# Patient Record
Sex: FEMALE | Race: WHITE | NOT HISPANIC OR LATINO | Employment: OTHER | URBAN - METROPOLITAN AREA
[De-identification: names, ages, dates, MRNs, and addresses within clinical notes are randomized per-mention and may not be internally consistent; named-entity substitution may affect disease eponyms.]

---

## 2017-06-08 ENCOUNTER — TRANSFERRED RECORDS (OUTPATIENT)
Dept: HEALTH INFORMATION MANAGEMENT | Facility: CLINIC | Age: 57
End: 2017-06-08

## 2018-06-14 ENCOUNTER — TRANSFERRED RECORDS (OUTPATIENT)
Dept: HEALTH INFORMATION MANAGEMENT | Facility: CLINIC | Age: 58
End: 2018-06-14

## 2019-02-05 ENCOUNTER — TRANSFERRED RECORDS (OUTPATIENT)
Dept: HEALTH INFORMATION MANAGEMENT | Facility: CLINIC | Age: 59
End: 2019-02-05

## 2019-06-13 ENCOUNTER — TRANSFERRED RECORDS (OUTPATIENT)
Dept: HEALTH INFORMATION MANAGEMENT | Facility: CLINIC | Age: 59
End: 2019-06-13

## 2019-07-18 ENCOUNTER — TRANSFERRED RECORDS (OUTPATIENT)
Dept: HEALTH INFORMATION MANAGEMENT | Facility: CLINIC | Age: 59
End: 2019-07-18

## 2019-08-13 ENCOUNTER — DOCUMENTATION ONLY (OUTPATIENT)
Dept: CARE COORDINATION | Facility: CLINIC | Age: 59
End: 2019-08-13

## 2019-09-26 ENCOUNTER — TRANSFERRED RECORDS (OUTPATIENT)
Dept: HEALTH INFORMATION MANAGEMENT | Facility: CLINIC | Age: 59
End: 2019-09-26

## 2019-12-16 ENCOUNTER — NURSE TRIAGE (OUTPATIENT)
Dept: NURSING | Facility: CLINIC | Age: 59
End: 2019-12-16

## 2020-01-08 ENCOUNTER — OFFICE VISIT (OUTPATIENT)
Dept: FAMILY MEDICINE | Facility: CLINIC | Age: 60
End: 2020-01-08
Payer: COMMERCIAL

## 2020-01-08 ENCOUNTER — TELEPHONE (OUTPATIENT)
Dept: PALLIATIVE MEDICINE | Facility: CLINIC | Age: 60
End: 2020-01-08

## 2020-01-08 ENCOUNTER — TELEPHONE (OUTPATIENT)
Dept: RHEUMATOLOGY | Facility: CLINIC | Age: 60
End: 2020-01-08

## 2020-01-08 VITALS
WEIGHT: 156 LBS | BODY MASS INDEX: 28.71 KG/M2 | OXYGEN SATURATION: 97 % | HEIGHT: 62 IN | RESPIRATION RATE: 16 BRPM | HEART RATE: 84 BPM | TEMPERATURE: 97 F | SYSTOLIC BLOOD PRESSURE: 110 MMHG | DIASTOLIC BLOOD PRESSURE: 70 MMHG

## 2020-01-08 DIAGNOSIS — H81.09 MENIERE'S DISEASE, UNSPECIFIED LATERALITY: ICD-10-CM

## 2020-01-08 DIAGNOSIS — M06.9 RHEUMATOID ARTHRITIS, INVOLVING UNSPECIFIED SITE, UNSPECIFIED RHEUMATOID FACTOR PRESENCE: ICD-10-CM

## 2020-01-08 DIAGNOSIS — I38 VALVULAR HEART DISEASE: ICD-10-CM

## 2020-01-08 DIAGNOSIS — G89.4 CHRONIC PAIN SYNDROME: ICD-10-CM

## 2020-01-08 DIAGNOSIS — I10 HYPERTENSION GOAL BP (BLOOD PRESSURE) < 140/90: ICD-10-CM

## 2020-01-08 DIAGNOSIS — E11.65 TYPE 2 DIABETES MELLITUS WITH HYPERGLYCEMIA, WITHOUT LONG-TERM CURRENT USE OF INSULIN (H): Primary | ICD-10-CM

## 2020-01-08 DIAGNOSIS — F32.0 MILD MAJOR DEPRESSION (H): ICD-10-CM

## 2020-01-08 PROCEDURE — 99204 OFFICE O/P NEW MOD 45 MIN: CPT | Performed by: FAMILY MEDICINE

## 2020-01-08 RX ORDER — HYDROCODONE BITARTRATE AND ACETAMINOPHEN 10; 325 MG/1; MG/1
1 TABLET ORAL
Status: CANCELLED | OUTPATIENT
Start: 2020-01-08

## 2020-01-08 RX ORDER — CELECOXIB 200 MG/1
200 CAPSULE ORAL 2 TIMES DAILY
Qty: 60 CAPSULE | Refills: 1 | Status: SHIPPED | OUTPATIENT
Start: 2020-01-08 | End: 2020-04-01

## 2020-01-08 RX ORDER — FENTANYL 25 UG/1
1 PATCH TRANSDERMAL
Status: CANCELLED | OUTPATIENT
Start: 2020-01-08

## 2020-01-08 RX ORDER — CELECOXIB 200 MG/1
200 CAPSULE ORAL DAILY
Status: CANCELLED | OUTPATIENT
Start: 2020-01-08

## 2020-01-08 ASSESSMENT — PATIENT HEALTH QUESTIONNAIRE - PHQ9
5. POOR APPETITE OR OVEREATING: SEVERAL DAYS
SUM OF ALL RESPONSES TO PHQ QUESTIONS 1-9: 8
5. POOR APPETITE OR OVEREATING: SEVERAL DAYS

## 2020-01-08 ASSESSMENT — ANXIETY QUESTIONNAIRES
5. BEING SO RESTLESS THAT IT IS HARD TO SIT STILL: NOT AT ALL
7. FEELING AFRAID AS IF SOMETHING AWFUL MIGHT HAPPEN: NOT AT ALL
6. BECOMING EASILY ANNOYED OR IRRITABLE: NOT AT ALL
2. NOT BEING ABLE TO STOP OR CONTROL WORRYING: SEVERAL DAYS
5. BEING SO RESTLESS THAT IT IS HARD TO SIT STILL: NOT AT ALL
7. FEELING AFRAID AS IF SOMETHING AWFUL MIGHT HAPPEN: NOT AT ALL
6. BECOMING EASILY ANNOYED OR IRRITABLE: NOT AT ALL
IF YOU CHECKED OFF ANY PROBLEMS ON THIS QUESTIONNAIRE, HOW DIFFICULT HAVE THESE PROBLEMS MADE IT FOR YOU TO DO YOUR WORK, TAKE CARE OF THINGS AT HOME, OR GET ALONG WITH OTHER PEOPLE: SOMEWHAT DIFFICULT
3. WORRYING TOO MUCH ABOUT DIFFERENT THINGS: SEVERAL DAYS
2. NOT BEING ABLE TO STOP OR CONTROL WORRYING: SEVERAL DAYS
3. WORRYING TOO MUCH ABOUT DIFFERENT THINGS: SEVERAL DAYS
IF YOU CHECKED OFF ANY PROBLEMS ON THIS QUESTIONNAIRE, HOW DIFFICULT HAVE THESE PROBLEMS MADE IT FOR YOU TO DO YOUR WORK, TAKE CARE OF THINGS AT HOME, OR GET ALONG WITH OTHER PEOPLE: SOMEWHAT DIFFICULT
1. FEELING NERVOUS, ANXIOUS, OR ON EDGE: MORE THAN HALF THE DAYS
GAD7 TOTAL SCORE: 5

## 2020-01-08 ASSESSMENT — MIFFLIN-ST. JEOR: SCORE: 1237.92

## 2020-01-08 NOTE — TELEPHONE ENCOUNTER
Reason for Call:  Other appointment    Detailed comments: patient has a referral for Hematology and needs to be seen ASAP if possible.  Please try and work her in soon.    She takes medication and has been out of it for 4 months.    Please advise.    Phone Number Patient can be reached at: Home number on file [phone] (home)    Best Time: asap    Can we leave a detailed message on this number? YES    Call taken on 1/8/2020 at 4:15 PM by Betty Harris

## 2020-01-08 NOTE — TELEPHONE ENCOUNTER
Pain Management Center Referral      1. Confirmed address with patient? Yes  2. Confirmed phone number with patient? Yes  3. Confirmed referring provider? Yes  4. Is the PCP the same as the referring provider? Yes  5. Has the patient been to any previous pain clinics? Yes  (If yes, send NICK with welcome letter)  6. Which insurance are we to bill for this appointment?  [employer]    7. Informed pt of cancellation (48 hour) policy? Yes    REGARDING OPIOID MEDICATIONS: We will always address appropriateness of opioid pain medications, but we generally will not automatically take on a prescribing role. When we do take on prescribing of opioids for chronic pain, it is in collaboration with the referring physician for an intermediate period of time (months), with an expectation that the primary physician or provider will assume the prescribing role if medications are effective at stable doses with demonstrated compliance. Therefore, please do not assume that your prescribing responsibilities end on the day of pain clinic consultation.  8. Informed pt of prescribing policy? Yes    9.Please be aware that once you are established with a pain provider and location, you will need to continue have all future visits with that provider and location. It is best to determine what location is the most convenient for you and schedule with that one.    ** PATIENT INFORMED OF THIS POLICY Yes      9. Referring Provider: Victor Hugo Liu    [facility]

## 2020-01-08 NOTE — PROGRESS NOTES
Subjective     Betsey Vanessa is a 59 year old female who presents to clinic today for the following health issues:    HPI   New Patient/Transfer of Care  Recently moved to [place] from [place].   Has diabetes last office visit for this was 9/2019    Has Meniere's disease requesting referral for ENT.    Reports a history of Diabetes -  Patient is being treated with oral agents and denies significant side effects. Control has been good. Complicating factors include but are not limited to: hypertension.   Last A1C noted in Epic was 6.9 in 4/2019.    Reports a history of hypertension - Denies any symptoms referable to elevated blood pressure. Specifically denies chest pain, palpitations, dyspnea, orthopnea, PND or peripheral edema. Blood pressure readings have been in normal range. Current medication regimen is as listed below. Patient denies any side effects of medication.     History of Rheumatoid Arthritis and Sjogren's syndrome- she is currently on Evoxac, Celebrex and IV Tocilizumab every month.   Also on long term narcotics to include Fentanyl patch for chronic pain.   Takes Clonazepam at night for sleep.   Requesting a referral to Rheumatology.    Reports a history of valvular heart disease with mitral valve stenosis; mitral regurgitation and severe aortic valve stenosis. States that she was seeing a Cardiologist and is requesting a referral.   [facility]     Reports a history of mild major depression- states that she has been stable on Lexapro 10 mg/day.  - PHQ-9/BONNIE 7 completed, see Epic for details        Patient Active Problem List   Diagnosis     Type 2 diabetes mellitus with hyperglycemia, without long-term current use of insulin (H)     Hypertension goal BP (blood pressure) < 140/90     Meniere's disease, unspecified laterality     Rheumatoid arthritis, involving unspecified site, unspecified rheumatoid factor presence (H)     Valvular heart disease      Chronic pain syndrome     Mild major depression (H)     Past Surgical History:   Procedure Laterality Date     ------------OTHER-------------      D&C     HYSTERECTOMY       INNER EAR SURGERY         Social History     Tobacco Use     Smoking status: Former Smoker     Start date: 1978     Last attempt to quit: 1998     Years since quittin.0     Smokeless tobacco: Never Used     Tobacco comment: 1 pack a day    Substance Use Topics     Alcohol use: No     Family History   Problem Relation Age of Onset     C.A.D. Mother      Hypertension Unknown         grandmother     Cerebrovascular Disease Father      Alcohol/Drug Father      Alcohol/Drug Brother      Alcohol/Drug Sister         aunt     Allergies Unknown         All family members     Alzheimer Disease Mother      Arthritis Unknown         aunt     Cardiovascular Mother      Eye Disorder Mother      Depression Father      Obesity Sister      Obesity Father         aunt     Thyroid Disease Mother      Thyroid Disease Sister      Asthma Father      Asthma Sister         daughter         Current Outpatient Medications   Medication Sig Dispense Refill     celecoxib (CELEBREX) 200 MG capsule Take 1 capsule (200 mg) by mouth 2 times daily 2 x a day 60 capsule 1     cevimeline (EVOXAC) 30 MG capsule Take 30 mg by mouth 3 times daily 4 x daily       clonazePAM (KLONOPIN) 2 MG tablet Take 2 mg by mouth Nightly as needed for sleep problem.       docusate calcium (GNP DOCUSATE CALCIUM) 240 MG capsule Take 240 mg by mouth daily       EPINEPHrine 0.3 MG/0.3ML SOLN PRN for Anaphylaxis       escitalopram (LEXAPRO) 10 MG tablet Take 10 mg by mouth daily       fentaNYL (DURAGESIC) 25 mcg/hr patch 72 hr Place 1 patch onto the skin every 72 hours       HYDROcodone-acetaminophen (NORCO)  MG per tablet Take 1 tablet by mouth Every 8 hrs. As needed for pain       metFORMIN (GLUCOPHAGE) 500 MG tablet Take 500 mg by mouth 2 times daily (with meals) 1000 mg 2 x a day   "     metroNIDAZOLE (METROGEL) 0.75 % gel Apply to face daily       nystatin (MYCOSTATIN) 066065 UNIT/GM POWD 2 x daily PRN for rash       omeprazole (PRILOSEC) 20 MG capsule Take by mouth daily 2 x daily       potassium chloride SA (K-DUR,KLOR-CON M) 10 MEQ tablet Take by mouth 2 times daily 20 mEq daily       promethazine (PHENERGAN) 25 MG suppository Place 25 mg rectally every 6 hours as needed For nusea       promethazine (PHENERGAN) 25 MG tablet Take by mouth every 6 hours as needed For nusea       psyllium (METAMUCIL) 58.6 % POWD Take by mouth daily PRN       TOCILIZUMAB IV Inject into the vein every 30 days       triamterene-hydrochlorothiazide (DYAZIDE) 37.5-25 MG per capsule 1 capsule every morning       VITAMIN D, CHOLECALCIFEROL, PO Take 50,000 Units by mouth daily Every 7 days       glucose blood VI test strips strip daily       Allergies   Allergen Reactions     Cevimeline Other (See Comments)     Duloxetine Other (See Comments)     Topiramate Other (See Comments)     Other reaction(s): Confusion     Amitriptyline Embonate [Amitriptyline]      Hyper activity     Azithromycin Hives     Cymbalta      Hyper activity     Gabapentin Hives     Macrobid [Nitrofurantoin] Hives     Paxil [Paroxetine] Other (See Comments)     Hyper behavior     Penicillins Hives     Tetracycline Hives       Reviewed and updated as needed this visit by Provider         Review of Systems   ROS COMP: Constitutional, HEENT, cardiovascular, pulmonary, gi and gu systems are negative, except as otherwise noted.      Objective    /70   Pulse 84   Temp 97  F (36.1  C) (Tympanic)   Resp 16   Ht 1.562 m (5' 1.5\")   Wt 70.8 kg (156 lb)   SpO2 97%   BMI 29.00 kg/m    Body mass index is 29 kg/m .  Physical Exam   GENERAL: healthy, alert and no distress  NECK: no adenopathy, no asymmetry, masses, or scars and thyroid normal to palpation  RESP: lungs clear to auscultation - no rales, rhonchi or wheezes  CV: regular rate and rhythm, " normal S1 S2, no S3 or S4, no murmur, click or rub, no peripheral edema and peripheral pulses strong  MS: no gross musculoskeletal defects noted, no edema    Diagnostic Test Results:  none         Assessment & Plan     Betsey was seen today for establish care.    Diagnoses and all orders for this visit:    Type 2 diabetes mellitus with hyperglycemia, without long-term current use of insulin (H)  Continue current management.    Hypertension goal BP (blood pressure) < 140/90, controlled  Continue current management.    Meniere's disease, unspecified laterality  -     OTOLARYNGOLOGY REFERRAL    Rheumatoid arthritis, involving unspecified site, unspecified rheumatoid factor presence (H)  -     RHEUMATOLOGY REFERRAL  -     Refill: celecoxib (CELEBREX) 200 MG capsule; Take 1 capsule (200 mg) by mouth 2 times daily 2 x a day    Valvular heart disease  -     CARDIOLOGY EVAL ADULT REFERRAL    Chronic pain syndrome  -     PAIN MANAGEMENT REFERRAL    Mild major depression (H)  - PHQ-9/BONNIE 7 completed, see Epic for details    - Continue current management.      Return in about 1 month (around 2/8/2020) for Follow up.  Patient made aware of our Internal Medicine provider- Dr Ken, if wishing to establish with him.     Velma Ibanez MD  [facility]

## 2020-01-09 ASSESSMENT — ANXIETY QUESTIONNAIRES: GAD7 TOTAL SCORE: 5

## 2020-01-12 PROBLEM — G89.4 CHRONIC PAIN SYNDROME: Status: ACTIVE | Noted: 2020-01-12

## 2020-01-12 PROBLEM — I10 HYPERTENSION GOAL BP (BLOOD PRESSURE) < 140/90: Status: ACTIVE | Noted: 2020-01-12

## 2020-01-12 PROBLEM — E11.65 TYPE 2 DIABETES MELLITUS WITH HYPERGLYCEMIA, WITHOUT LONG-TERM CURRENT USE OF INSULIN (H): Status: ACTIVE | Noted: 2020-01-12

## 2020-01-12 PROBLEM — F32.0 MILD MAJOR DEPRESSION (H): Status: ACTIVE | Noted: 2020-01-12

## 2020-01-12 PROBLEM — I38 VALVULAR HEART DISEASE: Status: ACTIVE | Noted: 2020-01-12

## 2020-01-12 PROBLEM — M06.9 RHEUMATOID ARTHRITIS, INVOLVING UNSPECIFIED SITE, UNSPECIFIED RHEUMATOID FACTOR PRESENCE: Status: ACTIVE | Noted: 2020-01-12

## 2020-01-12 PROBLEM — H81.09 MENIERE'S DISEASE, UNSPECIFIED LATERALITY: Status: ACTIVE | Noted: 2020-01-12

## 2020-01-13 NOTE — TELEPHONE ENCOUNTER
Had a cancellation for 1/14/2020 in [place] 11:20, patient is able to make it.  Madeline Cheung [facility] Rheumat[facility]  1/13/2020 9:29 AM

## 2020-01-14 ENCOUNTER — ANCILLARY PROCEDURE (OUTPATIENT)
Dept: GENERAL RADIOLOGY | Facility: CLINIC | Age: 60
End: 2020-01-14
Attending: INTERNAL MEDICINE
Payer: COMMERCIAL

## 2020-01-14 ENCOUNTER — OFFICE VISIT (OUTPATIENT)
Dept: RHEUMATOLOGY | Facility: CLINIC | Age: 60
End: 2020-01-14
Payer: COMMERCIAL

## 2020-01-14 VITALS
OXYGEN SATURATION: 94 % | HEIGHT: 62 IN | DIASTOLIC BLOOD PRESSURE: 71 MMHG | WEIGHT: 159.2 LBS | HEART RATE: 82 BPM | SYSTOLIC BLOOD PRESSURE: 138 MMHG | BODY MASS INDEX: 29.3 KG/M2

## 2020-01-14 DIAGNOSIS — M06.9 RHEUMATOID ARTHRITIS, INVOLVING UNSPECIFIED SITE, UNSPECIFIED RHEUMATOID FACTOR PRESENCE: ICD-10-CM

## 2020-01-14 DIAGNOSIS — M06.9 RHEUMATOID ARTHRITIS, INVOLVING UNSPECIFIED SITE, UNSPECIFIED RHEUMATOID FACTOR PRESENCE: Primary | ICD-10-CM

## 2020-01-14 DIAGNOSIS — G89.4 CHRONIC PAIN SYNDROME: ICD-10-CM

## 2020-01-14 DIAGNOSIS — R53.83 FATIGUE, UNSPECIFIED TYPE: ICD-10-CM

## 2020-01-14 DIAGNOSIS — M35.01 SJOGREN'S SYNDROME WITH KERATOCONJUNCTIVITIS SICCA (H): ICD-10-CM

## 2020-01-14 DIAGNOSIS — M54.2 NECK PAIN: ICD-10-CM

## 2020-01-14 DIAGNOSIS — M81.0 OSTEOPOROSIS, UNSPECIFIED OSTEOPOROSIS TYPE, UNSPECIFIED PATHOLOGICAL FRACTURE PRESENCE: ICD-10-CM

## 2020-01-14 DIAGNOSIS — Z11.59 ENCOUNTER FOR SCREENING FOR OTHER VIRAL DISEASES: ICD-10-CM

## 2020-01-14 LAB
ALBUMIN SERPL-MCNC: 4 G/DL (ref 3.4–5)
ALBUMIN UR-MCNC: NEGATIVE MG/DL
ALP SERPL-CCNC: 111 U/L (ref 40–150)
ALT SERPL W P-5'-P-CCNC: 37 U/L (ref 0–50)
ANION GAP SERPL CALCULATED.3IONS-SCNC: 6 MMOL/L (ref 3–14)
APPEARANCE UR: CLEAR
AST SERPL W P-5'-P-CCNC: 30 U/L (ref 0–45)
BACTERIA #/AREA URNS HPF: ABNORMAL /HPF
BASOPHILS # BLD AUTO: 0 10E9/L (ref 0–0.2)
BASOPHILS NFR BLD AUTO: 0.2 %
BILIRUB SERPL-MCNC: 0.5 MG/DL (ref 0.2–1.3)
BILIRUB UR QL STRIP: NEGATIVE
BUN SERPL-MCNC: 13 MG/DL (ref 7–30)
CALCIUM SERPL-MCNC: 9.5 MG/DL (ref 8.5–10.1)
CHLORIDE SERPL-SCNC: 103 MMOL/L (ref 94–109)
CK SERPL-CCNC: 53 U/L (ref 30–225)
CO2 SERPL-SCNC: 28 MMOL/L (ref 20–32)
COLOR UR AUTO: YELLOW
CREAT SERPL-MCNC: 0.7 MG/DL (ref 0.52–1.04)
CREAT UR-MCNC: 214 MG/DL
CRP SERPL-MCNC: 7.6 MG/L (ref 0–8)
DIFFERENTIAL METHOD BLD: ABNORMAL
EOSINOPHIL # BLD AUTO: 0.2 10E9/L (ref 0–0.7)
EOSINOPHIL NFR BLD AUTO: 3.4 %
ERYTHROCYTE [DISTWIDTH] IN BLOOD BY AUTOMATED COUNT: 12.7 % (ref 10–15)
ERYTHROCYTE [SEDIMENTATION RATE] IN BLOOD BY WESTERGREN METHOD: 27 MM/H (ref 0–30)
GFR SERPL CREATININE-BSD FRML MDRD: >90 ML/MIN/{1.73_M2}
GLUCOSE SERPL-MCNC: 120 MG/DL (ref 70–99)
GLUCOSE UR STRIP-MCNC: NEGATIVE MG/DL
HCT VFR BLD AUTO: 35.5 % (ref 35–47)
HGB BLD-MCNC: 12.2 G/DL (ref 11.7–15.7)
HGB UR QL STRIP: NEGATIVE
KETONES UR STRIP-MCNC: ABNORMAL MG/DL
LEUKOCYTE ESTERASE UR QL STRIP: ABNORMAL
LYMPHOCYTES # BLD AUTO: 1.6 10E9/L (ref 0.8–5.3)
LYMPHOCYTES NFR BLD AUTO: 30.8 %
MCH RBC QN AUTO: 29.4 PG (ref 26.5–33)
MCHC RBC AUTO-ENTMCNC: 34.4 G/DL (ref 31.5–36.5)
MCV RBC AUTO: 86 FL (ref 78–100)
MONOCYTES # BLD AUTO: 0.4 10E9/L (ref 0–1.3)
MONOCYTES NFR BLD AUTO: 7.1 %
NEUTROPHILS # BLD AUTO: 3 10E9/L (ref 1.6–8.3)
NEUTROPHILS NFR BLD AUTO: 58.5 %
NITRATE UR QL: NEGATIVE
NON-SQ EPI CELLS #/AREA URNS LPF: ABNORMAL /LPF
PH UR STRIP: 5.5 PH (ref 5–7)
PLATELET # BLD AUTO: 138 10E9/L (ref 150–450)
POTASSIUM SERPL-SCNC: 4 MMOL/L (ref 3.4–5.3)
PROT SERPL-MCNC: 7.9 G/DL (ref 6.8–8.8)
PROT UR-MCNC: 0.06 G/L
PROT/CREAT 24H UR: 0.03 G/G CR (ref 0–0.2)
PTH-INTACT SERPL-MCNC: 62 PG/ML (ref 18–80)
RBC # BLD AUTO: 4.15 10E12/L (ref 3.8–5.2)
RBC #/AREA URNS AUTO: ABNORMAL /HPF
SODIUM SERPL-SCNC: 137 MMOL/L (ref 133–144)
SOURCE: ABNORMAL
SP GR UR STRIP: >1.03 (ref 1–1.03)
TSH SERPL DL<=0.005 MIU/L-ACNC: 2.99 MU/L (ref 0.4–4)
UROBILINOGEN UR STRIP-ACNC: 0.2 EU/DL (ref 0.2–1)
WBC # BLD AUTO: 5 10E9/L (ref 4–11)
WBC #/AREA URNS AUTO: ABNORMAL /HPF

## 2020-01-14 PROCEDURE — 86200 CCP ANTIBODY: CPT | Performed by: INTERNAL MEDICINE

## 2020-01-14 PROCEDURE — 82306 VITAMIN D 25 HYDROXY: CPT | Performed by: INTERNAL MEDICINE

## 2020-01-14 PROCEDURE — 86140 C-REACTIVE PROTEIN: CPT | Performed by: INTERNAL MEDICINE

## 2020-01-14 PROCEDURE — 86160 COMPLEMENT ANTIGEN: CPT | Performed by: INTERNAL MEDICINE

## 2020-01-14 PROCEDURE — 86431 RHEUMATOID FACTOR QUANT: CPT | Performed by: INTERNAL MEDICINE

## 2020-01-14 PROCEDURE — 84156 ASSAY OF PROTEIN URINE: CPT | Performed by: INTERNAL MEDICINE

## 2020-01-14 PROCEDURE — 86618 LYME DISEASE ANTIBODY: CPT | Performed by: INTERNAL MEDICINE

## 2020-01-14 PROCEDURE — 86592 SYPHILIS TEST NON-TREP QUAL: CPT | Performed by: INTERNAL MEDICINE

## 2020-01-14 PROCEDURE — 36415 COLL VENOUS BLD VENIPUNCTURE: CPT | Performed by: INTERNAL MEDICINE

## 2020-01-14 PROCEDURE — 86038 ANTINUCLEAR ANTIBODIES: CPT | Performed by: INTERNAL MEDICINE

## 2020-01-14 PROCEDURE — 73130 X-RAY EXAM OF HAND: CPT | Mod: 59

## 2020-01-14 PROCEDURE — 73630 X-RAY EXAM OF FOOT: CPT | Mod: LT

## 2020-01-14 PROCEDURE — 86225 DNA ANTIBODY NATIVE: CPT | Performed by: INTERNAL MEDICINE

## 2020-01-14 PROCEDURE — 86235 NUCLEAR ANTIGEN ANTIBODY: CPT | Performed by: INTERNAL MEDICINE

## 2020-01-14 PROCEDURE — 87340 HEPATITIS B SURFACE AG IA: CPT | Performed by: INTERNAL MEDICINE

## 2020-01-14 PROCEDURE — 81001 URINALYSIS AUTO W/SCOPE: CPT | Performed by: INTERNAL MEDICINE

## 2020-01-14 PROCEDURE — 84443 ASSAY THYROID STIM HORMONE: CPT | Performed by: INTERNAL MEDICINE

## 2020-01-14 PROCEDURE — 86147 CARDIOLIPIN ANTIBODY EA IG: CPT | Performed by: INTERNAL MEDICINE

## 2020-01-14 PROCEDURE — 86146 BETA-2 GLYCOPROTEIN ANTIBODY: CPT | Performed by: INTERNAL MEDICINE

## 2020-01-14 PROCEDURE — 85652 RBC SED RATE AUTOMATED: CPT | Performed by: INTERNAL MEDICINE

## 2020-01-14 PROCEDURE — 86803 HEPATITIS C AB TEST: CPT | Performed by: INTERNAL MEDICINE

## 2020-01-14 PROCEDURE — 82550 ASSAY OF CK (CPK): CPT | Performed by: INTERNAL MEDICINE

## 2020-01-14 PROCEDURE — 72050 X-RAY EXAM NECK SPINE 4/5VWS: CPT

## 2020-01-14 PROCEDURE — 83970 ASSAY OF PARATHORMONE: CPT | Performed by: INTERNAL MEDICINE

## 2020-01-14 PROCEDURE — 80053 COMPREHEN METABOLIC PANEL: CPT | Performed by: INTERNAL MEDICINE

## 2020-01-14 PROCEDURE — 99204 OFFICE O/P NEW MOD 45 MIN: CPT | Performed by: INTERNAL MEDICINE

## 2020-01-14 PROCEDURE — 86481 TB AG RESPONSE T-CELL SUSP: CPT | Performed by: INTERNAL MEDICINE

## 2020-01-14 PROCEDURE — 85025 COMPLETE CBC W/AUTO DIFF WBC: CPT | Performed by: INTERNAL MEDICINE

## 2020-01-14 PROCEDURE — 86704 HEP B CORE ANTIBODY TOTAL: CPT | Performed by: INTERNAL MEDICINE

## 2020-01-14 PROCEDURE — 71046 X-RAY EXAM CHEST 2 VIEWS: CPT

## 2020-01-14 RX ORDER — CEVIMELINE HYDROCHLORIDE 30 MG/1
30 CAPSULE ORAL 3 TIMES DAILY
Qty: 270 CAPSULE | Refills: 1 | Status: SHIPPED | OUTPATIENT
Start: 2020-01-14 | End: 2020-04-20

## 2020-01-14 ASSESSMENT — MIFFLIN-ST. JEOR: SCORE: 1252.44

## 2020-01-14 NOTE — RESULT ENCOUNTER NOTE
Jaime Mcmillan, YAHAIRA: Please call to notify Ms. 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  No instability of the cervical spine.  Chest x-ray show an enlarged heart and she has known valvular issues with plans to establish with cardiology already.  Other x-rays were okay. Lab results are pending.     Terrence Carrillo MD  1/14/2020 5:46 PM

## 2020-01-14 NOTE — NURSING NOTE
RAPID3 (0-30) Cumulative Score  17.5          RAPID3 Weighted Score (divide #4 by 3 and that is the weighted score)  5.8     Madeline Cheung [facility] Rheumat[facility]  1/14/2020 11:16 AM

## 2020-01-14 NOTE — PATIENT INSTRUCTIONS
Rheumatology    Dr. Terrence Carrillo       [facility] in [place]   (Monday)  [address]  [place] [address]       [facility] in [place]   (Tuesday)  [address] [place] [address]    [facility] in [place]   (Wed., Thurs., and Friday)  [address] [place] [address]    Phone number: [phone]  Thank you for choosing [facility].  Madeline Cheung CMA

## 2020-01-14 NOTE — PROGRESS NOTES
Rheumatology Clinic Visit      Betsey Vanessa MRN# [id number]   YOB: 1960 Age: 59 year old      Date of visit: 1/14/20   PCP: Dr. Velma Ibanez  Referring provider: Dr. Velma Ibanez    Chief Complaint   Patient presents with:  Consult: RA and sjogren's.  Been off medictions for 4 months, starting to feel pain in hands, knees and feet. Pain wakes her up      Assessment and Plan     1.  Rheumatoid arthritis: Reportedly diagnosed many years ago.  Previously treated by Dr. Stacy at [facility] in [place], [place]. She recalls previous treatment including: MTX (LFT elevation after years of use), Remicade (ineffective, hives), Enbrel (dosen't recall it helping); and most recent treatment being Actemra 4 mg/kg IV every 8 weeks (used for years, last received in September 2019), and Celebrex.  Currently with mild synovitis at the bilateral MCPs.  She does need treatment but it would be helpful to know all of her past medications used and need screening labs prior to starting. If she hasn't been on leflunomide, SSZ, or HCQ in the past then these could be good options to consider before going back to Actemra.   - Labs today: CBC, CMP, ESR, CRP, RF, CCP, Hepatitis B/C, quantiferon TB gold plus, Lyme  - X-rays today: chest, bilateral hands/feet  - Request last 2 rheumatology clinic notes.     1/15/2020 addendum: 7/18/2019 rheumatology clinic note by Dr. Mejia received and reviewed.[email or web address] documents that the patient has seronegative rheumatoid arthritis.  On Tocilizumab 8 mg/kg IV every month in combination with methotrexate 15 mg SQ weekly.  On chronic narcotics.  She was started on ibandronate 3 mg IV every 3 months but had not yet had her first dose; last Actemra dose was on 7/2/2019 at 650 mg.  Has been successfully maintained on Actemra over the last 7 to 8 years.  She has had lots of complications and failures of both biologic and oral DMARDs.  Has failed methotrexate + rituximab,  Enbrel + leflunomide; had elevated liver enzymes preventing oral DMARDs;.  Low disease activity when receiving Actemra 8 mg/kg IV every 4 weeks.  Liver enzyme elevation thought to be secondary to methotrexate and ALEGRIA.  If platelets are reduced then she plans to reduce Actemra to 4 mg/kg.    2. Chronic neck pain in the setting of rheumatoid arthritis: check x-rays today.  - X-rays today: cervical spine with flex/ext views    3. Sjogren's Syndrome: reportedly had punctal plugs in the past.  Doing okay with artificial tears, Xiidra, and Evoxac.  Encouraged frequent sips of water, visits with a dentist at least every 6months, avoidance of sugary foods/drinks.   - Labs: CBC, CMP, ESR, CRP, MARIO, ROQUE, dsDNA, C3, C4, cardiolipin, beta-2 glycoprotein, RPR, CK, UA, Uprotein:creatinine    4. Chronic pain syndrome: she plans to establish with the [place] Clinic [place] [facility] [place]; already has a referral from her primary care provider  - Lab today: TSH    5. Reported osteoporosis: reportedly based on DEXA performed at [facility] in [place], [place]. Previously on alendronate (GI upset) and then received one dose of boniva in Sept 2019 per patient.    - Request outside DEXA report  - Request last 2 rheumatology clinic notes  - Labs: Ca, Cr, Vitamin D, PTH    6. Outside records are needed and the above workup is needed.  I explained to Ms. Vanessa that I will be out of town and will need to review her results and review the outside records when I return and she verbalized understanding and thanked me for the heads up and thanked me for seeing her today. Will review results and discuss treatment plan in clinic on 2/4/2020.    Ms. Vanessa verbalized agreement with and understanding of the rational for the diagnosis and treatment plan.  All questions were answered to best of my ability and the patient's satisfaction. Ms. Vanessa was advised to contact the clinic with any questions that may arise after the clinic  "visit.      Thank you for involving me in the care of the patient    Return to clinic: 2/4/2020    HPI   Betsey Vanessa is a 59 year old female with a past medical history significant for hypertension, depression, type 2 diabetes, Ménière's disease, and rheumatoid arthritis who is seen in consultation at the request of Dr. Velma Ibanez for evaluation of rheumatoid arthritis.    8/16/2019 documentation by Dr. Juani Stacy at [facility] ([place], [place]) notes rheumatoid arthritis and thrombocytopenia    1/8/2020 clinic note by Dr. Ibanez documents Ménière's disease-referring to ENT.  Diabetes.  Hypertension.  Rheumatoid arthritis and Sjogren's syndrome currently on Evoxac, Celebrex, IV Tocilizumab monthly.  Also on long-term narcotics that includes fentanyl patch for chronic pain on clonazepam for sleep at night.  Valvular heart disease with mitral valve stenosis, mitral valve regurgitation, and severe aortic valve stenosis and was following with a cardiologist with plans to establish with a new cardiologist here in [place].  Depression stable on Lexapro.    Recently moved from [place] to [place]    Today, Ms. Vanessa reports that she has RA diagnosed many years ago, chronic pain syndrome with plans to establish in the pain clinic, depression that is controlled, diabetes tx'd with oral medications, and hearing loss with plans to see ENT here.  Also states that she has \"bad bones\" and has been on Boniva injection x1 dose in February. Failed fosamax because of associated GI upset; used for 6 weeks. [facility] St[facility].  Return (treated with methotrexate because liver enzyme elevations, Remicade that was ineffective and caused hives, Enbrel that she does not recall helping in any way, and more recently Actemra 4 mg/kg IV every 8 weeks that has been used for years, last received in September 2019.  Also with Celebrex.  She says that since her " last dose of Actemra she has not seen any worsening of her arthritis.  Still with some pain at her MCPs that is worse in the morning and improves with time and activity.  Morning stiffness for approximately 1 hour.  Positive gelling phenomenon.  Diffuse body pain.    Denies fevers, chills, nausea, or vomiting.  Occasional constipation or diarrhea. No abdominal pain. No chest pain/pressure, palpitations, or shortness of breath. No LE swelling.  Chronic neck and lower back pain that were managed in the pain clinic previously and she plans to establish care in the pain clinic here soon. No oral or nasal sores.  No rash.  Dry eyes treated with artificial tears and Xiidra; she plans to establish with an ophthalmologist because she was followed by ophthalmology when in [place]; hx of punctal plugs.  Dry mouth is treated with frequent sips of water and Evoxac; doesn't recall ever being on pilocarpine. Evoxac is listed in her allergy list and she says that is inaccurate and then pulls out her Evoxac Rx to show that she has it and she says it helps and she's never had an adverse reaction to Evoxac.  No photosensitivity or photophobia.  No history of uveitis. No history of inflammatory bowel disease.  No history of DVT, pulmonary embolism, or miscarriage.   No history of serositis.  No history of Raynaud's Phenomenon.  No seizure history.   No known renal disorder.      Tobacco: Quit smoking in 1998  EtOH: None  Drugs: None    ROS   GEN: No fevers, chills, night sweats, or weight change  SKIN: No itching, rashes, sores  HEENT: No epistaxis. No oral or nasal ulcers.  CV: No chest pain, pressure, palpitations, or dyspnea on exertion.  PULM: No SOB, wheeze, cough.  GI:  No blood in stool. No abdominal pain.  See HPI  : No blood in urine.  MSK: See HPI.  NEURO: No numbness or tingling  ENDO: No heat/cold intolerance.  EXT: No LE swelling  PSYCH: See HPI    Active Problem List     Patient Active Problem List   Diagnosis      Type 2 diabetes mellitus with hyperglycemia, without long-term current use of insulin (H)     Hypertension goal BP (blood pressure) < 140/90     Meniere's disease, unspecified laterality     Rheumatoid arthritis, involving unspecified site, unspecified rheumatoid factor presence (H)     Valvular heart disease     Chronic pain syndrome     Mild major depression (H)     Past Medical History     Past Medical History:   Diagnosis Date     Anemia     r/t menses     Anxiety      Arthritis      Constipation      Depression      Diabetes (H)      Diarrhea      Double vision      Headache(784.0)      Hearing loss      Heart murmur      Heartburn      Hypertension      Indigestion      Nasal congestion      Night sweats      Numbness      Problems related to lack of adequate sleep      Rash      Sneezing      Tinnitus      Visual loss      Weakness      Weight gain     because of Prednisone     Past Surgical History     Past Surgical History:   Procedure Laterality Date     ------------OTHER-------------      D&C     HYSTERECTOMY       INNER EAR SURGERY       Allergy     Allergies   Allergen Reactions     Cevimeline Other (See Comments)     Duloxetine Other (See Comments)     Topiramate Other (See Comments)     Other reaction(s): Confusion     Amitriptyline Embonate [Amitriptyline]      Hyper activity     Azithromycin Hives     Cymbalta      Hyper activity     Gabapentin Hives     Macrobid [Nitrofurantoin] Hives     Paxil [Paroxetine] Other (See Comments)     Hyper behavior     Penicillins Hives     Tetracycline Hives     Current Medication List     Current Outpatient Medications   Medication Sig     celecoxib (CELEBREX) 200 MG capsule Take 1 capsule (200 mg) by mouth 2 times daily 2 x a day     cevimeline (EVOXAC) 30 MG capsule Take 30 mg by mouth 3 times daily 4 x daily     clonazePAM (KLONOPIN) 2 MG tablet Take 2 mg by mouth Nightly as needed for sleep problem.     docusate calcium (GNP DOCUSATE CALCIUM) 240 MG capsule  Take 240 mg by mouth daily     EPINEPHrine 0.3 MG/0.3ML SOLN PRN for Anaphylaxis     escitalopram (LEXAPRO) 10 MG tablet Take 10 mg by mouth daily     fentaNYL (DURAGESIC) 25 mcg/hr patch 72 hr Place 1 patch onto the skin every 72 hours     glucose blood VI test strips strip daily     HYDROcodone-acetaminophen (NORCO)  MG per tablet Take 1 tablet by mouth Every 8 hrs. As needed for pain     metFORMIN (GLUCOPHAGE) 500 MG tablet Take 500 mg by mouth 2 times daily (with meals) 1000 mg 2 x a day     metroNIDAZOLE (METROGEL) 0.75 % gel Apply to face daily     nystatin (MYCOSTATIN) 013216 UNIT/GM POWD 2 x daily PRN for rash     omeprazole (PRILOSEC) 20 MG capsule Take by mouth daily 2 x daily     potassium chloride SA (K-DUR,KLOR-CON M) 10 MEQ tablet Take by mouth 2 times daily 20 mEq daily     promethazine (PHENERGAN) 25 MG suppository Place 25 mg rectally every 6 hours as needed For nusea     promethazine (PHENERGAN) 25 MG tablet Take by mouth every 6 hours as needed For nusea     psyllium (METAMUCIL) 58.6 % POWD Take by mouth daily PRN     TOCILIZUMAB IV Inject into the vein every 30 days     triamterene-hydrochlorothiazide (DYAZIDE) 37.5-25 MG per capsule 1 capsule every morning     VITAMIN D, CHOLECALCIFEROL, PO Take 50,000 Units by mouth daily Every 7 days     No current facility-administered medications for this visit.          Social History   See HPI    Family History     Family History   Problem Relation Age of Onset     C.A.D. Mother      Alzheimer Disease Mother      Cardiovascular Mother      Eye Disorder Mother      Thyroid Disease Mother      Hypertension Other         grandmother     Cerebrovascular Disease Father      Alcohol/Drug Father      Depression Father      Obesity Father         aunt     Asthma Father      Alcohol/Drug Brother      Alcohol/Drug Sister         aunt     Allergies Other         All family members     Arthritis Other         aunt     Obesity Sister      Thyroid Disease Sister   "    Asthma Sister         daughter       Physical Exam     Temp Readings from Last 3 Encounters:   01/08/20 97  F (36.1  C) (Tympanic)     BP Readings from Last 5 Encounters:   01/14/20 138/71   01/08/20 110/70     Pulse Readings from Last 1 Encounters:   01/14/20 82     Resp Readings from Last 1 Encounters:   01/08/20 16     Estimated body mass index is 29.59 kg/m  as calculated from the following:    Height as of this encounter: 1.562 m (5' 1.5\").    Weight as of this encounter: 72.2 kg (159 lb 3.2 oz).    GEN: NAD  HEENT: MMM. No oral lesions. Anicteric, noninjected sclera.  Cochlear implant.  CV: S1, S2. RRR. No m/r/g.  PULM: CTA bilaterally. No w/c.  MSK: Bilateral second and fourth MCPs with subtle synovial swelling and tenderness to palpation on the palmar aspect of the joint.  PIPs and DIPs mildly tender to palpation but without swelling or increased warmth.  Wrists tender to palpation without swelling or increased warmth.  Elbows without swelling or tenderness to palpation.  Shoulders without swelling or tenderness to palpation.  Hips mildly tender to palpation but not with range of motion.  Knees, ankles, and MTPs without swelling or tenderness to palpation.  No dactylitis.  Achilles tendons nontender to palpation.  14 fibromyalgia tender points positive.    NEURO: UE and LE strengths 5/5 and equal bilaterally.   SKIN: No rash.  No nail pitting.  EXT: No LE edema  PSYCH: Alert. Appropriate.    Labs / Imaging (select studies)     [facility]   7/18/2013 CCP negative  8/5/2010 CCP negative  3/27/2013 hepatitis B surface antigen negative and hepatitis B core antibody negative  9/2/2011 hepatitis B surface antigen negative and hepatitis B core antibody negative  3/27/2013 hepatitis C antibody negative    Immunization History     Immunization History   Administered Date(s) Administered     FLU 6-35 months 10/22/2013     Influenza Vaccine IM > 6 months Valent IIV4 10/21/2015, 10/05/2016, " 10/11/2018, 09/25/2019     Influenza Vaccine, 3 YRS +, IM (QUADRIVALENT W/PRESERVATIVES) 09/18/2014, 10/03/2017     Pneumo Conj 13-V (2010&after) 10/11/2018     TD (ADULT, 7+) 04/20/1997, 04/17/2019     TDAP Vaccine (Adacel) 01/15/2009     Zoster vaccine recombinant adjuvanted (SHINGRIX) 10/11/2018          Chart documentation done in part with Dragon Voice recognition Software. Although reviewed after completion, some word and grammatical error may remain.    Terrence Carrillo MD

## 2020-01-15 LAB
ANA SER QL IF: NEGATIVE
B BURGDOR IGG+IGM SER QL: 0.05 (ref 0–0.89)
B2 GLYCOPROT1 IGG SERPL IA-ACNC: 0.7 U/ML
B2 GLYCOPROT1 IGM SERPL IA-ACNC: <2.9 U/ML
C3 SERPL-MCNC: 127 MG/DL (ref 81–157)
C4 SERPL-MCNC: 30 MG/DL (ref 13–39)
CARDIOLIPIN ANTIBODY IGG: <1.6 GPL-U/ML (ref 0–19.9)
CARDIOLIPIN ANTIBODY IGM: 1.6 MPL-U/ML (ref 0–19.9)
CCP AB SER IA-ACNC: 1 U/ML
DEPRECATED CALCIDIOL+CALCIFEROL SERPL-MC: 40 UG/L (ref 20–75)
DSDNA AB SER-ACNC: 2 IU/ML
ENA RNP IGG SER IA-ACNC: 2.7 AI (ref 0–0.9)
ENA SM IGG SER-ACNC: <0.2 AI (ref 0–0.9)
ENA SS-A IGG SER IA-ACNC: 0.4 AI (ref 0–0.9)
ENA SS-B IGG SER IA-ACNC: <0.2 AI (ref 0–0.9)
HBV CORE AB SERPL QL IA: NONREACTIVE
HBV SURFACE AG SERPL QL IA: NONREACTIVE
HCV AB SERPL QL IA: NONREACTIVE
RHEUMATOID FACT SER NEPH-ACNC: 20 IU/ML (ref 0–20)
RPR SER QL: NONREACTIVE

## 2020-01-16 LAB
GAMMA INTERFERON BACKGROUND BLD IA-ACNC: 0.02 IU/ML
M TB IFN-G BLD-IMP: NEGATIVE
M TB IFN-G CD4+ BCKGRND COR BLD-ACNC: 5.22 IU/ML
MITOGEN IGNF BCKGRD COR BLD-ACNC: 0 IU/ML
MITOGEN IGNF BCKGRD COR BLD-ACNC: 0 IU/ML

## 2020-01-17 ENCOUNTER — OFFICE VISIT (OUTPATIENT)
Dept: AUDIOLOGY | Facility: CLINIC | Age: 60
End: 2020-01-17
Payer: COMMERCIAL

## 2020-01-17 ENCOUNTER — OFFICE VISIT (OUTPATIENT)
Dept: OTOLARYNGOLOGY | Facility: CLINIC | Age: 60
End: 2020-01-17
Payer: COMMERCIAL

## 2020-01-17 VITALS — BODY MASS INDEX: 29.26 KG/M2 | HEIGHT: 62 IN | WEIGHT: 159 LBS

## 2020-01-17 DIAGNOSIS — R26.89 IMBALANCE: ICD-10-CM

## 2020-01-17 DIAGNOSIS — H81.03 MENIERE'S DISEASE, BILATERAL: Primary | ICD-10-CM

## 2020-01-17 DIAGNOSIS — H90.A22 SENSORINEURAL HEARING LOSS (SNHL) OF LEFT EAR WITH RESTRICTED HEARING OF RIGHT EAR: ICD-10-CM

## 2020-01-17 DIAGNOSIS — H90.3 SENSORINEURAL HEARING LOSS (SNHL) OF BOTH EARS: ICD-10-CM

## 2020-01-17 DIAGNOSIS — H90.A31 MIXED CONDUCTIVE AND SENSORINEURAL HEARING LOSS OF RIGHT EAR WITH RESTRICTED HEARING OF LEFT EAR: Primary | ICD-10-CM

## 2020-01-17 DIAGNOSIS — Z74.09 IMPAIRED FUNCTIONAL MOBILITY, BALANCE, GAIT, AND ENDURANCE: ICD-10-CM

## 2020-01-17 PROCEDURE — 92557 COMPREHENSIVE HEARING TEST: CPT | Performed by: AUDIOLOGIST

## 2020-01-17 PROCEDURE — 92567 TYMPANOMETRY: CPT | Performed by: AUDIOLOGIST

## 2020-01-17 PROCEDURE — 99203 OFFICE O/P NEW LOW 30 MIN: CPT | Performed by: OTOLARYNGOLOGY

## 2020-01-17 PROCEDURE — 99207 ZZC NO CHARGE LOS: CPT | Performed by: AUDIOLOGIST

## 2020-01-17 RX ORDER — MECLIZINE HYDROCHLORIDE 25 MG/1
25 TABLET ORAL 3 TIMES DAILY PRN
Qty: 60 TABLET | Refills: 3 | Status: SHIPPED | OUTPATIENT
Start: 2020-01-17 | End: 2020-04-01

## 2020-01-17 ASSESSMENT — MIFFLIN-ST. JEOR: SCORE: 1251.53

## 2020-01-17 NOTE — PROGRESS NOTES
I am seeing this patient in consultation for Meniere's disease at the request of the provider Dr. Velma Ibanez.      Chief Complaint - Meniere's Disease recheck     History of Present Illness - Betsey Vanessa is a 59 year old female with a long and complicated history of vertigo dating back more than 20 years.  She was initially diagnosed with Meniere's disease with intractable vertigo.  She underwent left labyrinthectomy which was reportedly her worse hearing ear in 1998. This did not help her symptoms at all and she had symptoms that seemed to emanate from the right.  She reports that her symptom constellation has remained the same but that they have increased over time. She also has had a right steroid injection in 2014 by Dr. Stallings. She had a BAHA placed one year ago.    Currently, the patient describes episodes of vertigo in which the they have a sense of motion. It lasts for all day. It is associated with nausea and/or vomiting. This has been going on for years. She takes compazine and clonazepam. Has tried valium. She is very sleepy from these. She has tried vestibular therapy.     Past Medical History -   Patient Active Problem List   Diagnosis     Type 2 diabetes mellitus with hyperglycemia, without long-term current use of insulin (H)     Hypertension goal BP (blood pressure) < 140/90     Meniere's disease, unspecified laterality     Rheumatoid arthritis, involving unspecified site, unspecified rheumatoid factor presence (H)     Valvular heart disease     Chronic pain syndrome     Mild major depression (H)       Current Medications -   Current Outpatient Medications:      celecoxib (CELEBREX) 200 MG capsule, Take 1 capsule (200 mg) by mouth 2 times daily 2 x a day, Disp: 60 capsule, Rfl: 1     cevimeline (EVOXAC) 30 MG capsule, Take 1 capsule (30 mg) by mouth 3 times daily, Disp: 270 capsule, Rfl: 1     clonazePAM (KLONOPIN) 2 MG tablet, Take 2 mg by mouth Nightly as needed for sleep problem., Disp: ,  Rfl:      docusate calcium (GNP DOCUSATE CALCIUM) 240 MG capsule, Take 240 mg by mouth daily, Disp: , Rfl:      EPINEPHrine 0.3 MG/0.3ML SOLN, PRN for Anaphylaxis, Disp: , Rfl:      escitalopram (LEXAPRO) 10 MG tablet, Take 10 mg by mouth daily, Disp: , Rfl:      fentaNYL (DURAGESIC) 25 mcg/hr patch 72 hr, Place 1 patch onto the skin every 72 hours, Disp: , Rfl:      glucose blood VI test strips strip, daily, Disp: , Rfl:      HYDROcodone-acetaminophen (NORCO)  MG per tablet, Take 1 tablet by mouth Every 8 hrs. As needed for pain, Disp: , Rfl:      lifitegrast (XIIDRA) 5 % opthalmic solution, Place 1 drop into both eyes 2 times daily, Disp: 24 each, Rfl: 1     metFORMIN (GLUCOPHAGE) 500 MG tablet, Take 500 mg by mouth 2 times daily (with meals) 1000 mg 2 x a day, Disp: , Rfl:      metroNIDAZOLE (METROGEL) 0.75 % gel, Apply to face daily, Disp: , Rfl:      nystatin (MYCOSTATIN) 919804 UNIT/GM POWD, 2 x daily PRN for rash, Disp: , Rfl:      omeprazole (PRILOSEC) 20 MG capsule, Take by mouth daily 2 x daily, Disp: , Rfl:      potassium chloride SA (K-DUR,KLOR-CON M) 10 MEQ tablet, Take by mouth 2 times daily 20 mEq daily, Disp: , Rfl:      promethazine (PHENERGAN) 25 MG suppository, Place 25 mg rectally every 6 hours as needed For nusea, Disp: , Rfl:      promethazine (PHENERGAN) 25 MG tablet, Take by mouth every 6 hours as needed For nusea, Disp: , Rfl:      psyllium (METAMUCIL) 58.6 % POWD, Take by mouth daily PRN, Disp: , Rfl:      TOCILIZUMAB IV, Inject into the vein every 30 days, Disp: , Rfl:      triamterene-hydrochlorothiazide (DYAZIDE) 37.5-25 MG per capsule, 1 capsule every morning, Disp: , Rfl:      VITAMIN D, CHOLECALCIFEROL, PO, Take 50,000 Units by mouth daily Every 7 days, Disp: , Rfl:     Allergies -   Allergies   Allergen Reactions     Duloxetine Other (See Comments)     Topiramate Other (See Comments)     Other reaction(s): Confusion     Amitriptyline Embonate [Amitriptyline]      Hyper activity      Azithromycin Hives     Cymbalta      Hyper activity     Gabapentin Hives     Macrobid [Nitrofurantoin] Hives     Paxil [Paroxetine] Other (See Comments)     Hyper behavior     Penicillins Hives     Tetracycline Hives       Social History -   Social History     Socioeconomic History     Marital status: Single     Spouse name: None     Number of children: None     Years of education: None     Highest education level: None   Occupational History     None   Social Needs     Financial resource strain: None     Food insecurity:     Worry: None     Inability: None     Transportation needs:     Medical: None     Non-medical: None   Tobacco Use     Smoking status: Former Smoker     Start date: 1978     Last attempt to quit: 1998     Years since quittin.0     Smokeless tobacco: Never Used     Tobacco comment: 1 pack a day    Substance and Sexual Activity     Alcohol use: No     Drug use: Never     Sexual activity: Not Currently   Lifestyle     Physical activity:     Days per week: None     Minutes per session: None     Stress: None   Relationships     Social connections:     Talks on phone: None     Gets together: None     Attends [employer] service: None     Active member of club or organization: None     Attends meetings of clubs or organizations: None     Relationship status: None     Intimate partner violence:     Fear of current or ex partner: None     Emotionally abused: None     Physically abused: None     Forced sexual activity: None   Other Topics Concern     None   Social History Narrative     None       Family History -   Family History   Problem Relation Age of Onset     C.A.D. Mother      Alzheimer Disease Mother      Cardiovascular Mother      Eye Disorder Mother      Thyroid Disease Mother      Hypertension Other         grandmother     Cerebrovascular Disease Father      Alcohol/Drug Father      Depression Father      Obesity Father         aunt     Asthma Father      Alcohol/Drug Brother   "    Alcohol/Drug Sister         aunt     Allergies Other         All family members     Arthritis Other         aunt     Obesity Sister      Thyroid Disease Sister      Asthma Sister         daughter       Review of Systems - As per HPI and PMHx, otherwise 7 system review of the head and neck negative.    Physical Exam  Ht 1.562 m (5' 1.5\")   Wt 72.1 kg (159 lb)   BMI 29.56 kg/m    General - The patient is in no distress.  Alert and oriented x3, answers questions and cooperates with examination appropriately.   Voice and Breathing - The patient was breathing comfortably without the use of accessory muscles. There was no wheezing, stridor, or stertor.  The patients voice was clear and strong, with no dysphonia.  Head and Face - Normocephalic and atraumatic.    Eyes - Pupils are reactive to light, but she has light sensitivity. Extraocular movements intact, but spontaneous nystagmus. Sclera were not icteric or injected, conjunctiva were pink and moist.   Neurologic - slow and unsteady gait. Cranial nerves II-XII are grossly intact. Specifically, the facial nerve is intact, House-Brackmann grade 1 of 6. Finger-nose-finger is very abnormal. Fast finger movements also abnormal.   Ears - The auricles appeared normal. The external auditory canals were nonedematous and nonerythematous. Right tympanic membrane perforation, 4 mm inferiorly. No otorrhea. Left tympanic membrane intact. No effusion.  Neck -  Palpation of the occipital, submental, submandibular, internal jugular chain, and supraclavicular nodes did not demonstrate any abnormal lymph nodes or masses. The parotid glands were without masses. Palpation of the thyroid was soft and smooth, with no nodules or goiter appreciated.  The trachea was midline.      Audiologic Studies - An audiogram and tympanogram were performed today as part of the evaluation and personally reviewed.  She has a type B large volume tympanogram right, type B normal volume tympanogram left.  " She has profound sensorineural hearing loss in the left.  She has severe mixed hearing loss right.    A/P - Betsey Vanessa is a 59 year old female with Meniere's disease and severe mixed hearing loss right, profound sensorineural hearing loss left. She is s/p left labyrinthectomy.  I recommend referral to [facility] to discuss her BAHA to make sure that that is optimized but also consider cochlear implantation.  I want her to see neurology as she had evidence of cerebellar signs on todays exam and I'm unsure if this is something that is complicating her dizziness from her vestibular dysfunction. I want her to try and use the least amount of vestibular suppressants. She wanted to try one less sedated. She can try meclizine. She should continue vestibular exercises and yoga.          Yonny Gutiérrez MD  Otolaryngology  [facility]

## 2020-01-17 NOTE — PROGRESS NOTES
AUDIOLOGY REPORT:    Patient was referred from ENT by Dr. Gutiérrez for audiology evaluation. The patient recently moved from [place] and is establishing care in the [facility]. She has a history of  Meniere's disease and had a labyrinthectomy in the left ear, which she reports did not help. The patient reports a tympanic membrane perforation in the right ear and notes that she sometimes has bloody drainage from that ear. She reports tinnitus in both ears. The patient has a bone anchored hearing aid (Cochlear BAHA). She has an abutment on the right side and wears the processor on the left ear because of feedback problems. She reports that her hearing seems to fluctuate. The last records of a hearing test available for review was at the [facility] on 2/13/2014 and results showed moderately severe to severe mixed hearing loss in the right ear and no responses in the left ear for pure tones or speech. The patient was accompanied to the appointment by her daughter Bailee.    Testing:    Otoscopy:   Otoscopic exam indicates ears are clear of cerumen bilaterally     Tympanograms:    RIGHT: large ear canal volume consistent with tympanic membrane perforation     LEFT:   restricted eardrum mobility (type B)  NOTE: dizziness and nausea reported during tympanometry on both sides    Thresholds:   Pure Tone Thresholds assessed using conventional audiometry with fair to good reliability (frequent false positives, likely due to tinnitus) from 250-8000 Hz bilaterally using circumaural headphones     RIGHT:  moderately severe to profound mixed hearing loss    LEFT:    profound hearing loss. No response at any tested frequency  NOTE: Bone conduction could not be masked due to limits of the equipment. The patient reported nausea at the end of testing, which was immediately after bone conduction    Speech Reception Threshold:    RIGHT: 80 dB HL    LEFT:   No response at 100 dB HL  Results are in agreement with pure  tone average.     Speech Detection Threshold:    LEFT:   90 dB HL    Word Recognition Score:     RIGHT: 92% at 100 dB HL using NU-6 recorded word list.    LEFT:   4% at 100 dB HL using NU-6 recorded word list.    Discussed results with the patient. Thresholds are stable compared to 2/13/2014. The patient expressed concern about the function of her remote microphone. She was encouraged to follow up with an [facility] audi[facility].    Patient was returned to ENT for follow up.     Robert Horton, CCC-A  Licensed Audiologist #[id number]  1/17/2020

## 2020-01-17 NOTE — LETTER
1/17/2020         RE: Betsey Vanessa  [address] [facility] [address]        Dear Colleague,    Thank you for referring your patient, Betsey Vanessa, to the [facility]. Please see a copy of my visit note below.    I am seeing this patient in consultation for Meniere's disease at the request of the provider Dr. Velma Ibanez.      Chief Complaint - Meniere's Disease recheck     History of Present Illness - Betsey Vanessa is a 59 year old female with a long and complicated history of vertigo dating back more than 20 years.  She was initially diagnosed with Meniere's disease with intractable vertigo.  She underwent left labyrinthectomy which was reportedly her worse hearing ear in 1998. This did not help her symptoms at all and she had symptoms that seemed to emanate from the right.  She reports that her symptom constellation has remained the same but that they have increased over time. She also has had a right steroid injection in 2014 by Dr. Stallings. She had a BAHA placed one year ago.    Currently, the patient describes episodes of vertigo in which the they have a sense of motion. It lasts for all day. It is associated with nausea and/or vomiting. This has been going on for years. She takes compazine and clonazepam. Has tried valium. She is very sleepy from these. She has tried vestibular therapy.     Past Medical History -   Patient Active Problem List   Diagnosis     Type 2 diabetes mellitus with hyperglycemia, without long-term current use of insulin (H)     Hypertension goal BP (blood pressure) < 140/90     Meniere's disease, unspecified laterality     Rheumatoid arthritis, involving unspecified site, unspecified rheumatoid factor presence (H)     Valvular heart disease     Chronic pain syndrome     Mild major depression (H)       Current Medications -   Current Outpatient Medications:      celecoxib (CELEBREX) 200 MG capsule, Take 1 capsule (200 mg) by mouth 2 times daily 2 x a day,  Disp: 60 capsule, Rfl: 1     cevimeline (EVOXAC) 30 MG capsule, Take 1 capsule (30 mg) by mouth 3 times daily, Disp: 270 capsule, Rfl: 1     clonazePAM (KLONOPIN) 2 MG tablet, Take 2 mg by mouth Nightly as needed for sleep problem., Disp: , Rfl:      docusate calcium (GNP DOCUSATE CALCIUM) 240 MG capsule, Take 240 mg by mouth daily, Disp: , Rfl:      EPINEPHrine 0.3 MG/0.3ML SOLN, PRN for Anaphylaxis, Disp: , Rfl:      escitalopram (LEXAPRO) 10 MG tablet, Take 10 mg by mouth daily, Disp: , Rfl:      fentaNYL (DURAGESIC) 25 mcg/hr patch 72 hr, Place 1 patch onto the skin every 72 hours, Disp: , Rfl:      glucose blood VI test strips strip, daily, Disp: , Rfl:      HYDROcodone-acetaminophen (NORCO)  MG per tablet, Take 1 tablet by mouth Every 8 hrs. As needed for pain, Disp: , Rfl:      lifitegrast (XIIDRA) 5 % opthalmic solution, Place 1 drop into both eyes 2 times daily, Disp: 24 each, Rfl: 1     metFORMIN (GLUCOPHAGE) 500 MG tablet, Take 500 mg by mouth 2 times daily (with meals) 1000 mg 2 x a day, Disp: , Rfl:      metroNIDAZOLE (METROGEL) 0.75 % gel, Apply to face daily, Disp: , Rfl:      nystatin (MYCOSTATIN) 068947 UNIT/GM POWD, 2 x daily PRN for rash, Disp: , Rfl:      omeprazole (PRILOSEC) 20 MG capsule, Take by mouth daily 2 x daily, Disp: , Rfl:      potassium chloride SA (K-DUR,KLOR-CON M) 10 MEQ tablet, Take by mouth 2 times daily 20 mEq daily, Disp: , Rfl:      promethazine (PHENERGAN) 25 MG suppository, Place 25 mg rectally every 6 hours as needed For nusea, Disp: , Rfl:      promethazine (PHENERGAN) 25 MG tablet, Take by mouth every 6 hours as needed For nusea, Disp: , Rfl:      psyllium (METAMUCIL) 58.6 % POWD, Take by mouth daily PRN, Disp: , Rfl:      TOCILIZUMAB IV, Inject into the vein every 30 days, Disp: , Rfl:      triamterene-hydrochlorothiazide (DYAZIDE) 37.5-25 MG per capsule, 1 capsule every morning, Disp: , Rfl:      VITAMIN D, CHOLECALCIFEROL, PO, Take 50,000 Units by mouth daily  Every 7 days, Disp: , Rfl:     Allergies -   Allergies   Allergen Reactions     Duloxetine Other (See Comments)     Topiramate Other (See Comments)     Other reaction(s): Confusion     Amitriptyline Embonate [Amitriptyline]      Hyper activity     Azithromycin Hives     Cymbalta      Hyper activity     Gabapentin Hives     Macrobid [Nitrofurantoin] Hives     Paxil [Paroxetine] Other (See Comments)     Hyper behavior     Penicillins Hives     Tetracycline Hives       Social History -   Social History     Socioeconomic History     Marital status: Single     Spouse name: None     Number of children: None     Years of education: None     Highest education level: None   Occupational History     None   Social Needs     Financial resource strain: None     Food insecurity:     Worry: None     Inability: None     Transportation needs:     Medical: None     Non-medical: None   Tobacco Use     Smoking status: Former Smoker     Start date: 1978     Last attempt to quit: 1998     Years since quittin.0     Smokeless tobacco: Never Used     Tobacco comment: 1 pack a day    Substance and Sexual Activity     Alcohol use: No     Drug use: Never     Sexual activity: Not Currently   Lifestyle     Physical activity:     Days per week: None     Minutes per session: None     Stress: None   Relationships     Social connections:     Talks on phone: None     Gets together: None     Attends Congregation service: None     Active member of club or organization: None     Attends meetings of clubs or organizations: None     Relationship status: None     Intimate partner violence:     Fear of current or ex partner: None     Emotionally abused: None     Physically abused: None     Forced sexual activity: None   Other Topics Concern     None   Social History Narrative     None       Family History -   Family History   Problem Relation Age of Onset     C.A.D. Mother      Alzheimer Disease Mother      Cardiovascular Mother      Eye  "Disorder Mother      Thyroid Disease Mother      Hypertension Other         grandmother     Cerebrovascular Disease Father      Alcohol/Drug Father      Depression Father      Obesity Father         aunt     Asthma Father      Alcohol/Drug Brother      Alcohol/Drug Sister         aunt     Allergies Other         All family members     Arthritis Other         aunt     Obesity Sister      Thyroid Disease Sister      Asthma Sister         daughter       Review of Systems - As per HPI and PMHx, otherwise 7 system review of the head and neck negative.    Physical Exam  Ht 1.562 m (5' 1.5\")   Wt 72.1 kg (159 lb)   BMI 29.56 kg/m     General - The patient is in no distress.  Alert and oriented x3, answers questions and cooperates with examination appropriately.   Voice and Breathing - The patient was breathing comfortably without the use of accessory muscles. There was no wheezing, stridor, or stertor.  The patients voice was clear and strong, with no dysphonia.  Head and Face - Normocephalic and atraumatic.    Eyes - Pupils are reactive to light, but she has light sensitivity. Extraocular movements intact, but spontaneous nystagmus. Sclera were not icteric or injected, conjunctiva were pink and moist.   Neurologic - slow and unsteady gait. Cranial nerves II-XII are grossly intact. Specifically, the facial nerve is intact, House-Brackmann grade 1 of 6. Finger-nose-finger is very abnormal. Fast finger movements also abnormal.   Ears - The auricles appeared normal. The external auditory canals were nonedematous and nonerythematous. Right tympanic membrane perforation, 4 mm inferiorly. No otorrhea. Left tympanic membrane intact. No effusion.  Neck -  Palpation of the occipital, submental, submandibular, internal jugular chain, and supraclavicular nodes did not demonstrate any abnormal lymph nodes or masses. The parotid glands were without masses. Palpation of the thyroid was soft and smooth, with no nodules or goiter " appreciated.  The trachea was midline.      Audiologic Studies - An audiogram and tympanogram were performed today as part of the evaluation and personally reviewed.  She has a type B large volume tympanogram right, type B normal volume tympanogram left.  She has profound sensorineural hearing loss in the left.  She has severe mixed hearing loss right.    A/P - Betsey Vanessa is a 59 year old female with Meniere's disease and severe mixed hearing loss right, profound sensorineural hearing loss left. She is s/p left labyrinthectomy.  I recommend referral to [facility] to discuss her BAHA to make sure that that is optimized but also consider cochlear implantation.  I want her to see neurology as she had evidence of cerebellar signs on todays exam and I'm unsure if this is something that is complicating her dizziness from her vestibular dysfunction. I want her to try and use the least amount of vestibular suppressants. She wanted to try one less sedated. She can try meclizine. She should continue vestibular exercises and yoga.          Yonny Gutiérrez MD  Otolaryngology  [facility]      Again, thank you for allowing me to participate in the care of your patient.        Sincerely,        Yonny Gutiérrez MD

## 2020-01-17 NOTE — PATIENT INSTRUCTIONS
General Scheduling Information  To schedule your CT/MRI scan, please contact [place] [facility] at [phone] [address] [place] [address]    To schedule your Surgery, please contact our Specialty Schedulers at [phone]    ENT Clinic Locations Clinic Hours Telephone Number     Yashira Zaidi  [address] [place] [address]   Tuesday:       8:00am -- 4:00pm    Wednesday:  8:00am - 4:00pm   To schedule an appointment with   Dr. Gutiérrez,   please contact our   Specialty Scheduling Department at:     [phone]       Yashira Ibarra  [address] [place] [address]   Friday:          8:00am - 4:00pm         Urgent Care Locations Clinic Hours Telephone Numbers     Yashira Hoyt  [address] [place] [address]     Monday-Friday:     11:00pm - 9:00pm    Saturday-Sunday:  9:00am - 5:00pm   [phone]     Yashira Ibarra  [address] [place] [address]     Monday-Friday:      5:00pm - 9:00pm     Saturday-Sunday:  9:00am - 5:00pm   [phone]

## 2020-01-21 ENCOUNTER — TELEPHONE (OUTPATIENT)
Dept: AUDIOLOGY | Facility: CLINIC | Age: 60
End: 2020-01-21

## 2020-01-21 NOTE — TELEPHONE ENCOUNTER
Health Call Center    Phone Message    May a detailed message be left on voicemail: yes    Reason for Call: Patient is being referred to Audiology for a cochlear implant evaluation.  Please contact patient with appointment options    Action Taken: Message routed to:  [facility] ([facility]): Audiology

## 2020-01-22 DIAGNOSIS — H90.3 SENSORINEURAL HEARING LOSS (SNHL) OF BOTH EARS: Primary | ICD-10-CM

## 2020-01-22 NOTE — TELEPHONE ENCOUNTER
"Spoke with patient and relayed this info from Dr. Stallings:    \"She really needs is a CT and MRI to check if she even has any signal in her cochlea since she's had a labyrinthectomy on the left. I'll take a look at them and decide.\"    She expressed understanding and will get testing done and wait to hear back for the next steps of her care plan.   "

## 2020-01-23 ENCOUNTER — PRE VISIT (OUTPATIENT)
Dept: NEUROLOGY | Facility: CLINIC | Age: 60
End: 2020-01-23

## 2020-01-23 NOTE — TELEPHONE ENCOUNTER
FUTURE VISIT INFORMATION      FUTURE VISIT INFORMATION:    Date: 2/7/2020    Time: 8AM    [place] [facility]  REFERRAL INFORMATION:    Referring provider:  Dr. Gutiérrez     Referring providers clinic:  [facility]     Reason for visit/diagnosis  Imbalance Concerns     RECORDS REQUESTED FROM:       Clinic name Comments Records Status Imaging Status   [facility] Everywhere N/A

## 2020-01-29 ENCOUNTER — HOSPITAL ENCOUNTER (OUTPATIENT)
Dept: CT IMAGING | Facility: CLINIC | Age: 60
End: 2020-01-29
Attending: OTOLARYNGOLOGY
Payer: COMMERCIAL

## 2020-01-29 ENCOUNTER — HOSPITAL ENCOUNTER (OUTPATIENT)
Dept: MRI IMAGING | Facility: CLINIC | Age: 60
Discharge: HOME | End: 2020-01-29
Attending: OTOLARYNGOLOGY | Admitting: OTOLARYNGOLOGY
Payer: COMMERCIAL

## 2020-01-29 DIAGNOSIS — H90.3 SENSORINEURAL HEARING LOSS (SNHL) OF BOTH EARS: ICD-10-CM

## 2020-01-29 PROCEDURE — A9585 GADOBUTROL INJECTION: HCPCS | Performed by: OTOLARYNGOLOGY

## 2020-01-29 PROCEDURE — 70553 MRI BRAIN STEM W/O & W/DYE: CPT

## 2020-01-29 PROCEDURE — 70480 CT ORBIT/EAR/FOSSA W/O DYE: CPT

## 2020-01-29 PROCEDURE — 25500064 ZZH RX 255 OP 636: Performed by: OTOLARYNGOLOGY

## 2020-01-29 RX ORDER — GADOBUTROL 604.72 MG/ML
7.5 INJECTION INTRAVENOUS ONCE
Status: COMPLETED | OUTPATIENT
Start: 2020-01-29 | End: 2020-01-29

## 2020-01-29 RX ADMIN — GADOBUTROL 7.5 ML: 604.72 INJECTION INTRAVENOUS at 14:25

## 2020-01-30 DIAGNOSIS — H81.03 MENIERE'S DISEASE, BILATERAL: Primary | ICD-10-CM

## 2020-01-30 DIAGNOSIS — R26.89 IMBALANCE: ICD-10-CM

## 2020-02-04 ENCOUNTER — OFFICE VISIT (OUTPATIENT)
Dept: RHEUMATOLOGY | Facility: CLINIC | Age: 60
End: 2020-02-04
Payer: COMMERCIAL

## 2020-02-04 ENCOUNTER — TELEPHONE (OUTPATIENT)
Dept: OTOLARYNGOLOGY | Facility: CLINIC | Age: 60
End: 2020-02-04

## 2020-02-04 VITALS
HEIGHT: 62 IN | SYSTOLIC BLOOD PRESSURE: 137 MMHG | DIASTOLIC BLOOD PRESSURE: 65 MMHG | OXYGEN SATURATION: 93 % | HEART RATE: 83 BPM | BODY MASS INDEX: 29.04 KG/M2 | WEIGHT: 157.8 LBS

## 2020-02-04 DIAGNOSIS — Z92.29 HISTORY OF BISPHOSPHONATE THERAPY: ICD-10-CM

## 2020-02-04 DIAGNOSIS — M85.89 OSTEOPENIA OF MULTIPLE SITES: ICD-10-CM

## 2020-02-04 DIAGNOSIS — Z79.899 HIGH RISK MEDICATIONS (NOT ANTICOAGULANTS) LONG-TERM USE: ICD-10-CM

## 2020-02-04 DIAGNOSIS — Z78.0 POST-MENOPAUSAL: ICD-10-CM

## 2020-02-04 DIAGNOSIS — M06.9 RHEUMATOID ARTHRITIS INVOLVING MULTIPLE SITES, UNSPECIFIED RHEUMATOID FACTOR PRESENCE: Primary | ICD-10-CM

## 2020-02-04 PROCEDURE — 99214 OFFICE O/P EST MOD 30 MIN: CPT | Performed by: INTERNAL MEDICINE

## 2020-02-04 RX ORDER — ZOLEDRONIC ACID 5 MG/100ML
5 INJECTION, SOLUTION INTRAVENOUS ONCE
Status: CANCELLED
Start: 2020-02-04

## 2020-02-04 RX ORDER — DIPHENHYDRAMINE HCL 25 MG
25 CAPSULE ORAL ONCE
Status: CANCELLED | OUTPATIENT
Start: 2020-02-04

## 2020-02-04 RX ORDER — HEPARIN SODIUM,PORCINE 10 UNIT/ML
5 VIAL (ML) INTRAVENOUS
Status: CANCELLED | OUTPATIENT
Start: 2020-02-04

## 2020-02-04 RX ORDER — HEPARIN SODIUM (PORCINE) LOCK FLUSH IV SOLN 100 UNIT/ML 100 UNIT/ML
5 SOLUTION INTRAVENOUS
Status: CANCELLED | OUTPATIENT
Start: 2020-02-04

## 2020-02-04 RX ORDER — ACETAMINOPHEN 325 MG/1
650 TABLET ORAL ONCE
Status: CANCELLED | OUTPATIENT
Start: 2020-02-04

## 2020-02-04 ASSESSMENT — MIFFLIN-ST. JEOR: SCORE: 1246.09

## 2020-02-04 NOTE — PATIENT INSTRUCTIONS
Please call to schedule infusions for both Reclast (for bone health, once yearly), and Actemra (every 4 weeks for rheumatoid arthritis)    [facility]  [address]  [place] [address]  [phone]    Rheumatology    Dr. Terrence SHARIF [facility] in [place]   (Monday)  [address]  [place] [address]       [facility] in [place]   (Tuesday)  [address] [place] [address]    [facility] in [place]   (Wed., Thurs., and Friday)  [address] [place] [address]    Phone number: [phone]  Thank you for choosing [facility].  Madeline Cheung CMA

## 2020-02-04 NOTE — NURSING NOTE
RAPID3 (0-30) Cumulative Score  15.3          RAPID3 Weighted Score (divide #4 by 3 and that is the weighted score)  5.1     Madeline Cheung [facility] Rheumatology  2/4/2020 8:43 AM

## 2020-02-04 NOTE — PROGRESS NOTES
Rheumatology Clinic Visit      Betsey Vanessa MRN# [id number]   YOB: 1960 Age: 59 year old      Date of visit: 2/04/20   PCP: Dr. Velma Ibanez  Referring provider: Dr. Velma Ibanez    Chief Complaint   Patient presents with:  Arthritis: RA and sjogren's. Follow up on labs and x-ray      Assessment and Plan     1.  Rheumatoid arthritis: Reportedly diagnosed many years ago.  Previously treated by Dr. Stacy at [facility] in [place], [place]. Previously on methotrexate + rituximab, Enbrel + leflunomide; had elevated liver enzymes preventing oral DMARDs; most recent effective tx was with tocilizumab 8mg/kg IV monthly per her last rheumatology notes.  ALEGRIA hx prevents several oral DMARDs and LFT elevations with MTX in the past. Reviewed her dx and tx options. Start Actemra 4mg/kg IV and if needed then will increase the dose. Risks and side effects of actemra were reviewed in detail with Ms. Vanessa today.  Pre-medication with tylenol and benadryl as she has in the past.   - Start Actemra 4mg/kg IV every 4 weeks    1/15/2020 addendum: 7/18/2019 rheumatology clinic note by Dr. Stacy  received and reviewed documents that the patient has seronegative rheumatoid arthritis.  On Tocilizumab 8 mg/kg IV every month in combination with methotrexate 15 mg SQ weekly.  On chronic narcotics.  She was started on ibandronate 3 mg IV every 3 months but had not yet had her first dose; last Actemra dose was on 7/2/2019 at 650 mg.  Has been successfully maintained on Actemra over the last 7 to 8 years.  She has had lots of complications and failures of both biologic and oral DMARDs.  Has failed methotrexate + rituximab, Enbrel + leflunomide; had elevated liver enzymes preventing oral DMARDs;.  Low disease activity when receiving Actemra 8 mg/kg IV every 4 weeks.  Liver enzyme elevation thought to be secondary to methotrexate and ALEGRIA.  If platelets are reduced then she plans to reduce Actemra  to 4 mg/kg.    3. Sjogren's Syndrome: reportedly had punctal plugs in the past.  Doing okay with artificial tears, Xiidra, and Evoxac.  Encouraged frequent sips of water, visits with a dentist at least every 6months, avoidance of sugary foods/drinks.     4. Chronic pain syndrome: she plans to establish with the Pain Clinic at the [facility]; already has a referral from her primary care provider    5. Osteopenia: based on 7/2019 DEXA report that she brought with her; FRAX score shows a 22% risk of major osteoporotic fracture in the next 10 years and a 1.9% risk for osteoporotic hip fracture in the next 10 years.  Based on FRAX tx is indicated.  She already received one dose of boniva in Sept 2019; and failed alendronate due to GI upset.  Discussed reclast and boniva; start reclast.   - Start reclast 5mg IV yearly  - Continue calcium 1000mg daily  - Continue vitamin D 1000 IU daily    # Bisphosphonate risks and side effects:  Risks and side effects include esophageal irritation, heartburn, osteonecrosis of the jaw (most often in people with dental disease or a recent dental procedure), and atypical femoral fractures.  IV bisphosphonates can cause a flu-like illness and bone pain lasting up to 2-3 days; premedication with acetaminophen will often prevent or lessen these symptoms. Unusual side effects that have been reported include occular symptoms such as uveitis, keartitis, optic neuritis, and orbital swelling.  Oral bisphosphonates should not be used in patients with esophageal problems (such as strictures, achalasia, or severe dysmotility, varices), malabsorption, or the inability to sit upright.  Prior to starting a biosphosphonate, invasive dental work should be completed if needed, and vitamin D level should be adequate.    Ms. Otf verbalized agreement with and understanding of the rational for the diagnosis and treatment plan.  All questions were answered to best of my ability and the patient's  "satisfaction. Ms. Vanessa was advised to contact the clinic with any questions that may arise after the clinic visit.      Thank you for involving me in the care of the patient    Return to clinic: 2/4/2020    HPI   Betsey Vanessa is a 59 year old female with a past medical history significant for hypertension, depression, type 2 diabetes, Ménière's disease, and rheumatoid arthritis who is seen in consultation for follow-up of RA.     8/16/2019 documentation by Dr. Juani Stacy at [facility] ([place], [place]) notes rheumatoid arthritis and thrombocytopenia    1/8/2020 clinic note by Dr. Ibanez documents Ménière's disease-referring to ENT.  Diabetes.  Hypertension.  Rheumatoid arthritis and Sjogren's syndrome currently on Evoxac, Celebrex, IV Tocilizumab monthly.  Also on long-term narcotics that includes fentanyl patch for chronic pain on clonazepam for sleep at night.  Valvular heart disease with mitral valve stenosis, mitral valve regurgitation, and severe aortic valve stenosis and was following with a cardiologist with plans to establish with a new cardiologist here in [place].  Depression stable on Lexapro.    Recently moved from [place] to [place]    Previously, Ms. Vanessa reports that she has RA diagnosed many years ago, chronic pain syndrome with plans to establish in the pain clinic, depression that is controlled, diabetes tx'd with oral medications, and hearing loss with plans to see ENT here.  Also states that she has \"bad bones\" and has been on Boniva injection x1 dose in February. Failed fosamax because of associated GI upset; used for 6 weeks. Reportedly [facility]. St[facility].  Return (treated with methotrexate because liver enzyme elevations, Remicade that was ineffective and caused hives, Enbrel that she does not recall helping in any way, and more recently Actemra 4 mg/kg IV every 8 weeks that has been used for years, last received in " September 2019.  Also with Celebrex.  She says that since her last dose of Actemra she has not seen any worsening of her arthritis.  Still with some pain at her MCPs that is worse in the morning and improves with time and activity.  Morning stiffness for approximately 1 hour.  Positive gelling phenomenon.  Diffuse body pain.    Today, she reports no change in joint symptoms.  MCPs are still the most affected; pain and stiffness worse in the AM and better with time and activity.  Morning stiffness for about 1 hour.     Denies fevers, chills, nausea, or vomiting.  Occasional constipation or diarrhea. No abdominal pain. No chest pain/pressure, palpitations, or shortness of breath. No LE swelling.  Chronic neck and lower back pain that were managed in the pain clinic previously and she plans to establish care in the pain clinic here soon. No oral or nasal sores.  No rash.  Dry eyes treated with artificial tears and Xiidra; she plans to establish with an ophthalmologist because she was followed by ophthalmology when in [place]; hx of punctal plugs.  Dry mouth is treated with frequent sips of water and Evoxac; doesn't recall ever being on pilocarpine. Evoxac is listed in her allergy list and she says that is inaccurate and then pulls out her Evoxac Rx to show that she has it and she says it helps and she's never had an adverse reaction to Evoxac.  No photosensitivity or photophobia.  No history of uveitis. No history of inflammatory bowel disease.  No history of DVT, pulmonary embolism, or miscarriage.   No history of serositis.  No history of Raynaud's Phenomenon.  No seizure history.   No known renal disorder.      Tobacco: Quit smoking in 1998  EtOH: None  Drugs: None    ROS   GEN: No fevers, chills, night sweats, or weight change  SKIN: No itching, rashes, sores  HEENT: No epistaxis. No oral or nasal ulcers.  CV: No chest pain, pressure, palpitations, or dyspnea on exertion.  PULM: No SOB, wheeze, cough.  GI:  No  blood in stool. No abdominal pain.  See HPI  : No blood in urine.  MSK: See HPI.  NEURO: No numbness or tingling  ENDO: No heat/cold intolerance.  EXT: No LE swelling  PSYCH: See HPI    Active Problem List     Patient Active Problem List   Diagnosis     Type 2 diabetes mellitus with hyperglycemia, without long-term current use of insulin (H)     Hypertension goal BP (blood pressure) < 140/90     Meniere's disease, unspecified laterality     Rheumatoid arthritis, involving unspecified site, unspecified rheumatoid factor presence (H)     Valvular heart disease     Chronic pain syndrome     Mild major depression (H)     Past Medical History     Past Medical History:   Diagnosis Date     Anemia     r/t menses     Anxiety      Arthritis      Constipation      Depression      Diabetes (H)      Diarrhea      Double vision      Headache(784.0)      Hearing loss      Heart murmur      Heartburn      Hypertension      Indigestion      Nasal congestion      Night sweats      Numbness      Problems related to lack of adequate sleep      Rash      Sneezing      Tinnitus      Visual loss      Weakness      Weight gain     because of Prednisone     Past Surgical History     Past Surgical History:   Procedure Laterality Date     ------------OTHER-------------      D&C     HYSTERECTOMY       INNER EAR SURGERY       Allergy     Allergies   Allergen Reactions     Duloxetine Other (See Comments)     Topiramate Other (See Comments)     Other reaction(s): Confusion     Amitriptyline Embonate [Amitriptyline]      Hyper activity     Azithromycin Hives     Cymbalta      Hyper activity     Gabapentin Hives     Macrobid [Nitrofurantoin] Hives     Paxil [Paroxetine] Other (See Comments)     Hyper behavior     Penicillins Hives     Tetracycline Hives     Current Medication List     Current Outpatient Medications   Medication Sig     celecoxib (CELEBREX) 200 MG capsule Take 1 capsule (200 mg) by mouth 2 times daily 2 x a day     cevimeline  (EVOXAC) 30 MG capsule Take 1 capsule (30 mg) by mouth 3 times daily     clonazePAM (KLONOPIN) 2 MG tablet Take 2 mg by mouth Nightly as needed for sleep problem.     docusate calcium (GNP DOCUSATE CALCIUM) 240 MG capsule Take 240 mg by mouth daily     EPINEPHrine 0.3 MG/0.3ML SOLN PRN for Anaphylaxis     escitalopram (LEXAPRO) 10 MG tablet Take 10 mg by mouth daily     fentaNYL (DURAGESIC) 25 mcg/hr patch 72 hr Place 1 patch onto the skin every 72 hours     glucose blood VI test strips strip daily     HYDROcodone-acetaminophen (NORCO)  MG per tablet Take 1 tablet by mouth Every 8 hrs. As needed for pain     lifitegrast (XIIDRA) 5 % opthalmic solution Place 1 drop into both eyes 2 times daily     meclizine (ANTIVERT) 25 MG tablet Take 1 tablet (25 mg) by mouth 3 times daily as needed for dizziness     metFORMIN (GLUCOPHAGE) 500 MG tablet Take 500 mg by mouth 2 times daily (with meals) 1000 mg 2 x a day     metroNIDAZOLE (METROGEL) 0.75 % gel Apply to face daily     nystatin (MYCOSTATIN) 509933 UNIT/GM POWD 2 x daily PRN for rash     omeprazole (PRILOSEC) 20 MG capsule Take by mouth daily 2 x daily     potassium chloride SA (K-DUR,KLOR-CON M) 10 MEQ tablet Take by mouth 2 times daily 20 mEq daily     promethazine (PHENERGAN) 25 MG suppository Place 25 mg rectally every 6 hours as needed For nusea     promethazine (PHENERGAN) 25 MG tablet Take by mouth every 6 hours as needed For nusea     psyllium (METAMUCIL) 58.6 % POWD Take by mouth daily PRN     TOCILIZUMAB IV Inject into the vein every 30 days     triamterene-hydrochlorothiazide (DYAZIDE) 37.5-25 MG per capsule 1 capsule every morning     VITAMIN D, CHOLECALCIFEROL, PO Take 50,000 Units by mouth daily Every 7 days     No current facility-administered medications for this visit.          Social History   See HPI    Family History     Family History   Problem Relation Age of Onset     C.A.D. Mother      Alzheimer Disease Mother      Cardiovascular Mother       "Eye Disorder Mother      Thyroid Disease Mother      Hypertension Other         grandmother     Cerebrovascular Disease Father      Alcohol/Drug Father      Depression Father      Obesity Father         aunt     Asthma Father      Alcohol/Drug Brother      Alcohol/Drug Sister         aunt     Allergies Other         All family members     Arthritis Other         aunt     Obesity Sister      Thyroid Disease Sister      Asthma Sister         daughter       Physical Exam     Temp Readings from Last 3 Encounters:   01/08/20 97  F (36.1  C) (Tympanic)     BP Readings from Last 5 Encounters:   01/14/20 138/71   01/08/20 110/70     Pulse Readings from Last 1 Encounters:   01/14/20 82     Resp Readings from Last 1 Encounters:   01/08/20 16     Estimated body mass index is 29.56 kg/m  as calculated from the following:    Height as of this encounter: 1.562 m (5' 1.5\").    Weight as of 1/17/20: 72.1 kg (159 lb).    GEN: NAD  HEENT: MMM. No oral lesions. Anicteric, noninjected sclera.  Cochlear implant.  CV: S1, S2. RRR. No m/r/g.  PULM: CTA bilaterally. No w/c.  MSK: Bilateral second - fourth MCPs with synovial swelling and tenderness to palpation, mostly on the palmar aspect of the joint.  PIPs and DIPs mildly tender to palpation but without swelling or increased warmth.  Wrists tender to palpation without swelling or increased warmth.  Elbows without swelling or tenderness to palpation.  Shoulders without swelling or tenderness to palpation.  Hips mildly tender to palpation but not with range of motion.  Knees, ankles, and MTPs without swelling or tenderness to palpation.  No dactylitis.  Achilles tendons nontender to palpation.   NEURO: UE and LE strengths 5/5 and equal bilaterally.   SKIN: No rash.  No nail pitting.  EXT: No LE edema  PSYCH: Alert. Appropriate.    Labs / Imaging (select studies)     RF/CCP  Recent Labs   Lab Test 01/14/20  1221   CCPIGG 1   RHF 20*     MARIO  Recent Labs   Lab Test 01/14/20  1221   ABBY " Negative     RNP/Sm/SSA/SSB  Recent Labs   Lab Test 01/14/20  1221   RNPIGG 2.7*   SMIGG <0.2   SSAIGG 0.4   SSBIGG <0.2     dsDNA  Recent Labs   Lab Test 01/14/20  1221   DNA 2     C3/C4  Recent Labs   Lab Test 01/14/20  1221   Z6MARCN 127   G1PDZYB 30     Antiphospholipid Antibodies  Recent Labs   Lab Test 01/14/20  1221   B2GPG 0.7   B2GPM <2.9   CARDG <1.6   CARDM 1.6     CBC  Recent Labs   Lab Test 01/14/20  1221   WBC 5.0   RBC 4.15   HGB 12.2   HCT 35.5   MCV 86   RDW 12.7   *   MCH 29.4   MCHC 34.4   NEUTROPHIL 58.5   LYMPH 30.8   MONOCYTE 7.1   EOSINOPHIL 3.4   BASOPHIL 0.2   ANEU 3.0   ALYM 1.6   MOISE 0.4   AEOS 0.2   ABAS 0.0     CMP  Recent Labs   Lab Test 01/14/20  1221      POTASSIUM 4.0   CHLORIDE 103   CO2 28   ANIONGAP 6   *   BUN 13   CR 0.70   GFRESTIMATED >90   GFRESTBLACK >90   MIRNA 9.5   BILITOTAL 0.5   ALBUMIN 4.0   PROTTOTAL 7.9   ALKPHOS 111   AST 30   ALT 37     Calcium/VitaminD  Recent Labs   Lab Test 01/14/20  1221   MIRNA 9.5   VITDT 40     ESR/CRP  Recent Labs   Lab Test 01/14/20  1221   SED 27   CRP 7.6     CK/Aldolase  Recent Labs   Lab Test 01/14/20  1221   CKT 53     TSH/T4  Recent Labs   Lab Test 01/14/20  1221   TSH 2.99     Hepatitis B  Recent Labs   Lab Test 01/14/20  1221   HBCAB Nonreactive   HEPBANG Nonreactive     Hepatitis C  Recent Labs   Lab Test 01/14/20  1221   HCVAB Nonreactive     Lyme ab screening  Recent Labs   Lab Test 01/14/20  1221   LYMEGM 0.05     Tuberculosis Screening  Recent Labs   Lab Test 01/14/20  1221   TBRES Negative     UA  Recent Labs   Lab Test 01/14/20  1230   COLOR Yellow   APPEARANCE Clear   URINEGLC Negative   URINEBILI Negative   SG >1.030   URINEPH 5.5   PROTEIN Negative   UROBILINOGEN 0.2   NITRITE Negative   UBLD Negative   LEUKEST Trace*   WBCU 0 - 5   RBCU O - 2   SQUAMOUSEPI Many*   BACTERIA Moderate*     Urine Microscopic  Recent Labs   Lab Test 01/14/20  1230   WBCU 0 - 5   RBCU O - 2   SQUAMOUSEPI Many*   BACTERIA  Moderate*     Urine Protein  Recent Labs   Lab Test 01/14/20  1230   UTP 0.06   UTPG 0.03   UCRR 214     [facility]   7/18/2013 CCP negative  8/5/2010 CCP negative  3/27/2013 hepatitis B surface antigen negative and hepatitis B core antibody negative  9/2/2011 hepatitis B surface antigen negative and hepatitis B core antibody negative  3/27/2013 hepatitis C antibody negative    Immunization History     Immunization History   Administered Date(s) Administered     FLU 6-35 months 10/22/2013     Influenza Vaccine IM > 6 months Valent IIV4 10/21/2015, 10/05/2016, 10/11/2018, 09/25/2019     Influenza Vaccine, 6+MO IM (QUADRIVALENT W/PRESERVATIVES) 09/18/2014, 10/03/2017     Pneumo Conj 13-V (2010&after) 10/11/2018     TD (ADULT, 7+) 04/20/1997, 04/17/2019     TDAP Vaccine (Adacel) 01/15/2009     Zoster vaccine recombinant adjuvanted (SHINGRIX) 10/11/2018          Chart documentation done in part with Dragon Voice recognition Software. Although reviewed after completion, some word and grammatical error may remain.    Terrence Carrillo MD

## 2020-02-06 ENCOUNTER — OFFICE VISIT (OUTPATIENT)
Dept: CARDIOLOGY | Facility: CLINIC | Age: 60
End: 2020-02-06
Attending: FAMILY MEDICINE
Payer: COMMERCIAL

## 2020-02-06 VITALS
SYSTOLIC BLOOD PRESSURE: 128 MMHG | HEART RATE: 75 BPM | OXYGEN SATURATION: 94 % | DIASTOLIC BLOOD PRESSURE: 74 MMHG | BODY MASS INDEX: 29.37 KG/M2 | WEIGHT: 158 LBS

## 2020-02-06 DIAGNOSIS — I35.0 AORTIC VALVE STENOSIS, ETIOLOGY OF CARDIAC VALVE DISEASE UNSPECIFIED: Primary | ICD-10-CM

## 2020-02-06 PROCEDURE — 99204 OFFICE O/P NEW MOD 45 MIN: CPT | Performed by: INTERNAL MEDICINE

## 2020-02-06 NOTE — NURSING NOTE
"Chief Complaint   Patient presents with     Heart Problem     NEW Dr. Hope echo performed at [facility] showing aortic stenosis. Pt reports havind some chest pains, SOB, [place], lightheadedness.       Initial There were no vitals taken for this visit. Estimated body mass index is 29.33 kg/m  as calculated from the following:    Height as of 2/4/20: 1.562 m (5' 1.5\").    Weight as of 2/4/20: 71.6 kg (157 lb 12.8 oz)..  BP completed using cuff size: regular    Chela Mathew MA  "

## 2020-02-06 NOTE — LETTER
2/6/2020      RE: Betsey Vanessa  [address] [facility] [address]       Dear Colleague,    Thank you for the opportunity to participate in the care of your patient, Betsey Vanessa, at the [facility] AT [facility]. Please see a copy of my visit note below.       SUBJECTIVE:  Betsey Vanessa is a 59 year old female who presents for evaluation of valvular heart disease.  Patient moved to [place] recently from [facility].  An echocardiogram outside hospital showed severe aortic stenosis, mild to moderate aortic regurgitation and mild to moderate mitral regurgitation.  Also had mild mitral stenosis.  Patient is asymptomatic from valvular heart disease.  Denies shortness of breath PND or palpitation.    Patient with a diagnosis of rheumatoid arthritis, Sjogren's syndrome.  Also has type 2 diabetes and hypertension.    Patient with severe Ménière's disease.  Significant hearing loss and cochlear implants.    Denied history of rheumatic fever.  Patient is not very active due to balance issues.  But denied cardiac symptoms.    Patient Active Problem List    Diagnosis Date Noted     Osteopenia of multiple sites 02/04/2020     Priority: Medium     History of bisphosphonate therapy 02/04/2020     Priority: Medium     Post-menopausal 02/04/2020     Priority: Medium     Type 2 diabetes mellitus with hyperglycemia, without long-term current use of insulin (H) 01/12/2020     Priority: Medium     Hypertension goal BP (blood pressure) < 140/90 01/12/2020     Priority: Medium     Meniere's disease, unspecified laterality 01/12/2020     Priority: Medium     Rheumatoid arthritis, involving unspecified site, unspecified rheumatoid factor presence (H) 01/12/2020     Priority: Medium     Valvular heart disease 01/12/2020     Priority: Medium     Chronic pain syndrome 01/12/2020     Priority: Medium     Mild major depression (H)  01/12/2020     Priority: Medium    .  Current Outpatient Medications   Medication Sig     celecoxib (CELEBREX) 200 MG capsule Take 1 capsule (200 mg) by mouth 2 times daily 2 x a day     cevimeline (EVOXAC) 30 MG capsule Take 1 capsule (30 mg) by mouth 3 times daily     clonazePAM (KLONOPIN) 2 MG tablet Take 2 mg by mouth Nightly as needed for sleep problem.     docusate calcium (GNP DOCUSATE CALCIUM) 240 MG capsule Take 240 mg by mouth daily     escitalopram (LEXAPRO) 10 MG tablet Take 10 mg by mouth daily     fentaNYL (DURAGESIC) 25 mcg/hr patch 72 hr Place 1 patch onto the skin every 72 hours     HYDROcodone-acetaminophen (NORCO)  MG per tablet Take 1 tablet by mouth Every 8 hrs. As needed for pain     lifitegrast (XIIDRA) 5 % opthalmic solution Place 1 drop into both eyes 2 times daily     metFORMIN (GLUCOPHAGE) 500 MG tablet Take 500 mg by mouth 2 times daily (with meals) 1000 mg 2 x a day     metroNIDAZOLE (METROGEL) 0.75 % gel Apply to face daily     nystatin (MYCOSTATIN) 341851 UNIT/GM POWD 2 x daily PRN for rash     omeprazole (PRILOSEC) 20 MG capsule Take by mouth daily 2 x daily     potassium chloride SA (K-DUR,KLOR-CON M) 10 MEQ tablet Take by mouth 2 times daily 20 mEq daily     promethazine (PHENERGAN) 25 MG suppository Place 25 mg rectally every 6 hours as needed For nusea     promethazine (PHENERGAN) 25 MG tablet Take by mouth every 6 hours as needed For nusea     psyllium (METAMUCIL) 58.6 % POWD Take by mouth daily PRN     TOCILIZUMAB IV Inject into the vein every 30 days     triamterene-hydrochlorothiazide (DYAZIDE) 37.5-25 MG per capsule 1 capsule every morning     VITAMIN D, CHOLECALCIFEROL, PO Take 50,000 Units by mouth daily Every 7 days     EPINEPHrine 0.3 MG/0.3ML SOLN PRN for Anaphylaxis     glucose blood VI test strips strip daily     meclizine (ANTIVERT) 25 MG tablet Take 1 tablet (25 mg) by mouth 3 times daily as needed for dizziness (Patient not taking: Reported on 2/6/2020)     No  current facility-administered medications for this visit.      Past Medical History:   Diagnosis Date     Anemia     r/t menses     Anxiety      Arthritis      Constipation      Depression      Diabetes (H)      Diarrhea      Double vision      Headache(784.0)      Hearing loss      Heart murmur      Heartburn      Hypertension      Indigestion      Nasal congestion      Night sweats      Numbness      Problems related to lack of adequate sleep      Rash      Sneezing      Tinnitus      Visual loss      Weakness      Weight gain     because of Prednisone     Past Surgical History:   Procedure Laterality Date     ------------OTHER-------------      D&C     HYSTERECTOMY       INNER EAR SURGERY       Allergies   Allergen Reactions     Duloxetine Other (See Comments)     Topiramate Other (See Comments)     Other reaction(s): Confusion     Amitriptyline Embonate [Amitriptyline]      Hyper activity     Azithromycin Hives     Cymbalta      Hyper activity     Gabapentin Hives     Macrobid [Nitrofurantoin] Hives     Paxil [Paroxetine] Other (See Comments)     Hyper behavior     Penicillins Hives     Tetracycline Hives     Social History     Socioeconomic History     Marital status: Single     Spouse name: Not on file     Number of children: Not on file     Years of education: Not on file     Highest education level: Not on file   Occupational History     Not on file   Social Needs     Financial resource strain: Not on file     Food insecurity:     Worry: Not on file     Inability: Not on file     Transportation needs:     Medical: Not on file     Non-medical: Not on file   Tobacco Use     Smoking status: Former Smoker     Start date: 1978     Last attempt to quit: 1998     Years since quittin.0     Smokeless tobacco: Never Used     Tobacco comment: 1 pack a day    Substance and Sexual Activity     Alcohol use: No     Drug use: Never     Sexual activity: Not Currently   Lifestyle     Physical activity:     Days  per week: Not on file     Minutes per session: Not on file     Stress: Not on file   Relationships     Social connections:     Talks on phone: Not on file     Gets together: Not on file     Attends Mandaeism service: Not on file     Active member of club or organization: Not on file     Attends meetings of clubs or organizations: Not on file     Relationship status: Not on file     Intimate partner violence:     Fear of current or ex partner: Not on file     Emotionally abused: Not on file     Physically abused: Not on file     Forced sexual activity: Not on file   Other Topics Concern     Not on file   Social History Narrative     Not on file     Family History   Problem Relation Age of Onset     C.A.D. Mother      Alzheimer Disease Mother      Cardiovascular Mother      Eye Disorder Mother      Thyroid Disease Mother      Hypertension Other         grandmother     Cerebrovascular Disease Father      Alcohol/Drug Father      Depression Father      Obesity Father         aunt     Asthma Father      Alcohol/Drug Brother      Alcohol/Drug Sister         aunt     Allergies Other         All family members     Arthritis Other         aunt     Obesity Sister      Thyroid Disease Sister      Asthma Sister         daughter          REVIEW OF SYSTEMS:  General: negative, fever, chills, night sweats  Skin: negative, acne, rash and scaling  Eyes: negative, double vision, eye pain and photophobia  Ears/Nose/Throat: negative, nasal congestion and purulent rhinorrhea  Respiratory: No dyspnea on exertion, No cough, No hemoptysis and negative  Cardiovascular: negative, palpitations, tachycardia, irregular heart beat, chest pain, exertional chest pain or pressure, paroxysmal nocturnal dyspnea, dyspnea on exertion and orthopnea  Gastrointestinal: negative, dysphagia, nausea and vomiting  Genitourinary: negative, nocturia, dysuria and frequency  Musculoskeletal: negative, fracture, back pain and neck pain  Neurologic: negative,  headaches, syncope, stroke and seizures  Psychiatric: negative, nervous breakdown, thoughts of self-harm and thoughts of hurting someone else  Hematologic/Lymphatic/Immunologic: negative, chills, fever and hay fever  Endocrine: negative, cold intolerance, heat intolerance and hot flashes       OBJECTIVE:  Blood pressure 128/74, pulse 75, weight 71.7 kg (158 lb), SpO2 94 %.  General Appearance: healthy, alert and no distress  Head: Normocephalic. No masses, lesions, tenderness or abnormalities  Eyes: conjuctiva clear, PERRL, EOM intact  Ears: External ears normal. Canals clear. TM's normal.  Nose: Nares normal  Mouth: normal  Neck: Supple, no cervical adenopathy, no thyromegaly  Lungs: clear to auscultation  Cardiac: regular rate and rhythm, normal S1 and S2, no murmur  Abdomen: Soft, nontender.  Normal bowel sounds.  No hepatosplenomegaly or abnormal masses  Extremities: no peripheral edema, peripheral pulses normal  Musculoskeletal: negative  Neurological: No motor deficit.       ASSESSMENT/PLAN:  Patient here for evaluation of valvular heart disease.  Current medical problems are rheumatoid arthritis, Sjogren's syndrome.  Severe Ménière's disease with balance issues and hearing loss.  Status post cochlear implant.  Type 2 diabetes and hypertension.  No history of rheumatic fever.  Patient is not very active due to balance issues.  Denied cardiac symptoms.  Patient had an outside echocardiogram.  This was reported as showing severe aortic stenosis mild to moderate aortic regurgitation, mild to moderate mitral regurgitation and mild mitral stenosis.  As patient is asymptomatic will reassess valvular heart disease with an echocardiogram.  Patient had an echocardiogram.  This showed moderate aortic stenosis.  Mild to moderate aortic regurgitation.  Part of the gradient could be due to regurgitation and hyperdynamic LV function.  Mitral valve appears rheumatic.  To have mild to moderate mitral stenosis and mild to  moderate mitral regurgitation.  Overall valvular heart disease at this time is not severe enough for any intervention.  Result was discussed with patient.  Will reassess valvular function in 1 year with an echocardiogram.  Patient is advised to continue current medications.  Per orders.   Return to Clinic 1yr.    Please do not hesitate to contact me if you have any questions/concerns.     Sincerely,     DEBRA To MD

## 2020-02-06 NOTE — PROGRESS NOTES
[facility] Neurology Consultation    Betsey Vanessa MRN# [id number]   Age: 59 year old YOB: 1960     Requesting physician: Victor Hugo Larry     Reason for Consultation: imbalance    History of Presenting Symptoms:  Betsey Vanessa is a 59 year old female who presents today for evaluation of imbalance    The patient has pertinent history Meniere's disease, Rheumatoid arthritis (previously on medications including; methotrexate + rituximab, Enbrel + leflunomide, and had elevated liver enzymes (ALEGRIA) leading to tocilizumab) and was to start Actemra.  She has Sjogren's syndrome, chronic pain syndrome for which she plans to establish care at pain clinic in [place] (on fentanyl patch, clonazepam for sleep), valvular heart disease (MV stenosis, MV regurg,Severe aortic valve stenosis), depression.  She recently moved to [place] from [place].    Regarding her imbalance, she has complicated history of vertigo (20 years). Initially diagnosed with Meniere's disease w/intractable vertigo she underwent L-labyrinthectomy which worsened hearing in 1998.  Symptoms have remained the same over these years, and had BAHA placed a year ago, has tried vestibular therapy, and had a steroid injection in 2014.  Symptoms are described as episodes of vertigo where the patient feels motion while not moving that can be associated with vomiting/nausea.  She is on compazine and clonazepam for this and has tried valium for it in the past. During ENT visit on 1/17/2020, she had signs on exam for possible cerebellar dysfunction and was referred to neurology for further evaluation.  While undergoing MRI on 1/29/2020, she was found to indicated to have choreiform movements.    Today, she indicates that she has imbalance that is not well controlled.  She can fall at the drop of a hat, and it can be in any direction.  She feels like she might pass out occasionally. She doesn't really notice a change in her balance but does  indicate that things are a little worse.  A decade ago, she had several clusters or spells of balance issues that were short (1-2 hours, or a day).  Now, she indicates that her imbalance issues with nausea last longer with more intense imbalance.  When describing her imbalance, she says that when she looks at an object the object itself (ceiling, door) can appear wavy.  Things are moving that shouldn't be moving. Regarding her medications, she takes promethazine 4-5 times a week.    She mentions headaches that she has had for her lifetime, but they are changing.  She reports that her peripheral vision on both sides starts to go, and then she has zaps in her head.  In the last year, these have become more present and are are similar to her chronic migraine issues (chronic migraines stopped after hysterectomy).  Right now, she reports about 3 headaches a week.  They last a couple hours, and are always right behind her eyes and in the center of her face.  She a pain pill which would make them go away.      She mentions twitching.  She gets twitching on her legs occasionally that can occur at anytime.  It doesn't bother her too much.  She doesn't report any odd movements of her arms or legs or neck that she isn't in control of or any tremor. She has ongoing tinging in the back of her leg (both sides) with some moises.     She was managed by Dr. Snyder in [place], and ENT in [place], for her dizziness.  She lives in her own place with her daughter.  She has a great deal of decreased stress now that she is here.  She reports that in [place] she had therapist do hypnosis that helped.         Past Medical History:     Past Medical History:   Diagnosis Date     Anemia     r/t menses     Anxiety      Arthritis      Constipation      Depression      Diabetes (H)      Diarrhea      Double vision      Headache(784.0)      Hearing loss      Heart murmur      Heartburn      Hypertension      Indigestion      Nasal congestion       Night sweats      Numbness      Problems related to lack of adequate sleep      Rash      Sneezing      Tinnitus      Visual loss      Weakness      Weight gain     because of Prednisone        Past Surgical History:     Past Surgical History:   Procedure Laterality Date     ------------OTHER-------------      D&C     HYSTERECTOMY       INNER EAR SURGERY          Social History:   Moved to [place] from [place] to be closer to family.   Tobacco Use     Smoking status: Former Smoker     Start date: 1978     Last attempt to quit: 1998     Years since quittin.0     Smokeless tobacco: Never Used     Tobacco comment: 1 pack a day    Substance Use Topics     Alcohol use: No     Drug use: Never        Family History:   Aunts had imbalance w/menier's disease.  Family History   Problem Relation Age of Onset     C.A.D. Mother      Alzheimer Disease Mother      Cardiovascular Mother      Eye Disorder Mother      Thyroid Disease Mother      Hypertension Other         grandmother     Cerebrovascular Disease Father      Alcohol/Drug Father      Depression Father      Obesity Father         aunt     Asthma Father      Alcohol/Drug Brother      Alcohol/Drug Sister         aunt     Allergies Other         All family members     Arthritis Other         aunt     Obesity Sister      Thyroid Disease Sister      Asthma Sister         daughter        Medications:     Current Outpatient Medications   Medication Sig     celecoxib (CELEBREX) 200 MG capsule Take 1 capsule (200 mg) by mouth 2 times daily 2 x a day     cevimeline (EVOXAC) 30 MG capsule Take 1 capsule (30 mg) by mouth 3 times daily     clonazePAM (KLONOPIN) 2 MG tablet Take 2 mg by mouth Nightly as needed for sleep problem.     docusate calcium (GNP DOCUSATE CALCIUM) 240 MG capsule Take 240 mg by mouth daily     EPINEPHrine 0.3 MG/0.3ML SOLN PRN for Anaphylaxis     escitalopram (LEXAPRO) 10 MG tablet Take 10 mg by mouth daily     fentaNYL (DURAGESIC) 25  mcg/hr patch 72 hr Place 1 patch onto the skin every 72 hours     glucose blood VI test strips strip daily     HYDROcodone-acetaminophen (NORCO)  MG per tablet Take 1 tablet by mouth Every 8 hrs. As needed for pain     lifitegrast (XIIDRA) 5 % opthalmic solution Place 1 drop into both eyes 2 times daily     meclizine (ANTIVERT) 25 MG tablet Take 1 tablet (25 mg) by mouth 3 times daily as needed for dizziness     metFORMIN (GLUCOPHAGE) 500 MG tablet Take 500 mg by mouth 2 times daily (with meals) 1000 mg 2 x a day     metroNIDAZOLE (METROGEL) 0.75 % gel Apply to face daily     nystatin (MYCOSTATIN) 405012 UNIT/GM POWD 2 x daily PRN for rash     omeprazole (PRILOSEC) 20 MG capsule Take by mouth daily 2 x daily     potassium chloride SA (K-DUR,KLOR-CON M) 10 MEQ tablet Take by mouth 2 times daily 20 mEq daily     promethazine (PHENERGAN) 25 MG suppository Place 25 mg rectally every 6 hours as needed For nusea     promethazine (PHENERGAN) 25 MG tablet Take by mouth every 6 hours as needed For nusea     psyllium (METAMUCIL) 58.6 % POWD Take by mouth daily PRN     TOCILIZUMAB IV Inject into the vein every 30 days     triamterene-hydrochlorothiazide (DYAZIDE) 37.5-25 MG per capsule 1 capsule every morning     VITAMIN D, CHOLECALCIFEROL, PO Take 50,000 Units by mouth daily Every 7 days        Allergies:     Allergies   Allergen Reactions     Duloxetine Other (See Comments)     Topiramate Other (See Comments)     Other reaction(s): Confusion     Amitriptyline Embonate [Amitriptyline]      Hyper activity     Azithromycin Hives     Cymbalta      Hyper activity     Gabapentin Hives     Macrobid [Nitrofurantoin] Hives     Paxil [Paroxetine] Other (See Comments)     Hyper behavior     Penicillins Hives     Tetracycline Hives        Review of Systems:   A comprehensive 10 point review of systems (constitutional, ENT, cardiac, peripheral vascular, lymphatic, respiratory, GI, , Musculoskeletal, skin, Neurological) was  "performed and found to be negative except as described in this note.      Physical Exam:   Vitals: BP (!) 142/58 (BP Location: Right arm, Patient Position: Sitting)   Pulse 89   Wt 71.7 kg (158 lb)   SpO2 93%   BMI 29.37 kg/m     General: Seated comfortably in no acute distress. Using cane to walk.  HEENT: Neck supple with normal range of motion. No paracervical muscle tenderness or tightness.   Heart: Regular rate  Lungs: breathing comfortably  GI: Non tender  Extremities: no edema  Skin: No rashes  Neurologic:     Mental Status: Fully alert, attentive and oriented. Speech slurred with \"cah\" sounds, but fluent. no paraphasic errors. MiniCog score of 4/5     Cranial Nerves: Visual fields poor on peripheral vision b/l (not naming numbers in fields at 5 feet). PERRL. EOMI with normal smooth pursuit, no dysconjugation. Facial sensation intact/symmetric. Facial movements symmetric. Hearing not formally tested but intact to conversation (b/l hearing aids, poor hearing). Palate elevation symmetric, uvula midline. Rapid alternation of phonemes is poor (pa-enzo-cah). Shoulder shrug strong bilaterally. Tongue protrusion midline.     Motor: No tremors or other abnormal movements observed. Paratonia throughout. No pronator drift but holding arms out for prolonged time is painful. Slowed finger tapping. Strength 4/5 on right hip flexion, 4/5 hip extension right. Otherwise 5/5 in all extremities.  Great deal of pain limits most motions throughout exam.     Deep Tendon Reflexes: 3+ right patellar, otherwise 2+/symmetric throughout upper and lower extremities. No clonus. Toes downgoing bilaterally.     Sensory: Vibratory sensation only 6-8 seconds in ankles b/l, 9 at left knee, 12 at right knee, full in fingers.  Otheriwse no loss of light touch or pinprick on extremities. positive Romberg.      Coordination: Finger-nose-finger and heel-shin intact with large degree of dysmetria (difficulty touching nose with her eyes open or " closed, no large amplitude swaying during motion or tremor upon reaching tremor)  Slowed, movements throughout. Slowed alternating movements of hands but intact and symmetric with rhythm.     Gait: Difficult to stand due to pain.  Slightly stooping posture, uses cane usually but not on exam today.  Large high tended steps (foot goes up then out then down as if searching for landing spot versus a normal swinging gait). Wider base, heels don't come w/in 8 inches during walking.  Turning is shuffled with some imbalance.  Tandem gait is poor (falls to either side, crosses legs and feet when trying to place heel to toe)         Data: Pertinent prior to visit   Imaging:  MRI brain: 1/29/2020  1. No abnormal signal or enhancement along the course of the seventh or eighth cranial nerves. Normal signal involving the vestibular and auditory structures.  2. Tiny questionable meningioma along the undersurface of the left lateral tentorium.  3. Scattered nonspecific T2 hyperintense foci within the periventricular and subcortical white matter and mild generalized cerebral atrophy.    XR cervical sine: 1/14/2020  There is some anterior bridging syndesmophytes at multiple levels with relative preservation disc spaces. Facet joints appear normal. Flexion-extension views do not show any evidence for any instability. There is no evidence for fracture. No imaging changes to suggest rheumatoid arthritis are present. There is no atlantoaxial subluxation. Incidental note is made that the patient is edentulous.    Procedures:  Audiogram 1/17/2020: ENT: She has a type B large volume tympanogram right, type B normal volume tympanogram left.  She has profound sensorineural hearing loss in the left.  She has severe mixed hearing loss right.         Assessment and Plan:   Assessment:  - Peripheral neuropathy, unspecified but likely length dependent with large fiber involvement  - Ataxia, distal and proximal (distal worse than proximal) with  speech coordination issues  - Radiculopathy, possibly in lumbar spine L4 region, likely bilateral    The patient has a long and complex history including conditions that can lead to small fiber neuropathies, large fiber length dependent polyneuropathies.  Her exam is somewhat clouded today by constant joint and muscular pain that is chronic.  I worry about her pain and weakness in her legs (thighs, hamstring) on more the right than the left.  She could be having a radiculopathy on top of her chronic pains that is easily masked by pain medications and her chronic conditions.  MRI lumbar spine with EMG may be helpful here.    I do get a large degree of ataxia on exam today (both in speech coordination, and in proximal and distal testing).  It is possible some of these findings are related to a polyneuropathy or large fiber neuropathy, but conditions like SCA may cause peripheral neuropathies with ataxia and insidious onsets.  I would await further testing with possible causes for her neuropathies, radiculopathy to return before moving the w/up in this direction.  Given there was no major vascular findings on MRI brain, I do not think there is ongoing stroke/infarction issues in the cerebellum or tracts surrounding it.  Given there was no chorea or atypical tremor/movement with her exam today, and no history of one sided difficulties of balance, I also am less concerned for a stroke in the BG.  I do not think prochlorperazine is causing any choreiform movements at this time.    Given her dizziness is reported to have concentric vision loss, occur like a drop attack without warning, and she has memory immediately following I would agree with cardiology in that she should have an echocardiogram and EKG to make sure this is not cardiogenic in nature.  She may also benefit from repeat orthostatic testing or tilt table testing.     Plan:  - EMG  - Lumbar spine MRI  - Complete blood count  - Serum calcium, magnesium, vitamin  B12, parathyroid levels  - paraneoplastic  - Agree cardiac recommendations for echocardiogram.      Follow up in Neurology clinic in 3 months or earlier as needed should new concerns arise.    CHARISSE Booth D.O.   of Neurology      Greater than 50% of the total time (70 min) in this patient encounter was spent on counseling and/or coordination of care. We reviewed diagnostic results, impressions, and discussed other possible tests if symptoms do not improve. We discussed the implications of the diagnosis, as well as risks and benefits of management options. We reviewed treatment instructions and our scheduled follow-up as specified in the discharge plan. We also discussed the importance of compliance with the chosen course of treatment. The patient is in agreement with this plan and has no further questions.

## 2020-02-06 NOTE — PATIENT INSTRUCTIONS
Thank you for coming to the [facility] @ [place]; please note the following instructions:    1. Dr. Hope has recommended an echocardiogram.  Results will determine next step.        If you have any questions regarding your visit please contact your care team:     Cardiology  Telephone Number   Neli MEMBRENO, RN  Lida HUSSEIN, RN   Jessie GILES, RMA  Chela SANCHEZ, RMA  Jimenez RUSS, LPN   [phone] (option 1)   For scheduling appts:     [phone] (select option 1)       For the Device Clinic (Pacemakers and ICD's)  RN's :  Vianney Ron   During business hours: [phone]    *After business hours:  [phone] (select option 4)      Normal test result notifications will be released via [employer] or mailed within 7 business days.  All other test results, will be communicated via telephone once reviewed by your cardiologist.    If you need a medication refill please contact your pharmacy.  Please allow 3 business days for your refill to be completed.    As always, thank you for trusting us with your health care needs!

## 2020-02-07 ENCOUNTER — ANCILLARY PROCEDURE (OUTPATIENT)
Dept: CARDIOLOGY | Facility: CLINIC | Age: 60
End: 2020-02-07
Attending: INTERNAL MEDICINE
Payer: COMMERCIAL

## 2020-02-07 ENCOUNTER — OFFICE VISIT (OUTPATIENT)
Dept: NEUROLOGY | Facility: CLINIC | Age: 60
End: 2020-02-07
Attending: OTOLARYNGOLOGY
Payer: COMMERCIAL

## 2020-02-07 VITALS
DIASTOLIC BLOOD PRESSURE: 58 MMHG | OXYGEN SATURATION: 93 % | SYSTOLIC BLOOD PRESSURE: 142 MMHG | BODY MASS INDEX: 29.37 KG/M2 | HEART RATE: 89 BPM | WEIGHT: 158 LBS

## 2020-02-07 DIAGNOSIS — R26.89 IMBALANCE: ICD-10-CM

## 2020-02-07 DIAGNOSIS — I35.0 AORTIC VALVE STENOSIS: ICD-10-CM

## 2020-02-07 DIAGNOSIS — R26.89 IMBALANCE: Primary | ICD-10-CM

## 2020-02-07 LAB
BASOPHILS # BLD AUTO: 0 10E9/L (ref 0–0.2)
BASOPHILS NFR BLD AUTO: 0.3 %
CALCIUM SERPL-MCNC: 9.6 MG/DL (ref 8.5–10.1)
DIFFERENTIAL METHOD BLD: ABNORMAL
EOSINOPHIL # BLD AUTO: 0.2 10E9/L (ref 0–0.7)
EOSINOPHIL NFR BLD AUTO: 3.1 %
ERYTHROCYTE [DISTWIDTH] IN BLOOD BY AUTOMATED COUNT: 13.2 % (ref 10–15)
HCT VFR BLD AUTO: 36.4 % (ref 35–47)
HGB BLD-MCNC: 11.9 G/DL (ref 11.7–15.7)
IMM GRANULOCYTES # BLD: 0 10E9/L (ref 0–0.4)
IMM GRANULOCYTES NFR BLD: 0.3 %
LYMPHOCYTES # BLD AUTO: 1.5 10E9/L (ref 0.8–5.3)
LYMPHOCYTES NFR BLD AUTO: 22.4 %
MAGNESIUM SERPL-MCNC: 1.7 MG/DL (ref 1.6–2.3)
MCH RBC QN AUTO: 28.1 PG (ref 26.5–33)
MCHC RBC AUTO-ENTMCNC: 32.7 G/DL (ref 31.5–36.5)
MCV RBC AUTO: 86 FL (ref 78–100)
MONOCYTES # BLD AUTO: 0.5 10E9/L (ref 0–1.3)
MONOCYTES NFR BLD AUTO: 7.1 %
NEUTROPHILS # BLD AUTO: 4.5 10E9/L (ref 1.6–8.3)
NEUTROPHILS NFR BLD AUTO: 66.8 %
NRBC # BLD AUTO: 0 10*3/UL
NRBC BLD AUTO-RTO: 0 /100
PLATELET # BLD AUTO: 122 10E9/L (ref 150–450)
PTH-INTACT SERPL-MCNC: 59 PG/ML (ref 18–80)
RBC # BLD AUTO: 4.23 10E12/L (ref 3.8–5.2)
RETICS # AUTO: 45.7 10E9/L (ref 25–95)
RETICS/RBC NFR AUTO: 1.1 % (ref 0.5–2)
VIT B12 SERPL-MCNC: 207 PG/ML (ref 193–986)
WBC # BLD AUTO: 6.7 10E9/L (ref 4–11)

## 2020-02-07 ASSESSMENT — PAIN SCALES - GENERAL: PAINLEVEL: MODERATE PAIN (5)

## 2020-02-07 NOTE — LETTER
2/7/2020       RE: Betsey Vanessa  [address] [facility] [address]     Dear Colleague,    Thank you for referring your patient, Betsey Vnaessa, to the [facility] NEUROLOGY at [facility]. Please see a copy of my visit note below.    [facility] Neurology Consultation    Betsey Vanessa MRN# [id number]   Age: 59 year old YOB: 1960     Requesting physician: Victor Hugo Larry     Reason for Consultation: imbalance    History of Presenting Symptoms:  Betsey Vanessa is a 59 year old female who presents today for evaluation of imbalance    The patient has pertinent history Meniere's disease, Rheumatoid arthritis (previously on medications including; methotrexate + rituximab, Enbrel + leflunomide, and had elevated liver enzymes (ALEGRIA) leading to tocilizumab) and was to start Actemra.  She has Sjogren's syndrome, chronic pain syndrome for which she plans to establish care at pain clinic in [place] (on fentanyl patch, clonazepam for sleep), valvular heart disease (MV stenosis, MV regurg,Severe aortic valve stenosis), depression.  She recently moved to [place] from [place].    Regarding her imbalance, she has complicated history of vertigo (20 years). Initially diagnosed with Meniere's disease w/intractable vertigo she underwent L-labyrinthectomy which worsened hearing in 1998.  Symptoms have remained the same over these years, and had BAHA placed a year ago, has tried vestibular therapy, and had a steroid injection in 2014.  Symptoms are described as episodes of vertigo where the patient feels motion while not moving that can be associated with vomiting/nausea.  She is on compazine and clonazepam for this and has tried valium for it in the past. During ENT visit on 1/17/2020, she had signs on exam for possible cerebellar dysfunction and was referred to neurology for further evaluation.  While undergoing MRI on 1/29/2020, she was found to indicated to have  choreiform movements.    Today, she indicates that she has imbalance that is not well controlled.  She can fall at the drop of a hat, and it can be in any direction.  She feels like she might pass out occasionally. She doesn't really notice a change in her balance but does indicate that things are a little worse.  A decade ago, she had several clusters or spells of balance issues that were short (1-2 hours, or a day).  Now, she indicates that her imbalance issues with nausea last longer with more intense imbalance.  When describing her imbalance, she says that when she looks at an object the object itself (ceiling, door) can appear wavy.  Things are moving that shouldn't be moving. Regarding her medications, she takes promethazine 4-5 times a week.    She mentions headaches that she has had for her lifetime, but they are changing.  She reports that her peripheral vision on both sides starts to go, and then she has zaps in her head.  In the last year, these have become more present and are are similar to her chronic migraine issues (chronic migraines stopped after hysterectomy).  Right now, she reports about 3 headaches a week.  They last a couple hours, and are always right behind her eyes and in the center of her face.  She a pain pill which would make them go away.      She mentions twitching.  She gets twitching on her legs occasionally that can occur at anytime.  It doesn't bother her too much.  She doesn't report any odd movements of her arms or legs or neck that she isn't in control of or any tremor. She has ongoing tinging in the back of her leg (both sides) with some moises.     She was managed by Dr. Snyder in [place], and ENT in [place], for her dizziness.  She lives in her own place with her daughter.  She has a great deal of decreased stress now that she is here.  She reports that in [place] she had therapist do hypnosis that helped.         Past Medical History:     Past Medical History:   Diagnosis  Date     Anemia     r/t menses     Anxiety      Arthritis      Constipation      Depression      Diabetes (H)      Diarrhea      Double vision      Headache(784.0)      Hearing loss      Heart murmur      Heartburn      Hypertension      Indigestion      Nasal congestion      Night sweats      Numbness      Problems related to lack of adequate sleep      Rash      Sneezing      Tinnitus      Visual loss      Weakness      Weight gain     because of Prednisone        Past Surgical History:     Past Surgical History:   Procedure Laterality Date     ------------OTHER-------------      D&C     HYSTERECTOMY       INNER EAR SURGERY          Social History:   Moved to [place] from [place] to be closer to family.   Tobacco Use     Smoking status: Former Smoker     Start date: 1978     Last attempt to quit: 1998     Years since quittin.0     Smokeless tobacco: Never Used     Tobacco comment: 1 pack a day    Substance Use Topics     Alcohol use: No     Drug use: Never        Family History:   Aunts had imbalance w/menier's disease.  Family History   Problem Relation Age of Onset     C.A.D. Mother      Alzheimer Disease Mother      Cardiovascular Mother      Eye Disorder Mother      Thyroid Disease Mother      Hypertension Other         grandmother     Cerebrovascular Disease Father      Alcohol/Drug Father      Depression Father      Obesity Father         aunt     Asthma Father      Alcohol/Drug Brother      Alcohol/Drug Sister         aunt     Allergies Other         All family members     Arthritis Other         aunt     Obesity Sister      Thyroid Disease Sister      Asthma Sister         daughter        Medications:     Current Outpatient Medications   Medication Sig     celecoxib (CELEBREX) 200 MG capsule Take 1 capsule (200 mg) by mouth 2 times daily 2 x a day     cevimeline (EVOXAC) 30 MG capsule Take 1 capsule (30 mg) by mouth 3 times daily     clonazePAM (KLONOPIN) 2 MG tablet Take 2 mg by  mouth Nightly as needed for sleep problem.     docusate calcium (GNP DOCUSATE CALCIUM) 240 MG capsule Take 240 mg by mouth daily     EPINEPHrine 0.3 MG/0.3ML SOLN PRN for Anaphylaxis     escitalopram (LEXAPRO) 10 MG tablet Take 10 mg by mouth daily     fentaNYL (DURAGESIC) 25 mcg/hr patch 72 hr Place 1 patch onto the skin every 72 hours     glucose blood VI test strips strip daily     HYDROcodone-acetaminophen (NORCO)  MG per tablet Take 1 tablet by mouth Every 8 hrs. As needed for pain     lifitegrast (XIIDRA) 5 % opthalmic solution Place 1 drop into both eyes 2 times daily     meclizine (ANTIVERT) 25 MG tablet Take 1 tablet (25 mg) by mouth 3 times daily as needed for dizziness     metFORMIN (GLUCOPHAGE) 500 MG tablet Take 500 mg by mouth 2 times daily (with meals) 1000 mg 2 x a day     metroNIDAZOLE (METROGEL) 0.75 % gel Apply to face daily     nystatin (MYCOSTATIN) 073452 UNIT/GM POWD 2 x daily PRN for rash     omeprazole (PRILOSEC) 20 MG capsule Take by mouth daily 2 x daily     potassium chloride SA (K-DUR,KLOR-CON M) 10 MEQ tablet Take by mouth 2 times daily 20 mEq daily     promethazine (PHENERGAN) 25 MG suppository Place 25 mg rectally every 6 hours as needed For nusea     promethazine (PHENERGAN) 25 MG tablet Take by mouth every 6 hours as needed For nusea     psyllium (METAMUCIL) 58.6 % POWD Take by mouth daily PRN     TOCILIZUMAB IV Inject into the vein every 30 days     triamterene-hydrochlorothiazide (DYAZIDE) 37.5-25 MG per capsule 1 capsule every morning     VITAMIN D, CHOLECALCIFEROL, PO Take 50,000 Units by mouth daily Every 7 days        Allergies:     Allergies   Allergen Reactions     Duloxetine Other (See Comments)     Topiramate Other (See Comments)     Other reaction(s): Confusion     Amitriptyline Embonate [Amitriptyline]      Hyper activity     Azithromycin Hives     Cymbalta      Hyper activity     Gabapentin Hives     Macrobid [Nitrofurantoin] Hives     Paxil [Paroxetine] Other (See  "Comments)     Hyper behavior     Penicillins Hives     Tetracycline Hives        Review of Systems:   A comprehensive 10 point review of systems (constitutional, ENT, cardiac, peripheral vascular, lymphatic, respiratory, GI, , Musculoskeletal, skin, Neurological) was performed and found to be negative except as described in this note.      Physical Exam:   Vitals: BP (!) 142/58 (BP Location: Right arm, Patient Position: Sitting)   Pulse 89   Wt 71.7 kg (158 lb)   SpO2 93%   BMI 29.37 kg/m      General: Seated comfortably in no acute distress. Using cane to walk.  HEENT: Neck supple with normal range of motion. No paracervical muscle tenderness or tightness.   Heart: Regular rate  Lungs: breathing comfortably  GI: Non tender  Extremities: no edema  Skin: No rashes  Neurologic:     Mental Status: Fully alert, attentive and oriented. Speech slurred with \"cah\" sounds, but fluent. no paraphasic errors. MiniCog score of 4/5     Cranial Nerves: Visual fields poor on peripheral vision b/l (not naming numbers in fields at 5 feet). PERRL. EOMI with normal smooth pursuit, no dysconjugation. Facial sensation intact/symmetric. Facial movements symmetric. Hearing not formally tested but intact to conversation (b/l hearing aids, poor hearing). Palate elevation symmetric, uvula midline. Rapid alternation of phonemes is poor (pa-enzo-cah). Shoulder shrug strong bilaterally. Tongue protrusion midline.     Motor: No tremors or other abnormal movements observed. Paratonia throughout. No pronator drift but holding arms out for prolonged time is painful. Slowed finger tapping. Strength 4/5 on right hip flexion, 4/5 hip extension right. Otherwise 5/5 in all extremities.  Great deal of pain limits most motions throughout exam.     Deep Tendon Reflexes: 3+ right patellar, otherwise 2+/symmetric throughout upper and lower extremities. No clonus. Toes downgoing bilaterally.     Sensory: Vibratory sensation only 6-8 seconds in ankles b/l, " 9 at left knee, 12 at right knee, full in fingers.  Otheriwse no loss of light touch or pinprick on extremities. positive Romberg.      Coordination: Finger-nose-finger and heel-shin intact with large degree of dysmetria (difficulty touching nose with her eyes open or closed, no large amplitude swaying during motion or tremor upon reaching tremor)  Slowed, movements throughout. Slowed alternating movements of hands but intact and symmetric with rhythm.     Gait: Difficult to stand due to pain.  Slightly stooping posture, uses cane usually but not on exam today.  Large high tended steps (foot goes up then out then down as if searching for landing spot versus a normal swinging gait). Wider base, heels don't come w/in 8 inches during walking.  Turning is shuffled with some imbalance.  Tandem gait is poor (falls to either side, crosses legs and feet when trying to place heel to toe)         Data: Pertinent prior to visit   Imaging:  MRI brain: 1/29/2020  1. No abnormal signal or enhancement along the course of the seventh or eighth cranial nerves. Normal signal involving the vestibular and auditory structures.  2. Tiny questionable meningioma along the undersurface of the left lateral tentorium.  3. Scattered nonspecific T2 hyperintense foci within the periventricular and subcortical white matter and mild generalized cerebral atrophy.    XR cervical sine: 1/14/2020  There is some anterior bridging syndesmophytes at multiple levels with relative preservation disc spaces. Facet joints appear normal. Flexion-extension views do not show any evidence for any instability. There is no evidence for fracture. No imaging changes to suggest rheumatoid arthritis are present. There is no atlantoaxial subluxation. Incidental note is made that the patient is edentulous.    Procedures:  Audiogram 1/17/2020: ENT: She has a type B large volume tympanogram right, type B normal volume tympanogram left.  She has profound sensorineural  hearing loss in the left.  She has severe mixed hearing loss right.         Assessment and Plan:   Assessment:  - Peripheral neuropathy, unspecified but likely length dependent with large fiber involvement  - Ataxia, distal and proximal (distal worse than proximal) with speech coordination issues  - Radiculopathy, possibly in lumbar spine L4 region, likely bilateral    The patient has a long and complex history including conditions that can lead to small fiber neuropathies, large fiber length dependent polyneuropathies.  Her exam is somewhat clouded today by constant joint and muscular pain that is chronic.  I worry about her pain and weakness in her legs (thighs, hamstring) on more the right than the left.  She could be having a radiculopathy on top of her chronic pains that is easily masked by pain medications and her chronic conditions.  MRI lumbar spine with EMG may be helpful here.    I do get a large degree of ataxia on exam today (both in speech coordination, and in proximal and distal testing).  It is possible some of these findings are related to a polyneuropathy or large fiber neuropathy, but conditions like SCA may cause peripheral neuropathies with ataxia and insidious onsets.  I would await further testing with possible causes for her neuropathies, radiculopathy to return before moving the w/up in this direction.  Given there was no major vascular findings on MRI brain, I do not think there is ongoing stroke/infarction issues in the cerebellum or tracts surrounding it.  Given there was no chorea or atypical tremor/movement with her exam today, and no history of one sided difficulties of balance, I also am less concerned for a stroke in the BG.  I do not think prochlorperazine is causing any choreiform movements at this time.    Given her dizziness is reported to have concentric vision loss, occur like a drop attack without warning, and she has memory immediately following I would agree with cardiology  in that she should have an echocardiogram and EKG to make sure this is not cardiogenic in nature.  She may also benefit from repeat orthostatic testing or tilt table testing.     Plan:  - EMG  - Lumbar spine MRI  - Complete blood count  - Serum calcium, magnesium, vitamin B12, parathyroid levels  - paraneoplastic  - Agree cardiac recommendations for echocardiogram.    Follow up in Neurology clinic in 3 months or earlier as needed should new concerns arise.    CHARISSE Booth D.O.   of Neurology    Greater than 50% of the total time (70 min) in this patient encounter was spent on counseling and/or coordination of care. We reviewed diagnostic results, impressions, and discussed other possible tests if symptoms do not improve. We discussed the implications of the diagnosis, as well as risks and benefits of management options. We reviewed treatment instructions and our scheduled follow-up as specified in the discharge plan. We also discussed the importance of compliance with the chosen course of treatment. The patient is in agreement with this plan and has no further questions.

## 2020-02-07 NOTE — PATIENT INSTRUCTIONS
We will do the following tests.    - EMG  - Lumbar spine MRI  - Complete blood count  - Serum calcium, magnesium, vitamin B12,  parathyroid levels  - paraneoplastic panel    These tests will look for reasons for your leg pain/weakness in legs.

## 2020-02-10 ENCOUNTER — HEALTH MAINTENANCE LETTER (OUTPATIENT)
Age: 60
End: 2020-02-10

## 2020-02-10 LAB
A-TOCOPHEROL VIT E SERPL-MCNC: 13.9 MG/L (ref 5.5–18)
BETA+GAMMA TOCOPHEROL SERPL-MCNC: 2.6 MG/L (ref 0–6)

## 2020-02-11 DIAGNOSIS — I35.0 AORTIC VALVE STENOSIS: Primary | ICD-10-CM

## 2020-02-12 NOTE — TELEPHONE ENCOUNTER
FUTURE VISIT INFORMATION      FUTURE VISIT INFORMATION:    Date: 3/19/20    Time: 12:30 PM; Aud BAHA at 8:30 AM    Location: [facility]  REFERRAL INFORMATION:    Referring provider:  Dr. Gutiérrez    Referring providers clinic:  [facility]    Reason for visit/diagnosis: New Baha    RECORDS REQUESTED FROM:       Clinic name Comments Records Status Imaging Status   [facility] 2/13/14 - ENT OV with Dr. Chandrakant Camarena    [facility] 1/17/20 - ENT OV with Dr. Brock Camarena    [facility] - Procedure (GUILLERMO) 3/3/20 - VNG/Chair  1/17/20 - Audiogram  2/13/14 - Audiogram  2/13/14 - VNG/Chair  6/11/13 - Audiogram [facility]    [facility] - Imaging 1/29/20 - MRI Brain W/WO  1/29/20 - CT Temporal WO [facility] PACs   [facility] 11/11/13 to 9/13/19 - ENT OVs with Dr. Momo Cheung  9/26/19 - Baha update with Sandee Torres [facility] Care Everywhere    [facility] - Procedures 2/6/19 - Audiogram  6/14/18 - Audiogram  6/12/17 - Audiogram  2/3/16 - Audiogram 2/13 Sent to Scan    [facility] 3/5/15 to 9/15/15 - ENT OVs with Dr. Snyder  3/5/15 - AUD OV with Meli Magaña Care Everywhere    [facility]- Procedures 3/5/15 - Audiogram (no audiogram)  9/15/15 - Audiogram 2/14 Sent to Scan    [facility] - Surgery 6/26/98 - OP Note for LABYRINTHECTOMY OF LEFT EAR with Dr. Martins Scanned In      * 2/12/20 10:18 AM Faxed req to [facility] and [facility] for Audiograms - Kathy  * 2/13/20 4:06 PM Received Audiograms from [facility] and sent to [facility] To be scanned into the chart - Kathy

## 2020-02-13 ENCOUNTER — INFUSION THERAPY VISIT (OUTPATIENT)
Dept: INFUSION THERAPY | Facility: CLINIC | Age: 60
End: 2020-02-13
Payer: COMMERCIAL

## 2020-02-13 VITALS
WEIGHT: 159.7 LBS | SYSTOLIC BLOOD PRESSURE: 128 MMHG | OXYGEN SATURATION: 95 % | RESPIRATION RATE: 18 BRPM | BODY MASS INDEX: 29.69 KG/M2 | HEART RATE: 91 BPM | TEMPERATURE: 98.3 F | DIASTOLIC BLOOD PRESSURE: 65 MMHG

## 2020-02-13 DIAGNOSIS — Z78.0 POST-MENOPAUSAL: ICD-10-CM

## 2020-02-13 DIAGNOSIS — M85.89 OSTEOPENIA OF MULTIPLE SITES: Primary | ICD-10-CM

## 2020-02-13 DIAGNOSIS — Z92.29 HISTORY OF BISPHOSPHONATE THERAPY: ICD-10-CM

## 2020-02-13 DIAGNOSIS — M06.9 RHEUMATOID ARTHRITIS, INVOLVING UNSPECIFIED SITE, UNSPECIFIED RHEUMATOID FACTOR PRESENCE: ICD-10-CM

## 2020-02-13 PROCEDURE — 96413 CHEMO IV INFUSION 1 HR: CPT | Performed by: NURSE PRACTITIONER

## 2020-02-13 PROCEDURE — 99207 ZZC NO CHARGE NURSE ONLY: CPT

## 2020-02-13 PROCEDURE — 96367 TX/PROPH/DG ADDL SEQ IV INF: CPT | Performed by: NURSE PRACTITIONER

## 2020-02-13 RX ORDER — ZOLEDRONIC ACID 5 MG/100ML
5 INJECTION, SOLUTION INTRAVENOUS ONCE
Status: COMPLETED | OUTPATIENT
Start: 2020-02-13 | End: 2020-02-13

## 2020-02-13 RX ORDER — ACETAMINOPHEN 325 MG/1
650 TABLET ORAL ONCE
Status: COMPLETED | OUTPATIENT
Start: 2020-02-13 | End: 2020-02-13

## 2020-02-13 RX ORDER — HEPARIN SODIUM (PORCINE) LOCK FLUSH IV SOLN 100 UNIT/ML 100 UNIT/ML
5 SOLUTION INTRAVENOUS
Status: CANCELLED | OUTPATIENT
Start: 2020-03-12

## 2020-02-13 RX ORDER — ACETAMINOPHEN 325 MG/1
650 TABLET ORAL ONCE
Status: CANCELLED | OUTPATIENT
Start: 2020-03-12

## 2020-02-13 RX ORDER — ZOLEDRONIC ACID 5 MG/100ML
5 INJECTION, SOLUTION INTRAVENOUS ONCE
Status: CANCELLED
Start: 2020-02-13

## 2020-02-13 RX ORDER — HEPARIN SODIUM,PORCINE 10 UNIT/ML
5 VIAL (ML) INTRAVENOUS
Status: CANCELLED | OUTPATIENT
Start: 2020-03-12

## 2020-02-13 RX ORDER — DIPHENHYDRAMINE HCL 25 MG
25 CAPSULE ORAL ONCE
Status: CANCELLED | OUTPATIENT
Start: 2020-03-12

## 2020-02-13 RX ORDER — HEPARIN SODIUM,PORCINE 10 UNIT/ML
5 VIAL (ML) INTRAVENOUS
Status: CANCELLED | OUTPATIENT
Start: 2020-02-13

## 2020-02-13 RX ORDER — DIPHENHYDRAMINE HCL 25 MG
25 CAPSULE ORAL ONCE
Status: COMPLETED | OUTPATIENT
Start: 2020-02-13 | End: 2020-02-13

## 2020-02-13 RX ORDER — HEPARIN SODIUM (PORCINE) LOCK FLUSH IV SOLN 100 UNIT/ML 100 UNIT/ML
5 SOLUTION INTRAVENOUS
Status: CANCELLED | OUTPATIENT
Start: 2020-02-13

## 2020-02-13 RX ADMIN — Medication 25 MG: at 12:21

## 2020-02-13 RX ADMIN — ACETAMINOPHEN 650 MG: 325 TABLET ORAL at 12:21

## 2020-02-13 RX ADMIN — Medication 250 ML: at 12:33

## 2020-02-13 RX ADMIN — ZOLEDRONIC ACID 5 MG: 0.05 INJECTION, SOLUTION INTRAVENOUS at 12:36

## 2020-02-13 ASSESSMENT — PAIN SCALES - GENERAL: PAINLEVEL: SEVERE PAIN (6)

## 2020-02-13 NOTE — PROGRESS NOTES
"Infusion Nursing Note:  Betsey Vanessa presents today for Actemra/Reclast.    Patient seen by provider today: No   present during visit today: Not Applicable.    Note: Pt states she is new from [place] - states she has been on Actemra for years.  Concerned that her premeds are different then \"what I am used to, but Dr Carrillo wants me to try the ones her has ordered\".  Pt states she used to get IV Benadryl and Solumedrol.  Oral Benadryl./Tylenol ordered today - instructed pt to discuss with provider any problems after this infusion since coming every 28 days.    Intravenous Access:  Peripheral IV placed.    Treatment Conditions:  Biological Infusion Checklist:  ~~~ NOTE: If the patient answers yes to any of the questions below, hold the infusion and contact ordering provider or on-call provider.    1. Have you recently had an elevated temperature, fever, chills, productive cough, coughing for 3 weeks or longer or hemoptysis, abnormal vital signs, night sweats,  chest pain or have you noticed a decrease in your appetite, unexplained weight loss or fatigue? No  2. Do you have any open wounds or new incisions? No  3. Do you have any recent or upcoming hospitalizations, surgeries or dental procedures? No  4. Do you currently have or recently have had any signs of illness or infection or are you on any antibiotics? No  5. Have you had any new, sudden or worsening abdominal pain? No  6. Have you or anyone in your household received a live vaccination in the past 4 weeks? Please note:  No live vaccines while on biologic/chemotherapy until 6 months after the last treatment.  Patient can receive the flu vaccine (shot only) and the pneumovax.  It is optimal for the patient to get these vaccines mid cycle, but they can be given at any time as long as it is not on the day of the infusion. No  7. Have you recently been diagnosed with any new nervous system diseases (ie. Multiple sclerosis, Guillain Paige, " seizures, neurological changes) or cancer diagnosis? No  8. Are you on any form of radiation or chemotherapy? No  9. Are you pregnant or breast feeding or do you have plans of pregnancy in the future? No  10. Have you been having any signs of worsening depression or suicidal ideations?  (benlysta only) No  11. Have there been any other new onset medical symptoms? No      Post Infusion Assessment:  Patient tolerated infusion without incident.  Blood return noted pre and post infusion.  Site patent and intact, free from redness, edema or discomfort.  No evidence of extravasations.  Access discontinued per protocol.  Biologic Infusion Post Education: Call the triage nurse at your clinic or seek medical attention if you have chills and/or temperature greater than or equal to 100.5, uncontrolled nausea/vomiting, diarrhea, constipation, dizziness, shortness of breath, chest pain, heart palpitations, weakness or any other new or concerning symptoms, questions or concerns.  You cannot have any live virus vaccines prior to or during treatment or up to 6 months post infusion.  If you have an upcoming surgery, medical procedure or dental procedure during treatment, this should be discussed with your ordering physician and your surgeon/dentist.  If you are having any concerning symptom, if you are unsure if you should get your next infusion or wish to speak to a provider before your next infusion, please call your care coordinator or triage nurse at your clinic to notify them so we can adequately serve you.       Discharge Plan:   Return in 28 days - pt to make more appointments.  Discharge instructions reviewed with: Patient.  Patient and/or family verbalized understanding of discharge instructions and all questions answered.  Patient discharged in stable condition accompanied by: self.  Departure Mode: Ambulatory.    Darcy Alexander RN

## 2020-02-13 NOTE — PROGRESS NOTES
SUBJECTIVE:  Betsey Vanessa is a 59 year old female who presents for evaluation of valvular heart disease.  Patient moved to [place] recently from [facility].  An echocardiogram outside hospital showed severe aortic stenosis, mild to moderate aortic regurgitation and mild to moderate mitral regurgitation.  Also had mild mitral stenosis.  Patient is asymptomatic from valvular heart disease.  Denies shortness of breath PND or palpitation.    Patient with a diagnosis of rheumatoid arthritis, Sjogren's syndrome.  Also has type 2 diabetes and hypertension.    Patient with severe Ménière's disease.  Significant hearing loss and cochlear implants.    Denied history of rheumatic fever.  Patient is not very active due to balance issues.  But denied cardiac symptoms.    Patient Active Problem List    Diagnosis Date Noted     Osteopenia of multiple sites 02/04/2020     Priority: Medium     History of bisphosphonate therapy 02/04/2020     Priority: Medium     Post-menopausal 02/04/2020     Priority: Medium     Type 2 diabetes mellitus with hyperglycemia, without long-term current use of insulin (H) 01/12/2020     Priority: Medium     Hypertension goal BP (blood pressure) < 140/90 01/12/2020     Priority: Medium     Meniere's disease, unspecified laterality 01/12/2020     Priority: Medium     Rheumatoid arthritis, involving unspecified site, unspecified rheumatoid factor presence (H) 01/12/2020     Priority: Medium     Valvular heart disease 01/12/2020     Priority: Medium     Chronic pain syndrome 01/12/2020     Priority: Medium     Mild major depression (H) 01/12/2020     Priority: Medium    .  Current Outpatient Medications   Medication Sig     celecoxib (CELEBREX) 200 MG capsule Take 1 capsule (200 mg) by mouth 2 times daily 2 x a day     cevimeline (EVOXAC) 30 MG capsule Take 1 capsule (30 mg) by mouth 3 times daily     clonazePAM (KLONOPIN) 2 MG tablet Take 2 mg by mouth Nightly as needed for sleep  problem.     docusate calcium (GNP DOCUSATE CALCIUM) 240 MG capsule Take 240 mg by mouth daily     escitalopram (LEXAPRO) 10 MG tablet Take 10 mg by mouth daily     fentaNYL (DURAGESIC) 25 mcg/hr patch 72 hr Place 1 patch onto the skin every 72 hours     HYDROcodone-acetaminophen (NORCO)  MG per tablet Take 1 tablet by mouth Every 8 hrs. As needed for pain     lifitegrast (XIIDRA) 5 % opthalmic solution Place 1 drop into both eyes 2 times daily     metFORMIN (GLUCOPHAGE) 500 MG tablet Take 500 mg by mouth 2 times daily (with meals) 1000 mg 2 x a day     metroNIDAZOLE (METROGEL) 0.75 % gel Apply to face daily     nystatin (MYCOSTATIN) 203609 UNIT/GM POWD 2 x daily PRN for rash     omeprazole (PRILOSEC) 20 MG capsule Take by mouth daily 2 x daily     potassium chloride SA (K-DUR,KLOR-CON M) 10 MEQ tablet Take by mouth 2 times daily 20 mEq daily     promethazine (PHENERGAN) 25 MG suppository Place 25 mg rectally every 6 hours as needed For nusea     promethazine (PHENERGAN) 25 MG tablet Take by mouth every 6 hours as needed For nusea     psyllium (METAMUCIL) 58.6 % POWD Take by mouth daily PRN     TOCILIZUMAB IV Inject into the vein every 30 days     triamterene-hydrochlorothiazide (DYAZIDE) 37.5-25 MG per capsule 1 capsule every morning     VITAMIN D, CHOLECALCIFEROL, PO Take 50,000 Units by mouth daily Every 7 days     EPINEPHrine 0.3 MG/0.3ML SOLN PRN for Anaphylaxis     glucose blood VI test strips strip daily     meclizine (ANTIVERT) 25 MG tablet Take 1 tablet (25 mg) by mouth 3 times daily as needed for dizziness (Patient not taking: Reported on 2/6/2020)     No current facility-administered medications for this visit.      Past Medical History:   Diagnosis Date     Anemia     r/t menses     Anxiety      Arthritis      Constipation      Depression      Diabetes (H)      Diarrhea      Double vision      Headache(784.0)      Hearing loss      Heart murmur      Heartburn      Hypertension      Indigestion       Nasal congestion      Night sweats      Numbness      Problems related to lack of adequate sleep      Rash      Sneezing      Tinnitus      Visual loss      Weakness      Weight gain     because of Prednisone     Past Surgical History:   Procedure Laterality Date     ------------OTHER-------------      D&C     HYSTERECTOMY       INNER EAR SURGERY       Allergies   Allergen Reactions     Duloxetine Other (See Comments)     Topiramate Other (See Comments)     Other reaction(s): Confusion     Amitriptyline Embonate [Amitriptyline]      Hyper activity     Azithromycin Hives     Cymbalta      Hyper activity     Gabapentin Hives     Macrobid [Nitrofurantoin] Hives     Paxil [Paroxetine] Other (See Comments)     Hyper behavior     Penicillins Hives     Tetracycline Hives     Social History     Socioeconomic History     Marital status: Single     Spouse name: Not on file     Number of children: Not on file     Years of education: Not on file     Highest education level: Not on file   Occupational History     Not on file   Social Needs     Financial resource strain: Not on file     Food insecurity:     Worry: Not on file     Inability: Not on file     Transportation needs:     Medical: Not on file     Non-medical: Not on file   Tobacco Use     Smoking status: Former Smoker     Start date: 1978     Last attempt to quit: 1998     Years since quittin.0     Smokeless tobacco: Never Used     Tobacco comment: 1 pack a day    Substance and Sexual Activity     Alcohol use: No     Drug use: Never     Sexual activity: Not Currently   Lifestyle     Physical activity:     Days per week: Not on file     Minutes per session: Not on file     Stress: Not on file   Relationships     Social connections:     Talks on phone: Not on file     Gets together: Not on file     Attends Sikh service: Not on file     Active member of club or organization: Not on file     Attends meetings of clubs or organizations: Not on file      Relationship status: Not on file     Intimate partner violence:     Fear of current or ex partner: Not on file     Emotionally abused: Not on file     Physically abused: Not on file     Forced sexual activity: Not on file   Other Topics Concern     Not on file   Social History Narrative     Not on file     Family History   Problem Relation Age of Onset     C.A.D. Mother      Alzheimer Disease Mother      Cardiovascular Mother      Eye Disorder Mother      Thyroid Disease Mother      Hypertension Other         grandmother     Cerebrovascular Disease Father      Alcohol/Drug Father      Depression Father      Obesity Father         aunt     Asthma Father      Alcohol/Drug Brother      Alcohol/Drug Sister         aunt     Allergies Other         All family members     Arthritis Other         aunt     Obesity Sister      Thyroid Disease Sister      Asthma Sister         daughter          REVIEW OF SYSTEMS:  General: negative, fever, chills, night sweats  Skin: negative, acne, rash and scaling  Eyes: negative, double vision, eye pain and photophobia  Ears/Nose/Throat: negative, nasal congestion and purulent rhinorrhea  Respiratory: No dyspnea on exertion, No cough, No hemoptysis and negative  Cardiovascular: negative, palpitations, tachycardia, irregular heart beat, chest pain, exertional chest pain or pressure, paroxysmal nocturnal dyspnea, dyspnea on exertion and orthopnea  Gastrointestinal: negative, dysphagia, nausea and vomiting  Genitourinary: negative, nocturia, dysuria and frequency  Musculoskeletal: negative, fracture, back pain and neck pain  Neurologic: negative, headaches, syncope, stroke and seizures  Psychiatric: negative, nervous breakdown, thoughts of self-harm and thoughts of hurting someone else  Hematologic/Lymphatic/Immunologic: negative, chills, fever and hay fever  Endocrine: negative, cold intolerance, heat intolerance and hot flashes       OBJECTIVE:  Blood pressure 128/74, pulse 75, weight 71.7  kg (158 lb), SpO2 94 %.  General Appearance: healthy, alert and no distress  Head: Normocephalic. No masses, lesions, tenderness or abnormalities  Eyes: conjuctiva clear, PERRL, EOM intact  Ears: External ears normal. Canals clear. TM's normal.  Nose: Nares normal  Mouth: normal  Neck: Supple, no cervical adenopathy, no thyromegaly  Lungs: clear to auscultation  Cardiac: regular rate and rhythm, normal S1 and S2, no murmur  Abdomen: Soft, nontender.  Normal bowel sounds.  No hepatosplenomegaly or abnormal masses  Extremities: no peripheral edema, peripheral pulses normal  Musculoskeletal: negative  Neurological: No motor deficit.       ASSESSMENT/PLAN:  Patient here for evaluation of valvular heart disease.  Current medical problems are rheumatoid arthritis, Sjogren's syndrome.  Severe Ménière's disease with balance issues and hearing loss.  Status post cochlear implant.  Type 2 diabetes and hypertension.  No history of rheumatic fever.  Patient is not very active due to balance issues.  Denied cardiac symptoms.  Patient had an outside echocardiogram.  This was reported as showing severe aortic stenosis mild to moderate aortic regurgitation, mild to moderate mitral regurgitation and mild mitral stenosis.  As patient is asymptomatic will reassess valvular heart disease with an echocardiogram.  Patient had an echocardiogram.  This showed moderate aortic stenosis.  Mild to moderate aortic regurgitation.  Part of the gradient could be due to regurgitation and hyperdynamic LV function.  Mitral valve appears rheumatic.  To have mild to moderate mitral stenosis and mild to moderate mitral regurgitation.  Overall valvular heart disease at this time is not severe enough for any intervention.  Result was discussed with patient.  Will reassess valvular function in 1 year with an echocardiogram.  Patient is advised to continue current medications.  Per orders.   Return to Clinic 1yr.

## 2020-02-19 ENCOUNTER — OFFICE VISIT (OUTPATIENT)
Dept: INTERNAL MEDICINE | Facility: CLINIC | Age: 60
End: 2020-02-19
Payer: COMMERCIAL

## 2020-02-19 VITALS
TEMPERATURE: 99.2 F | WEIGHT: 156 LBS | SYSTOLIC BLOOD PRESSURE: 136 MMHG | DIASTOLIC BLOOD PRESSURE: 70 MMHG | OXYGEN SATURATION: 93 % | BODY MASS INDEX: 29 KG/M2 | RESPIRATION RATE: 16 BRPM | HEART RATE: 93 BPM

## 2020-02-19 DIAGNOSIS — I10 HYPERTENSION GOAL BP (BLOOD PRESSURE) < 140/90: ICD-10-CM

## 2020-02-19 DIAGNOSIS — F32.0 MILD MAJOR DEPRESSION (H): ICD-10-CM

## 2020-02-19 DIAGNOSIS — F13.20 BENZODIAZEPINE DEPENDENCE, EPISODIC (H): ICD-10-CM

## 2020-02-19 DIAGNOSIS — M85.89 OSTEOPENIA OF MULTIPLE SITES: ICD-10-CM

## 2020-02-19 DIAGNOSIS — G89.4 CHRONIC PAIN SYNDROME: Primary | ICD-10-CM

## 2020-02-19 DIAGNOSIS — M06.9 RHEUMATOID ARTHRITIS, INVOLVING UNSPECIFIED SITE, UNSPECIFIED RHEUMATOID FACTOR PRESENCE: ICD-10-CM

## 2020-02-19 DIAGNOSIS — H81.09 MENIERE'S DISEASE, UNSPECIFIED LATERALITY: ICD-10-CM

## 2020-02-19 DIAGNOSIS — F11.20 CONTINUOUS OPIOID DEPENDENCE (H): ICD-10-CM

## 2020-02-19 DIAGNOSIS — I38 VALVULAR HEART DISEASE: ICD-10-CM

## 2020-02-19 DIAGNOSIS — E11.65 TYPE 2 DIABETES MELLITUS WITH HYPERGLYCEMIA, WITHOUT LONG-TERM CURRENT USE OF INSULIN (H): ICD-10-CM

## 2020-02-19 PROCEDURE — 99358 PROLONG SERVICE W/O CONTACT: CPT | Performed by: INTERNAL MEDICINE

## 2020-02-19 PROCEDURE — 99215 OFFICE O/P EST HI 40 MIN: CPT | Performed by: INTERNAL MEDICINE

## 2020-02-19 NOTE — Clinical Note
We spent 75 min together and another 60 apart (135 min).  I thought I'd bill as [id number] rather than [phone].  Either way, I can only put in one additional time code.  Looks like RVUs are about the same either way.  Link me if questions/concerns.

## 2020-02-20 ENCOUNTER — TELEPHONE (OUTPATIENT)
Dept: INTERNAL MEDICINE | Facility: CLINIC | Age: 60
End: 2020-02-20

## 2020-02-20 ENCOUNTER — DOCUMENTATION ONLY (OUTPATIENT)
Dept: CARE COORDINATION | Facility: CLINIC | Age: 60
End: 2020-02-20

## 2020-02-20 NOTE — PROGRESS NOTES
[email or web address]    Betsey Vanessa is a 59 year old female who presents to clinic today for the following health issues:    HPI   New Patient/Transfer of Care    Chronic Pain Initial Visit    Where in your body do you have pain? Hands, upper and lower back pain, jaw pain, ear pain  When did you first notice your pain? More than 5 years ago  How would you describe your pain? Aching, Burning, Exhausting, Nagging, Sharp, Shooting, Tender and Throbbing   How has your pain affected your ability to work? Unable to work  What makes your pain worse? Uncertain but does state weather effects her  Which of these pain treatments have you tried? Activity or exercise, Cold, Heat, Massage, Physical Therapy, Psychologist, Relaxation techniques / Biofeedback, Steroid Injections and TENS unit  Have you had a significant life event? Health Concerns since in her 20 years  Do you have any symptoms of anxiety, such as panic attacks, periods of constant worry, or not being able to turn off your thoughts? YES  How well are you sleeping? Either sleeps to much or not at all    Previous medication treatments:  Opiates - yes  Antidepressants - yes  NSAIDs - celecoxib (Celebrex)  Muscle relaxants - none  Topicals - none  Antiepileptics - none    History of chemical dependency:  No  Social History     Tobacco Use     Smoking status: Former Smoker     Start date: 1978     Last attempt to quit: 1998     Years since quittin.1     Smokeless tobacco: Never Used     Tobacco comment: 1 pack a day    Substance Use Topics     Alcohol use: No     Drug use: Never       PHQ-9 SCORE 2020   PHQ-9 Total Score 8 8     BONNIE-7 SCORE 2020   Total Score 5     No flowsheet data found.    How many servings of fruits and vegetables do you eat daily?  0-1    On average, how many sweetened beverages do you drink each day (Examples: soda, juice, sweet tea, etc.  Do NOT count diet or artificially sweetened beverages)?    "1    How many days per week do you exercise enough to make your heart beat faster? 3 or less    How many minutes a day do you exercise enough to make your heart beat faster? 9 or less    How many days per week do you miss taking your medication? 0    Social History     Social History Narrative    From [place] - lived after in [place], [place]    Moved to daughter's house in [place], [place] in late 2019    Daughter, Bailee Siu and 2 gkids    Enjoys crocheting, fishing     Daughter accompanies her mother today.  Patient has been in with her daughter here in the [facility] from her previous residence in [place].  She reports a close therapeutic primary care relationship there.  Her daughter expresses concern over her mother's general state of health and wellbeing as well as specific concerns about chronic opioid therapy and benzodiazepine use.  More specifically, Bailee notes that when more he has used clonazepam (at uncertain dosing) she has \"slept for 4 days\".  She has also been noted to have falls at home and injuries running into various objects in her living environment, including last fall note of running into a stove.  She has a quad cane and a walker with a seat-by report- and it is uncertain how frequently she uses these.    Betsey has followed through with all of the specialist referrals from Dr. Ibanez in the week prior.  These were reviewed with the patient and her daughter.  All evaluations and recommendations seemed appropriate to us.  She has had a hepatology evaluation last year as well, apparently.    I did a [place] and [place]  review which was consistent with patient's report of her chronic controlled substance medications.  It appears that her medications have been consistently filled by her primary as well as Dr. Milan in physiatry.  Total daily dose hydrocodone 30 mg, 25 mcg fentanyl patch, and it appears up to 2 mg total daily dose clonazepam, and the patient reports often " taking less than a full dose for anxiety as well as occasionally a full dose at night.    Underlying diagnoses include Ménière's disease, chronic pain disorder, rheumatoid arthritis, polyarticular osteoarthritis including hands, knees, feet, and cervical, lumbar, and thoracic spines, fibromyalgia, Sjogren's syndrome.  Last office visit that I see in care everywhere is September 26, 2019 with Dr. Milan.    I undertook a significant review of her care everywhere records as well as her more recent evaluations here in the [facility] system.    /70   Pulse 93   Temp 99.2  F (37.3  C) (Tympanic)   Resp 16   Wt 70.8 kg (156 lb)   SpO2 93%   BMI 29.00 kg/m    On exam, patient presents in no acute distress.  She is hard of hearing.  She is not grossly underweight.  She participates in conversation overall appropriately, though with some delay in some uncertain reduction or suppressed higher level comprehension.  Unsure of her baseline, but appears to have very mild slurring of speech at times.  She does not appear somnolent or sedated otherwise.  She is friendly and appropriate in her interaction otherwise, overall alert and engaged.    I spent a total of 45 minutes with the patient of which greater than 50% were devoted to counseling and coordination of care:     1. Chronic pain syndrome  4. Continuous opioid dependence (H)  Osteoarthritis  Patient has been on a holiday total daily dose of celecoxib, hydrocodone total daily dose 30 mg, and the 25 mcg/h fentanyl patches.  She reports not changing them every 3 days over the past several months so she has accumulated a significant stockpile which she has been using since she moved to [place] months ago.  She expresses willingness to call her prior primary care doctor for additional refills, but her daughter discourages this and would like to effectively terminate that relationship given the distance in her new care team here.  We discussed the difficult nature of  her multifactorial pain and current and prior chronic opioid treatment.  I think the most difficult tapering for her will be of the fentanyl patch.  The concept of OME/MMEs was discussed in some detail.  I agree that Betsey would benefit from a pain clinic consultation given the complexity of her background and potential need to convert her fentanyl to another long acting opioid.  It is possible that she could maintain adequate pain control shifted to buprenorphine treatment as well.  I certified her for medical cannabis in the days following her appointment to see if that could also help to reduce her opioid burden.  I agreed to take Betsey on for primary care follow-up.  PEG was 7.    2. Benzodiazepine dependence, episodic (H)  5. Meniere's disease, unspecified laterality  3. Mild major depression with anxiety component (H)  She has established with neurology and ENT.  She will attempt antihistamine over benzodiazepine for flares.  Additional work-up will be including a lumbar MRI, CBC, calcium, magnesium, B12, parathyroid, and paraneoplastic panel.    Her benzodiazepine use is less clear from her history.  It appears that she has been taking at least 1 mg of clonazepam daily, and perhaps often 1 mg twice daily, and other days 2 mg at bedtime.  She has used this in reaction to what she describes as flares of Ménière's disease, though per her report at her daughter's substantiation's there had certainly is an anxiety component as well as dizziness.  We discussed the harms of chronic benzodiazepines and the role of a benzodiazepine taper.  With her remaining supply of clonazepam, I asked her to take no more than half milligram doses no more than twice daily and ideally only at night as needed.  We will see which she is able to tolerate and follow-up accordingly.  A taper of 0.25 mg total daily dose per month may be an adequate speed, though I think that she is overmedicated in her general current regimen and she could  likely tolerate a 50% dose reduction most days at a minimum just for safety.    She has been taking 10 mg of escitalopram, and we agreed to discuss increasing dose or additional therapy for helping stabilize mood given the remainder of her medical comorbidities with mood components.    10. Rheumatoid arthritis, involving unspecified site, unspecified rheumatoid factor presence (H)  She has established with Dr. Carrillo.  She is on zoledronic acid and tocilizumab as well as cevimeline and Xiidra eyedrops.    6. Type 2 diabetes mellitus with hyperglycemia, without long-term current use of insulin (H)  Last hemoglobin A1c 6.9% about a year ago.  On mono metformin.    7. Hypertension goal BP (blood pressure) < 140/90  Adequate control at this time on Dyazide with potassium supplementation.    8. Valvular heart disease  Has established with cardiology.  Mild, various.  Updating TTE.  Not contributing directly to present concerning symptoms.    9. Osteoporosis of multiple sites  On bisph treatment and vitamin D.      I spent an additional 90 minutes in chart review not face-to-face in preparation for patient visit and this note.

## 2020-02-20 NOTE — TELEPHONE ENCOUNTER
Reason for call:  Other   Patient called regarding (reason for call): call back  Additional comments: Patient would like a call back in regards to medicinal cannabis.     Phone number to reach patient:  Home number on file [phone] (home)    Best Time:  Later morning    Can we leave a detailed message on this number?  YES    Travel screening: Negative

## 2020-02-21 NOTE — TELEPHONE ENCOUNTER
Patient was last seen in the clinic on 2/19/20.   Next appointment for follow up on 4/1/20.     Betsey wonders if Dr. Ken has added her to some sort of Medical Cannabis Registry. If need be, Patient is willing to wait to talk more with Dr. Ken about this at her next appointment on April 1.     Secondly, she reports that Dr. Ken told her that she might be on the verge of a sinus infection 2 days ago. Today she is wondering if he would be willing to treat her with an antibiotic.     Current symptoms: sore throat, congestion, green nasal drainage, cough, facial pressure and ear pain.     Betsey tells me that she is drinking tea with lemon and honey.     Please review and  advise.     Vianney Casper RN BSN

## 2020-02-21 NOTE — TELEPHONE ENCOUNTER
Just added now to cannabis registry.    I would continue to monitor symptoms without antibiotic treatment at this time.

## 2020-02-24 LAB — PNP ABY SERUM: NORMAL

## 2020-02-25 NOTE — TELEPHONE ENCOUNTER
Spoke with patient and informed her per Dr Ken, see below read word to word.  Patient verbalized understanding and stated she will call back if symptoms worsen or do not improve.

## 2020-02-27 NOTE — PROGRESS NOTES
AUDIOLOGY REPORT-BALANCE ASSESSMENT    SUBJECTIVE: Betsey Vanessa, 59 year old, was seen in Audiology at the [facility] [place] on 3/3/2020, for videonystagmography (VNG) and rotational chair testing referred by Laura Stallings M.D. Patient is accompanied to today's appointment by her daughter, Bailee, who assists with case history. Patient presents with chief complaints of episodic dizziness, external vertigo, nausea, and significant emesis. Patient also reports constant imbalance and disequilibrium. Patient currently utilizes a walker when ambulating. Patient's daughter reports that the patient's unsteadiness and spatial awareness seems to have worsened since her previous visit with our clinic in 2014; most notably when she reaches to grab something and misses the target. The patient states that her episodes continue to persist with such severity that her throat will often bleed from excessive retching and emesis; making it hard to eat and keep food down. Patient and her daughter report that the patient spends most of her time at home with limited movement and sleeps propped up in a recliner chair.     Patient reports symptoms are provoked by laying supine, fast head and body movements, walking, especially on uneven surfaces, and transitioning from standing to sitting or sitting to supine. Patient reports her episodes occur daily to multiple times a day and last anywhere from 15 minutes to days in duration. Patient and her daughter report significant concern for future falls, indicating multiple near-falls daily. Patient denies previous headaches but reports that over the past 2-3 years, she has started to experience headaches that often occur in conjunction with her dizziness. She reports her headaches are localized to her forehead and above her eyes. She notes blurred vision often precedes her headaches.     Patient has consulted with many providers over the years regarding her  "symptoms including: primary care physician, cardiology, neurology and multiple otolaryngologists, neuro-otologists, and audiologists. Patient reports a long-standing history (16+ years) of Meniere's disease with subsequent progressive hearing loss. Patient reports undergoing a labyrinthectomy of the left ear in 1998, with no significant change in symptoms. The patient also reports a tympanic membrane perforation in the right ear with intermittent clear and bloody drainage. She reports utilizing a Q-tip with hydrogen peroxide to clean the ear as needed. Patient and her daughter report a possible increase in the amount of drainage she has been experiencing recently. Patient currently uses a bone anchored hearing aid ([employer]ar BAHA) with an abutment on the right side and wears the processor on the left ear because of feedback problems. She reports bilateral tinnitus and aural fullness, worse right. She also reports intermittent \"stabbing\" right ear pain lasting minutes to hours. She notes that her ear pain sometimes precedes her vertigo episodes.     Patient's most recent audiologic vestibular evaluation performed on 2/13/2014; revealed reduced vestibular responses on the right and possible residual function on the left. Patient's most recent hearing evaluation performed on 1/17/2020; revealed moderately severe to profound mixed hearing loss in the right ear and profound hearing loss in the left ear. Patient's most recent MRI Brain from 1/29/2020 revealed \"No abnormal signal or enhancement along the course of the seventh or eighth cranial nerves. Normal signal involving the vestibular and auditory structures. Tiny questionable meningioma along the undersurface of the left lateral tentorium. Scattered nonspecific T2 hyperintense foci within the periventricular and subcortical white matter and mild generalized cerebral atrophy.\" Most recent CT imaging from 1/29/2020 revealed \"Right temporal bone: Fenestral otospongiosis. " "Mild debris in the middle ear cavity predominantly surrounding the ossicles. No associated erosion to suggest cholesteatoma. Chronic mastoiditis. Left temporal bone: Status post left mastoidectomy and labyrinthectomy.\"    Patient denies dizziness when she coughs, sneezes, or blows her nose. Patient denies history of previous ear or eye surgeries. Additional medical history includes Sjogren's syndrome and cataracts. Patient denies consumption of caffeinated beverages, nicotine, alcoholic substances, or use of medications with known vestibular interactions within the past 48 hours.     OBJECTIVE:  Abuse Screening:  Do you feel unsafe at home or work/school? No  Do you feel threatened by someone? No  Does anyone try to keep you from having contact with others, or doing things outside of your home? No  Physical signs of abuse present? No    Dizziness Handicap Inventory (DHI): patient did not complete    *Limited testing tolerated by the patient today secondary to patient exhibiting significant dizziness and nausea resulting in emesis and dry-heaving multiple times throughout all testing.     Rotational chair testing:   Sinusoidal harmonic acceleration test: 0.02, 0.08, 0.32 Hz.  Spontaneous nystagmus: Absent  Phase: Phase lead (0.02 and 0.08 Hz) sloping to normal phase at 0.32 Hz.   Gain: Normal  Symmetry: Normal with the exception of a right asymmetry at 0.02 Hz.  Spectral Purity: Good   Overall rotational chair test: Abnormal. Results suggest an uncompensated peripheral system.     Videonystagmography (VNG) testing:  Prescreening:  Tympanograms: Normal eardrum mobility bilaterally. Note: this test is completed to determine the status of the middle ear before irrigations are completed. Patient reports dizziness with tympanometry in both ears.   Ocular range of motion and ocular counter roll: Normal  Cross/cover: Normal  Head Thrust: Negative     Nystagmus Tests:  Gaze-Horizontal with Fixation:   Center: Normal   Right: " Normal   Left: Normal  Gaze-Vertical with Fixation:   Up: Normal   Down: Normal  Gaze with Fixation Denied   Center: Normal   Right: Normal   Left: Normal   Up: 1 deg/sec right beat  High Frequency Headshake:   Horizontal: Difficult to interpret due to patient having difficulty maintaining eyes open secondary to significant symptoms.    Vertical: Difficult to interpret due to patient having difficulty maintaining eyes open secondary to significant symptoms.     Positional Testing:  Positionals: Pre-Caloric Only: No nystagmus or symptoms.     Oculomotor Tests:  Saccades: Abnormal with reduced velocity and saccadic intrusions throughout. Patient reported increased dizziness and nausea resulting in emesis.  Pursuit: Abnormal with borderline reduced gain, poor morphology and saccadic intrusions throughout. Patient reported increased nausea and dizziness throughout.     Calorics:  Right Cool Air Eye Speed: 14 degrees per second left beating  Left Ice Water Eye Speed: 2 degrees per second right beating  *Patient requested cessation of testing following left ice water caloric irrigation.   Fixation Index: 0.4 is Normal  Overall caloric test: Abnormal.     ASSESSMENT:  1. Indications of central vestibular system involvement noted on today's exam were as follows:     -Abnormal Saccade and Pursuit testing; cannot rule out effects of attention, fatigue or motivation.     2. Indications of peripheral vestibular system involvement noted on today's exam were as follows:     -Abnormal Calorics: Cool air caloric irrigation revealed good function in the right ear and ice water caloric irrigation revealed residual function in the left ear.     -Abnormal Rotary Chair testing; results suggest an uncompensated peripheral system.     *Limited testing tolerated by the patient today secondary to patient exhibiting significant dizziness and nausea resulting in emesis and dry-heaving multiple times throughout all testing.     PLAN:  Proceed  with [facility] [place] Hearing Device consult as scheduled. Follow-up with Dr. Laura Stallings regarding today's results and for medical management on 3/19/2020. Please call this clinic at [phone] with questions regarding these results or recommendations.       Huber Valentino. CCC-A  Clinical Vestibular Audiologist   [place] #[id number]

## 2020-02-28 PROBLEM — M81.0 AGE-RELATED OSTEOPOROSIS WITHOUT CURRENT PATHOLOGICAL FRACTURE: Status: ACTIVE | Noted: 2019-07-28

## 2020-03-03 ENCOUNTER — OFFICE VISIT (OUTPATIENT)
Dept: AUDIOLOGY | Facility: CLINIC | Age: 60
End: 2020-03-03
Payer: COMMERCIAL

## 2020-03-03 DIAGNOSIS — R42 DIZZINESS AND GIDDINESS: Primary | ICD-10-CM

## 2020-03-06 ENCOUNTER — HOSPITAL ENCOUNTER (OUTPATIENT)
Dept: MRI IMAGING | Facility: CLINIC | Age: 60
Discharge: HOME | End: 2020-03-06
Attending: PSYCHIATRY & NEUROLOGY | Admitting: PSYCHIATRY & NEUROLOGY
Payer: COMMERCIAL

## 2020-03-06 DIAGNOSIS — R26.89 IMBALANCE: ICD-10-CM

## 2020-03-06 PROCEDURE — 72148 MRI LUMBAR SPINE W/O DYE: CPT

## 2020-03-09 ENCOUNTER — TELEPHONE (OUTPATIENT)
Dept: INTERNAL MEDICINE | Facility: CLINIC | Age: 60
End: 2020-03-09

## 2020-03-09 DIAGNOSIS — F11.20 CONTINUOUS OPIOID DEPENDENCE (H): Primary | ICD-10-CM

## 2020-03-09 NOTE — TELEPHONE ENCOUNTER
Reason for Call:  Other prescription    Detailed comments: re: fentynal patch 25 mg.  Pt is running low she has 2 patches.  Uses [facility] [address]  Caller informed that calls received after 3pm may not be returned same day.      Phone Number Patient can be reached at: Home number on file [phone] (home)    Best Time: any    Can we leave a detailed message on this number? YES    Call taken on 3/9/2020 at 4:05 PM by Cora Smith

## 2020-03-11 RX ORDER — FENTANYL 25 UG/1
1 PATCH TRANSDERMAL
Qty: 5 PATCH | Refills: 0 | Status: SHIPPED | OUTPATIENT
Start: 2020-03-11 | End: 2020-04-01

## 2020-03-12 ENCOUNTER — TELEPHONE (OUTPATIENT)
Dept: NEUROLOGY | Facility: CLINIC | Age: 60
End: 2020-03-12

## 2020-03-12 ENCOUNTER — INFUSION THERAPY VISIT (OUTPATIENT)
Dept: INFUSION THERAPY | Facility: CLINIC | Age: 60
End: 2020-03-12
Payer: COMMERCIAL

## 2020-03-12 ENCOUNTER — TELEPHONE (OUTPATIENT)
Dept: INTERNAL MEDICINE | Facility: CLINIC | Age: 60
End: 2020-03-12

## 2020-03-12 VITALS
OXYGEN SATURATION: 96 % | SYSTOLIC BLOOD PRESSURE: 142 MMHG | WEIGHT: 159.5 LBS | BODY MASS INDEX: 29.65 KG/M2 | HEART RATE: 84 BPM | TEMPERATURE: 97 F | DIASTOLIC BLOOD PRESSURE: 65 MMHG | RESPIRATION RATE: 14 BRPM

## 2020-03-12 DIAGNOSIS — M06.9 RHEUMATOID ARTHRITIS, INVOLVING UNSPECIFIED SITE, UNSPECIFIED RHEUMATOID FACTOR PRESENCE: Primary | ICD-10-CM

## 2020-03-12 PROCEDURE — 96413 CHEMO IV INFUSION 1 HR: CPT | Performed by: NURSE PRACTITIONER

## 2020-03-12 PROCEDURE — 99207 ZZC NO CHARGE NURSE ONLY: CPT

## 2020-03-12 RX ORDER — DIPHENHYDRAMINE HCL 25 MG
25 CAPSULE ORAL ONCE
Status: CANCELLED | OUTPATIENT
Start: 2020-04-09

## 2020-03-12 RX ORDER — ACETAMINOPHEN 325 MG/1
650 TABLET ORAL ONCE
Status: COMPLETED | OUTPATIENT
Start: 2020-03-12 | End: 2020-03-12

## 2020-03-12 RX ORDER — HEPARIN SODIUM (PORCINE) LOCK FLUSH IV SOLN 100 UNIT/ML 100 UNIT/ML
5 SOLUTION INTRAVENOUS
Status: CANCELLED | OUTPATIENT
Start: 2020-04-09

## 2020-03-12 RX ORDER — ACETAMINOPHEN 325 MG/1
650 TABLET ORAL ONCE
Status: CANCELLED | OUTPATIENT
Start: 2020-04-09

## 2020-03-12 RX ORDER — DIPHENHYDRAMINE HCL 25 MG
25 CAPSULE ORAL ONCE
Status: COMPLETED | OUTPATIENT
Start: 2020-03-12 | End: 2020-03-12

## 2020-03-12 RX ORDER — HEPARIN SODIUM,PORCINE 10 UNIT/ML
5 VIAL (ML) INTRAVENOUS
Status: CANCELLED | OUTPATIENT
Start: 2020-04-09

## 2020-03-12 RX ADMIN — Medication 25 MG: at 12:31

## 2020-03-12 RX ADMIN — Medication 250 ML: at 12:45

## 2020-03-12 RX ADMIN — ACETAMINOPHEN 650 MG: 325 TABLET ORAL at 12:31

## 2020-03-12 ASSESSMENT — PAIN SCALES - GENERAL: PAINLEVEL: SEVERE PAIN (7)

## 2020-03-12 NOTE — PROGRESS NOTES
Infusion Nursing Note:  Betsey Vanessa presents today for Actemra.    Patient seen by provider today: No   present during visit today: Not Applicable.    Note: N/A.    Intravenous Access:  Peripheral IV placed.    Treatment Conditions:  Biological Infusion Checklist:  ~~~ NOTE: If the patient answers yes to any of the questions below, hold the infusion and contact ordering provider or on-call provider.    1. Have you recently had an elevated temperature, fever, chills, productive cough, coughing for 3 weeks or longer or hemoptysis, abnormal vital signs, night sweats,  chest pain or have you noticed a decrease in your appetite, unexplained weight loss or fatigue? No  2. Do you have any open wounds or new incisions? No  3. Do you have any recent or upcoming hospitalizations, surgeries or dental procedures? No  4. Do you currently have or recently have had any signs of illness or infection or are you on any antibiotics? No  5. Have you had any new, sudden or worsening abdominal pain? No  6. Have you or anyone in your household received a live vaccination in the past 4 weeks? Please note:  No live vaccines while on biologic/chemotherapy until 6 months after the last treatment.  Patient can receive the flu vaccine (shot only) and the pneumovax.  It is optimal for the patient to get these vaccines mid cycle, but they can be given at any time as long as it is not on the day of the infusion. No  7. Have you recently been diagnosed with any new nervous system diseases (ie. Multiple sclerosis, Guillain Upham, seizures, neurological changes) or cancer diagnosis? No  8. Are you on any form of radiation or chemotherapy? No  9. Are you pregnant or breast feeding or do you have plans of pregnancy in the future? No  10. Have you been having any signs of worsening depression or suicidal ideations?  (benlysta only) No  11. Have there been any other new onset medical symptoms? No        Post Infusion Assessment:  Patient  tolerated infusion without incident.  Blood return noted pre and post infusion.  Site patent and intact, free from redness, edema or discomfort.  No evidence of extravasations.  Access discontinued per protocol.       Discharge Plan:   Patient will return 4/9/2020 for next appointment.   Patient discharged in stable condition accompanied by: self.  Departure Mode: Ambulatory.    Millicent Hernandez RN-BSN, PHN, OCN  [facility]

## 2020-03-12 NOTE — TELEPHONE ENCOUNTER
I spoke with Betsey about her image findings.  I indicated that she may have an L5 b/l radiculopathy due to pressure being put on the roots from her spinal bones.  This may cause some of her symptoms of weakness and pain, but is not necessarily the complete reason for all her symptoms.  I advised her to keep her appointment with EMG and see me after to discuss possible interventions and other treatments.    CHARISSE Booth D.O.  [facility]

## 2020-03-12 NOTE — TELEPHONE ENCOUNTER
NOTE TO PRESCRIBER:  Patient just received 10 patches of the FENTANYL 25 MCG yesterday.  RX was written by DR. TALAT WISE OUT OF [place], [place].  Was this sent by mistake?  Please advise.

## 2020-03-13 NOTE — TELEPHONE ENCOUNTER
I spoke with Kenia at [facility] - [place], [place] - [address]     She confirmed that Betsey picked up fentaNYL (DURAGESIC) 25 mcg/hr 72 hr patch 10 patches on 3/11/20 (prescribed by Dr. Victor Hugo Liu).     Per the direction of Dr. Ken, his prescription sent on 3/12/20 [facility]

## 2020-03-17 ENCOUNTER — TELEPHONE (OUTPATIENT)
Dept: AUDIOLOGY | Facility: CLINIC | Age: 60
End: 2020-03-17

## 2020-03-17 NOTE — TELEPHONE ENCOUNTER
Reason for call:  Hearing aid is not broke  Patient called regarding (reason for call): fix the tubing on hearing aid  Additional comments: patient would like to have the tube replaced that has come out of the ear mold, call to advise.    Phone number to reach patient:  Home number on file [phone] (home)    Best Time:  any    Can we leave a detailed message on this number?  YES    Travel screening: Not Applicable

## 2020-03-18 NOTE — TELEPHONE ENCOUNTER
I offered to see the patient between 10 am and noon or after 1 pm today. I provided the number to call and schedule this.     -Frederic Ngo CCC-A

## 2020-03-19 ENCOUNTER — PRE VISIT (OUTPATIENT)
Dept: OTOLARYNGOLOGY | Facility: CLINIC | Age: 60
End: 2020-03-19

## 2020-04-01 ENCOUNTER — VIRTUAL VISIT (OUTPATIENT)
Dept: INTERNAL MEDICINE | Facility: CLINIC | Age: 60
End: 2020-04-01
Payer: COMMERCIAL

## 2020-04-01 ENCOUNTER — TELEPHONE (OUTPATIENT)
Dept: INTERNAL MEDICINE | Facility: CLINIC | Age: 60
End: 2020-04-01

## 2020-04-01 DIAGNOSIS — F32.0 MILD MAJOR DEPRESSION (H): ICD-10-CM

## 2020-04-01 DIAGNOSIS — M06.9 RHEUMATOID ARTHRITIS, INVOLVING UNSPECIFIED SITE, UNSPECIFIED RHEUMATOID FACTOR PRESENCE: ICD-10-CM

## 2020-04-01 DIAGNOSIS — H81.03 MENIERE'S DISEASE, BILATERAL: ICD-10-CM

## 2020-04-01 DIAGNOSIS — M85.89 OSTEOPENIA OF MULTIPLE SITES: ICD-10-CM

## 2020-04-01 DIAGNOSIS — F11.20 CONTINUOUS OPIOID DEPENDENCE (H): ICD-10-CM

## 2020-04-01 DIAGNOSIS — F13.20 BENZODIAZEPINE DEPENDENCE, EPISODIC (H): Primary | ICD-10-CM

## 2020-04-01 DIAGNOSIS — M13.0 POLYARTICULAR ARTHRITIS: ICD-10-CM

## 2020-04-01 DIAGNOSIS — H81.09 MENIERE'S DISEASE, UNSPECIFIED LATERALITY: ICD-10-CM

## 2020-04-01 PROCEDURE — 99214 OFFICE O/P EST MOD 30 MIN: CPT | Mod: TEL | Performed by: INTERNAL MEDICINE

## 2020-04-01 RX ORDER — FENTANYL 25 UG/1
1 PATCH TRANSDERMAL
Qty: 10 PATCH | Refills: 0 | Status: SHIPPED | OUTPATIENT
Start: 2020-04-01 | End: 2020-05-13

## 2020-04-01 RX ORDER — ESCITALOPRAM OXALATE 20 MG/1
20 TABLET ORAL DAILY
Qty: 90 TABLET | Refills: 1 | Status: SHIPPED | OUTPATIENT
Start: 2020-04-01 | End: 2020-12-09

## 2020-04-01 RX ORDER — CELECOXIB 200 MG/1
200 CAPSULE ORAL 2 TIMES DAILY PRN
Qty: 180 CAPSULE | Refills: 1 | Status: SHIPPED | OUTPATIENT
Start: 2020-04-01 | End: 2020-10-23

## 2020-04-01 RX ORDER — ESCITALOPRAM OXALATE 10 MG/1
20 TABLET ORAL DAILY
Qty: 90 TABLET | Refills: 1 | Status: SHIPPED | OUTPATIENT
Start: 2020-04-01 | End: 2020-04-01

## 2020-04-01 RX ORDER — PROMETHAZINE HYDROCHLORIDE 25 MG/1
25 TABLET ORAL EVERY 6 HOURS PRN
Qty: 90 TABLET | Refills: 1 | Status: SHIPPED | OUTPATIENT
Start: 2020-04-01 | End: 2021-02-25

## 2020-04-01 RX ORDER — CHOLECALCIFEROL (VITAMIN D3) 50 MCG
1 TABLET ORAL DAILY
Qty: 90 TABLET | Refills: 3 | Status: SHIPPED | OUTPATIENT
Start: 2020-04-01 | End: 2021-02-18

## 2020-04-01 RX ORDER — CLONAZEPAM 0.5 MG/1
0.5 TABLET ORAL 2 TIMES DAILY PRN
Qty: 100 TABLET | Refills: 1 | Status: SHIPPED | OUTPATIENT
Start: 2020-04-01 | End: 2020-12-09

## 2020-04-01 RX ORDER — MECLIZINE HYDROCHLORIDE 25 MG/1
25 TABLET ORAL 3 TIMES DAILY PRN
Qty: 60 TABLET | Refills: 3 | Status: SHIPPED | OUTPATIENT
Start: 2020-04-01 | End: 2020-06-30

## 2020-04-01 NOTE — PROGRESS NOTES
"Subjective     Betsey Vanessa is a 59 year old female who is being evaluated via a billable telephone visit.      The patient has been notified of following:     \"This telephone visit will be conducted via a call between you and your physician/provider. We have found that certain health care needs can be provided without the need for a physical exam.  This service lets us provide the care you need with a short phone conversation.  If a prescription is necessary we can send it directly to your pharmacy.  If lab work is needed we can place an order for that and you can then stop by our lab to have the test done at a later time.    If during the course of the call the physician/provider feels a telephone visit is not appropriate, you will not be charged for this service.\"     Patient has given verbal consent for Telephone visit?  Yes    Betsey Vanessa complains of   Chief Complaint   Patient presents with     Hypertension     Patient is wondering about marijuana prescription, and fentanyl patches    ALLERGIES  Duloxetine; Topiramate; Amitriptyline embonate [amitriptyline]; Azithromycin; Cymbalta; Gabapentin; Macrobid [nitrofurantoin]; Paxil [paroxetine]; Penicillins; and Tetracycline    Hypertension Follow-up      Do you check your blood pressure regularly outside of the clinic? No 134/70    Are you following a low salt diet? Yes    Are your blood pressures ever more than 140 on the top number (systolic) OR more   than 90 on the bottom number (diastolic), for example 140/90? No      How many servings of fruits and vegetables do you eat daily?  2-3    On average, how many sweetened beverages do you drink each day (Examples: soda, juice, sweet tea, etc.  Do NOT count diet or artificially sweetened beverages)?   0    How many days per week do you exercise enough to make your heart beat faster? 3 or less    How many minutes a day do you exercise enough to make your heart beat faster? 10 - 19    How many days per week do you " miss taking your medication? 0    Reports she'll need fentanyl patches in the next 15 days.  Was cancelled at Pain clinic.    Reports allergies.  Using Flonase but also having itchy/red eyes.  No visual disturbances.    Clonazepam reduction: halving 2 mg tabs, taking once daily (total daily dose 1 mg 3/7 days and total daily dose 2 mg 4/7 days).      Reviewed and updated as needed this visit by Provider         Review of Systems   ROS COMP: Constitutional, HEENT, cardiovascular, pulmonary, gi and gu systems are negative, except as otherwise noted.       Objective   Reported vitals:  There were no vitals taken for this visit.     Psych: Alert and oriented times 3; coherent speech, normal   rate and volume, able to articulate logical thoughts, able   to abstract reason, no tangential thoughts, no hallucinations   or delusions  Her affect is normal.           Assessment/Plan:  1. Benzodiazepine dependence, episodic (H)  By her reports she's reduced from total daily dose 2mg+ to about 1.8 mg on average.  New prescription continues a slow taper to about 1.67 mg total daily dose.  Continue every 1-2 months as tolerated.    - clonazePAM (KLONOPIN) 0.5 MG tablet; Take 1 tablet (0.5 mg) by mouth 2 times daily as needed for anxiety OK to take 1-2 tabs at night as needed for sleep.  Should last 30 days.  Dispense: 100 tablet; Refill: 1    2. Continuous opioid dependence (H)  While unable to access pain clinic, we agreed to focus on tapering benzodiazepine rather than opioid predominantly.  No short-term analgesia written today but would be reasonable to consider depending on her need/utilization.  - fentaNYL (DURAGESIC) 25 mcg/hr 72 hr patch; Place 1 patch onto the skin every 72 hours  Dispense: 10 patch; Refill: 0    3. Meniere's disease, unspecified laterality  I again advised use of antihistamine preferentially over other agents.  - promethazine (PHENERGAN) 25 MG tablet; Take 1 tablet (25 mg) by mouth every 6 hours as  needed for nausea or other (Meniere's) For nusea  Dispense: 90 tablet; Refill: 1  - meclizine (ANTIVERT) 25 MG tablet; Take 1 tablet (25 mg) by mouth 3 times daily as needed for dizziness  Dispense: 60 tablet; Refill: 3    4. Polyarticular arthritis  5. Rheumatoid arthritis, involving unspecified site, unspecified rheumatoid factor presence (H)  OK to continue non-steroidal anti-inflammatory drug for now  - celecoxib (CELEBREX) 200 MG capsule; Take 1 capsule (200 mg) by mouth 2 times daily as needed for moderate pain 2 x a day  Dispense: 180 capsule; Refill: 1    7. Osteopenia of multiple sites  Avoid high dose vitamin D and use daily dosing.   - vitamin D3 (CHOLECALCIFEROL) 2000 units (50 mcg) tablet; Take 1 tablet (2,000 Units) by mouth daily  Dispense: 90 tablet; Refill: 3    8. Mild major depression (H)  20 mg escitalopram dose -- increase over 10 mg    Return in about 3 months (around 7/1/2020) for phone f/u in 6-8 weeks and 3 mo OV follow-up.      Phone call duration:  22 minutes    Pb Ken MD

## 2020-04-01 NOTE — TELEPHONE ENCOUNTER
Prior Authorization Retail Medication Request    Medication/Dose: fentaNYL (DURAGESIC) 25 mcg/hr 72 hr patch  ICD code (if different than what is on RX):   Previously Tried and Failed:  Rationale:      Insurance Name:  [employer] / [employer]   Insurance ID:  [id number]      Pharmacy Information (if different than what is on RX)  Name:  [facility] [address] [place], [place] - [address]   Phone: [phone]

## 2020-04-02 NOTE — TELEPHONE ENCOUNTER
Prior Authorization Approval    Authorization Effective Date: 3/3/2020  Authorization Expiration Date: 4/2/2021  Medication: fentaNYL (DURAGESIC) 25 mcg/hr 72 hr patch  Approved Dose/Quantity:   Reference #: [id number]   Insurance Company: [employer] - Phone [phone] Fax [phone]  Expected CoPay:       CoPay Card Available:      Foundation Assistance Needed:    Which Pharmacy is filling the prescription (Not needed for infusion/clinic administered): [facility] [address] [facility] [address]  Pharmacy Notified: Yes  Patient Notified: Yes, **Instructed pharmacy to notify patient when script is ready to /ship.**

## 2020-04-02 NOTE — TELEPHONE ENCOUNTER
PA Initiation    Medication: fentaNYL (DURAGESIC) 25 mcg/hr 72 hr patch  Insurance Company: [employer] - Phone [phone] Fax [phone]  Pharmacy Filling the Rx: [facility] [address] [facility] [place] [facility] [address] [facility]  Filling Pharmacy Phone: [phone]  Filling Pharmacy Fax: [phone]  Start Date: 4/2/2020

## 2020-04-03 ENCOUNTER — TELEPHONE (OUTPATIENT)
Dept: RHEUMATOLOGY | Facility: CLINIC | Age: 60
End: 2020-04-03

## 2020-04-07 ENCOUNTER — TELEPHONE (OUTPATIENT)
Dept: RHEUMATOLOGY | Facility: CLINIC | Age: 60
End: 2020-04-07

## 2020-04-07 ENCOUNTER — TELEPHONE (OUTPATIENT)
Dept: NEUROLOGY | Facility: CLINIC | Age: 60
End: 2020-04-07

## 2020-04-07 NOTE — TELEPHONE ENCOUNTER
"Prepping patient's chart for Thursday's Actemra infusion.    Noted telephone encounter in patient's chart from 4/3/2020, regarding approval for Orencia subcutaneously.    Called patient to see if she was getting subcutaneous Orencia, or coming to the infusion center on 4/9/2020.    Patient states \"someone called her\" about switching.  States she would like to switch to home injection, due to fear of going out in public with Covid pandemic.  However, patient states she doesn't know if she is approved or not.    Informed patient I would send the message to [facility] Auth Team to see if/when she can start home injections.  Informed patient the [facility] team will contact her directly.    Patient, or [facility] team, will need to call the infusion center if Actemra infusion will be cancelled for 4/9/2020.    Fariba Quezada RN on 4/7/2020 at 6:01 PM    "

## 2020-04-07 NOTE — TELEPHONE ENCOUNTER
Due to the COVID-19 virus a risk benefit analysis was performed by the provider and in the best interest of the patient and the facility, the patient's EMG test was rescheduled to 05/12.

## 2020-04-08 ENCOUNTER — VIRTUAL VISIT (OUTPATIENT)
Dept: OTOLARYNGOLOGY | Facility: CLINIC | Age: 60
End: 2020-04-08
Payer: COMMERCIAL

## 2020-04-08 DIAGNOSIS — H90.3 SENSORINEURAL HEARING LOSS (SNHL) OF BOTH EARS: ICD-10-CM

## 2020-04-08 DIAGNOSIS — H92.11 OTORRHEA, RIGHT: ICD-10-CM

## 2020-04-08 DIAGNOSIS — T85.79XA INFECTION OF BAHA BONE IMPLANT (H): ICD-10-CM

## 2020-04-08 DIAGNOSIS — H81.03 MENIERE'S DISEASE, BILATERAL: Primary | ICD-10-CM

## 2020-04-08 DIAGNOSIS — H72.91 PERFORATION OF RIGHT TYMPANIC MEMBRANE: ICD-10-CM

## 2020-04-08 RX ORDER — MUPIROCIN 20 MG/G
OINTMENT TOPICAL
Qty: 1 G | Refills: 0 | Status: SHIPPED | OUTPATIENT
Start: 2020-04-08 | End: 2020-08-03

## 2020-04-08 RX ORDER — OFLOXACIN 3 MG/ML
5 SOLUTION AURICULAR (OTIC) 2 TIMES DAILY
Qty: 1 BOTTLE | Refills: 1 | Status: SHIPPED | OUTPATIENT
Start: 2020-04-08 | End: 2020-06-30

## 2020-04-08 NOTE — LETTER
"4/8/2020       RE: Betsey Vanessa  [address] [facility] [address]     Dear Colleague,    Thank you for referring your patient, Betsey Vanessa, to the [facility] at [facility]. Please see a copy of my visit note below.    Subjective     Betsey Vanessa is a 59 year old female who is being evaluated via a billable telephone visit.      The patient has been notified of following:     \"This telephone visit will be conducted via a call between you and your physician/provider. We have found that certain health care needs can be provided without the need for a physical exam.  This service lets us provide the care you need with a short phone conversation.  If a prescription is necessary we can send it directly to your pharmacy.  If lab work is needed we can place an order for that and you can then stop by our lab to have the test done at a later time.    Telephone visits are billed at different rates depending on your insurance coverage. During this emergency period, for some insurers they may be billed the same as an in-person visit.  Please reach out to your insurance provider with any questions.    If during the course of the call the physician/provider feels a telephone visit is not appropriate, you will not be charged for this service.\"    Patient has given verbal consent for Telephone visit?  Yes    Betsey Vanessa complains of No chief complaint on file.    ALLERGIES  Duloxetine; Topiramate; Amitriptyline embonate [amitriptyline]; Azithromycin; Cymbalta; Gabapentin; Macrobid [nitrofurantoin]; Paxil [paroxetine]; Penicillins; and Tetracycline    Betsey Vanessa is seen for discussion regarding possible left osseointegrated implant with sound processor placement as well as her Meniere's disease.  Her son-in-law was on the call with her.  She has a long history of Meniere's disease and underwent left labyrinthectomy years ago.  She also has a right TM perforation and " underwent right osseointegrated implant with sound processor placement in 2018.  However, she reports that she cannot wear the processor on the right abutment and wears a left behind the ear processor because she gets feedback from the right abutment.  She reports that the scalp around the abutment is red and swollen and painful, she thinks she has an infection.  When asked how long the skin has looked like that, she says pretty much since she started wearing something on the abutment.  She has never been told to brush around the abutment and just cleans the abutment with alcohol.  There's no drainage around it.  I tried to understand what it is she wears to use the right osseointegrated implant with sound processor and she says she does wear something on the abutment as well as something around her neck and something behind her left ear that looks like a hearing aid.    She reports that she is also having right otorrhea which has been ongoing for months.  No pain.  She has not treated the drainage.    She continues to have significant imbalance as well as some vertigo.  She takes chronic vestibular suppressants.    Audiogram:  Outside audiogram from January was reviewed.  No response left ear.  Right severe mixed hearing loss with moderate sensorineural thresholds and 92% speech discrimination at 100dB.  However, there were multiple false responses noted.    Balance testing:  VNG and rotary chair testing was reviewed.  Rotational chair testing showed phase at the 2 lowest frequencies sloping up to normal into the highest frequencies and a single frequency asymmetry, remainder was normal.  VNG showed slowed velocity and saccadic intrusions throughout oculomotor testing and it also increased her nausea and dizziness during testing.  Right cool air caloric was 14 deg/sec left beating and left ice water caloric was 2 deg/sec right beating.    Assessment and plan:  We had a lengthy discussion regarding her multiple  issues.    I am going to place her on Floxin for the right otorrhea.  She very likely has an infection.  She should also be on dry ear precautions for the right ear as she has a perforation.    She likely has an infection around the abutment.  We discussed the need to brush daily with a soft infant toothbrush.  I have also sent a prescription for Bactroban into her pharmacy.  She may very well need oral antibiotics or possibly even have the abutment removed or changed out but we will make that decision after seeing her in clinic.  I'm hoping that if we can clear up the scalp and possibly place a longer abutment that we can resolve the feedback issues with the right processor.    She likely could benefit from a left osseointegrated implant with sound processor given her profound sensorineural hearing loss on that side.  Her bone thresholds on the right are in the moderate range so she would need a power processor.  I'm not sure if she's currently using power processor on the right.    Finally, for her Meniere's disease, she surprisingly still has a caloric response on the left despite having had a labyrinthectomy.  She did also potentially have a response on her previous testing in 2014 which we thought might have been spontaneous nystagmus and not a true response but this seems to confirm possible residual function on the left.  Recommendation was made for completion labyrinthectomy at the time of left osseointegrated implant with sound processor placement.    She and her son-in-law had their questions answered and we will set up an appointment for her to be seen in clinic for further discussion and check after antibiotic therapy.    Phone call duration:  24 minutes (start time 11:10am, end time 11:34am)    Laura Stallings MD        Again, thank you for allowing me to participate in the care of your patient.      Sincerely,    Laura Stallings MD

## 2020-04-08 NOTE — PROGRESS NOTES
"Subjective     Betsey Vanessa is a 59 year old female who is being evaluated via a billable telephone visit.      The patient has been notified of following:     \"This telephone visit will be conducted via a call between you and your physician/provider. We have found that certain health care needs can be provided without the need for a physical exam.  This service lets us provide the care you need with a short phone conversation.  If a prescription is necessary we can send it directly to your pharmacy.  If lab work is needed we can place an order for that and you can then stop by our lab to have the test done at a later time.    Telephone visits are billed at different rates depending on your insurance coverage. During this emergency period, for some insurers they may be billed the same as an in-person visit.  Please reach out to your insurance provider with any questions.    If during the course of the call the physician/provider feels a telephone visit is not appropriate, you will not be charged for this service.\"    Patient has given verbal consent for Telephone visit?  Yes    Betsey Vanessa complains of No chief complaint on file.    ALLERGIES  Duloxetine; Topiramate; Amitriptyline embonate [amitriptyline]; Azithromycin; Cymbalta; Gabapentin; Macrobid [nitrofurantoin]; Paxil [paroxetine]; Penicillins; and Tetracycline    Betsey Vanessa is seen for discussion regarding possible left osseointegrated implant with sound processor placement as well as her Meniere's disease.  Her son-in-law was on the call with her.  She has a long history of Meniere's disease and underwent left labyrinthectomy years ago.  She also has a right TM perforation and underwent right osseointegrated implant with sound processor placement in 2018.  However, she reports that she cannot wear the processor on the right abutment and wears a left behind the ear processor because she gets feedback from the right abutment.  She reports that the scalp " around the abutment is red and swollen and painful, she thinks she has an infection.  When asked how long the skin has looked like that, she says pretty much since she started wearing something on the abutment.  She has never been told to brush around the abutment and just cleans the abutment with alcohol.  There's no drainage around it.  I tried to understand what it is she wears to use the right osseointegrated implant with sound processor and she says she does wear something on the abutment as well as something around her neck and something behind her left ear that looks like a hearing aid.    She reports that she is also having right otorrhea which has been ongoing for months.  No pain.  She has not treated the drainage.    She continues to have significant imbalance as well as some vertigo.  She takes chronic vestibular suppressants.    Audiogram:  Outside audiogram from January was reviewed.  No response left ear.  Right severe mixed hearing loss with moderate sensorineural thresholds and 92% speech discrimination at 100dB.  However, there were multiple false responses noted.    Balance testing:  VNG and rotary chair testing was reviewed.  Rotational chair testing showed phase at the 2 lowest frequencies sloping up to normal into the highest frequencies and a single frequency asymmetry, remainder was normal.  VNG showed slowed velocity and saccadic intrusions throughout oculomotor testing and it also increased her nausea and dizziness during testing.  Right cool air caloric was 14 deg/sec left beating and left ice water caloric was 2 deg/sec right beating.    Assessment and plan:  We had a lengthy discussion regarding her multiple issues.    I am going to place her on Floxin for the right otorrhea.  She very likely has an infection.  She should also be on dry ear precautions for the right ear as she has a perforation.    She likely has an infection around the abutment.  We discussed the need to brush daily  with a soft infant toothbrush.  I have also sent a prescription for Bactroban into her pharmacy.  She may very well need oral antibiotics or possibly even have the abutment removed or changed out but we will make that decision after seeing her in clinic.  I'm hoping that if we can clear up the scalp and possibly place a longer abutment that we can resolve the feedback issues with the right processor.    She likely could benefit from a left osseointegrated implant with sound processor given her profound sensorineural hearing loss on that side.  Her bone thresholds on the right are in the moderate range so she would need a power processor.  I'm not sure if she's currently using power processor on the right.    Finally, for her Meniere's disease, she surprisingly still has a caloric response on the left despite having had a labyrinthectomy.  She did also potentially have a response on her previous testing in 2014 which we thought might have been spontaneous nystagmus and not a true response but this seems to confirm possible residual function on the left.  Recommendation was made for completion labyrinthectomy at the time of left osseointegrated implant with sound processor placement.    She and her son-in-law had their questions answered and we will set up an appointment for her to be seen in clinic for further discussion and check after antibiotic therapy.    Phone call duration:  24 minutes (start time 11:10am, end time 11:34am)    Laura Stallings MD

## 2020-04-08 NOTE — PATIENT INSTRUCTIONS
A prescription for Bactroban to place around the right abutment and Floxin to use in the right ear was sent to your pharmacy.  Please follow dry ear precautions for the right ear.  Follow up in 6-8 weeks.  You will be given an oral antibiotic prescription to take prior to the next visit.

## 2020-04-09 ENCOUNTER — INFUSION THERAPY VISIT (OUTPATIENT)
Dept: INFUSION THERAPY | Facility: CLINIC | Age: 60
End: 2020-04-09
Payer: COMMERCIAL

## 2020-04-09 ENCOUNTER — TELEPHONE (OUTPATIENT)
Dept: OTOLARYNGOLOGY | Facility: CLINIC | Age: 60
End: 2020-04-09

## 2020-04-09 VITALS
HEART RATE: 75 BPM | DIASTOLIC BLOOD PRESSURE: 66 MMHG | TEMPERATURE: 98.4 F | SYSTOLIC BLOOD PRESSURE: 130 MMHG | WEIGHT: 159.7 LBS | RESPIRATION RATE: 18 BRPM | BODY MASS INDEX: 29.69 KG/M2 | OXYGEN SATURATION: 98 %

## 2020-04-09 DIAGNOSIS — M06.9 RHEUMATOID ARTHRITIS, INVOLVING UNSPECIFIED SITE, UNSPECIFIED RHEUMATOID FACTOR PRESENCE: Primary | ICD-10-CM

## 2020-04-09 LAB
ALBUMIN SERPL-MCNC: 4 G/DL (ref 3.4–5)
ALBUMIN UR-MCNC: 10 MG/DL
ALP SERPL-CCNC: 80 U/L (ref 40–150)
ALT SERPL W P-5'-P-CCNC: 52 U/L (ref 0–50)
ANION GAP SERPL CALCULATED.3IONS-SCNC: 6 MMOL/L (ref 3–14)
APPEARANCE UR: ABNORMAL
AST SERPL W P-5'-P-CCNC: 36 U/L (ref 0–45)
BACTERIA #/AREA URNS HPF: ABNORMAL /HPF
BASOPHILS # BLD AUTO: 0 10E9/L (ref 0–0.2)
BASOPHILS NFR BLD AUTO: 0.3 %
BILIRUB SERPL-MCNC: 0.4 MG/DL (ref 0.2–1.3)
BILIRUB UR QL STRIP: NEGATIVE
BUN SERPL-MCNC: 17 MG/DL (ref 7–30)
CALCIUM SERPL-MCNC: 9.1 MG/DL (ref 8.5–10.1)
CHLORIDE SERPL-SCNC: 108 MMOL/L (ref 94–109)
CHOLEST SERPL-MCNC: 317 MG/DL
CO2 SERPL-SCNC: 27 MMOL/L (ref 20–32)
COLOR UR AUTO: YELLOW
CREAT SERPL-MCNC: 0.7 MG/DL (ref 0.52–1.04)
DIFFERENTIAL METHOD BLD: ABNORMAL
EOSINOPHIL # BLD AUTO: 0.2 10E9/L (ref 0–0.7)
EOSINOPHIL NFR BLD AUTO: 4.2 %
ERYTHROCYTE [DISTWIDTH] IN BLOOD BY AUTOMATED COUNT: 14 % (ref 10–15)
GFR SERPL CREATININE-BSD FRML MDRD: >90 ML/MIN/{1.73_M2}
GLUCOSE SERPL-MCNC: 121 MG/DL (ref 70–99)
GLUCOSE UR STRIP-MCNC: NEGATIVE MG/DL
HCT VFR BLD AUTO: 38.1 % (ref 35–47)
HDLC SERPL-MCNC: 41 MG/DL
HGB BLD-MCNC: 12.7 G/DL (ref 11.7–15.7)
HGB UR QL STRIP: NEGATIVE
IMM GRANULOCYTES # BLD: 0 10E9/L (ref 0–0.4)
IMM GRANULOCYTES NFR BLD: 0.3 %
KETONES UR STRIP-MCNC: NEGATIVE MG/DL
LDLC SERPL CALC-MCNC: 211 MG/DL
LEUKOCYTE ESTERASE UR QL STRIP: ABNORMAL
LYMPHOCYTES # BLD AUTO: 1.5 10E9/L (ref 0.8–5.3)
LYMPHOCYTES NFR BLD AUTO: 39.5 %
MCH RBC QN AUTO: 29.1 PG (ref 26.5–33)
MCHC RBC AUTO-ENTMCNC: 33.3 G/DL (ref 31.5–36.5)
MCV RBC AUTO: 87 FL (ref 78–100)
MONOCYTES # BLD AUTO: 0.3 10E9/L (ref 0–1.3)
MONOCYTES NFR BLD AUTO: 7.4 %
NEUTROPHILS # BLD AUTO: 1.8 10E9/L (ref 1.6–8.3)
NEUTROPHILS NFR BLD AUTO: 48.3 %
NITRATE UR QL: NEGATIVE
NON-SQ EPI CELLS #/AREA URNS LPF: ABNORMAL /LPF
NONHDLC SERPL-MCNC: 276 MG/DL
PH UR STRIP: 5.5 PH (ref 5–7)
PLATELET # BLD AUTO: 88 10E9/L (ref 150–450)
POTASSIUM SERPL-SCNC: 4 MMOL/L (ref 3.4–5.3)
PROT SERPL-MCNC: 7.4 G/DL (ref 6.8–8.8)
RBC # BLD AUTO: 4.37 10E12/L (ref 3.8–5.2)
RBC #/AREA URNS AUTO: ABNORMAL /HPF
SODIUM SERPL-SCNC: 141 MMOL/L (ref 133–144)
SOURCE: ABNORMAL
SP GR UR STRIP: 1.03 (ref 1–1.03)
TRANS CELLS #/AREA URNS HPF: ABNORMAL /HPF
TRIGL SERPL-MCNC: 325 MG/DL
UROBILINOGEN UR STRIP-MCNC: 2 MG/DL (ref 0–2)
WBC # BLD AUTO: 3.8 10E9/L (ref 4–11)
WBC #/AREA URNS AUTO: ABNORMAL /HPF

## 2020-04-09 PROCEDURE — 85025 COMPLETE CBC W/AUTO DIFF WBC: CPT | Performed by: INTERNAL MEDICINE

## 2020-04-09 PROCEDURE — 96413 CHEMO IV INFUSION 1 HR: CPT | Performed by: NURSE PRACTITIONER

## 2020-04-09 PROCEDURE — 80061 LIPID PANEL: CPT | Performed by: INTERNAL MEDICINE

## 2020-04-09 PROCEDURE — 81001 URINALYSIS AUTO W/SCOPE: CPT | Performed by: INTERNAL MEDICINE

## 2020-04-09 PROCEDURE — 80053 COMPREHEN METABOLIC PANEL: CPT | Performed by: INTERNAL MEDICINE

## 2020-04-09 PROCEDURE — 99207 ZZC NO CHARGE NURSE ONLY: CPT

## 2020-04-09 PROCEDURE — 36415 COLL VENOUS BLD VENIPUNCTURE: CPT | Performed by: INTERNAL MEDICINE

## 2020-04-09 RX ORDER — DIPHENHYDRAMINE HCL 25 MG
25 CAPSULE ORAL ONCE
Status: CANCELLED | OUTPATIENT
Start: 2020-05-07

## 2020-04-09 RX ORDER — HEPARIN SODIUM,PORCINE 10 UNIT/ML
5 VIAL (ML) INTRAVENOUS
Status: CANCELLED | OUTPATIENT
Start: 2020-05-07

## 2020-04-09 RX ORDER — ACETAMINOPHEN 325 MG/1
650 TABLET ORAL ONCE
Status: CANCELLED | OUTPATIENT
Start: 2020-05-07

## 2020-04-09 RX ORDER — HEPARIN SODIUM (PORCINE) LOCK FLUSH IV SOLN 100 UNIT/ML 100 UNIT/ML
5 SOLUTION INTRAVENOUS
Status: CANCELLED | OUTPATIENT
Start: 2020-05-07

## 2020-04-09 RX ORDER — ACETAMINOPHEN 325 MG/1
650 TABLET ORAL ONCE
Status: COMPLETED | OUTPATIENT
Start: 2020-04-09 | End: 2020-04-09

## 2020-04-09 RX ORDER — DIPHENHYDRAMINE HCL 25 MG
25 CAPSULE ORAL ONCE
Status: COMPLETED | OUTPATIENT
Start: 2020-04-09 | End: 2020-04-09

## 2020-04-09 RX ADMIN — Medication 250 ML: at 13:48

## 2020-04-09 RX ADMIN — Medication 25 MG: at 13:23

## 2020-04-09 RX ADMIN — ACETAMINOPHEN 650 MG: 325 TABLET ORAL at 13:24

## 2020-04-09 ASSESSMENT — PAIN SCALES - GENERAL: PAINLEVEL: SEVERE PAIN (6)

## 2020-04-09 NOTE — PROGRESS NOTES
Infusion Nursing Note:  Betsey Vanessa presents today for Actemra..    Patient seen by provider today: No   present during visit today: Not Applicable.    Note: Pt states she is doing well, denies any problems with her last infusion, denies any reason to hold thearpy today.  Will treat as ordered.    Intravenous Access:  Peripheral IV placed.    Treatment Conditions:  Lab Results   Component Value Date    HGB 12.7 04/09/2020     Lab Results   Component Value Date    WBC 3.8 04/09/2020      Lab Results   Component Value Date    ANEU 1.8 04/09/2020     Lab Results   Component Value Date    PLT 88 04/09/2020      Biological Infusion Checklist:  ~~~ NOTE: If the patient answers yes to any of the questions below, hold the infusion and contact ordering provider or on-call provider.    1. Have you recently had an elevated temperature, fever, chills, productive cough, coughing for 3 weeks or longer or hemoptysis, abnormal vital signs, night sweats,  chest pain or have you noticed a decrease in your appetite, unexplained weight loss or fatigue? No  2. Do you have any open wounds or new incisions? No  3. Do you have any recent or upcoming hospitalizations, surgeries or dental procedures? No  4. Do you currently have or recently have had any signs of illness or infection or are you on any antibiotics? No  5. Have you had any new, sudden or worsening abdominal pain? No  6. Have you or anyone in your household received a live vaccination in the past 4 weeks? Please note:  No live vaccines while on biologic/chemotherapy until 6 months after the last treatment.  Patient can receive the flu vaccine (shot only) and the pneumovax.  It is optimal for the patient to get these vaccines mid cycle, but they can be given at any time as long as it is not on the day of the infusion. No  7. Have you recently been diagnosed with any new nervous system diseases (ie. Multiple sclerosis, Guillain [place], seizures, neurological changes)  or cancer diagnosis? No  8. Are you on any form of radiation or chemotherapy? No  9. Are you pregnant or breast feeding or do you have plans of pregnancy in the future? No  10. Have you been having any signs of worsening depression or suicidal ideations?  (benlysta only) No  11. Have there been any other new onset medical symptoms? No      Post Infusion Assessment:  Patient tolerated infusion without incident.  Blood return noted pre and post infusion.  Site patent and intact, free from redness, edema or discomfort.  No evidence of extravasations.  Access discontinued per protocol.  Biologic Infusion Post Education: Call the triage nurse at your clinic or seek medical attention if you have chills and/or temperature greater than or equal to 100.5, uncontrolled nausea/vomiting, diarrhea, constipation, dizziness, shortness of breath, chest pain, heart palpitations, weakness or any other new or concerning symptoms, questions or concerns.  You cannot have any live virus vaccines prior to or during treatment or up to 6 months post infusion.  If you have an upcoming surgery, medical procedure or dental procedure during treatment, this should be discussed with your ordering physician and your surgeon/dentist.  If you are having any concerning symptom, if you are unsure if you should get your next infusion or wish to speak to a provider before your next infusion, please call your care coordinator or triage nurse at your clinic to notify them so we can adequately serve you.       Discharge Plan:   Return 05/07/2020 for Actemra Infusion.  Discharge instructions reviewed with: Patient.  Patient and/or family verbalized understanding of discharge instructions and all questions answered.  Patient discharged in stable condition accompanied by: self.  Departure Mode: Ambulatory.    Darcy Alexander RN

## 2020-04-09 NOTE — TELEPHONE ENCOUNTER
Left message for patient informing her of dry ear precautions, use a cotton ball smeared with Vaseline over her right ear when she showers. Informed her someone would be reaching out to her to get her scheduled to see Dr. Stallings in 6-8 weeks. We will put her on an oral antibiotic before her appointment. Direct call back number given.

## 2020-04-09 NOTE — TELEPHONE ENCOUNTER
We re taking every precaution to prevent the spread of COVID-19. Our top priority is to protect and care for our patients.     We are reviewing all new patients to determine if their visit is considered essential. We are balancing helping patients with chronic pain with the safety of our patients and staff.    In review of this upcoming new visit, the determination is:  This can be scheduled as a virtual visit.    This will be sent to our  staff to adjust schedule as determined by provider.    Liudmila Hewitt MD on 4/9/2020 at 8:45 AM

## 2020-04-09 NOTE — TELEPHONE ENCOUNTER
Order received from Velma Ibanez MD at [facility].      Patient is being referred for Evaluation for comprehensive services.      Patients diagnosis is Chronic pain.      Routing to provider pool to determine if this is essential.     Dee CONNORS    [facility]

## 2020-04-10 NOTE — TELEPHONE ENCOUNTER
----- Message from Joyce Truong sent at 4/6/2020 12:26 PM CDT -----  Regarding: Self Inject Actemra?  Hi Dr. Carrillo,     Would this patient be a candidate to change to self injecting actemra? If yes, can you have one of your RN's ensure the patient is ok with it as well and let me know?     We did run a test claim and it would be covered under her pharmacy benefits with a copay of $3.90.     Let me know,   Joyce Truong  Infusion  Supervisor  [facility] - [email or web address]  [email or web address]   Office: [phone]  Fax: [phone]

## 2020-04-10 NOTE — TELEPHONE ENCOUNTER
Rheumatology team: please call Ms. Vanessa to schedule a sooner rheumatology appointment.    Terrence Carrillo MD  4/10/2020 4:41 PM

## 2020-04-10 NOTE — TELEPHONE ENCOUNTER
Rheumatology Telephone Note:    I called and spoke with Ms. Vanessa.  Recalls history of reactions to many medications so would prefer having this done at the infusion center.     She had several questions so will schedule a follow-up appointment.     Terrence Carrillo MD  4/10/2020 4:31 PM

## 2020-04-14 NOTE — TELEPHONE ENCOUNTER
Spoke with patient, phone visit has been made for April 20th at 11:40AM.  Madeline Cheung CMA Rheumatology  4/14/2020 11:17 AM

## 2020-04-17 NOTE — RESULT ENCOUNTER NOTE
"SimpleOrder message sent:  \"Ms. Vanessa,    Cholesterol is elevated, platelets are low, and glucose is elevated.  Liver enzyme ALT is also slightly elevated.  We will review this at your appointment next week.    Sincerely,  Terrence Carrillo MD  4/17/2020 3:20 PM\""

## 2020-04-20 ENCOUNTER — VIRTUAL VISIT (OUTPATIENT)
Dept: RHEUMATOLOGY | Facility: CLINIC | Age: 60
End: 2020-04-20
Payer: COMMERCIAL

## 2020-04-20 DIAGNOSIS — M35.01 SJOGREN'S SYNDROME WITH KERATOCONJUNCTIVITIS SICCA (H): ICD-10-CM

## 2020-04-20 DIAGNOSIS — Z79.899 HIGH RISK MEDICATIONS (NOT ANTICOAGULANTS) LONG-TERM USE: ICD-10-CM

## 2020-04-20 DIAGNOSIS — M85.80 OSTEOPENIA, UNSPECIFIED LOCATION: ICD-10-CM

## 2020-04-20 DIAGNOSIS — M06.9 RHEUMATOID ARTHRITIS, INVOLVING UNSPECIFIED SITE, UNSPECIFIED RHEUMATOID FACTOR PRESENCE: Primary | ICD-10-CM

## 2020-04-20 DIAGNOSIS — D69.6 THROMBOCYTOPENIA (H): ICD-10-CM

## 2020-04-20 PROCEDURE — 99214 OFFICE O/P EST MOD 30 MIN: CPT | Mod: 95 | Performed by: INTERNAL MEDICINE

## 2020-04-20 RX ORDER — HEPARIN SODIUM,PORCINE 10 UNIT/ML
5 VIAL (ML) INTRAVENOUS
Status: CANCELLED | OUTPATIENT
Start: 2020-06-04

## 2020-04-20 RX ORDER — HEPARIN SODIUM (PORCINE) LOCK FLUSH IV SOLN 100 UNIT/ML 100 UNIT/ML
5 SOLUTION INTRAVENOUS
Status: CANCELLED | OUTPATIENT
Start: 2020-06-04

## 2020-04-20 RX ORDER — CEVIMELINE HYDROCHLORIDE 30 MG/1
30 CAPSULE ORAL 3 TIMES DAILY
Qty: 270 CAPSULE | Refills: 3 | Status: SHIPPED | OUTPATIENT
Start: 2020-04-20 | End: 2021-02-18

## 2020-04-20 NOTE — PROGRESS NOTES
".Betsey Vanessa is a 59 year old female who is being evaluated via a billable telephone visit.      The patient has been notified of following:     \"This telephone visit will be conducted via a call between you and your physician/provider. We have found that certain health care needs can be provided without the need for a physical exam.  This service lets us provide the care you need with a short phone conversation.  If a prescription is necessary we can send it directly to your pharmacy.  If lab work is needed we can place an order for that and you can then stop by our lab to have the test done at a later time.    Telephone visits are billed at different rates depending on your insurance coverage. During this emergency period, for some insurers they may be billed the same as an in-person visit.  Please reach out to your insurance provider with any questions.    If during the course of the call the physician/provider feels a telephone visit is not appropriate, you will not be charged for this service.\"    Patient has given verbal consent for Telephone visit?  Yes    How would you like to obtain your AVS? MyChart    Additional provider notes:       Rheumatology Telephone/TeleHealth Visit      Betsey Vanessa MRN# [id number]   YOB: 1960 Age: 59 year old      Date of visit: 4/20/20   PCP: Dr. Velma Ibanez  Referring provider: Dr. Velma Ibanez    Chief Complaint   Patient presents with:  Arthritis      Assessment and Plan     1.  Rheumatoid arthritis: Reportedly diagnosed many years ago.  Previously treated by Dr. Stacy at [facility] in [place], [place]. Previously on methotrexate + rituximab, Enbrel + leflunomide; had elevated liver enzymes preventing oral DMARDs; most recent effective tx was with tocilizumab 8mg/kg IV monthly per her last rheumatology notes.  ALEGRIA hx prevents several oral DMARDs and LFT elevations with MTX in the past. Reviewed her dx and tx options. Started " Actemra 4mg/kg IV but this resulted in lower platelets and she doesn't find much benefit from actemra this time. Need to hold actemra because of the thrombocytopenia and therefore will not increase the actemra dose.  Discussed orencia and she'd like to go forward with it.   - Start orencia 750mg IV on weeks 0, 2, 4, and then every 4 weeks thereafter  - Discontinue actemra   - Labs in 3 months: CBC, Creatinine, Hepatic Panel, ESR, CRP    # Abatacept (Orencia) Risks and Benefits: The risks and benefits of abatacept were discussed in detail and the patient verbalized understanding.  The risks discussed include, but are not limited to, the risk for hypersensitivity, anaphylaxis, anaphylactoid reactions, an increased risk for serious infections leading to hospitalization or death, and an increased risk for more frequent respiratory adverse events in patients with COPD.  If subcutaneous injections are used, then injection site reactions and/or pain may occur at the site of injection.  The most common side effects were discussed that include headache, upper respiratory tract infections, nasopharyngitis, and nausea.  It was discussed that the medication would need to be discontinued if a serious infection develops.  It was discussed that live vaccinations should not be received while using abatacept or within 30 days prior to starting abatacept.  I encouraged reviewing the package insert and asking any questions about the medication.      3. Sjogren's Syndrome: reportedly had punctal plugs in the past.  Doing okay with artificial tears, Xiidra, and Evoxac.  Encouraged frequent sips of water, visits with a dentist at least every 6months, avoidance of sugary foods/drinks.     4. Chronic pain syndrome: to follow with the Pain Clinic     5. Osteopenia: based on 7/2019 DEXA report that she brought with her; FRAX score shows a 22% risk of major osteoporotic fracture in the next 10 years and a 1.9% risk for osteoporotic hip  fracture in the next 10 years.  Based on FRAX tx is indicated.  She already received one dose of boniva in Sept 2019; and failed alendronate due to GI upset.  Reclast received 2/13/2020.   - Continue calcium 1000mg daily  - Continue vitamin D 1000 IU daily  - Reclast: next dose in February 2021    6. Thrombocytopenia: see #1.    # Relevant labs and imaging were reviewed with the patient    # High risk medication toxicity monitoring: discussion and labs reviewed; appropriate labs ordered. See above    # Note that this is a virtual visit to reduce the risk of COVID-19 exposure during this current pandemic.         Ms. Vanessa verbalized agreement with and understanding of the rational for the diagnosis and treatment plan.  All questions were answered to best of my ability and the patient's satisfaction. Ms. Vanessa was advised to contact the clinic with any questions that may arise after the clinic visit.      Thank you for involving me in the care of the patient    Return to clinic: 3 months    HPI   Betsey Vanessa is a 59 year old female with a past medical history significant for hypertension, depression, type 2 diabetes, Ménière's disease, and rheumatoid arthritis who is seen in consultation for follow-up of RA.     8/16/2019 documentation by Dr. Juani Stacy at [facility] ([place], [place]) notes rheumatoid arthritis and thrombocytopenia    1/8/2020 clinic note by Dr. Ibanez documents Ménière's disease-referring to ENT.  Diabetes.  Hypertension.  Rheumatoid arthritis and Sjogren's syndrome currently on Evoxac, Celebrex, IV Tocilizumab monthly.  Also on long-term narcotics that includes fentanyl patch for chronic pain on clonazepam for sleep at night.  Valvular heart disease with mitral valve stenosis, mitral valve regurgitation, and severe aortic valve stenosis and was following with a cardiologist with plans to establish with a new cardiologist here in [place].  Depression stable on  "Lexapro.    Recently moved from [place] to [place]    Previously, Ms. Vanessa reports that she has RA diagnosed many years ago, chronic pain syndrome with plans to establish in the pain clinic, depression that is controlled, diabetes tx'd with oral medications, and hearing loss with plans to see ENT here.  Also states that she has \"bad bones\" and has been on Boniva injection x1 dose in February. Failed fosamax because of associated GI upset; used for 6 weeks. [facility].  Return (treated with methotrexate because liver enzyme elevations, Remicade that was ineffective and caused hives, Enbrel that she does not recall helping in any way, and more recently Actemra 4 mg/kg IV every 8 weeks that has been used for years, last received in September 2019.  Also with Celebrex.  She says that since her last dose of Actemra she has not seen any worsening of her arthritis.  Still with some pain at her MCPs that is worse in the morning and improves with time and activity.  Morning stiffness for approximately 1 hour.  Positive gelling phenomenon.  Diffuse body pain.    Today, she reports pain at her feet, elbows, hands, lower back, knees.  She says that she has swelling; then her daughter who was also on the phonecall asked where she was swollen and Ms. Vanessa said that she wasn't swollen right now.  Peripheral joint pain is bad all day; she doesn't discern between AM, noon, or PM.  Activity doesn't affect her joint pain.  Felt better when on actemra in the past. Her daughter said that her mother seemed to respond well to actemra, but variable - sometimes helping for 1 week after infusion, sometimes for 3 weeks after, and sometimes not at all. Felt like in the past it was more effective.     Denies fevers, chills, nausea, or vomiting.  Occasional constipation or diarrhea. No abdominal pain. No chest pain/pressure, palpitations, or shortness of breath. No LE swelling.  " Chronic neck and lower back pain that were managed in the pain clinic previously and she plans to establish care in the pain clinic here soon. No oral or nasal sores.  No rash.  Dry eyes treated with artificial tears and Xiidra; she plans to establish with an ophthalmologist because she was followed by ophthalmology when in [place]; hx of punctal plugs.  Dry mouth is treated with frequent sips of water and Evoxac; doesn't recall ever being on pilocarpine. Evoxac is listed in her allergy list and she says that is inaccurate and then pulls out her Evoxac Rx to show that she has it and she says it helps and she's never had an adverse reaction to Evoxac.  No photosensitivity or photophobia.  No history of uveitis. No history of inflammatory bowel disease.  No history of DVT, pulmonary embolism, or miscarriage.   No history of serositis.  No history of Raynaud's Phenomenon.  No seizure history.   No known renal disorder.      Tobacco: Quit smoking in 1998  EtOH: None  Drugs: None    ROS   GEN: No fevers, chills, night sweats, or weight change  SKIN: No itching, rashes, sores  HEENT: No epistaxis. No oral or nasal ulcers.  CV: No chest pain, pressure, palpitations, or dyspnea on exertion.  PULM: No SOB, wheeze, cough.  GI:  No blood in stool. No abdominal pain.  See HPI  : No blood in urine.  MSK: See HPI.  NEURO: No numbness or tingling  ENDO: No heat/cold intolerance.  EXT: No LE swelling  PSYCH: See HPI    Active Problem List     Patient Active Problem List   Diagnosis     Type 2 diabetes mellitus with hyperglycemia, without long-term current use of insulin (H)     Hypertension goal BP (blood pressure) < 140/90     Meniere's disease, unspecified laterality     Rheumatoid arthritis, involving unspecified site, unspecified rheumatoid factor presence (H)     Valvular heart disease     Fibromyalgia     Mild major depression (H)     Osteopenia of multiple sites     History of bisphosphonate therapy      Post-menopausal     Benzodiazepine dependence, episodic (H)     Continuous opioid dependence (H)     Mitral stenosis     Age-related osteoporosis without current pathological fracture     Mixed conductive and sensorineural hearing loss of right ear     Polyarticular arthritis     Past Medical History     Past Medical History:   Diagnosis Date     Anemia     r/t menses     Anxiety      Arthritis      Constipation      Depression      Diabetes (H)      Diarrhea      Double vision      Headache(784.0)      Hearing loss      Heart murmur      Heartburn      Hypertension      Indigestion      Nasal congestion      Night sweats      Numbness      Problems related to lack of adequate sleep      Rash      Sneezing      Tinnitus      Visual loss      Weakness      Weight gain     because of Prednisone     Past Surgical History     Past Surgical History:   Procedure Laterality Date     ------------OTHER-------------      D&C     HYSTERECTOMY       INNER EAR SURGERY       RELEASE CARPAL TUNNEL BILATERAL Bilateral 2008     Allergy     Allergies   Allergen Reactions     Duloxetine Other (See Comments)     Topiramate Other (See Comments)     Other reaction(s): Confusion     Amitriptyline Embonate [Amitriptyline]      Hyper activity     Azithromycin Hives     Cymbalta      Hyper activity     Gabapentin Hives     Macrobid [Nitrofurantoin] Hives     Paxil [Paroxetine] Other (See Comments)     Hyper behavior     Penicillins Hives     Tetracycline Hives     Current Medication List     Current Outpatient Medications   Medication Sig     celecoxib (CELEBREX) 200 MG capsule Take 1 capsule (200 mg) by mouth 2 times daily as needed for moderate pain 2 x a day     cevimeline (EVOXAC) 30 MG capsule Take 1 capsule (30 mg) by mouth 3 times daily     clonazePAM (KLONOPIN) 0.5 MG tablet Take 1 tablet (0.5 mg) by mouth 2 times daily as needed for anxiety OK to take 1-2 tabs at night as needed for sleep.  Should last 30 days.     docusate calcium  (GNP DOCUSATE CALCIUM) 240 MG capsule Take 240 mg by mouth daily     EPINEPHrine 0.3 MG/0.3ML SOLN PRN for Anaphylaxis     escitalopram (LEXAPRO) 20 MG tablet Take 1 tablet (20 mg) by mouth daily     fentaNYL (DURAGESIC) 25 mcg/hr 72 hr patch Place 1 patch onto the skin every 72 hours     glucose blood VI test strips strip daily     HYDROcodone-acetaminophen (NORCO)  MG per tablet Take 1 tablet by mouth Every 8 hrs. As needed for pain     lifitegrast (XIIDRA) 5 % opthalmic solution Place 1 drop into both eyes 2 times daily     meclizine (ANTIVERT) 25 MG tablet Take 1 tablet (25 mg) by mouth 3 times daily as needed for dizziness     metFORMIN (GLUCOPHAGE) 500 MG tablet Take 500 mg by mouth 2 times daily (with meals) 1000 mg 2 x a day     metroNIDAZOLE (METROGEL) 0.75 % gel Apply to face daily     nystatin (MYCOSTATIN) 324553 UNIT/GM POWD 2 x daily PRN for rash     ofloxacin (FLOXIN) 0.3 % otic solution Place 5 drops into the right ear 2 times daily for 14 days     omeprazole (PRILOSEC) 20 MG capsule Take by mouth daily 2 x daily     potassium chloride SA (K-DUR,KLOR-CON M) 10 MEQ tablet Take by mouth 2 times daily 20 mEq daily     promethazine (PHENERGAN) 25 MG suppository Place 25 mg rectally every 6 hours as needed For nusea     promethazine (PHENERGAN) 25 MG tablet Take 1 tablet (25 mg) by mouth every 6 hours as needed for nausea or other (Meniere's) For nusea     psyllium (METAMUCIL) 58.6 % POWD Take by mouth daily PRN     TOCILIZUMAB IV Inject into the vein every 30 days     triamterene-hydrochlorothiazide (DYAZIDE) 37.5-25 MG per capsule 1 capsule every morning     vitamin D3 (CHOLECALCIFEROL) 2000 units (50 mcg) tablet Take 1 tablet (2,000 Units) by mouth daily     No current facility-administered medications for this visit.          Social History   See HPI    Family History     Family History   Problem Relation Age of Onset     C.A.D. Mother      Alzheimer Disease Mother      Cardiovascular Mother       "Eye Disorder Mother      Thyroid Disease Mother      Hypertension Other         grandmother     Cerebrovascular Disease Father      Alcohol/Drug Father      Depression Father      Obesity Father         aunt     Asthma Father      Alcohol/Drug Brother      Alcohol/Drug Sister         aunt     Allergies Other         All family members     Arthritis Other         aunt     Obesity Sister      Thyroid Disease Sister      Asthma Sister         daughter       Physical Exam     Temp Readings from Last 3 Encounters:   04/09/20 98.4  F (36.9  C)   03/12/20 97  F (36.1  C) (Oral)   02/19/20 99.2  F (37.3  C) (Tympanic)     BP Readings from Last 5 Encounters:   04/09/20 130/66   03/12/20 (!) 142/65   02/19/20 136/70   02/13/20 128/65   02/07/20 (!) 142/58     Pulse Readings from Last 1 Encounters:   04/09/20 75     Resp Readings from Last 1 Encounters:   04/09/20 18     Estimated body mass index is 29.69 kg/m  as calculated from the following:    Height as of 2/4/20: 1.562 m (5' 1.5\").    Weight as of 4/9/20: 72.4 kg (159 lb 11.2 oz).    Telephone visit    Labs / Imaging (select studies)   RF/CCP  Recent Labs   Lab Test 01/14/20  1221   CCPIGG 1   RHF 20*     CBC  Recent Labs   Lab Test 04/09/20  1226 02/07/20  1040 01/14/20  1221   WBC 3.8* 6.7 5.0   RBC 4.37 4.23 4.15   HGB 12.7 11.9 12.2   HCT 38.1 36.4 35.5   MCV 87 86 86   RDW 14.0 13.2 12.7   PLT 88* 122* 138*   MCH 29.1 28.1 29.4   MCHC 33.3 32.7 34.4   NEUTROPHIL 48.3 66.8 58.5   LYMPH 39.5 22.4 30.8   MONOCYTE 7.4 7.1 7.1   EOSINOPHIL 4.2 3.1 3.4   BASOPHIL 0.3 0.3 0.2   ANEU 1.8 4.5 3.0   ALYM 1.5 1.5 1.6   MOISE 0.3 0.5 0.4   AEOS 0.2 0.2 0.2   ABAS 0.0 0.0 0.0     CMP  Recent Labs   Lab Test 04/09/20  1226 02/07/20  1040 01/14/20  1221     --  137   POTASSIUM 4.0  --  4.0   CHLORIDE 108  --  103   CO2 27  --  28   ANIONGAP 6  --  6   *  --  120*   BUN 17  --  13   CR 0.70  --  0.70   GFRESTIMATED >90  --  >90   GFRESTBLACK >90  --  >90   MIRNA 9.1 9.6 " 9.5   BILITOTAL 0.4  --  0.5   ALBUMIN 4.0  --  4.0   PROTTOTAL 7.4  --  7.9   ALKPHOS 80  --  111   AST 36  --  30   ALT 52*  --  37     Calcium/VitaminD  Recent Labs   Lab Test 04/09/20  1226 02/07/20  1040 01/14/20  1221   MIRNA 9.1 9.6 9.5   VITDT  --   --  40     ESR/CRP  Recent Labs   Lab Test 01/14/20  1221   SED 27   CRP 7.6     Lipid Panel  Recent Labs   Lab Test 04/09/20  1226   CHOL 317*   TRIG 325*   HDL 41*   *   NHDL 276*     Hepatitis B  Recent Labs   Lab Test 01/14/20  1221   HBCAB Nonreactive   HEPBANG Nonreactive     Hepatitis C  Recent Labs   Lab Test 01/14/20  1221   HCVAB Nonreactive     Lyme ab screening  Recent Labs   Lab Test 01/14/20  1221   LYMEGM 0.05     Tuberculosis Screening  Recent Labs   Lab Test 01/14/20  1221   TBRES Negative       [facility]   7/18/2013 CCP negative  8/5/2010 CCP negative  3/27/2013 hepatitis B surface antigen negative and hepatitis B core antibody negative  9/2/2011 hepatitis B surface antigen negative and hepatitis B core antibody negative  3/27/2013 hepatitis C antibody negative    Immunization History     Immunization History   Administered Date(s) Administered     FLU 6-35 months 10/22/2013     Influenza Vaccine IM > 6 months Valent IIV4 10/21/2015, 10/05/2016, 10/11/2018, 09/25/2019     Influenza Vaccine, 6+MO IM (QUADRIVALENT W/PRESERVATIVES) 09/18/2014, 10/03/2017     Pneumo Conj 13-V (2010&after) 10/11/2018     TD (ADULT, 7+) 04/20/1997, 04/17/2019     TDAP Vaccine (Adacel) 01/15/2009     Zoster vaccine recombinant adjuvanted (SHINGRIX) 10/11/2018          Chart documentation done in part with Dragon Voice recognition Software. Although reviewed after completion, some word and grammatical error may remain.    Phone call start time: 11:42 AM  Phone call end time: 12:10 PM    This visit is equivalent to a 80367 visit    Location of patient: home  Location of provider: home    Follow up:  follow up appointment scheduled to be in  early August    Terrence Carrillo MD  4/20/2020

## 2020-04-29 ENCOUNTER — TELEPHONE (OUTPATIENT)
Dept: INTERNAL MEDICINE | Facility: CLINIC | Age: 60
End: 2020-04-29

## 2020-04-29 DIAGNOSIS — F11.20 CONTINUOUS OPIOID DEPENDENCE (H): Primary | ICD-10-CM

## 2020-04-29 NOTE — TELEPHONE ENCOUNTER
Routing refill request to provider for review/approval because:  Drug not on the [facility] refill protocol  And listed as historical. Due to new Refill protocol RN's no longer look up   Mallika Marquis RN

## 2020-04-29 NOTE — TELEPHONE ENCOUNTER
Reason for Call:  Medication or medication refill:    Do you use a [facility]?  Name of the pharmacy and phone number for the current request:  [facility] [address] [place], [place] - [address]  [phone]    Name of the medication requested: HYDROcodone-acetaminophen (NORCO)  MG per tablet    Other request: patient is calling for refill of above medication. Will not be seeing pain management until 05/15/20. Please call to discuss. Thank you.    Can we leave a detailed message on this number? YES    Phone number patient can be reached at: Home number on file [phone] (home)    Best Time:     Call taken on 4/29/2020 at 1:46 PM by Dora Arteaga

## 2020-05-01 RX ORDER — HYDROCODONE BITARTRATE AND ACETAMINOPHEN 10; 325 MG/1; MG/1
.5-1 TABLET ORAL 3 TIMES DAILY PRN
Qty: 45 TABLET | Refills: 0 | Status: SHIPPED | OUTPATIENT
Start: 2020-05-01 | End: 2020-05-13

## 2020-05-05 ENCOUNTER — TELEPHONE (OUTPATIENT)
Dept: NEUROLOGY | Facility: CLINIC | Age: 60
End: 2020-05-05

## 2020-05-05 NOTE — TELEPHONE ENCOUNTER
Due to the COVID-19 virus a risk benefit analysis was performed by the provider and in the best interest of the patient and the facility, the patient's EMG test was rescheduled to 06/11. Dr. oBoth was notified.

## 2020-05-07 ENCOUNTER — INFUSION THERAPY VISIT (OUTPATIENT)
Dept: INFUSION THERAPY | Facility: CLINIC | Age: 60
End: 2020-05-07
Payer: COMMERCIAL

## 2020-05-07 VITALS
WEIGHT: 161.5 LBS | DIASTOLIC BLOOD PRESSURE: 71 MMHG | RESPIRATION RATE: 18 BRPM | TEMPERATURE: 97.1 F | OXYGEN SATURATION: 97 % | HEART RATE: 81 BPM | SYSTOLIC BLOOD PRESSURE: 148 MMHG | BODY MASS INDEX: 30.02 KG/M2

## 2020-05-07 DIAGNOSIS — M06.9 RHEUMATOID ARTHRITIS, INVOLVING UNSPECIFIED SITE, UNSPECIFIED RHEUMATOID FACTOR PRESENCE: Primary | ICD-10-CM

## 2020-05-07 PROCEDURE — 99207 ZZC NO CHARGE LOS: CPT

## 2020-05-07 PROCEDURE — 96365 THER/PROPH/DIAG IV INF INIT: CPT | Performed by: NURSE PRACTITIONER

## 2020-05-07 RX ORDER — HEPARIN SODIUM,PORCINE 10 UNIT/ML
5 VIAL (ML) INTRAVENOUS
Status: CANCELLED | OUTPATIENT
Start: 2020-05-21

## 2020-05-07 RX ORDER — HEPARIN SODIUM (PORCINE) LOCK FLUSH IV SOLN 100 UNIT/ML 100 UNIT/ML
5 SOLUTION INTRAVENOUS
Status: CANCELLED | OUTPATIENT
Start: 2020-05-21

## 2020-05-07 RX ADMIN — Medication 250 ML: at 08:57

## 2020-05-07 ASSESSMENT — PAIN SCALES - GENERAL: PAINLEVEL: MODERATE PAIN (5)

## 2020-05-07 NOTE — PROGRESS NOTES
Infusion Nursing Note:  Betsey Jayon presents today for 1st Orencia infusion.    Patient seen by provider today: No   present during visit today: Not Applicable.    Note:   Answered patient's questions regarding Orencia and what to expect.    Patient understands to call Dr Carrillo if any signs of infection, or procedures, just like when she had Actemra.    Intravenous Access:  Peripheral IV placed.    Treatment Conditions:  Biological Infusion Checklist:  ~~~ NOTE: If the patient answers yes to any of the questions below, hold the infusion and contact ordering provider or on-call provider.    1. Have you recently had an elevated temperature, fever, chills, productive cough, coughing for 3 weeks or longer or hemoptysis, abnormal vital signs, night sweats,  chest pain or have you noticed a decrease in your appetite, unexplained weight loss or fatigue? No  2. Do you have any open wounds or new incisions? No  3. Do you have any recent or upcoming hospitalizations, surgeries or dental procedures? No  4. Do you currently have or recently have had any signs of illness or infection or are you on any antibiotics? No  5. Have you had any new, sudden or worsening abdominal pain? No  6. Have you or anyone in your household received a live vaccination in the past 4 weeks? Please note:  No live vaccines while on biologic/chemotherapy until 6 months after the last treatment.  Patient can receive the flu vaccine (shot only) and the pneumovax.  It is optimal for the patient to get these vaccines mid cycle, but they can be given at any time as long as it is not on the day of the infusion. No  7. Have you recently been diagnosed with any new nervous system diseases (ie. Multiple sclerosis, Guillain Bridgman, seizures, neurological changes) or cancer diagnosis? No  8. Are you on any form of radiation or chemotherapy? No  9. Are you pregnant or breast feeding or do you have plans of pregnancy in the future? No  10. Have there  been any other new onset medical symptoms? No        Post Infusion Assessment:  Patient tolerated infusion without incident.  Blood return noted pre and post infusion.  Site patent and intact, free from redness, edema or discomfort.  No evidence of extravasations.  Access discontinued per protocol.  Biologic Infusion Post Education: Call the triage nurse at your clinic or seek medical attention if you have chills and/or temperature greater than or equal to 100.5, uncontrolled nausea/vomiting, diarrhea, constipation, dizziness, shortness of breath, chest pain, heart palpitations, weakness or any other new or concerning symptoms, questions or concerns.  You cannot have any live virus vaccines prior to or during treatment or up to 6 months post infusion.  If you have an upcoming surgery, medical procedure or dental procedure during treatment, this should be discussed with your ordering physician and your surgeon/dentist.  If you are having any concerning symptom, if you are unsure if you should get your next infusion or wish to speak to a provider before your next infusion, please call your care coordinator or triage nurse at your clinic to notify them so we can adequately serve you.       Discharge Plan:   Patient will return 5/21/20 for next appointment.   Patient discharged in stable condition accompanied by: self. Daughter driving.  Departure Mode: Ambulatory with cane.    Fariba Quezada RN

## 2020-05-12 ENCOUNTER — TELEPHONE (OUTPATIENT)
Dept: INTERNAL MEDICINE | Facility: CLINIC | Age: 60
End: 2020-05-12

## 2020-05-12 DIAGNOSIS — F11.20 CONTINUOUS OPIOID DEPENDENCE (H): ICD-10-CM

## 2020-05-12 NOTE — TELEPHONE ENCOUNTER
Routing refill request to provider for review/approval because:  Drug not on the [facility] refill protocol     Last filled   Fentanyl last filled #10  4-1-20  Norco #45 5-1-20

## 2020-05-13 ENCOUNTER — VIRTUAL VISIT (OUTPATIENT)
Dept: FAMILY MEDICINE | Facility: CLINIC | Age: 60
End: 2020-05-13
Payer: COMMERCIAL

## 2020-05-13 ENCOUNTER — TELEPHONE (OUTPATIENT)
Dept: INTERNAL MEDICINE | Facility: CLINIC | Age: 60
End: 2020-05-13

## 2020-05-13 ENCOUNTER — MYC MEDICAL ADVICE (OUTPATIENT)
Dept: FAMILY MEDICINE | Facility: CLINIC | Age: 60
End: 2020-05-13

## 2020-05-13 DIAGNOSIS — F11.20 CONTINUOUS OPIOID DEPENDENCE (H): ICD-10-CM

## 2020-05-13 DIAGNOSIS — F32.0 MILD MAJOR DEPRESSION (H): ICD-10-CM

## 2020-05-13 DIAGNOSIS — I10 HYPERTENSION GOAL BP (BLOOD PRESSURE) < 140/90: Primary | ICD-10-CM

## 2020-05-13 DIAGNOSIS — Z76.0 ENCOUNTER FOR MEDICATION REFILL: ICD-10-CM

## 2020-05-13 PROCEDURE — 99214 OFFICE O/P EST MOD 30 MIN: CPT | Mod: 95 | Performed by: PHYSICIAN ASSISTANT

## 2020-05-13 RX ORDER — TRIAMTERENE AND HYDROCHLOROTHIAZIDE 37.5; 25 MG/1; MG/1
1 CAPSULE ORAL DAILY
Qty: 90 CAPSULE | Refills: 3 | Status: SHIPPED | OUTPATIENT
Start: 2020-05-13 | End: 2021-05-26

## 2020-05-13 RX ORDER — POTASSIUM CHLORIDE 750 MG/1
10 TABLET, EXTENDED RELEASE ORAL 2 TIMES DAILY
Qty: 180 TABLET | Refills: 3 | Status: SHIPPED | OUTPATIENT
Start: 2020-05-13 | End: 2022-02-08

## 2020-05-13 RX ORDER — HYDROCODONE BITARTRATE AND ACETAMINOPHEN 10; 325 MG/1; MG/1
.5-1 TABLET ORAL 3 TIMES DAILY PRN
Qty: 45 TABLET | Refills: 0 | Status: SHIPPED | OUTPATIENT
Start: 2020-05-13 | End: 2020-06-16

## 2020-05-13 RX ORDER — FENTANYL 25 UG/1
1 PATCH TRANSDERMAL
Qty: 5 PATCH | Refills: 0 | Status: SHIPPED | OUTPATIENT
Start: 2020-05-13 | End: 2020-06-02

## 2020-05-13 RX ORDER — DOCUSATE SODIUM 100 MG/1
100 CAPSULE, LIQUID FILLED ORAL 2 TIMES DAILY
Qty: 180 CAPSULE | Refills: 3 | Status: SHIPPED | OUTPATIENT
Start: 2020-05-13 | End: 2021-06-09

## 2020-05-13 RX ORDER — FENTANYL 25 UG/1
1 PATCH TRANSDERMAL
Qty: 5 PATCH | Refills: 0 | Status: SHIPPED | OUTPATIENT
Start: 2020-05-13 | End: 2020-05-13

## 2020-05-13 RX ORDER — HYDROCODONE BITARTRATE AND ACETAMINOPHEN 10; 325 MG/1; MG/1
.5-1 TABLET ORAL 3 TIMES DAILY PRN
Qty: 45 TABLET | Refills: 0 | Status: SHIPPED | OUTPATIENT
Start: 2020-05-13 | End: 2020-05-13

## 2020-05-13 NOTE — TELEPHONE ENCOUNTER
I see patient has appointment scheduled today with Eduardo Fraga and pain consultation in 2 days.    15 days of refills sent.

## 2020-05-13 NOTE — PROGRESS NOTES
"Betsey Vanessa is a 60 year old female who is being evaluated via a billable telephone visit.      The patient has been notified of following:     \"This telephone visit will be conducted via a call between you and your physician/provider. We have found that certain health care needs can be provided without the need for a physical exam.  This service lets us provide the care you need with a short phone conversation.  If a prescription is necessary we can send it directly to your pharmacy.  If lab work is needed we can place an order for that and you can then stop by our lab to have the test done at a later time.    Telephone visits are billed at different rates depending on your insurance coverage. During this emergency period, for some insurers they may be billed the same as an in-person visit.  Please reach out to your insurance provider with any questions.    If during the course of the call the physician/provider feels a telephone visit is not appropriate, you will not be charged for this service.\"    Patient has given verbal consent for Telephone visit?  Yes    What phone number would you like to be contacted at? [phone]    How would you like to obtain your AVS? MyChart    Subjective     Betsey Vanessa is a 60 year old female who presents to clinic today for the following health issues:    HPI   Patient takes fentanyl for back, neck, ear pain, RA.  This is been working well, however she was recently approved for the medical cannabis program and would like to begin this.  She is unsure whether Dr. Ken is able to prescribed this medication.  She needs refills of her fentanyl patches, Norco, docusate, Dyazide and potassium replacement.  She was recently switched to Orencia infusions and she has been tolerating this well. No side effects.  No other concerns.    Medication Followup of Potassium, Dyazide, DOK, stool softner    Taking Medication as prescribed: yes    Side Effects:  None    Medication Helping " Symptoms:  yes     Patient Active Problem List   Diagnosis     Type 2 diabetes mellitus with hyperglycemia, without long-term current use of insulin (H)     Hypertension goal BP (blood pressure) < 140/90     Meniere's disease, unspecified laterality     Rheumatoid arthritis, involving unspecified site, unspecified rheumatoid factor presence (H)     Valvular heart disease     Fibromyalgia     Mild major depression (H)     Osteopenia of multiple sites     History of bisphosphonate therapy     Post-menopausal     Benzodiazepine dependence, episodic (H)     Continuous opioid dependence (H)     Mitral stenosis     Age-related osteoporosis without current pathological fracture     Mixed conductive and sensorineural hearing loss of right ear     Polyarticular arthritis     Past Surgical History:   Procedure Laterality Date     ------------OTHER-------------      D&C     HYSTERECTOMY       INNER EAR SURGERY       RELEASE CARPAL TUNNEL BILATERAL Bilateral        Social History     Tobacco Use     Smoking status: Former Smoker     Start date: 1978     Last attempt to quit: 1998     Years since quittin.3     Smokeless tobacco: Never Used     Tobacco comment: 1 pack a day    Substance Use Topics     Alcohol use: No     Family History   Problem Relation Age of Onset     C.A.D. Mother      Alzheimer Disease Mother      Cardiovascular Mother      Eye Disorder Mother      Thyroid Disease Mother      Hypertension Other         grandmother     Cerebrovascular Disease Father      Alcohol/Drug Father      Depression Father      Obesity Father         aunt     Asthma Father      Alcohol/Drug Brother      Alcohol/Drug Sister         aunt     Allergies Other         All family members     Arthritis Other         aunt     Obesity Sister      Thyroid Disease Sister      Asthma Sister         daughter         Current Outpatient Medications   Medication Sig Dispense Refill     celecoxib (CELEBREX) 200 MG capsule Take 1  capsule (200 mg) by mouth 2 times daily as needed for moderate pain 2 x a day 180 capsule 1     cevimeline (EVOXAC) 30 MG capsule Take 1 capsule (30 mg) by mouth 3 times daily 270 capsule 3     clonazePAM (KLONOPIN) 0.5 MG tablet Take 1 tablet (0.5 mg) by mouth 2 times daily as needed for anxiety OK to take 1-2 tabs at night as needed for sleep.  Should last 30 days. 100 tablet 1     docusate sodium (COLACE) 100 MG capsule Take 1 capsule (100 mg) by mouth 2 times daily 180 capsule 3     EPINEPHrine 0.3 MG/0.3ML SOLN PRN for Anaphylaxis       escitalopram (LEXAPRO) 20 MG tablet Take 1 tablet (20 mg) by mouth daily 90 tablet 1     fentaNYL (DURAGESIC) 25 mcg/hr 72 hr patch Place 1 patch onto the skin every 72 hours 5 patch 0     glucose blood VI test strips strip daily       HYDROcodone-acetaminophen (NORCO)  MG per tablet Take 0.5-1 tablets by mouth 3 times daily as needed for severe pain 45 tablet 0     lifitegrast (XIIDRA) 5 % opthalmic solution Place 1 drop into both eyes 2 times daily 24 each 3     meclizine (ANTIVERT) 25 MG tablet Take 1 tablet (25 mg) by mouth 3 times daily as needed for dizziness 60 tablet 3     metFORMIN (GLUCOPHAGE) 500 MG tablet Take 500 mg by mouth 2 times daily (with meals) 1000 mg 2 x a day       metroNIDAZOLE (METROGEL) 0.75 % gel Apply to face daily       naloxone (NARCAN) 4 MG/0.1ML nasal spray Spray 1 spray (4 mg) into one nostril alternating nostrils once as needed for opioid reversal every 2-3 minutes until assistance arrives 0.2 mL 1     nystatin (MYCOSTATIN) [id number] UNIT/GM POWD 2 x daily PRN for rash       omeprazole (PRILOSEC) 20 MG capsule Take by mouth daily 2 x daily       potassium chloride ER (KLOR-CON M) 10 MEQ CR tablet Take 1 tablet (10 mEq) by mouth 2 times daily 20 mEq daily Take by mouth 2 times daily 20 mEq daily 180 tablet 3     promethazine (PHENERGAN) 25 MG suppository Place 25 mg rectally every 6 hours as needed For nusea       promethazine (PHENERGAN) 25  MG tablet Take 1 tablet (25 mg) by mouth every 6 hours as needed for nausea or other (Meniere's) For nusea 90 tablet 1     psyllium (METAMUCIL) 58.6 % POWD Take by mouth daily PRN       triamterene-HCTZ (DYAZIDE) 37.5-25 MG capsule Take 1 capsule by mouth daily 1 capsule every morning1 capsule every morning 90 capsule 3             vitamin D3 (CHOLECALCIFEROL) 2000 units (50 mcg) tablet Take 1 tablet (2,000 Units) by mouth daily 90 tablet 3     Allergies   Allergen Reactions     Duloxetine Other (See Comments)     Topiramate Other (See Comments)     Other reaction(s): Confusion     Amitriptyline Embonate [Amitriptyline]      Hyper activity     Azithromycin Hives     Cymbalta      Hyper activity     Gabapentin Hives     Macrobid [Nitrofurantoin] Hives     Paxil [Paroxetine] Other (See Comments)     Hyper behavior     Penicillins Hives     Tetracycline Hives       Reviewed and updated as needed this visit by Provider  Tobacco  Allergies  Meds  Problems  Med Hx  Surg Hx  Fam Hx         Review of Systems   Constitutional, HEENT, cardiovascular, pulmonary, gi and gu systems are negative, except as otherwise noted.       Objective   Reported vitals:  There were no vitals taken for this visit.   healthy, alert and no distress  PSYCH: Alert and oriented times 3; coherent speech, normal   rate and volume, able to articulate logical thoughts, able   to abstract reason, no tangential thoughts, no hallucinations   or delusions  Her affect is normal and pleasant  RESP: No cough, no audible wheezing, able to talk in full sentences  Remainder of exam unable to be completed due to telephone visits    Diagnostic Test Results:  Labs reviewed in Epic        Assessment/Plan:  1. Continuous opioid dependence (H)  We will refill her Norco for the rest of the month and give her 1 month supply of fentanyl patches.  I did access the [place] prescriber database which shows no evidence of misuse.  Previously, Norco was prescribed by  her primary provider in [place], [place].  I gave her the telephone number for the [facility] medical marijuana office to discuss next steps for her given her recent approval for this therapy.  In the meantime, she would prefer to continue chronic opiate treatment.    - HYDROcodone-acetaminophen (NORCO)  MG per tablet; Take 0.5-1 tablets by mouth 3 times daily as needed for severe pain  Dispense: 45 tablet; Refill: 0  - fentaNYL (DURAGESIC) 25 mcg/hr 72 hr patch; Place 1 patch onto the skin every 72 hours  Dispense: 5 patch; Refill: 0  - docusate sodium (COLACE) 100 MG capsule; Take 1 capsule (100 mg) by mouth 2 times daily  Dispense: 180 capsule; Refill: 3  - naloxone (NARCAN) 4 MG/0.1ML nasal spray; Spray 1 spray (4 mg) into one nostril alternating nostrils once as needed for opioid reversal every 2-3 minutes until assistance arrives  Dispense: 0.2 mL; Refill: 1    2. Mild major depression (H)  She is doing well on 20mg escitalopram daily and does not need refills of this medication currently.  She would like to get this filled by her primary here in the future.    3. Hypertension goal BP (blood pressure) < 140/90  No side effects from the medication or symptoms of hypotension.  We will continue.    - triamterene-HCTZ (DYAZIDE) 37.5-25 MG capsule; Take 1 capsule by mouth daily 1 capsule every morning1 capsule every morning  Dispense: 90 capsule; Refill: 3  - potassium chloride ER (KLOR-CON M) 10 MEQ CR tablet; Take 1 tablet (10 mEq) by mouth 2 times daily 20 mEq daily Take by mouth 2 times daily 20 mEq daily  Dispense: 180 tablet; Refill: 3    4. Encounter for medication refill  - triamterene-HCTZ (DYAZIDE) 37.5-25 MG capsule; Take 1 capsule by mouth daily 1 capsule every morning1 capsule every morning  Dispense: 90 capsule; Refill: 3  - potassium chloride ER (KLOR-CON M) 10 MEQ CR tablet; Take 1 tablet (10 mEq) by mouth 2 times daily 20 mEq daily Take by mouth 2 times daily 20  mEq daily  Dispense: 180 tablet; Refill: 3  - docusate sodium (COLACE) 100 MG capsule; Take 1 capsule (100 mg) by mouth 2 times daily  Dispense: 180 capsule; Refill: 3  - naloxone (NARCAN) 4 MG/0.1ML nasal spray; Spray 1 spray (4 mg) into one nostril alternating nostrils once as needed for opioid reversal every 2-3 minutes until assistance arrives  Dispense: 0.2 mL; Refill: 1    Follow-up with Dr. Ken as needed to discuss further medication refills and transition to medical marijuana.    Return if symptoms worsen or fail to improve.      Phone call duration:  23 minutes    GIULIANO [place]

## 2020-05-13 NOTE — PATIENT INSTRUCTIONS
Yohan Leggett,    Thank you for allowing [facility] to manage your care.    I sent your prescriptions to your pharmacy, including Suboxone nasal spray which we did not discuss.  This medication is for rescue use if you feel you have had an overdose of your narcotics.  You or a family member/friend can use this in an emergency situation.  If you have to use this medication, please call 911 or go to the emergency department for evaluation.    Please allow 1-2 business days for our office to call you in regards to your laboratory/radiological studies.  If not done so, I encourage you to login into [employer] ([email or web address] to review your results as well.     If you have any questions or concerns, please feel free to call us at [phone]    Sincerely,    Eduardo Fraga PA-C    Did you know?  You can schedule an e-Visit for certain simple non-emergent issue for your convenience.  To learn more about or start an eVisit, simply login to [employer], click  Visits  on top banner, click  Start a Virtual Visit  drop down, and click  Symptom-Specific E-Visit       Patient Education     Understanding the Risks and Side Effects of Opioid Medicines  When opioids are taken as prescribed, they are usually safe and can help manage pain effectively. But they do come with risks and side effects that are important to understand. Of the risks that can occur with opioid treatment, opioid overdose is the most serious. Overdose means taking a too high dose. For this reason, it is critical that you and your loved ones understand the signs and symptoms of an opioid overdose and what to do if it occurs.   Risks of opioid medicines  If you take opioids regularly for a long time, there is a risk of forming a tolerance or dependence to the medicines. There is also the risk of forming an addiction. But this is much less common when opioids are taken as directed under the care of a healthcare provider. Understanding  the differences between tolerance, dependence, and addiction is important. This helps you know what to expect when taking opioids and know what to do if you think you may be addicted.     Tolerance means that your body needs higher doses than before to get the same pain relief effects. Most people who take opioids for longer than a few weeks will form a tolerance. This is normal. Your healthcare provider will work with you to manage tolerance and ensure that your pain is still controlled.    Dependence means your body will have withdrawal symptoms if you reduce or stop taking the medicine. These symptoms can include sleeplessness, rapid heartbeat, rapid breathing, and diarrhea. Forming a dependence is common for people taking opioids regularly for a long time. When it is time to stop taking the medicine, your healthcare provider will work closely with you to taper the medicine to lessen withdrawal symptoms. You should never stop taking or reduce the amount of medicine you are used to taking without talking to your healthcare provider. Note: Dependence is not the same thing as addiction.    Addiction occurs when a person has the urge to seek out the medicine and can't stop using it despite the harm and negative effects it might cause. Some people, such as those who have a history of drug misuse, are at higher risk for addiction. Your healthcare provider will follow up with you regularly and also monitor you for signs of addiction. If you think you are forming an addiction to your medicine, call your healthcare provider right away.  What is opioid-use disorder?  Opioid-use disorder is a risk of taking opioid medicines. It may be diagnosed if a person shows a pattern of taking opioids despite negative consequences such as:    The opioid interferes with life, family or work obligations (this includes avoiding situations because of opioid use)    The opioid causes physical or psychological problems    Continued and  increased amount of time spent attempting to obtain, use and recover from opioid use    Unsuccessful attempts to cut down or stop opioid use    Using a higher amount of opioid than prescribed or using it in unsafe situations (such as driving)    Unmanaged signs and symptoms of tolerance or withdrawal  If you or your family suspect opioid-use disorder, contact your healthcare provider right away. They can help you assess the problem and provide treatment if needed.   Risk for overdose  Opioids affect the part of the brain that controls breathing. An overdose of opioids can slow breathing down too much and even stop a person s breathing. This can be fatal. Call 911 right away if an overdose is suspected in any person.   Three key signs and symptoms of opioid overdose are:    Narrowing of dark circles in the middle of eyes (pinpoint pupils)    Slowed or stopped breathing    Unconsciousness (this is when a person passes out and does not respond)  Other signs and symptoms to look for include:    Limp body    Pale face    Clammy skin    Purple or blue color of the lips and fingernails    Vomiting   Your healthcare provider may prescribe a medicine called naloxone in case of opioid overdose. When given within a certain period of time after an overdose, naloxone can help reverse the life-threatening effects of the opioid. Emergency care will still be needed.  Side effects of opioid medicines  Some side effects are common when taking opioids. These include constipation, nausea, sleepiness, impaired motor skills, and problems emptying the bladder (urinary retention). Opioid medicines can also cause problems with memory, thinking, and judgment, especially in older adults.   If you have any of these side effects, talk with your healthcare provider or pharmacist. They can provide advice for managing them. This might include:    Reducing the dose of your opioid medicine (never do this without talking with your healthcare  provider)    Trying a different type or brand of opioid medicine    Adding a drug to treat the side effect   In some cases, your healthcare provider may take measures to help prevent side effects that are likely to occur. For instance, to help prevent constipation, your healthcare provider may prescribe a laxative or stool softener at the same time you start opioid treatment.   More serious or longer-lasting side effects can occur when you don t take opioids exactly as directed. Misusing opioids can lead to liver and brain damage. To avoid these side effects:    Never take more opioids than prescribed by your healthcare provider.    Never combine opioids with non-prescribed medicines.    Never use street drugs or drink alcohol while taking opioids.    Don't take opioids in combination with benzodiazepines. Serious risks are associated with combining opioids with benzodiazepines. These risks include extreme sleepiness, slowed breathing, and death. Let your healthcare provider know if you are taking benzodiazepines.  When to call your healthcare provider  You will be carefully monitored during treatment with opioid medicines. But you should call your healthcare provider right away if you have any of these symptoms:    New pain, pain that gets worse, or pain that doesn t get better even after you take your medicine    Side effects, such as constipation or nausea, that keep you from daily activities    Extreme sleepiness    Breathing problems   Date Last Reviewed: 8/1/2017 2000-2019 [employer]. [address] [place] [address], [place] [address]. All rights reserved. This information is not intended as a substitute for professional medical care. Always follow your healthcare professional's instructions.

## 2020-05-13 NOTE — TELEPHONE ENCOUNTER
Per pharmacy:Please confirm diagnosis code or any other diagnosis associated with the use of opioids. Thanks!

## 2020-05-15 ENCOUNTER — VIRTUAL VISIT (OUTPATIENT)
Dept: PALLIATIVE MEDICINE | Facility: CLINIC | Age: 60
End: 2020-05-15
Payer: COMMERCIAL

## 2020-05-15 ENCOUNTER — TELEPHONE (OUTPATIENT)
Dept: PALLIATIVE MEDICINE | Facility: CLINIC | Age: 60
End: 2020-05-15

## 2020-05-15 DIAGNOSIS — Z51.81 ENCOUNTER FOR MONITORING OPIOID MAINTENANCE THERAPY: ICD-10-CM

## 2020-05-15 DIAGNOSIS — F13.20 BENZODIAZEPINE DEPENDENCE, EPISODIC (H): ICD-10-CM

## 2020-05-15 DIAGNOSIS — M47.819 FACET ARTHROPATHY: ICD-10-CM

## 2020-05-15 DIAGNOSIS — M54.2 CERVICALGIA: ICD-10-CM

## 2020-05-15 DIAGNOSIS — F11.20 CONTINUOUS OPIOID DEPENDENCE (H): ICD-10-CM

## 2020-05-15 DIAGNOSIS — M06.09 RHEUMATOID ARTHRITIS OF MULTIPLE SITES WITHOUT RHEUMATOID FACTOR (H): ICD-10-CM

## 2020-05-15 DIAGNOSIS — Z79.891 ENCOUNTER FOR MONITORING OPIOID MAINTENANCE THERAPY: ICD-10-CM

## 2020-05-15 DIAGNOSIS — M79.7 FIBROMYALGIA: Primary | ICD-10-CM

## 2020-05-15 PROCEDURE — 99214 OFFICE O/P EST MOD 30 MIN: CPT | Mod: 95 | Performed by: PSYCHIATRY & NEUROLOGY

## 2020-05-15 ASSESSMENT — PAIN SCALES - GENERAL: PAINLEVEL: MILD PAIN (3)

## 2020-05-15 NOTE — PROGRESS NOTES
The patient has been notified of following:     This telephone visit will be conducted via a call between you and your provider. We have found that certain health care needs can be provided without the need for a physical exam.  This service lets us provide the care you need with a phone conversation.  If a prescription is necessary we can send it directly to your pharmacy.  If lab work is needed we can place an order for that and you can then stop by our lab to have the test done at a later time. This is a billable service but we do not know the cost at this time.     Patient has given verbal consent for Telephone visit?  Yes    Hilary Barr MA                          [facility] Consultation    Betsey Vanessa is a 60 year old female who is being evaluated via a billable telephone visit.      Date of visit: 5/15/2020    Reason for consultation:    Betsey Vanessa is a 60 year old female who is seen in consultation today at the request of her provider, Dr. Ibanez    Primary Care Provider is Pb Ken.  Pain medications are being prescribed by Dr. Ken and Dr. Fraga.    Please see the [facility] health questionnaire which the patient completed and reviewed with me in detail.    Chief Complaint:    Chief Complaint   Patient presents with     Pain     Telephone visit due to COVID-19        Pain history:  Betsey Vanessa is a 60 year old female who has rheumatoid arthritis, diagnosed many years ago in [place], and now under the care of Dr. Carrillo at [facility]. He has pain in her neck, hands.  Her hand pain is the worst.  She gets swelling in her hands and wrists.  She also has hip pain, knee and foot pain.  She has been to a hand therapist after CTS.    Her pain can be quite debiliating. It makes it hard to walk and can make it hard to sleep.  She feels exhausted from her pain, and it make her feel overwhelmed.    She has been on fentanyl and Norco,  and they do help, but they do not completely cover her pain.  She was started by her rheumatologist- perhaps 8-10 years ago.    She has chronic neck pain. She feels it in the back of her neck, and it can radiate to her jaw and face.  It is on both sides, but worse on the right side.  It is hard to move her head.  She also gets dizzy and nausea.      She will get headaches, which are new in the last year.  She will feel tunnel vision, she has photophobia.  The headaches can be both sides of the head, and are focused around the eyes and forehead.  She used to have migraines in the past, and that improved with hystectomy.  No environmental changes.  She has had 3 headaches this week.  She will use promethazine. She will also take Norco. If not treated, she can throw up.    Pain rating: intensity 3/10 on a 0-10 scale.    Current pain medications include:  Celebrex 200mg BID  Fentanyl 25mcg/hr- filled 5/13  Norco 10/325- #45 tabs/30 days- filled 5/13- 1-3/day- helpful  Medical cannabis approved, not yet started  salanpas patch- over the counter  Emu oil    Clonazepam 0.5mg tabs- currently on taper with PCP- using 2-3/day  Narcan ordered    Previous medication treatments included:  Gabapentin - fast heartbeat, sweating  Cymbalta- hyperactivity  topamax- confusion  Amitriptyline- hyperactivity  Tramadol  darvocet      Other treatments have included:  Betsey Vanessa has been seen at a pain clinic in the past.    PT: hand therapy.- last about 3 years ago.  Acupuncture: no  TENs Unit: no  Injections:   Hand steroid injections-   Neck injections    Past Medical History:  Past Medical History:   Diagnosis Date     Anemia     r/t menses     Anxiety      Arthritis      Constipation      Depression      Diabetes (H)      Diarrhea      Double vision      Headache(784.0)      Hearing loss      Heart murmur      Heartburn      Hypertension      Indigestion      Nasal congestion      Night sweats      Numbness      Problems related to  lack of adequate sleep      Rash      Sneezing      Tinnitus      Visual loss      Weakness      Weight gain     because of Prednisone     Patient Active Problem List    Diagnosis Date Noted     Benzodiazepine dependence, episodic (H) 02/19/2020     Priority: Medium     Continuous opioid dependence (H) 02/19/2020     Priority: Medium     Osteopenia of multiple sites 02/04/2020     Priority: Medium     History of bisphosphonate therapy 02/04/2020     Priority: Medium     Post-menopausal 02/04/2020     Priority: Medium     Type 2 diabetes mellitus with hyperglycemia, without long-term current use of insulin (H) 01/12/2020     Priority: Medium     Hypertension goal BP (blood pressure) < 140/90 01/12/2020     Priority: Medium     Meniere's disease, unspecified laterality 01/12/2020     Priority: Medium     Rheumatoid arthritis, involving unspecified site, unspecified rheumatoid factor presence (H) 01/12/2020     Priority: Medium     Valvular heart disease 01/12/2020     Priority: Medium     Mild major depression (H) 01/12/2020     Priority: Medium     Age-related osteoporosis without current pathological fracture 07/28/2019     Priority: Medium     Mixed conductive and sensorineural hearing loss of right ear 03/05/2015     Priority: Medium     Mitral stenosis 04/14/2014     Priority: Medium     Fibromyalgia 10/28/2011     Priority: Medium     Polyarticular arthritis 10/28/2011     Priority: Medium     Encounter for long-term use of opiate analgesic 10/28/2011     Priority: Medium     Chronic pain 10/28/2011     Priority: Medium       Past Surgical History:  Past Surgical History:   Procedure Laterality Date     ------------OTHER-------------      D&C     HYSTERECTOMY       INNER EAR SURGERY       RELEASE CARPAL TUNNEL BILATERAL Bilateral 2008     Medications:  Current Outpatient Medications   Medication Sig Dispense Refill     celecoxib (CELEBREX) 200 MG capsule Take 1 capsule (200 mg) by mouth 2 times daily as needed  for moderate pain 2 x a day 180 capsule 1     cevimeline (EVOXAC) 30 MG capsule Take 1 capsule (30 mg) by mouth 3 times daily 270 capsule 3     clonazePAM (KLONOPIN) 0.5 MG tablet Take 1 tablet (0.5 mg) by mouth 2 times daily as needed for anxiety OK to take 1-2 tabs at night as needed for sleep.  Should last 30 days. 100 tablet 1     diclofenac (VOLTAREN) 1 % topical gel Place 2-4 g onto the skin 4 times daily . Max of 8g per dose. No more than 32g/day 100 g 3     docusate sodium (COLACE) 100 MG capsule Take 1 capsule (100 mg) by mouth 2 times daily 180 capsule 3     EPINEPHrine 0.3 MG/0.3ML SOLN PRN for Anaphylaxis       escitalopram (LEXAPRO) 20 MG tablet Take 1 tablet (20 mg) by mouth daily 90 tablet 1     glucose blood VI test strips strip daily       HYDROcodone-acetaminophen (NORCO)  MG per tablet Take 0.5-1 tablets by mouth 3 times daily as needed for severe pain 45 tablet 0     lifitegrast (XIIDRA) 5 % opthalmic solution Place 1 drop into both eyes 2 times daily 24 each 3     meclizine (ANTIVERT) 25 MG tablet Take 1 tablet (25 mg) by mouth 3 times daily as needed for dizziness 60 tablet 3     metFORMIN (GLUCOPHAGE) 500 MG tablet Take 500 mg by mouth 2 times daily (with meals) 1000 mg 2 x a day       metroNIDAZOLE (METROGEL) 0.75 % gel Apply to face daily       naloxone (NARCAN) 4 MG/0.1ML nasal spray Spray 1 spray (4 mg) into one nostril alternating nostrils once as needed for opioid reversal every 2-3 minutes until assistance arrives 0.2 mL 1     nystatin (MYCOSTATIN) 654052 UNIT/GM POWD 2 x daily PRN for rash       omeprazole (PRILOSEC) 20 MG capsule Take by mouth daily 2 x daily       potassium chloride ER (KLOR-CON M) 10 MEQ CR tablet Take 1 tablet (10 mEq) by mouth 2 times daily 20 mEq daily Take by mouth 2 times daily 20 mEq daily 180 tablet 3     promethazine (PHENERGAN) 25 MG suppository Place 25 mg rectally every 6 hours as needed For nusea       promethazine (PHENERGAN) 25 MG tablet Take 1  tablet (25 mg) by mouth every 6 hours as needed for nausea or other (Meniere's) For nusea 90 tablet 1     psyllium (METAMUCIL) 58.6 % POWD Take by mouth daily PRN       triamterene-HCTZ (DYAZIDE) 37.5-25 MG capsule Take 1 capsule by mouth daily 1 capsule every morning1 capsule every morning 90 capsule 3     vitamin D3 (CHOLECALCIFEROL) 2000 units (50 mcg) tablet Take 1 tablet (2,000 Units) by mouth daily 90 tablet 3     acetic acid-hydrocortisone (VOSOL-HC) 1-2 % otic solution Place 5 drops into the right ear daily 10 mL 0     fentaNYL (DURAGESIC) 25 mcg/hr 72 hr patch Place 1 patch onto the skin every 72 hours 5 patch 0     gentamicin (GARAMYCIN) 0.3 % ophthalmic solution Place 3 drops Into the left ear 3 times daily for 21 days 15 mL 1     Allergies:     Allergies   Allergen Reactions     Duloxetine Other (See Comments)     Topiramate Other (See Comments)     Other reaction(s): Confusion     Amitriptyline Embonate [Amitriptyline]      Hyper activity     Azithromycin Hives     Cymbalta      Hyper activity     Gabapentin Hives     Macrobid [Nitrofurantoin] Hives     Paxil [Paroxetine] Other (See Comments)     Hyper behavior     Penicillins Hives     Tetracycline Hives     Social History:  Home situation: lives with daughter and family  Occupation/Schooling: on disability- given for [employer]  Tobacco use: former- quit in 1998  Alcohol use: no  Drug use: no  History of chemical dependency treatment: no    Family history:  Family History   Problem Relation Age of Onset     C.A.D. Mother      Alzheimer Disease Mother      Cardiovascular Mother      Eye Disorder Mother      Thyroid Disease Mother      Hypertension Other         grandmother     Cerebrovascular Disease Father      Alcohol/Drug Father      Depression Father      Obesity Father         aunt     Asthma Father      Alcohol/Drug Brother      Alcohol/Drug Sister         aunt     Allergies Other         All family members     Arthritis Other         aunt      Obesity Sister      Thyroid Disease Sister      Asthma Sister         daughter       Review of Systems:    POSTIVE IN BOLD- none noted  GENERAL: fever/chills, fatigue, general unwell feeling, weight gain/loss.  HEAD/EYES:  headache, dizziness, or vision changes.    EARS/NOSE/THROAT:  Nosebleeds, hearing loss, sinus infection, earache, tinnitus.  IMMUNE:  Allergies, cancer, immune deficiency, or infections.  SKIN:  Urticaria, rash, hives  HEME/Lymphatic:   anemia, easy bruising, easy bleeding.  RESPIRATORY:  cough, wheezing, or shortness of breath  CARDIOVASCULAR/Circulation:  Extremity edema, syncope, hypertension, tachycardia, or angina.  GASTROINTESTINAL:  abdominal pain, nausea/emesis, diarrhea, constipation,  hematochezia, or melena.  ENDOCRINE:  Diabetes, steroid use,  thyroid disease or osteoporosis.  MUSCULOSKELETAL: neck pain, back pain, arthralgia, arthritis, or gout.  GENITOURINARY:  frequency, urgency, dysuria, difficulty voiding, hematuria or incontinence.  NEUROLOGIC:  weakness, numbness, paresthesias, seizure, tremor, stroke or memory loss.  PSYCHIATRIC:  depression, anxiety, stress, suicidal thoughts or mood swings.       Diagnostic tests:  No MRI imaging of neck here  Cervical spine xray - 1/14/20  IMPRESSION: There is some anterior bridging syndesmophytes at multiple levels with relative preservation disc spaces. Facet joints appear normal. Flexion-extension views do not show any evidence for any instability. There is no evidence for fracture. No imaging changes to suggest rheumatoid arthritis are present. There is no atlantoaxial  subluxation. Incidental note is made that the patient is edentulous.    MRI brain- 1/29/20  1. No abnormal signal or enhancement along the course of the seventh or eighth cranial nerves. Normal signal involving the vestibular and auditory structures.  2. Tiny questionable meningioma along the undersurface of the left lateral tentorium.  3. Scattered nonspecific T2  hyperintense foci within the  periventricular and subcortical white matter and mild generalized cerebral atrophy        [place] Prescription Monitoring Program reviewed 5-15    Outside records reviewed- Care everywhere.  Need to get signature for getting further records.        Screening tools:  DIRE Score for ongoing opioid management is calculated as follows:    Diagnosis = 2    Intractability = 2    Risk: Psych =2   Chem Hlth = 3  Reliability = 3  Social = 2    Efficacy = 2    Total DIRE Score = 16 (14 or higher predicts good candidate for ongoing opioid management; 13 or lower predicts poor candidate for opioid management)         Assessment:  1. Rheumatoid arthritis  2. fibromyalgia  3. Sjogren's Syndrome  4. Meniere's disease with intermittent vertigo and hearing loss  5. Type 2 diabetes    Plan:  Diagnosis reviewed, treatment option addressed, and risk/benefits discussed.  Self-care instructions given.  I am recommending a multidisciplinary treatment plan to help this patient better manage her pain.      1. Physical Therapy: pain PT  2. Pain Psychologist to address issues of relaxation, behavioral change, coping style, and other factors important to improvement: pain psychology  3. Diagnostic Studies: MRI cervical spine  4. Urine toxicology screen: UDS  5. Medication Management:   1. voltaren gel  2. Medication options need to be done with care due to concerns of side effects making her dizziness/vertigo worse  6. Further procedures recommended: not at this time  7. Other treatments: n/a  8. Recommendations/follow-up for PCP:  None.  Will need to discuss prescribing with PCP.  9. Release of information: none  10. Follow up: follow up in 1 month    Phone call duration: 64 minutes       Bailee Houser MD

## 2020-05-15 NOTE — TELEPHONE ENCOUNTER
THERESA to schedule new PT and PhD kristyn.    Dee CONNORS    [facility]

## 2020-05-15 NOTE — Clinical Note
"I had issues closing her chart and \"incomplete status\".  I think I have it managed.  We should talk about prescribing and opioids with her.  "

## 2020-05-18 ENCOUNTER — VIRTUAL VISIT (OUTPATIENT)
Dept: RHEUMATOLOGY | Facility: CLINIC | Age: 60
End: 2020-05-18
Payer: COMMERCIAL

## 2020-05-18 DIAGNOSIS — M06.9 RHEUMATOID ARTHRITIS, INVOLVING UNSPECIFIED SITE, UNSPECIFIED RHEUMATOID FACTOR PRESENCE: Primary | ICD-10-CM

## 2020-05-18 PROCEDURE — 99207 ZZC NO BILLABLE SERVICE THIS VISIT: CPT | Performed by: INTERNAL MEDICINE

## 2020-05-18 NOTE — PROGRESS NOTES
Rheumatology Telephone Note:    I called and spoke with Ms. Vanessa.  She reports that orencia may be helping already and she has only had one dose so far.  No other questions or issues that she had to discuss.  We were both in agreement that there are no new issues right now and that this appointment was accidentally not cancelled at her April follow-up appointment; she has an upcoming appointment in August.  Therefore, this visit is just a telephone call, not a telephone encounter.      All questions were answered and she thanked me for the call.     [id number]; no charge    Terrence Carrillo MD  5/18/2020 2:18 PM

## 2020-05-21 ENCOUNTER — INFUSION THERAPY VISIT (OUTPATIENT)
Dept: INFUSION THERAPY | Facility: CLINIC | Age: 60
End: 2020-05-21
Payer: COMMERCIAL

## 2020-05-21 VITALS
TEMPERATURE: 98.1 F | RESPIRATION RATE: 18 BRPM | HEART RATE: 79 BPM | DIASTOLIC BLOOD PRESSURE: 69 MMHG | WEIGHT: 161.8 LBS | OXYGEN SATURATION: 97 % | BODY MASS INDEX: 30.08 KG/M2 | SYSTOLIC BLOOD PRESSURE: 137 MMHG

## 2020-05-21 DIAGNOSIS — M06.9 RHEUMATOID ARTHRITIS, INVOLVING UNSPECIFIED SITE, UNSPECIFIED RHEUMATOID FACTOR PRESENCE: Primary | ICD-10-CM

## 2020-05-21 PROCEDURE — 99207 ZZC NO CHARGE NURSE ONLY: CPT

## 2020-05-21 PROCEDURE — 96365 THER/PROPH/DIAG IV INF INIT: CPT | Performed by: NURSE PRACTITIONER

## 2020-05-21 RX ORDER — HEPARIN SODIUM (PORCINE) LOCK FLUSH IV SOLN 100 UNIT/ML 100 UNIT/ML
5 SOLUTION INTRAVENOUS
Status: CANCELLED | OUTPATIENT
Start: 2020-06-04

## 2020-05-21 RX ORDER — HEPARIN SODIUM,PORCINE 10 UNIT/ML
5 VIAL (ML) INTRAVENOUS
Status: CANCELLED | OUTPATIENT
Start: 2020-06-04

## 2020-05-21 RX ADMIN — Medication 250 ML: at 08:44

## 2020-05-21 ASSESSMENT — PAIN SCALES - GENERAL: PAINLEVEL: SEVERE PAIN (6)

## 2020-05-21 NOTE — PROGRESS NOTES
Infusion Nursing Note:  Betsey Vanessa presents today for wk 2 orencia.    Patient seen by provider today: No   present during visit today: Not Applicable.      Intravenous Access:  Peripheral IV placed.    Treatment Conditions:  Biological Infusion Checklist:  ~~~ NOTE: If the patient answers yes to any of the questions below, hold the infusion and contact ordering provider or on-call provider.    1. Have you recently had an elevated temperature, fever, chills, productive cough, coughing for 3 weeks or longer or hemoptysis, abnormal vital signs, night sweats,  chest pain or have you noticed a decrease in your appetite, unexplained weight loss or fatigue? No  2. Do you have any open wounds or new incisions? No  3. Do you have any recent or upcoming hospitalizations, surgeries or dental procedures? No  4. Do you currently have or recently have had any signs of illness or infection or are you on any antibiotics? No  5. Have you had any new, sudden or worsening abdominal pain? No  6. Have you or anyone in your household received a live vaccination in the past 4 weeks? Please note:  No live vaccines while on biologic/chemotherapy until 6 months after the last treatment.  Patient can receive the flu vaccine (shot only) and the pneumovax.  It is optimal for the patient to get these vaccines mid cycle, but they can be given at any time as long as it is not on the day of the infusion. No  7. Have you recently been diagnosed with any new nervous system diseases (ie. Multiple sclerosis, Guillain [place], seizures, neurological changes) or cancer diagnosis? No  8. Are you on any form of radiation or chemotherapy? No  9. Are you pregnant or breast feeding or do you have plans of pregnancy in the future? No  10. Have you been having any signs of worsening depression or suicidal ideations?  (benlysta only) No  11. Have there been any other new onset medical symptoms? No        Post Infusion Assessment:  Patient tolerated  infusion without incident.  Blood return noted pre and post infusion.  Site patent and intact, free from redness, edema or discomfort.  No evidence of extravasations.  Access discontinued per protocol.       Discharge Plan:   Copy of AVS reviewed with patient and/or family.  Patient will return wk 4 orencia on 6/4/20.  Patient discharged in stable condition accompanied by: self.  Departure Mode: Ambulatory and using her cane.    Trinidad Hidalgo RN

## 2020-05-26 ENCOUNTER — TELEPHONE (OUTPATIENT)
Dept: PALLIATIVE MEDICINE | Facility: CLINIC | Age: 60
End: 2020-05-26

## 2020-05-26 NOTE — TELEPHONE ENCOUNTER
Reason for call:  Order   Order or referral being requested: MRI for cervical spine  Reason for request: Discussed at previous appointment  Date needed: as soon as possible  Has the patient been seen by the PCP for this problem? YES    Additional comments: Pt would like a call back    Phone number to reach patient:  Home number on file [phone] (home)    Best Time:  anytime    Can we leave a detailed message on this number?  YES    Travel screening: Not Applicable     Dee CONNORS    [facility]

## 2020-05-27 NOTE — TELEPHONE ENCOUNTER
Order for the MRI of the cervical spin is signed. She has to have this at the [facility]. She is wondering if they are scheduling patients right now. Provided number for her to call radiology scheduling department for that question and she agreed.      SURY StaufferN, RN  Care Coordinator  [facility] [place]

## 2020-05-29 ENCOUNTER — VIRTUAL VISIT (OUTPATIENT)
Dept: PALLIATIVE MEDICINE | Facility: CLINIC | Age: 60
End: 2020-05-29
Payer: COMMERCIAL

## 2020-05-29 DIAGNOSIS — M47.819 FACET ARTHROPATHY: ICD-10-CM

## 2020-05-29 DIAGNOSIS — M79.7 FIBROMYALGIA: ICD-10-CM

## 2020-05-29 DIAGNOSIS — M54.2 CERVICALGIA: ICD-10-CM

## 2020-05-29 DIAGNOSIS — M06.09 RHEUMATOID ARTHRITIS OF MULTIPLE SITES WITHOUT RHEUMATOID FACTOR (H): Primary | ICD-10-CM

## 2020-05-29 PROCEDURE — 97162 PT EVAL MOD COMPLEX 30 MIN: CPT | Mod: GP | Performed by: PHYSICAL THERAPIST

## 2020-05-29 PROCEDURE — 97110 THERAPEUTIC EXERCISES: CPT | Mod: GP | Performed by: PHYSICAL THERAPIST

## 2020-05-29 NOTE — PROGRESS NOTES
"PHYSICAL THERAPY INITIAL EVALUATION and PLAN OF CARE    Patient Name: Betsey Vanessa     : 1960    MRN: [id number]   Pain Management Provider:  Shantal Houser MD    Diagnosis:    Rheumatoid arthritis of multiple sites without rheumatoid factor (H)  Fibromyalgia  Facet arthropathy  Cervicalgia    The patient has been notified of the following:  \"This virtual visit will be conducted between you and your provider.  We have found that certain health care needs can be provided without the need for physical presence.  This service lets us provide the care you need with a virtual visit.\"    Due to external, as well as internal [facility] management of the COVID 19 Virus patient was not seen in our clinic.  As a substitution, we implemented a virtual visit to manage this patient's condition utilizing the PerioSeal virtual visit platform.  The provider reviewed the patient's chart and spoke with the patient to determine the following telemedicine visit is appropriate and effective for the patient's care.    The following type of visit was completed:  Video Visit:  The PerioSeal platform uses a synchronous HIPAA compliant video stream for this encounter.  Patient has given verbal consent for video visit? Yes    SUBJECTIVE:    PRESENTATION AND ETIOLOGY    Chief Complaint: Multiple areas of pain including hands, neck and back due to RA    Moved to [place] last year; used to walk 3-5 miles daily about 3-4 yrs ago due to foot pain  Onset / Etiology: Diagnosed many years ago in [place]    Pattern Since Onset: worse    Frequency: Constant    Intensity: Average 6/10    Fatigue Level: (scale 0-10)  6/10 average    LEVEL OF FUNCTION AT START OF CARE  Walking tolerance: 10'  Housework tolerance: dishes, cooking 10'  Sleep: trouble falling asleep due to racing mind and pain  Current Aggrevating Activities / Functional Limitations: weather      CURRENT / PREVIOUS INTERVENTION(S):   Relieving Activities / Self Care: " medication, patches, heat, ice, topical gel  Previous / Current therapies for current chief complaint: PT: for balance and hand therapy;     DEMOGRAPHICS  Employment Status: disability due to [employer]    Social Support: lives in a home with daughter, son-in-law and grand children    Pertinent Medical  / Surgical History: Epic Snapshot Reviewed, See provider's note    Patient's goals for physical therapy: improve fitness level and reduce pain level and medication use    ===============================================================  ===============================================================  Today's Treatment:  Initial evaluation  Therapeutic Activity:   For 30 minutes including Pt educated on the concept of the nervous system as a hypersensitive and hyperactive alarm system and the role of physical therapy in desensitizing the nerves and reducing pain.  Pt educated on beginning concepts of Pacing and Energy Conservation.  Instructed to begin walking program 5' x 2 daily.  Focus next session: progress walking program, add breathing/relaxation, mini breaks/posture, pacing, sensory calming/pain flare plan  ===============================================================  ASSESSMENT:  Physical Therapy Diagnosis:Impaired Posture and Impaired Muscle Performance    Patient requires PT intervention for the following impairments: Limited knowledge of condition and / or self care - inability to control symptoms, Impaired functional mobility, Pain and Deconditioning    Anticipated Goals and Expected Outcomes:  8 weeks  Patient will report the use of 2 self care practices during their day.  Patient will report the participation in 20 minutes of aerobic activity daily and practicing stretching daily.  Patient will demonstrate the ability to find core strength in neutral posture.  Patient will demonstrate the ability to relax muscle group before stretching.  16 weeks  Patient will be independent with a home exercise  program.  Patient will be independent with posture correction.  Patient will report independence with a self care/flare management program.  Patient will demonstrate improved functional strength and endurance as reports by increased tolerance for IADLs and more consistent participation in daily activity.     Rehab potential for achieving goals: good.    ===============================================================  PLAN:   Patient will benefit from skilled physical therapy consisting of:  neuromuscular reeducation of: kinesthetic sense and posture for sitting and/or standing activities, education in self care / home management training to include instruction in: symptom control techniques, therapeutic activities to achieve improved functional performance in: daily actvities and therapeutic exercise to develop: strength and endurance, flexibility and core stability    Assessment will be ongoing with changes in treatment as indicated.  Benefits/risks/alternatives to treatment have been reviewed and the patient has been instructed to contact this office if they have any questions or concerns.  This plan of care has been discussed with the patient and the patient is in agreement.     Frequency / Duration:  Patient will be seen for a total of 4-6 visits; 16 weeks    Total Visit Time: 45  minutes            Renae Romero, PT                                  Date:  5/29/2020      =====================================================  **  Referring Provider Certification: Referring Provider reviewing certifies that the above treatment / plan of care is required and authorized, and that the patient's plan will be reviewed every thirty (30) days **.   ======================================================     PRESENT:  NA    MULTIDISCIPLINARY PATIENT / FAMILY EDUCATION RECORD  Department:  Physical Therapy    Readiness to Learn: Ability to understand verbal instructions, Ability to understand written  instructions, Knowledge of educational needs / treatment plan  Specific Barriers to Learning: None  Referrals: None  Learning Needs: Rehabilitation techniques to improve functional independence Pain management education to improve daily activity tolerance.  Who: Patient  How: Demonstration, Verbal instructions, Written instructions  Response: Appropriate verbal response, Asked questions, Demonstrated ability, Verbalized recall / understanding

## 2020-06-02 ENCOUNTER — TELEPHONE (OUTPATIENT)
Dept: INTERNAL MEDICINE | Facility: CLINIC | Age: 60
End: 2020-06-02

## 2020-06-02 DIAGNOSIS — F11.20 CONTINUOUS OPIOID DEPENDENCE (H): ICD-10-CM

## 2020-06-02 RX ORDER — FENTANYL 25 UG/1
1 PATCH TRANSDERMAL
Qty: 5 PATCH | Refills: 0 | Status: SHIPPED | OUTPATIENT
Start: 2020-06-02 | End: 2020-06-05

## 2020-06-02 NOTE — TELEPHONE ENCOUNTER
Routing refill request to provider for review/approval because:  Drug not on the [facility] refill protocol     Last Written Prescription Date:  5-13-20  Last Fill Quantity: 5,  # refills: 0   Last office visit: 2/19/2020 with prescribing provider:     Future Office Visit:   Next 5 appointments (look out 90 days)    Jun 05, 2020  2:30 PM CDT  Telephone Visit with Danisha Hayes  [facility] ([facility]) [address] [address] [address]  [phone]   Jul 08, 2020  5:30 PM CDT  SHORT with Pb Ken MD  [facility] [place] ([facility]) [address]  [place] [address]  [phone]   Aug 03, 2020 11:00 AM CDT  Return Visit with Terrence Carrillo MD  [facility] [place] ([facility]) [address]  [place] [address]  [phone]

## 2020-06-02 NOTE — TELEPHONE ENCOUNTER
"Spoke with patient to notify them of below information. Pt stating the appt Friday is with a pain psychologist and she is \"pretty sure\" they are not going to take over her medications. Advised patient to ask them at her appt on Friday and to let us know. Patient verbalized understanding and agrees with plan.     Pt asking for this refill until they can determine if they are going to take over her prescriptions.    Please advise.    Bailee De Oliveira, RN, BSN    "

## 2020-06-02 NOTE — TELEPHONE ENCOUNTER
Will defer until after pain consult,as I presume she may have them assume prescriptions and/or change medications

## 2020-06-04 ENCOUNTER — TELEPHONE (OUTPATIENT)
Dept: OTOLARYNGOLOGY | Facility: CLINIC | Age: 60
End: 2020-06-04

## 2020-06-04 ENCOUNTER — TELEPHONE (OUTPATIENT)
Dept: INTERNAL MEDICINE | Facility: CLINIC | Age: 60
End: 2020-06-04

## 2020-06-04 ENCOUNTER — INFUSION THERAPY VISIT (OUTPATIENT)
Dept: INFUSION THERAPY | Facility: CLINIC | Age: 60
End: 2020-06-04
Payer: COMMERCIAL

## 2020-06-04 ENCOUNTER — OFFICE VISIT (OUTPATIENT)
Dept: OTOLARYNGOLOGY | Facility: CLINIC | Age: 60
End: 2020-06-04
Payer: COMMERCIAL

## 2020-06-04 ENCOUNTER — TELEPHONE (OUTPATIENT)
Dept: PALLIATIVE MEDICINE | Facility: CLINIC | Age: 60
End: 2020-06-04

## 2020-06-04 VITALS
BODY MASS INDEX: 29.56 KG/M2 | RESPIRATION RATE: 16 BRPM | SYSTOLIC BLOOD PRESSURE: 137 MMHG | HEART RATE: 84 BPM | DIASTOLIC BLOOD PRESSURE: 63 MMHG | TEMPERATURE: 96.8 F | OXYGEN SATURATION: 96 % | WEIGHT: 159 LBS

## 2020-06-04 VITALS
HEART RATE: 78 BPM | OXYGEN SATURATION: 96 % | WEIGHT: 159.7 LBS | TEMPERATURE: 98.2 F | DIASTOLIC BLOOD PRESSURE: 71 MMHG | RESPIRATION RATE: 18 BRPM | BODY MASS INDEX: 29.69 KG/M2 | SYSTOLIC BLOOD PRESSURE: 123 MMHG

## 2020-06-04 DIAGNOSIS — M06.9 RHEUMATOID ARTHRITIS, INVOLVING UNSPECIFIED SITE, UNSPECIFIED RHEUMATOID FACTOR PRESENCE: Primary | ICD-10-CM

## 2020-06-04 DIAGNOSIS — Z79.891 ENCOUNTER FOR LONG-TERM USE OF OPIATE ANALGESIC: ICD-10-CM

## 2020-06-04 DIAGNOSIS — H72.91 PERFORATION OF RIGHT TYMPANIC MEMBRANE: ICD-10-CM

## 2020-06-04 DIAGNOSIS — R42 DIZZINESS: ICD-10-CM

## 2020-06-04 DIAGNOSIS — T85.79XA INFECTION OF BAHA BONE IMPLANT (H): Primary | ICD-10-CM

## 2020-06-04 DIAGNOSIS — F11.20 CONTINUOUS OPIOID DEPENDENCE (H): ICD-10-CM

## 2020-06-04 DIAGNOSIS — G89.4 CHRONIC PAIN SYNDROME: ICD-10-CM

## 2020-06-04 DIAGNOSIS — H92.11 OTORRHEA, RIGHT: ICD-10-CM

## 2020-06-04 PROCEDURE — 96365 THER/PROPH/DIAG IV INF INIT: CPT | Performed by: NURSE PRACTITIONER

## 2020-06-04 PROCEDURE — 99207 ZZC NO CHARGE NURSE ONLY: CPT

## 2020-06-04 RX ORDER — HEPARIN SODIUM,PORCINE 10 UNIT/ML
5 VIAL (ML) INTRAVENOUS
Status: CANCELLED | OUTPATIENT
Start: 2020-07-02

## 2020-06-04 RX ORDER — HEPARIN SODIUM (PORCINE) LOCK FLUSH IV SOLN 100 UNIT/ML 100 UNIT/ML
5 SOLUTION INTRAVENOUS
Status: CANCELLED | OUTPATIENT
Start: 2020-07-02

## 2020-06-04 RX ADMIN — Medication 250 ML: at 09:49

## 2020-06-04 ASSESSMENT — PATIENT HEALTH QUESTIONNAIRE - PHQ9: SUM OF ALL RESPONSES TO PHQ QUESTIONS 1-9: 12

## 2020-06-04 ASSESSMENT — PAIN SCALES - GENERAL
PAINLEVEL: MODERATE PAIN (4)
PAINLEVEL: MODERATE PAIN (4)

## 2020-06-04 NOTE — PROGRESS NOTES
Infusion Nursing Note:  Betseyfrancisco javier Jayon presents today for wk 4 orencia.    Patient seen by provider today: No   present during visit today: Not Applicable.    Intravenous Access:  Peripheral IV placed.    Treatment Conditions:  Biological Infusion Checklist:  ~~~ NOTE: If the patient answers yes to any of the questions below, hold the infusion and contact ordering provider or on-call provider.    1. Have you recently had an elevated temperature, fever, chills, productive cough, coughing for 3 weeks or longer or hemoptysis, abnormal vital signs, night sweats,  chest pain or have you noticed a decrease in your appetite, unexplained weight loss or fatigue? No  2. Do you have any open wounds or new incisions? No  3. Do you have any recent or upcoming hospitalizations, surgeries or dental procedures? No  4. Do you currently have or recently have had any signs of illness or infection or are you on any antibiotics? No  5. Have you had any new, sudden or worsening abdominal pain? No  6. Have you or anyone in your household received a live vaccination in the past 4 weeks? Please note:  No live vaccines while on biologic/chemotherapy until 6 months after the last treatment.  Patient can receive the flu vaccine (shot only) and the pneumovax.  It is optimal for the patient to get these vaccines mid cycle, but they can be given at any time as long as it is not on the day of the infusion. No  7. Have you recently been diagnosed with any new nervous system diseases (ie. Multiple sclerosis, Guillain Sunset, seizures, neurological changes) or cancer diagnosis? No  8. Are you on any form of radiation or chemotherapy? No  9. Are you pregnant or breast feeding or do you have plans of pregnancy in the future? No  10. Have you been having any signs of worsening depression or suicidal ideations?  (benlysta only) No  11. Have there been any other new onset medical symptoms? No, but she had an left ear tube placed for chronic  nausea/ equilibrium issues related to meniere's and a past ear surgery.        Post Infusion Assessment:  Patient tolerated infusion without incident.  Blood return noted pre and post infusion.  Site patent and intact, free from redness, edema or discomfort.  No evidence of extravasations.  Access discontinued per protocol.       Discharge Plan:   Copy of AVS reviewed with patient and/or family.  Patient will return 7/2/20 for next appointment.  Patient discharged in stable condition accompanied by: self.  Departure Mode: Ambulatory and using her cane.    Trinidad Hidalgo RN

## 2020-06-04 NOTE — PATIENT INSTRUCTIONS
1. You were seen in the ENT Clinic today by .  If you have any questions or concerns after your appointment, please call   - Option 1: ENT Clinic: [phone]  - Option 2: Silvia ('s Nurse): [phone]    2.   Plan to return to clinic  In 2 - 3 weeks     Silvia RN  Care Coordinator  [facility]  [phone]

## 2020-06-04 NOTE — TELEPHONE ENCOUNTER
Pharmacy would like someone form the office to call them about the diagnoses for this medication.     Prior Authorization Approval    Authorization Effective Date: 5/5/2020  Authorization Expiration Date: 6/4/2021  Medication: fentaNYL (DURAGESIC) 25 mcg/hr 72 hr patch   Approved Dose/Quantity:    Reference #: [id number]   Insurance Company: [employer] - Phone [phone] Fax [phone]  Expected CoPay:       CoPay Card Available:      Foundation Assistance Needed:    Which Pharmacy is filling the prescription (Not needed for infusion/clinic administered): [facility] [address] [place] [address] [address]  Pharmacy Notified: Yes  Patient Notified: No, Pharmacy would like someone form the office to call them about the diagnoses for this medication.

## 2020-06-04 NOTE — TELEPHONE ENCOUNTER
A 15 day supply of fentaNYL (DURAGESIC) 25 mcg/hr 72 hr patch was sent to the pharmacy on 6/2/20. Patient notified.     SURY StaufferN, RN  Care Coordinator  [facility] [place]

## 2020-06-04 NOTE — PROGRESS NOTES
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 She was last seen via a virtual visit on 4/8/2020 at which several issues were discussed. She has a complex otologic history for which there are several active issues.     With regard to her right ear, she reports intermittent purulent and bloody otorrhea over the past 2 years. She reports right sided otalgia and fluctuation in her right sided hearing. She completed a 2 week course of ofloxacin otic drops which were prescribed at her previous visit.     Her right sided conductive hearing loss necessitates an ultra powered BAHA which includes a BTE component. She is unable to wear the BTE component on the right ear due to feedback, and currently wears it in the left ear. She reports considerable improvement in pain, swelling, and drainage from the abutment site following a course of topical bactroban. She is currently cleaning the site with a baby toothbrush.    With regard to her left sided Meniere's disease, she reports dysequilibrium that occurs almost daily and precludes independent ambulation. She currently ambulates with the assistance of a cane. She reports worsening of this dysequilibrium 1-2 times weekly, associated with nausea. Of note, the patient is a relatively poor historian with regard to defining temporal details related to her symptoms. She also reports a distant history of migraine headaches which improved following hysterectomy, however she feels that they are now coming back. Again, she is a relatively poor historian with regard to temporal details of these migraine headaches, though believes there is some association with her episodes of worsening vestibular symptoms. She reports visual aura.    Review of systems:  10 point review of systems unremarkable except where noted  in HPI.    Physical examination:  female in no acute distress.  Alert and answering questions appropriately.  HB 1/6 bilaterally. Right BAHA abutment site with surrounding erythema, crusting. Both ears examined under the microscope. Right ear canal patent, dry, TM thickened with subtotal central perforation, granulation tissue overlying TM in the anterior inferior quadrant, purulent and bloody otorrhea emanating from perforation, middle ear mucosa erythematous. Left ear canal patent, dry, TM intact with normal landmarks, tympanosclerosis noted in anterior quadrant, middle ear well-aerated. Please note the microscope was necessary to examine the right ear due to the otorrhea and perforation.    Assessment and plan:  Betsey Vanessa is a 60 year old female with left Meniere's disease s/p left labyrinthectomy with residual caloric response present, right tympanic membrane perforation s/p right osseointegrated hearing aid placement with chronic otorrhea, possible vestibular migraine. We engaged in a detailed discussion regarding her multiple otologic issues and potential management options.    Her right BAHA abutment site is considerably improved from previous, though continues to appear erythematous and edematous. A culture was obtained in clinic today. Recommend continued use of bactroban topical ointment bid x14 days. We also discussed proper hygiene practices including brushing site with baby toothbrush. We discussed placement of the BTE component of her BAHA, and investigated placement on her right ear in clinic today over the helix which is higher than it would typically be worn as her abutment is fairly low. She did not experience feedback in clinic today. However, her current ear huggie is not configured to allow her to wear it comfortably high on the right ear. She will discuss this with her audiologist and her daughter took a picture of the placement.    She continues to experience purulent and bloody otorrhea  from her right tympanic membrane perforation. A culture was obtained in clinic today. A large granulation tissue polyp was also noted overlying the anteroinferior portion of her tympanic membrane. Recommend ofloxacin otic drops to right ear bid. We will call her with the results of her cultures and advise regarding appropriate antimicrobial therapy. We discussed surgical intervention as an alternative management option, however surgery is precluded by the fact that this is her only hearing ear.    With regard to her left Meniere disease, she continues to experience almost daily dysequilibrium, which is at times disabling. She is currently unable to ambulate independently and currently ambulates with the assistance of a cane. Given her residual caloric function following labyrinthectomy, she likely has residual neuroepithelial elements. We discussed several management options including chemical labyrinthectomy and revision labyrinthectomy. She would like to purse chemical labyrinthectomy at this time which is a reasonable option given that revision labyrinthectomy will still be available to her should she fail to receive benefit from chemical labyrinthectomy. Recommend gentamycin otic drops to left ear 3-4x daily x2-3 weeks. A PE tube was placed in her left ear in clinic today. We counseled her regarding the potential for worsening of her vestibular function during the course of treatment. During this counseling we also made specific reference to the importance of avoiding placement of gentamycin drops in her right ear, as this may result in a bilateral vestibular deficit give her right sided tympanic membrane perforation.     She also described worsening of her migraine headaches over the past year. She is not currently on maintenance or abortive therapy at this time. She follows with a neurologist and we have advised her to discuss the issue of her migraines at her next visit. We provided counseling regarding the  association of vestibular migraine with Meniere disease and the importance of adequate treatment of her migraines in order to achieve optimal improvement of her balance symptoms. We will also discuss vestibular rehabilitation at her follow up visit.    The patient and her daughter asked insightful questions and expressed understanding. They are in agreement with this treatment plan. We will see her back in 2-3 weeks or sooner with problems. All questions were answered.    Procedure:  Left myringotomy and PE tube placement was performed for instillation of gentamicin drops. Informed consent was obtained and signed.  Time Out was conducted with confirmation of the patient's name, side of the procedure and procedure to be done.  The left ear was examined under the microscope.  Phenol was placed on the posteroinferior quadrant.  Myringotomy incision was made.  Clarissa-Bobbin tube was placed.  The patient tolerated the procedure well with no complications.    I, Laura Stallings MD, saw this patient with the resident/fellow and agree with the resident/fellow s findings and plan of care as documented in the resident s/fellow s note. I was present for the entire procedure.    Laura Stallings MD

## 2020-06-04 NOTE — TELEPHONE ENCOUNTER
Prior Authorization Retail Medication Request    Medication/Dose: fentaNYL (DURAGESIC) 25 mcg/hr 72 hr patch   ICD code (if different than what is on RX):  F11.20  Previously Tried and Failed:    Rationale:      Insurance Name:  [employer]  Insurance ID:  [id number]       Pharmacy Information (if different than what is on RX)  Name:  [facility]  Phone:  [phone]

## 2020-06-04 NOTE — LETTER
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 She was last seen via a virtual visit on 4/8/2020 at which several issues were discussed. She has a complex otologic history for which there are several active issues.     With regard to her right ear, she reports intermittent purulent and bloody otorrhea over the past 2 years. She reports right sided otalgia and fluctuation in her right sided hearing. She completed a 2 week course of ofloxacin otic drops which were prescribed at her previous visit.     Her right sided conductive hearing loss necessitates an ultra powered BAHA which includes a BTE component. She is unable to wear the BTE component on the right ear due to feedback, and currently wears it in the left ear. She reports considerable improvement in pain, swelling, and drainage from the abutment site following a course of topical bactroban. She is currently cleaning the site with a baby toothbrush.    With regard to her left sided Meniere's disease, she reports dysequilibrium that occurs almost daily and precludes independent ambulation. She currently ambulates with the assistance of a cane. She reports worsening of this dysequilibrium 1-2 times weekly, associated with nausea. Of note, the patient is a relatively poor historian with regard to defining temporal details related to her symptoms. She also reports a distant history of migraine headaches which improved following hysterectomy, however she feels that they are now coming back. Again, she is a relatively poor historian with regard to temporal details of these migraine headaches, though believes there is some association with her episodes of worsening vestibular symptoms. She reports visual aura.    Review  of systems:  10 point review of systems unremarkable except where noted in HPI.    Physical examination:  female in no acute distress.  Alert and answering questions appropriately.  HB 1/6 bilaterally. Right BAHA abutment site with surrounding erythema, crusting. Both ears examined under the microscope. Right ear canal patent, dry, TM thickened with subtotal central perforation, granulation tissue overlying TM in the anterior inferior quadrant, purulent and bloody otorrhea emanating from perforation, middle ear mucosa erythematous. Left ear canal patent, dry, TM intact with normal landmarks, tympanosclerosis noted in anterior quadrant, middle ear well-aerated. Please note the microscope was necessary to examine the right ear due to the otorrhea and perforation.    Assessment and plan:  Betsey Vanessa is a 60 year old female with left Meniere's disease s/p left labyrinthectomy with residual caloric response present, right tympanic membrane perforation s/p right osseointegrated hearing aid placement with chronic otorrhea, possible vestibular migraine. We engaged in a detailed discussion regarding her multiple otologic issues and potential management options.    Her right BAHA abutment site is considerably improved from previous, though continues to appear erythematous and edematous. A culture was obtained in clinic today. Recommend continued use of bactroban topical ointment bid x14 days. We also discussed proper hygiene practices including brushing site with baby toothbrush. We discussed placement of the BTE component of her BAHA, and investigated placement on her right ear in clinic today over the helix which is higher than it would typically be worn as her abutment is fairly low. She did not experience feedback in clinic today. However, her current ear huggie is not configured to allow her to wear it comfortably high on the right ear. She will discuss this with her audiologist and her daughter took a picture of the  placement.    She continues to experience purulent and bloody otorrhea from her right tympanic membrane perforation. A culture was obtained in clinic today. A large granulation tissue polyp was also noted overlying the anteroinferior portion of her tympanic membrane. Recommend ofloxacin otic drops to right ear bid. We will call her with the results of her cultures and advise regarding appropriate antimicrobial therapy. We discussed surgical intervention as an alternative management option, however surgery is precluded by the fact that this is her only hearing ear.    With regard to her left Meniere disease, she continues to experience almost daily dysequilibrium, which is at times disabling. She is currently unable to ambulate independently and currently ambulates with the assistance of a cane. Given her residual caloric function following labyrinthectomy, she likely has residual neuroepithelial elements. We discussed several management options including chemical labyrinthectomy and revision labyrinthectomy. She would like to purse chemical labyrinthectomy at this time which is a reasonable option given that revision labyrinthectomy will still be available to her should she fail to receive benefit from chemical labyrinthectomy. Recommend gentamycin otic drops to left ear 3-4x daily x2-3 weeks. A PE tube was placed in her left ear in clinic today. We counseled her regarding the potential for worsening of her vestibular function during the course of treatment. During this counseling we also made specific reference to the importance of avoiding placement of gentamycin drops in her right ear, as this may result in a bilateral vestibular deficit give her right sided tympanic membrane perforation.     She also described worsening of her migraine headaches over the past year. She is not currently on maintenance or abortive therapy at this time. She follows with a neurologist and we have advised her to discuss the issue of  her migraines at her next visit. We provided counseling regarding the association of vestibular migraine with Meniere disease and the importance of adequate treatment of her migraines in order to achieve optimal improvement of her balance symptoms. We will also discuss vestibular rehabilitation at her follow up visit.    The patient and her daughter asked insightful questions and expressed understanding. They are in agreement with this treatment plan. We will see her back in 2-3 weeks or sooner with problems. All questions were answered.    Procedure:  Left myringotomy and PE tube placement was performed for instillation of gentamicin drops. Informed consent was obtained and signed.  Time Out was conducted with confirmation of the patient's name, side of the procedure and procedure to be done.  The left ear was examined under the microscope.  Phenol was placed on the posteroinferior quadrant.  Myringotomy incision was made.  Clarissa-Bobbin tube was placed.  The patient tolerated the procedure well with no complications.    I, Laura Stallings MD, saw this patient with the resident/fellow and agree with the resident/fellow s findings and plan of care as documented in the resident s/fellow s note. I was present for the entire procedure.    Laura Stallings MD

## 2020-06-04 NOTE — TELEPHONE ENCOUNTER
Reason for call:  Other   Patient called regarding (reason for call): call back  Additional comments: Pt calling to state that she requested a refill of fentaNYL (DURAGESIC) 25 mcg/hr 72 hr patch fro Dr. Ken. Pt was told that they would not refill until after her PhD appointment. Pt is confused on why this would be and would like a call back.    Phone number to reach patient:  Home number on file [phone] (home)    Best Time:  anytime    Can we leave a detailed message on this number?  YES    Travel screening: Not Applicable     Dee CONNORS    [facility]

## 2020-06-04 NOTE — NURSING NOTE
Chief Complaint   Patient presents with     RECHECK     follow up      Blood pressure 137/63, pulse 84, temperature 96.8  F (36  C), resp. rate 16, weight 72.1 kg (159 lb), SpO2 96 %.    Leonides MCGEE

## 2020-06-04 NOTE — TELEPHONE ENCOUNTER
Central Prior Authorization Team   Phone: [phone]      PA Initiation    Medication: fentaNYL (DURAGESIC) 25 mcg/hr 72 hr patch   Insurance Company: [employer] - Phone [phone] Fax [phone]  Pharmacy Filling the Rx: [facility] [address] [place], [place] - [address]  Filling Pharmacy Phone: [phone]  Filling Pharmacy Fax:    Start Date: 6/4/2020

## 2020-06-04 NOTE — TELEPHONE ENCOUNTER
[facility] Call Center    Phone Message    May a detailed message be left on voicemail: yes     Reason for Call: Other: Pt stated that Dr Stallings was supposed to send ear drop script to pharmacy but hasnt yet. Please send script and call pt when done.       Action Taken: Message routed to:  [facility]): ent    Travel Screening: Not Applicable

## 2020-06-05 ENCOUNTER — VIRTUAL VISIT (OUTPATIENT)
Dept: PALLIATIVE MEDICINE | Facility: CLINIC | Age: 60
End: 2020-06-05
Attending: PSYCHIATRY & NEUROLOGY
Payer: COMMERCIAL

## 2020-06-05 ENCOUNTER — TELEPHONE (OUTPATIENT)
Dept: INTERNAL MEDICINE | Facility: CLINIC | Age: 60
End: 2020-06-05

## 2020-06-05 DIAGNOSIS — M79.7 FIBROMYALGIA: ICD-10-CM

## 2020-06-05 DIAGNOSIS — M06.09 RHEUMATOID ARTHRITIS OF MULTIPLE SITES WITHOUT RHEUMATOID FACTOR (H): Primary | ICD-10-CM

## 2020-06-05 DIAGNOSIS — M47.819 FACET ARTHROPATHY: ICD-10-CM

## 2020-06-05 PROCEDURE — 96156 HLTH BHV ASSMT/REASSESSMENT: CPT | Mod: 95 | Performed by: PSYCHOLOGIST

## 2020-06-05 RX ORDER — FENTANYL 25 UG/1
1 PATCH TRANSDERMAL
Qty: 5 PATCH | Refills: 0 | Status: SHIPPED | OUTPATIENT
Start: 2020-06-05 | End: 2020-06-30

## 2020-06-05 RX ORDER — GENTAMICIN SULFATE 3 MG/ML
3 SOLUTION/ DROPS OPHTHALMIC 3 TIMES DAILY
Qty: 15 ML | Refills: 1 | Status: SHIPPED | OUTPATIENT
Start: 2020-06-05 | End: 2020-06-30

## 2020-06-05 NOTE — TELEPHONE ENCOUNTER
Check last prescription.  I had just sent it again with new diagnoses.    Discussed with Afia Delaney as requested with codes.
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
## 2020-06-05 NOTE — TELEPHONE ENCOUNTER
Spoke with pharmacist Elaina at pharmacy and per her they will not fill fentanyl ordered today nor will they fill any more Norco requested.  DX Continuous opioid dependence (H) [F11.20] is not acceptable.  Per Elaina they have requested DX to be changed multiple times and RX's continue to come with DX Continuous opioid dependence (H) [F11.20] .  Elaina is requesting new DX and provider to call them at [phone] for them to refill med.  Please advise

## 2020-06-05 NOTE — TELEPHONE ENCOUNTER
Per pharmacist at [facility], this Rx doesn't need a PA, but the Dx code of Continuous opioid dependence (H) [F11.20] is not acceptable. Is there another code to be used? Please send a new Rx with updated code.

## 2020-06-05 NOTE — TELEPHONE ENCOUNTER
Reason for Call:  Other prescription    Detailed comments: [facility] pharmacy is calling to let provider pharmacy refusal to fill for RX: fentaNYL (DURAGESIC) 25 mcg/hr 72 hr patch, please send to another pharmacy. Thank you.    Phone Number Patient can be reached at: [phone]    Best Time:     Can we leave a detailed message on this number? YES    Call taken on 6/5/2020 at 12:31 PM by Dora Arteaga

## 2020-06-06 LAB
BACTERIA SPEC CULT: ABNORMAL
SPECIMEN SOURCE: ABNORMAL
SPECIMEN SOURCE: ABNORMAL

## 2020-06-08 ENCOUNTER — TELEPHONE (OUTPATIENT)
Dept: OTOLARYNGOLOGY | Facility: CLINIC | Age: 60
End: 2020-06-08

## 2020-06-08 DIAGNOSIS — H92.11 OTORRHEA, RIGHT: Primary | ICD-10-CM

## 2020-06-08 RX ORDER — HYDROCORTISONE AND ACETIC ACID 20.75; 10.375 MG/ML; MG/ML
5 SOLUTION AURICULAR (OTIC) DAILY
Qty: 10 ML | Refills: 0 | Status: SHIPPED | OUTPATIENT
Start: 2020-06-08 | End: 2020-08-14

## 2020-06-08 NOTE — TELEPHONE ENCOUNTER
Let patient know it may burn and if it is too painful, stop, but that she's not growing anything but skin organisms on her cultures (ear and BAHA) so she doesn't need antibiotic drops for the right. Let us know if she has to stop the [facility].

## 2020-06-15 ENCOUNTER — VIRTUAL VISIT (OUTPATIENT)
Dept: PALLIATIVE MEDICINE | Facility: CLINIC | Age: 60
End: 2020-06-15
Payer: COMMERCIAL

## 2020-06-15 DIAGNOSIS — M79.7 FIBROMYALGIA: ICD-10-CM

## 2020-06-15 DIAGNOSIS — M54.2 CERVICALGIA: ICD-10-CM

## 2020-06-15 DIAGNOSIS — M06.09 RHEUMATOID ARTHRITIS OF MULTIPLE SITES WITHOUT RHEUMATOID FACTOR (H): Primary | ICD-10-CM

## 2020-06-15 DIAGNOSIS — M47.819 FACET ARTHROPATHY: ICD-10-CM

## 2020-06-15 PROCEDURE — 97530 THERAPEUTIC ACTIVITIES: CPT | Mod: GP | Performed by: PHYSICAL THERAPIST

## 2020-06-15 NOTE — PROGRESS NOTES
"Patient Name: Betsey Vanessa      YOB: 1960     Medical Record Number: [id number]  Diagnosis:    Rheumatoid arthritis of multiple sites without rheumatoid factor (H)  Fibromyalgia  Facet arthropathy  Cervicalgia     The patient has been notified of the following:  \"This virtual visit will be conducted between you and your provider.  We have found that certain health care needs can be provided without the need for physical presence.  This service lets us provide the care you need with a virtual visit.\"    Due to external, as well as internal [employer] management of the COVID 19 Virus the patient was not seen in our clinic.  As a substitution, we implemented a virtual visit to manage this patient's condition utilizing the [employer] virtual visit platform.  The provider reviewed the patient's chart and spoke with the patient to determine the following telemedicine visit is appropriate and effective for the patient's care.    The following type of visit was completed:  Video Visit:  The [employer] platform uses a synchronous HIPAA compliant video stream for this encounter.  Patient has given verbal consent for video visit? Yes    Visit Info Length of Visit: 40  Arrived: On Time   Exercise/Activity Education Instruction:  Pacing/Energy Conservation  Breathing/Relaxation and guided imagery 2-3x daily x 5'  Activity:  Breathing/Relaxation/guided imagery  Walking program 5' x 2 daily   Notes: Pt reports walking around the block for 5', but not every day.  Has been nauseated and dizzy due to medication she needs to be on for 21 days.  Has done yoga/deep breathing in the past.  Close eyes; concentrate on \"Mother Earth\"; take some nice deep breaths and slow breathing down; continue for several minutes.  Performed guided imagery exercise. Pt agreed to find \"yoga box\" that she has used in the past for ideas of daily self care     Demonstrates/Verbalizes Technique: 5 (1= poor technique-difficulty performing " exercises,significant cues required; 5= good technique-performs exercises without cues)  Body Awareness: 3 (1=low awareness; 5=high awareness)  Posture/Stability: 3 (1= poor posture, stability; 5= good posture, stability)   Motivational Level:   Ask questions, Eager to learn and Cooperative Participation:  Full   Patient Satisfaction:  satisfied   Response to Teaching:   enthusiastic, cooperative and asked questions  Factors that affect learning: None   Plan of Care  Continue with current plan of care: Focus next session including progress walking program as able, monitor breathing/relaxation; add HEP as able; add mini breaks/postrue, pacing and sensory calming/pain flare plan   Therapist: Renae Romero, PT                  Date: 6/15/2020        Video-Visit Details     Type of service:  Video Visit  Video Start Time: 10:30 AM  Video End Time: 11:10 AM  Originating Location (pt. Location): Home  Distant Location (provider location):  [place] [facility]   Platform used for Video Visit: [employer]

## 2020-06-16 ENCOUNTER — VIRTUAL VISIT (OUTPATIENT)
Dept: PALLIATIVE MEDICINE | Facility: CLINIC | Age: 60
End: 2020-06-16
Payer: COMMERCIAL

## 2020-06-16 DIAGNOSIS — G89.4 CHRONIC PAIN SYNDROME: Primary | ICD-10-CM

## 2020-06-16 DIAGNOSIS — M79.7 FIBROMYALGIA: ICD-10-CM

## 2020-06-16 DIAGNOSIS — F11.20 CONTINUOUS OPIOID DEPENDENCE (H): ICD-10-CM

## 2020-06-16 DIAGNOSIS — M06.09 RHEUMATOID ARTHRITIS OF MULTIPLE SITES WITHOUT RHEUMATOID FACTOR (H): ICD-10-CM

## 2020-06-16 PROCEDURE — 99214 OFFICE O/P EST MOD 30 MIN: CPT | Mod: 95 | Performed by: PSYCHIATRY & NEUROLOGY

## 2020-06-16 RX ORDER — HYDROCODONE BITARTRATE AND ACETAMINOPHEN 10; 325 MG/1; MG/1
.5-1 TABLET ORAL 3 TIMES DAILY PRN
Qty: 60 TABLET | Refills: 0 | Status: SHIPPED | OUTPATIENT
Start: 2020-06-16 | End: 2020-07-23

## 2020-06-16 RX ORDER — FENTANYL 12.5 UG/1
1 PATCH TRANSDERMAL
Qty: 10 PATCH | Refills: 0 | Status: SHIPPED | OUTPATIENT
Start: 2020-06-16 | End: 2020-07-15

## 2020-06-16 ASSESSMENT — PAIN SCALES - GENERAL: PAINLEVEL: MODERATE PAIN (4)

## 2020-06-16 NOTE — PATIENT INSTRUCTIONS
1. [facility] Scheduling:    [phone]    2. Schedule lab at [place]

## 2020-06-16 NOTE — PROGRESS NOTES
The patient has been notified of following:     This telephone visit will be conducted via a call between you and your provider. We have found that certain health care needs can be provided without the need for a physical exam.  This service lets us provide the care you need with a phone conversation.  If a prescription is necessary we can send it directly to your pharmacy.  If lab work is needed we can place an order for that and you can then stop by our lab to have the test done at a later time. This is a billable service but we do not know the cost at this time.    Patient has given verbal consent for Telephone visit?  Yes    Patient would like AVS sent to [facility]  [facility]    Betsey Vanessa is a 60 year old female who is being evaluated via a billable telephone visit.       Date of visit: 6/16/2020    Chief complaint:   Chief Complaint   Patient presents with     Pain     Telephone Visit due to COVID-19       Interval history:  Betsey Vanessa was last seen by me on 5/15/20.      Recommendations/plan at the last visit included:  1. Physical Therapy: pain PT  2. Pain Psychologist to address issues of relaxation, behavioral change, coping style, and other factors important to improvement: pain psychology  3. Diagnostic Studies: MRI cervical spine  4. Urine toxicology screen: UDS  5. Medication Management:   1. voltaren gel  2. Medication options need to be done with care due to concerns of side effects making her dizziness/vertigo worse  6. Further procedures recommended: not at this time  7. Other treatments: n/a  8. Recommendations/follow-up for PCP:  None.  Will need to discuss prescribing with PCP.  9. Release of information: none  10. Follow up: follow up in 1 month    Since her last visit, Betsey Vanessa reports:  - has seen PT and pain psych  - no UDS  - no imaging done yet. MRI  "cervical spine has been ordered  - Pain has been better since starting medical cannabis- started in May. She does think it helps. Has been able to decrease her Norco, down to one tab a day from 3 tabs.   - Most of her pain is in her back, hands, and shoulders, and ears... \"all over\"  - Cannabis makes her tired, she is still working with pharmacist to titrate her dose.   - Is using gentamycin drops in her ear to help with balance issues, causes her to feel dizzy, nauseated and sick. Started last Thursday.   - She has not driven in years.     Pain scores:  Pain intensity on average is 4 on a scale of 0-10. Previously at a 6/10.     Current pain treatments:   Celebrex 200mg BID  Fentanyl 25mcg/hr- filled 5/13  Norco 10/325- #45 tabs/30 days- filled 5/13- 1 /day- helpful  Medical cannabis- helps  Diclofenac- helps   salanpas patch- over the counter  Emu oil    Clonazepam 0.5mg tabs- currently on taper with PCP- using 2-3/day, typically 1 mg a day.   Narcan ordered    Previous medication treatments included:  Gabapentin - fast heartbeat, sweating  Cymbalta- hyperactivity  topamax- confusion  Amitriptyline- hyperactivity  Tramadol  darvocet    Other treatments have included:  Betsey Vanessa has been seen at a pain clinic in the past.    PT: hand therapy.- last about 3 years ago.  Acupuncture: no  TENs Unit: no  Injections:   Hand steroid injections-   Neck injections    Side Effects:  tired/sleepy    Medications:  Current Outpatient Medications   Medication Sig Dispense Refill     acetic acid-hydrocortisone (VOSOL-HC) 1-2 % otic solution Place 5 drops into the right ear daily 10 mL 0     celecoxib (CELEBREX) 200 MG capsule Take 1 capsule (200 mg) by mouth 2 times daily as needed for moderate pain 2 x a day 180 capsule 1     cevimeline (EVOXAC) 30 MG capsule Take 1 capsule (30 mg) by mouth 3 times daily 270 capsule 3     clonazePAM (KLONOPIN) 0.5 MG tablet Take 1 tablet (0.5 mg) by mouth 2 times daily as needed for anxiety " OK to take 1-2 tabs at night as needed for sleep.  Should last 30 days. 100 tablet 1     diclofenac (VOLTAREN) 1 % topical gel Place 2-4 g onto the skin 4 times daily . Max of 8g per dose. No more than 32g/day 100 g 3     docusate sodium (COLACE) 100 MG capsule Take 1 capsule (100 mg) by mouth 2 times daily 180 capsule 3     EPINEPHrine 0.3 MG/0.3ML SOLN PRN for Anaphylaxis       escitalopram (LEXAPRO) 20 MG tablet Take 1 tablet (20 mg) by mouth daily 90 tablet 1     fentaNYL (DURAGESIC) 25 mcg/hr 72 hr patch Place 1 patch onto the skin every 72 hours 5 patch 0     gentamicin (GARAMYCIN) 0.3 % ophthalmic solution Place 3 drops Into the left ear 3 times daily for 21 days 15 mL 1     glucose blood VI test strips strip daily       HYDROcodone-acetaminophen (NORCO)  MG per tablet Take 0.5-1 tablets by mouth 3 times daily as needed for severe pain 45 tablet 0     lifitegrast (XIIDRA) 5 % opthalmic solution Place 1 drop into both eyes 2 times daily 24 each 3     meclizine (ANTIVERT) 25 MG tablet Take 1 tablet (25 mg) by mouth 3 times daily as needed for dizziness 60 tablet 3     metFORMIN (GLUCOPHAGE) 500 MG tablet Take 500 mg by mouth 2 times daily (with meals) 1000 mg 2 x a day       metroNIDAZOLE (METROGEL) 0.75 % gel Apply to face daily       naloxone (NARCAN) 4 MG/0.1ML nasal spray Spray 1 spray (4 mg) into one nostril alternating nostrils once as needed for opioid reversal every 2-3 minutes until assistance arrives 0.2 mL 1     nystatin (MYCOSTATIN) 922742 UNIT/GM POWD 2 x daily PRN for rash       omeprazole (PRILOSEC) 20 MG capsule Take by mouth daily 2 x daily       potassium chloride ER (KLOR-CON M) 10 MEQ CR tablet Take 1 tablet (10 mEq) by mouth 2 times daily 20 mEq daily Take by mouth 2 times daily 20 mEq daily 180 tablet 3     promethazine (PHENERGAN) 25 MG suppository Place 25 mg rectally every 6 hours as needed For nusea       promethazine (PHENERGAN) 25 MG tablet Take 1 tablet (25 mg) by mouth every  6 hours as needed for nausea or other (Meniere's) For nusea 90 tablet 1     psyllium (METAMUCIL) 58.6 % POWD Take by mouth daily PRN       triamterene-HCTZ (DYAZIDE) 37.5-25 MG capsule Take 1 capsule by mouth daily 1 capsule every morning1 capsule every morning 90 capsule 3     vitamin D3 (CHOLECALCIFEROL) 2000 units (50 mcg) tablet Take 1 tablet (2,000 Units) by mouth daily 90 tablet 3       Medical History: any changes in medical history since they were last seen? none    Review of Systems:  The 14 system ROS was reviewed from the intake questionnaire, and is positive for: tiredness, nausea, vomiting, dizziness.  Any bowel or bladder problems: none  Mood: stable     Assessment:   1. Rheumatoid arthritis  2. fibromyalgia  3. Sjogren's Syndrome  4. Meniere's disease with intermittent vertigo and hearing loss  5. Type 2 diabetes    Betsey Vanessa is a 60 year old female who is seen at the pain clinic for follow up of her chronic pain due to rheumatoid arthritis, fibromyalgia, and sjogren's.    Patient seems to have a slower cognitive response than last visit.    Plan:  1. Physical Therapy:  Has seen PT  2. Clinical Health Psychologist to address issues of relaxation, behavioral change, coping style, and other factors important to improvement.  Has seen Pain Psych  3. Diagnostic Studies:  Still need UDS and Cervical MRI that were ordered at last visit. Patient was given phone numbers again to set these up.  4. Medication Management:    1. Discussed decreasing the amount of medications with CNS depression. Patient finds medical cannabis helpful so we discussed decreasing Fentanyl patch to 12.5 mcg. She changes her patch tomorrow then will have 1 left after that. She is due for a refill on 6/23 and we will start her at the lower dose of 12 mcg.   2. Continue medical cannabis, titrate with Dispensary Pharmacist  3. Continue Norco prn.She is currently taking 1 tab/day, was previously taking 1-3 tabs/day. She currently has  15 tablets left out of her last prescription. Will refill her next prescription with 60 tabs to start on   5. Further procedures recommended: none  6. Recommendations to PCP: see above   7. Follow up: 1 month- phone    Phone call duration: 30 minutes     Bailee Houser MD

## 2020-06-16 NOTE — PATIENT INSTRUCTIONS
1. Schedule pain PT  2. Schedule pain psychology  3. Try the voltaren gel.  4. Get urine drug screen done- contact local clinic.  5. MRI cervical spine ordered  [facility] Scheduling:    Mahendra Orozco [place]: [phone]    Chaya Sharp: [phone]    [facility] (including [place]): [phone]

## 2020-06-25 ENCOUNTER — TELEPHONE (OUTPATIENT)
Dept: NEUROLOGY | Facility: CLINIC | Age: 60
End: 2020-06-25

## 2020-06-25 NOTE — TELEPHONE ENCOUNTER
Health Call Center    Phone Message    May a detailed message be left on voicemail: yes     Reason for Call: Other: Betsey called because she is ill from medication she is taking, and so she is not able to make the EMG appointment that was scheduled for her today (6/25/20). Betsey will need to reschedule this EMG appointment. Per COVID-19 protocol, it is recommended an encounter be sent over regarding scheduling for EMG's. Please give Betsey a call back at your earliest convenience.     Action Taken: Message routed to:  [facility] ([facility]):  Neuro    Travel Screening: Not Applicable

## 2020-06-30 ENCOUNTER — OFFICE VISIT (OUTPATIENT)
Dept: INTERNAL MEDICINE | Facility: CLINIC | Age: 60
End: 2020-06-30
Payer: COMMERCIAL

## 2020-06-30 VITALS
HEART RATE: 84 BPM | SYSTOLIC BLOOD PRESSURE: 138 MMHG | TEMPERATURE: 99.2 F | BODY MASS INDEX: 29.44 KG/M2 | DIASTOLIC BLOOD PRESSURE: 72 MMHG | RESPIRATION RATE: 16 BRPM | WEIGHT: 158.4 LBS | OXYGEN SATURATION: 96 %

## 2020-06-30 DIAGNOSIS — E78.5 HYPERLIPIDEMIA, UNSPECIFIED HYPERLIPIDEMIA TYPE: Primary | ICD-10-CM

## 2020-06-30 DIAGNOSIS — R39.9 URINARY SYMPTOM OR SIGN: ICD-10-CM

## 2020-06-30 DIAGNOSIS — E11.65 TYPE 2 DIABETES MELLITUS WITH HYPERGLYCEMIA, WITHOUT LONG-TERM CURRENT USE OF INSULIN (H): ICD-10-CM

## 2020-06-30 DIAGNOSIS — B37.31 YEAST INFECTION OF THE VAGINA: ICD-10-CM

## 2020-06-30 PROCEDURE — 99214 OFFICE O/P EST MOD 30 MIN: CPT | Performed by: INTERNAL MEDICINE

## 2020-06-30 RX ORDER — PRAVASTATIN SODIUM 20 MG
20 TABLET ORAL DAILY
Qty: 90 TABLET | Refills: 3 | Status: SHIPPED | OUTPATIENT
Start: 2020-06-30 | End: 2020-08-14 | Stop reason: SINTOL

## 2020-06-30 RX ORDER — FLUCONAZOLE 150 MG/1
150 TABLET ORAL DAILY
Qty: 3 TABLET | Refills: 0 | Status: SHIPPED | OUTPATIENT
Start: 2020-06-30 | End: 2020-08-03

## 2020-06-30 ASSESSMENT — PAIN SCALES - GENERAL: PAINLEVEL: MODERATE PAIN (4)

## 2020-06-30 NOTE — PROGRESS NOTES
Subjective     Betsey Vanessa is a 60 year old female who presents to clinic today for the following health issues:    HPI   Medication Followup of fentanyl patch- patient is taking a 12 now, down from a 25    Taking Medication as prescribed: yes    Side Effects:  None    Medication Helping Symptoms:  yes     Using gentamicin (for intentional ototoxicity?) to aid her Meniere's.    Concerned about fentanyl patch reduction.  Anxious and panicky at times, not worsened.  Started medical cannabis about 3 weeks ago and unsure of results thus far.  Doesn't like the oil and hasn't used the sublingual spray yet.  Denies problems / side effects from medical cannabis to date.    Using some clonazepam still, typically 2-3 tabs of 0.5 mg per day as prescribed.        1. Hyperlipidemia, unspecified hyperlipidemia type    2. Urinary symptom or sign    3. Type 2 diabetes mellitus with hyperglycemia, without long-term current use of insulin (H)    4. Yeast infection of the vagina        PMH: Updated and/or reviewed in chart.    ROS:  Constitutional, neuro, EMT, endocrine, pulmonary, cardiac, gastrointestinal, genitourinary, musculoskeletal, integument and psychiatric systems are otherwise negative.    Objective   OBJECTIVE:                                                    BP (!) 146/67   Pulse 84   Temp 99.2  F (37.3  C) (Tympanic)   Resp 16   Wt 71.8 kg (158 lb 6.4 oz)   SpO2 96%   BMI 29.44 kg/m      GEN: No acute distress, wearing facemask  EYES: No conjunctival injection or icterus, EOMI grossly intact  RESP: Unlabored, regular  NEURO: Normal gait, MAEx4, light touch sensation grossly intact  PSYCH: Normal mood and affect    No results found for any visits on 06/30/20.   Assessment & Plan   ASSESSMENT/PLAN:                                                    1. Hyperlipidemia, unspecified hyperlipidemia type  Intolerant of two prior statins years ago.  We agreed to limited trial of low potency statin and follow-up levels  in 6-8 weeks.  - Comprehensive metabolic panel; Future  - STATIN NOT PRESCRIBED (INTENTIONAL); Please choose reason not prescribed, below  Dispense:    - pravastatin (PRAVACHOL) 20 MG tablet; Take 1 tablet (20 mg) by mouth daily  Dispense: 90 tablet; Refill: 3  - Lipid panel reflex to direct LDL Fasting; Future    2. Urinary symptom or sign  - UA reflex to Microscopic and Culture    3. Type 2 diabetes mellitus with hyperglycemia, without long-term current use of insulin (H)  Recheck hemoglobin A1c and continue metformin.  Hypertension not adequately controlled on initial blood pressure -- recheck and follow-up in 6-8 weeks -- may resume a lisinopril or losartan.  Taking triamterene.  - metFORMIN (GLUCOPHAGE) 500 MG tablet; Take 2 tablets (1,000 mg) by mouth 2 times daily (with meals) 1000 mg 2 x a day  Dispense: 360 tablet; Refill: 3  - Hemoglobin A1c; Future    4. Yeast infection of the vagina  OK to treat empirically  - fluconazole (DIFLUCAN) 150 MG tablet; Take 1 tablet (150 mg) by mouth daily for 3 days  Dispense: 3 tablet; Refill: 0      Orders Placed This Encounter     Hemoglobin A1c     Comprehensive metabolic panel     Lipid panel reflex to direct LDL Fasting     UA reflex to Microscopic and Culture     metFORMIN (GLUCOPHAGE) 500 MG tablet     STATIN NOT PRESCRIBED (INTENTIONAL)     pravastatin (PRAVACHOL) 20 MG tablet     fluconazole (DIFLUCAN) 150 MG tablet        Return in about 6 weeks (around 8/11/2020) for Diabetes follow-up, Hypertension follow-up.    Pb Ken MD

## 2020-07-01 ENCOUNTER — TELEPHONE (OUTPATIENT)
Dept: OTOLARYNGOLOGY | Facility: CLINIC | Age: 60
End: 2020-07-01

## 2020-07-01 ENCOUNTER — TRANSFERRED RECORDS (OUTPATIENT)
Dept: HEALTH INFORMATION MANAGEMENT | Facility: CLINIC | Age: 60
End: 2020-07-01

## 2020-07-01 DIAGNOSIS — R42 DIZZINESS: Primary | ICD-10-CM

## 2020-07-01 LAB — RETINOPATHY: NORMAL

## 2020-07-01 NOTE — TELEPHONE ENCOUNTER
the imbalance is good, that's her drops working. She needs to be doing vestibular therapy. Not sure what to tell her about the ear pain. Is she having ear drainage?     She is not doing therapy. Orders might need to placed. Drainage is brown. Possibly wax.     Ni Galvan LPN

## 2020-07-01 NOTE — TELEPHONE ENCOUNTER
Health Call Center    Phone Message    May a detailed message be left on voicemail: yes     Reason for Call: Symptoms or Concerns     If patient has red-flag symptoms, warm transfer to triage line    Current symptom or concern: throwing up - Pt thinks it is from the Gentamicin drops she is taking.      Symptoms have been present for:  2 week(s)    Has patient previously been seen for this? No            Are there any new or worsening symptoms? Yes: Pt cancelled her appt. Today with Chandrakant and is rescheduled for August but would like a call back to discuss.  Thank you.       Action Taken: Message routed to:  [facility] ENT    Travel Screening: Not Applicable

## 2020-07-01 NOTE — TELEPHONE ENCOUNTER
Pt is having dizziness- walls seem wavy and floor is moving. Along with ear pain. She took some promethazine recently and not sure of the relief from that yet. Also on her last day of the drops.     Ni Galvan LPN

## 2020-07-02 LAB
FUNGUS SPEC CULT: NORMAL
SPECIMEN SOURCE: NORMAL

## 2020-07-15 ENCOUNTER — TELEPHONE (OUTPATIENT)
Dept: NEUROLOGY | Facility: CLINIC | Age: 60
End: 2020-07-15

## 2020-07-15 DIAGNOSIS — G89.4 CHRONIC PAIN SYNDROME: ICD-10-CM

## 2020-07-15 DIAGNOSIS — M06.09 RHEUMATOID ARTHRITIS OF MULTIPLE SITES WITHOUT RHEUMATOID FACTOR (H): ICD-10-CM

## 2020-07-15 RX ORDER — FENTANYL 12.5 UG/1
1 PATCH TRANSDERMAL
Qty: 10 PATCH | Refills: 0 | Status: SHIPPED | OUTPATIENT
Start: 2020-07-15 | End: 2020-08-12

## 2020-07-15 NOTE — TELEPHONE ENCOUNTER
Medication refill information reviewed.     Due date for fentaNYL (DURAGESIC) 12 mcg/hr 72 hr patch  is 7/16/20     Prescriptions prepped for review.     Will route to provider.

## 2020-07-15 NOTE — TELEPHONE ENCOUNTER
Script Eprescribed to pharmacy    Will send this to MA team to notify patient.    Signed Prescriptions:                        Disp   Refills    fentaNYL (DURAGESIC) 12 mcg/hr 72 hr patch 10 pat*0        Sig: Place 1 patch onto the skin every 72 hours remove old           patch. Fill 7/15/20 to start 7/16/20  Authorizing Provider: SANTOS ISLAS MD  [facility]

## 2020-07-15 NOTE — TELEPHONE ENCOUNTER
Reason for call:  Medication   If this is a refill request, has the caller requested the refill from the pharmacy already? No  Will the patient be using a [facility]? No  Name of the pharmacy and phone number for the current request: [id number] - [place] - [address]    Name of the medication requested: fentaNYL (DURAGESIC) 12 mcg/hr 72 hr patch     Other request:     Phone number to reach patient:  Cell number on file:    Telephone Information:   Mobile [phone]       Best Time:      Can we leave a detailed message on this number?  YES    Travel screening: Not Applicable

## 2020-07-15 NOTE — TELEPHONE ENCOUNTER
Patient requesting refill(s) of fentaNYL (DURAGESIC) 12 mcg/hr 72 hr patch     Last dispensed from pharmacy on 6/16/2020    Pt last seen by prescribing provider on 6/16/2020  Next appt scheduled for 7/23/2020     checked in the past 6 months? Yes If no, print current report and give to RN    Last urine drug screen date 05/15/2020  Current opioid agreement on file (completed within the last year) No Date of opioid agreement: None in file     Processing (pick one and delete the others):      E-prescribe to [id number] - [place], [place]    Will route to nursing pool for review and preparation of prescription(s).

## 2020-07-20 ENCOUNTER — INFUSION THERAPY VISIT (OUTPATIENT)
Dept: INFUSION THERAPY | Facility: CLINIC | Age: 60
End: 2020-07-20
Payer: COMMERCIAL

## 2020-07-20 VITALS
DIASTOLIC BLOOD PRESSURE: 67 MMHG | TEMPERATURE: 98.8 F | SYSTOLIC BLOOD PRESSURE: 135 MMHG | BODY MASS INDEX: 29.78 KG/M2 | HEART RATE: 81 BPM | WEIGHT: 160.2 LBS | OXYGEN SATURATION: 98 %

## 2020-07-20 DIAGNOSIS — M06.9 RHEUMATOID ARTHRITIS, INVOLVING UNSPECIFIED SITE, UNSPECIFIED RHEUMATOID FACTOR PRESENCE: Primary | ICD-10-CM

## 2020-07-20 PROCEDURE — 99207 ZZC NO CHARGE NURSE ONLY: CPT

## 2020-07-20 PROCEDURE — 96365 THER/PROPH/DIAG IV INF INIT: CPT | Performed by: NURSE PRACTITIONER

## 2020-07-20 RX ORDER — HEPARIN SODIUM,PORCINE 10 UNIT/ML
5 VIAL (ML) INTRAVENOUS
Status: CANCELLED | OUTPATIENT
Start: 2020-07-30

## 2020-07-20 RX ORDER — HEPARIN SODIUM (PORCINE) LOCK FLUSH IV SOLN 100 UNIT/ML 100 UNIT/ML
5 SOLUTION INTRAVENOUS
Status: CANCELLED | OUTPATIENT
Start: 2020-07-30

## 2020-07-20 RX ADMIN — Medication 250 ML: at 08:40

## 2020-07-20 ASSESSMENT — PAIN SCALES - GENERAL: PAINLEVEL: MODERATE PAIN (4)

## 2020-07-20 NOTE — PROGRESS NOTES
Infusion Nursing Note:  Betsey Vanessa presents today for Orencia.    Patient seen by provider today: No   present during visit today: Not Applicable.    Note: Pt denies new medical concerns, says her pain is worse today but hopes it improves after Orencia.  See flow sheet for assessment.    Intravenous Access:  Peripheral IV placed.    Treatment Conditions:  Biological Infusion Checklist:  ~~~ NOTE: If the patient answers yes to any of the questions below, hold the infusion and contact ordering provider or on-call provider.    1. Have you recently had an elevated temperature, fever, chills, productive cough, coughing for 3 weeks or longer or hemoptysis, abnormal vital signs, night sweats,  chest pain or have you noticed a decrease in your appetite, unexplained weight loss or fatigue? No  2. Do you have any open wounds or new incisions? No  3. Do you have any recent or upcoming hospitalizations, surgeries or dental procedures? No  4. Do you currently have or recently have had any signs of illness or infection or are you on any antibiotics? No  5. Have you had any new, sudden or worsening abdominal pain? No  6. Have you or anyone in your household received a live vaccination in the past 4 weeks? Please note:  No live vaccines while on biologic/chemotherapy until 6 months after the last treatment.  Patient can receive the flu vaccine (shot only) and the pneumovax.  It is optimal for the patient to get these vaccines mid cycle, but they can be given at any time as long as it is not on the day of the infusion. No  7. Have you recently been diagnosed with any new nervous system diseases (ie. Multiple sclerosis, Guillain Alburtis, seizures, neurological changes) or cancer diagnosis? No  8. Are you on any form of radiation or chemotherapy? No  9. Are you pregnant or breast feeding or do you have plans of pregnancy in the future? No  10. Have you been having any signs of worsening depression or suicidal ideations?   (benlysta only) No  11. Have there been any other new onset medical symptoms? No        Post Infusion Assessment:  Patient tolerated infusion without incident.  Blood return noted pre and post infusion.  Site patent and intact, free from redness, edema or discomfort.  No evidence of extravasations.  Access discontinued per protocol.       Discharge Plan:   Patient discharged in stable condition accompanied by: self.  Departure Mode: Ambulatory.  Pt will RTC 8/17/20 for Orencia.    Avi Paz RN

## 2020-07-23 ENCOUNTER — VIRTUAL VISIT (OUTPATIENT)
Dept: PALLIATIVE MEDICINE | Facility: CLINIC | Age: 60
End: 2020-07-23
Payer: COMMERCIAL

## 2020-07-23 DIAGNOSIS — H81.09 MENIERE'S DISEASE, UNSPECIFIED LATERALITY: ICD-10-CM

## 2020-07-23 DIAGNOSIS — M06.09 RHEUMATOID ARTHRITIS OF MULTIPLE SITES WITHOUT RHEUMATOID FACTOR (H): Primary | ICD-10-CM

## 2020-07-23 DIAGNOSIS — F11.20 CONTINUOUS OPIOID DEPENDENCE (H): ICD-10-CM

## 2020-07-23 DIAGNOSIS — G89.4 CHRONIC PAIN SYNDROME: ICD-10-CM

## 2020-07-23 PROCEDURE — 99214 OFFICE O/P EST MOD 30 MIN: CPT | Mod: 95 | Performed by: PSYCHIATRY & NEUROLOGY

## 2020-07-23 RX ORDER — HYDROCODONE BITARTRATE AND ACETAMINOPHEN 10; 325 MG/1; MG/1
.5-1 TABLET ORAL 3 TIMES DAILY PRN
Qty: 60 TABLET | Refills: 0 | Status: SHIPPED | OUTPATIENT
Start: 2020-07-23 | End: 2020-09-15

## 2020-07-23 ASSESSMENT — PAIN SCALES - GENERAL: PAINLEVEL: MODERATE PAIN (5)

## 2020-07-23 NOTE — PROGRESS NOTES
The patient has been notified of following:     This telephone visit will be conducted via a call between you and your provider. We have found that certain health care needs can be provided without the need for a physical exam.  This service lets us provide the care you need with a phone conversation.  If a prescription is necessary we can send it directly to your pharmacy.  If lab work is needed we can place an order for that and you can then stop by our lab to have the test done at a later time. This is a billable service but we do not know the cost at this time.     Patient has given verbal consent for Telephone visit?  Yes    Hilary Barr MA                              [facility]    Betsey Vanessa is a 60 year old female who is being evaluated via a billable telephone visit.       Date of visit: 7/23/2020     Chief complaint:   Chief Complaint   Patient presents with     Pain     Telephone visit due to COVID-19        Interval history:  Betsey Vanessa was last seen by me on 6/16/20.      Recommendations/plan at the last visit included:  1. Physical Therapy:  Has seen PT  2. Clinical Health Psychologist to address issues of relaxation, behavioral change, coping style, and other factors important to improvement.  Has seen Pain Psych  3. Diagnostic Studies:  Still need UDS and Cervical MRI that were ordered at last visit. Patient was given phone numbers again to set these up.  4. Medication Management:    1. Discussed decreasing the amount of medications with CNS depression. Patient finds medical cannabis helpful so we discussed decreasing Fentanyl patch to 12.5 mcg. She changes her patch tomorrow then will have 1 left after that. She is due for a refill on 6/23 and we will start her at the lower dose of 12 mcg.   2. Continue medical cannabis, titrate with Dispensary Pharmacist  3. Continue Norco prn.She is currently taking 1 tab/day, was previously taking 1-3 tabs/day. She currently  has 15 tablets left out of her last prescription. Will refill her next prescription with 60 tabs to start on   5. Further procedures recommended: none  6. Recommendations to PCP: see above   7. Follow up: 1 month- phone    Since her last visit, Betsey Vanessa reports:  -she decreased her fentanyl and doesn't think there was that much of a problem.  -she has more low back pain- sharp pain  -worsens with movement.  -her meineres has worsened- having more nausea/vomiting.  She feels her body is sore.  She is taking promethazine.  -she continues to have neck/shoulder and hand pain- has not set up the MRI yet.      Pain scores:  Pain intensity on average is 5 on a scale of 0-10.     Current pain treatments:   Celebrex 200mg BID  Fentanyl 12mcg/hr- decrease went ok  Norco 10/325- using 1-3/day.  Last script for 60 tabs 5 weeks ago, still has 23 tabs remaiining  Medical cannabis- helps  Diclofenac- helps   salanpas patch- over the counter  Emu oil    Clonazepam 0.5mg tabs- currently on taper with PCP- using 2-3/day, typically 1 mg a day.   Narcan ordered    Previous medication treatments included:  Gabapentin - fast heartbeat, sweating  Cymbalta- hyperactivity  topamax- confusion  Amitriptyline- hyperactivity  Tramadol  darvocet    Other treatments have included:  Betsey Vanessa has been seen at a pain clinic in the past.    PT: hand therapy.- last about 3 years ago.  Acupuncture: no  TENs Unit: no  Injections:   Hand steroid injections-   Neck injections    Side Effects:  none    Medications:  Current Outpatient Medications   Medication Sig Dispense Refill     celecoxib (CELEBREX) 200 MG capsule Take 1 capsule (200 mg) by mouth 2 times daily as needed for moderate pain 2 x a day 180 capsule 1     cevimeline (EVOXAC) 30 MG capsule Take 1 capsule (30 mg) by mouth 3 times daily 270 capsule 3     clonazePAM (KLONOPIN) 0.5 MG tablet Take 1 tablet (0.5 mg) by mouth 2 times daily as needed for anxiety OK to take 1-2 tabs at night  as needed for sleep.  Should last 30 days. 100 tablet 1     diclofenac (VOLTAREN) 1 % topical gel Place 2-4 g onto the skin 4 times daily . Max of 8g per dose. No more than 32g/day 100 g 3     docusate sodium (COLACE) 100 MG capsule Take 1 capsule (100 mg) by mouth 2 times daily 180 capsule 3     EPINEPHrine 0.3 MG/0.3ML SOLN PRN for Anaphylaxis       escitalopram (LEXAPRO) 20 MG tablet Take 1 tablet (20 mg) by mouth daily 90 tablet 1     glucose blood VI test strips strip daily       HYDROcodone-acetaminophen (NORCO)  MG per tablet Take 0.5-1 tablets by mouth 3 times daily as needed for severe pain Put 4-6 hours between dose.  Fill on/after 8/1 to start on/after 8/3 60 tablet 0     lifitegrast (XIIDRA) 5 % opthalmic solution Place 1 drop into both eyes 2 times daily 24 each 3     medical cannabis (Patient's own supply) Take 1 Dose by mouth See Admin Instructions (The purpose of this order is to document that the patient reports taking medical cannabis.  This is not a prescription, and is not used to certify that the patient has a qualifying medical condition.)       metFORMIN (GLUCOPHAGE) 500 MG tablet Take 2 tablets (1,000 mg) by mouth 2 times daily (with meals) 1000 mg 2 x a day 360 tablet 3     metroNIDAZOLE (METROGEL) 0.75 % gel Apply to face daily       potassium chloride ER (KLOR-CON M) 10 MEQ CR tablet Take 1 tablet (10 mEq) by mouth 2 times daily 20 mEq daily Take by mouth 2 times daily 20 mEq daily 180 tablet 3     promethazine (PHENERGAN) 25 MG suppository Place 25 mg rectally every 6 hours as needed For nusea       promethazine (PHENERGAN) 25 MG tablet Take 1 tablet (25 mg) by mouth every 6 hours as needed for nausea or other (Meniere's) For nusea 90 tablet 1     psyllium (METAMUCIL) 58.6 % POWD Take by mouth daily PRN       STATIN NOT PRESCRIBED (INTENTIONAL) Please choose reason not prescribed, below       triamterene-HCTZ (DYAZIDE) 37.5-25 MG capsule Take 1 capsule by mouth daily 1 capsule every  morning1 capsule every morning 90 capsule 3     vitamin D3 (CHOLECALCIFEROL) 2000 units (50 mcg) tablet Take 1 tablet (2,000 Units) by mouth daily 90 tablet 3     acetic acid-hydrocortisone (VOSOL-HC) 1-2 % otic solution Place 5 drops into the right ear 2 times daily 10 mL 0     EPINEPHrine (ANY BX GENERIC EQUIV) 0.3 MG/0.3ML injection 2-pack Inject 0.3 mLs (0.3 mg) into the muscle as needed for anaphylaxis 2 each 0     fentaNYL (DURAGESIC) 12 mcg/hr 72 hr patch Place 1 patch onto the skin every 72 hours remove old patch. Fill 8/13/20 to start 8/15/20 10 patch 0     fluvastatin (LESCOL) 20 MG capsule Take 1 capsule (20 mg) by mouth At Bedtime 90 capsule 1     naloxone (NARCAN) 4 MG/0.1ML nasal spray Spray 1 spray (4 mg) into one nostril alternating nostrils once as needed for opioid reversal every 2-3 minutes until assistance arrives 0.2 mL 1     nystatin (MYCOSTATIN) 879044 UNIT/GM external powder Apply topically 2 times daily as needed 2 x daily PRN for rash 60 g 4     omeprazole (PRILOSEC) 20 MG DR capsule Take 1 capsule (20 mg) by mouth 2 times daily 2 x daily 180 capsule 1     PROAIR  (90 Base) MCG/ACT inhaler INHALE 2 PUFFS BY MOUTH EVERY 6 HOURS AS NEEDED FOR WHEEZING         Medical History: any changes in medical history since they were last seen? None    Review of Systems:  The 14 system ROS was reviewed from the intake questionnaire, and is positive for: tiredness, nausea, vomiting, dizziness.- menieres symptoms  Any bowel or bladder problems: none  Mood: stable     Assessment:   1. Rheumatoid arthritis  2. fibromyalgia  3. Sjogren's Syndrome  4. Meniere's disease with intermittent vertigo and hearing loss  5. Type 2 diabetes      Plan:  1. Physical Therapy:  continue  2. Clinical Health Psychologist to address issues of relaxation, behavioral change, coping style, and other factors important to improvement. continue  3. Diagnostic Studies:  Still need UDS and Cervical MRI that were ordered at last  visit. Patient was given phone numbers again to set these up.  4. Medication Management:  Continue at current doses  5. Further procedures recommended: none  6. Recommendations to PCP: see above   7. Follow up: 2 month- phone    Phone call duration: 23 minutes     Bailee Houser MD

## 2020-07-23 NOTE — TELEPHONE ENCOUNTER
Patient is still waiting to get this scheduled.  Please call    Bailee Houser MD  [facility]

## 2020-07-27 ENCOUNTER — TRANSFERRED RECORDS (OUTPATIENT)
Dept: HEALTH INFORMATION MANAGEMENT | Facility: CLINIC | Age: 60
End: 2020-07-27

## 2020-07-27 ENCOUNTER — PATIENT OUTREACH (OUTPATIENT)
Dept: CARE COORDINATION | Facility: CLINIC | Age: 60
End: 2020-07-27

## 2020-07-27 DIAGNOSIS — Z91.199 HISTORY OF PARTIAL ADHERENCE TO TREATMENT: Primary | ICD-10-CM

## 2020-07-27 LAB — RETINOPATHY: NEGATIVE

## 2020-07-27 NOTE — PROGRESS NOTES
Clinic Care Coordination Contact  Community Health Worker Initial Outreach    Patient accepts CC: Yes. Patient scheduled for assessment with RN CC on 7/28 at 1pm. Patient noted desire to discuss [facility] services and assistance setting up future appointments.   The Clinic Community Health Worker spoke with the patient today to discuss possible Clinic Care Coordination enrollment. The service was described to the patient and immediate needs were discussed. The patient agreed to enrollment and an assessment was scheduled. The patient was provided with contact information for the clinic CHW.             Assessment date: 7/28 @1pm    Reason for Referral: Patient/Caregiver Support: Resources for Support and treatment adherence   Additional pertinent details: See note: Check to see what we might be able to help with. has scheduleing issues.     Viki VALENTINE Community Health Worker  [facility] Care Coordination  [facility]  Phone: [phone]

## 2020-07-27 NOTE — PROGRESS NOTES
See below -- please offer patient assistance.  -ALAINA Houser, MD Jesus Manuel Arevalo, MD Melvina Mcmillan,     I'm wondering if you can look into support for Betsey.     I have orders placed back in May that have still not be done- a lab and and MRI.   We talked at an appt in June as she hadn't scheduled, and walked through the process again.   Today, we are in the same spot.     She is saying things like they aren't scheduling labs or aren't scheduling MRIs, but I know that is not the case.  She is also playing phone tag for EMG scheduling.     I'm wondering if care coordination is appropriate in this setting.     Bailee

## 2020-07-28 ENCOUNTER — APPOINTMENT (OUTPATIENT)
Dept: NURSING | Facility: CLINIC | Age: 60
End: 2020-07-28
Attending: INTERNAL MEDICINE
Payer: COMMERCIAL

## 2020-07-28 NOTE — PROGRESS NOTES
Clinic Care Coordination Contact  Care Team Conversations    Patient contacted. Patient not able to talk at this time. Would like a follow up call next week  8-4-20 4:00pm.     Korey Campbell RN  Primary Care Clinic RN Care Coordinator  [facility]   [phone]

## 2020-07-31 NOTE — TELEPHONE ENCOUNTER
[facility] Call Center    Phone Message    May a detailed message be left on voicemail: yes     Reason for Call: Other: Betsey returning call. Please call her back to schedule EMG.      Action Taken: Message routed to:  [facility] talib neuro     Travel Screening: Not Applicable

## 2020-08-03 ENCOUNTER — VIRTUAL VISIT (OUTPATIENT)
Dept: PALLIATIVE MEDICINE | Facility: CLINIC | Age: 60
End: 2020-08-03
Payer: COMMERCIAL

## 2020-08-03 ENCOUNTER — VIRTUAL VISIT (OUTPATIENT)
Dept: RHEUMATOLOGY | Facility: CLINIC | Age: 60
End: 2020-08-03
Payer: COMMERCIAL

## 2020-08-03 DIAGNOSIS — G89.4 CHRONIC PAIN SYNDROME: Primary | ICD-10-CM

## 2020-08-03 DIAGNOSIS — M47.819 FACET ARTHROPATHY: ICD-10-CM

## 2020-08-03 DIAGNOSIS — M06.9 RHEUMATOID ARTHRITIS, INVOLVING UNSPECIFIED SITE, UNSPECIFIED RHEUMATOID FACTOR PRESENCE: Primary | ICD-10-CM

## 2020-08-03 DIAGNOSIS — M85.80 OSTEOPENIA, UNSPECIFIED LOCATION: ICD-10-CM

## 2020-08-03 DIAGNOSIS — M35.01 SJOGREN'S SYNDROME WITH KERATOCONJUNCTIVITIS SICCA (H): ICD-10-CM

## 2020-08-03 DIAGNOSIS — M79.7 FIBROMYALGIA: ICD-10-CM

## 2020-08-03 DIAGNOSIS — M06.09 RHEUMATOID ARTHRITIS OF MULTIPLE SITES WITHOUT RHEUMATOID FACTOR (H): ICD-10-CM

## 2020-08-03 DIAGNOSIS — D69.6 THROMBOCYTOPENIA (H): ICD-10-CM

## 2020-08-03 DIAGNOSIS — M54.2 CERVICALGIA: ICD-10-CM

## 2020-08-03 DIAGNOSIS — Z79.899 HIGH RISK MEDICATIONS (NOT ANTICOAGULANTS) LONG-TERM USE: ICD-10-CM

## 2020-08-03 PROCEDURE — 96159 HLTH BHV IVNTJ INDIV EA ADDL: CPT | Mod: 95 | Performed by: PSYCHOLOGIST

## 2020-08-03 PROCEDURE — 96158 HLTH BHV IVNTJ INDIV 1ST 30: CPT | Mod: 95 | Performed by: PSYCHOLOGIST

## 2020-08-03 PROCEDURE — 99213 OFFICE O/P EST LOW 20 MIN: CPT | Mod: 95 | Performed by: INTERNAL MEDICINE

## 2020-08-03 PROCEDURE — 97530 THERAPEUTIC ACTIVITIES: CPT | Mod: 95 | Performed by: PHYSICAL THERAPIST

## 2020-08-03 RX ORDER — ALBUTEROL SULFATE 90 UG/1
AEROSOL, METERED RESPIRATORY (INHALATION)
COMMUNITY
Start: 2020-07-03 | End: 2021-06-09

## 2020-08-03 NOTE — PROGRESS NOTES
"Patient Name: Betsey Vanessa      YOB: 1960     Medical Record Number: [id number]  Diagnosis:    Chronic pain syndrome  Rheumatoid arthritis of multiple sites without rheumatoid factor (H)  Fibromyalgia  Facet arthropathy  Cervicalgia     The patient has been notified of the following:  \"This virtual visit will be conducted between you and your provider.  We have found that certain health care needs can be provided without the need for physical presence.  This service lets us provide the care you need with a virtual visit.\"    Due to external, as well as internal [facility] management of the COVID 19 Virus the patient was not seen in our clinic.  As a substitution, we implemented a virtual visit to manage this patient's condition utilizing the [employer] virtual visit platform.  The provider reviewed the patient's chart and spoke with the patient to determine the following telemedicine visit is appropriate and effective for the patient's care.    The following type of visit was completed:  Video Visit:  The [employer] platform uses a synchronous HIPAA compliant video stream for this encounter.  Patient has given verbal consent for video visit? Yes    Visit Info Length of Visit: 45 minutes  Arrived: On Time   Exercise/Activity   Activity/Self Cares:   Breathing/Relaxation/guided imagery 2-3x daily x 5'  Walking program 5' x 2 daily; uses cane or walker sometimes  HEP: performed seated arm exercises x 3'; pt will look for gentle seated yoga or fitness youtube exercise videos  Mini-breaks: next  Pacing: instructed in pacing/energy conservation  Sensory calming / Pain Flare Plan: next       Patient's goals for physical therapy: improve fitness level and reduce pain level and medication use    Notes: Still having trouble with nausea and dizziness so not able to walk daily.  Pain is better overall, except for back pain. Can tolerate positions including sitting/reclining, standing/walking or on all " "fours.  Pt reports walking around the block for 5', but not every day.   Has done yoga/deep breathing in the past.  Close eyes; concentrate on \"Mother Earth\"; take some nice deep breaths and slow breathing down; continue for several minutes.  Performed guided imagery exercise.   Pt unable to find \"yoga box\" that she has used in the past for ideas of daily self care     Demonstrates/Verbalizes Technique: 5 (1= poor technique-difficulty performing exercises,significant cues required; 5= good technique-performs exercises without cues)  Body Awareness: 3 (1=low awareness; 5=high awareness)  Posture/Stability: 3 (1= poor posture, stability; 5= good posture, stability)   Motivational Level:   Ask questions, Eager to learn and Cooperative Participation:  Full   Patient Satisfaction:  satisfied   Response to Teaching:   enthusiastic, cooperative and asked questions  Factors that affect learning: None   Plan of Care  Continue with current plan of care: Focus next session including progress walking program as able, monitor breathing/relaxation; add HEP as able; add mini breaks/postrue, pacing and sensory calming/pain flare plan   Therapist: Renae Romero, PT                  Date: 8/3/2020        Video-Visit Details     Type of service:  Video Visit  Video Start Time: 3:00 pm  Video End Time: 3:45 pm  Originating Location (pt. Location): Home  Distant Location (provider location):  [facility]   Platform used for Video Visit: Alek                                                                                      "

## 2020-08-03 NOTE — PROGRESS NOTES
"Betsey Vanessa is a 60 year old female who is being evaluated via a billable telephone visit.      The patient has been notified of following:     \"This telephone visit will be conducted via a call between you and Danisha Hayes PsyD LP. We have found that certain health care needs can be provided without the need for an in-person session.  This service lets us provide the care you need with a phone conversation.       If during the course of the call I feel a telephone visit is not appropriate, you will not be charged for this service.\"      Reviewed that patient is in a quiet private place, no recording without permission, all apps and notifications of any devices are turned off. Began the session by talking about the risks and benefits of telehealth, what to do if there is a break in the connection, reviewed the safety protocol for during and after sessions. The purpose of using telehealth for this session was due to the COVID-19 pandemic and was to reduce the spread of the disease and protect both the clinician and client.             Patient has given verbal consent for telephone visit? YES    Phone call contact time  Call Started at 2:07 pm - patient was unable  Call Ended at 2:57 PM  Total 50 minutes     Pain Diagnoses per pain provider:   Chronic pain syndrome      Rheumatoid arthritis of multiple sites without rheumatoid factor (H)      Fibromyalgia         DATA: Discussed ongoing nausea that has been primary rationale for frequent cancellations. Patient reports her pain is primarily manageable - she reports she has been using less pain medications however still experiences pain flares. Patient's mood is variable however very low some days - states she doesn't have thoughts of killing herself, however at times her mood is overwhelmed by her pain, nausea and dizziness. Activity level is variable - depends on her balance and nausea. Stress level is stable. Sleep hygiene is 'all over the map' - sleeps best in " the morning. Tends to lay in bed for a 'long time' before falling asleep - discussed some basic sleep hygiene tips. Discussed paying attention to cues regarding feeling sleepy, as she tends to push through this and then struggles to fall asleep. Patient reports engaging in self-care for her pain 2-3 times per day. Discussed adding imagery to her breathing technique through the use of calming words. She has been using a massager on her hands, she has not been using it on her back or neck. She would like to connect with Bailee Houser MD to discuss injections that she had suggested at last visit.    ASSESSMENT: Easily engaged. Seems open to exploration of modifications to skills she is already utilizing, seems to also recognize that regular appointments will be most beneficial.  Progress toward goals: as expected.    Pain Status: unchanged and remained stable    Emotional Status: had fluctuating course              Medication / chemical use concerns:  None    PLAN:   Next Appointment: Betsey Vanessa's next appointment is scheduled for 8/20 at 2:00 pm.  Assignment/Objectives /interventions for next session: Practice breathing exercise as discussed on daily basis.    Telephone-Visit Details    Type of service:  Telephone Visit    Originating Location (pt. Location): Home    Distant Location (provider location):  [facility]     Mode of Communication:  Telephone    I have reviewed the note as documented above.  This accurately captures the substance of my conversation with the patient.    Danisha Hayes PsyD LP  Licensed Psychologist  Outpatient Clinic Therapist  [facility]    Disclaimer: This note consists of symbols derived from keyboarding, dictation and/or voice recognition software. As a result, there may be errors in the script that have gone undetected. Please consider this when interpreting information found in this chart.

## 2020-08-03 NOTE — PROGRESS NOTES
"Betsey Vanessa is a 60 year old female who is being evaluated via a billable telephone visit.      The patient has been notified of following:     \"This telephone visit will be conducted via a call between you and your physician/provider. We have found that certain health care needs can be provided without the need for a physical exam.  This service lets us provide the care you need with a short phone conversation.  If a prescription is necessary we can send it directly to your pharmacy.  If lab work is needed we can place an order for that and you can then stop by our lab to have the test done at a later time.    Telephone visits are billed at different rates depending on your insurance coverage. During this emergency period, for some insurers they may be billed the same as an in-person visit.  Please reach out to your insurance provider with any questions.    If during the course of the call the physician/provider feels a telephone visit is not appropriate, you will not be charged for this service.\"    Patient has given verbal consent for Telephone visit?  Yes    What phone number would you like to be contacted at? [phone]    How would you like to obtain your AVS? [facility]    Rheumatology Telephone/TeleHealth Visit      Betsey Vanessa MRN# [id number]   YOB: 1960 Age: 60 year old      Date of visit: 8/03/20   PCP: Dr. Velma Ibanez  Referring provider: Dr. Velma Ibanez    Chief Complaint   Patient presents with:  Arthritis: RA no new concerns, stiffness or pain    Assessment and Plan     1.  Rheumatoid arthritis: Reportedly diagnosed many years ago.  Previously treated by Dr. Stacy at [facility] in [place], [place]. Previously on methotrexate + rituximab, Enbrel + leflunomide; had elevated liver enzymes preventing oral DMARDs; most recent effective tx was with tocilizumab 8mg/kg IV monthly per her last rheumatology notes.  ALEGRIA hx prevents several oral DMARDs and LFT " elevations with MTX in the past. Reviewed her dx and tx options. Started Actemra 4mg/kg IV but this resulted in lower platelets and she doesn't find much benefit from actemra this time. Stopped Actemra because of the thrombocytopenia; started Orencia with the first dose 5/7/2020. Improved with Orencia; less inflammatory joint symptoms; less swelling. Continue Orencia.  - Continue orencia 750mg IV every 4 weeks:   - Labs within the next 2 months:  CBC, Creatinine, Hepatic Panel, ESR, CRP    2. Sjogren's Syndrome: reportedly had punctal plugs in the past.  Doing okay with artificial tears, Xiidra, and Evoxac.  Encouraged frequent sips of water, visits with a dentist at least every 6months, avoidance of sugary foods/drinks.     3. Chronic pain syndrome: to follow with the Pain Clinic     4. Osteopenia: based on 7/2019 DEXA report that she brought with her; FRAX score shows a 22% risk of major osteoporotic fracture in the next 10 years and a 1.9% risk for osteoporotic hip fracture in the next 10 years.  Based on FRAX tx is indicated.  She already received one dose of boniva in Sept 2019; and failed alendronate due to GI upset.  Reclast received 2/13/2020.   - Continue calcium 1000mg daily  - Continue vitamin D 1000 IU daily  - Reclast: next dose in February 2021  - Labs within the next 2 months: Calcium, vitamin D    6. Thrombocytopenia: see #1.    # Relevant labs and imaging were reviewed with the patient    # High risk medication toxicity monitoring: discussion and labs reviewed; appropriate labs ordered. See above.  Instructed that if confirmed to have COVID-19 or exposure to someone with confirmed COVID-19 to call this clinic for directions on DMARD management.    # Note that this is a virtual visit to reduce the risk of COVID-19 exposure during this current pandemic.      # Considered to be at high risk of complications from the COVID-19 virus.  It is recommended to limit contact with other people and if possible  "to work remotely or provide a leave of absence to reduce the risk for COVID-19.      Ms. Vanessa verbalized agreement with and understanding of the rational for the diagnosis and treatment plan.  All questions were answered to best of my ability and the patient's satisfaction. Ms. Vanessa was advised to contact the clinic with any questions that may arise after the clinic visit.      Thank you for involving me in the care of the patient    Return to clinic: 3 months    HPI   Betsey Vanessa is a 60 year old female with a past medical history significant for hypertension, depression, type 2 diabetes, Ménière's disease, and rheumatoid arthritis who is seen in consultation for follow-up of RA.     8/16/2019 documentation by Dr. Juani Stacy at [facility] ([place], [place]) notes rheumatoid arthritis and thrombocytopenia    1/8/2020 clinic note by Dr. Ibanez documents Ménière's disease-referring to ENT.  Diabetes.  Hypertension.  Rheumatoid arthritis and Sjogren's syndrome currently on Evoxac, Celebrex, IV Tocilizumab monthly.  Also on long-term narcotics that includes fentanyl patch for chronic pain on clonazepam for sleep at night.  Valvular heart disease with mitral valve stenosis, mitral valve regurgitation, and severe aortic valve stenosis and was following with a cardiologist with plans to establish with a new cardiologist here in [place].  Depression stable on Lexapro.    Recently moved from [place] to [place]    Previously, Ms. Vanessa reports that she has RA diagnosed many years ago, chronic pain syndrome with plans to establish in the pain clinic, depression that is controlled, diabetes tx'd with oral medications, and hearing loss with plans to see ENT here.  Also states that she has \"bad bones\" and has been on Boniva injection x1 dose in February. Failed fosamax because of associated GI upset; used for 6 weeks. [facility] Stacy.  Return " (treated with methotrexate because liver enzyme elevations, Remicade that was ineffective and caused hives, Enbrel that she does not recall helping in any way, and more recently Actemra 4 mg/kg IV every 8 weeks that has been used for years, last received in September 2019.  Also with Celebrex.  She says that since her last dose of Actemra she has not seen any worsening of her arthritis.  Still with some pain at her MCPs that is worse in the morning and improves with time and activity.  Morning stiffness for approximately 1 hour.  Positive gelling phenomenon.  Diffuse body pain.    5/18/2020:  pain at her feet, elbows, hands, lower back, knees.  She says that she has swelling; then her daughter who was also on the phonecall asked where she was swollen and Ms. Vanessa said that she wasn't swollen right now.  Peripheral joint pain is bad all day; she doesn't discern between AM, noon, or PM.  Activity doesn't affect her joint pain.  Felt better when on actemra in the past. Her daughter said that her mother seemed to respond well to actemra, but variable - sometimes helping for 1 week after infusion, sometimes for 3 weeks after, and sometimes not at all. Felt like in the past it was more effective.     Today, 8/3/2020: feet and kness worse in the evening, worse with activity; better with rest.  MCPs feel better with orencia; no longer with swelling.  MCPs sometimes better activity, sometimes worse. Morning stiffness may be from zero to 120 min. Tolerating Orencia well. Missed orencia once in July with no worsening symptoms; had worse meniere's at the time so she skipped a dose.  No bruising or bleeding.    Denies fevers, chills, nausea, or vomiting.  Occasional constipation or diarrhea. No abdominal pain. No chest pain/pressure, palpitations, or shortness of breath. No LE swelling.  Chronic neck and lower back pain that were managed in the pain clinic previously and she plans to establish care in the pain clinic here  soon. No oral or nasal sores.  No rash.  Dry eyes treated with artificial tears and Xiidra; she plans to establish with an ophthalmologist because she was followed by ophthalmology when in [place]; hx of punctal plugs.  Dry mouth is treated with frequent sips of water and Evoxac; doesn't recall ever being on pilocarpine. Evoxac is listed in her allergy list and she says that is inaccurate and then pulls out her Evoxac Rx to show that she has it and she says it helps and she's never had an adverse reaction to Evoxac.  No photosensitivity or photophobia.  No history of uveitis. No history of inflammatory bowel disease.  No history of DVT, pulmonary embolism, or miscarriage.   No history of serositis.  No history of Raynaud's Phenomenon.  No seizure history.   No known renal disorder.      Tobacco: Quit smoking in 1998  EtOH: None  Drugs: None    ROS   GEN: No fevers, chills, night sweats, or weight change  SKIN: No itching, rashes, sores  HEENT: No epistaxis. No oral or nasal ulcers.  CV: No chest pain, pressure, palpitations, or dyspnea on exertion.  PULM: No SOB, wheeze, cough.  GI:  No blood in stool. No abdominal pain.  See HPI  : No blood in urine.  MSK: See HPI.  NEURO: No numbness or tingling  ENDO: No heat/cold intolerance.  EXT: No LE swelling  PSYCH: See HPI    Active Problem List     Patient Active Problem List   Diagnosis     Type 2 diabetes mellitus with hyperglycemia, without long-term current use of insulin (H)     Hypertension goal BP (blood pressure) < 140/90     Meniere's disease, unspecified laterality     Rheumatoid arthritis, involving unspecified site, unspecified rheumatoid factor presence (H)     Valvular heart disease     Fibromyalgia     Mild major depression (H)     Osteopenia of multiple sites     History of bisphosphonate therapy     Post-menopausal     Benzodiazepine dependence, episodic (H)     Continuous opioid dependence (H)     Mitral stenosis     Age-related osteoporosis  without current pathological fracture     Mixed conductive and sensorineural hearing loss of right ear     Polyarticular arthritis     Encounter for long-term use of opiate analgesic     Chronic pain     Tympanic membrane perforation     Myofascial pain     Migraine with vertigo     Past Medical History     Past Medical History:   Diagnosis Date     Anemia     r/t menses     Anxiety      Arthritis      Constipation      Depression      Diabetes (H)      Diarrhea      Double vision      Headache(784.0)      Hearing loss      Heart murmur      Heartburn      Hypertension      Indigestion      Nasal congestion      Night sweats      Numbness      Problems related to lack of adequate sleep      Rash      Sneezing      Tinnitus      Visual loss      Weakness      Weight gain     because of Prednisone     Past Surgical History     Past Surgical History:   Procedure Laterality Date     ------------OTHER-------------      D&C     HYSTERECTOMY       INNER EAR SURGERY       RELEASE CARPAL TUNNEL BILATERAL Bilateral 2008     Allergy     Allergies   Allergen Reactions     Duloxetine Other (See Comments)     Topiramate Other (See Comments)     Other reaction(s): Confusion     Amitriptyline Embonate [Amitriptyline]      Hyper activity     Azithromycin Hives     Cymbalta      Hyper activity     Gabapentin Hives     Hmg-Coa-R Inhibitors Other (See Comments)     Liver function studies abnormal on Lipitor / Crestor     Macrobid [Nitrofurantoin] Hives     Paxil [Paroxetine] Other (See Comments)     Hyper behavior     Penicillins Hives     Tetracycline Hives     Current Medication List     Current Outpatient Medications   Medication Sig     acetic acid-hydrocortisone (VOSOL-HC) 1-2 % otic solution Place 5 drops into the right ear daily     celecoxib (CELEBREX) 200 MG capsule Take 1 capsule (200 mg) by mouth 2 times daily as needed for moderate pain 2 x a day     cevimeline (EVOXAC) 30 MG capsule Take 1 capsule (30 mg) by mouth 3 times  daily     clonazePAM (KLONOPIN) 0.5 MG tablet Take 1 tablet (0.5 mg) by mouth 2 times daily as needed for anxiety OK to take 1-2 tabs at night as needed for sleep.  Should last 30 days.     diclofenac (VOLTAREN) 1 % topical gel Place 2-4 g onto the skin 4 times daily . Max of 8g per dose. No more than 32g/day     docusate sodium (COLACE) 100 MG capsule Take 1 capsule (100 mg) by mouth 2 times daily     EPINEPHrine 0.3 MG/0.3ML SOLN PRN for Anaphylaxis     escitalopram (LEXAPRO) 20 MG tablet Take 1 tablet (20 mg) by mouth daily     fentaNYL (DURAGESIC) 12 mcg/hr 72 hr patch Place 1 patch onto the skin every 72 hours remove old patch. Fill 7/15/20 to start 7/16/20     glucose blood VI test strips strip daily     HYDROcodone-acetaminophen (NORCO)  MG per tablet Take 0.5-1 tablets by mouth 3 times daily as needed for severe pain Put 4-6 hours between dose.  Fill on/after 8/1 to start on/after 8/3     lifitegrast (XIIDRA) 5 % opthalmic solution Place 1 drop into both eyes 2 times daily     medical cannabis (Patient's own supply) Take 1 Dose by mouth See Admin Instructions (The purpose of this order is to document that the patient reports taking medical cannabis.  This is not a prescription, and is not used to certify that the patient has a qualifying medical condition.)     metFORMIN (GLUCOPHAGE) 500 MG tablet Take 2 tablets (1,000 mg) by mouth 2 times daily (with meals) 1000 mg 2 x a day     metroNIDAZOLE (METROGEL) 0.75 % gel Apply to face daily     naloxone (NARCAN) 4 MG/0.1ML nasal spray Spray 1 spray (4 mg) into one nostril alternating nostrils once as needed for opioid reversal every 2-3 minutes until assistance arrives (Patient not taking: Reported on 6/30/2020)     nystatin (MYCOSTATIN) 002134 UNIT/GM POWD 2 x daily PRN for rash     omeprazole (PRILOSEC) 20 MG capsule Take by mouth daily 2 x daily     potassium chloride ER (KLOR-CON M) 10 MEQ CR tablet Take 1 tablet (10 mEq) by mouth 2 times daily 20 mEq  daily Take by mouth 2 times daily 20 mEq daily     pravastatin (PRAVACHOL) 20 MG tablet Take 1 tablet (20 mg) by mouth daily     PROAIR  (90 Base) MCG/ACT inhaler INHALE 2 PUFFS BY MOUTH EVERY 6 HOURS AS NEEDED FOR WHEEZING     promethazine (PHENERGAN) 25 MG suppository Place 25 mg rectally every 6 hours as needed For nusea     promethazine (PHENERGAN) 25 MG tablet Take 1 tablet (25 mg) by mouth every 6 hours as needed for nausea or other (Meniere's) For nusea     psyllium (METAMUCIL) 58.6 % POWD Take by mouth daily PRN     STATIN NOT PRESCRIBED (INTENTIONAL) Please choose reason not prescribed, below     triamterene-HCTZ (DYAZIDE) 37.5-25 MG capsule Take 1 capsule by mouth daily 1 capsule every morning1 capsule every morning     vitamin D3 (CHOLECALCIFEROL) 2000 units (50 mcg) tablet Take 1 tablet (2,000 Units) by mouth daily     No current facility-administered medications for this visit.          Social History   See HPI    Family History     Family History   Problem Relation Age of Onset     C.A.D. Mother      Alzheimer Disease Mother      Cardiovascular Mother      Eye Disorder Mother      Thyroid Disease Mother      Hypertension Other         grandmother     Cerebrovascular Disease Father      Alcohol/Drug Father      Depression Father      Obesity Father         aunt     Asthma Father      Alcohol/Drug Brother      Alcohol/Drug Sister         aunt     Allergies Other         All family members     Arthritis Other         aunt     Obesity Sister      Thyroid Disease Sister      Asthma Sister         daughter       Physical Exam     Temp Readings from Last 3 Encounters:   07/20/20 98.8  F (37.1  C) (Oral)   06/30/20 99.2  F (37.3  C) (Tympanic)   06/04/20 98.2  F (36.8  C) (Oral)     BP Readings from Last 5 Encounters:   07/20/20 135/67   06/30/20 138/72   06/04/20 123/71   06/04/20 137/63   05/21/20 137/69     Pulse Readings from Last 1 Encounters:   07/20/20 81     Resp Readings from Last 1 Encounters:  "  06/30/20 16     Estimated body mass index is 29.78 kg/m  as calculated from the following:    Height as of 2/4/20: 1.562 m (5' 1.5\").    Weight as of 7/20/20: 72.7 kg (160 lb 3.2 oz).    Telephone visit    Labs / Imaging (select studies)   RF/CCP  Recent Labs   Lab Test 01/14/20  1221   CCPIGG 1   RHF 20*     CBC  Recent Labs   Lab Test 04/09/20  1226 02/07/20  1040 01/14/20  1221   WBC 3.8* 6.7 5.0   RBC 4.37 4.23 4.15   HGB 12.7 11.9 12.2   HCT 38.1 36.4 35.5   MCV 87 86 86   RDW 14.0 13.2 12.7   PLT 88* 122* 138*   MCH 29.1 28.1 29.4   MCHC 33.3 32.7 34.4   NEUTROPHIL 48.3 66.8 58.5   LYMPH 39.5 22.4 30.8   MONOCYTE 7.4 7.1 7.1   EOSINOPHIL 4.2 3.1 3.4   BASOPHIL 0.3 0.3 0.2   ANEU 1.8 4.5 3.0   ALYM 1.5 1.5 1.6   MOISE 0.3 0.5 0.4   AEOS 0.2 0.2 0.2   ABAS 0.0 0.0 0.0     CMP  Recent Labs   Lab Test 04/09/20  1226 02/07/20  1040 01/14/20  1221     --  137   POTASSIUM 4.0  --  4.0   CHLORIDE 108  --  103   CO2 27  --  28   ANIONGAP 6  --  6   *  --  120*   BUN 17  --  13   CR 0.70  --  0.70   GFRESTIMATED >90  --  >90   GFRESTBLACK >90  --  >90   MIRNA 9.1 9.6 9.5   BILITOTAL 0.4  --  0.5   ALBUMIN 4.0  --  4.0   PROTTOTAL 7.4  --  7.9   ALKPHOS 80  --  111   AST 36  --  30   ALT 52*  --  37     Calcium/VitaminD  Recent Labs   Lab Test 04/09/20  1226 02/07/20  1040 01/14/20  1221   MIRNA 9.1 9.6 9.5   VITDT  --   --  40     ESR/CRP  Recent Labs   Lab Test 01/14/20  1221   SED 27   CRP 7.6     Hepatitis B  Recent Labs   Lab Test 01/14/20  1221   HBCAB Nonreactive   HEPBANG Nonreactive     Hepatitis C  Recent Labs   Lab Test 01/14/20  1221   HCVAB Nonreactive     Lyme ab screening  Recent Labs   Lab Test 01/14/20  1221   LYMEGM 0.05     Tuberculosis Screening  Recent Labs   Lab Test 01/14/20  1221   TBRES Negative     [facility]   7/18/2013 CCP negative  8/5/2010 CCP negative  3/27/2013 hepatitis B surface antigen negative and hepatitis B core antibody negative  9/2/2011 hepatitis " B surface antigen negative and hepatitis B core antibody negative  3/27/2013 hepatitis C antibody negative    Immunization History     Immunization History   Administered Date(s) Administered     FLU 6-35 months 10/22/2013     Influenza Vaccine IM > 6 months Valent IIV4 10/21/2015, 10/05/2016, 10/11/2018, 09/25/2019     Influenza Vaccine, 6+MO IM (QUADRIVALENT W/PRESERVATIVES) 09/18/2014, 10/03/2017     Pneumo Conj 13-V (2010&after) 10/11/2018     TD (ADULT, 7+) 04/20/1997, 04/17/2019     TDAP Vaccine (Adacel) 01/15/2009     Zoster vaccine recombinant adjuvanted (SHINGRIX) 10/11/2018          Chart documentation done in part with Dragon Voice recognition Software. Although reviewed after completion, some word and grammatical error may remain.    Phone call start time: 11:10 AM  Phone call end time: 11:23 AM    This visit is equivalent to a 40081 visit    Location of patient: home, in [place]  Location of provider: home    Follow up:  follow up appointment scheduled to be in Woo Carrillo MD

## 2020-08-03 NOTE — PATIENT INSTRUCTIONS
Dr. Carrillo s Rheumatology Clinics  Locations Clinic Hours Telephone Number     [facility]  [address] [facility] [address] [facility]  [place] [address]   Wednesday: 7:20AM - 4:00PM  Thursday:     7:20AM - 4:00PM  Friday:          7:20AM - 11:00AM   To schedule an appointment with   Dr. Carrillo,   please contact   Specialty [phone]       [facility]ra Orozco  [address] [facility] [address]  [place] [address]   Monday:       7:20AM - 4:00PM         [place] [address] [place] [address]   Tuesday:      7:20AM - 4:00PM          [facility] (hours may change)  Locations Pharmacy Hours Telephone Numbers     [facility]  [address]  [place] [address]   Monday - Friday: 8:00AM - 8:00PM  Saturday:             8:00AM - 1:00PM [phone]     Yashira Orozco  [address] [facility] [address]  [place] [address]   Monday-Wed:      8:30AM-7:00PM  :          8:30AM-6:00PM  Saturday:             9:00AM-1:00PM [phone]       [facility] (hours may change)  Locations Pharmacy Hours Telephone Numbers     [facility]  [address] [address] [place] [address] [place] [address]   Monday - Friday: 8:00AM - 5:00PM [phone]     [facility]  [address]  [address]  [place] [address]   Monday - Friday: 8:00AM - 4:30PM [phone]     [facility]   [facility]  [address] [place] [address]   Monday - Friday: 8:00AM - 4:30PM [phone]     [facility]  [address] [facility]  [address]  [place] [address]   Monday - Friday:   7:30AM - 6:00PM  Saturday-: 7:00AM - 3:30PM [phone]       [facility] (hours may change)  Urgent Care Locations Urgent Care Hours Telephone Numbers     [facility]  [address] [place] [address]     Monday-Friday:    11:00AM - 9:00PM    Saturday-: 9:00AM - 5:00PM   [phone]     [place]  [place]  [address] [facility] [place] [address]     Monday-Friday:     5:00PM - 9:00PM    Saturday-Sunday: 9:00AM - 5:00PM   [phone]     [facility]  [address] [place] [address]   Monday - Sunday: Noon - 8:00PM [phone]

## 2020-08-05 ENCOUNTER — OFFICE VISIT (OUTPATIENT)
Dept: OTOLARYNGOLOGY | Facility: CLINIC | Age: 60
End: 2020-08-05
Payer: COMMERCIAL

## 2020-08-05 VITALS
HEART RATE: 74 BPM | DIASTOLIC BLOOD PRESSURE: 70 MMHG | SYSTOLIC BLOOD PRESSURE: 127 MMHG | BODY MASS INDEX: 29.56 KG/M2 | WEIGHT: 159 LBS | RESPIRATION RATE: 20 BRPM | OXYGEN SATURATION: 95 %

## 2020-08-05 DIAGNOSIS — H92.11 OTORRHEA, RIGHT: Primary | ICD-10-CM

## 2020-08-05 DIAGNOSIS — H81.03 MENIERE'S DISEASE, BILATERAL: ICD-10-CM

## 2020-08-05 DIAGNOSIS — H72.91 PERFORATION OF RIGHT TYMPANIC MEMBRANE: ICD-10-CM

## 2020-08-05 ASSESSMENT — PAIN SCALES - GENERAL: PAINLEVEL: MODERATE PAIN (4)

## 2020-08-05 NOTE — LETTER
8/5/2020      RE: Betsey Vanessa  [address]  [facility] [address]       Betsey Vanessa is seen for right ear bleeding and drainage as well as continued vertigo with nausea. Her last culture in June showed only skin organisms on the right and was prescribed VosolHC for that side. She also had a PE tube placed on the left for gentamicin instillation. Her daughter thinks that her symptoms did improve with the gentamicin treatment but she's still having symptoms.    Physical examination:  female in no acute distress.  Alert and answering questions appropriately.  HB 1/6 bilaterally.  Both ears examined under the microscope. Right meatal skin with crusting and flaking, ear canal with dried crust, no otorrhea though, TM with a stable anterior 30% perforation with otorrhea in the middle ear which was cultured, middle ear mucosa healthy appearing, anterior superior edge of the TM with a polyp which was suctioned off, slight granulation on the posterior edge of the TM, remainder of the TM healthy. Left ear canal clear, PE tube in the inferior quadrant and open with an aerated middle ear. Right BAHA abutment clean, scalp healthy around it.    Assessment and plan:  Continued right chronic otorrhea with some inflammation of the TM. Some of the otorrhea may be middle ear effusion coming through the perforation but there is granulation tissue and a polyp on the TM but the middle ear itself looks improved from her last visit. We will call with the culture results. If negative, she may benefit from VosolHC or boric acid powder.    With regards to her continued vertigo and imbalance with nausea, we discussed that, since she did find benefit from the gentamicin instillation, she could repeat another course for 2 weeks in case there is still some residual function remaining. The recommendation for vestibular therapy still stands. Also, she continues to have migraines which may be contributing to her symptoms as well and we encouraged  her to visit with Neurology to discuss headache management. She and her daughter had their questions answered.    Please note that more than 50% of this 15 minute visit were spent in consultation primarily discussing her continued vertigo and nausea and potential treatment options for that.    Laura Stallings MD

## 2020-08-05 NOTE — PATIENT INSTRUCTIONS
1. You were seen in the ENT Clinic today by Dr. Stallings .  If you have any questions or concerns after your appointment, please call   - Option 1: ENT Clinic: [phone]   - Option 2: Silvia (Dr. Stallings's Nurse): [phone]     2.   Ear culture/fungus done    Ni Galvan LPN  [facility] - Otolaryngology

## 2020-08-05 NOTE — NURSING NOTE
Chief Complaint   Patient presents with     RECHECK     follow up      Blood pressure 127/70, pulse 74, resp. rate 20, weight 72.1 kg (159 lb), SpO2 95 %.    Leonides Cook LPN

## 2020-08-05 NOTE — Clinical Note
8/5/2020       RE: Betsey Vanessa  [address]  [place] [address]     Dear Colleague,    Thank you for referring your patient, Betsey Vanessa, to the [facility] at [facility]. Please see a copy of my visit note below.    Betsey Vanessa is seen for right ear bleeding and drainage as well as continued vertigo with nausea. Her last culture in June showed only skin organisms on the right and was prescribed VosolHC for that side. She also had a PE tube placed on the left for gentamicin instillation. Her daughter thinks that     Review of systems:  ***    Physical examination:  female in no acute distress.  Alert and answering questions appropriately.  HB 1/6 bilaterally.  Both ears examined under the microscope. Right meatal skin with crusting and flaking, ear canal with dried crust, no otorrhea though, TM with a stable anterior 30% perforation with otorrhea in the middle ear which was cultured, middle ear mucosa healthy appearing, anterior superior edge of the TM with a polyp which was suctioned off, slight granulation on the posterior edge of the TM, remainder of the TM healthy. Left ear canal clear, PE tube in the inferior quadrant and open with an aerated middle ear. Right BAHA abutment clean, scalp healthy around it.    Audiogram:  ***    Assessment and plan:  ***      Again, thank you for allowing me to participate in the care of your patient.      Sincerely,    Laura Stallings MD

## 2020-08-05 NOTE — PROGRESS NOTES
Betsey Vanessa is seen for right ear bleeding and drainage as well as continued vertigo with nausea. Her last culture in June showed only skin organisms on the right and was prescribed VosolHC for that side. She also had a PE tube placed on the left for gentamicin instillation. Her daughter thinks that her symptoms did improve with the gentamicin treatment but she's still having symptoms.    Physical examination:  female in no acute distress.  Alert and answering questions appropriately.  HB 1/6 bilaterally.  Both ears examined under the microscope. Right meatal skin with crusting and flaking, ear canal with dried crust, no otorrhea though, TM with a stable anterior 30% perforation with otorrhea in the middle ear which was cultured, middle ear mucosa healthy appearing, anterior superior edge of the TM with a polyp which was suctioned off, slight granulation on the posterior edge of the TM, remainder of the TM healthy. Left ear canal clear, PE tube in the inferior quadrant and open with an aerated middle ear. Right BAHA abutment clean, scalp healthy around it.    Assessment and plan:  Continued right chronic otorrhea with some inflammation of the TM. Some of the otorrhea may be middle ear effusion coming through the perforation but there is granulation tissue and a polyp on the TM but the middle ear itself looks improved from her last visit. We will call with the culture results. If negative, she may benefit from VosolHC or boric acid powder.    With regards to her continued vertigo and imbalance with nausea, we discussed that, since she did find benefit from the gentamicin instillation, she could repeat another course for 2 weeks in case there is still some residual function remaining. The recommendation for vestibular therapy still stands. Also, she continues to have migraines which may be contributing to her symptoms as well and we encouraged her to visit with Neurology to discuss headache management. She and her  daughter had their questions answered.    Please note that more than 50% of this 15 minute visit were spent in consultation primarily discussing her continued vertigo and nausea and potential treatment options for that.

## 2020-08-07 LAB
BACTERIA SPEC CULT: NORMAL
BACTERIA SPEC CULT: NORMAL
SPECIMEN SOURCE: NORMAL

## 2020-08-07 NOTE — PROGRESS NOTES
Clinic Care Coordination Contact  Community Health Worker Initial Outreach    Patient accepts CC:  Yes, Patient scheduled for assessment with RN CC on 8/11 @2pm.    The Clinic Community Health Worker spoke with the patient today to discuss possible Clinic Care Coordination enrollment. The service was described to the patient and immediate needs were discussed. The patient agreed to enrollment and an assessment was scheduled. The patient was provided with contact information for the clinic CHW.             RE-SCHEDULED Assessment date: 8/11 @2pm    Viki VALENTINE Community Health Worker  [facility] Clinic Care Coordination  [facility]  Phone: [phone]

## 2020-08-11 ENCOUNTER — PATIENT OUTREACH (OUTPATIENT)
Dept: CARE COORDINATION | Facility: CLINIC | Age: 60
End: 2020-08-11

## 2020-08-11 NOTE — PROGRESS NOTES
Clinic Care Coordination Contact  Care Team Conversations    Patient reported that this is not a good time to call and would like to reschedule for Friday at 1:00.       Korey Campbell RN  Primary Care Clinic RN Care Coordinator  [facility]   [phone]           Problem: Patient Care Overview (Adult)  Goal: Adult Individualization and Mutuality   03/08/18 0845   Individualization   Patient Specific Preferences none   Patient Specific Goals none   Patient Specific Interventions none   Mutuality/Individual Preferences   What Anxieties, Fears or Concerns Do You Have About Your Health or Care? none   What Questions Do You Have About Your Health or Care? none   What Information Would Help Us Give You More Personalized Care? none

## 2020-08-11 NOTE — LETTER
[place] CARE COORDINATION  [facility]  [address] [place] [address]  [phone]  August 17, 2020    Betsey Vanessa  [address]  [place] [address]      Dear Betsey,    I am a clinic care coordinator who works with Pb Ken MD at [facility]. I wanted to introduce myself and provide you with my contact information for you to be able to call me with any questions or concerns. Below is a description of clinic care coordination and how I can further assist you.      The clinic care coordination team is made up of a registered nurse,  and community health worker who understand the health care system. The goal of clinic care coordination is to help you manage your health and improve access to the health care system in the most efficient manner. The team can assist you in meeting your health care goals by providing education, coordinating services, strengthening the communication among your providers and supporting you with any resource needs.    Please feel free to contact me at [phone] with any questions or concerns. We are focused on providing you with the highest-quality healthcare experience possible and that all starts with you.     Sincerely,     Korey Campbell RN  Clinic Care Coordinator

## 2020-08-12 DIAGNOSIS — G89.4 CHRONIC PAIN SYNDROME: ICD-10-CM

## 2020-08-12 DIAGNOSIS — M06.09 RHEUMATOID ARTHRITIS OF MULTIPLE SITES WITHOUT RHEUMATOID FACTOR (H): ICD-10-CM

## 2020-08-12 RX ORDER — FENTANYL 12.5 UG/1
1 PATCH TRANSDERMAL
Qty: 10 PATCH | Refills: 0 | Status: SHIPPED | OUTPATIENT
Start: 2020-08-12 | End: 2020-09-15

## 2020-08-12 NOTE — TELEPHONE ENCOUNTER
Script Eprescribed to pharmacy    Will send this to MA team to notify patient.    Signed Prescriptions:                        Disp   Refills    fentaNYL (DURAGESIC) 12 mcg/hr 72 hr patch 10 pat*0        Sig: Place 1 patch onto the skin every 72 hours remove old           patch. Fill 8/13/20 to start 8/15/20  Authorizing Provider: SANTOS ISLAS MD  [facility]

## 2020-08-12 NOTE — TELEPHONE ENCOUNTER
Received fax   [facility] [address] [place] - [address]  [address] [place] [address]  Phone: [phone] Fax: [phone]     requesting refill(s) of fentaNYL (DURAGESIC) 12 mcg/hr 72 hr patch     Last  from pharmacy on 07/15/2020    Pt last seen by prescribing provider on 07/23/2020  Next appt scheduled for 09/25/2020     checked in the past 6 months? Yes If no, print current report and give to RN    Last urine drug screen date 05/15/2020 future- no resulted urine  Current opioid agreement on file (completed within the last year) No Date of opioid agreement: none found    E-prescribe to     [facility] [address] [place] [facility] [address] [facility]  [address] [place] [address]  Phone: [phone] Fax: [phone]      Will route to nursing pool for review and preparation of prescription(s).       Lisette Pabon MA  [facility]

## 2020-08-12 NOTE — TELEPHONE ENCOUNTER
Medication refill information reviewed.     Due date for  fentaNYL (DURAGESIC) 12 mcg/hr 72 hr patch  is 8/15/20     Prescriptions prepped for review.     Will route to provider.

## 2020-08-13 NOTE — PATIENT INSTRUCTIONS
Reminders:     Please remember to arrive 5-10 minutes early for your appointments. If you are late you may need to reschedule your appointment.    If you have mychart please be aware your results and communications will be sent within your mychart. Unless results are critical and/or urgent value.       Patient Education     Self-Care for Sinusitis     Drinking plenty of water can help sinuses drain.   Sinusitis can often be managed with self-care. Self-care can keep sinuses moist and make you feel more comfortable. Remember to follow your doctor's instructions closely. This can make a big difference in getting your sinus problem under control.  Drink fluids  Drinking extra fluids helps thin your mucus. This lets it drain from your sinuses more easily. Have a glass of water every hour or two. A humidifier helps in much the same way. Fluids can also offset the drying effects of certain medicines. If you use a humidifier, follow the product maker's instructions on how to use it. Clean it on a regular schedule.  Use saltwater rinses  Rinses help keep your sinuses and nose moist. Mix a teaspoon of salt in 8 ounces of fresh, warm water. Use a bulb syringe to gently squirt the water into your nose a few times a day. You can also buy ready-made saline nasal sprays.  Apply hot or cold packs  Applying heat to the area surrounding your sinuses may make you feel more comfortable. Use a hot water bottle or a hand towel dipped in hot water. Some people also find ice packs effective for relieving pain.  Medicines  Your doctor may prescribe medications to help treat your sinusitis. If you have an infection, antibiotics can help clear it up. If you are prescribed antibiotics, take all pills on schedule until they are gone, even if you feel better. Decongestants help relieve swelling. Use decongestant sprays for short periods only under the direction of your doctor. If you have allergies, your doctor may prescribe medications to help  relieve them.   Date Last Reviewed: 10/1/2016    2827-4674 [employer]. [address], [place] [address]. All rights reserved. This information is not intended as a substitute for professional medical care. Always follow your healthcare professional's instructions.           Patient Education     Preventing Sinusitis    Colds, flu, and allergies make it more likely for you to get sinusitis. Do your best to prevent sinusitis by preventing these problems. Do what you can to avoid getting colds and other infections. Stay away from things that cause allergies (allergens). Keep your sinuses as moist as you can.  Tips for air travel  When traveling on an airplane, use saline nasal spray to keep your sinuses moist. Drink plenty of fluids. You may also want to take a decongestant before you get on the plane.   Prevent colds  Do what you can to avoid being exposed to colds and flu. When possible, take more time to rest when you feel something  coming on.     Wash your hands often. This is especially important during cold and flu season. Try not to touch your face.    As much as possible, stay away from infected people.    Follow these standbys for staying healthy: Eat balanced meals, exercise regularly, and get plenty of sleep.  Stay away from allergens  First find out what things you re allergic to. Then take steps to stay away from allergens or irritants in the air such as dust, pollution, and pollen.    Wear a mask when you clean. Or consider hiring a  to help you stay away from dust.    Sit in the nonsmoking sections of restaurants.    Don't go outdoors during peak pollution hours such as rush hour.    Keep an air conditioner on during allergy season. Clean its filter regularly.    Ask your healthcare provider about a referral to have an allergy evaluation. Or ask for a referral to see an allergy specialist.  Boost moisture  Keeping your sinuses moist makes your mucus thinner. This  allows your sinuses to drain better. And this helps prevent infection. Ask your doctor about these suggestions:    Use a humidifier. Clean it often to remove any mold or mildew.    Drink several glasses of water a day.    Stay away from drying beverages such as alcohol and coffee.    Stay away from all types of smoke, which dries out sinus linings. This includes tobacco smoke and chemical smoke in workplace settings.    Use saltwater rinses.  Date Last Reviewed: 10/1/2016    5788-8909 [employer]. [address] [place] [address], [place] 8100767. All rights reserved. This information is not intended as a substitute for professional medical care. Always follow your healthcare professional's instructions.

## 2020-08-13 NOTE — PROGRESS NOTES
Subjective     Betsey Vanessa is a 60 year old female who presents to clinic today for the following health issues:    HPI       Diabetes Follow-up      How often are you checking your blood sugar? Not at all    What concerns do you have today about your diabetes? None     Do you have any of these symptoms? (Select all that apply)  No numbness or tingling in feet.  No redness, sores or blisters on feet.  No complaints of excessive thirst.  No reports of blurry vision.  No significant changes to weight.    Have you had a diabetic eye exam in the last 12 months? Yes- Date of last eye exam: 7/2020,  [facility]  Denies CP, SOB, unhealing sores, vision changes, dizziness        BP Readings from Last 2 Encounters:   08/14/20 136/81   08/05/20 127/70     LDL Cholesterol Calculated (mg/dL)   Date Value   04/09/2020 211 (H)         Hypertension Follow-up      Do you check your blood pressure regularly outside of the clinic? No     Are you following a low salt diet? Yes    Are your blood pressures ever more than 140 on the top number (systolic) OR more   than 90 on the bottom number (diastolic), for example 140/90? na      How many servings of fruits and vegetables do you eat daily?  0-1    On average, how many sweetened beverages do you drink each day (Examples: soda, juice, sweet tea, etc.  Do NOT count diet or artificially sweetened beverages)?   0    How many days per week do you exercise enough to make your heart beat faster? 3 or less    How many minutes a day do you exercise enough to make your heart beat faster? 9 or less    How many days per week do you miss taking your medication? 0     Worsening seasonal allergies for some time, sinus headaches, pressure, nasal congestion for more than 1.5 weeks.     Medication refills- nystatin for fungal skin, epi pen, and GERD.  Will trial a new cholesterol medication to see if she still has muscle pain with this.     Patient Active Problem List   Diagnosis     Type  2 diabetes mellitus with hyperglycemia, without long-term current use of insulin (H)     Hypertension goal BP (blood pressure) < 140/90     Meniere's disease, unspecified laterality     Rheumatoid arthritis, involving unspecified site, unspecified rheumatoid factor presence (H)     Valvular heart disease     Fibromyalgia     Mild major depression (H)     Osteopenia of multiple sites     History of bisphosphonate therapy     Post-menopausal     Benzodiazepine dependence, episodic (H)     Continuous opioid dependence (H)     Mitral stenosis     Age-related osteoporosis without current pathological fracture     Mixed conductive and sensorineural hearing loss of right ear     Polyarticular arthritis     Encounter for long-term use of opiate analgesic     Chronic pain     Tympanic membrane perforation     Myofascial pain     Migraine with vertigo     Gastroesophageal reflux disease, esophagitis presence not specified     Fungal skin infection     Anaphylaxis, sequela     Hyperlipidemia, unspecified hyperlipidemia type     Past Surgical History:   Procedure Laterality Date     ------------OTHER-------------      D&C     HYSTERECTOMY       INNER EAR SURGERY       RELEASE CARPAL TUNNEL BILATERAL Bilateral        Social History     Tobacco Use     Smoking status: Former Smoker     Start date: 1978     Last attempt to quit: 1998     Years since quittin.5     Smokeless tobacco: Never Used     Tobacco comment: 1 pack a day    Substance Use Topics     Alcohol use: No     Family History   Problem Relation Age of Onset     C.A.D. Mother      Alzheimer Disease Mother      Cardiovascular Mother      Eye Disorder Mother      Thyroid Disease Mother      Hypertension Other         grandmother     Cerebrovascular Disease Father      Alcohol/Drug Father      Depression Father      Obesity Father         aunt     Asthma Father      Alcohol/Drug Brother      Alcohol/Drug Sister         aunt     Allergies Other          All family members     Arthritis Other         aunt     Obesity Sister      Thyroid Disease Sister      Asthma Sister         daughter         Current Outpatient Medications   Medication Sig Dispense Refill     celecoxib (CELEBREX) 200 MG capsule Take 1 capsule (200 mg) by mouth 2 times daily as needed for moderate pain 2 x a day 180 capsule 1     cevimeline (EVOXAC) 30 MG capsule Take 1 capsule (30 mg) by mouth 3 times daily 270 capsule 3     clindamycin (CLEOCIN) 150 MG capsule Take 1 capsule (150 mg) by mouth 3 times daily for 7 days 21 capsule 0     clonazePAM (KLONOPIN) 0.5 MG tablet Take 1 tablet (0.5 mg) by mouth 2 times daily as needed for anxiety OK to take 1-2 tabs at night as needed for sleep.  Should last 30 days. 100 tablet 1     diclofenac (VOLTAREN) 1 % topical gel Place 2-4 g onto the skin 4 times daily . Max of 8g per dose. No more than 32g/day 100 g 3     docusate sodium (COLACE) 100 MG capsule Take 1 capsule (100 mg) by mouth 2 times daily 180 capsule 3     EPINEPHrine (ANY BX GENERIC EQUIV) 0.3 MG/0.3ML injection 2-pack Inject 0.3 mLs (0.3 mg) into the muscle as needed for anaphylaxis 2 each 0     EPINEPHrine 0.3 MG/0.3ML SOLN PRN for Anaphylaxis       escitalopram (LEXAPRO) 20 MG tablet Take 1 tablet (20 mg) by mouth daily 90 tablet 1     fentaNYL (DURAGESIC) 12 mcg/hr 72 hr patch Place 1 patch onto the skin every 72 hours remove old patch. Fill 8/13/20 to start 8/15/20 10 patch 0     fluvastatin (LESCOL) 20 MG capsule Take 1 capsule (20 mg) by mouth At Bedtime 90 capsule 1     glucose blood VI test strips strip daily       HYDROcodone-acetaminophen (NORCO)  MG per tablet Take 0.5-1 tablets by mouth 3 times daily as needed for severe pain Put 4-6 hours between dose.  Fill on/after 8/1 to start on/after 8/3 60 tablet 0     lifitegrast (XIIDRA) 5 % opthalmic solution Place 1 drop into both eyes 2 times daily 24 each 3     medical cannabis (Patient's own supply) Take 1 Dose by mouth See Admin  Instructions (The purpose of this order is to document that the patient reports taking medical cannabis.  This is not a prescription, and is not used to certify that the patient has a qualifying medical condition.)       metFORMIN (GLUCOPHAGE) 500 MG tablet Take 2 tablets (1,000 mg) by mouth 2 times daily (with meals) 1000 mg 2 x a day 360 tablet 3     metroNIDAZOLE (METROGEL) 0.75 % gel Apply to face daily       naloxone (NARCAN) 4 MG/0.1ML nasal spray Spray 1 spray (4 mg) into one nostril alternating nostrils once as needed for opioid reversal every 2-3 minutes until assistance arrives 0.2 mL 1     nystatin (MYCOSTATIN) 245196 UNIT/GM external powder Apply topically 2 times daily as needed 2 x daily PRN for rash 60 g 4     omeprazole (PRILOSEC) 20 MG DR capsule Take 1 capsule (20 mg) by mouth 2 times daily 2 x daily 180 capsule 1     potassium chloride ER (KLOR-CON M) 10 MEQ CR tablet Take 1 tablet (10 mEq) by mouth 2 times daily 20 mEq daily Take by mouth 2 times daily 20 mEq daily 180 tablet 3     PROAIR  (90 Base) MCG/ACT inhaler INHALE 2 PUFFS BY MOUTH EVERY 6 HOURS AS NEEDED FOR WHEEZING       promethazine (PHENERGAN) 25 MG suppository Place 25 mg rectally every 6 hours as needed For nusea       promethazine (PHENERGAN) 25 MG tablet Take 1 tablet (25 mg) by mouth every 6 hours as needed for nausea or other (Meniere's) For nusea 90 tablet 1     STATIN NOT PRESCRIBED (INTENTIONAL) Please choose reason not prescribed, below       triamterene-HCTZ (DYAZIDE) 37.5-25 MG capsule Take 1 capsule by mouth daily 1 capsule every morning1 capsule every morning 90 capsule 3     vitamin D3 (CHOLECALCIFEROL) 2000 units (50 mcg) tablet Take 1 tablet (2,000 Units) by mouth daily 90 tablet 3     psyllium (METAMUCIL) 58.6 % POWD Take by mouth daily PRN       Allergies   Allergen Reactions     Duloxetine Other (See Comments)     Topiramate Other (See Comments)     Other reaction(s): Confusion     Amitriptyline Embonate  "[Amitriptyline]      Hyper activity     Azithromycin Hives     Cymbalta      Hyper activity     Gabapentin Hives     Hmg-Coa-R Inhibitors Other (See Comments)     Liver function studies abnormal on Lipitor / Crestor     Macrobid [Nitrofurantoin] Hives     Paxil [Paroxetine] Other (See Comments)     Hyper behavior     Penicillins Hives     Tetracycline Hives     Recent Labs   Lab Test 04/09/20  1226 01/14/20  1221   *  --    HDL 41*  --    TRIG 325*  --    ALT 52* 37   CR 0.70 0.70   GFRESTIMATED >90 >90   GFRESTBLACK >90 >90   POTASSIUM 4.0 4.0   TSH  --  2.99      BP Readings from Last 3 Encounters:   08/14/20 136/81   08/05/20 127/70   07/20/20 135/67    Wt Readings from Last 3 Encounters:   08/14/20 72.7 kg (160 lb 3.2 oz)   08/05/20 72.1 kg (159 lb)   07/20/20 72.7 kg (160 lb 3.2 oz)                    Reviewed and updated as needed this visit by Provider         Review of Systems   Constitutional, HEENT, cardiovascular, pulmonary, GI, , musculoskeletal, neuro, skin, endocrine and psych systems are negative, except as otherwise noted.      Objective    /81   Pulse 86   Temp 98.6  F (37  C) (Tympanic)   Resp 16   Ht 1.57 m (5' 1.81\")   Wt 72.7 kg (160 lb 3.2 oz)   SpO2 94%   BMI 29.48 kg/m    Body mass index is 29.48 kg/m .  Physical Exam   GENERAL: healthy, alert and no distress  ENT: POSITIVE nasal mucosa edematous, erythematous, maxillary and frontal sinus pressure and pain with palpation/ bending forward.   EYES: Eyes grossly normal to inspection, PERRL and conjunctivae and sclerae normal  NECK: no adenopathy, no asymmetry, masses, or scars and thyroid normal to palpation  RESP: lungs clear to auscultation - no rales, rhonchi or wheezes  CV: regular rate and rhythm, normal S1 S2, no S3 or S4, no murmur, click or rub, no peripheral edema and peripheral pulses strong  MS: no gross musculoskeletal defects noted, no edema, no CVA tenderness  PSYCH: mentation appears normal, affect " "normal/bright    Diagnostic Test Results:  Labs reviewed in Epic  See orders        Assessment & Plan       ICD-10-CM    1. Type 2 diabetes mellitus with hyperglycemia, without long-term current use of insulin (H)  E11.65 HEMOGLOBIN A1C     FOOT EXAM   2. Hyperlipidemia, unspecified hyperlipidemia type  E78.5 Lipid panel reflex to direct LDL Fasting     fluvastatin (LESCOL) 20 MG capsule   3. Anaphylaxis, sequela  T78.2XXS EPINEPHrine (ANY BX GENERIC EQUIV) 0.3 MG/0.3ML injection 2-pack   4. Fungal skin infection  B36.9 nystatin (MYCOSTATIN) 467294 UNIT/GM external powder   5. Gastroesophageal reflux disease, esophagitis presence not specified  K21.9 omeprazole (PRILOSEC) 20 MG DR capsule   6. Other chronic pain  G89.29 Drug Abuse Screen Panel 13, Urine (Pain Care Package)   7. Screening for human immunodeficiency virus without presence of risk factors  Z11.4 HIV Screening   8. Acute sinusitis with symptoms > 10 days  J01.90 clindamycin (CLEOCIN) 150 MG capsule        BMI:   Estimated body mass index is 29.48 kg/m  as calculated from the following:    Height as of this encounter: 1.57 m (5' 1.81\").    Weight as of this encounter: 72.7 kg (160 lb 3.2 oz).   Weight management plan: Discussed healthy diet and exercise guidelines        See Patient Instructions: advised to take full course of ABX with daily yogurt or probiotics.  Follow up as needed    Return in about 3 months (around 11/14/2020), or if symptoms worsen or fail to improve.    Migdalia Chatterjee, PREM  [facility]    "

## 2020-08-14 ENCOUNTER — OFFICE VISIT (OUTPATIENT)
Dept: FAMILY MEDICINE | Facility: CLINIC | Age: 60
End: 2020-08-14
Payer: COMMERCIAL

## 2020-08-14 VITALS
TEMPERATURE: 98.6 F | HEIGHT: 62 IN | BODY MASS INDEX: 29.48 KG/M2 | RESPIRATION RATE: 16 BRPM | OXYGEN SATURATION: 94 % | SYSTOLIC BLOOD PRESSURE: 136 MMHG | DIASTOLIC BLOOD PRESSURE: 81 MMHG | WEIGHT: 160.2 LBS | HEART RATE: 86 BPM

## 2020-08-14 DIAGNOSIS — B36.9 FUNGAL SKIN INFECTION: ICD-10-CM

## 2020-08-14 DIAGNOSIS — Z11.4 SCREENING FOR HUMAN IMMUNODEFICIENCY VIRUS WITHOUT PRESENCE OF RISK FACTORS: ICD-10-CM

## 2020-08-14 DIAGNOSIS — E11.65 TYPE 2 DIABETES MELLITUS WITH HYPERGLYCEMIA, WITHOUT LONG-TERM CURRENT USE OF INSULIN (H): Primary | ICD-10-CM

## 2020-08-14 DIAGNOSIS — T78.2XXS ANAPHYLAXIS, SEQUELA: ICD-10-CM

## 2020-08-14 DIAGNOSIS — E78.5 HYPERLIPIDEMIA, UNSPECIFIED HYPERLIPIDEMIA TYPE: ICD-10-CM

## 2020-08-14 DIAGNOSIS — G89.29 OTHER CHRONIC PAIN: ICD-10-CM

## 2020-08-14 DIAGNOSIS — K21.9 GASTROESOPHAGEAL REFLUX DISEASE, ESOPHAGITIS PRESENCE NOT SPECIFIED: ICD-10-CM

## 2020-08-14 DIAGNOSIS — J01.90 ACUTE SINUSITIS WITH SYMPTOMS > 10 DAYS: ICD-10-CM

## 2020-08-14 LAB
AMPHETAMINES UR QL: NOT DETECTED NG/ML
BARBITURATES UR QL SCN: NOT DETECTED NG/ML
BENZODIAZ UR QL SCN: NOT DETECTED NG/ML
BUPRENORPHINE UR QL: NOT DETECTED NG/ML
CANNABINOIDS UR QL: ABNORMAL NG/ML
CHOLEST SERPL-MCNC: 199 MG/DL
COCAINE UR QL SCN: NOT DETECTED NG/ML
D-METHAMPHET UR QL: NOT DETECTED NG/ML
HBA1C MFR BLD: 8 % (ref 0–5.6)
HDLC SERPL-MCNC: 47 MG/DL
HIV 1+2 AB+HIV1 P24 AG SERPL QL IA: NONREACTIVE
LDLC SERPL CALC-MCNC: 123 MG/DL
METHADONE UR QL SCN: NOT DETECTED NG/ML
NONHDLC SERPL-MCNC: 152 MG/DL
OPIATES UR QL SCN: ABNORMAL NG/ML
OXYCODONE UR QL SCN: NOT DETECTED NG/ML
PCP UR QL SCN: NOT DETECTED NG/ML
PROPOXYPH UR QL: NOT DETECTED NG/ML
TRICYCLICS UR QL SCN: NOT DETECTED NG/ML
TRIGL SERPL-MCNC: 143 MG/DL

## 2020-08-14 PROCEDURE — 36415 COLL VENOUS BLD VENIPUNCTURE: CPT | Performed by: NURSE PRACTITIONER

## 2020-08-14 PROCEDURE — 87389 HIV-1 AG W/HIV-1&-2 AB AG IA: CPT | Performed by: NURSE PRACTITIONER

## 2020-08-14 PROCEDURE — 80306 DRUG TEST PRSMV INSTRMNT: CPT | Performed by: NURSE PRACTITIONER

## 2020-08-14 PROCEDURE — 83036 HEMOGLOBIN GLYCOSYLATED A1C: CPT | Performed by: NURSE PRACTITIONER

## 2020-08-14 PROCEDURE — 99207 C FOOT EXAM  NO CHARGE: CPT | Performed by: NURSE PRACTITIONER

## 2020-08-14 PROCEDURE — 99214 OFFICE O/P EST MOD 30 MIN: CPT | Performed by: NURSE PRACTITIONER

## 2020-08-14 PROCEDURE — 80061 LIPID PANEL: CPT | Performed by: NURSE PRACTITIONER

## 2020-08-14 RX ORDER — FLUVASTATIN 20 MG/1
20 CAPSULE ORAL AT BEDTIME
Qty: 90 CAPSULE | Refills: 1 | Status: SHIPPED | OUTPATIENT
Start: 2020-08-14 | End: 2021-01-08

## 2020-08-14 RX ORDER — NYSTATIN 100000 [USP'U]/G
POWDER TOPICAL 2 TIMES DAILY PRN
Qty: 60 G | Refills: 4 | Status: SHIPPED | OUTPATIENT
Start: 2020-08-14

## 2020-08-14 RX ORDER — CLINDAMYCIN HCL 150 MG
150 CAPSULE ORAL 3 TIMES DAILY
Qty: 21 CAPSULE | Refills: 0 | Status: SHIPPED | OUTPATIENT
Start: 2020-08-14 | End: 2020-08-21

## 2020-08-14 RX ORDER — EPINEPHRINE 0.3 MG/.3ML
0.3 INJECTION SUBCUTANEOUS PRN
Qty: 2 EACH | Refills: 0 | Status: SHIPPED | OUTPATIENT
Start: 2020-08-14 | End: 2022-08-19

## 2020-08-14 ASSESSMENT — ANXIETY QUESTIONNAIRES
7. FEELING AFRAID AS IF SOMETHING AWFUL MIGHT HAPPEN: NOT AT ALL
GAD7 TOTAL SCORE: 1
5. BEING SO RESTLESS THAT IT IS HARD TO SIT STILL: NOT AT ALL
3. WORRYING TOO MUCH ABOUT DIFFERENT THINGS: NOT AT ALL
6. BECOMING EASILY ANNOYED OR IRRITABLE: NOT AT ALL
2. NOT BEING ABLE TO STOP OR CONTROL WORRYING: NOT AT ALL
1. FEELING NERVOUS, ANXIOUS, OR ON EDGE: SEVERAL DAYS

## 2020-08-14 ASSESSMENT — PATIENT HEALTH QUESTIONNAIRE - PHQ9
SUM OF ALL RESPONSES TO PHQ QUESTIONS 1-9: 5
5. POOR APPETITE OR OVEREATING: NOT AT ALL

## 2020-08-14 ASSESSMENT — MIFFLIN-ST. JEOR: SCORE: 1256.91

## 2020-08-14 NOTE — RESULT ENCOUNTER NOTE
Sam Leggett,    Thank you for your recent office visit.    Here are your recent results.  Your A1C is 8, continue to work on diet and exercise.  Due to the fact that you are a diabetic, your cholesterol is considered too high as well.  Try the new cholesterol medication, I hope you have less side effects with it!  Are you currently in the medical marijuana program?    Feel free to contact me via [employer] or call the clinic at [phone].    Sincerely,    ANIBAL Alexandre, FNP-BC

## 2020-08-15 ASSESSMENT — ANXIETY QUESTIONNAIRES: GAD7 TOTAL SCORE: 1

## 2020-08-17 ENCOUNTER — INFUSION THERAPY VISIT (OUTPATIENT)
Dept: INFUSION THERAPY | Facility: CLINIC | Age: 60
End: 2020-08-17
Payer: COMMERCIAL

## 2020-08-17 VITALS
DIASTOLIC BLOOD PRESSURE: 65 MMHG | HEART RATE: 78 BPM | TEMPERATURE: 97.5 F | WEIGHT: 161.8 LBS | OXYGEN SATURATION: 96 % | BODY MASS INDEX: 29.77 KG/M2 | SYSTOLIC BLOOD PRESSURE: 138 MMHG | RESPIRATION RATE: 18 BRPM

## 2020-08-17 DIAGNOSIS — M06.9 RHEUMATOID ARTHRITIS, INVOLVING UNSPECIFIED SITE, UNSPECIFIED RHEUMATOID FACTOR PRESENCE: Primary | ICD-10-CM

## 2020-08-17 PROCEDURE — 99207 ZZC NO CHARGE NURSE ONLY: CPT

## 2020-08-17 ASSESSMENT — PAIN SCALES - GENERAL: PAINLEVEL: MODERATE PAIN (4)

## 2020-08-17 NOTE — PROGRESS NOTES
Infusion Nursing Note:  Betsey Vanessa presents today for Orencia every 28 days - HELD  Patient seen by provider today: No   present during visit today: Not Applicable.    Note:Pt states she has been having sinus drainage, headaches - PCP  put her on an oral antibiotic 150mg Clindamycin TID for 7 days starting Friday 08/14/2020 . Dr Carrillo paged for orders - Pt waited approximately 30 minutes - decided to reschedule appointment for 2 weeks and will change September appointment today.    Intravenous Access:  Peripheral IV placed.    Treatment Conditions:  Biological Infusion Checklist:  ~~~ NOTE: If the patient answers yes to any of the questions below, hold the infusion and contact ordering provider or on-call provider.    1. Have you recently had an elevated temperature, fever, chills, productive cough, coughing for 3 weeks or longer or hemoptysis, abnormal vital signs, night sweats,  chest pain or have you noticed a decrease in your appetite, unexplained weight loss or fatigue? No  2. Do you have any open wounds or new incisions? No  3. Do you have any recent or upcoming hospitalizations, surgeries or dental procedures? No  4. Do you currently have or recently have had any signs of illness or infection or are you on any antibiotics? Yes, Sinus infection - Started Clindamycin 150 mg PO TID for 7 days on 08/14/2020  5. Have you had any new, sudden or worsening abdominal pain? No  6. Have you or anyone in your household received a live vaccination in the past 4 weeks? Please note:  No live vaccines while on biologic/chemotherapy until 6 months after the last treatment.  Patient can receive the flu vaccine (shot only) and the pneumovax.  It is optimal for the patient to get these vaccines mid cycle, but they can be given at any time as long as it is not on the day of the infusion. No  7. Have you recently been diagnosed with any new nervous system diseases (ie. Multiple sclerosis, Guillain Hematite, seizures,  neurological changes) or cancer diagnosis? No  8. Are you on any form of radiation or chemotherapy? No  9. Are you pregnant or breast feeding or do you have plans of pregnancy in the future? No  10. Have you been having any signs of worsening depression or suicidal ideations?  (benlysta only) No  11. Have there been any other new onset medical symptoms? No      Discharge Plan: Dr Carrillo - Updated on pt status - Agreed with 2 weeks return plan.  Return in 2 weeks - pt will reschedule once off antibiotic for 1 week and asymptomatic.  Discharge instructions reviewed with: Patient.  Patient and/or family verbalized understanding of discharge instructions and all questions answered.  Patient discharged in stable condition accompanied by: self.  Departure Mode: Ambulatory.    Darcy Alexander RN

## 2020-08-24 ENCOUNTER — TELEPHONE (OUTPATIENT)
Dept: OTOLARYNGOLOGY | Facility: CLINIC | Age: 60
End: 2020-08-24

## 2020-08-24 DIAGNOSIS — H92.11 OTORRHEA, RIGHT: Primary | ICD-10-CM

## 2020-08-24 RX ORDER — HYDROCORTISONE AND ACETIC ACID 20.75; 10.375 MG/ML; MG/ML
5 SOLUTION AURICULAR (OTIC) 2 TIMES DAILY
Qty: 10 ML | Refills: 0 | Status: SHIPPED | OUTPATIENT
Start: 2020-08-24 | End: 2020-08-24

## 2020-08-24 RX ORDER — HYDROCORTISONE AND ACETIC ACID 20.75; 10.375 MG/ML; MG/ML
5 SOLUTION AURICULAR (OTIC) 2 TIMES DAILY
Qty: 10 ML | Refills: 0 | Status: SHIPPED | OUTPATIENT
Start: 2020-08-24 | End: 2021-03-09

## 2020-08-24 NOTE — TELEPHONE ENCOUNTER
She is wondering when she should come back to be seen. Pt had not started her Gentamicin but will start today. Also will start the new drops as she has them left over from a previous course. Pt voiced understanding of instructions.     Ni Galvan LPN

## 2020-08-24 NOTE — TELEPHONE ENCOUNTER
----- Message from Laura Stallings MD sent at 8/23/2020 12:40 PM CDT -----  Would you let her know that I put a prescription in for VosolHC into the pharmacy for her as her last culture still grew nothing but skin organisms on the right? If it burns too much, let us know and we'll change it to boric acid powder. Did she end up restarting the gentamicin on the left? Thanks.    Laura    Left a detailed message for the pt. If she calls back. Take message if she is using the gentamicin on the left. And route back to me.    Ni Galvan LPN

## 2020-08-27 ENCOUNTER — OFFICE VISIT (OUTPATIENT)
Dept: AUDIOLOGY | Facility: CLINIC | Age: 60
End: 2020-08-27
Payer: COMMERCIAL

## 2020-08-27 DIAGNOSIS — H90.A22 SENSORINEURAL HEARING LOSS (SNHL) OF LEFT EAR WITH RESTRICTED HEARING OF RIGHT EAR: ICD-10-CM

## 2020-08-27 DIAGNOSIS — H90.A31 MIXED CONDUCTIVE AND SENSORINEURAL HEARING LOSS OF RIGHT EAR WITH RESTRICTED HEARING OF LEFT EAR: Primary | ICD-10-CM

## 2020-08-27 NOTE — PROGRESS NOTES
AUDIOLOGY REPORT    BACKGROUND INFORMATION: Betsey Vanessa was seen in the Audiology Clinic at [facility] on 8/27/2020 to establish care.  The patient has been seen previously at [facility] and results revealed a profound (unmeasuremable) likely sensorineural hearing loss left ear and an essentially severe mixed hearing loss right ear. She has a right eardrum perforation.  She was implanted with a right osseointegrated device reportedly in 2018 in [place]. She currently uses a Right Cochlear BAHA 5 Super Power device which has feedback issues.  She is here today to see if that can be resolved. She currently has the headpiece on the abutment but the on-ear portion is upside down on the opposite ear to reduce feedback.    She was accompanied to today's appointment by her daughter.    TEST RESULTS AND PROCEDURES:   The abutment site was inspected, there was a very small scab noted near the abutment with some skin overgrowth. No redness, drainage, or swelling was noted today. Significant feedback was noted to be coming from the device when the on ear and abutment portion were near each other. Just to see what would happen, a BAHA 5 Power device was programmed. Aided soundfield testing with the Super Power and Power devices was completed and the results were comparable (thresholds were within 10 dB when comparing results between devices). This was reviewed with the patient, it was discussed that a prior authorization would be needed to try and switch to a Power device and have her insurance cover it. She expressed understanding and decided to simply stay with the current Super Power.    After trying the device in many different positions, it was found that if the on-ear portion was upside down and the abutment portion had the cord pointing backwards, no feedback was noted.  A picture was taken for Betsey to reference for correct placement of the device.  A shorter cord was  "tried but resulted in more feedback so her longer cord was re-attached. A \"buzz\" was noted when the patient was speaking, but this went away when Betsey decreased the volume on the processor. Betsey had questions regarding reprogramming the device. The option of re-running the feedback manager was discussed, but it was also reviewed that this would likely limits volume, something that she did not want and so feedback manager was not performed today. It was discussed that based on how sensitive the device was to feedback, it would be extremely difficult to turn the volume up. She and her daughter expressed understanding. The limitations of feedback and realistic expectations were reviewed.    The option of getting a koala clip from [employer] and attaching the on-ear portion to Betsey's shirt was discussed. Betsey and her daughter expressed understanding but did not feel they needed to pursue that option at this time.    SUMMARY AND RECOMMENDATIONS: An osseointegrated hearing aid check was performed today.  Adjustments were made as noted above and the patient will return as needed.  Call this clinic with questions regarding today s visit.      Alee Lutz  Audiologist  [place] License  #[id number]        "

## 2020-09-01 ENCOUNTER — INFUSION THERAPY VISIT (OUTPATIENT)
Dept: INFUSION THERAPY | Facility: CLINIC | Age: 60
End: 2020-09-01
Payer: COMMERCIAL

## 2020-09-01 VITALS
SYSTOLIC BLOOD PRESSURE: 130 MMHG | HEART RATE: 73 BPM | OXYGEN SATURATION: 97 % | WEIGHT: 161.8 LBS | BODY MASS INDEX: 29.77 KG/M2 | TEMPERATURE: 98.1 F | RESPIRATION RATE: 18 BRPM | DIASTOLIC BLOOD PRESSURE: 78 MMHG

## 2020-09-01 DIAGNOSIS — M06.9 RHEUMATOID ARTHRITIS, INVOLVING UNSPECIFIED SITE, UNSPECIFIED RHEUMATOID FACTOR PRESENCE: Primary | ICD-10-CM

## 2020-09-01 PROCEDURE — 99207 ZZC NO CHARGE NURSE ONLY: CPT

## 2020-09-01 PROCEDURE — 96365 THER/PROPH/DIAG IV INF INIT: CPT | Performed by: INTERNAL MEDICINE

## 2020-09-01 RX ORDER — HEPARIN SODIUM (PORCINE) LOCK FLUSH IV SOLN 100 UNIT/ML 100 UNIT/ML
5 SOLUTION INTRAVENOUS
Status: CANCELLED | OUTPATIENT
Start: 2020-09-14

## 2020-09-01 RX ORDER — HEPARIN SODIUM,PORCINE 10 UNIT/ML
5 VIAL (ML) INTRAVENOUS
Status: CANCELLED | OUTPATIENT
Start: 2020-09-14

## 2020-09-01 RX ADMIN — Medication 250 ML: at 08:16

## 2020-09-01 ASSESSMENT — PAIN SCALES - GENERAL: PAINLEVEL: MILD PAIN (2)

## 2020-09-01 NOTE — PROGRESS NOTES
Infusion Nursing Note:  Betsey Vanessa presents today for orencia.    Patient seen by provider today: No   present during visit today: Not Applicable.    Intravenous Access:  Peripheral IV placed.    Treatment Conditions:  Biological Infusion Checklist:  ~~~ NOTE: If the patient answers yes to any of the questions below, hold the infusion and contact ordering provider or on-call provider.    1. Have you recently had an elevated temperature, fever, chills, productive cough, coughing for 3 weeks or longer or hemoptysis, abnormal vital signs, night sweats,  chest pain or have you noticed a decrease in your appetite, unexplained weight loss or fatigue? No  2. Do you have any open wounds or new incisions? No  3. Do you have any recent or upcoming hospitalizations, surgeries or dental procedures? No  4. Do you currently have or recently have had any signs of illness or infection or are you on any antibiotics? No  5. Have you had any new, sudden or worsening abdominal pain? No  6. Have you or anyone in your household received a live vaccination in the past 4 weeks? Please note:  No live vaccines while on biologic/chemotherapy until 6 months after the last treatment.  Patient can receive the flu vaccine (shot only) and the pneumovax.  It is optimal for the patient to get these vaccines mid cycle, but they can be given at any time as long as it is not on the day of the infusion. No  7. Have you recently been diagnosed with any new nervous system diseases (ie. Multiple sclerosis, Guillain [place], seizures, neurological changes) or cancer diagnosis? No  8. Are you on any form of radiation or chemotherapy? No  9. Are you pregnant or breast feeding or do you have plans of pregnancy in the future? No  10. Have you been having any signs of worsening depression or suicidal ideations?  (benlysta only) No  11. Have there been any other new onset medical symptoms? No        Post Infusion Assessment:  Patient tolerated  infusion without incident.  Blood return noted pre and post infusion.  Site patent and intact, free from redness, edema or discomfort.  No evidence of extravasations.  Access discontinued per protocol.       Discharge Plan:   Copy of AVS reviewed with patient and/or family.  Patient will return 9/29/20 for next appointment.  Patient discharged in stable condition accompanied by: self and using her cane.  Departure Mode: Ambulatory.    Trinidad Hidalgo RN

## 2020-09-02 LAB
FUNGUS SPEC CULT: NORMAL
SPECIMEN SOURCE: NORMAL

## 2020-09-10 ENCOUNTER — TELEPHONE (OUTPATIENT)
Dept: RHEUMATOLOGY | Facility: CLINIC | Age: 60
End: 2020-09-10

## 2020-09-10 NOTE — TELEPHONE ENCOUNTER
Reason for call:  Other   Patient called regarding (reason for call): prescription  Additional comments: Patients mouth has sores in it and the patient thinks it is from the medication ( patient unsure name of medication)    Phone number to reach patient:  Home number on file [phone] (home)    Best Time:  any    Can we leave a detailed message on this number?  YES    Travel screening: Negative

## 2020-09-11 ENCOUNTER — TELEPHONE (OUTPATIENT)
Dept: FAMILY MEDICINE | Facility: CLINIC | Age: 60
End: 2020-09-11

## 2020-09-11 ENCOUNTER — OFFICE VISIT (OUTPATIENT)
Dept: URGENT CARE | Facility: URGENT CARE | Age: 60
End: 2020-09-11
Payer: COMMERCIAL

## 2020-09-11 VITALS
SYSTOLIC BLOOD PRESSURE: 133 MMHG | HEART RATE: 80 BPM | TEMPERATURE: 99 F | OXYGEN SATURATION: 97 % | DIASTOLIC BLOOD PRESSURE: 61 MMHG

## 2020-09-11 DIAGNOSIS — J02.9 SORE THROAT: Primary | ICD-10-CM

## 2020-09-11 DIAGNOSIS — K13.79 SORE MOUTH: ICD-10-CM

## 2020-09-11 DIAGNOSIS — E11.9 TYPE 2 DIABETES MELLITUS WITHOUT COMPLICATION, WITHOUT LONG-TERM CURRENT USE OF INSULIN (H): ICD-10-CM

## 2020-09-11 LAB
DEPRECATED S PYO AG THROAT QL EIA: NEGATIVE
SPECIMEN SOURCE: NORMAL

## 2020-09-11 PROCEDURE — 40001204 ZZHCL STATISTIC STREP A RAPID: Performed by: NURSE PRACTITIONER

## 2020-09-11 PROCEDURE — 99214 OFFICE O/P EST MOD 30 MIN: CPT | Performed by: NURSE PRACTITIONER

## 2020-09-11 PROCEDURE — 87651 STREP A DNA AMP PROBE: CPT | Performed by: NURSE PRACTITIONER

## 2020-09-11 RX ORDER — LIDOCAINE HYDROCHLORIDE 20 MG/ML
15 SOLUTION OROPHARYNGEAL
Qty: 300 ML | Refills: 0 | Status: SHIPPED | OUTPATIENT
Start: 2020-09-11 | End: 2020-09-18

## 2020-09-11 ASSESSMENT — ENCOUNTER SYMPTOMS
COUGH: 0
RHINORRHEA: 0
DIARRHEA: 0
HEADACHES: 0
CHILLS: 0
SORE THROAT: 1
FEVER: 0
SHORTNESS OF BREATH: 0
VOMITING: 0
NAUSEA: 0

## 2020-09-11 NOTE — TELEPHONE ENCOUNTER
Patient spoke with Dr. Carrillo earlier today (see notes). She was instructed to be evaluated by her PCP. Patient states that she thinks the mouth sores are getting worse. She wants to be evaluated this evening.     Patient plans to present to the [facility].     Vianney Casper RN BSN

## 2020-09-11 NOTE — PATIENT INSTRUCTIONS
Patient Education     When You Have a Sore Throat    A sore throat can be painful. There are many reasons why you may have a sore throat. Your healthcare provider will work with you to find the cause of your sore throat. He or she will also find the best treatment for you.  What causes a sore throat?  Sore throats can be caused or worsened by:    Cold or flu viruses    Bacteria    Irritants such as tobacco smoke or air pollution    Acid reflux  A healthy throat  The tonsils are on the sides of the throat near the base of the tongue. They collect viruses and bacteria and help fight infection. The throat (pharynx) is the passage for air. Mucus from the nasal cavity also moves down the passage.  An inflamed throat  The tonsils and pharynx can become inflamed due to a cold or flu virus. Postnasal drip (excess mucus draining from the nasal cavity) can irritate the throat. It can also make the throat or tonsils more likely to be infected by bacteria. Severe, untreated tonsillitis in children or adults can cause a pocket of pus (abscess) to form near the tonsil.  Your evaluation  A medical evaluation can help find the cause of your sore throat. It can also help your healthcare provider choose the best treatment for you. The evaluation may include a health history, physical exam, and diagnostic tests.  Health history  Your healthcare provider may ask you the following:    How long has the sore throat lasted and how have you been treating it?    Do you have any other symptoms, such as body aches, fever, or cough?    Does your sore throat recur? If so, how often? How many days of school or work have you missed because of a sore throat?    Do you have trouble eating or swallowing?    Have you been told that you snore or have other sleep problems?    Do you have bad breath?    Do you cough up bad-tasting mucus?  Physical exam  During the exam, your healthcare provider checks your ears, nose, and throat for problems. He or she  "also checks for swelling in the neck, and may listen to your chest.  Possible tests  Other tests your healthcare provider may perform include:    A throat swab to check for bacteria such as streptococcus (the bacteria that causes strep throat)    A blood test to check for mononucleosis (a viral infection)    A chest X-ray to rule out pneumonia, especially if you have a cough  Treating a sore throat  Treatment depends on many factors. What is the likely cause? Is the problem recent? Does it keep coming back? In many cases, the best thing to do is to treat the symptoms, rest, and let the problem heal itself. Antibiotics may help clear up some bacterial infections. For cases of severe or recurring tonsillitis, the tonsils may need to be removed.  Relieving your symptoms    Don t smoke, and avoid secondhand smoke.    For children, try throat sprays or Popsicles. Adults and older children may try lozenges.    Drink warm liquids to soothe the throat and help thin mucus. Avoid alcohol, spicy foods, and acidic drinks such as orange juice. These can irritate the throat.    Gargle with warm saltwater (1 teaspoon of salt to 8 ounces of warm water).    Use a humidifier to keep air moist and relieve throat dryness.    Try over-the-counter pain relievers such as acetaminophen or ibuprofen. Use as directed, and don t exceed the recommended dose. Don t give aspirin to children.   Are antibiotics needed?  If your sore throat is due to a bacterial infection, antibiotics may speed healing and prevent complications. Although group A streptococcus (\"strep throat\" or GAS) is the major treatable infection for a sore throat, GAS causes only 5% to 15% of sore throats in adults who seek medical care. Most sore throats are caused by cold or flu viruses. And antibiotics don t treat viral illness. In fact, using antibiotics when they re not needed may produce bacteria that are harder to kill. Your healthcare provider will prescribe antibiotics " only if he or she thinks they are likely to help.  If antibiotics are prescribed  Take the medicine exactly as directed. Be sure to finish your prescription even if you re feeling better. And be sure to ask your healthcare provider or pharmacist what side effects are common and what to do about them.  Is surgery needed?  In some cases, tonsils need to be removed. This is often done as outpatient (same-day) surgery. Your healthcare provider may advise removing the tonsils in cases of:    Several severe bouts of tonsillitis in a year.  Severe  episodes include those that lead to missed days of school or work, or that need to be treated with antibiotics.    Tonsillitis that causes breathing problems during sleep    Tonsillitis caused by food particles collecting in pouches in the tonsils (cryptic tonsillitis)  Call your healthcare provider if any of the following occur:    Symptoms worsen, or new symptoms develop.    Swollen tonsils make breathing difficult.    The pain is severe enough to keep you from drinking liquids.    A skin rash, hives, or wheezing develops. Any of these could signal an allergic reaction to antibiotics.    Symptoms don t improve within a week.    Symptoms don t improve within 2 to 3 days of starting antibiotics.   Date Last Reviewed: 10/1/2016    4335-3649 [employer]. [address] [place] [address], [place] [address]. All rights reserved. This information is not intended as a substitute for professional medical care. Always follow your healthcare professional's instructions.

## 2020-09-11 NOTE — TELEPHONE ENCOUNTER
RN: agree that she needs to have these sores evaluated by her PCP.  Less likely that the sores are orencia related.  Possible that the oral sores could be related to her dry mouth, so please start using sugar free lozenges, biotene mouth rinses, and continue frequent sips of water.     Terrence Carrillo MD  9/11/2020 2:41 PM

## 2020-09-11 NOTE — TELEPHONE ENCOUNTER
Called and spoke with patient who reports she has had intermittent painful mouth sores for the past few months. Nothing worsens or activates the sores. Salt water rinses provide mild relief. Patient has never been evaluated for this. She is wondering if the sores are a side effect of Orencia and is wondering if she should continue the medication. RN recommended that patient be evaluated by her primary care doctor to rule out other causes and forwarded patient's message to Dr. Carrillo. Patient agreed with this plan and will call her PCP to schedule a visit.    Jaime Mcmillan RN....9/11/2020 9:32 AM

## 2020-09-11 NOTE — PROGRESS NOTES
SUBJECTIVE:   Betsey Vanessa is a 60 year old female presenting with a chief complaint of   Chief Complaint   Patient presents with     Mouth Lesions     mouth sores since may 2020       She is an established patient of [facility].    Sore throat    Onset of symptoms was 4 months ago.  Course of illness is worsening.    Severity moderate  Current and Associated symptoms: mouth sores and sore throat  Treatment measures tried include None tried.  Predisposing factors include none  Patietn is getting treatment for RA,   She is type 2 diabetes on metformin,   Reports that hemoglobin AIC was high and will  Have a follow up soon with PCP  Review of Systems   Constitutional: Negative for chills and fever.   HENT: Positive for mouth sores and sore throat. Negative for congestion, ear pain and rhinorrhea.    Respiratory: Negative for cough and shortness of breath.    Gastrointestinal: Negative for diarrhea, nausea and vomiting.   Neurological: Negative for headaches.   All other systems reviewed and are negative.      Past Medical History:   Diagnosis Date     Anemia     r/t menses     Anxiety      Arthritis      Constipation      Depression      Diabetes (H)      Diarrhea      Double vision      Headache(784.0)      Hearing loss      Heart murmur      Heartburn      Hypertension      Indigestion      Nasal congestion      Night sweats      Numbness      Problems related to lack of adequate sleep      Rash      Sneezing      Tinnitus      Visual loss      Weakness      Weight gain     because of Prednisone     Family History   Problem Relation Age of Onset     C.A.D. Mother      Alzheimer Disease Mother      Cardiovascular Mother      Eye Disorder Mother      Thyroid Disease Mother      Hypertension Other         grandmother     Cerebrovascular Disease Father      Alcohol/Drug Father      Depression Father      Obesity Father         aunt     Asthma Father      Alcohol/Drug Brother      Alcohol/Drug Sister         aunt      Allergies Other         All family members     Arthritis Other         aunt     Obesity Sister      Thyroid Disease Sister      Asthma Sister         daughter     Current Outpatient Medications   Medication Sig Dispense Refill     acetic acid-hydrocortisone (VOSOL-HC) 1-2 % otic solution Place 5 drops into the right ear 2 times daily 10 mL 0     celecoxib (CELEBREX) 200 MG capsule Take 1 capsule (200 mg) by mouth 2 times daily as needed for moderate pain 2 x a day 180 capsule 1     cevimeline (EVOXAC) 30 MG capsule Take 1 capsule (30 mg) by mouth 3 times daily 270 capsule 3     clonazePAM (KLONOPIN) 0.5 MG tablet Take 1 tablet (0.5 mg) by mouth 2 times daily as needed for anxiety OK to take 1-2 tabs at night as needed for sleep.  Should last 30 days. 100 tablet 1     diclofenac (VOLTAREN) 1 % topical gel Place 2-4 g onto the skin 4 times daily . Max of 8g per dose. No more than 32g/day 100 g 3     docusate sodium (COLACE) 100 MG capsule Take 1 capsule (100 mg) by mouth 2 times daily 180 capsule 3     EPINEPHrine (ANY BX GENERIC EQUIV) 0.3 MG/0.3ML injection 2-pack Inject 0.3 mLs (0.3 mg) into the muscle as needed for anaphylaxis 2 each 0     EPINEPHrine 0.3 MG/0.3ML SOLN PRN for Anaphylaxis       escitalopram (LEXAPRO) 20 MG tablet Take 1 tablet (20 mg) by mouth daily 90 tablet 1     fentaNYL (DURAGESIC) 12 mcg/hr 72 hr patch Place 1 patch onto the skin every 72 hours remove old patch. Fill 8/13/20 to start 8/15/20 10 patch 0     fluvastatin (LESCOL) 20 MG capsule Take 1 capsule (20 mg) by mouth At Bedtime 90 capsule 1     HYDROcodone-acetaminophen (NORCO)  MG per tablet Take 0.5-1 tablets by mouth 3 times daily as needed for severe pain Put 4-6 hours between dose.  Fill on/after 8/1 to start on/after 8/3 60 tablet 0     lidocaine (XYLOCAINE) 2 % solution Swish and spit 15 mLs in mouth every 3 hours as needed for moderate pain ; Max 8 doses/24 hour period. 300 mL 0     lifitegrast (XIIDRA) 5 % opthalmic  solution Place 1 drop into both eyes 2 times daily 24 each 3     medical cannabis (Patient's own supply) Take 1 Dose by mouth See Admin Instructions (The purpose of this order is to document that the patient reports taking medical cannabis.  This is not a prescription, and is not used to certify that the patient has a qualifying medical condition.)       metFORMIN (GLUCOPHAGE) 500 MG tablet Take 2 tablets (1,000 mg) by mouth 2 times daily (with meals) 1000 mg 2 x a day 360 tablet 3     metroNIDAZOLE (METROGEL) 0.75 % gel Apply to face daily       naloxone (NARCAN) 4 MG/0.1ML nasal spray Spray 1 spray (4 mg) into one nostril alternating nostrils once as needed for opioid reversal every 2-3 minutes until assistance arrives 0.2 mL 1     nystatin (MYCOSTATIN) 354024 UNIT/GM external powder Apply topically 2 times daily as needed 2 x daily PRN for rash 60 g 4     omeprazole (PRILOSEC) 20 MG DR capsule Take 1 capsule (20 mg) by mouth 2 times daily 2 x daily 180 capsule 1     potassium chloride ER (KLOR-CON M) 10 MEQ CR tablet Take 1 tablet (10 mEq) by mouth 2 times daily 20 mEq daily Take by mouth 2 times daily 20 mEq daily 180 tablet 3     PROAIR  (90 Base) MCG/ACT inhaler INHALE 2 PUFFS BY MOUTH EVERY 6 HOURS AS NEEDED FOR WHEEZING       promethazine (PHENERGAN) 25 MG suppository Place 25 mg rectally every 6 hours as needed For nusea       promethazine (PHENERGAN) 25 MG tablet Take 1 tablet (25 mg) by mouth every 6 hours as needed for nausea or other (Meniere's) For nusea 90 tablet 1     psyllium (METAMUCIL) 58.6 % POWD Take by mouth daily PRN       STATIN NOT PRESCRIBED (INTENTIONAL) Please choose reason not prescribed, below       triamterene-HCTZ (DYAZIDE) 37.5-25 MG capsule Take 1 capsule by mouth daily 1 capsule every morning1 capsule every morning 90 capsule 3     vitamin D3 (CHOLECALCIFEROL) 2000 units (50 mcg) tablet Take 1 tablet (2,000 Units) by mouth daily 90 tablet 3     glucose blood VI test strips  strip daily       Social History     Tobacco Use     Smoking status: Former Smoker     Start date: 1978     Last attempt to quit: 1998     Years since quittin.6     Smokeless tobacco: Never Used     Tobacco comment: 1 pack a day    Substance Use Topics     Alcohol use: No       OBJECTIVE  /61   Pulse 80   Temp 99  F (37.2  C) (Tympanic)   SpO2 97%     Physical Exam  Vitals signs and nursing note reviewed.   Constitutional:       General: She is not in acute distress.     Appearance: She is well-developed. She is not diaphoretic.   HENT:      Head: Normocephalic and atraumatic.      Right Ear: Tympanic membrane and external ear normal.      Left Ear: Tympanic membrane and external ear normal.      Mouth/Throat:      Mouth: Mucous membranes are moist.      Pharynx: Posterior oropharyngeal erythema present.      Comments: I cannot see sores in mouth but throat is erythematous  Eyes:      Pupils: Pupils are equal, round, and reactive to light.   Neck:      Musculoskeletal: Normal range of motion and neck supple.   Pulmonary:      Effort: Pulmonary effort is normal. No respiratory distress.      Breath sounds: Normal breath sounds.   Lymphadenopathy:      Cervical: No cervical adenopathy.   Skin:     General: Skin is warm and dry.   Neurological:      Mental Status: She is alert.      Cranial Nerves: No cranial nerve deficit.         Labs:  Results for orders placed or performed in visit on 20 (from the past 24 hour(s))   Streptococcus A Rapid Scr w Reflx to PCR    Specimen: Throat   Result Value Ref Range    Strep Specimen Description Throat     Streptococcus Group A Rapid Screen Negative NEG^Negative       ASSESSMENT:      ICD-10-CM    1. Sore throat  J02.9 Streptococcus A Rapid Scr w Reflx to PCR     Group A Streptococcus PCR Throat Swab   2. Sore mouth  K13.79 lidocaine (XYLOCAINE) 2 % solution   3. Type 2 diabetes mellitus without complication, without long-term current use of insulin  "(H)  E11.9       PLAN:  I discussed lab results with the patient.  Patient educational/instructional material provided including reasons for follow-up    The patient indicates understanding of these issues and agrees with the plan.    In addition to the above, diabetes was also briefly addressed today. Health maintenance, medications and recent results reviewed.   Advised to follow up with PCP with any other concerns or if soreness in mouth is not resolving.      Primary Care Provider\"}    Patient Instructions       Patient Education     When You Have a Sore Throat    A sore throat can be painful. There are many reasons why you may have a sore throat. Your healthcare provider will work with you to find the cause of your sore throat. He or she will also find the best treatment for you.  What causes a sore throat?  Sore throats can be caused or worsened by:    Cold or flu viruses    Bacteria    Irritants such as tobacco smoke or air pollution    Acid reflux  A healthy throat  The tonsils are on the sides of the throat near the base of the tongue. They collect viruses and bacteria and help fight infection. The throat (pharynx) is the passage for air. Mucus from the nasal cavity also moves down the passage.  An inflamed throat  The tonsils and pharynx can become inflamed due to a cold or flu virus. Postnasal drip (excess mucus draining from the nasal cavity) can irritate the throat. It can also make the throat or tonsils more likely to be infected by bacteria. Severe, untreated tonsillitis in children or adults can cause a pocket of pus (abscess) to form near the tonsil.  Your evaluation  A medical evaluation can help find the cause of your sore throat. It can also help your healthcare provider choose the best treatment for you. The evaluation may include a health history, physical exam, and diagnostic tests.  Health history  Your healthcare provider may ask you the following:    How long has the sore throat lasted and " how have you been treating it?    Do you have any other symptoms, such as body aches, fever, or cough?    Does your sore throat recur? If so, how often? How many days of school or work have you missed because of a sore throat?    Do you have trouble eating or swallowing?    Have you been told that you snore or have other sleep problems?    Do you have bad breath?    Do you cough up bad-tasting mucus?  Physical exam  During the exam, your healthcare provider checks your ears, nose, and throat for problems. He or she also checks for swelling in the neck, and may listen to your chest.  Possible tests  Other tests your healthcare provider may perform include:    A throat swab to check for bacteria such as streptococcus (the bacteria that causes strep throat)    A blood test to check for mononucleosis (a viral infection)    A chest X-ray to rule out pneumonia, especially if you have a cough  Treating a sore throat  Treatment depends on many factors. What is the likely cause? Is the problem recent? Does it keep coming back? In many cases, the best thing to do is to treat the symptoms, rest, and let the problem heal itself. Antibiotics may help clear up some bacterial infections. For cases of severe or recurring tonsillitis, the tonsils may need to be removed.  Relieving your symptoms    Don t smoke, and avoid secondhand smoke.    For children, try throat sprays or Popsicles. Adults and older children may try lozenges.    Drink warm liquids to soothe the throat and help thin mucus. Avoid alcohol, spicy foods, and acidic drinks such as orange juice. These can irritate the throat.    Gargle with warm saltwater (1 teaspoon of salt to 8 ounces of warm water).    Use a humidifier to keep air moist and relieve throat dryness.    Try over-the-counter pain relievers such as acetaminophen or ibuprofen. Use as directed, and don t exceed the recommended dose. Don t give aspirin to children.   Are antibiotics needed?  If your sore  "throat is due to a bacterial infection, antibiotics may speed healing and prevent complications. Although group A streptococcus (\"strep throat\" or GAS) is the major treatable infection for a sore throat, GAS causes only 5% to 15% of sore throats in adults who seek medical care. Most sore throats are caused by cold or flu viruses. And antibiotics don t treat viral illness. In fact, using antibiotics when they re not needed may produce bacteria that are harder to kill. Your healthcare provider will prescribe antibiotics only if he or she thinks they are likely to help.  If antibiotics are prescribed  Take the medicine exactly as directed. Be sure to finish your prescription even if you re feeling better. And be sure to ask your healthcare provider or pharmacist what side effects are common and what to do about them.  Is surgery needed?  In some cases, tonsils need to be removed. This is often done as outpatient (same-day) surgery. Your healthcare provider may advise removing the tonsils in cases of:    Several severe bouts of tonsillitis in a year.  Severe  episodes include those that lead to missed days of school or work, or that need to be treated with antibiotics.    Tonsillitis that causes breathing problems during sleep    Tonsillitis caused by food particles collecting in pouches in the tonsils (cryptic tonsillitis)  Call your healthcare provider if any of the following occur:    Symptoms worsen, or new symptoms develop.    Swollen tonsils make breathing difficult.    The pain is severe enough to keep you from drinking liquids.    A skin rash, hives, or wheezing develops. Any of these could signal an allergic reaction to antibiotics.    Symptoms don t improve within a week.    Symptoms don t improve within 2 to 3 days of starting antibiotics.   Date Last Reviewed: 10/1/2016    9086-1876 [employer]. [address] [place] [address], [place] [address]. All rights reserved. This information is not intended as " a substitute for professional medical care. Always follow your healthcare professional's instructions.

## 2020-09-11 NOTE — TELEPHONE ENCOUNTER
Reason for call:  Other   Patient called regarding (reason for call): call back  Additional comments: Patient is calling due to a sore throat and sore in mouth. She is wondering what do do. Please call back to discuss.    Phone number to reach patient:  Home number on file [phone] (home)    Best Time:  Any     Can we leave a detailed message on this number?  YES    Travel screening: Negative

## 2020-09-11 NOTE — TELEPHONE ENCOUNTER
Called and advised patient of Dr. Carrillo's recommendations below. Patient verbalized understanding and has no further questions.    Jaime Mcmillan RN....9/11/2020 3:50 PM

## 2020-09-12 LAB
SPECIMEN SOURCE: NORMAL
STREP GROUP A PCR: NOT DETECTED

## 2020-09-14 ENCOUNTER — TELEPHONE (OUTPATIENT)
Dept: PALLIATIVE MEDICINE | Facility: CLINIC | Age: 60
End: 2020-09-14

## 2020-09-14 DIAGNOSIS — M06.09 RHEUMATOID ARTHRITIS OF MULTIPLE SITES WITHOUT RHEUMATOID FACTOR (H): ICD-10-CM

## 2020-09-14 DIAGNOSIS — G89.4 CHRONIC PAIN SYNDROME: ICD-10-CM

## 2020-09-14 NOTE — TELEPHONE ENCOUNTER
Received fax received from pharmacy requesting refill(s) of fentaNYL (DURAGESIC) 12 mcg/hr 72 hr patch     Last dispensed from pharmacy on 08/13/20    Pt last seen by prescribing provider on 07/23/20  Next appt scheduled for 09/25/20     checked in the past 6 months? Yes If no, print current report and give to RN    Last urine drug screen date 05/15/20- Future  Current opioid agreement on file (completed within the last year) No Date of opioid agreement: None on file    E-prescribe to   [id number] - [place] - [address]  [address] [address]  Phone: [phone] Fax: [phone]      Will route to nursing [place] for review and preparation of prescription(s).       Lisette Pabon MA  [facility] [place]

## 2020-09-15 DIAGNOSIS — F11.20 CONTINUOUS OPIOID DEPENDENCE (H): ICD-10-CM

## 2020-09-15 RX ORDER — FENTANYL 12.5 UG/1
1 PATCH TRANSDERMAL
Qty: 5 PATCH | Refills: 0 | Status: SHIPPED | OUTPATIENT
Start: 2020-09-15 | End: 2020-10-01

## 2020-09-15 NOTE — TELEPHONE ENCOUNTER
Please see other encounter.  Need UDS  Also, how much is she using per day. This has lasted longer than 30 days, does she have more remaining?    Bailee Houser MD  [facility]

## 2020-09-15 NOTE — TELEPHONE ENCOUNTER
Medication refill information reviewed.     Due date for fentaNYL (DURAGESIC) 12 mcg/hr 72 hr patch is 9/15/20     Prescriptions prepped for review.     Will route to provider.

## 2020-09-15 NOTE — TELEPHONE ENCOUNTER
Outreach X1. Left a  requesting call back. Provided call back number.    SURY StaufferN, RN  Care Coordinator  [facility] [place]

## 2020-09-15 NOTE — TELEPHONE ENCOUNTER
Patient requesting refill(s) of HYDROcodone-acetaminophen (NORCO)  MG per tablet   Last dispensed from pharmacy on 8/3/2020    Pt last seen by prescribing provider on 7/23/2020  Next appt scheduled for 9/25/2020     checked in the past 6 months? Yes If no, print current report and give to RN    Last urine drug screen date 5/15/2020  Current opioid agreement on file (completed within the last year) No Date of opioid agreement: None on file     E-prescribe to [facility] [address] [place] [address] pharmacy    Will route to nursing [place] for review and preparation of prescription(s).

## 2020-09-15 NOTE — TELEPHONE ENCOUNTER
Medication refill information reviewed.     Due date for HYDROcodone-acetaminophen (NORCO)  MG per tablet  is anytime. Last dispensed 8/3/20.      Prescriptions prepped for review.     Will route to provider.

## 2020-09-15 NOTE — TELEPHONE ENCOUNTER
Pt returning call to nursing. She can be reached at [phone]      Yoly SHARIF    [facility] [place]

## 2020-09-15 NOTE — TELEPHONE ENCOUNTER
Please call patient  She was to set up UDS a few months ago.   I had worked with her PCP, I would like this done in next day.    I am going to prescribe a 15 day supply for now.

## 2020-09-15 NOTE — TELEPHONE ENCOUNTER
Spoke to patient and she reports she completed the drug screen on 8/14/20. It turns out that the screen she completed on 8/14/20 was the Drug Abuse Screen Panel 13, Urine (Pain Care Package) ordered by Migdalia Chatterjee NP.     Will route to Dr Houser to review. Does patient still need to come in urgently to complete the UDS?     SURY StaufferN, RN  Care Coordinator  [facility] [place]

## 2020-09-16 DIAGNOSIS — M35.01 SJOGREN'S SYNDROME WITH KERATOCONJUNCTIVITIS SICCA (H): ICD-10-CM

## 2020-09-16 RX ORDER — HYDROCODONE BITARTRATE AND ACETAMINOPHEN 10; 325 MG/1; MG/1
.5-1 TABLET ORAL 3 TIMES DAILY PRN
Qty: 60 TABLET | Refills: 0 | Status: SHIPPED | OUTPATIENT
Start: 2020-09-16 | End: 2020-11-02

## 2020-09-16 NOTE — TELEPHONE ENCOUNTER
Script Eprescribed to pharmacy    Will send this to MA team to notify patient.    Signed Prescriptions:                        Disp   Refills    HYDROcodone-acetaminophen (NORCO)  M*60 tab*0        Sig: Take 0.5-1 tablets by mouth 3 times daily as needed           for severe pain Put 4-6 hours between dose.  Fill           on/after 9/15/20 to start on/after 9/15/20  Authorizing Provider: SANTOS ISLAS MD  [facility]

## 2020-09-16 NOTE — TELEPHONE ENCOUNTER
No, that is ok for now.  We will get the other one in the future, as this one doesn't show specific opioids or fentanyl.    Bailee Houser MD  [facility]

## 2020-09-17 NOTE — TELEPHONE ENCOUNTER
Nursing hasn't connected with her yet, but could we please make sure she is aware that the fentanyl was previously sent in?  It may have gotten mixed with these messages.    Bailee Houser MD  [facility]

## 2020-09-18 NOTE — TELEPHONE ENCOUNTER
Last Office Visit: 8/3/20  Future Office visit:    Next 5 appointments (look out 90 days)    Nov 16, 2020 10:40 AM CST  Return Visit with Terrence Carrillo MD  [facility] [place] ([facility]) [address]  [place] [address]  [phone]           Routing refill request to provider for review/approval because:  Drug not active on patient's medication list    Jaime Mcmillan RN....9/18/2020 8:51 AM

## 2020-09-19 NOTE — TELEPHONE ENCOUNTER
Rheumatology team: Please call to notify Ms. Vanessa that Xiidra has been refilled.  Terrence Carrillo MD  9/19/2020 6:57 PM

## 2020-09-23 NOTE — TELEPHONE ENCOUNTER
Message left for patient that a prescription has been refilled and sent to pharmacy, any questions please call us at [phone].

## 2020-09-29 ENCOUNTER — INFUSION THERAPY VISIT (OUTPATIENT)
Dept: INFUSION THERAPY | Facility: CLINIC | Age: 60
End: 2020-09-29
Payer: COMMERCIAL

## 2020-09-29 VITALS
SYSTOLIC BLOOD PRESSURE: 131 MMHG | OXYGEN SATURATION: 96 % | RESPIRATION RATE: 16 BRPM | BODY MASS INDEX: 29.26 KG/M2 | TEMPERATURE: 98.3 F | HEART RATE: 80 BPM | DIASTOLIC BLOOD PRESSURE: 66 MMHG | WEIGHT: 159 LBS

## 2020-09-29 DIAGNOSIS — M06.9 RHEUMATOID ARTHRITIS, INVOLVING UNSPECIFIED SITE, UNSPECIFIED RHEUMATOID FACTOR PRESENCE: Primary | ICD-10-CM

## 2020-09-29 DIAGNOSIS — M06.9 RHEUMATOID ARTHRITIS, INVOLVING UNSPECIFIED SITE, UNSPECIFIED RHEUMATOID FACTOR PRESENCE: ICD-10-CM

## 2020-09-29 PROCEDURE — 99207 ZZC NO CHARGE LOS: CPT

## 2020-09-29 PROCEDURE — 96365 THER/PROPH/DIAG IV INF INIT: CPT | Performed by: NURSE PRACTITIONER

## 2020-09-29 RX ORDER — HEPARIN SODIUM (PORCINE) LOCK FLUSH IV SOLN 100 UNIT/ML 100 UNIT/ML
5 SOLUTION INTRAVENOUS
Status: CANCELLED | OUTPATIENT
Start: 2020-10-27

## 2020-09-29 RX ORDER — HEPARIN SODIUM,PORCINE 10 UNIT/ML
5 VIAL (ML) INTRAVENOUS
Status: CANCELLED | OUTPATIENT
Start: 2020-10-27

## 2020-09-29 RX ADMIN — Medication 250 ML: at 08:22

## 2020-09-29 ASSESSMENT — PAIN SCALES - GENERAL: PAINLEVEL: MODERATE PAIN (4)

## 2020-09-29 NOTE — PROGRESS NOTES
Infusion Nursing Note:  Betsey Vanessa presents today for Orencia.    Patient seen by provider today: No   present during visit today: Not Applicable.    Note: N/A.    Intravenous Access:  Peripheral IV placed.    Treatment Conditions:  Biological Infusion Checklist:  ~~~ NOTE: If the patient answers yes to any of the questions below, hold the infusion and contact ordering provider or on-call provider.    1. Have you recently had an elevated temperature, fever, chills, productive cough, coughing for 3 weeks or longer or hemoptysis, abnormal vital signs, night sweats,  chest pain or have you noticed a decrease in your appetite, unexplained weight loss or fatigue? No  2. Do you have any open wounds or new incisions? No  3. Do you have any recent or upcoming hospitalizations, surgeries or dental procedures? No  4. Do you currently have or recently have had any signs of illness or infection or are you on any antibiotics? No  5. Have you had any new, sudden or worsening abdominal pain? No  6. Have you or anyone in your household received a live vaccination in the past 4 weeks? Please note:  No live vaccines while on biologic/chemotherapy until 6 months after the last treatment.  Patient can receive the flu vaccine (shot only) and the pneumovax.  It is optimal for the patient to get these vaccines mid cycle, but they can be given at any time as long as it is not on the day of the infusion. No  7. Have you recently been diagnosed with any new nervous system diseases (ie. Multiple sclerosis, Guillain [place], seizures, neurological changes) or cancer diagnosis? No  8. Are you on any form of radiation or chemotherapy? No  9. Are you pregnant or breast feeding or do you have plans of pregnancy in the future? No  10. Have you been having any signs of worsening depression or suicidal ideations?  (benlysta only) No  11. Have there been any other new onset medical symptoms? No    Post Infusion Assessment:  Patient  tolerated infusion without incident.  Site patent and intact, free from redness, edema or discomfort.  No evidence of extravasations.  Access discontinued per protocol.       Discharge Plan:   Patient will return in 4 weeks for next appointment.   Patient discharged in stable condition accompanied by: self.  Departure Mode: Ambulatory.    Ronda Landon RN

## 2020-09-30 ENCOUNTER — HOSPITAL ENCOUNTER (OUTPATIENT)
Dept: MRI IMAGING | Facility: CLINIC | Age: 60
Discharge: HOME | End: 2020-09-30
Attending: PSYCHIATRY & NEUROLOGY | Admitting: PSYCHIATRY & NEUROLOGY
Payer: COMMERCIAL

## 2020-09-30 DIAGNOSIS — M06.09 RHEUMATOID ARTHRITIS OF MULTIPLE SITES WITHOUT RHEUMATOID FACTOR (H): ICD-10-CM

## 2020-09-30 DIAGNOSIS — M54.2 CERVICALGIA: ICD-10-CM

## 2020-09-30 PROCEDURE — 72141 MRI NECK SPINE W/O DYE: CPT

## 2020-10-01 ENCOUNTER — TELEPHONE (OUTPATIENT)
Dept: PALLIATIVE MEDICINE | Facility: CLINIC | Age: 60
End: 2020-10-01

## 2020-10-01 ENCOUNTER — VIRTUAL VISIT (OUTPATIENT)
Dept: PALLIATIVE MEDICINE | Facility: CLINIC | Age: 60
End: 2020-10-01
Payer: COMMERCIAL

## 2020-10-01 DIAGNOSIS — M06.09 RHEUMATOID ARTHRITIS OF MULTIPLE SITES WITHOUT RHEUMATOID FACTOR (H): ICD-10-CM

## 2020-10-01 DIAGNOSIS — G89.4 CHRONIC PAIN SYNDROME: ICD-10-CM

## 2020-10-01 DIAGNOSIS — M47.819 FACET ARTHROPATHY: Primary | ICD-10-CM

## 2020-10-01 PROCEDURE — 99213 OFFICE O/P EST LOW 20 MIN: CPT | Mod: 95 | Performed by: PSYCHIATRY & NEUROLOGY

## 2020-10-01 RX ORDER — FENTANYL 12.5 UG/1
1 PATCH TRANSDERMAL
Qty: 10 PATCH | Refills: 0 | Status: SHIPPED | OUTPATIENT
Start: 2020-10-01 | End: 2020-11-02

## 2020-10-01 ASSESSMENT — PAIN SCALES - GENERAL: PAINLEVEL: MODERATE PAIN (5)

## 2020-10-01 NOTE — PROGRESS NOTES
"Betsey Vanessa is a 60 year old female who is being evaluated via a billable telephone visit.      The patient has been notified of following:     \"This telephone visit will be conducted via a call between you and your physician/provider. We have found that certain health care needs can be provided without the need for a physical exam.  This service lets us provide the care you need with a short phone conversation.  If a prescription is necessary we can send it directly to your pharmacy.  If lab work is needed we can place an order for that and you can then stop by our lab to have the test done at a later time.    Telephone visits are billed at different rates depending on your insurance coverage. During this emergency period, for some insurers they may be billed the same as an in-person visit.  Please reach out to your insurance provider with any questions.    If during the course of the call the physician/provider feels a telephone visit is not appropriate, you will not be charged for this service.\"    Patient has given verbal consent for Telephone visit?  Yes    What phone number would you like to be contacted at? [phone]    How would you like to obtain your AVS? Zee Lazcano [facility]  [facility]    Betsey Vanessa is a 60 year old female who is being evaluated via a billable telephone visit.       Date of visit: 10/1/2020     Chief complaint:   Chief Complaint   Patient presents with     Pain     Telephone Visit due to Covid-19       Interval history:  Betsey Vanessa was last seen by me on 67/23/20.      Recommendations/plan at the last visit included:  1. Physical Therapy:  continue  2. Clinical Health Psychologist to address issues of relaxation, behavioral change, coping style, and other factors important to improvement. continue  3. Diagnostic Studies:  Still need UDS and Cervical MRI that " were ordered at last visit. Patient was given phone numbers again to set these up.  4. Medication Management:  Continue at current doses  5. Further procedures recommended: none  6. Recommendations to PCP: see above   7. Follow up: 2 month- phone    Since her last visit, Betsey Vanessa reports:  -her low back continues to be painful.  -she has started PT and pain psychology  -neck pain persistent. Low back is the worst.  -is working with her PCP on weaning clonazepam, and has been weaning some of the norco-      Pain scores:  Pain intensity on average is 5 on a scale of 0-10.     Current pain treatments:   Celebrex 200mg BID  Fentanyl 12mcg/hr- decrease went ok  Norco 10/325- using 1-3/day.  Last script for 60 tabs 5 weeks ago, still has 23 tabs remaiining  Medical cannabis- helps  Diclofenac gel- helps   salanpas patch- over the counter  Emu oil    Clonazepam 0.5mg tabs- currently on taper with PCP  Narcan ordered    Previous medication treatments included:  Gabapentin - fast heartbeat, sweating  Cymbalta- hyperactivity  topamax- confusion  Amitriptyline- hyperactivity  Tramadol  darvocet    Other treatments have included:  Betsey Vanessa has been seen at a pain clinic in the past.    PT: hand therapy.- last about 3 years ago.  Acupuncture: no  TENs Unit: no  Injections:   Hand steroid injections-   Neck injections    Side Effects:  sore mouth- talking with rheumatologist    Medications:  Current Outpatient Medications   Medication Sig Dispense Refill     acetic acid-hydrocortisone (VOSOL-HC) 1-2 % otic solution Place 5 drops into the right ear 2 times daily 10 mL 0     celecoxib (CELEBREX) 200 MG capsule Take 1 capsule (200 mg) by mouth 2 times daily as needed for moderate pain 2 x a day 180 capsule 1     cevimeline (EVOXAC) 30 MG capsule Take 1 capsule (30 mg) by mouth 3 times daily 270 capsule 3     clonazePAM (KLONOPIN) 0.5 MG tablet Take 1 tablet (0.5 mg) by mouth 2 times daily as needed for anxiety OK to take  1-2 tabs at night as needed for sleep.  Should last 30 days. 100 tablet 1     diclofenac (VOLTAREN) 1 % topical gel Place 2-4 g onto the skin 4 times daily . Max of 8g per dose. No more than 32g/day 100 g 3     docusate sodium (COLACE) 100 MG capsule Take 1 capsule (100 mg) by mouth 2 times daily 180 capsule 3     EPINEPHrine (ANY BX GENERIC EQUIV) 0.3 MG/0.3ML injection 2-pack Inject 0.3 mLs (0.3 mg) into the muscle as needed for anaphylaxis 2 each 0     EPINEPHrine 0.3 MG/0.3ML SOLN PRN for Anaphylaxis       escitalopram (LEXAPRO) 20 MG tablet Take 1 tablet (20 mg) by mouth daily 90 tablet 1     fentaNYL (DURAGESIC) 12 mcg/hr 72 hr patch Place 1 patch onto the skin every 72 hours remove old patch. Fill 9/15/20 to start 9/15/20 5 patch 0     fluvastatin (LESCOL) 20 MG capsule Take 1 capsule (20 mg) by mouth At Bedtime 90 capsule 1     glucose blood VI test strips strip daily       HYDROcodone-acetaminophen (NORCO)  MG per tablet Take 0.5-1 tablets by mouth 3 times daily as needed for severe pain Put 4-6 hours between dose.  Fill on/after 9/15/20 to start on/after 9/15/20 60 tablet 0     lifitegrast (XIIDRA) 5 % opthalmic solution Place 1 drop into both eyes 2 times daily 24 each 3     medical cannabis (Patient's own supply) Take 1 Dose by mouth See Admin Instructions (The purpose of this order is to document that the patient reports taking medical cannabis.  This is not a prescription, and is not used to certify that the patient has a qualifying medical condition.)       metFORMIN (GLUCOPHAGE) 500 MG tablet Take 2 tablets (1,000 mg) by mouth 2 times daily (with meals) 1000 mg 2 x a day 360 tablet 3     metroNIDAZOLE (METROGEL) 0.75 % gel Apply to face daily       naloxone (NARCAN) 4 MG/0.1ML nasal spray Spray 1 spray (4 mg) into one nostril alternating nostrils once as needed for opioid reversal every 2-3 minutes until assistance arrives 0.2 mL 1     nystatin (MYCOSTATIN) 451631 UNIT/GM external powder Apply  topically 2 times daily as needed 2 x daily PRN for rash 60 g 4     omeprazole (PRILOSEC) 20 MG DR capsule Take 1 capsule (20 mg) by mouth 2 times daily 2 x daily 180 capsule 1     potassium chloride ER (KLOR-CON M) 10 MEQ CR tablet Take 1 tablet (10 mEq) by mouth 2 times daily 20 mEq daily Take by mouth 2 times daily 20 mEq daily 180 tablet 3     PROAIR  (90 Base) MCG/ACT inhaler INHALE 2 PUFFS BY MOUTH EVERY 6 HOURS AS NEEDED FOR WHEEZING       promethazine (PHENERGAN) 25 MG suppository Place 25 mg rectally every 6 hours as needed For nusea       promethazine (PHENERGAN) 25 MG tablet Take 1 tablet (25 mg) by mouth every 6 hours as needed for nausea or other (Meniere's) For nusea 90 tablet 1     psyllium (METAMUCIL) 58.6 % POWD Take by mouth daily PRN       STATIN NOT PRESCRIBED (INTENTIONAL) Please choose reason not prescribed, below       triamterene-HCTZ (DYAZIDE) 37.5-25 MG capsule Take 1 capsule by mouth daily 1 capsule every morning1 capsule every morning 90 capsule 3     vitamin D3 (CHOLECALCIFEROL) 2000 units (50 mcg) tablet Take 1 tablet (2,000 Units) by mouth daily 90 tablet 3       Medical History: any changes in medical history since they were last seen? None    Review of Systems:  The 14 system ROS was reviewed from the intake questionnaire, and is positive for: tiredness, nausea, vomiting, dizziness.- menieres symptoms  Any bowel or bladder problems: none  Mood: stable     MRI C spine 9/30/20- discussed with patient  Findings:  The cervical vertebrae are normally aligned.  Multilevel disc height  narrowing end disc desiccation.  There is normal signal within and  normal contour of the cervical spinal cord.  The findings on a level  by level basis are as follows:     C2-3:  No spinal canal or neural foraminal stenosis.     C3-4:  Disc osteophyte complex and bilateral uncovertebral spurring.  Bilateral facet hypertrophy. Mild left neural foraminal stenosis. No  spinal canal or right neural  foraminal stenosis.     C4-5:  Bilateral facet arthrosis. No spinal canal or neural foraminal  stenosis.     C5-6:  Disc osteophyte complex and superimposed tiny central  protrusion. Effacement of the ventral thecal sac and indentation of  the cord. No neural foraminal stenosis. Mild spinal canal stenosis.     C6-7:  Mild bilateral facet arthrosis and disc bulge. Mild bilateral  neural foraminal stenosis. No spinal canal stenosis.     C7-T1:  No spinal canal or neural foraminal stenosis.     T1-T2: No spinal canal or neural foraminal stenosis.     T2-T3: Central extrusion abutting the ventral cord.     No abnormality of the paraspinous soft tissues.                                                                      Impression:   Multilevel cervical spondylosis. There is a left central extrusion at  T2-T3 which abuts the cord. Central protrusion at C5-C6 also abuts the  Cord.    MRI lumbar spine 3/6/20- discussed with patient  Impression: Multilevel lumbar spondylosis, most pronounced at L4-5  with moderate neural foraminal stenosis bilaterally and moderate  spinal canal stenosis. Additionally, there is narrowing of the lateral  recesses at L4-5 with possible impingement of the traversing L5 nerve  roots bilaterally. Further degenerative disease as described above.       Assessment:   1. Rheumatoid arthritis  2. fibromyalgia  3. Sjogren's Syndrome  4. Meniere's disease with intermittent vertigo and hearing loss  5. Type 2 diabetes      Plan:  1. Physical Therapy:  continue  2. Clinical Health Psychologist to address issues of relaxation, behavioral change, coping style, and other factors important to improvement. continue  3. Diagnostic Studies:  She will be getting the comprehensive urine drug screen at next visit.  4. Medication Management:    1. Wean Norco as able.  2. We discussed further weaning of opioid.  Will wait for now.  5. Further procedures recommended: bilateral lumbar facet joint injections.  Discussed steroid vs medial branch block. Given rheumatoid arthritis and good response to steroids, she wants to try this first.  6. Recommendations to PCP: see above   7. Follow up: 2 month- virtual    Phone call duration: 23 minutes     Bailee Houser MD

## 2020-10-01 NOTE — PATIENT INSTRUCTIONS
1. Schedule facet joint injections- [phone]  2. Schedule follow up in 2 months- virtual    ----------------------------------------------------------------  Clinic Number:  [phone]     Call with any questions about your care and for scheduling assistance.     Calls are returned Monday through Friday between 8 AM and 4:30 PM. We usually get back to you within 2 business days depending on the issue/request.    If we are prescribing your medications:    For opioid medication refills, call the clinic or send a Theorem message 7 days in advance.  Please include:    Name of requested medication    Name of the pharmacy.    For non-opioid medications, call your pharmacy directly to request a refill. Please allow 3-4 days to be processed.     Per [place] State Law:    All controlled substance prescriptions must be filled within 30 days of being written.      For those controlled substances allowing refills, pickup must occur within 30 days of last fill.      We believe regular attendance is key to your success in our program!      Any time you are unable to keep your appointment we ask that you call us at least 24 hours in advance to cancel.This will allow us to offer the appointment time to another patient.     Multiple missed appointments may lead to dismissal from the clinic.

## 2020-10-01 NOTE — TELEPHONE ENCOUNTER
"Screening Questions for Radiology Injections:    Injection to be done at which interventional clinic site? [facility] [place]    If [facility], tell patient that this procedure requires a COVID-19 lab test be done within 4 days of the procedure. Would you still like to move forward with scheduling the procedure?  Not Applicable   If YES, let patient know that someone will call them to schedule the COVID-19 test. Route to nursing to enter order.     Instruct patient to arrive as directed prior to the scheduled appointment time:    [place] minutes before      [place]: 30 minutes before; if IV needed 1 hour before     Dr. Bueno-no IV needed for Cervical JOHNATHAN; please instruct to arrive 30\" early    Procedure ordered by Corrina    Procedure ordered? bilateral lumbar facet joint injection    As a reminder, receiving steroids can decrease your body's ability to fight infection.   Would you still like to move forward with scheduling the injection?  Yes      Transforaminal Cervical JOHNATHAN - no pain provider currently performing    What insurance would patient like us to bill for this procedure? [employer]/ Medicare      Worker's comp or MVA (motor vehicle accident) -Any injection DO NOT SCHEDULE and route to Shena Young.      Order Mapper insurance - For SI joint injections, DO NOT SCHEDULE and route Shena Hector.       [employer] - Any injection besides hip/shoulder/knee joint DO NOT SCHEDULE and route to Shena Young. She will obtain PA and call pt back to schedule procedure or notify pt of denial.       [employer]-Route to [place] for review      **BCBS- ALL need to be routed to [place] for review if a PA is needed**      IF SCHEDULING IN [place] AND NEEDS A PA, IT IS OKAY TO SCHEDULE. [place] HANDLES THEIR OWN PA'S AFTER THE PATIENT IS SCHEDULED. PLEASE SCHEDULE AT LEAST 1 WEEK OUT SO A PA CAN BE OBTAINED.    Any chance of pregnancy? NO   If YES, do NOT schedule and route to RN pool    Is an "  needed? No     Patient has a drive home? (mandatory) YES: Informed    Is patient taking any blood thinners (i.e. plavix, coumadin, jantoven, warfarin, heparin, pradaxa or dabigatran, etc)? No   If hold needed, do NOT schedule, route to RN pool     Is patient taking any aspirin products (includes Excedrin and Fiorinal)? No     If more than 325mg/day, OK to schedule; Instruct pt to decrease to less than 325 mg for 7 days AND route to RN pool    For CERVICAL procedures, hold all aspirin products for 6 days.     Tell pt that if aspirin product is not held for 6 days, the procedure WILL BE cancelled.      Does the patient have a bleeding or clotting disorder? No     If YES, okay to schedule AND route to RN nurse pool    For any patients with platelet count <100, must be forwarded to provider    Is patient diabetic?  Yes  If YES, instruct them to bring their glucometer.    Does patient have an active infection or treated for one within the past week? No     Is patient currently taking any antibiotics?  No     For patients on chronic, preventative, or prophylactic antibiotics, procedures may be scheduled.     For patients on antibiotics for active or recent infection:antibiotic course must have been completed for 4 days    Is patient currently taking any steroid medications? (i.e. Prednisone, Medrol)  No     For patients on steroid medications, course must have been completed for 4 days    Is patient actively being treated for cancer or immunocompromised? No  If YES, do NOT schedule and route to RN pool     Are you able to get on and off an exam table with minimal or no assistance? Yes  If NO, do NOT schedule and route to RN pool    Are you able to roll over and lay on your stomach with minimal or no assistance? Yes  If NO, do NOT schedule and route to RN pool     Any allergies to contrast dye, iodine, shellfish, or numbing and steroid medications? No  If YES, route to RN pool AND add allergy information to  appointment notes    Allergies: Duloxetine, Topiramate, Amitriptyline embonate [amitriptyline], Azithromycin, Cymbalta, Gabapentin, Hmg-coa-r inhibitors, Macrobid [nitrofurantoin], Paxil [paroxetine], Penicillins, and Tetracycline      Has the patient had a flu shot or any other vaccinations within 7 days before or after the procedure.  No     Does patient have an MRI/CT?  YES: 2020  Check Procedure Scheduling Grid to see if required.      Was the MRI done within the last 3 years?  Yes    If yes, where was the MRI done i.e.[facility], [facility], [place], [facility] etc? [facility]      If no, do not schedule and route to RN pool    If MRI was not done at [place], [facility] or [facility] do NOT schedule and route to RN pool.      If pt has an imaging disc, the injection MAY be scheduled but pt has to bring disc to appt.     If they show up without the disc the injection cannot be done    Procedure Specific Instructions:      If celiac plexus block, informed patient NPO for 6 hours and that it is okay to take medications with sips of water, especially blood pressure medications  Not Applicable         If this is for a cervical procedure, informed patient that aspirin needs to be held for 6 days.   Not Applicable      If IV needed:    Do not schedule procedures requiring IV placement in the first appointment of the day or first appointment after lunch. Do NOT schedule at 0745, 0815 or 1245.     Instructed pt to arrive 30 minutes early for IV start if required. (Check Procedure Scheduling Grid)  Not Applicable    Reminders:      If you are started on any steroids or antibiotics between now and your appointment, you must contact us because the procedure may need to be cancelled.  Yes      For all procedures except radiofrequency ablations (RFAs) and spinal cord stimulator (SCS) trials, informed patient:    IV sedation is not provided for this procedure.  If you feel that an oral anti-anxiety medication is needed, you  can discuss this further with your referring provider or primary care provider.  The Pain Clinic provider will discuss specifics of what the procedure includes at your appointment.  Most procedures last 10-20 minutes.  We use numbing medications to help with any discomfort during the procedure.  Not Applicable      For patients 85 or older we recommend having an adult stay w/ them for the remainder of the day.       Does the patient have any questions?  NO  Dee Iyer  [facility]

## 2020-10-12 ENCOUNTER — ANCILLARY PROCEDURE (OUTPATIENT)
Dept: RADIOLOGY | Facility: CLINIC | Age: 60
End: 2020-10-12
Attending: PSYCHIATRY & NEUROLOGY
Payer: COMMERCIAL

## 2020-10-12 ENCOUNTER — RADIOLOGY INJECTION OFFICE VISIT (OUTPATIENT)
Dept: PALLIATIVE MEDICINE | Facility: CLINIC | Age: 60
End: 2020-10-12
Attending: PSYCHIATRY & NEUROLOGY
Payer: COMMERCIAL

## 2020-10-12 VITALS
DIASTOLIC BLOOD PRESSURE: 68 MMHG | SYSTOLIC BLOOD PRESSURE: 136 MMHG | OXYGEN SATURATION: 97 % | HEART RATE: 80 BPM | RESPIRATION RATE: 16 BRPM

## 2020-10-12 DIAGNOSIS — Z79.891 ENCOUNTER FOR MONITORING OPIOID MAINTENANCE THERAPY: ICD-10-CM

## 2020-10-12 DIAGNOSIS — M47.816 LUMBAR FACET ARTHROPATHY: ICD-10-CM

## 2020-10-12 DIAGNOSIS — M47.819 FACET ARTHROPATHY: Primary | ICD-10-CM

## 2020-10-12 DIAGNOSIS — Z51.81 ENCOUNTER FOR MONITORING OPIOID MAINTENANCE THERAPY: ICD-10-CM

## 2020-10-12 PROCEDURE — 64494 INJ PARAVERT F JNT L/S 2 LEV: CPT | Performed by: PSYCHIATRY & NEUROLOGY

## 2020-10-12 PROCEDURE — 64493 INJ PARAVERT F JNT L/S 1 LEV: CPT | Mod: 50 | Performed by: PSYCHIATRY & NEUROLOGY

## 2020-10-12 PROCEDURE — 80307 DRUG TEST PRSMV CHEM ANLYZR: CPT | Mod: 90 | Performed by: PSYCHIATRY & NEUROLOGY

## 2020-10-12 PROCEDURE — 99000 SPECIMEN HANDLING OFFICE-LAB: CPT | Performed by: PSYCHIATRY & NEUROLOGY

## 2020-10-12 ASSESSMENT — PAIN SCALES - GENERAL: PAINLEVEL: SEVERE PAIN (6)

## 2020-10-12 NOTE — PATIENT INSTRUCTIONS
[facility]   Procedure Discharge Instructions    Today you saw: Dr. Shantal Houser      You had an:   facet joint injection        If you were holding your blood thinning medication, please restart taking it: N/A    Be cautious when walking. Numbness and/or weakness in the lower extremities may occur for up to 6-8 hours after the procedure due to effect of the local anesthetic    Do not drive for 6 hours. The effect of the local anesthetic could slow your reflexes.     You may resume your regular activities after 24 hours    Avoid strenuous activity for the first 24 hours    You may shower, however avoid swimming, tub baths or hot tubs for 24 hours following your procedure    You may have a mild to moderate increase in pain for several days following the injection.    It may take up to 14 days for the steroid medication to start working although you may feel the effect as early as a few days after the procedure.       You may use ice packs for 10-15 minutes, 3 to 4 times a day at the injection site for comfort    Do not use heat to painful areas for 6 to 8 hours. This will give the local anesthetic time to wear off and prevent you from accidentally burning your skin.     Unless you have been directed to avoid the use of anti-inflammatory medications (NSAIDS), you may use medications such as ibuprofen, Aleve or Tylenol for pain control if needed.     Possible side effects of steroids that you may experience include flushing, elevated blood pressure, increased appetite, mild headaches and restlessness.  All of these symptoms will get better with time.    If you experience any of the following, call the Pain Clinic during work hours (Mon-Friday 8-4:30 pm) at [phone] or the Provider Line after hours at [phone]:  -Fever over 100 degree F  -Swelling, bleeding, redness, drainage, warmth at the injection site  -Progressive weakness or numbness in your legs   -Unusual new onset of  pain that is not improving

## 2020-10-12 NOTE — PROGRESS NOTES
Pre procedure Diagnosis: facet arthropathy   Post procedure Diagnosis: Same  Procedure performed: Bilateral L4-5, L5-S1 facet joint injections  Anesthesia: none  Complications: none  Operators: Bailee Houser MD, Valeria Landry MD (fellow)    Indications:   Betsey Vanessa is a 60 year old female seen by me in clinic for low back pain.  They have a history of bilateral lumbar facet arthropathy.  Exam shows facetogenic pain in the bilateral lumbar spine and they have tried conservative treatment including PT, medications.    Options/alternatives, benefits and risks were discussed with the patient including bleeding, infection, flared pain, tissue trauma, exposure to radiation, reaction to medications including seizure, spinal cord injury, paralysis, weakness, numbness and headache.   Questions were answered to her satisfaction and she agrees to proceed. Voluntary informed consent was obtained and signed.     Vitals were reviewed: Yes  Allergies were reviewed:  Yes   Medications were reviewed:  Yes   Pre-procedure pain score: 6/10    Procedure:  After getting informed consent, patient was brought into the procedure suite and was placed in a prone position on the procedure table.   A Pause for the Cause was performed.  Patient was prepped and draped in sterile fashion.     Under AP fluoroscopic guidance the L4/5 and L5/S1 facet joints on the right, then left side were identified, and the C-arm was rotated obliquely to the affected side to open the joint space. A total of 6 ml of 1% lidocaine was injected at the needle entry point and needle tract. Then a 22 gauge 3.5 inch quincke type spinal needle was inserted and advanced under fluoroscopic guidance targeting the superior articular pillar of each joint. Once the needle made a contact with SAP, it was rotated and was then advanced into the joint.    AP fluoroscopic views were obtained to confirm the needle placement. Then, contrast was injected after negative  aspiration for heme and CSF in each joint, confirming appropriate placement.  A total of 1.5ml of Omnipaque was used, and 8.5ml was wasted.     The injection was then accomplished using a solution containing 3ml of 0.5% bupivacaine mixed with 10mg of dexamethasone, divided between the 4 joints. The needles were removed.     Hemostasis was achieved, the area was cleaned, and bandaids were placed when appropriate.  The patient tolerated the procedure well, and was taken to the recovery room.    Images were saved to PACS.    Post-procedure pain score: 3/10  Follow-up includes:   -f/u phone call in one week  -f/u with referring provider  -UDS and opioid agreement today    Bailee Houser MD  [facility]

## 2020-10-12 NOTE — NURSING NOTE
Discharge Information    IV Discontiued Time:  NA    Amount of Fluid Infused:  NA    Discharge Criteria = When patient returns to baseline or as per MD order    Consciousness:  Pt is fully awake    Circulation:  BP +/- 20% of pre-procedure level    Respiration:  Patient is able to breathe deeply    O2 Sat:  Patient is able to maintain O2 Sat >92% on room air    Activity:  Moves 4 extremities on command    Ambulation:  Patient is able to stand and walk or stand and pivot into wheelchair    Dressing:  Clean/dry or No Dressing    Notes:   Discharge instructions and AVS given to patient    Patient meets criteria for discharge?  YES    Admitted to PCU?  No    Responsible adult present to accompany patient home?  Yes    Signature/Title:    Alexandr Martínez RN  RN Care Coordinator  [facility]

## 2020-10-12 NOTE — NURSING NOTE
Pre-procedure Intake    Have you been fasting? NA    If yes, for how long? No     Are you taking a prescribed blood thinner such as coumadin, Plavix, Xarelto?    No    If yes, when did you take your last dose? No     Do you take aspirin?  No    If cervical procedure, have you held aspirin for 6 days?   No     Do you have any allergies to contrast dye, iodine, steroid and/or numbing medications?  NO    Are you currently taking antibiotics or have an active infection?  NO    Have you had a fever/elevated temperature within the past week? NO    Are you currently taking oral steroids? NO    Do you have a ? Yes       Are you pregnant or breastfeeding?  NO    Are the vital signs normal?  Yes    Hilary Barr MA

## 2020-10-12 NOTE — LETTER
[facility]  10/12/20    Patient: Betsey Vanessa  YOB: 1960  Medical Record Number: [id number]                                                                  Opioid / Opioid Plus Controlled Substance Agreement    I understand that my care provider has prescribed an opioid (narcotic) controlled substance to help manage my condition(s). I am taking this medicine to help me function or work. I know this is strong medicine, and that it can cause serious side effects. Opioid medicine can be sedating, addicting and may cause a dependency on the drug. They can affect my ability to drive or think, and cause depression. They need to be taken exactly as prescribed. Combining opioids with certain medicines or chemicals (such as cocaine, sedatives and tranquilizers, sleeping pills, meth) can be dangerous or even fatal. Also, if I stop opioids suddenly, I may have severe withdrawal symptoms. Last, I understand that opioids do not work for all types of pain nor for all patients. If not helpful, I may be asked to stop them.      The risks, benefits, and side effects of these medicine(s) were explained to me. I agree that:    1. I will take part in other treatments as advised by my care team. This may be psychiatry or counseling, physical therapy, behavioral therapy, group treatment or a referral to a pain clinic. I will reduce or stop my medicine when my care team tells me to do so.  2. I will take my medicines as prescribed. I will not change the dose or schedule unless my care team tells me to. There will be no refills if I  run out early.   I may be contactedwithout warning and asked to complete a urine drug test or pill count at any time.   3. I will keep all my appointments, and understand this is part of the monitoring of opioids. My care team may require an office visit for EVERY opioid/controlled substance refill. If I miss appointments or don t follow instructions, my care team may stop  my medicine.  4. I will not ask other providers to prescribe controlled substances, and I will not accept controlled substances from other people. If I need another prescribed controlled substance for a new reason, I will tell my care team within 1 business day.  5. I will use one pharmacy to fill all of my controlled substance prescriptions, and it is up to me to make sure that I do not run out of my medicines on weekends or holidays. If my care team is willing to refill my opioid prescription without a visit, I must request refills only during office hours, refills may take up to 3 days to process, and it may take up to 5 to 7 days for my medicine to be mailed and ready at my pharmacy. Prescriptions will not be mailed anywhere except my pharmacy.        [id number]  Rev 12/18         Registration to scan to EHR                             Page 1 of 2               Controlled Substance Agreement [facility] [place]  10/12/20  Patient: Betsey Vanessa  YOB: 1960  Medical Record Number: [id number]                                                                  6. I am responsible for my prescriptions. If the medicine/prescription is lost or stolen, it will not be replaced. I also agree not to share controlled substance medicines with anyone.  7. I agree to not use ANY illegal or recreational drugs. This includes marijuana, cocaine, bath salts or other drugs. I agree not to use alcohol unless my care team says I may.          I agree to give urine samples whenever asked. If I don t give a urine sample, the care team may stop my medicine.    8. If I enroll in the [place] Medical Marijuana program, I will tell my care team. I will also sign an agreement to share my medical records with my care team.   9. I will bring in my list of medicines (or my medicine bottles) each time I come to the clinic.   10. I will tell my care team right away if I become pregnant or have a new medical  problem treated outside of my regular clinic.  11. I understand that this medicine can affect my thinking and judgment. It may be unsafe for me to drive, use machinery and do dangerous tasks. I will not do any of these things until I know how the medicine affects me. If my dose changes, I will wait to see how it affects me. I will contact my care team if I have concerns about medicine side effects.    I understand that if I do not follow any of the conditions above, my prescriptions or treatment may be stopped.      I agree that my provider, clinic care team, and pharmacy may work with any city, state or federal law enforcement agency that investigates the misuse, sale, or other diversion of my controlled medicine. I will allow my provider to discuss my care with or share a copy of this agreement with any other treating provider, pharmacy or emergency room where I receive care. I agree to give up (waive) any right of privacy or confidentiality with respect to these consents.     I have read this agreement and have asked questions about anything I did not understand.      ________________________________________________________________________  Patient signature - Date/Time -  Betsey Vanessa                                      ________________________________________________________________________  Witness signature                                                            ________________________________________________________________________  Provider signature - Shantal Houser MD      [id number]  Rev 12/18         Registration to scan to EHR                         Page 2 of 2                   Controlled Substance Agreement Opioid           Page 1 of 2  Opioid Pain Medicines (also known as Narcotics)  What You Need to Know    What are opioids?   Opioids are pain medicines that must be prescribed by a doctor.  They are also known as narcotics.    Examples are:     morphine (MS Contin, Janessa)    oxycodone  (Oxycontin)    oxycodone and acetaminophen (Percocet)    hydrocodone and acetaminophen (Vicodin, Norco)     fentanyl patch (Duragesic)     hydromorphone (Dilaudid)     methadone     What do opioids do well?   Opioids are best for short-term pain after a surgery or injury. They also work well for cancer pain. Unlike other pain medicines, they do not cause liver or kidney failure or ulcers. They may help some people with long-lasting (chronic) pain.     What do opioids NOT do well?   Opioids never get rid of pain entirely, and they do not work well for most patients with chronic pain. Opioids do not reduce swelling, one of the causes of pain. They also don t work well for nerve pain.                           For informational purposes only.  Not to replace the advice of your care provider.  Copyright [facility]. All right reserved. [employer] [id number] 02/18.      Page 2 of 2    Risks and side effects   Talk to your doctor before you start or decide to keep taking one of these medicines. Side effects include:    Lowering your breathing rate enough to cause death    Overdose, including death, especially if taking higher than prescribed doses    Long-term opioid use    Worse depression symptoms; less pleasure in things you usually enjoy    Feeling tired or sluggish    Slower thoughts or cloudy thinking    Being more sensitive to pain over time; pain is harder to control    Trouble sleeping or restless sleep    Changes in hormone levels (for example, less testosterone)    Changes in sex drive or ability to have sex    Constipation    Unsafe driving    Itching and sweating    Feeling dizzy    Nausea, vomiting and dry mouth    What else should I know about opioids?  When someone takes opioids for too long or too often, they become dependent. This means that if you stop or reduce the medicine too quickly, you will have withdrawal symptoms.    Dependence is not the same as addiction. Addiction is when  people keep using a substance that harms their body, their mind or their relations with others. If you have a history of drug or alcohol abuse, taking opioids can cause a relapse.    Over time, opioids don t work as well. Most people will need higher and higher doses. The higher the dose, the more serious the side effects. We don t know the long-term effects of opioids.      Prescribed opioids aren't the best way to manage chronic pain    Other ways to manage pain include:      Ibuprofen or acetaminophen.  You should always try this first.      Treat health problems that may be causing pain.      acupuncture or massage, deep breathing, meditation, visual imagery, aromatherapy.      Use heat or ice at the pain site      Physical therapy and exercise      Stop smoking      See a counselor or therapist                                                  People who have used opioids for a long time may have a lower quality of life, worse depression, higher levels of pain and more visits to doctors.    Never share your opioids with others. Be sure to store opioids in a secure place, locked if possible.Young children can easily swallow them and overdose.     You can overdose on opioids.  Signs of overdose include decrease or loss of consciousness, slowed breathing, trouble waking and blue lips.  If someone is worried about overdose, they should call 911.    If you are at risk for overdose, you may get naloxone (Narcan, a medicine that reverses the effects of opioids.  If you overdose, a friend or family member can give you Narcan while waiting for the ambulance.  They need to know the signs of overdose and how to give Narcan.    While you're taking opioids:    Don't use alcohol or street drugs. Taking them together can cause death.    Don't take any of these medicines unless your doctor says its okay.  Taking these with opioids can cause death.    Benzodiazepines (such as lorazepam         or diazepam)    Muscle relaxers  (such as cyclobenzaprine)    sleeping pills    other opioids    Safe disposal of opioids  Find your area drug take-back program, your pharmacy mail-back program, buy a special disposal bag (such as Deterra) from your pharmacy or flush them down the toilet.  Use the guidelines at:  www.fda.gov/drugs/resourcesforyou

## 2020-10-14 LAB — PAIN DRUG SCR UR W RPTD MEDS: NORMAL

## 2020-10-19 DIAGNOSIS — G89.4 CHRONIC PAIN SYNDROME: Primary | ICD-10-CM

## 2020-10-19 NOTE — TELEPHONE ENCOUNTER
Routing refill request to provider for review/approval because:  Labs out of range:  CBC, ALT  Lola Olvera BSN, RN

## 2020-10-23 ENCOUNTER — MYC REFILL (OUTPATIENT)
Dept: INTERNAL MEDICINE | Facility: CLINIC | Age: 60
End: 2020-10-23

## 2020-10-23 ENCOUNTER — TELEPHONE (OUTPATIENT)
Dept: INTERNAL MEDICINE | Facility: CLINIC | Age: 60
End: 2020-10-23

## 2020-10-23 DIAGNOSIS — M06.9 RHEUMATOID ARTHRITIS, INVOLVING UNSPECIFIED SITE, UNSPECIFIED RHEUMATOID FACTOR PRESENCE: ICD-10-CM

## 2020-10-23 RX ORDER — CELECOXIB 200 MG/1
200 CAPSULE ORAL 2 TIMES DAILY PRN
Qty: 180 CAPSULE | Refills: 1 | Status: CANCELLED | OUTPATIENT
Start: 2020-10-23

## 2020-10-23 NOTE — TELEPHONE ENCOUNTER
Reason for call:  Medication   If this is a refill request, has the caller requested the refill from the pharmacy already? Yes  Will the patient be using a [facility]? No  Name of the pharmacy and phone number for the current request:   [facility] [address] [facility] [place] [place] [facility] [address] [address] Phone:  [phone]   Fax:  [phone]            Name of the medication requested:   celecoxib (CELEBREX) 200 MG capsule    Other request: Please call once ready.    Phone number to reach patient:  Home number on file [phone] (home)    Best Time:  Any     Can we leave a detailed message on this number?  YES    Travel screening: Negative

## 2020-10-23 NOTE — TELEPHONE ENCOUNTER
Patient has also sent a mychart request and has called for refill.  Patient seen by Migdalia Chatterjee recently 8-14-20.  Will send to her to advise in Dr Ken's absence.

## 2020-10-23 NOTE — TELEPHONE ENCOUNTER
Last Office visit  6/30/20 with Dr. Ken with the following instructions:     Return in about 6 weeks (around 8/11/2020) for Diabetes follow-up, Hypertension follow-up.     Pended for 30 day supply with appointment reminder.    Vianney Casper RN BSN

## 2020-10-24 RX ORDER — CELECOXIB 200 MG/1
200 CAPSULE ORAL 2 TIMES DAILY PRN
Qty: 60 CAPSULE | Refills: 0 | Status: SHIPPED | OUTPATIENT
Start: 2020-10-24 | End: 2020-11-16

## 2020-10-28 ENCOUNTER — INFUSION THERAPY VISIT (OUTPATIENT)
Dept: INFUSION THERAPY | Facility: CLINIC | Age: 60
End: 2020-10-28
Payer: COMMERCIAL

## 2020-10-28 VITALS
DIASTOLIC BLOOD PRESSURE: 72 MMHG | RESPIRATION RATE: 14 BRPM | WEIGHT: 159.8 LBS | TEMPERATURE: 98.4 F | OXYGEN SATURATION: 97 % | BODY MASS INDEX: 29.41 KG/M2 | HEART RATE: 81 BPM | SYSTOLIC BLOOD PRESSURE: 131 MMHG

## 2020-10-28 DIAGNOSIS — M35.01 SJOGREN'S SYNDROME WITH KERATOCONJUNCTIVITIS SICCA (H): ICD-10-CM

## 2020-10-28 DIAGNOSIS — M06.9 RHEUMATOID ARTHRITIS (H): Primary | ICD-10-CM

## 2020-10-28 DIAGNOSIS — M06.9 RHEUMATOID ARTHRITIS (H): ICD-10-CM

## 2020-10-28 DIAGNOSIS — D69.6 THROMBOCYTOPENIA (H): ICD-10-CM

## 2020-10-28 DIAGNOSIS — Z79.899 HIGH RISK MEDICATIONS (NOT ANTICOAGULANTS) LONG-TERM USE: ICD-10-CM

## 2020-10-28 DIAGNOSIS — M85.80 OSTEOPENIA, UNSPECIFIED LOCATION: ICD-10-CM

## 2020-10-28 LAB
ALBUMIN SERPL-MCNC: 4.1 G/DL (ref 3.4–5)
ALP SERPL-CCNC: 101 U/L (ref 40–150)
ALT SERPL W P-5'-P-CCNC: 37 U/L (ref 0–50)
AST SERPL W P-5'-P-CCNC: 21 U/L (ref 0–45)
BASOPHILS # BLD AUTO: 0 10E9/L (ref 0–0.2)
BASOPHILS NFR BLD AUTO: 0.1 %
BILIRUB DIRECT SERPL-MCNC: 0.2 MG/DL (ref 0–0.2)
BILIRUB SERPL-MCNC: 0.6 MG/DL (ref 0.2–1.3)
CALCIUM SERPL-MCNC: 9.3 MG/DL (ref 8.5–10.1)
CREAT SERPL-MCNC: 0.83 MG/DL (ref 0.52–1.04)
CRP SERPL-MCNC: 3.9 MG/L (ref 0–8)
DIFFERENTIAL METHOD BLD: NORMAL
EOSINOPHIL # BLD AUTO: 0.2 10E9/L (ref 0–0.7)
EOSINOPHIL NFR BLD AUTO: 2.5 %
ERYTHROCYTE [DISTWIDTH] IN BLOOD BY AUTOMATED COUNT: 13.7 % (ref 10–15)
ERYTHROCYTE [SEDIMENTATION RATE] IN BLOOD BY WESTERGREN METHOD: 27 MM/H (ref 0–30)
GFR SERPL CREATININE-BSD FRML MDRD: 77 ML/MIN/{1.73_M2}
HCT VFR BLD AUTO: 37.5 % (ref 35–47)
HGB BLD-MCNC: 12.3 G/DL (ref 11.7–15.7)
IMM GRANULOCYTES # BLD: 0 10E9/L (ref 0–0.4)
IMM GRANULOCYTES NFR BLD: 0.4 %
LYMPHOCYTES # BLD AUTO: 1.8 10E9/L (ref 0.8–5.3)
LYMPHOCYTES NFR BLD AUTO: 25.8 %
MCH RBC QN AUTO: 27 PG (ref 26.5–33)
MCHC RBC AUTO-ENTMCNC: 32.8 G/DL (ref 31.5–36.5)
MCV RBC AUTO: 82 FL (ref 78–100)
MONOCYTES # BLD AUTO: 0.4 10E9/L (ref 0–1.3)
MONOCYTES NFR BLD AUTO: 5.1 %
NEUTROPHILS # BLD AUTO: 4.7 10E9/L (ref 1.6–8.3)
NEUTROPHILS NFR BLD AUTO: 66.1 %
PLATELET # BLD AUTO: 162 10E9/L (ref 150–450)
PROT SERPL-MCNC: 7.9 G/DL (ref 6.8–8.8)
RBC # BLD AUTO: 4.56 10E12/L (ref 3.8–5.2)
WBC # BLD AUTO: 7.1 10E9/L (ref 4–11)

## 2020-10-28 PROCEDURE — 80076 HEPATIC FUNCTION PANEL: CPT | Performed by: INTERNAL MEDICINE

## 2020-10-28 PROCEDURE — 99207 PR NO CHARGE LOS: CPT

## 2020-10-28 PROCEDURE — 96365 THER/PROPH/DIAG IV INF INIT: CPT | Performed by: NURSE PRACTITIONER

## 2020-10-28 PROCEDURE — 82306 VITAMIN D 25 HYDROXY: CPT | Performed by: INTERNAL MEDICINE

## 2020-10-28 PROCEDURE — 85025 COMPLETE CBC W/AUTO DIFF WBC: CPT | Performed by: INTERNAL MEDICINE

## 2020-10-28 PROCEDURE — 82310 ASSAY OF CALCIUM: CPT | Performed by: INTERNAL MEDICINE

## 2020-10-28 PROCEDURE — 36415 COLL VENOUS BLD VENIPUNCTURE: CPT | Performed by: INTERNAL MEDICINE

## 2020-10-28 PROCEDURE — 86140 C-REACTIVE PROTEIN: CPT | Performed by: INTERNAL MEDICINE

## 2020-10-28 PROCEDURE — 82565 ASSAY OF CREATININE: CPT | Performed by: INTERNAL MEDICINE

## 2020-10-28 PROCEDURE — 85652 RBC SED RATE AUTOMATED: CPT | Performed by: INTERNAL MEDICINE

## 2020-10-28 RX ORDER — HEPARIN SODIUM (PORCINE) LOCK FLUSH IV SOLN 100 UNIT/ML 100 UNIT/ML
5 SOLUTION INTRAVENOUS
Status: CANCELLED | OUTPATIENT
Start: 2020-11-24

## 2020-10-28 RX ORDER — HEPARIN SODIUM,PORCINE 10 UNIT/ML
5 VIAL (ML) INTRAVENOUS
Status: CANCELLED | OUTPATIENT
Start: 2020-11-24

## 2020-10-28 RX ADMIN — Medication 250 ML: at 16:39

## 2020-10-28 ASSESSMENT — PAIN SCALES - GENERAL: PAINLEVEL: MODERATE PAIN (5)

## 2020-10-28 NOTE — PROGRESS NOTES
Infusion Nursing Note:  Betsey Vanessa presents today for Orencia infusion.    Patient seen by provider today: No   present during visit today: Not Applicable.    Note: N/A.    Intravenous Access:  Peripheral IV placed.    Treatment Conditions:  Biological Infusion Checklist:  ~~~ NOTE: If the patient answers yes to any of the questions below, hold the infusion and contact ordering provider or on-call provider.    1. Have you recently had an elevated temperature, fever, chills, productive cough, coughing for 3 weeks or longer or hemoptysis, abnormal vital signs, night sweats,  chest pain or have you noticed a decrease in your appetite, unexplained weight loss or fatigue? No  2. Do you have any open wounds or new incisions? No  3. Do you have any recent or upcoming hospitalizations, surgeries or dental procedures? No  4. Do you currently have or recently have had any signs of illness or infection or are you on any antibiotics? No  5. Have you had any new, sudden or worsening abdominal pain? No  6. Have you or anyone in your household received a live vaccination in the past 4 weeks? Please note:  No live vaccines while on biologic/chemotherapy until 6 months after the last treatment.  Patient can receive the flu vaccine (shot only) and the pneumovax.  It is optimal for the patient to get these vaccines mid cycle, but they can be given at any time as long as it is not on the day of the infusion. No  7. Have you recently been diagnosed with any new nervous system diseases (ie. Multiple sclerosis, Guillain [place], seizures, neurological changes) or cancer diagnosis? No  8. Are you on any form of radiation or chemotherapy? No  9. Have there been any other new onset medical symptoms? No    Post Infusion Assessment:  Patient tolerated infusion without incident.  Blood return noted pre and post infusion.  Site patent and intact, free from redness, edema or discomfort.  No evidence of extravasations.  Access  discontinued per protocol.       Discharge Plan:   Patient will return 11/24/2020 for next appointment.   Patient discharged in stable condition accompanied by: self.  Departure Mode: Ambulatory.    Fariba Quezada RN

## 2020-10-29 LAB — DEPRECATED CALCIDIOL+CALCIFEROL SERPL-MC: 33 UG/L (ref 20–75)

## 2020-11-02 DIAGNOSIS — G89.4 CHRONIC PAIN SYNDROME: ICD-10-CM

## 2020-11-02 DIAGNOSIS — F11.20 CONTINUOUS OPIOID DEPENDENCE (H): ICD-10-CM

## 2020-11-02 DIAGNOSIS — M06.09 RHEUMATOID ARTHRITIS OF MULTIPLE SITES WITHOUT RHEUMATOID FACTOR (H): ICD-10-CM

## 2020-11-02 NOTE — TELEPHONE ENCOUNTER
Reason for call:  Medication   If this is a refill request, has the caller requested the refill from the pharmacy already? No  Will the patient be using a [facility]? No  Name of the pharmacy and phone number for the current request: [id number] [place], [place] - [address]    Name of the medication requested: HYDROcodone-acetaminophen (NORCO)  MG per tablet  fentaNYL (DURAGESIC) 12 mcg/hr 72 hr patch    Other request: none    Phone number to reach patient:  Home number on file [phone] (home)    Best Time:  anytime    Can we leave a detailed message on this number?  YES    Travel screening: Not Applicable     Dee CONNORS    [facility]

## 2020-11-02 NOTE — TELEPHONE ENCOUNTER
Received call from patient requesting refill(s) of fentaNYL (DURAGESIC) 12 mcg/hr 72 hr patch and HYDROcodone-acetaminophen (NORCO)  MG per tablet    Last dispensed from pharmacy on 10/01/20-fentanyl       09/16/20-norco    Pt last seen by prescribing provider on 10/01/20  Next appt scheduled for 12/11/20     checked in the past 6 months? Yes If no, print current report and give to RN    Last urine drug screen date 10/12/20  Current opioid agreement on file (completed within the last year) Yes Date of opioid agreement: 10/2/20    E-prescribe to pharmacy-[facility] - [place], [place] - [address] [address]     Will route to nursing pool for review and preparation of prescription(s).

## 2020-11-02 NOTE — TELEPHONE ENCOUNTER
Medication refill information reviewed.     Due date for  fentaNYL (DURAGESIC) 12 mcg/hr 72 hr patch and HYDROcodone-acetaminophen (NORCO)  MG per tablet is 11/3/20     Prescriptions prepped for review.     Will route to provider.

## 2020-11-03 RX ORDER — FENTANYL 12.5 UG/1
1 PATCH TRANSDERMAL
Qty: 10 PATCH | Refills: 0 | Status: SHIPPED | OUTPATIENT
Start: 2020-11-03 | End: 2020-12-07

## 2020-11-03 RX ORDER — HYDROCODONE BITARTRATE AND ACETAMINOPHEN 10; 325 MG/1; MG/1
.5-1 TABLET ORAL 3 TIMES DAILY PRN
Qty: 60 TABLET | Refills: 0 | Status: SHIPPED | OUTPATIENT
Start: 2020-11-03 | End: 2021-02-18

## 2020-11-03 NOTE — TELEPHONE ENCOUNTER
Script Eprescribed to pharmacy    Will send this to MA team to notify patient.    Signed Prescriptions:                        Disp   Refills    fentaNYL (DURAGESIC) 12 mcg/hr 72 hr patch 10 pat*0        Sig: Place 1 patch onto the skin every 72 hours remove old           patch. Fill 11/2/20 to start 11/3/20  Authorizing Provider: SANTOS ISLAS    HYDROcodone-acetaminophen (NORCO)  M*60 tab*0        Sig: Take 0.5-1 tablets by mouth 3 times daily as needed           for severe pain Put 4-6 hours between dose.  Fill           on/after 11/2/20 to start on/after 11/3/20  Authorizing Provider: SANTOS ISLAS MD  [facility]

## 2020-11-14 ENCOUNTER — HEALTH MAINTENANCE LETTER (OUTPATIENT)
Age: 60
End: 2020-11-14

## 2020-11-16 ENCOUNTER — VIRTUAL VISIT (OUTPATIENT)
Dept: RHEUMATOLOGY | Facility: CLINIC | Age: 60
End: 2020-11-16
Payer: COMMERCIAL

## 2020-11-16 DIAGNOSIS — Z79.899 HIGH RISK MEDICATIONS (NOT ANTICOAGULANTS) LONG-TERM USE: ICD-10-CM

## 2020-11-16 DIAGNOSIS — G89.4 CHRONIC PAIN SYNDROME: ICD-10-CM

## 2020-11-16 DIAGNOSIS — M85.80 OSTEOPENIA, UNSPECIFIED LOCATION: ICD-10-CM

## 2020-11-16 DIAGNOSIS — M35.01 SJOGREN'S SYNDROME WITH KERATOCONJUNCTIVITIS SICCA (H): ICD-10-CM

## 2020-11-16 DIAGNOSIS — M06.09 RHEUMATOID ARTHRITIS OF MULTIPLE SITES WITH NEGATIVE RHEUMATOID FACTOR (H): Primary | ICD-10-CM

## 2020-11-16 PROCEDURE — 99214 OFFICE O/P EST MOD 30 MIN: CPT | Mod: 95 | Performed by: INTERNAL MEDICINE

## 2020-11-16 RX ORDER — CELECOXIB 100 MG/1
100 CAPSULE ORAL DAILY
Qty: 30 CAPSULE | Refills: 3 | Status: SHIPPED | OUTPATIENT
Start: 2020-11-16 | End: 2021-01-08

## 2020-11-16 NOTE — PROGRESS NOTES
"Betsey Vanessa is a 60 year old female who is being evaluated via a billable telephone visit.      The patient has been notified of following:     \"This telephone visit will be conducted via a call between you and your physician/provider. We have found that certain health care needs can be provided without the need for a physical exam.  This service lets us provide the care you need with a short phone conversation.  If a prescription is necessary we can send it directly to your pharmacy.  If lab work is needed we can place an order for that and you can then stop by our lab to have the test done at a later time.    Telephone visits are billed at different rates depending on your insurance coverage. During this emergency period, for some insurers they may be billed the same as an in-person visit.  Please reach out to your insurance provider with any questions.    If during the course of the call the physician/provider feels a telephone visit is not appropriate, you will not be charged for this service.\"    Patient has given verbal consent for Telephone visit?  Yes    What phone number would you like to be contacted at? [phone]    How would you like to obtain your AVS? Mail a copy     Madeline Cheung CMA Rheumatology  11/16/2020 10:31 AM    Rheumatology Telephone/TeleHealth Visit      Betsey Vanessa MRN# [id number]   YOB: 1960 Age: 60 year old      Date of visit: 11/16/20   PCP: Dr. Velma Ibanez  Referring provider: Dr. Velma Ibanez    Chief Complaint   Patient presents with:  Arthritis: RA. Having some pain all over    Assessment and Plan     1.  Rheumatoid arthritis: Reportedly diagnosed many years ago.  Previously treated by Dr. Stacy at [facility] in [place], [place]. Previously on methotrexate + rituximab, Enbrel + leflunomide; had elevated liver enzymes preventing oral DMARDs; most recent effective tx was with tocilizumab 8mg/kg IV monthly per her last rheumatology " notes.  ALEGRIA hx prevents several oral DMARDs and LFT elevations with MTX in the past. Reviewed her dx and tx options. Started Actemra 4mg/kg IV but this resulted in lower platelets and she doesn't find much benefit from actemra this time. Stopped Actemra because of the thrombocytopenia; started Orencia with the first dose 5/7/2020. Improved with Orencia and will continue.  She would like to continue Celebrex as she has found it beneficial for her body pains; reduce dose to 100 mg daily considering history of thrombocytopenia and liver enzyme elevation; continue to monitor with labs.  - Continue orencia 750mg IV every 4 weeks:   - Change Celebrex to be 100 mg daily  - Labs in 3 months: CBC, Creatinine, Hepatic Panel, ESR, CRP    # NSAIDs:  The risks and benefits of NSAIDs were discussed in detail and the patient verbalized understanding.  The risks discussed include, but are not limited to, the risk for hypersensitivity, anaphylaxis, GI upset, gastric ulcer, nephropathy, and an increased risk for cardiovascular events.     2. Sjogren's Syndrome: reportedly had punctal plugs in the past.  Doing okay with artificial tears, Xiidra, and Evoxac.  Encouraged frequent sips of water, visits with a dentist at least every 6months, avoidance of sugary foods/drinks.     3. Chronic pain syndrome: Managed at the pain clinic     4. Osteopenia: based on 7/2019 DEXA report that she brought with her; FRAX score shows a 22% risk of major osteoporotic fracture in the next 10 years and a 1.9% risk for osteoporotic hip fracture in the next 10 years.  Based on FRAX tx is indicated.  She already received one dose of boniva in Sept 2019; and failed alendronate due to GI upset.  Reclast received 2/13/2020.   - Continue calcium 1000mg daily  - Continue vitamin D 2000 IU daily  - Reclast: next dose in February 2021  - Labs in 3 months: Calcium, vitamin D    6. Thrombocytopenia: see #1.    # Relevant labs and imaging were reviewed with the  patient    # High risk medication toxicity monitoring: discussion and labs reviewed; appropriate labs ordered. See above.  Instructed that if confirmed to have COVID-19 or exposure to someone with confirmed COVID-19 to call this clinic for directions on DMARD management.    # Note that this is a virtual visit to reduce the risk of COVID-19 exposure during this current pandemic.      # Considered to be at high risk of complications from the COVID-19 virus.  It is recommended to limit contact with other people and if possible to work remotely or provide a leave of absence to reduce the risk for COVID-19.      Ms. Vanessa verbalized agreement with and understanding of the rational for the diagnosis and treatment plan.  All questions were answered to best of my ability and the patient's satisfaction. Ms. Vanessa was advised to contact the clinic with any questions that may arise after the clinic visit.      Thank you for involving me in the care of the patient    Return to clinic: 3 months    HPI   Betsey Vanessa is a 60 year old female with a past medical history significant for hypertension, depression, type 2 diabetes, Ménière's disease, and rheumatoid arthritis who is seen in consultation for follow-up of RA.     8/16/2019 documentation by Dr. Juani Stacy at [facility] ([place], [place]) notes rheumatoid arthritis and thrombocytopenia    1/8/2020 clinic note by Dr. Ibanez documents Ménière's disease-referring to ENT.  Diabetes.  Hypertension.  Rheumatoid arthritis and Sjogren's syndrome currently on Evoxac, Celebrex, IV Tocilizumab monthly.  Also on long-term narcotics that includes fentanyl patch for chronic pain on clonazepam for sleep at night.  Valvular heart disease with mitral valve stenosis, mitral valve regurgitation, and severe aortic valve stenosis and was following with a cardiologist with plans to establish with a new cardiologist here in [place].  Depression stable on  "Lexapro.    Moved from [place] to [place]    1/14/2020: Ms. Vanessa reported that she has RA diagnosed many years ago, chronic pain syndrome with plans to establish in the pain clinic, depression that is controlled, diabetes tx'd with oral medications, and hearing loss with plans to see ENT here.  Also states that she has \"bad bones\" and has been on Boniva injection x1 dose in February. Failed fosamax because of associated GI upset; used for 6 weeks. Reportedly [facility] by [facility]. Stacy.  Return (treated with methotrexate because liver enzyme elevations, Remicade that was ineffective and caused hives, Enbrel that she does not recall helping in any way, and more recently Actemra 4 mg/kg IV every 8 weeks that has been used for years, last received in September 2019.  Also with Celebrex.  She says that since her last dose of Actemra she has not seen any worsening of her arthritis.  Still with some pain at her MCPs that is worse in the morning and improves with time and activity.  Morning stiffness for approximately 1 hour.  Positive gelling phenomenon.  Diffuse body pain.    5/18/2020:  pain at her feet, elbows, hands, lower back, knees.  She says that she has swelling; then her daughter who was also on the phonecall asked where she was swollen and Ms. Vanessa said that she wasn't swollen right now.  Peripheral joint pain is bad all day; she doesn't discern between AM, noon, or PM.  Activity doesn't affect her joint pain.  Felt better when on actemra in the past. Her daughter said that her mother seemed to respond well to actemra, but variable - sometimes helping for 1 week after infusion, sometimes for 3 weeks after, and sometimes not at all. Felt like in the past it was more effective.     8/3/2020: feet and kness worse in the evening, worse with activity; better with rest.  MCPs feel better with orencia; no longer with swelling.  MCPs sometimes better activity, sometimes worse. Morning " stiffness may be from zero to 120 min. Tolerating Orencia well. Missed orencia once in July with no worsening symptoms; had worse meniere's at the time so she skipped a dose.  No bruising or bleeding.    Today, 11/16/2020: Doing well regard to Orencia.  She notes that her pain has been better controlled and her pain medications have been reduced over time.  However, she has run out of Celebrex for a period of time and then restarted at 200 mg daily and has noticed significant improvement when taking it.  She notes that her last labs did look good and wonders if I would be willing to continue Celebrex.  She has no GI upset with Celebrex use.  Currently without joint swelling.  Diffuse body pain is improved with Celebrex.  Taking vitamin D 2000 IU daily.    Denies fevers, chills, nausea, or vomiting.  Occasional constipation or diarrhea. No abdominal pain. No chest pain/pressure, palpitations, or shortness of breath. No LE swelling.  Chronic neck and lower back pain that were managed in the pain clinic previously and she plans to establish care in the pain clinic here soon. No oral or nasal sores.  No rash.  Dry eyes treated with artificial tears and Xiidra; she plans to establish with an ophthalmologist because she was followed by ophthalmology when in [place]; hx of punctal plugs.  Dry mouth is treated with frequent sips of water and Evoxac; doesn't recall ever being on pilocarpine. Evoxac is listed in her allergy list and she says that is inaccurate and then pulls out her Evoxac Rx to show that she has it and she says it helps and she's never had an adverse reaction to Evoxac.  No photosensitivity or photophobia.  No history of uveitis. No history of inflammatory bowel disease.  No history of DVT, pulmonary embolism, or miscarriage.   No history of serositis.  No history of Raynaud's Phenomenon.  No seizure history.   No known renal disorder.      Tobacco: Quit smoking in 1998  EtOH: None  Drugs: None    ROS    GEN: No fevers, chills, night sweats, or weight change  SKIN: No itching, rashes, sores  HEENT: No epistaxis. No oral or nasal ulcers.  CV: No chest pain, pressure, palpitations, or dyspnea on exertion.  PULM: No SOB, wheeze, cough.  GI:  No blood in stool. No abdominal pain.  See HPI  : No blood in urine.  MSK: See HPI.  NEURO: No numbness or tingling  ENDO: No heat/cold intolerance.  EXT: No LE swelling  PSYCH: See HPI    Active Problem List     Patient Active Problem List   Diagnosis     Type 2 diabetes mellitus with hyperglycemia, without long-term current use of insulin (H)     Hypertension goal BP (blood pressure) < 140/90     Meniere's disease, unspecified laterality     Rheumatoid arthritis, involving unspecified site, unspecified rheumatoid factor presence     Valvular heart disease     Fibromyalgia     Mild major depression (H)     Osteopenia of multiple sites     History of bisphosphonate therapy     Post-menopausal     Benzodiazepine dependence, episodic (H)     Continuous opioid dependence (H)     Mitral stenosis     Age-related osteoporosis without current pathological fracture     Mixed conductive and sensorineural hearing loss of right ear     Polyarticular arthritis     Encounter for long-term use of opiate analgesic     Chronic pain     Tympanic membrane perforation     Myofascial pain     Migraine with vertigo     Gastroesophageal reflux disease, esophagitis presence not specified     Fungal skin infection     Anaphylaxis, sequela     Hyperlipidemia, unspecified hyperlipidemia type     Past Medical History     Past Medical History:   Diagnosis Date     Anemia     r/t menses     Anxiety      Arthritis      Constipation      Depression      Diabetes (H)      Diarrhea      Double vision      Headache(784.0)      Hearing loss      Heart murmur      Heartburn      Hypertension      Indigestion      Nasal congestion      Night sweats      Numbness      Problems related to lack of adequate sleep       Rash      Sneezing      Tinnitus      Visual loss      Weakness      Weight gain     because of Prednisone     Past Surgical History     Past Surgical History:   Procedure Laterality Date     ------------OTHER-------------      D&C     HYSTERECTOMY       INNER EAR SURGERY       RELEASE CARPAL TUNNEL BILATERAL Bilateral 2008     Allergy     Allergies   Allergen Reactions     Duloxetine Other (See Comments)     Topiramate Other (See Comments)     Other reaction(s): Confusion     Amitriptyline Embonate [Amitriptyline]      Hyper activity     Azithromycin Hives     Cymbalta      Hyper activity     Gabapentin Hives     Hmg-Coa-R Inhibitors Other (See Comments)     Liver function studies abnormal on Lipitor / Crestor     Macrobid [Nitrofurantoin] Hives     Paxil [Paroxetine] Other (See Comments)     Hyper behavior     Penicillins Hives     Tetracycline Hives     Current Medication List     Current Outpatient Medications   Medication Sig     celecoxib (CELEBREX) 200 MG capsule Take 1 capsule (200 mg) by mouth 2 times daily as needed for moderate pain     cevimeline (EVOXAC) 30 MG capsule Take 1 capsule (30 mg) by mouth 3 times daily     clonazePAM (KLONOPIN) 0.5 MG tablet Take 1 tablet (0.5 mg) by mouth 2 times daily as needed for anxiety OK to take 1-2 tabs at night as needed for sleep.  Should last 30 days.     diclofenac (VOLTAREN) 1 % topical gel Place 2-4 g onto the skin 4 times daily . Max of 8g per dose. No more than 32g/day     docusate sodium (COLACE) 100 MG capsule Take 1 capsule (100 mg) by mouth 2 times daily     escitalopram (LEXAPRO) 20 MG tablet Take 1 tablet (20 mg) by mouth daily     fentaNYL (DURAGESIC) 12 mcg/hr 72 hr patch Place 1 patch onto the skin every 72 hours remove old patch. Fill 11/2/20 to start 11/3/20     fluvastatin (LESCOL) 20 MG capsule Take 1 capsule (20 mg) by mouth At Bedtime     HYDROcodone-acetaminophen (NORCO)  MG per tablet Take 0.5-1 tablets by mouth 3 times daily as needed  for severe pain Put 4-6 hours between dose.  Fill on/after 11/2/20 to start on/after 11/3/20     lifitegrast (XIIDRA) 5 % opthalmic solution Place 1 drop into both eyes 2 times daily     medical cannabis (Patient's own supply) Take 1 Dose by mouth See Admin Instructions (The purpose of this order is to document that the patient reports taking medical cannabis.  This is not a prescription, and is not used to certify that the patient has a qualifying medical condition.)     metFORMIN (GLUCOPHAGE) 500 MG tablet Take 2 tablets (1,000 mg) by mouth 2 times daily (with meals) 1000 mg 2 x a day     metroNIDAZOLE (METROGEL) 0.75 % gel Apply to face daily     omeprazole (PRILOSEC) 20 MG DR capsule Take 1 capsule (20 mg) by mouth 2 times daily 2 x daily     potassium chloride ER (KLOR-CON M) 10 MEQ CR tablet Take 1 tablet (10 mEq) by mouth 2 times daily 20 mEq daily Take by mouth 2 times daily 20 mEq daily     PROAIR  (90 Base) MCG/ACT inhaler INHALE 2 PUFFS BY MOUTH EVERY 6 HOURS AS NEEDED FOR WHEEZING     promethazine (PHENERGAN) 25 MG suppository Place 25 mg rectally every 6 hours as needed For nusea     promethazine (PHENERGAN) 25 MG tablet Take 1 tablet (25 mg) by mouth every 6 hours as needed for nausea or other (Meniere's) For nusea     psyllium (METAMUCIL) 58.6 % POWD Take by mouth daily PRN     STATIN NOT PRESCRIBED (INTENTIONAL) Please choose reason not prescribed, below     triamterene-HCTZ (DYAZIDE) 37.5-25 MG capsule Take 1 capsule by mouth daily 1 capsule every morning1 capsule every morning     vitamin D3 (CHOLECALCIFEROL) 2000 units (50 mcg) tablet Take 1 tablet (2,000 Units) by mouth daily     acetic acid-hydrocortisone (VOSOL-HC) 1-2 % otic solution Place 5 drops into the right ear 2 times daily (Patient not taking: Reported on 10/28/2020)     EPINEPHrine (ANY BX GENERIC EQUIV) 0.3 MG/0.3ML injection 2-pack Inject 0.3 mLs (0.3 mg) into the muscle as needed for anaphylaxis     EPINEPHrine 0.3 MG/0.3ML  "SOLN PRN for Anaphylaxis     glucose blood VI test strips strip daily     naloxone (NARCAN) 4 MG/0.1ML nasal spray Spray 1 spray (4 mg) into one nostril alternating nostrils once as needed for opioid reversal every 2-3 minutes until assistance arrives (Patient not taking: Reported on 10/28/2020)     nystatin (MYCOSTATIN) 544271 UNIT/GM external powder Apply topically 2 times daily as needed 2 x daily PRN for rash (Patient not taking: Reported on 10/28/2020)     No current facility-administered medications for this visit.          Social History   See HPI    Family History     Family History   Problem Relation Age of Onset     C.A.D. Mother      Alzheimer Disease Mother      Cardiovascular Mother      Eye Disorder Mother      Thyroid Disease Mother      Hypertension Other         grandmother     Cerebrovascular Disease Father      Alcohol/Drug Father      Depression Father      Obesity Father         aunt     Asthma Father      Alcohol/Drug Brother      Alcohol/Drug Sister         aunt     Allergies Other         All family members     Arthritis Other         aunt     Obesity Sister      Thyroid Disease Sister      Asthma Sister         daughter       Physical Exam     Temp Readings from Last 3 Encounters:   10/28/20 98.4  F (36.9  C) (Oral)   09/29/20 98.3  F (36.8  C) (Oral)   09/11/20 99  F (37.2  C) (Tympanic)     BP Readings from Last 5 Encounters:   10/28/20 131/72   10/12/20 136/68   09/29/20 131/66   09/11/20 133/61   09/01/20 130/78     Pulse Readings from Last 1 Encounters:   10/28/20 81     Resp Readings from Last 1 Encounters:   10/28/20 14     Estimated body mass index is 29.41 kg/m  as calculated from the following:    Height as of 8/14/20: 1.57 m (5' 1.81\").    Weight as of 10/28/20: 72.5 kg (159 lb 12.8 oz).    Telephone visit    Labs / Imaging (select studies)     RF/CCP  Recent Labs   Lab Test 01/14/20  1221   CCPIGG 1   RHF 20*     MARIO  Recent Labs   Lab Test 01/14/20  1221   ABBY Negative "     RNP/Sm/SSA/SSB  Recent Labs   Lab Test 01/14/20  1221   RNPIGG 2.7*   SMIGG <0.2   SSAIGG 0.4   SSBIGG <0.2     dsDNA  Recent Labs   Lab Test 01/14/20  1221   DNA 2     C3/C4  Recent Labs   Lab Test 01/14/20  1221   Y8IFJEL 127   U5TQHXR 30     Antiphospholipid Antibodies  Recent Labs   Lab Test 01/14/20  1221   B2GPG 0.7   B2GPM <2.9   CARDG <1.6   CARDM 1.6     CBC  Recent Labs   Lab Test 10/28/20  1537 04/09/20  1226 02/07/20  1040   WBC 7.1 3.8* 6.7   RBC 4.56 4.37 4.23   HGB 12.3 12.7 11.9   HCT 37.5 38.1 36.4   MCV 82 87 86   RDW 13.7 14.0 13.2    88* 122*   MCH 27.0 29.1 28.1   MCHC 32.8 33.3 32.7   NEUTROPHIL 66.1 48.3 66.8   LYMPH 25.8 39.5 22.4   MONOCYTE 5.1 7.4 7.1   EOSINOPHIL 2.5 4.2 3.1   BASOPHIL 0.1 0.3 0.3   ANEU 4.7 1.8 4.5   ALYM 1.8 1.5 1.5   MOISE 0.4 0.3 0.5   AEOS 0.2 0.2 0.2   ABAS 0.0 0.0 0.0     CMP  Recent Labs   Lab Test 10/28/20  1537 04/09/20  1226 02/07/20  1040 01/14/20  1221   NA  --  141  --  137   POTASSIUM  --  4.0  --  4.0   CHLORIDE  --  108  --  103   CO2  --  27  --  28   ANIONGAP  --  6  --  6   GLC  --  121*  --  120*   BUN  --  17  --  13   CR 0.83 0.70  --  0.70   GFRESTIMATED 77 >90  --  >90   GFRESTBLACK 90 >90  --  >90   MIRNA 9.3 9.1 9.6 9.5   BILITOTAL 0.6 0.4  --  0.5   ALBUMIN 4.1 4.0  --  4.0   PROTTOTAL 7.9 7.4  --  7.9   ALKPHOS 101 80  --  111   AST 21 36  --  30   ALT 37 52*  --  37     HgA1c  Recent Labs   Lab Test 08/14/20  0736   A1C 8.0*     Calcium/VitaminD  Recent Labs   Lab Test 10/28/20  1537 04/09/20  1226 02/07/20  1040 01/14/20  1221   MIRNA 9.3 9.1 9.6 9.5   VITDT 33  --   --  40     ESR/CRP  Recent Labs   Lab Test 10/28/20  1537 01/14/20  1221   SED 27 27   CRP 3.9 7.6     CK/Aldolase  Recent Labs   Lab Test 01/14/20  1221   CKT 53     TSH/T4  Recent Labs   Lab Test 01/14/20  1221   TSH 2.99     Lipid Panel  Recent Labs   Lab Test 08/14/20  0736 04/09/20  1226   CHOL 199 317*   TRIG 143 325*   HDL 47* 41*   * 211*   NHDL 152* 276*      Hepatitis B  Recent Labs   Lab Test 01/14/20  1221   HBCAB Nonreactive   HEPBANG Nonreactive     Hepatitis C  Recent Labs   Lab Test 01/14/20  1221   HCVAB Nonreactive     Lyme ab screening  Recent Labs   Lab Test 01/14/20  1221   LYMEGM 0.05     Tuberculosis Screening  Recent Labs   Lab Test 01/14/20  1221   TBRES Negative     HIV Screening  Recent Labs   Lab Test 08/14/20  0736   HIAGAB Nonreactive     UA  Recent Labs   Lab Test 04/09/20  1241 01/14/20  1230   COLOR Yellow Yellow   APPEARANCE Slightly Cloudy Clear   URINEGLC Negative Negative   URINEBILI Negative Negative   SG 1.033 >1.030   URINEPH 5.5 5.5   PROTEIN 10* Negative   UROBILINOGEN  --  0.2   NITRITE Negative Negative   UBLD Negative Negative   LEUKEST Trace* Trace*   WBCU 0 - 5 0 - 5   RBCU O - 2 O - 2   SQUAMOUSEPI Moderate* Many*   BACTERIA Few* Moderate*     Urine Microscopic  Recent Labs   Lab Test 04/09/20  1241 01/14/20  1230   WBCU 0 - 5 0 - 5   RBCU O - 2 O - 2   SQUAMOUSEPI Moderate* Many*   BACTERIA Few* Moderate*     Care[facility]where [facility]   7/18/2013 CCP negative  8/5/2010 CCP negative  3/27/2013 hepatitis B surface antigen negative and hepatitis B core antibody negative  9/2/2011 hepatitis B surface antigen negative and hepatitis B core antibody negative  3/27/2013 hepatitis C antibody negative    Immunization History     Immunization History   Administered Date(s) Administered     FLU 6-35 months 10/22/2013     Influenza Vaccine IM > 6 months Valent IIV4 10/21/2015, 10/05/2016, 10/11/2018, 09/25/2019     Influenza Vaccine, 6+MO IM (QUADRIVALENT W/PRESERVATIVES) 09/18/2014, 10/03/2017     Pneumo Conj 13-V (2010&after) 10/11/2018     TD (ADULT, 7+) 04/20/1997, 04/17/2019     TDAP Vaccine (Adacel) 01/15/2009     Zoster vaccine recombinant adjuvanted (SHINGRIX) 10/11/2018          Chart documentation done in part with Dragon Voice recognition Software. Although reviewed after completion, some word and grammatical error may  remain.    Phone call start time: 11:40 AM  Phone call end time: 11:48 AM    This visit is equivalent to a 34013 visit    Location of patient: home, in [place]  Location of provider: home    Follow up:  follow up appointment scheduled to be in Feb    Terrence Carrillo MD

## 2020-11-24 ENCOUNTER — INFUSION THERAPY VISIT (OUTPATIENT)
Dept: INFUSION THERAPY | Facility: CLINIC | Age: 60
End: 2020-11-24
Payer: COMMERCIAL

## 2020-11-24 VITALS
RESPIRATION RATE: 18 BRPM | DIASTOLIC BLOOD PRESSURE: 53 MMHG | SYSTOLIC BLOOD PRESSURE: 115 MMHG | HEART RATE: 75 BPM | WEIGHT: 161 LBS | OXYGEN SATURATION: 97 % | BODY MASS INDEX: 29.63 KG/M2 | TEMPERATURE: 97.4 F

## 2020-11-24 DIAGNOSIS — M06.09 RHEUMATOID ARTHRITIS OF MULTIPLE SITES WITH NEGATIVE RHEUMATOID FACTOR (H): Primary | ICD-10-CM

## 2020-11-24 PROCEDURE — 99207 PR NO CHARGE LOS: CPT

## 2020-11-24 PROCEDURE — 96365 THER/PROPH/DIAG IV INF INIT: CPT | Performed by: NURSE PRACTITIONER

## 2020-11-24 RX ORDER — HEPARIN SODIUM,PORCINE 10 UNIT/ML
5 VIAL (ML) INTRAVENOUS
Status: CANCELLED | OUTPATIENT
Start: 2020-11-25

## 2020-11-24 RX ORDER — HEPARIN SODIUM (PORCINE) LOCK FLUSH IV SOLN 100 UNIT/ML 100 UNIT/ML
5 SOLUTION INTRAVENOUS
Status: CANCELLED | OUTPATIENT
Start: 2020-11-25

## 2020-11-24 RX ADMIN — Medication 250 ML: at 11:57

## 2020-11-24 ASSESSMENT — PAIN SCALES - GENERAL: PAINLEVEL: MILD PAIN (2)

## 2020-11-24 NOTE — PROGRESS NOTES
Infusion Nursing Note:  Betsey Vanessa presents today for Vikas.    Patient seen by provider today: No   present during visit today: Not Applicable.    Note: N/A.    Intravenous Access:  Peripheral IV placed.    Treatment Conditions:  Biological Infusion Checklist:  ~~~ NOTE: If the patient answers yes to any of the questions below, hold the infusion and contact ordering provider or on-call provider.    1. Have you recently had an elevated temperature, fever, chills, productive cough, coughing for 3 weeks or longer or hemoptysis, abnormal vital signs, night sweats,  chest pain or have you noticed a decrease in your appetite, unexplained weight loss or fatigue? No  2. Do you have any open wounds or new incisions? No  3. Do you have any recent or upcoming hospitalizations, surgeries or dental procedures? No  4. Do you currently have or recently have had any signs of illness or infection or are you on any antibiotics? No  5. Have you had any new, sudden or worsening abdominal pain? No  6. Have you or anyone in your household received a live vaccination in the past 4 weeks? Please note:  No live vaccines while on biologic/chemotherapy until 6 months after the last treatment.  Patient can receive the flu vaccine (shot only) and the pneumovax.  It is optimal for the patient to get these vaccines mid cycle, but they can be given at any time as long as it is not on the day of the infusion. No  7. Have you recently been diagnosed with any new nervous system diseases (ie. Multiple sclerosis, Guillain [place], seizures, neurological changes) or cancer diagnosis? No  8. Are you on any form of radiation or chemotherapy? No  9. Are you pregnant or breast feeding or do you have plans of pregnancy in the future? No  10. Have you been having any signs of worsening depression or suicidal ideations?  (benlysta only) No  11. Have there been any other new onset medical symptoms? No    Post Infusion Assessment:  Patient  tolerated infusion without incident.  Site patent and intact, free from redness, edema or discomfort.  No evidence of extravasations.  Access discontinued per protocol.       Discharge Plan:   Patient will return 12/22 for next appointment.   Patient discharged in stable condition accompanied by: self.  Departure Mode: Ambulatory.    Ronda Landon RN

## 2020-12-03 ENCOUNTER — TELEPHONE (OUTPATIENT)
Dept: OTOLARYNGOLOGY | Facility: CLINIC | Age: 60
End: 2020-12-03

## 2020-12-04 ENCOUNTER — MYC REFILL (OUTPATIENT)
Dept: PALLIATIVE MEDICINE | Facility: CLINIC | Age: 60
End: 2020-12-04

## 2020-12-04 DIAGNOSIS — M06.09 RHEUMATOID ARTHRITIS OF MULTIPLE SITES WITHOUT RHEUMATOID FACTOR (H): ICD-10-CM

## 2020-12-04 DIAGNOSIS — G89.4 CHRONIC PAIN SYNDROME: ICD-10-CM

## 2020-12-04 RX ORDER — FENTANYL 12.5 UG/1
1 PATCH TRANSDERMAL
Qty: 10 PATCH | Refills: 0 | Status: CANCELLED | OUTPATIENT
Start: 2020-12-04

## 2020-12-07 RX ORDER — FENTANYL 12.5 UG/1
1 PATCH TRANSDERMAL
Qty: 10 PATCH | Refills: 0 | Status: SHIPPED | OUTPATIENT
Start: 2020-12-07 | End: 2020-12-31

## 2020-12-08 NOTE — PROGRESS NOTES
SUBJECTIVE:   CC: Betsey Vanessa is an 60 year old woman who presents for preventive health visit.     Patient has been advised of split billing requirements and indicates understanding: Yes  Healthy Habits:    Do you get at least three servings of calcium containing foods daily (dairy, green leafy vegetables, etc.)? No    Amount of exercise or daily activities, outside of work: 5 day(s) per week    Problems taking medications regularly No    Medication side effects: No    Have you had an eye exam in the past two years? yes    Do you see a dentist twice per year? no    Do you have sleep apnea, excessive snoring or daytime drowsiness?no    PROBLEMS TO ADD ON...  Fasting    Needing refills and/or labs for:  Diabetes  Is patient checking sugars regularly? Yes If so, how often? Few times a month Readings? 130  Medication side effects? No     High cholesterol  Has been tolerating fluvastatin well    Depression/Anxiety  Update PHQ/BONNIE    -------------------------------------    Today's PHQ-2 Score:   PHQ-2 (  [employer]) 2020   Q1: Little interest or pleasure in doing things 0 0   Q2: Feeling down, depressed or hopeless 0 0   PHQ-2 Score 0 0       Abuse: Current or Past(Physical, Sexual or Emotional)- No  Do you feel safe in your environment? Yes    Have you ever done Advance Care Planning? (For example, a Health Directive, POLST, or a discussion with a medical provider or your loved ones about your wishes): No, advance care planning information given to patient to review.  Patient plans to discuss their wishes with loved ones or provider.      Social History     Tobacco Use     Smoking status: Former Smoker     Start date: 1978     Quit date: 1998     Years since quittin.9     Smokeless tobacco: Never Used     Tobacco comment: 1 pack a day    Substance Use Topics     Alcohol use: No     If you drink alcohol do you typically have >3 drinks per day or >7 drinks per week? No                      Reviewed orders with patient.  Reviewed health maintenance and updated orders accordingly - Yes  Lab work is in process    Mammogram Screening: Patient over age 50, mutual decision to screen reflected in health maintenance.    Pertinent mammograms are reviewed under the imaging tab.  History of abnormal Pap smear: Status post benign hysterectomy. Health Maintenance and Surgical History updated.     Reviewed and updated as needed this visit by clinical staff  Tobacco  Allergies  Meds              Reviewed and updated as needed this visit by Provider                Past Medical History:   Diagnosis Date     Anemia     r/t menses     Anxiety      Arthritis      Constipation      Depression      Diabetes (H)      Diarrhea      Double vision      Headache(784.0)      Hearing loss      Heart murmur      Heartburn      Hypertension      Indigestion      Nasal congestion      Night sweats      Numbness      Problems related to lack of adequate sleep      Rash      Sneezing      Tinnitus      Visual loss      Weakness      Weight gain     because of Prednisone        ROS:  Other than what is noted in the HPI and PMH a complete review of systems is otherwise negative including: Constitutional, HEENT, endocrine, cardiovascular, respiratory, GI/, musculoskeletal, neuro, and psychiatric.     OBJECTIVE:   /70   Pulse 78   Temp 98.3  F (36.8  C) (Tympanic)   Resp 16   Wt 72 kg (158 lb 12.8 oz)   SpO2 98%   BMI 29.22 kg/m    EXAM:  GENERAL: healthy, alert and no distress  EYES: Eyes grossly normal to inspection, PERRL and conjunctivae and sclerae normal  HENT: ear canals and TM's normal, nose and mouth without ulcers or lesions  NECK: no adenopathy, no asymmetry, masses, or scars and thyroid normal to palpation  RESP: lungs clear to auscultation - no rales, rhonchi or wheezes  BREAST: normal without masses, tenderness or nipple discharge and no palpable axillary masses or adenopathy  CV: regular rates and rhythm,  normal S1 S2, no S3 or S4 and no murmur, click or rub  ABDOMEN: soft, nontender, no hepatosplenomegaly, no masses and bowel sounds normal  MS: no gross musculoskeletal defects noted, no edema  SKIN: no suspicious lesions or rashes  NEURO: Normal strength and tone, mentation intact and speech normal  PSYCH: mentation appears normal, affect normal/bright      ASSESSMENT/PLAN:       ICD-10-CM    1. Encounter for routine adult medical exam with abnormal findings  Z00.01    2. Encounter for screening mammogram for breast cancer  Z12.31 *MA Screening Digital Bilateral   3. Nausea and vomiting, intractability of vomiting not specified, unspecified vomiting type  R11.2 GASTROENTEROLOGY ADULT REF CONSULT ONLY   4. Benzodiazepine dependence, episodic (H)  F13.20 clonazePAM (KLONOPIN) 0.5 MG tablet   5. Perennial allergic rhinitis  J30.89 fluticasone (FLONASE) 50 MCG/ACT nasal spray     cetirizine (ZYRTEC) 10 MG tablet   6. Type 2 diabetes mellitus with hyperglycemia, without long-term current use of insulin (H)  E11.65 HEMOGLOBIN A1C     blood glucose (NO BRAND SPECIFIED) test strip     blood glucose calibration (NO BRAND SPECIFIED) solution     thin (NO BRAND SPECIFIED) lancets     alcohol swab prep pads   7. Hyperlipidemia, unspecified hyperlipidemia type  E78.5 Lipid panel reflex to direct LDL Fasting     ALT     AST   8. Mild major depression (H)  F32.0 escitalopram (LEXAPRO) 20 MG tablet       1,2) Screenings discussed    3) Referral to GI for further evaluation.     4) Med refilled. Last refill filled 4 months ago. 100 tabs seem to last her at least 3 months.    5) Discussed allergy treatments. Will add Zyrtec 10mg to her Flonase.    6-8) Due for routine labs today. Will adjust meds based on results. PHQ and BONNIE at goal.       Patient has been advised of split billing requirements and indicates understanding: Yes  COUNSELING:   Reviewed preventive health counseling, as reflected in patient instructions    Estimated body  "mass index is 29.22 kg/m  as calculated from the following:    Height as of 8/14/20: 1.57 m (5' 1.81\").    Weight as of this encounter: 72 kg (158 lb 12.8 oz).    She reports that she quit smoking about 22 years ago. She started smoking about 42 years ago. She has never used smokeless tobacco.      Counseling Resources:  ATP IV Guidelines  Pooled Cohorts Equation Calculator  Breast Cancer Risk Calculator  BRCA-Related Cancer Risk Assessment: FHS-7 Tool  FRAX Risk Assessment  ICSI Preventive Guidelines  Dietary Guidelines for Americans, 2010  USDA's MyPlate  ASA Prophylaxis  Lung CA Screening    [facility] [place]  "

## 2020-12-09 ENCOUNTER — OFFICE VISIT (OUTPATIENT)
Dept: FAMILY MEDICINE | Facility: CLINIC | Age: 60
End: 2020-12-09
Payer: COMMERCIAL

## 2020-12-09 VITALS
HEART RATE: 78 BPM | OXYGEN SATURATION: 98 % | TEMPERATURE: 98.3 F | RESPIRATION RATE: 16 BRPM | BODY MASS INDEX: 29.22 KG/M2 | DIASTOLIC BLOOD PRESSURE: 70 MMHG | WEIGHT: 158.8 LBS | SYSTOLIC BLOOD PRESSURE: 128 MMHG

## 2020-12-09 DIAGNOSIS — Z00.01 ENCOUNTER FOR ROUTINE ADULT MEDICAL EXAM WITH ABNORMAL FINDINGS: Primary | ICD-10-CM

## 2020-12-09 DIAGNOSIS — Z12.31 ENCOUNTER FOR SCREENING MAMMOGRAM FOR BREAST CANCER: ICD-10-CM

## 2020-12-09 DIAGNOSIS — R11.2 NAUSEA AND VOMITING, INTRACTABILITY OF VOMITING NOT SPECIFIED, UNSPECIFIED VOMITING TYPE: ICD-10-CM

## 2020-12-09 DIAGNOSIS — J30.89 PERENNIAL ALLERGIC RHINITIS: ICD-10-CM

## 2020-12-09 DIAGNOSIS — E11.65 TYPE 2 DIABETES MELLITUS WITH HYPERGLYCEMIA, WITHOUT LONG-TERM CURRENT USE OF INSULIN (H): ICD-10-CM

## 2020-12-09 DIAGNOSIS — E78.5 HYPERLIPIDEMIA, UNSPECIFIED HYPERLIPIDEMIA TYPE: ICD-10-CM

## 2020-12-09 DIAGNOSIS — F13.20 BENZODIAZEPINE DEPENDENCE, EPISODIC (H): ICD-10-CM

## 2020-12-09 DIAGNOSIS — F32.0 MILD MAJOR DEPRESSION (H): ICD-10-CM

## 2020-12-09 LAB
ALT SERPL W P-5'-P-CCNC: 37 U/L (ref 0–50)
AST SERPL W P-5'-P-CCNC: 22 U/L (ref 0–45)
CHOLEST SERPL-MCNC: 221 MG/DL
HBA1C MFR BLD: 7.5 % (ref 0–5.6)
HDLC SERPL-MCNC: 59 MG/DL
LDLC SERPL CALC-MCNC: 129 MG/DL
NONHDLC SERPL-MCNC: 162 MG/DL
TRIGL SERPL-MCNC: 164 MG/DL

## 2020-12-09 PROCEDURE — 80061 LIPID PANEL: CPT | Performed by: PHYSICIAN ASSISTANT

## 2020-12-09 PROCEDURE — 90471 IMMUNIZATION ADMIN: CPT | Performed by: PHYSICIAN ASSISTANT

## 2020-12-09 PROCEDURE — 90732 PPSV23 VACC 2 YRS+ SUBQ/IM: CPT | Performed by: PHYSICIAN ASSISTANT

## 2020-12-09 PROCEDURE — 99396 PREV VISIT EST AGE 40-64: CPT | Mod: 25 | Performed by: PHYSICIAN ASSISTANT

## 2020-12-09 PROCEDURE — 84460 ALANINE AMINO (ALT) (SGPT): CPT | Performed by: PHYSICIAN ASSISTANT

## 2020-12-09 PROCEDURE — 84450 TRANSFERASE (AST) (SGOT): CPT | Performed by: PHYSICIAN ASSISTANT

## 2020-12-09 PROCEDURE — 83036 HEMOGLOBIN GLYCOSYLATED A1C: CPT | Performed by: PHYSICIAN ASSISTANT

## 2020-12-09 PROCEDURE — 99214 OFFICE O/P EST MOD 30 MIN: CPT | Mod: 25 | Performed by: PHYSICIAN ASSISTANT

## 2020-12-09 PROCEDURE — 36415 COLL VENOUS BLD VENIPUNCTURE: CPT | Performed by: PHYSICIAN ASSISTANT

## 2020-12-09 RX ORDER — CLONAZEPAM 0.5 MG/1
0.5 TABLET ORAL 2 TIMES DAILY PRN
Qty: 100 TABLET | Refills: 1 | Status: SHIPPED | OUTPATIENT
Start: 2020-12-09 | End: 2021-06-09

## 2020-12-09 RX ORDER — CETIRIZINE HYDROCHLORIDE 10 MG/1
10 TABLET ORAL DAILY
Qty: 90 TABLET | Refills: 3 | Status: SHIPPED | OUTPATIENT
Start: 2020-12-09 | End: 2022-02-20

## 2020-12-09 RX ORDER — LANCETS
EACH MISCELLANEOUS
Qty: 1 EACH | Refills: 3 | Status: SHIPPED | OUTPATIENT
Start: 2020-12-09 | End: 2023-01-24

## 2020-12-09 RX ORDER — FLUTICASONE PROPIONATE 50 MCG
1-2 SPRAY, SUSPENSION (ML) NASAL DAILY
Qty: 16 G | Refills: 11 | Status: SHIPPED | OUTPATIENT
Start: 2020-12-09 | End: 2022-01-28

## 2020-12-09 RX ORDER — GLUCOSAMINE HCL/CHONDROITIN SU 500-400 MG
CAPSULE ORAL
Qty: 100 EACH | Refills: 3 | Status: SHIPPED | OUTPATIENT
Start: 2020-12-09

## 2020-12-09 RX ORDER — ESCITALOPRAM OXALATE 20 MG/1
20 TABLET ORAL DAILY
Qty: 90 TABLET | Refills: 1 | Status: SHIPPED | OUTPATIENT
Start: 2020-12-09 | End: 2021-12-30

## 2020-12-09 ASSESSMENT — ANXIETY QUESTIONNAIRES
IF YOU CHECKED OFF ANY PROBLEMS ON THIS QUESTIONNAIRE, HOW DIFFICULT HAVE THESE PROBLEMS MADE IT FOR YOU TO DO YOUR WORK, TAKE CARE OF THINGS AT HOME, OR GET ALONG WITH OTHER PEOPLE: SOMEWHAT DIFFICULT
5. BEING SO RESTLESS THAT IT IS HARD TO SIT STILL: NOT AT ALL
6. BECOMING EASILY ANNOYED OR IRRITABLE: NOT AT ALL
3. WORRYING TOO MUCH ABOUT DIFFERENT THINGS: NOT AT ALL
7. FEELING AFRAID AS IF SOMETHING AWFUL MIGHT HAPPEN: NOT AT ALL
GAD7 TOTAL SCORE: 1
1. FEELING NERVOUS, ANXIOUS, OR ON EDGE: SEVERAL DAYS
2. NOT BEING ABLE TO STOP OR CONTROL WORRYING: NOT AT ALL

## 2020-12-09 ASSESSMENT — PATIENT HEALTH QUESTIONNAIRE - PHQ9
SUM OF ALL RESPONSES TO PHQ QUESTIONS 1-9: 3
5. POOR APPETITE OR OVEREATING: NOT AT ALL

## 2020-12-09 NOTE — LETTER
My Depression Action Plan  Name: Betsey Vanessa   Date of Birth 1960  Date: 12/9/2020    My doctor: Pb Ken   My clinic: [facility]  [address]  [place] [id number] [phone]          GREEN    ZONE   Good Control    What it looks like:     Things are going generally well. You have normal ups and downs. You may even feel depressed from time to time, but bad moods usually last less than a day.   What you need to do:  1. Continue to care for yourself (see self care plan)  2. Check your depression survival kit and update it as needed  3. Follow your physician s recommendations including any medication.  4. Do not stop taking medication unless you consult with your physician first.           YELLOW         ZONE Getting Worse    What it looks like:     Depression is starting to interfere with your life.     It may be hard to get out of bed; you may be starting to isolate yourself from others.    Symptoms of depression are starting to last most all day and this has happened for several days.     You may have suicidal thoughts but they are not constant.   What you need to do:     1. Call your care team. Your response to treatment will improve if you keep your care team informed of your progress. Yellow periods are signs an adjustment may need to be made.     2. Continue your self-care.  Just get dressed and ready for the day.  Don't give yourself time to talk yourself out of it.    3. Talk to someone in your support network.    4. Open up your Depression Self-Care Plan/Wellness Kit.           RED    ZONE Medical Alert - Get Help    What it looks like:     Depression is seriously interfering with your life.     You may experience these or other symptoms: You can t get out of bed most days, can t work or engage in other necessary activities, you have trouble taking care of basic hygiene, or basic responsibilities, thoughts of suicide or death that will not go  away, self-injurious behavior.     What you need to do:  1. Call your care team and request a same-day appointment. If they are not available (weekends or after hours) call your local crisis line, emergency room or 911.            Depression Self-Care Plan / Wellness Kit    Self-Care for Depression  Here s the deal. Your body and mind are really not as separate as most people think.  What you do and think affects how you feel and how you feel influences what you do and think. This means if you do things that people who feel good do, it will help you feel better.  Sometimes this is all it takes.  There is also a place for medication and therapy depending on how severe your depression is, so be sure to consult with your medical provider and/ or Behavioral Health Consultant if your symptoms are worsening or not improving.     In order to better manage my stress, I will:    Exercise  Get some form of exercise, every day. This will help reduce pain and release endorphins, the  feel good  chemicals in your brain. This is almost as good as taking antidepressants!  This is not the same as joining a gym and then never going! (they count on that by the way ) It can be as simple as just going for a walk or doing some gardening, anything that will get you moving.      Hygiene   Maintain good hygiene (get out of bed in the morning, make your bed, brush your teeth, take a shower, and get dressed like you were going to work, even if you are unemployed).  If your clothes don't fit try to get ones that do.    Diet  Strive to eat foods that are good for me, drink plenty of water, and avoid excessive sugar, caffeine, alcohol, and other mood-altering substances.  Some foods that are helpful in depression are: complex carbohydrates, B vitamins, flaxseed, fish or fish oil, fresh fruits and vegetables.    Psychotherapy  Agree to participate in Individual Therapy (if recommended).    Medication  If prescribed medications, I agree to take  them.  Missing doses can result in serious side effects.  I understand that drinking alcohol, or other illicit drug use, may cause potential side effects.  I will not stop my medication abruptly without first discussing it with my provider.    Staying Connected With Others  Stay in touch with my friends, family members, and my primary care provider/team.    Use your imagination  Be creative.  We all have a creative side; it doesn t matter if it s oil painting, sand castles, or mud pies! This will also kick up the endorphins.    Witness Beauty  (AKA stop and smell the roses) Take a look outside, even in mid-winter. Notice colors, textures. Watch the squirrels and birds.     Service to others  Be of service to others.  There is always someone else in need.  By helping others we can  get out of ourselves  and remember the really important things.  This also provides opportunities for practicing all the other parts of the program.    Humor  Laugh and be silly!  Adjust your TV habits for less news and crime-drama and more comedy.    Control your stress  Try breathing deep, massage therapy, biofeedback, and meditation. Find time to relax each day.     Crisis Text Line  http://www.crisistextline.org    The Crisis Text Line serves anyone, in any type of crisis, providing access to free, 24/7 support and information via the medium people already use and trust:    Here's how it works:  1.  Text 535-517 from anywhere in the [place], anytime, about any type of crisis.  2.  A live, trained Crisis Counselor receives the text and responds quickly.  3.  The volunteer Crisis Counselor will help you move from a 'hot moment to a cool moment'.    My support system    Clinic Contact:  Phone number:    Contact 1:  Phone number:    Contact 2:  Phone number:    Mandaen/:  Phone number:    Therapist:  Phone number:    Local crisis center:    Phone number:    Other community support:  Phone number:

## 2020-12-10 ASSESSMENT — ANXIETY QUESTIONNAIRES: GAD7 TOTAL SCORE: 1

## 2020-12-10 NOTE — TELEPHONE ENCOUNTER
REFERRAL INFORMATION:    Referring Provider:  Katerina Judd PA-C    Referring Clinic:  [facility]     Reason for Visit/Diagnosis: Nausea and vomiting     FUTURE VISIT INFORMATION:    Appointment Date: 1/5/2021    Appointment Time: 1 PM     NOTES STATUS DETAILS   OFFICE NOTE from Referring Provider Internal 12/9/2020 Office visit with Katerina Judd PA-C   OFFICE NOTE from Other Specialist N/A    HOSPITAL DISCHARGE SUMMARY/  ED VISITS N/A    OPERATIVE REPORT N/A    MEDICATION LIST Internal         ENDOSCOPY  N/A    COLONOSCOPY Care Everywhere 11/14/18, 10/21/13 ([facility])   ERCP N/A    EUS N/A    STOOL TESTING N/A    PERTINENT LABS Internal/ Care Everywhere    PATHOLOGY REPORTS (RELATED) Care Everywhere 10/21/13   IMAGING (CT, MRI, EGD, MRCP, Small Bowel Follow Through/SBT, MR/CT Enterography) N/A

## 2020-12-11 ENCOUNTER — TELEPHONE (OUTPATIENT)
Dept: FAMILY MEDICINE | Facility: CLINIC | Age: 60
End: 2020-12-11

## 2020-12-11 ENCOUNTER — VIRTUAL VISIT (OUTPATIENT)
Dept: PALLIATIVE MEDICINE | Facility: CLINIC | Age: 60
End: 2020-12-11
Payer: COMMERCIAL

## 2020-12-11 DIAGNOSIS — M79.7 FIBROMYALGIA: ICD-10-CM

## 2020-12-11 DIAGNOSIS — G89.4 CHRONIC PAIN SYNDROME: ICD-10-CM

## 2020-12-11 DIAGNOSIS — M06.09 RHEUMATOID ARTHRITIS OF MULTIPLE SITES WITHOUT RHEUMATOID FACTOR (H): Primary | ICD-10-CM

## 2020-12-11 DIAGNOSIS — M47.819 FACET ARTHROPATHY: ICD-10-CM

## 2020-12-11 PROCEDURE — 99213 OFFICE O/P EST LOW 20 MIN: CPT | Mod: 95 | Performed by: PSYCHIATRY & NEUROLOGY

## 2020-12-11 ASSESSMENT — PAIN SCALES - GENERAL: PAINLEVEL: MODERATE PAIN (4)

## 2020-12-11 NOTE — TELEPHONE ENCOUNTER
Directives from Katerina Judd PA-C reviewed with patient and she verbalized a good understanding.     Per patient's request, instructions were also sent to her via My Chart.     Vianney Casper RN BSN

## 2020-12-11 NOTE — PATIENT INSTRUCTIONS
1. Schedule pain PT and pain psychology  2.  Call with about 7 days advanced notice for refills  3. Schedule follow up in 2-3 months- our team should be calling you  4. Update Dr. Carrillo about how you are doing on the lower dose of Celebrex.  5. Call if another procedure is needed    ----------------------------------------------------------------  [facility] Number:  [phone]     Call with any questions about your care and for scheduling assistance.     Calls are returned Monday through Friday between 8 AM and 4:30 PM. We usually get back to you within 2 business days depending on the issue/request.    If we are prescribing your medications:    For opioid medication refills, call the clinic or send a [employer] message 7 days in advance.  Please include:    Name of requested medication    Name of the pharmacy.    For non-opioid medications, call your pharmacy directly to request a refill. Please allow 3-4 days to be processed.     Per [place] State Law:    All controlled substance prescriptions must be filled within 30 days of being written.      For those controlled substances allowing refills, pickup must occur within 30 days of last fill.      We believe regular attendance is key to your success in our program!      Any time you are unable to keep your appointment we ask that you call us at least 24 hours in advance to cancel.This will allow us to offer the appointment time to another patient.     Multiple missed appointments may lead to dismissal from the clinic.

## 2020-12-11 NOTE — PROGRESS NOTES
"Betsey Vanessa is a 60 year old female who is being evaluated via a billable video visit.      The patient has been notified of following:     \"This video visit will be conducted via a call between you and your physician/provider. We have found that certain health care needs can be provided without the need for an in-person physical exam.  This service lets us provide the care you need with a video conversation.  If a prescription is necessary we can send it directly to your pharmacy.  If lab work is needed we can place an order for that and you can then stop by our lab to have the test done at a later time.    Video visits are billed at different rates depending on your insurance coverage.  Please reach out to your insurance provider with any questions.    If during the course of the call the physician/provider feels a video visit is not appropriate, you will not be charged for this service.\"    Patient has given verbal consent for Video visit? Yes  How would you like to obtain your AVS? MyChart  If you are dropped from the video visit, the video invite should be resent to: Text to cell phone: [phone]  Will anyone else be joining your video visit? No      Geena Farmer CMA (AAMA)    Video-Visit Details    Changed to phone due to technical difficulties    Betsey Vanessa is a 60 year old female who is being evaluated via a billable telephone visit.                                    [facility]    Date of visit: 12/11/2020      Chief complaint:   Chief Complaint   Patient presents with     Pain     Video visit due to COVID-19       Interval history:  Betsey Vanessa was last seen by me on 10/1//20.      Recommendations/plan at the last visit included:  1. Physical Therapy:  continue  2. Clinical Health Psychologist to address issues of relaxation, behavioral change, coping style, and other factors important to improvement. continue  3. Diagnostic Studies:  She will be getting the " comprehensive urine drug screen at next visit.  4. Medication Management:    1. Wean Norco as able.  2. We discussed further weaning of opioid.  Will wait for now.  5. Further procedures recommended: bilateral lumbar facet joint injections. Discussed steroid vs medial branch block. Given rheumatoid arthritis and good response to steroids, she wants to try this first.  6. Recommendations to PCP: see above   7. Follow up: 2 month- virtual      Since her last visit, Betsey Vanessa reports:  -had bilateral L4/5 and L5/S1 facet joint injections on 10/12/20-it took about 2 weeks, but they then worked pretty well.  She continues to have relief.  80%  -she thinks that she is sleeping and moving better- doing some walking and some exercises.  -with her Norco- she uses 0.-2/day.   -she is a little sore today- thinks that may be due to weather front.    -has not gotten back in with PT and pain psychology- she is willing to call to schedule      Pain scores:  Pain intensity on average is 4 on a scale of 0-10.     Current pain treatments:   Celebrex 100mg daily- lower dose   Fentanyl 12mcg/hr- has decreased in the past, concerned about coming down on that right now.  Norco 10/325- using 1-3/day.  Last script- 11/3 for 60 tabs.  Medical cannabis- helps but causes side effects like dizziness and dry mouth.  Diclofenac gel- helps   salanpas patch- over the counter  Emu oil    Clonazepam 0.5mg tabs- currently on taper with PCP  Narcan ordered    Previous medication treatments included:  Gabapentin - fast heartbeat, sweating  Cymbalta- hyperactivity  topamax- confusion  Amitriptyline- hyperactivity  Tramadol  darvocet    Other treatments have included:  Betsey Vanessa has been seen at a pain clinic in the past.    PT: hand therapy.- last about 3 years ago.  Acupuncture: no  TENs Unit: no  Injections:   Hand steroid injections-   Neck injections    Side Effects: none    Medications:  Current Outpatient Medications   Medication Sig  Dispense Refill     alcohol swab prep pads Use to swab area of injection/rosie as directed 100 each 3     blood glucose (NO BRAND SPECIFIED) test strip Use to test blood sugar 2 times daily or as directed. To accompany: Blood Glucose Monitor Brands: per insurance. 100 strip 6     blood glucose calibration (NO BRAND SPECIFIED) solution Use to calibrate blood glucose monitor as needed as directed. To accompany: Blood Glucose Monitor Brands: per insurance. 1 Bottle 3     celecoxib (CELEBREX) 100 MG capsule Take 1 capsule (100 mg) by mouth daily 30 capsule 3     cetirizine (ZYRTEC) 10 MG tablet Take 1 tablet (10 mg) by mouth daily 90 tablet 3     cevimeline (EVOXAC) 30 MG capsule Take 1 capsule (30 mg) by mouth 3 times daily 270 capsule 3     clonazePAM (KLONOPIN) 0.5 MG tablet Take 1 tablet (0.5 mg) by mouth 2 times daily as needed for anxiety OK to take 1-2 tabs at night as needed for sleep. Should last about 90 days 100 tablet 1     diclofenac (VOLTAREN) 1 % topical gel Place 2-4 g onto the skin 4 times daily . Max of 8g per dose. No more than 32g/day 100 g 3     docusate sodium (COLACE) 100 MG capsule Take 1 capsule (100 mg) by mouth 2 times daily 180 capsule 3     escitalopram (LEXAPRO) 20 MG tablet Take 1 tablet (20 mg) by mouth daily 90 tablet 1     fentaNYL (DURAGESIC) 12 mcg/hr 72 hr patch Place 1 patch onto the skin every 72 hours remove old patch. Fill 12/7/20 to start 12/7/20 10 patch 0     fluticasone (FLONASE) 50 MCG/ACT nasal spray Spray 1-2 sprays into both nostrils daily 16 g 11     fluvastatin (LESCOL) 20 MG capsule Take 1 capsule (20 mg) by mouth At Bedtime 90 capsule 1     glucose blood VI test strips strip daily       HYDROcodone-acetaminophen (NORCO)  MG per tablet Take 0.5-1 tablets by mouth 3 times daily as needed for severe pain Put 4-6 hours between dose.  Fill on/after 11/2/20 to start on/after 11/3/20 60 tablet 0     lifitegrast (XIIDRA) 5 % opthalmic solution Place 1 drop into both  eyes 2 times daily 24 each 3     medical cannabis (Patient's own supply) Take 1 Dose by mouth See Admin Instructions (The purpose of this order is to document that the patient reports taking medical cannabis.  This is not a prescription, and is not used to certify that the patient has a qualifying medical condition.)       metFORMIN (GLUCOPHAGE) 500 MG tablet Take 2 tablets (1,000 mg) by mouth 2 times daily (with meals) 1000 mg 2 x a day 360 tablet 3     metroNIDAZOLE (METROGEL) 0.75 % gel Apply to face daily       omeprazole (PRILOSEC) 20 MG DR capsule Take 1 capsule (20 mg) by mouth 2 times daily 2 x daily 180 capsule 1     potassium chloride ER (KLOR-CON M) 10 MEQ CR tablet Take 1 tablet (10 mEq) by mouth 2 times daily 20 mEq daily Take by mouth 2 times daily 20 mEq daily 180 tablet 3     PROAIR  (90 Base) MCG/ACT inhaler INHALE 2 PUFFS BY MOUTH EVERY 6 HOURS AS NEEDED FOR WHEEZING       promethazine (PHENERGAN) 25 MG suppository Place 25 mg rectally every 6 hours as needed For nusea       promethazine (PHENERGAN) 25 MG tablet Take 1 tablet (25 mg) by mouth every 6 hours as needed for nausea or other (Meniere's) For nusea 90 tablet 1     thin (NO BRAND SPECIFIED) lancets Use to test blood sugar 2 times daily or as directed. To accompany: Blood Glucose Monitor Brands: per insurance. 1 each 3     triamterene-HCTZ (DYAZIDE) 37.5-25 MG capsule Take 1 capsule by mouth daily 1 capsule every morning1 capsule every morning 90 capsule 3     vitamin D3 (CHOLECALCIFEROL) 2000 units (50 mcg) tablet Take 1 tablet (2,000 Units) by mouth daily 90 tablet 3     acetic acid-hydrocortisone (VOSOL-HC) 1-2 % otic solution Place 5 drops into the right ear 2 times daily (Patient not taking: Reported on 12/11/2020) 10 mL 0     EPINEPHrine (ANY BX GENERIC EQUIV) 0.3 MG/0.3ML injection 2-pack Inject 0.3 mLs (0.3 mg) into the muscle as needed for anaphylaxis (Patient not taking: Reported on 12/11/2020) 2 each 0     EPINEPHrine 0.3  MG/0.3ML SOLN PRN for Anaphylaxis       naloxone (NARCAN) 4 MG/0.1ML nasal spray Spray 1 spray (4 mg) into one nostril alternating nostrils once as needed for opioid reversal every 2-3 minutes until assistance arrives (Patient not taking: Reported on 12/11/2020) 0.2 mL 1     nystatin (MYCOSTATIN) 215446 UNIT/GM external powder Apply topically 2 times daily as needed 2 x daily PRN for rash (Patient not taking: Reported on 12/11/2020) 60 g 4     psyllium (METAMUCIL) 58.6 % POWD Take by mouth daily PRN       STATIN NOT PRESCRIBED (INTENTIONAL) Please choose reason not prescribed, below         Medical History: any changes in medical history since they were last seen? None    Review of Systems:  The 14 system ROS was reviewed from the intake questionnaire, and is positive for: joint pain  Any bowel or bladder problems: none  Mood: stable       Assessment:   1. Rheumatoid arthritis  2. fibromyalgia  3. Sjogren's Syndrome  4. Meniere's disease with intermittent vertigo and hearing loss  5. Type 2 diabetes      Plan:  1. Physical Therapy:  She will call to schedule  2. Clinical Health Psychologist to address issues of relaxation, behavioral change, coping style, and other factors important to improvement.  She will call to schedule  3. Diagnostic Studies:  none  4. Medication Management:    1. Next script- decrease norco to 7.5/325 and #45/month.  Then following month, further decrease  2. She is taking less than what is prescribed- scripts lasting longer than 30 days.  5. Further procedures recommended: bilateral lumbar facet joint injections prn, depending on how long the last, would consider medial branch block/RFA   6. Recommendations to PCP: see above   7. Follow up: 2 month- virtual    Phone call duration: 20 minutes     Bailee Houser MD

## 2020-12-11 NOTE — TELEPHONE ENCOUNTER
Please call patient with the following info:    A1c has improved - continue Metformin without change  Cholesterol is still elevated. 20mg of fluvastatin is not enough to improve cholesterol. I'd like her to try 40mg every day. If she tolerates 40mg we'll recheck lipids in 2-3 months. If not, we can go back down to 20mg. She should let me know in the next 2-3 weeks

## 2020-12-22 ENCOUNTER — INFUSION THERAPY VISIT (OUTPATIENT)
Dept: INFUSION THERAPY | Facility: CLINIC | Age: 60
End: 2020-12-22
Payer: COMMERCIAL

## 2020-12-22 VITALS
OXYGEN SATURATION: 98 % | DIASTOLIC BLOOD PRESSURE: 73 MMHG | TEMPERATURE: 97.9 F | SYSTOLIC BLOOD PRESSURE: 133 MMHG | BODY MASS INDEX: 30.09 KG/M2 | WEIGHT: 163.5 LBS | HEART RATE: 75 BPM | RESPIRATION RATE: 14 BRPM

## 2020-12-22 DIAGNOSIS — M06.09 RHEUMATOID ARTHRITIS OF MULTIPLE SITES WITH NEGATIVE RHEUMATOID FACTOR (H): Primary | ICD-10-CM

## 2020-12-22 PROCEDURE — 96413 CHEMO IV INFUSION 1 HR: CPT | Performed by: NURSE PRACTITIONER

## 2020-12-22 PROCEDURE — 99207 PR NO CHARGE LOS: CPT

## 2020-12-22 RX ORDER — HEPARIN SODIUM (PORCINE) LOCK FLUSH IV SOLN 100 UNIT/ML 100 UNIT/ML
5 SOLUTION INTRAVENOUS
Status: CANCELLED | OUTPATIENT
Start: 2020-12-23

## 2020-12-22 RX ORDER — HEPARIN SODIUM,PORCINE 10 UNIT/ML
5 VIAL (ML) INTRAVENOUS
Status: CANCELLED | OUTPATIENT
Start: 2020-12-23

## 2020-12-22 RX ADMIN — Medication 250 ML: at 11:40

## 2020-12-22 NOTE — PROGRESS NOTES
Infusion Nursing Note:  Betsey Vanessa presents today for Orencia infusion.    Patient seen by provider today: No   present during visit today: Not Applicable.    Note: N/A.    Intravenous Access:  Peripheral IV placed.    Treatment Conditions:  Biological Infusion Checklist:  ~~~ NOTE: If the patient answers yes to any of the questions below, hold the infusion and contact ordering provider or on-call provider.    1. Have you recently had an elevated temperature, fever, chills, productive cough, coughing for 3 weeks or longer or hemoptysis, abnormal vital signs, night sweats,  chest pain or have you noticed a decrease in your appetite, unexplained weight loss or fatigue? No  2. Do you have any open wounds or new incisions? No  3. Do you have any recent or upcoming hospitalizations, surgeries or dental procedures? No  4. Do you currently have or recently have had any signs of illness or infection or are you on any antibiotics? No  5. Have you had any new, sudden or worsening abdominal pain? No  6. Have you or anyone in your household received a live vaccination in the past 4 weeks? Please note:  No live vaccines while on biologic/chemotherapy until 6 months after the last treatment.  Patient can receive the flu vaccine (shot only) and the pneumovax.  It is optimal for the patient to get these vaccines mid cycle, but they can be given at any time as long as it is not on the day of the infusion. No  7. Have you recently been diagnosed with any new nervous system diseases (ie. Multiple sclerosis, Guillain Pierce, seizures, neurological changes) or cancer diagnosis? No  8. Are you on any form of radiation or chemotherapy? No  9. Are you pregnant or breast feeding or do you have plans of pregnancy in the future? No  10. Have there been any other new onset medical symptoms? No        Post Infusion Assessment:  Patient tolerated infusion without incident.  Blood return noted pre and post infusion.  Site patent and  intact, free from redness, edema or discomfort.  No evidence of extravasations.  Access discontinued per protocol.  Biologic Infusion Post Education: Call the triage nurse at your clinic or seek medical attention if you have chills and/or temperature greater than or equal to 100.5, uncontrolled nausea/vomiting, diarrhea, constipation, dizziness, shortness of breath, chest pain, heart palpitations, weakness or any other new or concerning symptoms, questions or concerns.  You cannot have any live virus vaccines prior to or during treatment or up to 6 months post infusion.  If you have an upcoming surgery, medical procedure or dental procedure during treatment, this should be discussed with your ordering physician and your surgeon/dentist.  If you are having any concerning symptom, if you are unsure if you should get your next infusion or wish to speak to a provider before your next infusion, please call your care coordinator or triage nurse at your clinic to notify them so we can adequately serve you.       Discharge Plan:   AVS to patient via [employer].  Patient will return 1/19/21 for next appointment.   Patient discharged in stable condition accompanied by: self.  Departure Mode: Ambulatory.    Fariba Quezada RN

## 2020-12-28 ENCOUNTER — TELEPHONE (OUTPATIENT)
Dept: CARDIOLOGY | Facility: CLINIC | Age: 60
End: 2020-12-28

## 2020-12-28 NOTE — TELEPHONE ENCOUNTER
Left a message for patient to call back to schedule her yearly follow up with Dr. Hope in 2/2021 with an Echo prior.     EDGAR Rodríguez

## 2020-12-31 DIAGNOSIS — M06.09 RHEUMATOID ARTHRITIS OF MULTIPLE SITES WITHOUT RHEUMATOID FACTOR (H): ICD-10-CM

## 2020-12-31 DIAGNOSIS — G89.4 CHRONIC PAIN SYNDROME: ICD-10-CM

## 2020-12-31 RX ORDER — FENTANYL 12.5 UG/1
1 PATCH TRANSDERMAL
Qty: 10 PATCH | Refills: 0 | Status: SHIPPED | OUTPATIENT
Start: 2020-12-31 | End: 2021-02-02

## 2020-12-31 NOTE — TELEPHONE ENCOUNTER
Medication refill information reviewed.     Due date for fentanyl 12 mcg is 1/6/2021     Prescription prepped for review.     Will route to provider.     SURY RiveraN, RN-BC  Patient Care Supervisor  [facility] [place]

## 2020-12-31 NOTE — TELEPHONE ENCOUNTER
Reason for call:  Medication   If this is a refill request, has the caller requested the refill from the pharmacy already? No  Will the patient be using a [facility]? No  Name of the pharmacy and phone number for the current request: [id number] - [place], [place] - [address]    Name of the medication requested: fentaNYL (DURAGESIC) 12 mcg/hr 72 hr patch    Other request:     Phone number to reach patient:  Cell number on file:    Telephone Information:   Mobile [phone]       Best Time:      Can we leave a detailed message on this number?  YES    Travel screening: Not Applicable

## 2020-12-31 NOTE — TELEPHONE ENCOUNTER
Script Eprescribed to pharmacy    Will send this to MA team to notify patient.    Signed Prescriptions:                        Disp   Refills    fentaNYL (DURAGESIC) 12 mcg/hr 72 hr patch 10 pat*0        Sig: Place 1 patch onto the skin every 72 hours remove old           patch. Fill 1/04/2021 to start 1/06/2021  Authorizing Provider: SANTOS ISLAS MD  [facility]

## 2020-12-31 NOTE — TELEPHONE ENCOUNTER
Patient requesting refill(s) of fentaNYL (DURAGESIC) 12 mcg/hr 72 hr patch  Last dispensed from pharmacy on 12/07/2020    Patient's last office/virtual visit by prescribing provider on 12/11/2020  Next office/virtual appointment scheduled for 02/18/2021    Last urine drug screen date 10/12/2020  Current opioid agreement on file (completed within the last year) Yes Date of opioid agreement: 10/12/2020    E-prescribe to [facility] YUNIER Orozco     Will route to nursing Donnelly for review and preparation of prescription(s).

## 2021-01-05 ENCOUNTER — PRE VISIT (OUTPATIENT)
Dept: GASTROENTEROLOGY | Facility: CLINIC | Age: 61
End: 2021-01-05

## 2021-01-06 ENCOUNTER — VIRTUAL VISIT (OUTPATIENT)
Dept: FAMILY MEDICINE | Facility: CLINIC | Age: 61
End: 2021-01-06
Payer: COMMERCIAL

## 2021-01-06 DIAGNOSIS — L29.9 PRURITIC DISORDER: Primary | ICD-10-CM

## 2021-01-06 PROCEDURE — 99207 PR NO CHARGE LOS: CPT | Mod: TEL | Performed by: PHYSICIAN ASSISTANT

## 2021-01-06 NOTE — PROGRESS NOTES
Betsey Vanessa is a 60 year old female who is being evaluated via a billable telephone visit.      What phone number would you like to be contacted at? [phone]  How would you like to obtain your AVS? [facility]  Assessment & Plan   Problem List Items Addressed This Visit     None         Needs face to face visit for evaluation and anticipated labs. Patient in agreement. To ER with worsening/changing symptoms.    12 minutes spent on the date of the encounter doing chart review, history and exam, documentation and further activities as noted above      See Patient Instructions    Return in about 2 days (around 1/8/2021).    GIULIANO South  [facility] [place]    Subjective     Betsey Vanessa is a 60 year old who presents to clinic today for the following health issues:    HPI       Concern - Hives  Onset: 1 week  Description: Pain, itchy all over body  Intensity: moderate  Progression of Symptoms:  improving  Accompanying Signs & Symptoms: drainage from nose and right ear  Previous history of similar problem: Yes  Precipitating factors:        Worsened by: scratching it, wearing clothes  Alleviating factors:        Improved by: Benadryl  Therapies tried and outcome: Benadryl  Feels itchy and pain internally. Mainly on feet and hands. No rash. Has had this in the past with no identified caused. Resolved spontaneously. Celebrex was dropped from 400mg to 100mg daily. Also increased her fluvastatin was recently increased from 20mg to 40mg.    Review of Systems   Constitutional, HEENT, cardiovascular, pulmonary, gi and gu systems are negative, except as otherwise noted.      Objective           Vitals:  No vitals were obtained today due to virtual visit.    Physical Exam   healthy, alert and no distress  PSYCH: Alert and oriented times 3; coherent speech, normal   rate and volume, able to articulate logical thoughts, able   to abstract reason, no tangential thoughts, no hallucinations   or delusions   Her affect is normal and pleasant  RESP: No cough, no audible wheezing, able to talk in full sentences  Remainder of exam unable to be completed due to telephone visits    Phone call duration: 10 minutes

## 2021-01-07 NOTE — PATIENT INSTRUCTIONS
Yohan Leggett,    Thank you for allowing [facility] to manage your care.    Please allow 1-2 business days for our office to contact you in regards to your laboratory/radiological studies.  If not done so, I encourage you to login into [email or web address] ([email or web address] to review your results as well.     If you have any questions or concerns, please feel free to call us at [phone]    Sincerely,    Eduardo Fraga PA-C    Did you know?      You can schedule a video visit for follow-up appointments as well as future appointments for certain conditions.  Please see the below link.     [email or web address]    If you have not already done so,  I encourage you to sign up for [email or web address] ([email or web address]).  This will allow you to review your results, securely communicate with a provider, and schedule virtual visits as well.

## 2021-01-07 NOTE — PROGRESS NOTES
Assessment & Plan   Problem List Items Addressed This Visit        Nervous and Auditory    Chronic pain    Relevant Medications    celecoxib (CELEBREX) 100 MG capsule    Other Relevant Orders    ESR: Erythrocyte sedimentation rate (Completed)    CRP, inflammation (Completed)       Endocrine    Hyperlipidemia, unspecified hyperlipidemia type    Relevant Medications    fluvastatin (LESCOL) 20 MG capsule      Other Visit Diagnoses     Pruritus    -  Primary    Relevant Medications    hydrOXYzine (ATARAX) 25 MG tablet    Other Relevant Orders    Comprehensive metabolic panel (BMP + Alb, Alk Phos, ALT, AST, Total. Bili, TP) (Completed)         Unclear cause of pruritus.  I do not believe the patient has underlying anaphylaxis, angioedema, cellulitis, or urticaria. No uremia electrolyte abnormality or hyperbilirubinemia on comp panel. Fluvastatin does cause pruritus in a small percentage of patients and this was recently doubled. Will decrease back to 20mg daily and treat with hydroxyzine. Could be due to chronic opiates or cevimeline as well, though doses have been stable per her report. She will use an otc fragrance free skin cream.    As for joint pain, will increase her Celebrex 100mg BID. Unlikely septic joint given exam and labs. She will follow up with her rheumatologist vs primary in the next 2-3 months, sooner prn.     Complete history and physical exam as below. AF with normal VS.    DDx and Dx discussed with and explained to the pt to their satisfaction.  All questions were answered at this time. Pt expressed understanding of and agreement with this dx, tx, and plan. No further workup warranted and standard medication warnings given. I have given the patient a list of pertinent indications for re-evaluation. Will go to the Emergency Department if symptoms worsen or new concerning symptoms arise. Patient left in no apparent distress.     36 minutes spent on the date of the encounter doing chart review, history  "and exam, documentation and further activities as noted above    See Patient Instructions    Return if symptoms worsen or fail to improve.    GIULIANO [facility] [place] Vanessa is a 60 year old who presents to clinic today for the following health issues  accompanied by her daughter:    HPI     Concern - Hives  Onset: 1 week  Description: Pain, itchy all over body  Intensity: moderate  Progression of Symptoms:  improving  Accompanying Signs & Symptoms: drainage from nose and right ear  Previous history of similar problem: Yes  Precipitating factors:        Worsened by: scratching it, wearing clothes  Alleviating factors:        Improved by: Benadryl  Therapies tried and outcome: Benadryl  Feels itchy and pain internally. Mainly on feet and hands. No rash. Has had this in the past with no identified caused. Resolved spontaneously. Celebrex was dropped from 400mg to 100mg daily. Also increased her fluvastatin was recently increased from 20mg to 40mg.    Joint pain in knees and wrists.     Review of Systems   Constitutional, HEENT, cardiovascular, pulmonary, gi and gu systems are negative, except as otherwise noted.      Objective    /58   Pulse 87   Temp 99.5  F (37.5  C) (Tympanic)   Resp 16   Ht 1.573 m (5' 1.93\")   Wt 70.9 kg (156 lb 6.4 oz)   SpO2 96%   BMI 28.67 kg/m    Body mass index is 28.67 kg/m .  Physical Exam  Vitals signs and nursing note reviewed.   Constitutional:       General: She is not in acute distress.     Appearance: She is not ill-appearing or diaphoretic.   HENT:      Head: Normocephalic and atraumatic.      Mouth/Throat:      Mouth: Mucous membranes are moist.   Eyes:      Conjunctiva/sclera: Conjunctivae normal.   Cardiovascular:      Rate and Rhythm: Normal rate and regular rhythm.      Heart sounds: Normal heart sounds. No murmur. No friction rub. No gallop.    Pulmonary:      Effort: Pulmonary effort is normal. No " respiratory distress.      Breath sounds: Normal breath sounds. No stridor. No wheezing, rhonchi or rales.   Abdominal:      General: Bowel sounds are normal. There is no distension.      Palpations: Abdomen is soft. There is no mass.      Tenderness: There is no abdominal tenderness. There is no guarding or rebound.      Hernia: No hernia is present.   Musculoskeletal:      Comments: Diffuse tenderness to joints of hand and fingers bilaterally.  No overlying signs of trauma or infection. Distal CMS intact. Pain with ROM in these joints.    Skin:     General: Skin is warm and dry.      Coloration: Skin is not jaundiced.      Findings: No bruising, erythema or rash.   Neurological:      General: No focal deficit present.      Mental Status: She is alert. Mental status is at baseline.   Psychiatric:         Mood and Affect: Mood normal.         Behavior: Behavior normal.          Results for orders placed or performed in visit on 01/08/21   Comprehensive metabolic panel (BMP + Alb, Alk Phos, ALT, AST, Total. Bili, TP)     Status: Abnormal   Result Value Ref Range    Sodium 135 133 - 144 mmol/L    Potassium 4.2 3.4 - 5.3 mmol/L    Chloride 99 94 - 109 mmol/L    Carbon Dioxide 32 20 - 32 mmol/L    Anion Gap 4 3 - 14 mmol/L    Glucose 155 (H) 70 - 99 mg/dL    Urea Nitrogen 16 7 - 30 mg/dL    Creatinine 0.80 0.52 - 1.04 mg/dL    GFR Estimate 81 >60 mL/min/[1.73_m2]    GFR Estimate If Black >90 >60 mL/min/[1.73_m2]    Calcium 9.7 8.5 - 10.1 mg/dL    Bilirubin Total 0.5 0.2 - 1.3 mg/dL    Albumin 4.1 3.4 - 5.0 g/dL    Protein Total 8.2 6.8 - 8.8 g/dL    Alkaline Phosphatase 100 40 - 150 U/L    ALT 45 0 - 50 U/L    AST 23 0 - 45 U/L   ESR: Erythrocyte sedimentation rate     Status: None   Result Value Ref Range    Sed Rate 21 0 - 30 mm/h   CRP, inflammation     Status: None   Result Value Ref Range    CRP Inflammation 4.6 0.0 - 8.0 mg/L

## 2021-01-08 ENCOUNTER — OFFICE VISIT (OUTPATIENT)
Dept: FAMILY MEDICINE | Facility: CLINIC | Age: 61
End: 2021-01-08
Payer: COMMERCIAL

## 2021-01-08 VITALS
SYSTOLIC BLOOD PRESSURE: 124 MMHG | RESPIRATION RATE: 16 BRPM | HEART RATE: 87 BPM | BODY MASS INDEX: 28.78 KG/M2 | WEIGHT: 156.4 LBS | OXYGEN SATURATION: 96 % | DIASTOLIC BLOOD PRESSURE: 58 MMHG | HEIGHT: 62 IN | TEMPERATURE: 99.5 F

## 2021-01-08 DIAGNOSIS — G89.4 CHRONIC PAIN SYNDROME: ICD-10-CM

## 2021-01-08 DIAGNOSIS — E78.5 HYPERLIPIDEMIA, UNSPECIFIED HYPERLIPIDEMIA TYPE: ICD-10-CM

## 2021-01-08 DIAGNOSIS — L29.9 PRURITUS: Primary | ICD-10-CM

## 2021-01-08 LAB
ALBUMIN SERPL-MCNC: 4.1 G/DL (ref 3.4–5)
ALP SERPL-CCNC: 100 U/L (ref 40–150)
ALT SERPL W P-5'-P-CCNC: 45 U/L (ref 0–50)
ANION GAP SERPL CALCULATED.3IONS-SCNC: 4 MMOL/L (ref 3–14)
AST SERPL W P-5'-P-CCNC: 23 U/L (ref 0–45)
BILIRUB SERPL-MCNC: 0.5 MG/DL (ref 0.2–1.3)
BUN SERPL-MCNC: 16 MG/DL (ref 7–30)
CALCIUM SERPL-MCNC: 9.7 MG/DL (ref 8.5–10.1)
CHLORIDE SERPL-SCNC: 99 MMOL/L (ref 94–109)
CO2 SERPL-SCNC: 32 MMOL/L (ref 20–32)
CREAT SERPL-MCNC: 0.8 MG/DL (ref 0.52–1.04)
CRP SERPL-MCNC: 4.6 MG/L (ref 0–8)
ERYTHROCYTE [SEDIMENTATION RATE] IN BLOOD BY WESTERGREN METHOD: 21 MM/H (ref 0–30)
GFR SERPL CREATININE-BSD FRML MDRD: 81 ML/MIN/{1.73_M2}
GLUCOSE SERPL-MCNC: 155 MG/DL (ref 70–99)
POTASSIUM SERPL-SCNC: 4.2 MMOL/L (ref 3.4–5.3)
PROT SERPL-MCNC: 8.2 G/DL (ref 6.8–8.8)
SODIUM SERPL-SCNC: 135 MMOL/L (ref 133–144)

## 2021-01-08 PROCEDURE — 85652 RBC SED RATE AUTOMATED: CPT | Performed by: PHYSICIAN ASSISTANT

## 2021-01-08 PROCEDURE — 86140 C-REACTIVE PROTEIN: CPT | Performed by: PHYSICIAN ASSISTANT

## 2021-01-08 PROCEDURE — 80053 COMPREHEN METABOLIC PANEL: CPT | Performed by: PHYSICIAN ASSISTANT

## 2021-01-08 PROCEDURE — 36415 COLL VENOUS BLD VENIPUNCTURE: CPT | Performed by: PHYSICIAN ASSISTANT

## 2021-01-08 PROCEDURE — 99214 OFFICE O/P EST MOD 30 MIN: CPT | Performed by: PHYSICIAN ASSISTANT

## 2021-01-08 RX ORDER — HYDROXYZINE HYDROCHLORIDE 25 MG/1
25 TABLET, FILM COATED ORAL 3 TIMES DAILY PRN
Qty: 40 TABLET | Refills: 0 | Status: SHIPPED | OUTPATIENT
Start: 2021-01-08

## 2021-01-08 RX ORDER — FLUVASTATIN 20 MG/1
20 CAPSULE ORAL AT BEDTIME
Qty: 90 CAPSULE | Refills: 1 | Status: SHIPPED | OUTPATIENT
Start: 2021-01-08 | End: 2021-05-13

## 2021-01-08 RX ORDER — CELECOXIB 100 MG/1
100 CAPSULE ORAL 2 TIMES DAILY
Qty: 120 CAPSULE | Refills: 1 | Status: SHIPPED | OUTPATIENT
Start: 2021-01-08 | End: 2021-02-18

## 2021-01-08 ASSESSMENT — MIFFLIN-ST. JEOR: SCORE: 1241.55

## 2021-01-08 NOTE — Clinical Note
Any thoughts on Betsey's pruritus and joint pain? I modestly increased her celebrex and decreased her fluvastatin. Due to another med? Sjogren's? Thanks.

## 2021-01-08 NOTE — PATIENT INSTRUCTIONS
Yohan Leggett,    Thank you for allowing [facility] to manage your care.    I ordered some blood work, please go to the laboratory to get your laboratory studies.    I sent your prescriptions to your pharmacy.    For itching, please use hydroxyzine as prescribed. Do not use this medication while driving, operating machinery, with other sedating medications, or while drinking alcohol as it will make you drowsy.    Please allow 1-2 business days for our office to contact you in regards to your laboratory/radiological studies.  If not done so, I encourage you to login into [employer] [email or web address] to review your results as well.     If you have any questions or concerns, please feel free to call us at [phone]    Sincerely,    Eduardo Fraga PA-C    Did you know?      You can schedule a video visit for follow-up appointments as well as future appointments for certain conditions.  Please see the below link.     [email or web address]    If you have not already done so,  I encourage you to sign up for [employer] ([email or web address]).  This will allow you to review your results, securely communicate with a provider, and schedule virtual visits as well.

## 2021-01-19 ENCOUNTER — INFUSION THERAPY VISIT (OUTPATIENT)
Dept: INFUSION THERAPY | Facility: CLINIC | Age: 61
End: 2021-01-19
Payer: COMMERCIAL

## 2021-01-19 VITALS
DIASTOLIC BLOOD PRESSURE: 65 MMHG | HEART RATE: 74 BPM | TEMPERATURE: 98.5 F | BODY MASS INDEX: 29.55 KG/M2 | WEIGHT: 161.2 LBS | SYSTOLIC BLOOD PRESSURE: 127 MMHG | OXYGEN SATURATION: 96 %

## 2021-01-19 DIAGNOSIS — F11.20 CONTINUOUS OPIOID DEPENDENCE (H): ICD-10-CM

## 2021-01-19 DIAGNOSIS — Z79.899 HIGH RISK MEDICATIONS (NOT ANTICOAGULANTS) LONG-TERM USE: ICD-10-CM

## 2021-01-19 DIAGNOSIS — M06.9 RHEUMATOID ARTHRITIS (H): Primary | ICD-10-CM

## 2021-01-19 DIAGNOSIS — G89.4 CHRONIC PAIN SYNDROME: Primary | ICD-10-CM

## 2021-01-19 DIAGNOSIS — M85.80 OSTEOPENIA, UNSPECIFIED LOCATION: ICD-10-CM

## 2021-01-19 DIAGNOSIS — M06.09 RHEUMATOID ARTHRITIS OF MULTIPLE SITES WITH NEGATIVE RHEUMATOID FACTOR (H): ICD-10-CM

## 2021-01-19 DIAGNOSIS — M06.09 RHEUMATOID ARTHRITIS OF MULTIPLE SITES WITHOUT RHEUMATOID FACTOR (H): ICD-10-CM

## 2021-01-19 LAB
ALBUMIN SERPL-MCNC: 3.9 G/DL (ref 3.4–5)
ALP SERPL-CCNC: 117 U/L (ref 40–150)
ALT SERPL W P-5'-P-CCNC: 36 U/L (ref 0–50)
AST SERPL W P-5'-P-CCNC: 23 U/L (ref 0–45)
BASOPHILS # BLD AUTO: 0 10E9/L (ref 0–0.2)
BASOPHILS NFR BLD AUTO: 0.3 %
BILIRUB DIRECT SERPL-MCNC: <0.1 MG/DL (ref 0–0.2)
BILIRUB SERPL-MCNC: 0.4 MG/DL (ref 0.2–1.3)
CALCIUM SERPL-MCNC: 9.2 MG/DL (ref 8.5–10.1)
CREAT SERPL-MCNC: 0.88 MG/DL (ref 0.52–1.04)
CRP SERPL-MCNC: 3 MG/L (ref 0–8)
DIFFERENTIAL METHOD BLD: NORMAL
EOSINOPHIL # BLD AUTO: 0.3 10E9/L (ref 0–0.7)
EOSINOPHIL NFR BLD AUTO: 4 %
ERYTHROCYTE [DISTWIDTH] IN BLOOD BY AUTOMATED COUNT: 14.4 % (ref 10–15)
ERYTHROCYTE [SEDIMENTATION RATE] IN BLOOD BY WESTERGREN METHOD: 24 MM/H (ref 0–30)
GFR SERPL CREATININE-BSD FRML MDRD: 72 ML/MIN/{1.73_M2}
HCT VFR BLD AUTO: 36.6 % (ref 35–47)
HGB BLD-MCNC: 11.8 G/DL (ref 11.7–15.7)
IMM GRANULOCYTES # BLD: 0 10E9/L (ref 0–0.4)
IMM GRANULOCYTES NFR BLD: 0.3 %
LYMPHOCYTES # BLD AUTO: 1.6 10E9/L (ref 0.8–5.3)
LYMPHOCYTES NFR BLD AUTO: 25.3 %
MCH RBC QN AUTO: 26.5 PG (ref 26.5–33)
MCHC RBC AUTO-ENTMCNC: 32.2 G/DL (ref 31.5–36.5)
MCV RBC AUTO: 82 FL (ref 78–100)
MONOCYTES # BLD AUTO: 0.3 10E9/L (ref 0–1.3)
MONOCYTES NFR BLD AUTO: 5.1 %
NEUTROPHILS # BLD AUTO: 4.2 10E9/L (ref 1.6–8.3)
NEUTROPHILS NFR BLD AUTO: 65 %
PLATELET # BLD AUTO: 159 10E9/L (ref 150–450)
PROT SERPL-MCNC: 7.7 G/DL (ref 6.8–8.8)
RBC # BLD AUTO: 4.46 10E12/L (ref 3.8–5.2)
WBC # BLD AUTO: 6.5 10E9/L (ref 4–11)

## 2021-01-19 PROCEDURE — 36415 COLL VENOUS BLD VENIPUNCTURE: CPT | Performed by: INTERNAL MEDICINE

## 2021-01-19 PROCEDURE — 96365 THER/PROPH/DIAG IV INF INIT: CPT | Performed by: NURSE PRACTITIONER

## 2021-01-19 PROCEDURE — 85025 COMPLETE CBC W/AUTO DIFF WBC: CPT | Performed by: INTERNAL MEDICINE

## 2021-01-19 PROCEDURE — 82306 VITAMIN D 25 HYDROXY: CPT | Performed by: INTERNAL MEDICINE

## 2021-01-19 PROCEDURE — 80076 HEPATIC FUNCTION PANEL: CPT | Performed by: INTERNAL MEDICINE

## 2021-01-19 PROCEDURE — 99207 PR NO CHARGE LOS: CPT

## 2021-01-19 PROCEDURE — 82310 ASSAY OF CALCIUM: CPT | Performed by: INTERNAL MEDICINE

## 2021-01-19 PROCEDURE — 82565 ASSAY OF CREATININE: CPT | Performed by: INTERNAL MEDICINE

## 2021-01-19 PROCEDURE — 85652 RBC SED RATE AUTOMATED: CPT | Performed by: INTERNAL MEDICINE

## 2021-01-19 PROCEDURE — 86140 C-REACTIVE PROTEIN: CPT | Performed by: INTERNAL MEDICINE

## 2021-01-19 RX ORDER — HEPARIN SODIUM (PORCINE) LOCK FLUSH IV SOLN 100 UNIT/ML 100 UNIT/ML
5 SOLUTION INTRAVENOUS
Status: CANCELLED | OUTPATIENT
Start: 2021-02-16

## 2021-01-19 RX ORDER — HEPARIN SODIUM,PORCINE 10 UNIT/ML
5 VIAL (ML) INTRAVENOUS
Status: CANCELLED | OUTPATIENT
Start: 2021-02-16

## 2021-01-19 RX ADMIN — Medication 250 ML: at 12:47

## 2021-01-19 ASSESSMENT — PAIN SCALES - GENERAL: PAINLEVEL: MILD PAIN (3)

## 2021-01-19 NOTE — TELEPHONE ENCOUNTER
Reason for call:  Medication   If this is a refill request, has the caller requested the refill from the pharmacy already? No  Will the patient be using a [facility]? No  Name of the pharmacy and phone number for the current request: [id number] - [place], [place] - [address]    Name of the medication requested: HYDROcodone-acetaminophen (NORCO)  MG per tablet    Other request: Pt has 6 tablets left    Phone number to reach patient:  Home number on file [phone] (home)    Best Time:  anytime    Can we leave a detailed message on this number?  YES    Travel screening: Not Applicable     Dee CONNORS    [facility]

## 2021-01-19 NOTE — PROGRESS NOTES
Infusion Nursing Note:  Betsey Vanessa presents today for Orencia.    Patient seen by provider today: No   present during visit today: Not Applicable.    Note: Pt states her body aches are worse today, feels she is in need of her orencia, states she feels better after infusion.  Patient DID did/did not meet criteria for an asymptomatic covid-19 PCR test in infusion today. Patient Declined accepted/declined the covid-19 test.    Intravenous Access:  Peripheral IV placed.    Treatment Conditions:  Biological Infusion Checklist:  ~~~ NOTE: If the patient answers yes to any of the questions below, hold the infusion and contact ordering provider or on-call provider.    1. Have you recently had an elevated temperature, fever, chills, productive cough, coughing for 3 weeks or longer or hemoptysis, abnormal vital signs, night sweats,  chest pain or have you noticed a decrease in your appetite, unexplained weight loss or fatigue? No  2. Do you have any open wounds or new incisions? No  3. Do you have any recent or upcoming hospitalizations, surgeries or dental procedures? No  4. Do you currently have or recently have had any signs of illness or infection or are you on any antibiotics? No  5. Have you had any new, sudden or worsening abdominal pain? No  6. Have you or anyone in your household received a live vaccination in the past 4 weeks? Please note:  No live vaccines while on biologic/chemotherapy until 6 months after the last treatment.  Patient can receive the flu vaccine (shot only) and the pneumovax.  It is optimal for the patient to get these vaccines mid cycle, but they can be given at any time as long as it is not on the day of the infusion. No  7. Have you recently been diagnosed with any new nervous system diseases (ie. Multiple sclerosis, Guillain [place], seizures, neurological changes) or cancer diagnosis? No  8. Are you on any form of radiation or chemotherapy? No  9. Are you pregnant or breast  feeding or do you have plans of pregnancy in the future? No  10. Have you been having any signs of worsening depression or suicidal ideations?  (benlysta only) No  11. Have there been any other new onset medical symptoms? No        Post Infusion Assessment:  Patient tolerated infusion without incident.  Blood return noted pre and post infusion.  Site patent and intact, free from redness, edema or discomfort.  No evidence of extravasations.  Access discontinued per protocol.       Discharge Plan:   Patient discharged in stable condition accompanied by: self.  Departure Mode: Ambulatory.  Pt will RTC 2/16/21 for Orencia.    Avi Paz RN

## 2021-01-19 NOTE — TELEPHONE ENCOUNTER
Received call from patient requesting refill(s) of   HYDROcodone-acetaminophen (NORCO)  MG per tablet  Last dispensed from pharmacy on 11/3/2020    Patient's last office/virtual visit by prescribing provider on 12/11/2020  Next office/virtual appointment scheduled for 2/158/2021    Last urine drug screen date 10/12/2020  Current opioid agreement on file (completed within the last year) Yes Date of opioid agreement: 10/12/2020    E-prescribe to  [facility] [address] [place] [address] [facility]    Will route to nursing Omar for review and preparation of prescription(s).

## 2021-01-20 LAB — DEPRECATED CALCIDIOL+CALCIFEROL SERPL-MC: 25 UG/L (ref 20–75)

## 2021-01-20 NOTE — TELEPHONE ENCOUNTER
Medication refill information reviewed. Script adjusted per med note. Please review.     Med note: SANTOS ISLAS Dec 11, 2020  9:47 AM  Change next script to Norco 7.5/325mg, and #45    Due date for HYDROcodone-acetaminophen (NORCO)  MG per tablet  is anytime Norco 10 MG last filled on 11/3/21    Prescriptions prepped for review.     Will route to provider.

## 2021-01-21 RX ORDER — HYDROCODONE BITARTRATE AND ACETAMINOPHEN 7.5; 325 MG/1; MG/1
.5-1 TABLET ORAL EVERY 6 HOURS PRN
Qty: 45 TABLET | Refills: 0 | Status: SHIPPED | OUTPATIENT
Start: 2021-01-21 | End: 2021-03-04

## 2021-01-21 NOTE — TELEPHONE ENCOUNTER
E-prescription confirmation received. Voicemail left for patient with information about new tablet size and phone number for patient to call if she has questions.

## 2021-01-21 NOTE — TELEPHONE ENCOUNTER
Script Eprescribed to pharmacy  [place] Prescription Monitoring [place]     Will send this to MA team to notify patient.  Please advise her this is a different size tab based on our discussion at her last visit. If questions- can be sent to nursing to answer    Signed Prescriptions:                        Disp   Refills    HYDROcodone-acetaminophen (NORCO) 7.5-325 *45 tab*0        Sig: Take 0.5-1 tablets by mouth every 6 hours as needed           for severe pain  Authorizing Provider: SANTOS ISLAS MD  [facility]

## 2021-01-21 NOTE — RESULT ENCOUNTER NOTE
"RN: Please call to notify Betsey Vanessa of the info below      [email or web address] message sent:  \"Ms. Vanessa,    It would be better to have a higher vitamin D, so please increase vitamin D to 4000 IU daily.  Other labs are okay.    Sincerely,  Terrence Carrillo MD  1/20/2021\""

## 2021-01-26 DIAGNOSIS — M35.01 SJOGREN'S SYNDROME WITH KERATOCONJUNCTIVITIS SICCA (H): ICD-10-CM

## 2021-01-28 NOTE — TELEPHONE ENCOUNTER
Medication:   Xidra 5%  Last written on:   9/19/2020  Quantity:   24 each    Refills:   3    Last office visit:   11/16/2020  Next office visit:   02/18/2021  Last labs:   1/19/2021    Madeline Cheung CMA Rheumatology  1/28/2021 8:08 AM

## 2021-02-01 NOTE — TELEPHONE ENCOUNTER
Rheumatology team: Please call to notify Ms. Vanessa that Xiidra has been refilled.  Terrence Carrillo MD  1/31/2021 8:52 PM

## 2021-02-02 DIAGNOSIS — M06.09 RHEUMATOID ARTHRITIS OF MULTIPLE SITES WITHOUT RHEUMATOID FACTOR (H): ICD-10-CM

## 2021-02-02 DIAGNOSIS — G89.4 CHRONIC PAIN SYNDROME: ICD-10-CM

## 2021-02-02 NOTE — TELEPHONE ENCOUNTER
Medication refill information reviewed.     Due date for fentaNYL (DURAGESIC) 12 mcg/hr 72 hr patch is 02/05/21     Prescriptions prepped for review.     Will route to provider.

## 2021-02-02 NOTE — TELEPHONE ENCOUNTER
Received call from patient requesting refill(s) of fentaNYL (DURAGESIC) 12 mcg/hr 72 hr patch    Last dispensed from pharmacy on 01/06/21    Patient's last office/virtual visit by prescribing provider on 12/11/20  Next office/virtual appointment scheduled for 02/18/21    Last urine drug screen date 10/12/20  Current opioid agreement on file (completed within the last year) Yes Date of opioid agreement: 10/12/20    E-prescribe to pharmacy-[facility] [address] [place] [address] [id number]     Will route to nursing [place] for review and preparation of prescription(s).

## 2021-02-02 NOTE — TELEPHONE ENCOUNTER
Reason for call:  Medication   If this is a refill request, has the caller requested the refill from the pharmacy already? No  Will the patient be using a [facility]? No  Name of the pharmacy and phone number for the current request: [id number] - [place], [place] - [address]    Name of the medication requested: fentaNYL (DURAGESIC) 12 mcg/hr 72 hr patch    Other request: none    Phone number to reach patient:  Home number on file [phone] (home)    Best Time:  anytime    Can we leave a detailed message on this number?  YES    Travel screening: Not Applicable     Dee CONNORS    [facility]

## 2021-02-03 RX ORDER — FENTANYL 12.5 UG/1
1 PATCH TRANSDERMAL
Qty: 10 PATCH | Refills: 0 | Status: SHIPPED | OUTPATIENT
Start: 2021-02-03 | End: 2021-03-04

## 2021-02-03 NOTE — TELEPHONE ENCOUNTER
Script Eprescribed to pharmacy  [place]    Will send this to MA team to notify patient.    Signed Prescriptions:                        Disp   Refills    fentaNYL (DURAGESIC) 12 mcg/hr 72 hr patch 10 pat*0        Sig: Place 1 patch onto the skin every 72 hours remove old           patch. Fill 02/03/21 to start 02/05/21  Authorizing Provider: SANTOS ISLAS MD  [facility]

## 2021-02-16 ENCOUNTER — INFUSION THERAPY VISIT (OUTPATIENT)
Dept: INFUSION THERAPY | Facility: CLINIC | Age: 61
End: 2021-02-16
Payer: COMMERCIAL

## 2021-02-16 VITALS
DIASTOLIC BLOOD PRESSURE: 71 MMHG | TEMPERATURE: 97.8 F | BODY MASS INDEX: 30.34 KG/M2 | RESPIRATION RATE: 14 BRPM | WEIGHT: 165.5 LBS | OXYGEN SATURATION: 97 % | HEART RATE: 75 BPM | SYSTOLIC BLOOD PRESSURE: 145 MMHG

## 2021-02-16 DIAGNOSIS — Z11.59 ENCOUNTER FOR SCREENING FOR OTHER VIRAL DISEASES: Primary | ICD-10-CM

## 2021-02-16 DIAGNOSIS — M06.09 RHEUMATOID ARTHRITIS OF MULTIPLE SITES WITH NEGATIVE RHEUMATOID FACTOR (H): ICD-10-CM

## 2021-02-16 LAB
SARS-COV-2 RNA RESP QL NAA+PROBE: NORMAL
SPECIMEN SOURCE: NORMAL

## 2021-02-16 PROCEDURE — U0005 INFEC AGEN DETEC AMPLI PROBE: HCPCS | Performed by: NURSE PRACTITIONER

## 2021-02-16 PROCEDURE — U0003 INFECTIOUS AGENT DETECTION BY NUCLEIC ACID (DNA OR RNA); SEVERE ACUTE RESPIRATORY SYNDROME CORONAVIRUS 2 (SARS-COV-2) (CORONAVIRUS DISEASE [COVID-19]), AMPLIFIED PROBE TECHNIQUE, MAKING USE OF HIGH THROUGHPUT TECHNOLOGIES AS DESCRIBED BY CMS-2020-01-R: HCPCS | Performed by: NURSE PRACTITIONER

## 2021-02-16 PROCEDURE — 99207 PR NO CHARGE LOS: CPT

## 2021-02-16 PROCEDURE — 96365 THER/PROPH/DIAG IV INF INIT: CPT | Performed by: NURSE PRACTITIONER

## 2021-02-16 RX ORDER — HEPARIN SODIUM (PORCINE) LOCK FLUSH IV SOLN 100 UNIT/ML 100 UNIT/ML
5 SOLUTION INTRAVENOUS
Status: CANCELLED | OUTPATIENT
Start: 2021-03-16

## 2021-02-16 RX ORDER — HEPARIN SODIUM,PORCINE 10 UNIT/ML
5 VIAL (ML) INTRAVENOUS
Status: CANCELLED | OUTPATIENT
Start: 2021-03-16

## 2021-02-16 RX ADMIN — Medication 250 ML: at 11:18

## 2021-02-16 ASSESSMENT — PAIN SCALES - GENERAL: PAINLEVEL: NO PAIN (0)

## 2021-02-16 NOTE — PROGRESS NOTES
Infusion Nursing Note:  Betsey Vanessa presents today for Orencia infusion.    Patient seen by provider today: No   present during visit today: Not Applicable.    Note:  Patient met criteria for an asymptomatic covid-19 PCR test in infusion today. Patient accepted the covid-19 test.    Intravenous Access:  Peripheral IV placed.    Treatment Conditions:  Biological Infusion Checklist:  ~~~ NOTE: If the patient answers yes to any of the questions below, hold the infusion and contact ordering provider or on-call provider.    1. Have you recently had an elevated temperature, fever, chills, productive cough, coughing for 3 weeks or longer or hemoptysis, abnormal vital signs, night sweats,  chest pain or have you noticed a decrease in your appetite, unexplained weight loss or fatigue? No  2. Do you have any open wounds or new incisions? No  3. Do you have any recent or upcoming hospitalizations, surgeries or dental procedures? No  4. Do you currently have or recently have had any signs of illness or infection or are you on any antibiotics? No  5. Have you had any new, sudden or worsening abdominal pain? No  6. Have you or anyone in your household received a live vaccination in the past 4 weeks? Please note:  No live vaccines while on biologic/chemotherapy until 6 months after the last treatment.  Patient can receive the flu vaccine (shot only) and the pneumovax.  It is optimal for the patient to get these vaccines mid cycle, but they can be given at any time as long as it is not on the day of the infusion. No  7. Have you recently been diagnosed with any new nervous system diseases (ie. Multiple sclerosis, Guillain [place], seizures, neurological changes) or cancer diagnosis? No  8. Are you on any form of radiation or chemotherapy? No  9. Are you pregnant or breast feeding or do you have plans of pregnancy in the future? No  10. Have there been any other new onset medical symptoms? No        Post Infusion  Assessment:  Patient tolerated infusion without incident.  Blood return noted pre and post infusion.  Site patent and intact, free from redness, edema or discomfort.  No evidence of extravasations.  Access discontinued per protocol.  Biologic Infusion Post Education: Call the triage nurse at your clinic or seek medical attention if you have chills and/or temperature greater than or equal to 100.5, uncontrolled nausea/vomiting, diarrhea, constipation, dizziness, shortness of breath, chest pain, heart palpitations, weakness or any other new or concerning symptoms, questions or concerns.  You cannot have any live virus vaccines prior to or during treatment or up to 6 months post infusion.  If you have an upcoming surgery, medical procedure or dental procedure during treatment, this should be discussed with your ordering physician and your surgeon/dentist.  If you are having any concerning symptom, if you are unsure if you should get your next infusion or wish to speak to a provider before your next infusion, please call your care coordinator or triage nurse at your clinic to notify them so we can adequately serve you.       Discharge Plan:   AVS to patient via [employer].  Patient will return 3/16/21 for next appointment.   Patient discharged in stable condition accompanied by: self.  Departure Mode: Ambulatory.    Fariba Quezada RN

## 2021-02-17 LAB
LABORATORY COMMENT REPORT: NORMAL
SARS-COV-2 RNA RESP QL NAA+PROBE: NEGATIVE
SPECIMEN SOURCE: NORMAL

## 2021-02-18 ENCOUNTER — VIRTUAL VISIT (OUTPATIENT)
Dept: PALLIATIVE MEDICINE | Facility: CLINIC | Age: 61
End: 2021-02-18
Payer: COMMERCIAL

## 2021-02-18 ENCOUNTER — OFFICE VISIT (OUTPATIENT)
Dept: RHEUMATOLOGY | Facility: CLINIC | Age: 61
End: 2021-02-18
Payer: COMMERCIAL

## 2021-02-18 VITALS
SYSTOLIC BLOOD PRESSURE: 147 MMHG | WEIGHT: 163.2 LBS | HEART RATE: 86 BPM | DIASTOLIC BLOOD PRESSURE: 96 MMHG | BODY MASS INDEX: 30.03 KG/M2 | OXYGEN SATURATION: 97 % | HEIGHT: 62 IN

## 2021-02-18 DIAGNOSIS — M79.642 BILATERAL HAND PAIN: ICD-10-CM

## 2021-02-18 DIAGNOSIS — M85.89 OSTEOPENIA OF MULTIPLE SITES: ICD-10-CM

## 2021-02-18 DIAGNOSIS — G89.4 CHRONIC PAIN SYNDROME: ICD-10-CM

## 2021-02-18 DIAGNOSIS — F11.20 CONTINUOUS OPIOID DEPENDENCE (H): ICD-10-CM

## 2021-02-18 DIAGNOSIS — M06.09 RHEUMATOID ARTHRITIS OF MULTIPLE SITES WITH NEGATIVE RHEUMATOID FACTOR (H): Primary | ICD-10-CM

## 2021-02-18 DIAGNOSIS — M79.641 BILATERAL HAND PAIN: ICD-10-CM

## 2021-02-18 DIAGNOSIS — M06.09 RHEUMATOID ARTHRITIS OF MULTIPLE SITES WITHOUT RHEUMATOID FACTOR (H): Primary | ICD-10-CM

## 2021-02-18 DIAGNOSIS — Z79.899 HIGH RISK MEDICATIONS (NOT ANTICOAGULANTS) LONG-TERM USE: ICD-10-CM

## 2021-02-18 DIAGNOSIS — M35.01 SJOGREN'S SYNDROME WITH KERATOCONJUNCTIVITIS SICCA (H): ICD-10-CM

## 2021-02-18 PROCEDURE — 99214 OFFICE O/P EST MOD 30 MIN: CPT | Performed by: INTERNAL MEDICINE

## 2021-02-18 PROCEDURE — 99214 OFFICE O/P EST MOD 30 MIN: CPT | Mod: 95 | Performed by: PSYCHIATRY & NEUROLOGY

## 2021-02-18 RX ORDER — HEPARIN SODIUM (PORCINE) LOCK FLUSH IV SOLN 100 UNIT/ML 100 UNIT/ML
5 SOLUTION INTRAVENOUS
Status: CANCELLED | OUTPATIENT
Start: 2021-02-18

## 2021-02-18 RX ORDER — CELECOXIB 100 MG/1
100 CAPSULE ORAL 2 TIMES DAILY
Qty: 120 CAPSULE | Refills: 1 | Status: SHIPPED | OUTPATIENT
Start: 2021-02-18 | End: 2021-05-21

## 2021-02-18 RX ORDER — ERGOCALCIFEROL 1.25 MG/1
50000 CAPSULE, LIQUID FILLED ORAL WEEKLY
Qty: 12 CAPSULE | Refills: 2 | Status: SHIPPED | OUTPATIENT
Start: 2021-02-18 | End: 2022-01-19

## 2021-02-18 RX ORDER — CEVIMELINE HYDROCHLORIDE 30 MG/1
30 CAPSULE ORAL 3 TIMES DAILY
Qty: 270 CAPSULE | Refills: 3 | Status: SHIPPED | OUTPATIENT
Start: 2021-02-18 | End: 2021-09-17

## 2021-02-18 RX ORDER — HEPARIN SODIUM,PORCINE 10 UNIT/ML
5 VIAL (ML) INTRAVENOUS
Status: CANCELLED | OUTPATIENT
Start: 2021-02-18

## 2021-02-18 RX ORDER — HYDROXYCHLOROQUINE SULFATE 200 MG/1
TABLET, FILM COATED ORAL
Qty: 135 TABLET | Refills: 1 | Status: SHIPPED | OUTPATIENT
Start: 2021-02-18 | End: 2021-05-21

## 2021-02-18 RX ORDER — ZOLEDRONIC ACID 5 MG/100ML
5 INJECTION, SOLUTION INTRAVENOUS ONCE
Status: CANCELLED
Start: 2021-02-18 | End: 2021-02-18

## 2021-02-18 ASSESSMENT — MIFFLIN-ST. JEOR: SCORE: 1272.41

## 2021-02-18 ASSESSMENT — PAIN SCALES - GENERAL: PAINLEVEL: SEVERE PAIN (6)

## 2021-02-18 NOTE — PATIENT INSTRUCTIONS
1. Hand therapy- [phone].  2. Schedule in person visit in [place]- [phone]  3. Continue your Percocet 7.5/325.  You can take 1/2 tab or a full tab.  When you call for refill, let us know how many you have remaining.      ----------------------------------------------------------------  Clinic Number:  [phone]     Call with any questions about your care and for scheduling assistance.     Calls are returned Monday through Friday between 8 AM and 4:30 PM. We usually get back to you within 2 business days depending on the issue/request.    If we are prescribing your medications:    For opioid medication refills, call the clinic or send a [employer] message 7 days in advance.  Please include:    Name of requested medication    Name of the pharmacy.    For non-opioid medications, call your pharmacy directly to request a refill. Please allow 3-4 days to be processed.     Per [place] State Law:    All controlled substance prescriptions must be filled within 30 days of being written.      For those controlled substances allowing refills, pickup must occur within 30 days of last fill.      We believe regular attendance is key to your success in our program!      Any time you are unable to keep your appointment we ask that you call us at least 24 hours in advance to cancel.This will allow us to offer the appointment time to another patient.     Multiple missed appointments may lead to dismissal from the clinic.

## 2021-02-18 NOTE — PROGRESS NOTES
Betsey is a 60 year old who is being evaluated via a billable video visit.      How would you like to obtain your AVS? MyChart  If the video visit is dropped, the invitation should be resent by: Text to cell phone: [phone]  Will anyone else be joining your video visit? Miley Barr MA        Video Start Time: 1401  Video-Visit Details    Type of service:  Video Visit    Video End Time:1429    Originating Location (pt. Location): Home    Distant Location (provider location):  [facility]     Platform used for Video Visit: Optinuity                                   [facility]    Date of visit: 2/18/2021     Chief complaint:   Chief Complaint   Patient presents with     Pain     Video visit due to COVID-19        Interval history:  Betsey Vanessa was last seen by me on 12/11//20.      Recommendations/plan at the last visit included:  1. Physical Therapy:  She will call to schedule  2. Clinical Health Psychologist to address issues of relaxation, behavioral change, coping style, and other factors important to improvement.  She will call to schedule  3. Diagnostic Studies:  none  4. Medication Management:    1. Next script- decrease norco to 7.5/325 and #45/month.  Then following month, further decrease  2. She is taking less than what is prescribed- scripts lasting longer than 30 days.  5. Further procedures recommended: bilateral lumbar facet joint injections prn, depending on how long the last, would consider medial branch block/RFA   6. Recommendations to PCP: see above   7. Follow up: 2 month- virtual      Since her last visit, Betsey Vanessa reports:  -no significant changes to pain.  -she continues to have a lot of joint pain, including with the hands.  -was able to get back into rheumatology, starting new treatment.      Pain scores:  Pain intensity on average is 6 on a scale of 0-10.     Current pain treatments:   Celebrex 100mg daily- lower dose    Fentanyl 12mcg/hr- has decreased in the past, concerned about coming down on that right now.  Norco 7.5/325- using 1-3/day.  Last script- 11/3 for 60 tabs.  Medical cannabis- helps but causes side effects like dizziness and dry mouth.  Diclofenac gel- helps   salanpas patch- over the counter  Emu oil    Clonazepam 0.5mg tabs- currently on taper with PCP  Narcan ordered    Previous medication treatments included:  Gabapentin - fast heartbeat, sweating  Cymbalta- hyperactivity  topamax- confusion  Amitriptyline- hyperactivity  Tramadol  darvocet    Other treatments have included:  Betseyfrancisco javier Vanessa has been seen at a pain clinic in the past.    PT: hand therapy.- last about 3 years ago.  Acupuncture: no  TENs Unit: no  Injections:   Hand steroid injections-   Neck injections    Side Effects: none    Medications:  Current Outpatient Medications   Medication Sig Dispense Refill     acetic acid-hydrocortisone (VOSOL-HC) 1-2 % otic solution Place 5 drops into the right ear 2 times daily 10 mL 0     alcohol swab prep pads Use to swab area of injection/rosie as directed 100 each 3     blood glucose (NO BRAND SPECIFIED) test strip Use to test blood sugar 2 times daily or as directed. To accompany: Blood Glucose Monitor Brands: per insurance. 100 strip 6     blood glucose calibration (NO BRAND SPECIFIED) solution Use to calibrate blood glucose monitor as needed as directed. To accompany: Blood Glucose Monitor Brands: per insurance. 1 Bottle 3     celecoxib (CELEBREX) 100 MG capsule Take 1 capsule (100 mg) by mouth 2 times daily 120 capsule 1     cetirizine (ZYRTEC) 10 MG tablet Take 1 tablet (10 mg) by mouth daily 90 tablet 3     cevimeline (EVOXAC) 30 MG capsule Take 1 capsule (30 mg) by mouth 3 times daily 270 capsule 3     clonazePAM (KLONOPIN) 0.5 MG tablet Take 1 tablet (0.5 mg) by mouth 2 times daily as needed for anxiety OK to take 1-2 tabs at night as needed for sleep. Should last about 90 days 100 tablet 1      diclofenac (VOLTAREN) 1 % topical gel Place 2-4 g onto the skin 4 times daily . Max of 8g per dose. No more than 32g/day 100 g 3     docusate sodium (COLACE) 100 MG capsule Take 1 capsule (100 mg) by mouth 2 times daily 180 capsule 3     EPINEPHrine (ANY BX GENERIC EQUIV) 0.3 MG/0.3ML injection 2-pack Inject 0.3 mLs (0.3 mg) into the muscle as needed for anaphylaxis 2 each 0     EPINEPHrine 0.3 MG/0.3ML SOLN PRN for Anaphylaxis       escitalopram (LEXAPRO) 20 MG tablet Take 1 tablet (20 mg) by mouth daily 90 tablet 1     fentaNYL (DURAGESIC) 12 mcg/hr 72 hr patch Place 1 patch onto the skin every 72 hours remove old patch. Fill 02/03/21 to start 02/05/21 10 patch 0     fluticasone (FLONASE) 50 MCG/ACT nasal spray Spray 1-2 sprays into both nostrils daily 16 g 11     fluvastatin (LESCOL) 20 MG capsule Take 1 capsule (20 mg) by mouth At Bedtime 90 capsule 1     glucose blood VI test strips strip daily       HYDROcodone-acetaminophen (NORCO)  MG per tablet Take 0.5-1 tablets by mouth 3 times daily as needed for severe pain Put 4-6 hours between dose.  Fill on/after 11/2/20 to start on/after 11/3/20 60 tablet 0     HYDROcodone-acetaminophen (NORCO) 7.5-325 MG per tablet Take 0.5-1 tablets by mouth every 6 hours as needed for severe pain 45 tablet 0     hydroxychloroquine (PLAQUENIL) 200 MG tablet Hydroxychloroquine 200mg daily; and an additional 200mg every other day. 135 tablet 1     hydrOXYzine (ATARAX) 25 MG tablet Take 1 tablet (25 mg) by mouth 3 times daily as needed for itching 40 tablet 0     lifitegrast (XIIDRA) 5 % opthalmic solution Place 1 drop into both eyes 2 times daily 24 each 6     medical cannabis (Patient's own supply) Take 1 Dose by mouth See Admin Instructions (The purpose of this order is to document that the patient reports taking medical cannabis.  This is not a prescription, and is not used to certify that the patient has a qualifying medical condition.)       metFORMIN (GLUCOPHAGE) 500 MG  tablet Take 2 tablets (1,000 mg) by mouth 2 times daily (with meals) 1000 mg 2 x a day 360 tablet 3     metroNIDAZOLE (METROGEL) 0.75 % gel Apply to face daily       naloxone (NARCAN) 4 MG/0.1ML nasal spray Spray 1 spray (4 mg) into one nostril alternating nostrils once as needed for opioid reversal every 2-3 minutes until assistance arrives 0.2 mL 1     nystatin (MYCOSTATIN) 308882 UNIT/GM external powder Apply topically 2 times daily as needed 2 x daily PRN for rash 60 g 4     omeprazole (PRILOSEC) 20 MG DR capsule Take 1 capsule (20 mg) by mouth 2 times daily 2 x daily 180 capsule 1     potassium chloride ER (KLOR-CON M) 10 MEQ CR tablet Take 1 tablet (10 mEq) by mouth 2 times daily 20 mEq daily Take by mouth 2 times daily 20 mEq daily 180 tablet 3     PROAIR  (90 Base) MCG/ACT inhaler INHALE 2 PUFFS BY MOUTH EVERY 6 HOURS AS NEEDED FOR WHEEZING       promethazine (PHENERGAN) 25 MG suppository Place 25 mg rectally every 6 hours as needed For nusea       promethazine (PHENERGAN) 25 MG tablet Take 1 tablet (25 mg) by mouth every 6 hours as needed for nausea or other (Meniere's) For nusea 90 tablet 1     psyllium (METAMUCIL) 58.6 % POWD Take by mouth daily PRN       STATIN NOT PRESCRIBED (INTENTIONAL) Please choose reason not prescribed, below       thin (NO BRAND SPECIFIED) lancets Use to test blood sugar 2 times daily or as directed. To accompany: Blood Glucose Monitor Brands: per insurance. 1 each 3     triamterene-HCTZ (DYAZIDE) 37.5-25 MG capsule Take 1 capsule by mouth daily 1 capsule every morning1 capsule every morning 90 capsule 3     UNABLE TO FIND MEDICATION NAME: Tumeric 1000 mg every day for pain       vitamin D2 (ERGOCALCIFEROL) 63579 units (1250 mcg) capsule Take 1 capsule (50,000 Units) by mouth once a week 12 capsule 2       Medical History: any changes in medical history since they were last seen? None    Review of Systems:  The 14 system ROS was reviewed from the intake questionnaire, and  is positive for: joint pain  Any bowel or bladder problems: none  Mood: stable       Assessment:   1. Rheumatoid arthritis  2. fibromyalgia  3. Sjogren's Syndrome  4. Meniere's disease with intermittent vertigo and hearing loss  5. Type 2 diabetes      Plan:  1. Physical Therapy: hand therapy, will schedule PT  2. Clinical Health Psychologist to address issues of relaxation, behavioral change, coping style, and other factors important to improvement.  She will call to schedule  3. Diagnostic Studies:  none  4. Medication Management:  Continue decreasing Norco as able. She will let us know use when calling in for refill  5. Further procedures recommended: none at this time.  Could do bilateral lumbar facet joint injections prn, depending on how long the last, would consider medial branch block/RFA   6. Recommendations to PCP: see above   7. Follow up: in person in job [place] MD

## 2021-02-18 NOTE — PROGRESS NOTES
Rheumatology Clinic Visit      Betsey Vanessa MRN# [id number]   YOB: 1960 Age: 60 year old      Date of visit: 2/18/21   PCP: Dr. Velma Ibanez  Referring provider: Dr. Velma Ibanez    Chief Complaint   Patient presents with:  Arthritis: RA    Assessment and Plan     1.  Rheumatoid arthritis: Reportedly diagnosed many years ago.  Previously treated by Dr. Stacy at [facility] in [place], [place]. Previously on methotrexate + rituximab, Enbrel + leflunomide; had elevated liver enzymes preventing oral DMARDs; most recent effective tx was with tocilizumab 8mg/kg IV monthly per her last rheumatology notes.  ALEGRIA hx prevents several oral DMARDs and LFT elevations with MTX in the past.  Previously used Actemra 4mg/kg IV but this resulted in lower platelets and she did not find much benefit from it, so it was discontinued.  Orencia was then started, with the first dose on 5/7/2020 and she improved with this.  Mild swelling at the right second-fifth MCPs today, with prolonged morning stiffness.  Add hydroxychloroquine.  Most of the pain symptoms that she is having are most consistent with a chronic pain syndrome.  Chronic illness, progressive.    - Continue orencia 750mg IV every 4 weeks:   - Start hydroxychloroquine 200 mg daily with an additional 200 mg every other day  - Continue Celebrex 100 mg BID   - Ophthalmology referral for hydroxychloroquine toxicity monitoring   - Labs in 3 months: CBC, Creatinine, Hepatic Panel, ESR, CRP    # Hydroxychloroquine (Plaquenil) Risks and Benefits:  The risks and benefits of hydroxychloroquine were discussed in detail and the patient verbalized understanding; the patient also verbalized agreement to get the required ophthalmologic toxicity monitoring.  The risks discussed include, but are not limited to, the risk for hypersensitivity, anaphylaxis, anaphylactoid reactions, irreversible retinal damage, rare hematologic reactions, and rare  cardiomyopathy.   I encouraged reviewing the package insert and asking any questions about the medication.       2. Sjogren's Syndrome: reportedly had punctal plugs in the past.  Doing okay with artificial tears, Xiidra, and Evoxac.  Encouraged frequent sips of water, visits with a dentist at least every 6months, avoidance of sugary foods/drinks.  Chronic illness, stable.       3. Chronic pain syndrome: Managed at the pain clinic     4. Osteopenia: based on 7/2019 DEXA report that she brought with her; FRAX score shows a 22% risk of major osteoporotic fracture in the next 10 years and a 1.9% risk for osteoporotic hip fracture in the next 10 years.  Based on FRAX tx is indicated.  She already received one dose of boniva in Sept 2019; and failed alendronate due to GI upset.  Reclast received 2/13/2020.  Chronic illness  - Continue calcium 1000mg daily  - Continue vitamin D 2000 IU daily  - Reclast: next dose in February 2021; advised that she call to schedule  - Labs in 3 months: Calcium, vitamin D    5. Elevated blood pressure:  Betsey to follow up with Primary Care provider regarding elevated blood pressure.     # At this time the COVID-19 vaccine (currently available from [facility]) is recommended based on our knowledge of this vaccine and vaccines in the past.  Based on the data for the mRNA COVID-19 vaccines available in the [place], there is no preference for one COVID-19 vaccine over another. Note that there is no data currently available to specifically establish safety and efficacy for this vaccine in immunocompromised people.    Medication(s) that need adjustment around the time of COVID-19 vaccination:  - Orencia (abatacept) intravenous (IV): the first vaccination should occur 4 weeks after Orencia infusion and then postpone the subsequent Orencia infusion by 1 week; no interruption is needed around the second COVID19 vaccine dose.    #  Instructed that if confirmed to have COVID-19 or exposure  to someone with confirmed COVID-19 to call this clinic for directions on DMARD management.    # This is a virtual visit to reduce the risk of COVID-19 exposure during this current pandemic.      # Considered to be at high risk of complications from the COVID-19 virus.  It is recommended to limit contact with other people and if possible to work remotely or provide a leave of absence to reduce the risk for COVID-19.      Total minutes spent in evaluation with patient, documentation, , and review of pertinent studies and chart notes: 35     Ms. Vanessa verbalized agreement with and understanding of the rational for the diagnosis and treatment plan.  All questions were answered to best of my ability and the patient's satisfaction. Ms. Vanessa was advised to contact the clinic with any questions that may arise after the clinic visit.      Thank you for involving me in the care of the patient    Return to clinic: 3 months    HPI   Betsey Vanessa is a 60 year old female with a past medical history significant for hypertension, depression, type 2 diabetes, Ménière's disease, and rheumatoid arthritis who is seen in consultation for follow-up of RA.     8/16/2019 documentation by Dr. Juani Stacy at [facility] ([place], [place]) notes rheumatoid arthritis and thrombocytopenia    1/8/2020 clinic note by Dr. Ibanez documents Ménière's disease-referring to ENT.  Diabetes.  Hypertension.  Rheumatoid arthritis and Sjogren's syndrome currently on Evoxac, Celebrex, IV Tocilizumab monthly.  Also on long-term narcotics that includes fentanyl patch for chronic pain on clonazepam for sleep at night.  Valvular heart disease with mitral valve stenosis, mitral valve regurgitation, and severe aortic valve stenosis and was following with a cardiologist with plans to establish with a new cardiologist here in [place].  Depression stable on Lexapro.    Moved from [place] to [place]    1/14/2020: Ms.  "Otf reported that she has RA diagnosed many years ago, chronic pain syndrome with plans to establish in the pain clinic, depression that is controlled, diabetes tx'd with oral medications, and hearing loss with plans to see ENT here.  Also states that she has \"bad bones\" and has been on Boniva injection x1 dose in February. Failed fosamax because of associated GI upset; used for 6 weeks. [facility].  Return (treated with methotrexate because liver enzyme elevations, Remicade that was ineffective and caused hives, Enbrel that she does not recall helping in any way, and more recently Actemra 4 mg/kg IV every 8 weeks that has been used for years, last received in September 2019.  Also with Celebrex.  She says that since her last dose of Actemra she has not seen any worsening of her arthritis.  Still with some pain at her MCPs that is worse in the morning and improves with time and activity.  Morning stiffness for approximately 1 hour.  Positive gelling phenomenon.  Diffuse body pain.    5/18/2020:  pain at her feet, elbows, hands, lower back, knees.  She says that she has swelling; then her daughter who was also on the phonecall asked where she was swollen and Ms. Vanessa said that she wasn't swollen right now.  Peripheral joint pain is bad all day; she doesn't discern between AM, noon, or PM.  Activity doesn't affect her joint pain.  Felt better when on actemra in the past. Her daughter said that her mother seemed to respond well to actemra, but variable - sometimes helping for 1 week after infusion, sometimes for 3 weeks after, and sometimes not at all. Felt like in the past it was more effective.     8/3/2020: feet and kness worse in the evening, worse with activity; better with rest.  MCPs feel better with orencia; no longer with swelling.  MCPs sometimes better activity, sometimes worse. Morning stiffness may be from zero to 120 min. Tolerating Orencia well. " Missed orencia once in July with no worsening symptoms; had worse meniere's at the time so she skipped a dose.  No bruising or bleeding.    11/16/2020: Doing well regard to Orencia.  She notes that her pain has been better controlled and her pain medications have been reduced over time.  However, she has run out of Celebrex for a period of time and then restarted at 200 mg daily and has noticed significant improvement when taking it.  She notes that her last labs did look good and wonders if I would be willing to continue Celebrex.  She has no GI upset with Celebrex use.  Currently without joint swelling.  Diffuse body pain is improved with Celebrex.  Taking vitamin D 2000 IU daily.    Today, 2/18/2021: Swelling and pain across the MCPs that is worse in the morning improves with time and activity.  Morning stiffness for approximately 1 hour.  Whole body pain.  Feels like Orencia has been helping.    Denies fevers, chills, nausea, or vomiting.  Occasional constipation or diarrhea. No abdominal pain. No chest pain/pressure, palpitations, or shortness of breath. No LE swelling.  Chronic neck and lower back pain that were managed in the pain clinic previously and she plans to establish care in the pain clinic here soon. No oral or nasal sores.  No rash.  Dry eyes treated with artificial tears and Xiidra; she plans to establish with an ophthalmologist because she was followed by ophthalmology when in [place]; hx of punctal plugs.  Dry mouth is treated with frequent sips of water and Evoxac; doesn't recall ever being on pilocarpine. Evoxac is listed in her allergy list and she says that is inaccurate and then pulls out her Evoxac Rx to show that she has it and she says it helps and she's never had an adverse reaction to Evoxac.  No photosensitivity or photophobia.  No history of uveitis. No history of inflammatory bowel disease.  No history of DVT, pulmonary embolism, or miscarriage.   No history of serositis.  No  history of Raynaud's Phenomenon.  No seizure history.   No known renal disorder.      Tobacco: Quit smoking in 1998  EtOH: None  Drugs: None    ROS   GEN: No fevers, chills, night sweats, or weight change  SKIN: No itching, rashes, sores  HEENT: No epistaxis. No oral or nasal ulcers.  CV: No chest pain, pressure, palpitations, or dyspnea on exertion.  PULM: No SOB, wheeze, cough.  GI:  No blood in stool. No abdominal pain.  See HPI  : No blood in urine.  MSK: See HPI.  NEURO: No numbness or tingling  ENDO: No heat/cold intolerance.  EXT: No LE swelling  PSYCH: See HPI    Active Problem List     Patient Active Problem List   Diagnosis     Type 2 diabetes mellitus with hyperglycemia, without long-term current use of insulin (H)     Hypertension goal BP (blood pressure) < 140/90     Meniere's disease, unspecified laterality     Rheumatoid arthritis, involving unspecified site, unspecified rheumatoid factor presence     Valvular heart disease     Fibromyalgia     Mild major depression (H)     Osteopenia of multiple sites     History of bisphosphonate therapy     Post-menopausal     Benzodiazepine dependence, episodic (H)     Continuous opioid dependence (H)     Mitral stenosis     Age-related osteoporosis without current pathological fracture     Mixed conductive and sensorineural hearing loss of right ear     Polyarticular arthritis     Encounter for long-term use of opiate analgesic     Chronic pain     Tympanic membrane perforation     Myofascial pain     Migraine with vertigo     Gastroesophageal reflux disease, esophagitis presence not specified     Fungal skin infection     Anaphylaxis, sequela     Hyperlipidemia, unspecified hyperlipidemia type     Past Medical History     Past Medical History:   Diagnosis Date     Anemia     r/t menses     Anxiety      Arthritis      Constipation      Depression      Diabetes (H)      Diarrhea      Double vision      Headache(784.0)      Hearing loss      Heart murmur       Heartburn      Hypertension      Indigestion      Nasal congestion      Night sweats      Numbness      Problems related to lack of adequate sleep      Rash      Sneezing      Tinnitus      Visual loss      Weakness      Weight gain     because of Prednisone     Past Surgical History     Past Surgical History:   Procedure Laterality Date     ------------OTHER-------------      D&C     HYSTERECTOMY       INNER EAR SURGERY       RELEASE CARPAL TUNNEL BILATERAL Bilateral 2008     Allergy     Allergies   Allergen Reactions     Duloxetine Other (See Comments)     Topiramate Other (See Comments)     Other reaction(s): Confusion     Amitriptyline Embonate [Amitriptyline]      Hyper activity     Azithromycin Hives     Cymbalta      Hyper activity     Gabapentin Hives     Hmg-Coa-R Inhibitors Other (See Comments)     Liver function studies abnormal on Lipitor / Crestor     Macrobid [Nitrofurantoin] Hives     Paxil [Paroxetine] Other (See Comments)     Hyper behavior     Penicillins Hives     Tetracycline Hives     Current Medication List     Current Outpatient Medications   Medication Sig     acetic acid-hydrocortisone (VOSOL-HC) 1-2 % otic solution Place 5 drops into the right ear 2 times daily     celecoxib (CELEBREX) 100 MG capsule Take 1 capsule (100 mg) by mouth 2 times daily     cetirizine (ZYRTEC) 10 MG tablet Take 1 tablet (10 mg) by mouth daily     cevimeline (EVOXAC) 30 MG capsule Take 1 capsule (30 mg) by mouth 3 times daily     clonazePAM (KLONOPIN) 0.5 MG tablet Take 1 tablet (0.5 mg) by mouth 2 times daily as needed for anxiety OK to take 1-2 tabs at night as needed for sleep. Should last about 90 days     diclofenac (VOLTAREN) 1 % topical gel Place 2-4 g onto the skin 4 times daily . Max of 8g per dose. No more than 32g/day     docusate sodium (COLACE) 100 MG capsule Take 1 capsule (100 mg) by mouth 2 times daily     escitalopram (LEXAPRO) 20 MG tablet Take 1 tablet (20 mg) by mouth daily     fentaNYL  (DURAGESIC) 12 mcg/hr 72 hr patch Place 1 patch onto the skin every 72 hours remove old patch. Fill 02/03/21 to start 02/05/21     fluticasone (FLONASE) 50 MCG/ACT nasal spray Spray 1-2 sprays into both nostrils daily     fluvastatin (LESCOL) 20 MG capsule Take 1 capsule (20 mg) by mouth At Bedtime     glucose blood VI test strips strip daily     HYDROcodone-acetaminophen (NORCO)  MG per tablet Take 0.5-1 tablets by mouth 3 times daily as needed for severe pain Put 4-6 hours between dose.  Fill on/after 11/2/20 to start on/after 11/3/20     HYDROcodone-acetaminophen (NORCO) 7.5-325 MG per tablet Take 0.5-1 tablets by mouth every 6 hours as needed for severe pain     hydrOXYzine (ATARAX) 25 MG tablet Take 1 tablet (25 mg) by mouth 3 times daily as needed for itching     lifitegrast (XIIDRA) 5 % opthalmic solution Place 1 drop into both eyes 2 times daily     medical cannabis (Patient's own supply) Take 1 Dose by mouth See Admin Instructions (The purpose of this order is to document that the patient reports taking medical cannabis.  This is not a prescription, and is not used to certify that the patient has a qualifying medical condition.)     metFORMIN (GLUCOPHAGE) 500 MG tablet Take 2 tablets (1,000 mg) by mouth 2 times daily (with meals) 1000 mg 2 x a day     metroNIDAZOLE (METROGEL) 0.75 % gel Apply to face daily     naloxone (NARCAN) 4 MG/0.1ML nasal spray Spray 1 spray (4 mg) into one nostril alternating nostrils once as needed for opioid reversal every 2-3 minutes until assistance arrives     nystatin (MYCOSTATIN) 527869 UNIT/GM external powder Apply topically 2 times daily as needed 2 x daily PRN for rash     omeprazole (PRILOSEC) 20 MG DR capsule Take 1 capsule (20 mg) by mouth 2 times daily 2 x daily     potassium chloride ER (KLOR-CON M) 10 MEQ CR tablet Take 1 tablet (10 mEq) by mouth 2 times daily 20 mEq daily Take by mouth 2 times daily 20 mEq daily     PROAIR  (90 Base) MCG/ACT inhaler  INHALE 2 PUFFS BY MOUTH EVERY 6 HOURS AS NEEDED FOR WHEEZING     promethazine (PHENERGAN) 25 MG suppository Place 25 mg rectally every 6 hours as needed For nusea     promethazine (PHENERGAN) 25 MG tablet Take 1 tablet (25 mg) by mouth every 6 hours as needed for nausea or other (Meniere's) For nusea     psyllium (METAMUCIL) 58.6 % POWD Take by mouth daily PRN     STATIN NOT PRESCRIBED (INTENTIONAL) Please choose reason not prescribed, below     triamterene-HCTZ (DYAZIDE) 37.5-25 MG capsule Take 1 capsule by mouth daily 1 capsule every morning1 capsule every morning     vitamin D3 (CHOLECALCIFEROL) 2000 units (50 mcg) tablet Take 1 tablet (2,000 Units) by mouth daily     alcohol swab prep pads Use to swab area of injection/rosie as directed     blood glucose (NO BRAND SPECIFIED) test strip Use to test blood sugar 2 times daily or as directed. To accompany: Blood Glucose Monitor Brands: per insurance.     blood glucose calibration (NO BRAND SPECIFIED) solution Use to calibrate blood glucose monitor as needed as directed. To accompany: Blood Glucose Monitor Brands: per insurance.     EPINEPHrine (ANY BX GENERIC EQUIV) 0.3 MG/0.3ML injection 2-pack Inject 0.3 mLs (0.3 mg) into the muscle as needed for anaphylaxis     EPINEPHrine 0.3 MG/0.3ML SOLN PRN for Anaphylaxis     thin (NO BRAND SPECIFIED) lancets Use to test blood sugar 2 times daily or as directed. To accompany: Blood Glucose Monitor Brands: per insurance.     UNABLE TO FIND MEDICATION NAME: Tumeric 1000 mg every day for pain     No current facility-administered medications for this visit.          Social History   See HPI    Family History     Family History   Problem Relation Age of Onset     C.A.D. Mother      Alzheimer Disease Mother      Cardiovascular Mother      Eye Disorder Mother      Thyroid Disease Mother      Hypertension Other         grandmother     Cerebrovascular Disease Father      Alcohol/Drug Father      Depression Father      Obesity Father   "       aunt     Asthma Father      Alcohol/Drug Brother      Cancer Brother      Alcohol/Drug Sister         aunt     Cancer Sister      Allergies Other         All family members     Arthritis Other         aunt     Obesity Sister      Thyroid Disease Sister      Asthma Sister         daughter       Physical Exam     Temp Readings from Last 3 Encounters:   02/16/21 97.8  F (36.6  C) (Oral)   01/19/21 98.5  F (36.9  C) (Oral)   01/08/21 99.5  F (37.5  C) (Tympanic)     BP Readings from Last 5 Encounters:   02/18/21 (!) 147/96   02/16/21 (!) 145/71   01/19/21 127/65   01/08/21 124/58   12/22/20 133/73     Pulse Readings from Last 1 Encounters:   02/18/21 86     Resp Readings from Last 1 Encounters:   02/16/21 14     Estimated body mass index is 29.92 kg/m  as calculated from the following:    Height as of this encounter: 1.573 m (5' 1.93\").    Weight as of this encounter: 74 kg (163 lb 3.2 oz).      GEN: NAD. Healthy appearing adult.   HEENT: MMM.  Anicteric, noninjected sclera. No obvious external lesions of the ear and nose. Hearing intact.  PULM: No increased work of breathing  MSK: Mild swelling and tenderness palpation across the right second and fifth MCPs.  Left hand MCPs with subtle synovial swelling but no tenderness palpation.  PIPs and DIPs without swelling or tenderness palpation.  Wrists, elbows, shoulders, knees, ankles, and MTPs diffusely tender to palpation but no swelling, effusion, increased warmth, or overlying erythema.  All fibromyalgia tender points positive.  SKIN: No rash or jaundice seen  PSYCH: Alert. Appropriate.      Labs / Imaging (select studies)     RF/CCP  Recent Labs   Lab Test 01/14/20  1221   CCPIGG 1   RHF 20*     MARIO  Recent Labs   Lab Test 01/14/20  1221   ABBY Negative     RNP/Sm/SSA/SSB  Recent Labs   Lab Test 01/14/20  1221   RNPIGG 2.7*   SMIGG <0.2   SSAIGG 0.4   SSBIGG <0.2     dsDNA  Recent Labs   Lab Test 01/14/20  1221   DNA 2     C3/C4  Recent Labs   Lab Test " 01/14/20  1221   H8QRUPW 127   Z5APOGW 30     Antiphospholipid Antibodies  Recent Labs   Lab Test 01/14/20  1221   B2GPG 0.7   B2GPM <2.9   CARDG <1.6   CARDM 1.6     CBC  Recent Labs   Lab Test 01/19/21  1130 10/28/20  1537 04/09/20  1226   WBC 6.5 7.1 3.8*   RBC 4.46 4.56 4.37   HGB 11.8 12.3 12.7   HCT 36.6 37.5 38.1   MCV 82 82 87   RDW 14.4 13.7 14.0    162 88*   MCH 26.5 27.0 29.1   MCHC 32.2 32.8 33.3   NEUTROPHIL 65.0 66.1 48.3   LYMPH 25.3 25.8 39.5   MONOCYTE 5.1 5.1 7.4   EOSINOPHIL 4.0 2.5 4.2   BASOPHIL 0.3 0.1 0.3   ANEU 4.2 4.7 1.8   ALYM 1.6 1.8 1.5   MOISE 0.3 0.4 0.3   AEOS 0.3 0.2 0.2   ABAS 0.0 0.0 0.0     CMP  Recent Labs   Lab Test 01/19/21  1130 01/08/21  1535 12/09/20  0851 10/28/20  1537 04/09/20  1226 01/14/20  1221 01/14/20  1221   NA  --  135  --   --  141  --  137   POTASSIUM  --  4.2  --   --  4.0  --  4.0   CHLORIDE  --  99  --   --  108  --  103   CO2  --  32  --   --  27  --  28   ANIONGAP  --  4  --   --  6  --  6   GLC  --  155*  --   --  121*  --  120*   BUN  --  16  --   --  17  --  13   CR 0.88 0.80  --  0.83 0.70  --  0.70   GFRESTIMATED 72 81  --  77 >90  --  >90   GFRESTBLACK 84 >90  --  90 >90  --  >90   MIRNA 9.2 9.7  --  9.3 9.1   < > 9.5   BILITOTAL 0.4 0.5  --  0.6 0.4  --  0.5   ALBUMIN 3.9 4.1  --  4.1 4.0  --  4.0   PROTTOTAL 7.7 8.2  --  7.9 7.4  --  7.9   ALKPHOS 117 100  --  101 80  --  111   AST 23 23 22 21 36  --  30   ALT 36 45 37 37 52*  --  37    < > = values in this interval not displayed.     HgA1c  Recent Labs   Lab Test 12/09/20  0851 08/14/20  0736   A1C 7.5* 8.0*     Calcium/VitaminD  Recent Labs   Lab Test 01/19/21  1130 01/08/21  1535 10/28/20  1537 01/14/20  1221 01/14/20  1221   MIRNA 9.2 9.7 9.3   < > 9.5   VITDT 25  --  33  --  40    < > = values in this interval not displayed.     ESR/CRP  Recent Labs   Lab Test 01/19/21  1130 01/08/21  1535 10/28/20  1537   SED 24 21 27   CRP 3.0 4.6 3.9     CK/Aldolase  Recent Labs   Lab Test 01/14/20  1221    CKT 53     TSH/T4  Recent Labs   Lab Test 01/14/20  1221   TSH 2.99     Hepatitis B  Recent Labs   Lab Test 01/14/20  1221   HBCAB Nonreactive   HEPBANG Nonreactive     Hepatitis C  Recent Labs   Lab Test 01/14/20  1221   HCVAB Nonreactive     Lyme ab screening  Recent Labs   Lab Test 01/14/20  1221   LYMEGM 0.05       Tuberculosis Screening  Recent Labs   Lab Test 01/14/20  1221   TBRES Negative     HIV Screening  Recent Labs   Lab Test 08/14/20  0736   HIAGAB Nonreactive     UA  Recent Labs   Lab Test 04/09/20  1241 01/14/20  1230   COLOR Yellow Yellow   APPEARANCE Slightly Cloudy Clear   URINEGLC Negative Negative   URINEBILI Negative Negative   SG 1.033 >1.030   URINEPH 5.5 5.5   PROTEIN 10* Negative   UROBILINOGEN  --  0.2   NITRITE Negative Negative   UBLD Negative Negative   LEUKEST Trace* Trace*   WBCU 0 - 5 0 - 5   RBCU O - 2 O - 2   SQUAMOUSEPI Moderate* Many*   BACTERIA Few* Moderate*     Urine Microscopic  Recent Labs   Lab Test 04/09/20  1241 01/14/20  1230   WBCU 0 - 5 0 - 5   RBCU O - 2 O - 2   SQUAMOUSEPI Moderate* Many*   BACTERIA Few* Moderate*     Urine Protein  Recent Labs   Lab Test 01/14/20  1230   UTP 0.06   UTPG 0.03   UCRR 214     [facility]   7/18/2013 CCP negative  8/5/2010 CCP negative  3/27/2013 hepatitis B surface antigen negative and hepatitis B core antibody negative  9/2/2011 hepatitis B surface antigen negative and hepatitis B core antibody negative  3/27/2013 hepatitis C antibody negative    Immunization History     Immunization History   Administered Date(s) Administered     FLU 6-35 months 10/22/2013     Influenza Vaccine IM > 6 months Valent IIV4 10/21/2015, 10/05/2016, 10/11/2018, 09/25/2019     Influenza Vaccine, 6+MO IM (QUADRIVALENT W/PRESERVATIVES) 09/18/2014, 10/03/2017     Pneumo Conj 13-V (2010&after) 10/11/2018     Pneumococcal 23 valent 12/09/2020     TD (ADULT, 7+) 04/20/1997, 04/17/2019     TDAP Vaccine (Adacel) 01/15/2009     Zoster vaccine  recombinant adjuvanted (SHINGRIX) 10/11/2018          Chart documentation done in part with Dragon Voice recognition Software. Although reviewed after completion, some word and grammatical error may remain.    Terrence Carrillo MD

## 2021-02-18 NOTE — PATIENT INSTRUCTIONS
Medication(s) that need adjustment around the time of COVID-19 vaccination:  - Orencia (abatacept) intravenous (IV): the first vaccination should occur 4 weeks after Orencia infusion and then postpone the subsequent Orencia infusion by 1 week; no interruption is needed around the second COVID19 vaccine dose.    Please call to schedule your reclast infusion    RHEUMATOLOGY    Dr. Terrence Carrillo    [address] [address]    Our new phone number for Rheumatology is [phone], this number will be able to help you schedule appointments for Dr. Carrillo or if you have any message you would like sent to us.    Thank you for choosing [facility]!  Madeline Cheung [facility] Rheumatology

## 2021-02-22 NOTE — PATIENT INSTRUCTIONS
1. 1.You were seen in the ENT Clinic today by Dr. Stallings.  If you have any questions or concerns after your appointment, please call   - Option 1: ENT Clinic: [phone]  - Option 2: [facility] (Dr. Stallings): [phone]    2.   Plan to return to clinic in 6 months.    Vianney Escoto RN  Clinical Coordinator  [facility]- Otolaryngology  [phone]    iN Galvan LPN  [facility]- Otolaryngology  [phone]

## 2021-02-25 ENCOUNTER — OFFICE VISIT (OUTPATIENT)
Dept: OTOLARYNGOLOGY | Facility: CLINIC | Age: 61
End: 2021-02-25
Payer: COMMERCIAL

## 2021-02-25 VITALS — BODY MASS INDEX: 30 KG/M2 | HEIGHT: 62 IN | WEIGHT: 163 LBS

## 2021-02-25 DIAGNOSIS — H81.02 MENIERE'S DISEASE OF LEFT EAR: ICD-10-CM

## 2021-02-25 DIAGNOSIS — H72.91 PERFORATION OF RIGHT TYMPANIC MEMBRANE: Primary | ICD-10-CM

## 2021-02-25 DIAGNOSIS — Z96.22 S/P MYRINGOTOMY WITH INSERTION OF TUBE: ICD-10-CM

## 2021-02-25 PROCEDURE — 99212 OFFICE O/P EST SF 10 MIN: CPT | Mod: 25 | Performed by: OTOLARYNGOLOGY

## 2021-02-25 PROCEDURE — 92504 EAR MICROSCOPY EXAMINATION: CPT | Performed by: OTOLARYNGOLOGY

## 2021-02-25 RX ORDER — PROMETHAZINE HYDROCHLORIDE 25 MG/1
25 TABLET ORAL EVERY 6 HOURS PRN
Qty: 90 TABLET | Refills: 0 | Status: SHIPPED | OUTPATIENT
Start: 2021-02-25 | End: 2023-01-01

## 2021-02-25 ASSESSMENT — MIFFLIN-ST. JEOR: SCORE: 1272.61

## 2021-02-25 ASSESSMENT — PAIN SCALES - GENERAL: PAINLEVEL: MODERATE PAIN (5)

## 2021-02-25 NOTE — NURSING NOTE
"Chief Complaint   Patient presents with     RECHECK     6 month follow up       Height 1.575 m (5' 2\"), weight 73.9 kg (163 lb).    Gayatri Gaines, EMT    "

## 2021-02-25 NOTE — PROGRESS NOTES
Betsey Vanessa is seen for bilateral ear checks. She reports that she continues to have drainage from the right ear but no pain. She reports some vertigo and nausea but not as bad as before the gentamicin treatment.     Physical examination:  female in no acute distress.  Alert and answering questions appropriately.  HB 1/6 bilaterally.  Both ears examined under the microscope. Right canal with dry cerumen which was removed with a curette and alligator, canal skin healthy, TM with a stable inferior perforation with a small area granulation at the umbo which does not appear infected, middle ear healthy. Left ear canal clear, Clarissa bobbin tube beginning to extrude with cerumen around the base and sitting up on the TM but it may still be in the middle ear, remainder of the TM healthy, middle ear aerated.    Assessment and plan:  Quite healthy appearing ears. This is probably the best her ears have looked with both dry. She and her daughter were pleased. Recommendation had been made in the past for vestibular testing which she has not done yet and recommendation was made again. We'll see her again in 6 months.

## 2021-02-25 NOTE — LETTER
2/25/2021      RE: Betsey Vanessa  [address]  [facility] [address]       Betsey Vanessa is seen for bilateral ear checks. She reports that she continues to have drainage from the right ear but no pain. She reports some vertigo and nausea but not as bad as before the gentamicin treatment.     Physical examination:  female in no acute distress.  Alert and answering questions appropriately.  HB 1/6 bilaterally.  Both ears examined under the microscope. Right canal with dry cerumen which was removed with a curette and alligator, canal skin healthy, TM with a stable inferior perforation with a small area granulation at the umbo which does not appear infected, middle ear healthy. Left ear canal clear, Clarissa bobbin tube beginning to extrude with cerumen around the base and sitting up on the TM but it may still be in the middle ear, remainder of the TM healthy, middle ear aerated.    Assessment and plan:  Quite healthy appearing ears. This is probably the best her ears have looked with both dry. She and her daughter were pleased. Recommendation had been made in the past for vestibular testing which she has not done yet and recommendation was made again. We'll see her again in 6 months.        Laura Stallings MD

## 2021-03-03 ENCOUNTER — TELEPHONE (OUTPATIENT)
Dept: OTOLARYNGOLOGY | Facility: CLINIC | Age: 61
End: 2021-03-03

## 2021-03-03 ENCOUNTER — TELEPHONE (OUTPATIENT)
Dept: PALLIATIVE MEDICINE | Facility: CLINIC | Age: 61
End: 2021-03-03

## 2021-03-03 DIAGNOSIS — M06.09 RHEUMATOID ARTHRITIS OF MULTIPLE SITES WITHOUT RHEUMATOID FACTOR (H): ICD-10-CM

## 2021-03-03 DIAGNOSIS — F11.20 CONTINUOUS OPIOID DEPENDENCE (H): ICD-10-CM

## 2021-03-03 DIAGNOSIS — G89.4 CHRONIC PAIN SYNDROME: ICD-10-CM

## 2021-03-03 DIAGNOSIS — R42 DIZZINESS: Primary | ICD-10-CM

## 2021-03-03 NOTE — TELEPHONE ENCOUNTER
Reason for call:  Medication   If this is a refill request, has the caller requested the refill from the pharmacy already? No  Will the patient be using a [facility]? No  Name of the pharmacy and phone number for the current request: [id number] - [place], [place] - [address]    Name of the medication requested:   fentaNYL (DURAGESIC) 12 mcg/hr 72 hr patch  HYDROcodone-acetaminophen (NORCO) 7.5-325 MG per tablet    Other request:     Phone number to reach patient:  Cell number on file:    Telephone Information:   Mobile [phone]       Best Time:      Can we leave a detailed message on this number?  YES    Travel screening: Not Applicable

## 2021-03-03 NOTE — TELEPHONE ENCOUNTER
[facility] Call Center    Phone Message    May a detailed message be left on voicemail: yes     Reason for Call: Other: Pt would like to discuss disability report updates. Wonders if Dr Chandrakant deems her able or disable to work due to her Dx. Requests call back to [phone]. Thank you    Action Taken: Message routed to:  [facility] ENT    Travel Screening: Not Applicable

## 2021-03-03 NOTE — TELEPHONE ENCOUNTER
Received fax from pharmacy requesting refill(s) for     fentaNYL (DURAGESIC) 12 mcg/hr 72 hr patch  Last dispensed from pharmacy on 02/03/21     HYDROcodone-acetaminophen (NORCO) 7.5-325 MG per tablet   Last dispensed from pharmacy on 01/21/21    Patient's last office/virtual visit by prescribing provider on 02/18/21  Next office/virtual appointment scheduled for 04/14/21    Last urine drug screen date 10/12/20  Current opioid agreement on file? Yes Date of opioid agreement: 10/12/20    E-prescribe to:    [facility] [address] [facility] [address] [address]    Will route to nursing [place] for review and preparation of prescription(s).

## 2021-03-03 NOTE — TELEPHONE ENCOUNTER
Spoke to the pt per Dr. Stallings. Patient must complete an appt with PT- vestibular to make a decision on the paperwork. Patient had some confusion about this but explained a couple times we would have someone reach out to her to get it scheduled.    Ni Galvan LPN

## 2021-03-04 RX ORDER — FENTANYL 12.5 UG/1
1 PATCH TRANSDERMAL
Qty: 10 PATCH | Refills: 0 | Status: SHIPPED | OUTPATIENT
Start: 2021-03-04 | End: 2021-04-05

## 2021-03-04 RX ORDER — HYDROCODONE BITARTRATE AND ACETAMINOPHEN 7.5; 325 MG/1; MG/1
.5-1 TABLET ORAL EVERY 6 HOURS PRN
Qty: 30 TABLET | Refills: 0 | Status: SHIPPED | OUTPATIENT
Start: 2021-03-04 | End: 2021-04-05

## 2021-03-04 NOTE — TELEPHONE ENCOUNTER
Medication refill information reviewed.     Medication Note       JOSSUESANTOS BABB   Thu Feb 18, 2021  2:21 PM   Next script, provided #30               Due date for     fentaNYL (DURAGESIC) 12 mcg/hr 72 hr patch is 03/07/21     HYDROcodone-acetaminophen (NORCO) 7.5-325 MG per tablet  is anytime. Qty changes to #30    Prescriptions prepped for review.     Will route to provider.

## 2021-03-04 NOTE — TELEPHONE ENCOUNTER
Script Eprescribed to pharmacy  [place] Prescription [place]    Will send this to MA team to notify patient.    Signed Prescriptions:                        Disp   Refills    fentaNYL (DURAGESIC) 12 mcg/hr 72 hr patch 10 pat*0        Sig: Place 1 patch onto the skin every 72 hours remove old           patch. Fill 03/05/21 to start 03/07/21  Authorizing Provider: SANTOS ISLAS    HYDROcodone-acetaminophen (NORCO) 7.5-325 *30 tab*0        Sig: Take 0.5-1 tablets by mouth every 6 hours as needed           for severe pain  Authorizing Provider: SANTOS ISLAS MD  [facility]

## 2021-03-05 NOTE — TELEPHONE ENCOUNTER
Spoke to patient, notified that prescriptions were sent to preferred pharmacy.    Able to fill 03/05/21 and start 03/27/21      Lisette Pabon MA  [facility] [place]

## 2021-03-09 ENCOUNTER — OFFICE VISIT (OUTPATIENT)
Dept: FAMILY MEDICINE | Facility: CLINIC | Age: 61
End: 2021-03-09
Payer: COMMERCIAL

## 2021-03-09 VITALS
WEIGHT: 162 LBS | SYSTOLIC BLOOD PRESSURE: 130 MMHG | OXYGEN SATURATION: 98 % | HEART RATE: 76 BPM | RESPIRATION RATE: 16 BRPM | DIASTOLIC BLOOD PRESSURE: 72 MMHG | BODY MASS INDEX: 29.63 KG/M2 | TEMPERATURE: 97.7 F

## 2021-03-09 DIAGNOSIS — B37.31 CANDIDIASIS OF VAGINA: ICD-10-CM

## 2021-03-09 DIAGNOSIS — K21.9 GASTROESOPHAGEAL REFLUX DISEASE, UNSPECIFIED WHETHER ESOPHAGITIS PRESENT: ICD-10-CM

## 2021-03-09 DIAGNOSIS — L20.82 FLEXURAL ECZEMA: ICD-10-CM

## 2021-03-09 DIAGNOSIS — E11.65 TYPE 2 DIABETES MELLITUS WITH HYPERGLYCEMIA, WITHOUT LONG-TERM CURRENT USE OF INSULIN (H): Primary | ICD-10-CM

## 2021-03-09 LAB
CREAT UR-MCNC: 88 MG/DL
HBA1C MFR BLD: 8 % (ref 0–5.6)
MICROALBUMIN UR-MCNC: 7 MG/L
MICROALBUMIN/CREAT UR: 7.39 MG/G CR (ref 0–25)

## 2021-03-09 PROCEDURE — 83036 HEMOGLOBIN GLYCOSYLATED A1C: CPT | Performed by: PHYSICIAN ASSISTANT

## 2021-03-09 PROCEDURE — 99214 OFFICE O/P EST MOD 30 MIN: CPT | Performed by: PHYSICIAN ASSISTANT

## 2021-03-09 PROCEDURE — 36415 COLL VENOUS BLD VENIPUNCTURE: CPT | Performed by: PHYSICIAN ASSISTANT

## 2021-03-09 PROCEDURE — 82043 UR ALBUMIN QUANTITATIVE: CPT | Performed by: PHYSICIAN ASSISTANT

## 2021-03-09 RX ORDER — FLUCONAZOLE 150 MG/1
150 TABLET ORAL ONCE
Qty: 1 TABLET | Refills: 0 | Status: SHIPPED | OUTPATIENT
Start: 2021-03-09 | End: 2021-03-09

## 2021-03-09 RX ORDER — PIMECROLIMUS 10 MG/G
CREAM TOPICAL 2 TIMES DAILY
Qty: 60 G | Refills: 5 | Status: SHIPPED | OUTPATIENT
Start: 2021-03-09 | End: 2023-01-01

## 2021-03-09 NOTE — PATIENT INSTRUCTIONS
Continue to take the metformin, but make sure to take it consistently.  Start Trulicity 0.75mg once weekly - take with your vitamin D on Sundays.    Good moisturizers:  Vaseline, Vanicream, Aquaphor (Eucerin product) or Aveeno products.  Moisturizing daily is key in order to keep eczema from flaring.     Start using Elidel cream twice daily to help your eczema from faring. Continue to use the triamcinolone as needed for flares.

## 2021-03-09 NOTE — PROGRESS NOTES
"    Assessment & Plan     1. Type 2 diabetes mellitus with hyperglycemia, without long-term current use of insulin (H)    2. Flexural eczema    3. Candidiasis of vagina    4. Gastroesophageal reflux disease, unspecified whether esophagitis present        1) A1c has increased some. Continue Metformin, no change. Start Trulicity 0.75mg once weekly. Recheck A1c in 3 months.    2) We discussed common causes of eczema flares and I stressed the importance of moisturizing. Will start Elidel cream BID to see if this helps prevent flares.     3) Diflucan x1 dose. Follow up if symptoms fail to improve or worsen.    4) Med refilled.    Patient Instructions   Continue to take the metformin, but make sure to take it consistently.  Start Trulicity 0.75mg once weekly - take with your vitamin D on Sundays.    Good moisturizers:  Vaseline, Vanicream, Aquaphor (Eucerin product) or Aveeno products.  Moisturizing daily is key in order to keep eczema from flaring.     Start using Elidel cream twice daily to help your eczema from faring. Continue to use the triamcinolone as needed for flares.       Review of the result(s) of each unique test - A1c         BMI:   Estimated body mass index is 29.63 kg/m  as calculated from the following:    Height as of 2/25/21: 1.575 m (5' 2\").    Weight as of this encounter: 73.5 kg (162 lb).       Return in about 3 months (around 6/9/2021) for diabetes follow up, repeat A1c - 40 mins.    Katerina Judd PA-C  [facility] [place] is a 60 year old who presents for the following health issues     HPI     Diabetes Follow-up    How often are you checking your blood sugar? A few times a week  What time of day are you checking your blood sugars (select all that apply)?  Before meals and Morning  Have you had any blood sugars above 200?  Yes 287  Have you had any blood sugars below 70?  No    What symptoms do you notice when your blood sugar is low?  Shaky    What " concerns do you have today about your diabetes? Other: not under control      Do you have any of these symptoms? (Select all that apply)  No numbness or tingling in feet.  No redness, sores or blisters on feet.  No complaints of excessive thirst.  No reports of blurry vision.  No significant changes to weight.     Misses Metformin 1-2 days per week due to vomiting related to her Meniere's       BP Readings from Last 2 Encounters:   03/09/21 130/72   02/18/21 (!) 147/96     Hemoglobin A1C (%)   Date Value   03/09/2021 8.0 (H)   12/09/2020 7.5 (H)     LDL Cholesterol Calculated (mg/dL)   Date Value   12/09/2020 129 (H)   08/14/2020 123 (H)                 How many servings of fruits and vegetables do you eat daily?  2-3    On average, how many sweetened beverages do you drink each day (Examples: soda, juice, sweet tea, etc.  Do NOT count diet or artificially sweetened beverages)?   0    How many days per week do you exercise enough to make your heart beat faster? 7    How many minutes a day do you exercise enough to make your heart beat faster? 9 or less  How many days per week do you miss taking your medication? 2    What makes it hard for you to take your medications?  remembering to take      Diagnosed with eczema  Very itchy, red rash that affects her hands mostly  Moisturizes numerous times per day, recently tried a cuticle oil that seems to help  The last biologic she was on for her RA helped her eczema, but not her RA      Review of Systems   Constitutional, skin, endocrine systems are negative, except as otherwise noted.      Objective    /72   Pulse 76   Temp 97.7  F (36.5  C) (Tympanic)   Resp 16   Wt 73.5 kg (162 lb)   SpO2 98%   BMI 29.63 kg/m    Body mass index is 29.63 kg/m .  Physical Exam   GENERAL: healthy, alert and no distress  MS: no gross musculoskeletal defects noted, no edema  SKIN: no suspicious lesions or rashes  NEURO: Normal strength and tone, mentation intact and speech  normal  PSYCH: mentation appears normal, affect normal/bright    Results for orders placed or performed in visit on 03/09/21 (from the past 24 hour(s))   HEMOGLOBIN A1C   Result Value Ref Range    Hemoglobin A1C 8.0 (H) 0 - 5.6 %

## 2021-03-16 ENCOUNTER — INFUSION THERAPY VISIT (OUTPATIENT)
Dept: INFUSION THERAPY | Facility: CLINIC | Age: 61
End: 2021-03-16
Payer: COMMERCIAL

## 2021-03-16 VITALS
WEIGHT: 160.1 LBS | SYSTOLIC BLOOD PRESSURE: 128 MMHG | TEMPERATURE: 98.3 F | HEART RATE: 85 BPM | DIASTOLIC BLOOD PRESSURE: 65 MMHG | OXYGEN SATURATION: 94 % | RESPIRATION RATE: 16 BRPM | BODY MASS INDEX: 29.28 KG/M2

## 2021-03-16 DIAGNOSIS — M85.89 OSTEOPENIA OF MULTIPLE SITES: Primary | ICD-10-CM

## 2021-03-16 DIAGNOSIS — M06.09 RHEUMATOID ARTHRITIS OF MULTIPLE SITES WITH NEGATIVE RHEUMATOID FACTOR (H): Primary | ICD-10-CM

## 2021-03-16 DIAGNOSIS — Z78.0 POST-MENOPAUSAL: ICD-10-CM

## 2021-03-16 DIAGNOSIS — Z92.29 HISTORY OF BISPHOSPHONATE THERAPY: ICD-10-CM

## 2021-03-16 DIAGNOSIS — M85.89 OSTEOPENIA OF MULTIPLE SITES: ICD-10-CM

## 2021-03-16 LAB
CALCIUM SERPL-MCNC: 10.2 MG/DL (ref 8.5–10.1)
CREAT SERPL-MCNC: 0.83 MG/DL (ref 0.52–1.04)
GFR SERPL CREATININE-BSD FRML MDRD: 78 ML/MIN/{1.73_M2}

## 2021-03-16 PROCEDURE — 96367 TX/PROPH/DG ADDL SEQ IV INF: CPT | Performed by: INTERNAL MEDICINE

## 2021-03-16 PROCEDURE — 82310 ASSAY OF CALCIUM: CPT | Performed by: INTERNAL MEDICINE

## 2021-03-16 PROCEDURE — 96365 THER/PROPH/DIAG IV INF INIT: CPT | Performed by: INTERNAL MEDICINE

## 2021-03-16 PROCEDURE — 99207 PR NO CHARGE LOS: CPT

## 2021-03-16 PROCEDURE — 82565 ASSAY OF CREATININE: CPT | Performed by: INTERNAL MEDICINE

## 2021-03-16 PROCEDURE — 36415 COLL VENOUS BLD VENIPUNCTURE: CPT | Performed by: INTERNAL MEDICINE

## 2021-03-16 RX ORDER — ZOLEDRONIC ACID 5 MG/100ML
5 INJECTION, SOLUTION INTRAVENOUS ONCE
Status: CANCELLED
Start: 2021-03-16 | End: 2021-03-16

## 2021-03-16 RX ORDER — HEPARIN SODIUM (PORCINE) LOCK FLUSH IV SOLN 100 UNIT/ML 100 UNIT/ML
5 SOLUTION INTRAVENOUS
Status: CANCELLED | OUTPATIENT
Start: 2021-04-13

## 2021-03-16 RX ORDER — HEPARIN SODIUM (PORCINE) LOCK FLUSH IV SOLN 100 UNIT/ML 100 UNIT/ML
5 SOLUTION INTRAVENOUS
Status: CANCELLED | OUTPATIENT
Start: 2021-03-16

## 2021-03-16 RX ORDER — HEPARIN SODIUM,PORCINE 10 UNIT/ML
5 VIAL (ML) INTRAVENOUS
Status: CANCELLED | OUTPATIENT
Start: 2021-04-13

## 2021-03-16 RX ORDER — HEPARIN SODIUM,PORCINE 10 UNIT/ML
5 VIAL (ML) INTRAVENOUS
Status: CANCELLED | OUTPATIENT
Start: 2021-03-16

## 2021-03-16 RX ORDER — ZOLEDRONIC ACID 5 MG/100ML
5 INJECTION, SOLUTION INTRAVENOUS ONCE
Status: COMPLETED | OUTPATIENT
Start: 2021-03-16 | End: 2021-03-16

## 2021-03-16 RX ADMIN — ZOLEDRONIC ACID 5 MG: 0.05 INJECTION, SOLUTION INTRAVENOUS at 12:03

## 2021-03-16 RX ADMIN — Medication 250 ML: at 12:03

## 2021-03-16 ASSESSMENT — PAIN SCALES - GENERAL: PAINLEVEL: MILD PAIN (2)

## 2021-03-16 NOTE — PROGRESS NOTES
Infusion Nursing Note:  Betsey Vanessa presents today for Orencia/Reclast.    Patient seen by provider today: No   present during visit today: Not Applicable.    Note: N/A.  Patient did meet criteria for an asymptomatic covid-19 PCR test in infusion today. Patient declined the covid-19 test.    Intravenous Access:  Peripheral IV placed.    Treatment Conditions:  Biological Infusion Checklist:  ~~~ NOTE: If the patient answers yes to any of the questions below, hold the infusion and contact ordering provider or on-call provider.    1. Have you recently had an elevated temperature, fever, chills, productive cough, coughing for 3 weeks or longer or hemoptysis, abnormal vital signs, night sweats,  chest pain or have you noticed a decrease in your appetite, unexplained weight loss or fatigue? No  2. Do you have any open wounds or new incisions? No  3. Do you have any recent or upcoming hospitalizations, surgeries or dental procedures? No  4. Do you currently have or recently have had any signs of illness or infection or are you on any antibiotics? No  5. Have you had any new, sudden or worsening abdominal pain? No  6. Have you or anyone in your household received a live vaccination in the past 4 weeks? Please note:  No live vaccines while on biologic/chemotherapy until 6 months after the last treatment.  Patient can receive the flu vaccine (shot only) and the pneumovax.  It is optimal for the patient to get these vaccines mid cycle, but they can be given at any time as long as it is not on the day of the infusion. No  7. Have you recently been diagnosed with any new nervous system diseases (ie. Multiple sclerosis, Guillain [place], seizures, neurological changes) or cancer diagnosis? No  8. Are you on any form of radiation or chemotherapy? No  9. Are you pregnant or breast feeding or do you have plans of pregnancy in the future? No  10. Have you been having any signs of worsening depression or suicidal ideations?   (benlysta only) No  11. Have there been any other new onset medical symptoms? No      Post Infusion Assessment:  Patient tolerated infusion without incident.  Blood return noted pre and post infusion.  Site patent and intact, free from redness, edema or discomfort.  No evidence of extravasations.  Access discontinued per protocol.       Discharge Plan:   Patient will return 4/13/2021 for next appointment.   Patient discharged in stable condition accompanied by: self.  Departure Mode: Ambulatory.    Millicent Hernandez RN-BSN, PHN, OCN  [facility]

## 2021-03-18 ENCOUNTER — OFFICE VISIT (OUTPATIENT)
Dept: AUDIOLOGY | Facility: CLINIC | Age: 61
End: 2021-03-18
Payer: COMMERCIAL

## 2021-03-18 DIAGNOSIS — H90.A31 MIXED CONDUCTIVE AND SENSORINEURAL HEARING LOSS OF RIGHT EAR WITH RESTRICTED HEARING OF LEFT EAR: Primary | ICD-10-CM

## 2021-03-18 PROCEDURE — 92700 UNLISTED ORL SERVICE/PX: CPT | Performed by: AUDIOLOGIST

## 2021-03-18 NOTE — PROGRESS NOTES
AUDIOLOGY REPORT    BACKGROUND INFORMATION: Betsey Vanessa was seen in the Audiology Clinic at [facility] on 3/18/2021 for follow-up.  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  She has a right eardrum perforation.  She was implanted with a right osseointegrated device reportedly in 2018 in [place]. She currently uses a Right Cochlear BAHA 5 Super Power device which has continual feedback issues. She consistently uses her remote microphone which she reports helps her hear better. She also has questions regarding her old Phonak BiCROS device.    She was accompanied to today's appointment by her daughter who expressed frustration regarding the Super Power device.    TEST RESULTS AND PROCEDURES:   The Super Power devices does emit feedback. The only wear that she is able to wear the device consistently is to have the small piece on the abutment (right side of head) and a longer cord which wraps behind the head, the processor portion of the device needs to be worn upside on the left ear. At the previous appointment, other options such as using a Koala clip to get the device of the ear was discussed. Another option was to try and get authorization for a Power device. Both of these options had been offered at the appointment in August as well. The patient and her daughter would like to try the Koala clip first to see if things improve with the processor worn on the intended side of her head. She was offered a loaner Power BAHA 5 to wear in the meantime but she did not wish to use a loaner and will continue with her personal device as is until the next appointment.    The patient has a Phonak BiCROS system that is about 8 years old. The left transmitter was chewed up by her puppy. It was reviewed that the cost of the repair would be an out of pocket cost to get it fixed.  She was quoted $350 to have it repaired. It was also reviewed that given the age of the device/how damaged it was, the clinic does not guarantee that the  will be able to fix it. She and her daughter expressed understanding and requested it be sent in for repair. The hearing aid portion will be kept in clinic until her next appointment.      SUMMARY AND RECOMMENDATIONS: A Cochlear BAHA Super Power 5 follow-up was performed today. The patient would like to try the koala clip and so one will be ordered for her to try. The patient's transmitter will be sent to the  to see if it can be repaired. The patient is schedule to follow-up in April for a hearing test and to try to manage the feedback with the koala clip as well as pick-up the repaired hearing aid. Call this clinic with questions regarding today s visit.    Alee Lutz  Audiologist  [place] License  #[id number]

## 2021-03-19 ENCOUNTER — IMMUNIZATION (OUTPATIENT)
Dept: NURSING | Facility: CLINIC | Age: 61
End: 2021-03-19
Payer: COMMERCIAL

## 2021-03-19 ENCOUNTER — VIRTUAL VISIT (OUTPATIENT)
Dept: PALLIATIVE MEDICINE | Facility: CLINIC | Age: 61
End: 2021-03-19
Payer: COMMERCIAL

## 2021-03-19 DIAGNOSIS — M47.819 FACET ARTHROPATHY: ICD-10-CM

## 2021-03-19 DIAGNOSIS — M06.09 RHEUMATOID ARTHRITIS OF MULTIPLE SITES WITHOUT RHEUMATOID FACTOR (H): ICD-10-CM

## 2021-03-19 DIAGNOSIS — G89.4 CHRONIC PAIN SYNDROME: Primary | ICD-10-CM

## 2021-03-19 DIAGNOSIS — M54.2 CERVICALGIA: ICD-10-CM

## 2021-03-19 DIAGNOSIS — M79.7 FIBROMYALGIA: ICD-10-CM

## 2021-03-19 PROCEDURE — 91300 PR COVID VAC PFIZER DIL RECON 30 MCG/0.3 ML IM: CPT

## 2021-03-19 PROCEDURE — 97530 THERAPEUTIC ACTIVITIES: CPT | Mod: GP | Performed by: PHYSICAL THERAPIST

## 2021-03-19 PROCEDURE — 0001A PR COVID VAC PFIZER DIL RECON 30 MCG/0.3 ML IM: CPT

## 2021-03-19 NOTE — PROGRESS NOTES
"Patient Name: Betsey Vanessa      YOB: 1960     Medical Record Number: [id number]  Diagnosis:    Chronic pain syndrome  Rheumatoid arthritis of multiple sites without rheumatoid factor (H)  Fibromyalgia  Facet arthropathy  Cervicalgia     The patient has been notified of the following:  \"This virtual visit will be conducted between you and your provider.  We have found that certain health care needs can be provided without the need for physical presence.  This service lets us provide the care you need with a virtual visit.\"    Due to external, as well as internal [facility] management of the COVID 19 Virus the patient was not seen in our clinic.  As a substitution, we implemented a virtual visit to manage this patient's condition utilizing the Live Matrix virtual visit platform.  The provider reviewed the patient's chart and spoke with the patient to determine the following telemedicine visit is appropriate and effective for the patient's care.    The following type of visit was completed: Technical difficulty so converted to telephone audio visit  Video Visit:  The Live Matrix platform uses a synchronous HIPAA compliant video stream for this encounter.  Patient has given verbal consent for video visit? Yes    Visit Info Length of Visit: 45 minutes  Arrived: On Time   Exercise/Activity   Activity/Self Cares:   Breathing/Relaxation/guided imagery 2-3x daily x 5'  Walking program 10' x 3-4 daily; uses cane or walker sometimes  HEP: performed seated arm exercises x 3'; pt will look for gentle seated yoga or fitness youtube exercise videos, hold on further HEP until completed vestibular therapy  Mini-breaks: independent  Pacing: independent  Sensory calming / Pain Flare Plan: topical medication, heat, medical marijuana, quiet place, pet therapy       Patient's goals for physical therapy: improve fitness level and reduce pain level and medication use    Notes: Overall pain and fatigue are better at 3-4/10; " "Walking tolerance improved to 10' 3-4x/day; housework tolerance improved to 15-20'  Still having trouble with nausea and dizziness and is planning to start vestibular PT.  Has done yoga/deep breathing in the past.  Close eyes; concentrate on \"Mother Earth\"; take some nice deep breaths and slow breathing down; continue for several minutes.  Performed guided imagery exercise.   Pt unable to find \"yoga box\" that she has used in the past for ideas of daily self care     Demonstrates/Verbalizes Technique: 5 (1= poor technique-difficulty performing exercises,significant cues required; 5= good technique-performs exercises without cues)  Body Awareness: 3 (1=low awareness; 5=high awareness)  Posture/Stability: 3 (1= poor posture, stability; 5= good posture, stability)   Motivational Level:   Ask questions, Eager to learn and Cooperative Participation:  Full   Patient Satisfaction:  satisfied   Response to Teaching:   enthusiastic, cooperative and asked questions  Factors that affect learning: None   Plan of Care  Continue with current plan of care: Focus next session including recheck in one month after completion of vestibular PT; progress HEP as needed; monitor self cares   Therapist: Renae Romero, PT                  Date: 3/19/2021        Video-Visit Details     Type of service:  Video Visit  Video Start Time: 9:30 am  Video End Time: 10:15 am  Originating Location (pt. Location): Home  Distant Location (provider location):  [place] [facility]   Platform used for Video Visit: Linksify/Sovicell                                                                                      "

## 2021-03-22 ENCOUNTER — TELEPHONE (OUTPATIENT)
Dept: OTOLARYNGOLOGY | Facility: CLINIC | Age: 61
End: 2021-03-22

## 2021-03-22 DIAGNOSIS — H81.02 MENIERE'S DISEASE OF LEFT EAR: Primary | ICD-10-CM

## 2021-03-22 DIAGNOSIS — H90.3 SENSORINEURAL HEARING LOSS (SNHL) OF BOTH EARS: ICD-10-CM

## 2021-03-22 NOTE — TELEPHONE ENCOUNTER
Called patient to let her know that balance testing showed a weakness in her left ear, and she may benefit from vestibular / balance therapy.  She said she was interested in it and gave me a phone number to contact so that the order could be faxed over.      Also requested that a hearing test referral be ordered so that she can have her hearing tested.      All questions were answered and concerns were addressed.    Vianney Escoto RN

## 2021-04-05 DIAGNOSIS — M06.09 RHEUMATOID ARTHRITIS OF MULTIPLE SITES WITHOUT RHEUMATOID FACTOR (H): ICD-10-CM

## 2021-04-05 DIAGNOSIS — F11.20 CONTINUOUS OPIOID DEPENDENCE (H): ICD-10-CM

## 2021-04-05 DIAGNOSIS — G89.4 CHRONIC PAIN SYNDROME: ICD-10-CM

## 2021-04-05 RX ORDER — FENTANYL 12.5 UG/1
1 PATCH TRANSDERMAL
Qty: 10 PATCH | Refills: 0 | Status: SHIPPED | OUTPATIENT
Start: 2021-04-05 | End: 2021-05-04

## 2021-04-05 RX ORDER — HYDROCODONE BITARTRATE AND ACETAMINOPHEN 7.5; 325 MG/1; MG/1
.5-1 TABLET ORAL EVERY 6 HOURS PRN
Qty: 30 TABLET | Refills: 0 | Status: SHIPPED | OUTPATIENT
Start: 2021-04-05 | End: 2021-05-04

## 2021-04-05 NOTE — TELEPHONE ENCOUNTER
Reason for call:  Medication   If this is a refill request, has the caller requested the refill from the pharmacy already? No  Will the patient be using a [facility]? No  Name of the pharmacy and phone number for the current request: [facility] - [place], [place] - [address] [address]    Name of the medication requested:   fentaNYL (DURAGESIC) 12 mcg/hr 72 hr patch  HYDROcodone-acetaminophen (NORCO) 7.5-325 MG per tablet    Other request:     Phone number to reach patient:  Cell number on file:    Telephone Information:   Mobile [phone]       Best Time:      Can we leave a detailed message on this number?  YES    Travel screening: Not Applicable

## 2021-04-05 NOTE — TELEPHONE ENCOUNTER
Received call from patient requesting refill(s) of fentaNYL (DURAGESIC) 12 mcg/hr 72 hr patch  HYDROcodone-acetaminophen (NORCO) 7.5-325 MG per tablet     Last dispensed from pharmacy on fentanyl on 3/5/21, norco on 3/4/21    Patient's last office/virtual visit by prescribing provider on 2/18/21  Next office/virtual appointment scheduled for 4/14/21    Last urine drug screen date 10/12/20  Current opioid agreement on file (completed within the last year) Yes Date of opioid agreement: 10/12/20    E-prescribe to  [facility] [address] [place], [place] - [address] [address]    Will route to nursing [place] for review and preparation of prescription(s).

## 2021-04-05 NOTE — TELEPHONE ENCOUNTER
Script Eprescribed to pharmacy  [place] Prescription Monitoring Program checked    Will send this to MA team to notify patient.    Signed Prescriptions:                        Disp   Refills    fentaNYL (DURAGESIC) 12 mcg/hr 72 hr patch 10 pat*0        Sig: Place 1 patch onto the skin every 72 hours remove old           patch. Fill 04/05/21 to start 04/06/21  Authorizing Provider: SANTOS ISLAS    HYDROcodone-acetaminophen (NORCO) 7.5-325 *30 tab*0        Sig: Take 0.5-1 tablets by mouth every 6 hours as needed           for severe pain  Authorizing Provider: SANTOS ISLAS MD  [facility]

## 2021-04-06 NOTE — TELEPHONE ENCOUNTER
[employer] message sent with Rx approval from provider.  Felicia  Pain Clinic Management Team

## 2021-04-09 ENCOUNTER — TELEPHONE (OUTPATIENT)
Dept: AUDIOLOGY | Facility: CLINIC | Age: 61
End: 2021-04-09

## 2021-04-09 ENCOUNTER — TELEPHONE (OUTPATIENT)
Dept: RHEUMATOLOGY | Facility: CLINIC | Age: 61
End: 2021-04-09

## 2021-04-09 ENCOUNTER — IMMUNIZATION (OUTPATIENT)
Dept: NURSING | Facility: CLINIC | Age: 61
End: 2021-04-09
Attending: FAMILY MEDICINE
Payer: COMMERCIAL

## 2021-04-09 PROCEDURE — 91300 PR COVID VAC PFIZER DIL RECON 30 MCG/0.3 ML IM: CPT

## 2021-04-09 PROCEDURE — 0002A PR COVID VAC PFIZER DIL RECON 30 MCG/0.3 ML IM: CPT

## 2021-04-09 NOTE — TELEPHONE ENCOUNTER
Spoke with Betsey, let her know that the device was able to be fixed and so it is ready to go for the appointments that are schedule on 4/19. She expressed understanding.    Alee Lutz  Audiologist  [place] License  #[id number]

## 2021-04-09 NOTE — TELEPHONE ENCOUNTER
[facility] Call Center    Phone Message    May a detailed message be left on voicemail: yes     Reason for Call: Medication Question or concern regarding medication   Prescription Clarification  Name of Medication: Orencia  Prescribing Provider: Dr. Carrillo      What on the order needs clarification? Patient is getting the Covid vaccine today and wants to know if she can still have the Orencia infusion next week.            Action Taken: Other: [facility]    Travel Screening: Not Applicable

## 2021-04-09 NOTE — TELEPHONE ENCOUNTER
[facility] Call Center    Phone Message    May a detailed message be left on voicemail: yes     Reason for Call: Other: Pt calling in she brought in her hearing aids and she would like to know if they are fixed yet, please call back to discuss further       Action Taken: Message routed to:  [facility] AUDIOLOGY    Travel Screening: Not Applicable

## 2021-04-09 NOTE — TELEPHONE ENCOUNTER
Called patient and discussed Dr. Carrillo's recommendations below. Patient verbalized understanding and as no questions.    Based on our current understanding (and this may change over time as we learn more), the following adjustments are recommended around the time of COVID-19 vaccination:  - Orencia (abatacept) intravenous (IV): the first vaccination should occur 4 weeks after Orencia infusion and then postpone the subsequent Orencia infusion by 1 week; no interruption is needed around the second COVID19 vaccine dose.    Jaime SOLOMON RN....4/9/2021 12:36 PM

## 2021-04-13 ENCOUNTER — INFUSION THERAPY VISIT (OUTPATIENT)
Dept: INFUSION THERAPY | Facility: CLINIC | Age: 61
End: 2021-04-13
Payer: COMMERCIAL

## 2021-04-13 VITALS
WEIGHT: 161.9 LBS | TEMPERATURE: 97.7 F | BODY MASS INDEX: 29.61 KG/M2 | SYSTOLIC BLOOD PRESSURE: 121 MMHG | HEART RATE: 82 BPM | OXYGEN SATURATION: 98 % | RESPIRATION RATE: 14 BRPM | DIASTOLIC BLOOD PRESSURE: 70 MMHG

## 2021-04-13 DIAGNOSIS — M06.09 RHEUMATOID ARTHRITIS OF MULTIPLE SITES WITH NEGATIVE RHEUMATOID FACTOR (H): Primary | ICD-10-CM

## 2021-04-13 PROCEDURE — 99207 PR NO CHARGE LOS: CPT

## 2021-04-13 PROCEDURE — 96365 THER/PROPH/DIAG IV INF INIT: CPT | Performed by: INTERNAL MEDICINE

## 2021-04-13 RX ORDER — HEPARIN SODIUM (PORCINE) LOCK FLUSH IV SOLN 100 UNIT/ML 100 UNIT/ML
5 SOLUTION INTRAVENOUS
Status: CANCELLED | OUTPATIENT
Start: 2021-05-11

## 2021-04-13 RX ORDER — HEPARIN SODIUM,PORCINE 10 UNIT/ML
5 VIAL (ML) INTRAVENOUS
Status: CANCELLED | OUTPATIENT
Start: 2021-05-11

## 2021-04-13 RX ADMIN — Medication 250 ML: at 11:50

## 2021-04-13 NOTE — PROGRESS NOTES
Infusion Nursing Note:  Betseyfrancisco javier Vanessa presents today for Orencia infusion.    Patient seen by provider today: No   present during visit today: Not Applicable.    Note: N/A.    Intravenous Access:  Peripheral IV placed.    Treatment Conditions:  Biological Infusion Checklist:  ~~~ NOTE: If the patient answers yes to any of the questions below, hold the infusion and contact ordering provider or on-call provider.    1. Have you recently had an elevated temperature, fever, chills, productive cough, coughing for 3 weeks or longer or hemoptysis, abnormal vital signs, night sweats,  chest pain or have you noticed a decrease in your appetite, unexplained weight loss or fatigue? Yes, Had second Covid vaccine on 4/9/21.  Had fevers on 4/10/21 and 4/11/21.  2. Do you have any open wounds or new incisions? No  3. Do you have any recent or upcoming hospitalizations, surgeries or dental procedures? No  4. Do you currently have or recently have had any signs of illness or infection or are you on any antibiotics? No  5. Have you had any new, sudden or worsening abdominal pain? No  6. Have you or anyone in your household received a live vaccination in the past 4 weeks? Please note:  No live vaccines while on biologic/chemotherapy until 6 months after the last treatment.  Patient can receive the flu vaccine (shot only) and the pneumovax.  It is optimal for the patient to get these vaccines mid cycle, but they can be given at any time as long as it is not on the day of the infusion. No  7. Have you recently been diagnosed with any new nervous system diseases (ie. Multiple sclerosis, Guillain [place], seizures, neurological changes) or cancer diagnosis? No  8. Are you on any form of radiation or chemotherapy? No  9. Are you pregnant or breast feeding or do you have plans of pregnancy in the future? No  10. Have there been any other new onset medical symptoms? No    Post Infusion Assessment:  Patient tolerated infusion without  incident.  Blood return noted pre and post infusion.  Site patent and intact, free from redness, edema or discomfort.  No evidence of extravasations.  Access discontinued per protocol.  Biologic Infusion Post Education: Call the triage nurse at your clinic or seek medical attention if you have chills and/or temperature greater than or equal to 100.5, uncontrolled nausea/vomiting, diarrhea, constipation, dizziness, shortness of breath, chest pain, heart palpitations, weakness or any other new or concerning symptoms, questions or concerns.  You cannot have any live virus vaccines prior to or during treatment or up to 6 months post infusion.  If you have an upcoming surgery, medical procedure or dental procedure during treatment, this should be discussed with your ordering physician and your surgeon/dentist.  If you are having any concerning symptom, if you are unsure if you should get your next infusion or wish to speak to a provider before your next infusion, please call your care coordinator or triage nurse at your clinic to notify them so we can adequately serve you.       Discharge Plan:   AVS to patient via [employer].  Patient will return 5/11/21 for next appointment.   Patient discharged in stable condition accompanied by: self.  Departure Mode: Ambulatory.    Fariba Quezada RN

## 2021-04-14 ENCOUNTER — OFFICE VISIT (OUTPATIENT)
Dept: PALLIATIVE MEDICINE | Facility: CLINIC | Age: 61
End: 2021-04-14
Payer: COMMERCIAL

## 2021-04-14 VITALS — DIASTOLIC BLOOD PRESSURE: 68 MMHG | HEART RATE: 82 BPM | SYSTOLIC BLOOD PRESSURE: 123 MMHG

## 2021-04-14 DIAGNOSIS — M79.641 BILATERAL HAND PAIN: ICD-10-CM

## 2021-04-14 DIAGNOSIS — M25.562 CHRONIC PAIN OF BOTH KNEES: ICD-10-CM

## 2021-04-14 DIAGNOSIS — G89.29 CHRONIC PAIN OF BOTH KNEES: ICD-10-CM

## 2021-04-14 DIAGNOSIS — M25.561 CHRONIC PAIN OF BOTH KNEES: ICD-10-CM

## 2021-04-14 DIAGNOSIS — M47.816 LUMBAR FACET ARTHROPATHY: ICD-10-CM

## 2021-04-14 DIAGNOSIS — M06.09 RHEUMATOID ARTHRITIS OF MULTIPLE SITES WITHOUT RHEUMATOID FACTOR (H): ICD-10-CM

## 2021-04-14 DIAGNOSIS — M47.819 FACET ARTHROPATHY: Primary | ICD-10-CM

## 2021-04-14 DIAGNOSIS — M79.642 BILATERAL HAND PAIN: ICD-10-CM

## 2021-04-14 PROCEDURE — 99215 OFFICE O/P EST HI 40 MIN: CPT | Performed by: PSYCHIATRY & NEUROLOGY

## 2021-04-14 ASSESSMENT — PAIN SCALES - GENERAL: PAINLEVEL: SEVERE PAIN (7)

## 2021-04-14 NOTE — PATIENT INSTRUCTIONS
1. No changes to medications  2. Continue your vestibular therapy and the stretches we talked about today.  Call when done with your vestibular therapy so we can do PT for your neck.  3. Schedule hand therapy as you can.  4. Schedule with sports medicine.  To contact Ortho  to schedule, call [phone].   5. Schedule procedure for your back. [phone]  6. Schedule 2 months- virtual  ----------------------------------------------------------------  Clinic Number:  [phone]     Call with any questions about your care and for scheduling assistance.     Calls are returned Monday through Friday between 8 AM and 4:30 PM. We usually get back to you within 2 business days depending on the issue/request.    If we are prescribing your medications:    For opioid medication refills, call the clinic or send a Slinky message 7 days in advance.  Please include:    Name of requested medication    Name of the pharmacy.    For non-opioid medications, call your pharmacy directly to request a refill. Please allow 3-4 days to be processed.     Per [place] State Law:    All controlled substance prescriptions must be filled within 30 days of being written.      For those controlled substances allowing refills, pickup must occur within 30 days of last fill.      We believe regular attendance is key to your success in our program!      Any time you are unable to keep your appointment we ask that you call us at least 24 hours in advance to cancel.This will allow us to offer the appointment time to another patient.     Multiple missed appointments may lead to dismissal from the clinic.

## 2021-04-14 NOTE — PROGRESS NOTES
[facility]    Date of visit: 4/14/2021     Chief complaint:   Chief Complaint   Patient presents with     Pain       Interval history:  Betsey Vanessa was last seen by me on 2/18/21.      Recommendations/plan at the last visit included:  1. Physical Therapy: hand therapy, will schedule PT  2. Clinical Health Psychologist to address issues of relaxation, behavioral change, coping style, and other factors important to improvement.  She will call to schedule  3. Diagnostic Studies:  none  4. Medication Management:  Continue decreasing Norco as able. She will let us know use when calling in for refill  5. Further procedures recommended: none at this time.  Could do bilateral lumbar facet joint injections prn, depending on how long the last, would consider medial branch block/RFA   6. Recommendations to PCP: see above   7. Follow up: in person in [place]      Since her last visit, Betsey Vanessa reports:  -no significant changes to pain.  -she continues to have a lot of joint pain, including with the hands.  -was able to get back into rheumatology, starting new treatment.  She feels the Orencia works but wears off in the 3rd week.  She has pain in the knees bilaterally that is bothersome, and she also feels like her back is getting worse  -had steroid injections in the facet joints in Oct 2020, and feels that they are wearing off, would like to repeat.  -brought diary of use.  Most days using 0-1 tab.  Some rarer days using 2/day.  Currently getting 30/month and she feels that Is appropriate.  -she is still using the clonazepam. She is down from previous 2mg doses to 0.5mg. PDMP shows that 100 tabs lasted from 12/9 to 3/17    Pain scores:  Pain intensity on average is 7 on a scale of 0-10.     Current pain treatments:   Celebrex 100mg daily- lower dose   Fentanyl 12mcg/hr- has decreased in the past, concerned about coming down on that right now.  Norco 7.5/325- using  0-1day, rare 2.  #30/month working  Medical cannabis- helps but causes side effects like dizziness and dry mouth.  Diclofenac gel- helps   salanpas patch- over the counter  Emu oil    Clonazepam 0.5mg tabs- currently on taper with PCP  Narcan ordered    Previous medication treatments included:  Gabapentin - fast heartbeat, sweating  Cymbalta- hyperactivity  topamax- confusion  Amitriptyline- hyperactivity  Tramadol  darvocet    Other treatments have included:  Betsey Vanessa has been seen at a pain clinic in the past.    PT: hand therapy.- last about 3 years ago.  Acupuncture: no  TENs Unit: no  Injections:   Hand steroid injections-   Neck injections    Side Effects: none    Medications:  Current Outpatient Medications   Medication Sig Dispense Refill     alcohol swab prep pads Use to swab area of injection/rosie as directed 100 each 3     blood glucose (NO BRAND SPECIFIED) test strip Use to test blood sugar 2 times daily or as directed. To accompany: Blood Glucose Monitor Brands: per insurance. 100 strip 6     blood glucose calibration (NO BRAND SPECIFIED) solution Use to calibrate blood glucose monitor as needed as directed. To accompany: Blood Glucose Monitor Brands: per insurance. 1 Bottle 3     celecoxib (CELEBREX) 100 MG capsule Take 1 capsule (100 mg) by mouth 2 times daily 120 capsule 1     cetirizine (ZYRTEC) 10 MG tablet Take 1 tablet (10 mg) by mouth daily 90 tablet 3     cevimeline (EVOXAC) 30 MG capsule Take 1 capsule (30 mg) by mouth 3 times daily 270 capsule 3     clonazePAM (KLONOPIN) 0.5 MG tablet Take 1 tablet (0.5 mg) by mouth 2 times daily as needed for anxiety OK to take 1-2 tabs at night as needed for sleep. Should last about 90 days 100 tablet 1     diclofenac (VOLTAREN) 1 % topical gel Apply 2-4 g topically 4 times daily as needed for moderate pain 300 g 3     diclofenac (VOLTAREN) 1 % topical gel Place 2-4 g onto the skin 4 times daily . Max of 8g per dose. No more than 32g/day 100 g 3      docusate sodium (COLACE) 100 MG capsule Take 1 capsule (100 mg) by mouth 2 times daily 180 capsule 3     dulaglutide (TRULICITY) 0.75 MG/0.5ML pen Inject 0.75 mg Subcutaneous every 7 days 6 mL 0     escitalopram (LEXAPRO) 20 MG tablet Take 1 tablet (20 mg) by mouth daily 90 tablet 1     fentaNYL (DURAGESIC) 12 mcg/hr 72 hr patch Place 1 patch onto the skin every 72 hours remove old patch. Fill 04/05/21 to start 04/06/21 10 patch 0     fluticasone (FLONASE) 50 MCG/ACT nasal spray Spray 1-2 sprays into both nostrils daily 16 g 11     fluvastatin (LESCOL) 20 MG capsule Take 1 capsule (20 mg) by mouth At Bedtime 90 capsule 1     glucose blood VI test strips strip daily       hydroxychloroquine (PLAQUENIL) 200 MG tablet Hydroxychloroquine 200mg daily; and an additional 200mg every other day. 135 tablet 1     hydrOXYzine (ATARAX) 25 MG tablet Take 1 tablet (25 mg) by mouth 3 times daily as needed for itching 40 tablet 0     lifitegrast (XIIDRA) 5 % opthalmic solution Place 1 drop into both eyes 2 times daily 24 each 6     medical cannabis (Patient's own supply) Take 1 Dose by mouth See Admin Instructions (The purpose of this order is to document that the patient reports taking medical cannabis.  This is not a prescription, and is not used to certify that the patient has a qualifying medical condition.)       metFORMIN (GLUCOPHAGE) 500 MG tablet Take 2 tablets (1,000 mg) by mouth 2 times daily (with meals) 1000 mg 2 x a day 360 tablet 3     metroNIDAZOLE (METROGEL) 0.75 % gel Apply to face daily       nystatin (MYCOSTATIN) 097769 UNIT/GM external powder Apply topically 2 times daily as needed 2 x daily PRN for rash 60 g 4     omeprazole (PRILOSEC) 20 MG DR capsule Take 1 capsule (20 mg) by mouth 2 times daily 180 capsule 3     pimecrolimus (ELIDEL) 1 % external cream Apply topically 2 times daily - use on back of neck and hands 60 g 5     potassium chloride ER (KLOR-CON M) 10 MEQ CR tablet Take 1 tablet (10 mEq) by mouth 2  times daily 20 mEq daily Take by mouth 2 times daily 20 mEq daily 180 tablet 3     PROAIR  (90 Base) MCG/ACT inhaler INHALE 2 PUFFS BY MOUTH EVERY 6 HOURS AS NEEDED FOR WHEEZING       promethazine (PHENERGAN) 25 MG suppository Place 25 mg rectally every 6 hours as needed For nusea       promethazine (PHENERGAN) 25 MG tablet Take 1 tablet (25 mg) by mouth every 6 hours as needed for nausea or other (Meniere's) For nusea 90 tablet 0     psyllium (METAMUCIL) 58.6 % POWD Take by mouth daily PRN       thin (NO BRAND SPECIFIED) lancets Use to test blood sugar 2 times daily or as directed. To accompany: Blood Glucose Monitor Brands: per insurance. 1 each 3     triamterene-HCTZ (DYAZIDE) 37.5-25 MG capsule Take 1 capsule by mouth daily 1 capsule every morning1 capsule every morning 90 capsule 3     vitamin D2 (ERGOCALCIFEROL) 73659 units (1250 mcg) capsule Take 1 capsule (50,000 Units) by mouth once a week 12 capsule 2     EPINEPHrine (ANY BX GENERIC EQUIV) 0.3 MG/0.3ML injection 2-pack Inject 0.3 mLs (0.3 mg) into the muscle as needed for anaphylaxis (Patient not taking: Reported on 4/14/2021) 2 each 0     EPINEPHrine 0.3 MG/0.3ML SOLN PRN for Anaphylaxis       HYDROcodone-acetaminophen (NORCO) 7.5-325 MG per tablet Take 0.5-1 tablets by mouth every 6 hours as needed for severe pain 30 tablet 0     naloxone (NARCAN) 4 MG/0.1ML nasal spray Spray 1 spray (4 mg) into one nostril alternating nostrils once as needed for opioid reversal every 2-3 minutes until assistance arrives (Patient not taking: Reported on 4/14/2021) 0.2 mL 1     STATIN NOT PRESCRIBED (INTENTIONAL) Please choose reason not prescribed, below       UNABLE TO FIND MEDICATION NAME: Tumeric 1000 mg every day for pain         Medical History: any changes in medical history since they were last seen? None    Physical Exam:  /68   Pulse 82   Awake, alert   Antalgic gait, uses cane.  Noted arthritis in MCP, PIP joints in hands.  Pain with range of  motion of knee.  Tenderness along joints  Pain with extension of lumbar spine    Review of Systems:  The 14 system ROS was reviewed from the intake questionnaire, and is positive for: dizziness, headache, itching, v[place]ing, stiffness, joint pain, arthritis  Any bowel or bladder problems: none  Mood: depression, anxiety, stress      Assessment:   1. Rheumatoid arthritis  2. fibromyalgia  3. Sjogren's Syndrome  4. Meniere's disease with intermittent vertigo and hearing loss  5. Type 2 diabetes      Plan:  1. Physical Therapy: she is scheduled again for PT with Renae. Advised she can also schedule hand therapy  2. Sports med referral for bilateral knee pain- likely osteoarthritis compoent.  3. Clinical Health Psychologist to address issues of relaxation, behavioral change, coping style, and other factors important to improvement.  prn  4. Diagnostic Studies:  none  5. Medication Management:    1. No changes to opioids- have made number reflect use.  2. We have discussed opioids/benzos. Recommended that she continue to work on lowering the clonazepam use. She describes some of it is for her M[place].  6. Further procedures recommended: given inflammation and long lasting nature, will repeat the facet joint injections for the low back.  7. Recommendations to PCP: none  8. Follow up: 2 months, virtual    40 minutes spent on the date of encounter doing chart review, history, and exam documentation and further activities as noted above.     Bailee Houser MD

## 2021-04-15 ENCOUNTER — TELEPHONE (OUTPATIENT)
Dept: PALLIATIVE MEDICINE | Facility: CLINIC | Age: 61
End: 2021-04-15

## 2021-04-15 NOTE — TELEPHONE ENCOUNTER
Screening Questions for Radiology Injections:    Injection to be done at which interventional clinic site? [facility] [place]    If [facility] location, tell patient that this procedure requires a COVID-19 lab test be done within 4 days of the procedure. Would you still like to move forward with scheduling the procedure?  Not Applicable   If YES, let patient know that someone will call them to schedule the COVID-19 test and that they will only receive a call back if the result is positive. Route to nursing to enter order.     Instruct patient to arrive as directed prior to the scheduled appointment time:    [place] minutes before      [place]: 30 minutes before; if IV needed 1 hour before     Procedure ordered by Corrina    Procedure ordered? bilateral L4-5 and L5-S1 facet joint injections      Transforaminal Cervical JOHNATHAN - no pain provider currently performing    As a reminder, receiving steroids can decrease your body's ability to fight infection.   Would you still like to move forward with scheduling the injection?  Yes    What insurance would patient like us to bill for this procedure? [employer]      Worker's comp or MVA (motor vehicle accident) -Any injection DO NOT SCHEDULE and route to Shena Young.      [employer] insurance - For SI joint injections, DO NOT SCHEDULE and route Shena Young.       ALL BCBS, [employer] and [employer]-Route to Shena for review DO NOT SCHEDULE      IF SCHEDULING IN [place] AND NEEDS A PA, IT IS OKAY TO SCHEDULE. [place] HANDLES THEIR OWN PA'S AFTER THE PATIENT IS SCHEDULED. PLEASE SCHEDULE AT LEAST 1 WEEK OUT SO A PA CAN BE OBTAINED.    Any chance of pregnancy? NO   If YES, do NOT schedule and route to RN pool    Is an  needed? No     Patient has a drive home? (mandatory) YES: INFORMED    Is patient taking any blood thinners (i.e. plavix, coumadin, jantoven, warfarin, heparin, pradaxa or dabigatran, etc)? No   If hold needed, do NOT  schedule, route to RN pool     Is patient taking any aspirin products (includes Excedrin and Fiorinal)? No     If more than 325mg/day, OK to schedule; Instruct pt to decrease to less than 325 mg for 7 days AND route to RN pool    For CERVICAL procedures, hold all aspirin products for 6 days.     Tell pt that if aspirin product is not held for 6 days, the procedure WILL BE cancelled.      Does the patient have a bleeding or clotting disorder? No     If YES, okay to schedule AND route to RN nurse pool    For any patients with platelet count <100, must be forwarded to provider    Is patient diabetic?  Yes  If YES, instruct them to bring their glucometer.    Does patient have an active infection or treated for one within the past week? No     Is patient currently taking any antibiotics?  No     For patients on chronic, preventative, or prophylactic antibiotics, procedures may be scheduled.     For patients on antibiotics for active or recent infection:antibiotic course must have been completed for 4 days    Is patient currently taking any steroid medications? (i.e. Prednisone, Medrol)  No     For patients on steroid medications, course must have been completed for 4 days    Is patient actively being treated for cancer or immunocompromised? No  If YES, do NOT schedule and route to RN pool     Are you able to get on and off an exam table with minimal or no assistance? Yes  If NO, do NOT schedule and route to RN pool    Are you able to roll over and lay on your stomach with minimal or no assistance? Yes  If NO, do NOT schedule and route to RN pool     Any allergies to contrast dye, iodine, shellfish, or numbing and steroid medications? No  If YES, route to RN pool AND add allergy information to appointment notes    Allergies: Duloxetine, Topiramate, Amitriptyline embonate [amitriptyline], Azithromycin, Cymbalta, Gabapentin, Hmg-coa-r inhibitors, Macrobid [nitrofurantoin], Paxil [paroxetine], Penicillins, and Tetracycline       Has the patient had a flu shot or any other vaccinations within 7 days before or after the procedure.  No     Have you recently had a COVID vaccine or have plans to get it in the near future? Yes - 4/9 second dose    If yes, explain that for the vaccine to work best they need to:       wait 1 week before and 1 week after getting Vaccine #1    wait 1 week before and 2 weeks after getting Vaccine #2    If patient has concerns about the timing, send to RN pool     Does patient have an MRI/CT?  YES: 2020  Check Procedure Scheduling Grid to see if required.      Was the MRI done within the last 3 years?  Yes    If yes, where was the MRI done i.e.[facility], [facility], [facility], [facility] etc? [facility]      If no, do not schedule and route to RN pool    If MRI was not done at [facility], [facility] or [facility] do NOT schedule and route to RN pool.      If pt has an imaging disc, the injection MAY be scheduled but pt has to bring disc to appt.     If they show up without the disc the injection cannot be done    Procedure Specific Instructions:      If celiac plexus block, informed patient NPO for 6 hours and that it is okay to take medications with sips of water, especially blood pressure medications  Not Applicable         If this is for a cervical procedure, informed patient that aspirin needs to be held for 6 days.   Not Applicable      If IV needed:    Do not schedule procedures requiring IV placement in the first appointment of the day or first appointment after lunch. Do NOT schedule at 0745, 0815 or 1245.     Instructed pt to arrive 30 minutes early for IV start if required. (Check Procedure Scheduling Grid)  Not Applicable    Reminders:      If you are started on any steroids or antibiotics between now and your appointment, you must contact us because the procedure may need to be cancelled.  Yes      For all procedures except radiofrequency ablations (RFAs) and spinal cord stimulator (SCS) trials, informed  patient:    IV sedation is not provided for this procedure.  If you feel that an oral anti-anxiety medication is needed, you can discuss this further with your referring provider or primary care provider.  The Pain Clinic provider will discuss specifics of what the procedure includes at your appointment.  Most procedures last 10-20 minutes.  We use numbing medications to help with any discomfort during the procedure.  Not Applicable      For patients 85 or older we recommend having an adult stay w/ them for the remainder of the day.       Does the patient have any questions?  NO  Dee Iyer  [facility]

## 2021-04-19 ENCOUNTER — OFFICE VISIT (OUTPATIENT)
Dept: AUDIOLOGY | Facility: CLINIC | Age: 61
End: 2021-04-19
Payer: COMMERCIAL

## 2021-04-19 DIAGNOSIS — H90.3 SENSORINEURAL HEARING LOSS (SNHL) OF BOTH EARS: ICD-10-CM

## 2021-04-19 DIAGNOSIS — H90.A31 MIXED CONDUCTIVE AND SENSORINEURAL HEARING LOSS OF RIGHT EAR WITH RESTRICTED HEARING OF LEFT EAR: Primary | ICD-10-CM

## 2021-04-19 DIAGNOSIS — H93.8X3 EAR FULLNESS, BILATERAL: ICD-10-CM

## 2021-04-19 DIAGNOSIS — H91.92 HEARING LOSS OF LEFT EAR, UNSPECIFIED HEARING LOSS TYPE: ICD-10-CM

## 2021-04-19 DIAGNOSIS — H81.02 MENIERE'S DISEASE OF LEFT EAR: ICD-10-CM

## 2021-04-19 PROCEDURE — 92567 TYMPANOMETRY: CPT | Performed by: AUDIOLOGIST

## 2021-04-19 PROCEDURE — 92700 UNLISTED ORL SERVICE/PX: CPT | Performed by: AUDIOLOGIST

## 2021-04-19 PROCEDURE — 92592 PR HEARING AID CHECK, MONAURAL: CPT | Performed by: AUDIOLOGIST

## 2021-04-19 PROCEDURE — V5181 HEARING AID MONAURAL BTE: HCPCS | Mod: RB | Performed by: AUDIOLOGIST

## 2021-04-19 PROCEDURE — 92557 COMPREHENSIVE HEARING TEST: CPT | Performed by: AUDIOLOGIST

## 2021-04-19 NOTE — PROGRESS NOTES
AUDIOLOGY REPORT    BACKGROUND INFORMATION: Betsey Vanessa was seen in the Audiology Clinic at [facility] on 4/19/2021 for follow-up.  The patient had a hearing evaluation prior to the BAHA check today and results revealed a stable right mixed hearing loss and a profound hearing loss with poor word recognition in the left ear. She was implanted with a right osseointegrated device reportedly in 2018 in [place]. She currently uses a Right Cochlear BAHA 5 Super Power device which has continual feedback issues. She consistently uses her remote microphone which she reports helps her hear better.    She is here today to try a Cochlear [employer] Koala clip with the BAHA as an alternative way of wearing it. The patient wears the processor portion of the device upside on the left side, otherwise she gets too much feedback.    TEST RESULTS AND PROCEDURES:  Different methods of attaching the large body of the behind the ear portion of the BAHA was tried. The Koala clip does not  the body of the BAHA well (the Koala clip is made for a cochlear implant processor but is what is recommended by the company as the next step to try). The BAHA would easily slip out of the Koala clip as it cannot anchor at the point where the processor and rechargeable battery connect which is how the clip connects to a cochlear implant. An attempt was made to connect it with a tonehook attached which helped but the device still came loose from the clip fairly easily.  After about 20 minute of trying different methods, it was decided that the Koala clip would not be a good option for Betsey as it seems as though it would come apart easily which increases her risk of losing the device.    Betsey was in agreement and stated that she thinks she is ready to try and obtain a tradition BAHA 5 power device which is still an appropriate option for her and which would not have the feedback issues to the same extent as the  super power device.     SUMMARY AND RECOMMENDATIONS: A BAHA follow-up was performed today .  Adjustments were made as noted above, the Koala clip is not a suitable option for wearing the Cochlear BAHA 5 Super Power device.  Prior authorization will be requested from her insurance to see if a BAHA 5 Power device can be obtained. If insurance approves a new device the patient will be notified and called to schedule an appointment for a fitting.  She and her daughter were in agreement with the plan.  Call this clinic with questions regarding today s visit.      Alee Lutz  Audiologist  [place] License  #[id number]

## 2021-04-19 NOTE — PROGRESS NOTES
AUDIOLOGY REPORT    BACKGROUND INFORMATION: Betsey Vanessa was seen in the Audiology Clinic at [facility] on 4/19/2021 for follow-up. The patient had a hearing evaluation prior to the hearing aid check today and results revealed a stable right mixed hearing loss and a profound hearing loss with poor word recognition in the left ear. She was implanted with a right osseointegrated device reportedly in 2018 in [place]. She currently uses a Right Cochlear BAHA 5 Super Power device.    She is picking up her repaired left BiCROS (Phonak) transmitter which recently went in for an out of warranty repair due to a dog chewing it up.    TEST RESULTS AND PROCEDURES:   The transmitter and the hearing aid were connected. She reported good sound quality and volume with the BiCROS device. No programming changes were made to the devices today.    Betsey reports that she feels she hears better with the hearing aids than with the BAHA device. It was reviewed that the Phonak Lizzy hearing aid she has can provide more power than the BAHA device, however, because of her history of ear infections it is not necessarily recommended because the earmold on the hearing aid may make her more prone to ear infections. She expressed understanding. If the patient would like to pursue a traditional hearing aid again she would need medical clearance from a physician to do so.    SUMMARY AND RECOMMENDATIONS: A hearing aid check was performed today and the patient picked up her repaired with transmiter.  The patient will follow-up as needed for her hearing aid maintenance. The $350 repair fee will be billed to the patient's insurance.  Call this clinic with questions regarding today s visit.      Alee Lutz  Audiologist  [place] License  #[id number]

## 2021-05-04 DIAGNOSIS — M06.09 RHEUMATOID ARTHRITIS OF MULTIPLE SITES WITHOUT RHEUMATOID FACTOR (H): ICD-10-CM

## 2021-05-04 DIAGNOSIS — F11.20 CONTINUOUS OPIOID DEPENDENCE (H): ICD-10-CM

## 2021-05-04 DIAGNOSIS — G89.4 CHRONIC PAIN SYNDROME: ICD-10-CM

## 2021-05-04 RX ORDER — HYDROCODONE BITARTRATE AND ACETAMINOPHEN 7.5; 325 MG/1; MG/1
.5-1 TABLET ORAL EVERY 6 HOURS PRN
Qty: 30 TABLET | Refills: 0 | Status: SHIPPED | OUTPATIENT
Start: 2021-05-04 | End: 2021-06-02

## 2021-05-04 RX ORDER — FENTANYL 12.5 UG/1
1 PATCH TRANSDERMAL
Qty: 10 PATCH | Refills: 0 | Status: SHIPPED | OUTPATIENT
Start: 2021-05-04 | End: 2021-06-02

## 2021-05-04 NOTE — TELEPHONE ENCOUNTER
Medication refill information reviewed.     Due date for    fentaNYL (DURAGESIC) 12 mcg/hr 72 hr patch 05/06/21     HYDROcodone-acetaminophen (NORCO) 7.5-325 MG per tablet is 05/06/21     Prescriptions prepped for review.     Will route to provider.

## 2021-05-04 NOTE — TELEPHONE ENCOUNTER
Received call from patient requesting refill(s) of      fentaNYL (DURAGESIC) 12 mcg/hr 72 hr patch  Last dispensed from pharmacy on 04/05/21    HYDROcodone-acetaminophen (NORCO) 7.5-325 MG per tablet  Last dispensed from pharmacy on 04/05/21    Patient's last office/virtual visit by prescribing provider on 04/21/21  Next office/virtual appointment scheduled for 05/12/21    Last urine drug screen date 10/12/20  Current opioid agreement on file (completed within the last year) Yes Date of opioid agreement: 10/12/20    E-prescribe to   [id number]  [address] [address] - [address]  [address] [place] [address]  Phone: [phone] Fax: [phone]    Will route to nursing [place] for review and preparation of prescription(s).       Lisette Pabon MA  [facility]

## 2021-05-04 NOTE — TELEPHONE ENCOUNTER
Script Eprescribed to pharmacy  [place] Prescription Monitoring [place] checked    Will send this to MA team to notify patient.    Signed Prescriptions:                        Disp   Refills    fentaNYL (DURAGESIC) 12 mcg/hr 72 hr patch 10 pat*0        Sig: Place 1 patch onto the skin every 72 hours remove old           patch. Fill 05/05/21 to start 05/06/21  Authorizing Provider: SANTOS ISLAS    HYDROcodone-acetaminophen (NORCO) 7.5-325 *30 tab*0        Sig: Take 0.5-1 tablets by mouth every 6 hours as needed           for severe pain . Fill 5/5 to start on/after           5/6/21  Authorizing Provider: SANTOS ISLAS MD  [facility]

## 2021-05-04 NOTE — TELEPHONE ENCOUNTER
Reason for call:  Medication   If this is a refill request, has the caller requested the refill from the pharmacy already? No  Will the patient be using a [facility]? No  Name of the pharmacy and phone number for the current request: [id number] - [place], [place] - [address]    Name of the medication requested:   fentaNYL (DURAGESIC) 12 mcg/hr 72 hr patch  HYDROcodone-acetaminophen (NORCO) 7.5-325 MG per tablet    Other request:     Phone number to reach patient:  Cell number on file:    Telephone Information:   Mobile [phone]       Best Time:      Can we leave a detailed message on this number?  YES    Travel screening: Not Applicable

## 2021-05-05 NOTE — TELEPHONE ENCOUNTER
Spoke to patient, notified that prescriptions were sent to preferred pharmacy.    Able to fill 05/05/21 and start 05/06/21      Lisette Pabon MA  [facility] [place]

## 2021-05-11 ENCOUNTER — INFUSION THERAPY VISIT (OUTPATIENT)
Dept: INFUSION THERAPY | Facility: CLINIC | Age: 61
End: 2021-05-11
Payer: COMMERCIAL

## 2021-05-11 VITALS
TEMPERATURE: 98.8 F | OXYGEN SATURATION: 94 % | WEIGHT: 164.8 LBS | HEART RATE: 82 BPM | DIASTOLIC BLOOD PRESSURE: 70 MMHG | SYSTOLIC BLOOD PRESSURE: 138 MMHG | BODY MASS INDEX: 30.14 KG/M2

## 2021-05-11 DIAGNOSIS — M06.9 RHEUMATOID ARTHRITIS (H): Primary | ICD-10-CM

## 2021-05-11 PROCEDURE — 96365 THER/PROPH/DIAG IV INF INIT: CPT | Performed by: NURSE PRACTITIONER

## 2021-05-11 PROCEDURE — 99207 PR NO CHARGE LOS: CPT

## 2021-05-11 RX ORDER — HEPARIN SODIUM (PORCINE) LOCK FLUSH IV SOLN 100 UNIT/ML 100 UNIT/ML
5 SOLUTION INTRAVENOUS
Status: CANCELLED | OUTPATIENT
Start: 2021-06-08

## 2021-05-11 RX ORDER — HEPARIN SODIUM,PORCINE 10 UNIT/ML
5 VIAL (ML) INTRAVENOUS
Status: CANCELLED | OUTPATIENT
Start: 2021-06-08

## 2021-05-11 RX ADMIN — Medication 250 ML: at 11:49

## 2021-05-11 ASSESSMENT — PAIN SCALES - GENERAL: PAINLEVEL: SEVERE PAIN (6)

## 2021-05-11 NOTE — PROGRESS NOTES
Pre procedure Diagnosis: facet arthropathy   Post procedure Diagnosis: Same  Procedure performed: Bilateral L4-5, L5-S1 facet joint injections  Anesthesia: none  Complications: none  Operators: Bailee Houser MD    Indications:   Betsey Vanessa is a 61 year old female seen by me in clinic for low back pain.  They have a history of bilateral lumbar facet arthropathy.  Exam shows facetogenic pain in the bilateral lumbar spine and they have tried conservative treatment including PT, medications. Previous injections were helpful    Options/alternatives, benefits and risks were discussed with the patient including bleeding, infection, flared pain, tissue trauma, exposure to radiation, reaction to medications including seizure, spinal cord injury, paralysis, weakness, numbness and headache.   Questions were answered to her satisfaction and she agrees to proceed. Voluntary informed consent was obtained and signed.     Vitals were reviewed: Yes  Allergies were reviewed:  Yes   Medications were reviewed:  Yes   Pre-procedure pain score: 6/10    Procedure:  After getting informed consent, patient was brought into the procedure suite and was placed in a prone position on the procedure table.   A Pause for the Cause was performed.  Patient was prepped and draped in sterile fashion.     Under AP fluoroscopic guidance the L4/5 and L5/S1 facet joints on the right, then left side were identified, and the C-arm was rotated obliquely to the affected side to open the joint space. A total of 6 ml of 1% lidocaine was injected at the needle entry point and needle tract. Then a 22 gauge 3.5 inch quincke type spinal needle was inserted and advanced under fluoroscopic guidance targeting the superior articular pillar of each joint. Once the needle made a contact with SAP, it was rotated and was then advanced into the joint.    AP fluoroscopic views were obtained to confirm the needle placement. Then, contrast was injected after negative  aspiration for heme and CSF in each joint, confirming appropriate placement.  A total of 2ml of Omnipaque was used, and 8ml was wasted.     The injection was then accomplished using a solution containing 3ml of 0.5% bupivacaine mixed with 10mg of dexamethasone, divided between the 4 joints. The needles were removed.     Hemostasis was achieved, the area was cleaned, and bandaids were placed when appropriate.  The patient tolerated the procedure well, and was taken to the recovery room.    Images were saved to PACS.    Post-procedure pain score: 3/10  Follow-up includes:   -f/u phone call in one week  -f/u with referring provider  -UDS and opioid agreement today    Bailee Houser MD  [facility]

## 2021-05-11 NOTE — PROGRESS NOTES
Infusion Nursing Note:  Betsey VANESSA Vanessa presents today for Orencia.    Patient seen by provider today: No   present during visit today: Not Applicable.    Note: Pt c/o her meniere's disease acting up yesterday, dizziness and N/V .  Denies other new complaints, see flow sheet for assessment      Intravenous Access:  Peripheral IV placed.    Treatment Conditions:  Biological Infusion Checklist:  ~~~ NOTE: If the patient answers yes to any of the questions below, hold the infusion and contact ordering provider or on-call provider.    1. Have you recently had an elevated temperature, fever, chills, productive cough, coughing for 3 weeks or longer or hemoptysis, abnormal vital signs, night sweats,  chest pain or have you noticed a decrease in your appetite, unexplained weight loss or fatigue? No  2. Do you have any open wounds or new incisions? No  3. Do you have any recent or upcoming hospitalizations, surgeries or dental procedures? No  4. Do you currently have or recently have had any signs of illness or infection or are you on any antibiotics? No  5. Have you had any new, sudden or worsening abdominal pain? No  6. Have you or anyone in your household received a live vaccination in the past 4 weeks? Please note:  No live vaccines while on biologic/chemotherapy until 6 months after the last treatment.  Patient can receive the flu vaccine (shot only) and the pneumovax.  It is optimal for the patient to get these vaccines mid cycle, but they can be given at any time as long as it is not on the day of the infusion. No  7. Have you recently been diagnosed with any new nervous system diseases (ie. Multiple sclerosis, Guillain [place], seizures, neurological changes) or cancer diagnosis? No  8. Are you on any form of radiation or chemotherapy? No  9. Are you pregnant or breast feeding or do you have plans of pregnancy in the future? No  10. Have you been having any signs of worsening depression or suicidal ideations?   (benlysta only) No  11. Have there been any other new onset medical symptoms? No        Post Infusion Assessment:  Patient tolerated infusion without incident.  Blood return noted pre and post infusion.  Site patent and intact, free from redness, edema or discomfort.  No evidence of extravasations.  Access discontinued per protocol.       Discharge Plan:   Patient discharged in stable condition accompanied by: self.  Departure Mode: Ambulatory.  Pt will RTC 6/8/21 for orencia.    Avi Paz RN

## 2021-05-12 ENCOUNTER — RADIOLOGY INJECTION OFFICE VISIT (OUTPATIENT)
Dept: PALLIATIVE MEDICINE | Facility: CLINIC | Age: 61
End: 2021-05-12
Attending: PSYCHIATRY & NEUROLOGY
Payer: COMMERCIAL

## 2021-05-12 VITALS — OXYGEN SATURATION: 98 % | SYSTOLIC BLOOD PRESSURE: 135 MMHG | DIASTOLIC BLOOD PRESSURE: 78 MMHG | HEART RATE: 80 BPM

## 2021-05-12 DIAGNOSIS — M47.816 LUMBAR FACET ARTHROPATHY: ICD-10-CM

## 2021-05-12 DIAGNOSIS — E78.5 HYPERLIPIDEMIA, UNSPECIFIED HYPERLIPIDEMIA TYPE: ICD-10-CM

## 2021-05-12 PROCEDURE — 64494 INJ PARAVERT F JNT L/S 2 LEV: CPT | Mod: 50 | Performed by: PSYCHIATRY & NEUROLOGY

## 2021-05-12 PROCEDURE — 64493 INJ PARAVERT F JNT L/S 1 LEV: CPT | Mod: 50 | Performed by: PSYCHIATRY & NEUROLOGY

## 2021-05-12 NOTE — NURSING NOTE
Discharge Information    IV Discontiued Time:  NA    Amount of Fluid Infused:  NA    Discharge Criteria = When patient returns to baseline or as per MD order    Consciousness:  Pt is fully awake    Circulation:  BP +/- 20% of pre-procedure level    Respiration:  Patient is able to breathe deeply    O2 Sat:  Patient is able to maintain O2 Sat >92% on room air    Activity:  Moves 4 extremities on command    Ambulation:  Patient is able to stand and walk or stand and pivot into wheelchair    Dressing:  Clean/dry or No Dressing    Notes:   Discharge instructions and AVS given to patient    Patient meets criteria for discharge?  YES    Admitted to PCU?  No    Responsible adult present to accompany patient home?  Yes    Signature/Title:    michael krause RN  RN Care Coordinator  [facility] [place]

## 2021-05-12 NOTE — NURSING NOTE
Pre-procedure Intake    Have you been fasting? No    If yes, for how long?     Are you taking a prescribed blood thinner such as coumadin, Plavix, Xarelto?    No    If yes, when did you take your last dose?     Do you take aspirin?   NO    If cervical procedure, have you held aspirin for 6 days?   NA    Do you have any allergies to contrast dye, iodine, steroid and/or numbing medications?  NO    Are you currently taking antibiotics or have an active infection?  NO    Have you had a fever/elevated temperature within the past week? NO    Are you currently taking oral steroids? NO    Do you have a ? Yes       Are you pregnant or breastfeeding? NA    Have you received the COVID-19 vaccine? Yes     If yes, was it your 1st, 2nd or only dose needed? 2nd dose  in March      Notify provider and RNs if systolic BP >170, diastolic BP >100, P >100 or O2 sats < 90%

## 2021-05-12 NOTE — PATIENT INSTRUCTIONS
[facility]   Procedure Discharge Instructions    Today you saw:    Dr. Shantal Houser      You had an:  lumbar  facet joint injection      Medications used:  Lidocaine   Bupivacaine   Dexamethasone Omnipaque            Be cautious when walking. Numbness and/or weakness in the lower extremities may occur for up to 6-8 hours after the procedure due to effect of the local anesthetic    Do not drive for 6 hours. The effect of the local anesthetic could slow your reflexes.     You may resume your regular activities after 24 hours    Avoid strenuous activity for the first 24 hours    You may shower, however avoid swimming, tub baths or hot tubs for 24 hours following your procedure    You may have a mild to moderate increase in pain for several days following the injection.    It may take up to 14 days for the steroid medication to start working although you may feel the effect as early as a few days after the procedure.       You may use ice packs for 10-15 minutes, 3 to 4 times a day at the injection site for comfort    Do not use heat to painful areas for 6 to 8 hours. This will give the local anesthetic time to wear off and prevent you from accidentally burning your skin.     Unless you have been directed to avoid the use of anti-inflammatory medications (NSAIDS), you may use medications such as ibuprofen, Aleve or Tylenol for pain control if needed.     If you were fasting, you may resume your normal diet and medications after the procedure    If you have diabetes, check your blood sugar more frequently than usual as your blood sugar may be higher than normal for 10-14 days following a steroid injection. Contact your doctor who manages your diabetes if your blood sugar is higher than usual    Possible side effects of steroids that you may experience include flushing, elevated blood pressure, increased appetite, mild headaches and restlessness.  All of these symptoms will get better with  time.    If you experience any of the following, call the Pain Clinic during work hours (Mon-Friday 8-4:30 pm) at [phone] or the Provider Line after hours at [phone]:  -Fever over 100 degree F  -Swelling, bleeding, redness, drainage, warmth at the injection site  -Progressive weakness or numbness in your legs or arms  -Loss of bowel or bladder function  -Unusual headache that is not relieved by Tylenol or other pain reliever  -Unusual new onset of pain that is not improving

## 2021-05-13 ENCOUNTER — THERAPY VISIT (OUTPATIENT)
Dept: PHYSICAL THERAPY | Facility: CLINIC | Age: 61
End: 2021-05-13
Attending: OTOLARYNGOLOGY
Payer: COMMERCIAL

## 2021-05-13 DIAGNOSIS — R42 DIZZINESS: ICD-10-CM

## 2021-05-13 PROCEDURE — 97112 NEUROMUSCULAR REEDUCATION: CPT | Mod: GP | Performed by: PHYSICAL THERAPIST

## 2021-05-13 PROCEDURE — 97162 PT EVAL MOD COMPLEX 30 MIN: CPT | Mod: GP | Performed by: PHYSICAL THERAPIST

## 2021-05-13 RX ORDER — FLUVASTATIN 20 MG/1
20 CAPSULE ORAL AT BEDTIME
Qty: 90 CAPSULE | Refills: 1 | Status: SHIPPED | OUTPATIENT
Start: 2021-05-13 | End: 2022-02-06

## 2021-05-13 NOTE — TELEPHONE ENCOUNTER
Routing refill request to provider for review/approval because:  Dr Ken is out on medical leave.  Patient has followed up with Katerina Judd PA-C.  Will send request to her to advise.    Recent Labs   Lab Test 12/09/20  0851 08/14/20  0736   CHOL 221* 199   HDL 59 47*   * 123*   TRIG 164* 143

## 2021-05-14 NOTE — PROGRESS NOTES
05/13/21 1500   Quick Adds   Quick Adds Vestibular Eval   Type of Visit Initial OP PT Evaluation   General Information   Start of Care Date 05/13/21   Referring Physician Laura Stallings MD   Orders Evaluate and Treat as Indicated   Order Date 03/03/21   Medical Diagnosis Dizzniess   Onset of illness/injury or Date of Surgery 03/03/21   Precautions/Limitations fall precautions   Surgical/Medical history reviewed Yes   Pertinent history of current vestibular problem (include personal factors and/or comorbidities that impact the POC)  Hearing loss;Migraines   Hearing Loss Comments Bilateral hearing loss. Has R cochlar implant. Deaf in L ear   Pertinent history of current problem (include personal factors and/or comorbidities that impact the POC) Patient reports long standing balance/dizziness issues. Patient reports frequent falls/near falls. Patient had cochlear implant in 2018. Patient reports spells including tinnitus, ear fullness, nausea/vomiting, dizziness that occur multiple times a day, but not every day. Patient received Gentamycin injection to L ear. Patient reports sensitivity to loud sounds,as well. PMH: RA, pain, migraines, Sjogren's syndrome   Pertinent Visual History  Occasional blurry vision. Sees optometrist every 6 months   Prior level of function comment Long standing dizziness/imbalance- per daughter 20+ years. Patient reports increasing imbalance over past year;previously less falls   Previous/Current Treatment Physical Therapy   Improvement after PT Mild   Living environment House/townhome   Current Assistive Devices Quad Cane   Assistive Devices Comments Uses quad cane occasionally inside home and outside home. Has WW, but not currently using   Patient/Family Goals Statement Improve balance and decrease dizziness   Fall Risk Screen   Fall screen completed by PT   Have you fallen 2 or more times in the past year? Yes   Have you fallen and had an injury in the past year? Yes   Is patient a fall  risk? Yes   Fall screen comments defer to next session due to time constraints   Pain   Patient currently in pain Yes   Pain comments Patient with chronic pain- seeing pain management provider   Cognitive Status Examination   Orientation orientation to person, place and time   Level of Consciousness alert   Follows Commands and Answers Questions 100% of the time;able to follow multistep instructions   Personal Safety and Judgment intact   Memory intact   Cognitive Comment repeated questioning secondary to hearing loss   Posture   Posture Forward head position   Strength   Strength Comments BLE grossly 4/5   Bed Mobility   Bed Mobility Comments Modified independent- increased time for performance due to dizziness onset   Transfer Skills   Transfer Comments SPT without AD supervision due to imbalance with initial stance (L trunk lean) and with quick turns   Gait   Gait Comments Patient ambulates with quad cane at supervision level due to instability present in static stance, busy environments and turns. Increased lateral sway and step out responses for recovery   Balance   Balance Comments Patient requires UE support on mat/chair in unsupported sitting due to circular sway. Impaired static standing balance due to L trunk lean and circular sway. Increased after initial movement   Balance Special Tests   Balance Special Tests Romberg   Balance Special Tests Romberg   Seconds 10 Seconds   Comments eyes open, LOB immediately with EC   Sensory Examination   Sensory Perception Comments Patient reports occasional numbness/tingling in feet   Cervicogenic Screen   Neck ROM mild restrictions in all directions   Oculomotor Exam   Smooth Pursuit Normal   Saccades Abnormal   Saccades Comments eye movements   VOR Abnormal   VOR Comments retinal slip horizontal reported   Rapid Head Thrust Comments unable to assess due to increased symptoms   Convergence Testing Abnormal   Convergence Testing Comments >6cm   Dynamic Visual Acuity  (DVA)   DVA Comments deferred due to next session due to increased symptoms with testing   Planned Therapy Interventions   Planned Therapy Interventions balance training;gait training;neuromuscular re-education;ROM;strengthening;stretching;transfer training;manual therapy;bed mobility training   Clinical Impression   Criteria for Skilled Therapeutic Interventions Met yes, treatment indicated   PT Diagnosis imbalance, dizziness   Influenced by the following impairments balance, symptoms, posture, strength, oculomotor control   Functional limitations due to impairments impaired gait- household and community, increased fall risk, increased symptoms, decreased participation in daily activitis   Clinical Presentation Evolving/Changing   Clinical Presentation Rationale personal factors, body systems involved, fall risk   Clinical Decision Making (Complexity) Moderate complexity   Therapy Frequency 1 time/week   Predicted Duration of Therapy Intervention (days/wks) 4-6 weeks   Risk & Benefits of therapy have been explained Yes   Patient, Family & other staff in agreement with plan of care Yes   Clinical Impression Comments Patient is a 61-year-old female who presents to outpatient PT with reports of longstanding dizziness and imbalance, but reports recent increased symptoms (time unknown).  On exam patient demonstrates significant vestibular sensitivity and balance deficits which are impacting her participation in daily activities.  Patient would benefit from skilled PT services to reduce fall risk, improve symptom management in order to return to safe daily activities.  Recommending for session trial to assess progress due to previous PT episodes with little to mild improvements.  Patient with complex medical history which may impact overall plan of care.  Patient reports wanting to get mental health referral due to previous success with therapist before.   Education Assessment   Preferred Learning Style  Demonstration;Pictures/video   Barriers to Learning Emotional;Other  (Medical)   GOALS   PT Eval Goals 1;2;3;4   Goal 1   Goal Identifier Balance   Goal Description Patient will score at least 30 seconds on condition 1 of the mCTSIB for improved static balance   Target Date 08/10/21   Goal 2   Goal Identifier TUG   Goal Description Patient will perform TUG with least restrictive device <20 seconds to decrease fall risk   Target Date 08/10/21   Goal 3   Goal Identifier Symptoms   Goal Description Patient will report <3 point increase in symptoms with transition changes to improve activity participation   Target Date 08/10/21   Goal 4   Goal Identifier HEP   Goal Description Patient will be independent in home exercise program for maintenance of therapy gains at discharge   Target Date 08/10/21   Total Evaluation Time   PT Dung, Moderate Complexity Minutes (69084) 45     Emma Ponce PT, DPT  Physical Therapist  [facility] [address]  [address] [place] [address]  [email or web address]  Appointments: [phone]

## 2021-05-14 NOTE — DISCHARGE INSTRUCTIONS
1. Start using your walker at all times to improve grounding and decrease fall risk at this time  2. First goal is to decrease vestibular sensitivity through habituation techniques. After your brain calms it's response to vestibular (inner ear) input, then we can work more on balance/balance reaction and visual sensitivity retraining  3. Daily habituation exercises: laying down on bed then sitting up on side, sit to stand (it is okay if you need your feet apart for stability). Look at something stable straight ahead for visual fixation and focus on your buttocks/thighs when sitting and feet when standing. Deep breathe and wait for symptoms to return to baseline before moving again.  4. Ask your primary care provider for a mental health referral since you had good benefit from a therapist previously.    Emma Ponce PT, DPT  Physical Therapist  [facility] [address]  [address] [place] [address]  [email or web address]  Appointments: [phone]

## 2021-05-21 ENCOUNTER — OFFICE VISIT (OUTPATIENT)
Dept: RHEUMATOLOGY | Facility: CLINIC | Age: 61
End: 2021-05-21
Payer: COMMERCIAL

## 2021-05-21 VITALS
HEIGHT: 62 IN | SYSTOLIC BLOOD PRESSURE: 124 MMHG | BODY MASS INDEX: 30.11 KG/M2 | HEART RATE: 85 BPM | WEIGHT: 163.6 LBS | DIASTOLIC BLOOD PRESSURE: 65 MMHG | OXYGEN SATURATION: 98 %

## 2021-05-21 DIAGNOSIS — M85.89 OSTEOPENIA OF MULTIPLE SITES: ICD-10-CM

## 2021-05-21 DIAGNOSIS — M06.09 RHEUMATOID ARTHRITIS OF MULTIPLE SITES WITH NEGATIVE RHEUMATOID FACTOR (H): Primary | ICD-10-CM

## 2021-05-21 DIAGNOSIS — Z79.899 HIGH RISK MEDICATIONS (NOT ANTICOAGULANTS) LONG-TERM USE: ICD-10-CM

## 2021-05-21 LAB
ALBUMIN SERPL-MCNC: 4 G/DL (ref 3.4–5)
ALP SERPL-CCNC: 83 U/L (ref 40–150)
ALT SERPL W P-5'-P-CCNC: 35 U/L (ref 0–50)
AST SERPL W P-5'-P-CCNC: 20 U/L (ref 0–45)
BASOPHILS # BLD AUTO: 0 10E9/L (ref 0–0.2)
BASOPHILS NFR BLD AUTO: 0.3 %
BILIRUB DIRECT SERPL-MCNC: 0.1 MG/DL (ref 0–0.2)
BILIRUB SERPL-MCNC: 0.3 MG/DL (ref 0.2–1.3)
CREAT SERPL-MCNC: 0.92 MG/DL (ref 0.52–1.04)
CRP SERPL-MCNC: 5.6 MG/L (ref 0–8)
DEPRECATED CALCIDIOL+CALCIFEROL SERPL-MC: 39 UG/L (ref 20–75)
DIFFERENTIAL METHOD BLD: ABNORMAL
EOSINOPHIL # BLD AUTO: 0.2 10E9/L (ref 0–0.7)
EOSINOPHIL NFR BLD AUTO: 3 %
ERYTHROCYTE [DISTWIDTH] IN BLOOD BY AUTOMATED COUNT: 15.3 % (ref 10–15)
ERYTHROCYTE [SEDIMENTATION RATE] IN BLOOD BY WESTERGREN METHOD: 23 MM/H (ref 0–30)
GFR SERPL CREATININE-BSD FRML MDRD: 68 ML/MIN/{1.73_M2}
HCT VFR BLD AUTO: 35.7 % (ref 35–47)
HGB BLD-MCNC: 11.6 G/DL (ref 11.7–15.7)
LYMPHOCYTES # BLD AUTO: 2.4 10E9/L (ref 0.8–5.3)
LYMPHOCYTES NFR BLD AUTO: 36.2 %
MCH RBC QN AUTO: 26.3 PG (ref 26.5–33)
MCHC RBC AUTO-ENTMCNC: 32.5 G/DL (ref 31.5–36.5)
MCV RBC AUTO: 81 FL (ref 78–100)
MONOCYTES # BLD AUTO: 0.5 10E9/L (ref 0–1.3)
MONOCYTES NFR BLD AUTO: 7.4 %
NEUTROPHILS # BLD AUTO: 3.5 10E9/L (ref 1.6–8.3)
NEUTROPHILS NFR BLD AUTO: 53.1 %
PLATELET # BLD AUTO: 160 10E9/L (ref 150–450)
PROT SERPL-MCNC: 7.7 G/DL (ref 6.8–8.8)
RBC # BLD AUTO: 4.41 10E12/L (ref 3.8–5.2)
WBC # BLD AUTO: 6.6 10E9/L (ref 4–11)

## 2021-05-21 PROCEDURE — 86140 C-REACTIVE PROTEIN: CPT | Performed by: INTERNAL MEDICINE

## 2021-05-21 PROCEDURE — 80076 HEPATIC FUNCTION PANEL: CPT | Performed by: INTERNAL MEDICINE

## 2021-05-21 PROCEDURE — 85652 RBC SED RATE AUTOMATED: CPT | Performed by: INTERNAL MEDICINE

## 2021-05-21 PROCEDURE — 99214 OFFICE O/P EST MOD 30 MIN: CPT | Performed by: INTERNAL MEDICINE

## 2021-05-21 PROCEDURE — 36415 COLL VENOUS BLD VENIPUNCTURE: CPT | Performed by: INTERNAL MEDICINE

## 2021-05-21 PROCEDURE — 82565 ASSAY OF CREATININE: CPT | Performed by: INTERNAL MEDICINE

## 2021-05-21 PROCEDURE — 85025 COMPLETE CBC W/AUTO DIFF WBC: CPT | Performed by: INTERNAL MEDICINE

## 2021-05-21 PROCEDURE — 82306 VITAMIN D 25 HYDROXY: CPT | Performed by: INTERNAL MEDICINE

## 2021-05-21 RX ORDER — CELECOXIB 100 MG/1
100 CAPSULE ORAL 2 TIMES DAILY
Qty: 180 CAPSULE | Refills: 1 | Status: SHIPPED | OUTPATIENT
Start: 2021-05-21 | End: 2021-09-02

## 2021-05-21 RX ORDER — HYDROXYCHLOROQUINE SULFATE 200 MG/1
TABLET, FILM COATED ORAL
Qty: 135 TABLET | Refills: 1 | Status: SHIPPED | OUTPATIENT
Start: 2021-05-21 | End: 2021-09-17

## 2021-05-21 ASSESSMENT — MIFFLIN-ST. JEOR: SCORE: 1270.33

## 2021-05-21 NOTE — PATIENT INSTRUCTIONS
RHEUMATOLOGY    Dr. Terrence Carrillo    [facility]  [address] [place] [address]    Our new phone number for Rheumatology is [phone], this number will be able to help you schedule appointments for Dr. Carrillo or if you have any message you would like sent to us.    Thank you for choosing [facility]!    Madeline Cheung [facility]

## 2021-05-21 NOTE — PROGRESS NOTES
Rheumatology Clinic Visit      Betsey Vanessa MRN# [id number]   YOB: 1960 Age: 61 year old      Date of visit: 5/21/21   PCP: Dr. Velma Ibanez    Chief Complaint   Patient presents with:  Arthritis: RA    Assessment and Plan     1.  Rheumatoid arthritis: Reportedly diagnosed many years ago.  Previously treated by Dr. Stacy at [facility] in [place], [place]. Previously on methotrexate + rituximab, Enbrel + leflunomide; had elevated liver enzymes preventing oral DMARDs; most recent effective tx was with tocilizumab 8mg/kg IV monthly per her last rheumatology notes.  ALEGRIA hx prevents several oral DMARDs and LFT elevations with MTX in the past.  Previously used Actemra 4mg/kg IV but this resulted in lower platelets and she did not find much benefit from it, so it was discontinued.  Orencia was then started, with the first dose on 5/7/2020 and she improved significantly with Orencia.  RA is well controlled at this time.  Symptoms at this point are related to chronic pain syndrome. Chronic illness, stable.    - Continue orencia 750mg IV every 4 weeks:   - Continue hydroxychloroquine 200 mg daily with an additional 200 mg every other day  - Continue Celebrex 100 mg BID   - Ophthalmology referral for hydroxychloroquine toxicity monitoring   - Labs today: CBC, Creatinine, Hepatic Panel, ESR, CRP    2. Sjogren's Syndrome: reportedly had punctal plugs in the past.  Doing okay with artificial tears, Xiidra, and Evoxac.  Encouraged frequent sips of water, visits with a dentist at least every 6months, avoidance of sugary foods/drinks.  Chronic illness, stable.       3. Chronic pain syndrome: Managed at the pain clinic.  Encouraged low impact physical activities such as walking or using a stationary bike    4. Osteopenia: based on 7/2019 DEXA report that she brought with her; FRAX score shows a 22% risk of major osteoporotic fracture in the next 10 years and a 1.9% risk for osteoporotic  hip fracture in the next 10 years.  Based on FRAX tx is indicated.  She already received one dose of boniva in Sept 2019; and failed alendronate due to GI upset.  Reclast received 2/13/2020 and 3/16/2021.  Chronic illness  - Continue calcium 1000mg daily  - Continue vitamin D 50,000 IU once weekly  - Continue Reclast yearly, next due in March 2022  - Labs today: Vitamin D    5. Elevated blood pressure:  Betsey to follow up with Primary Care provider regarding elevated blood pressure.     # Status-post 2 doses of the Pfizer COVID-19 vaccine, received 3/19/2021 and 4/9/2021    Total minutes spent in evaluation with patient, documentation, , and review of pertinent studies and chart notes: 19      Ms. Vanessa verbalized agreement with and understanding of the rational for the diagnosis and treatment plan.  All questions were answered to best of my ability and the patient's satisfaction. Ms. Vanessa was advised to contact the clinic with any questions that may arise after the clinic visit.      Thank you for involving me in the care of the patient    Return to clinic: 3 months    HPI   Betsey Vanessa is a 61 year old female with a past medical history significant for hypertension, depression, type 2 diabetes, Ménière's disease, and rheumatoid arthritis who is seen in consultation for follow-up of RA.     Today, 5/21/2021: Doing well at this time with regard to rheumatoid arthritis.  Morning stiffness for about 20 minutes.  No joint swelling.  Diffuse body pain that is not joint centered.  Ménière's disease and is following with a specialist for this who has reportedly recommended that she get a four-wheel walker and I told her that this was a good idea.    Denies fevers, chills, nausea, or vomiting.  Occasional constipation or diarrhea. No abdominal pain. No chest pain/pressure, palpitations, or shortness of breath. No LE swelling.  Chronic neck and lower back pain that were managed in the pain clinic previously  and she plans to establish care in the pain clinic here soon. No oral or nasal sores.  No rash.  Dry eyes treated with artificial tears and Xiidra; she plans to establish with an ophthalmologist because she was followed by ophthalmology when in [place]; hx of punctal plugs.  Dry mouth is treated with frequent sips of water and Evoxac.    Tobacco: Quit smoking in 1998  EtOH: None  Drugs: None    ROS   12 point review of system was completed and negative except as noted in the HPI     Active Problem List     Patient Active Problem List   Diagnosis     Type 2 diabetes mellitus with hyperglycemia, without long-term current use of insulin (H)     Hypertension goal BP (blood pressure) < 140/90     Meniere's disease, unspecified laterality     Rheumatoid arthritis, involving unspecified site, unspecified rheumatoid factor presence     Valvular heart disease     Fibromyalgia     Mild major depression (H)     Osteopenia of multiple sites     History of bisphosphonate therapy     Post-menopausal     Benzodiazepine dependence, episodic (H)     Continuous opioid dependence (H)     Mitral stenosis     Age-related osteoporosis without current pathological fracture     Mixed conductive and sensorineural hearing loss of right ear     Polyarticular arthritis     Encounter for long-term use of opiate analgesic     Chronic pain     Tympanic membrane perforation     Myofascial pain     Migraine with vertigo     Gastroesophageal reflux disease, esophagitis presence not specified     Fungal skin infection     Anaphylaxis, sequela     Hyperlipidemia, unspecified hyperlipidemia type     Past Medical History     Past Medical History:   Diagnosis Date     Anemia     r/t menses     Anxiety      Arthritis      Constipation      Depression      Diabetes (H)      Diarrhea      Double vision      Headache(784.0)      Hearing loss      Heart murmur      Heartburn      Hypertension      Indigestion      Nasal congestion      Night sweats       Numbness      Problems related to lack of adequate sleep      Rash      Sneezing      Tinnitus      Visual loss      Weakness      Weight gain     because of Prednisone     Past Surgical History     Past Surgical History:   Procedure Laterality Date     ------------OTHER-------------      D&C     HYSTERECTOMY       INNER EAR SURGERY       RELEASE CARPAL TUNNEL BILATERAL Bilateral 2008     Allergy     Allergies   Allergen Reactions     Duloxetine Other (See Comments)     Topiramate Other (See Comments)     Other reaction(s): Confusion     Amitriptyline Embonate [Amitriptyline]      Hyper activity     Azithromycin Hives     Cymbalta      Hyper activity     Gabapentin Hives     Hmg-Coa-R Inhibitors Other (See Comments)     Liver function studies abnormal on Lipitor / Crestor     Macrobid [Nitrofurantoin] Hives     Paxil [Paroxetine] Other (See Comments)     Hyper behavior     Penicillins Hives     Tetracycline Hives     Current Medication List     Current Outpatient Medications   Medication Sig     alcohol swab prep pads Use to swab area of injection/rosie as directed     blood glucose (NO BRAND SPECIFIED) test strip Use to test blood sugar 2 times daily or as directed. To accompany: Blood Glucose Monitor Brands: per insurance.     blood glucose calibration (NO BRAND SPECIFIED) solution Use to calibrate blood glucose monitor as needed as directed. To accompany: Blood Glucose Monitor Brands: per insurance.     celecoxib (CELEBREX) 100 MG capsule Take 1 capsule (100 mg) by mouth 2 times daily     cetirizine (ZYRTEC) 10 MG tablet Take 1 tablet (10 mg) by mouth daily     cevimeline (EVOXAC) 30 MG capsule Take 1 capsule (30 mg) by mouth 3 times daily     clonazePAM (KLONOPIN) 0.5 MG tablet Take 1 tablet (0.5 mg) by mouth 2 times daily as needed for anxiety OK to take 1-2 tabs at night as needed for sleep. Should last about 90 days     diclofenac (VOLTAREN) 1 % topical gel Apply 2-4 g topically 4 times daily as needed for  moderate pain     diclofenac (VOLTAREN) 1 % topical gel Place 2-4 g onto the skin 4 times daily . Max of 8g per dose. No more than 32g/day     docusate sodium (COLACE) 100 MG capsule Take 1 capsule (100 mg) by mouth 2 times daily     dulaglutide (TRULICITY) 0.75 MG/0.5ML pen Inject 0.75 mg Subcutaneous every 7 days     escitalopram (LEXAPRO) 20 MG tablet Take 1 tablet (20 mg) by mouth daily     fluticasone (FLONASE) 50 MCG/ACT nasal spray Spray 1-2 sprays into both nostrils daily     fluvastatin (LESCOL) 20 MG capsule Take 1 capsule (20 mg) by mouth At Bedtime     glucose blood VI test strips strip daily     HYDROcodone-acetaminophen (NORCO) 7.5-325 MG per tablet Take 0.5-1 tablets by mouth every 6 hours as needed for severe pain . Fill 5/5 to start on/after 5/6/21     hydroxychloroquine (PLAQUENIL) 200 MG tablet Hydroxychloroquine 200mg daily; and an additional 200mg every other day.     hydrOXYzine (ATARAX) 25 MG tablet Take 1 tablet (25 mg) by mouth 3 times daily as needed for itching     lifitegrast (XIIDRA) 5 % opthalmic solution Place 1 drop into both eyes 2 times daily     medical cannabis (Patient's own supply) Take 1 Dose by mouth See Admin Instructions (The purpose of this order is to document that the patient reports taking medical cannabis.  This is not a prescription, and is not used to certify that the patient has a qualifying medical condition.)     metFORMIN (GLUCOPHAGE) 500 MG tablet Take 2 tablets (1,000 mg) by mouth 2 times daily (with meals) 1000 mg 2 x a day     metroNIDAZOLE (METROGEL) 0.75 % gel Apply to face daily     nystatin (MYCOSTATIN) 819941 UNIT/GM external powder Apply topically 2 times daily as needed 2 x daily PRN for rash     omeprazole (PRILOSEC) 20 MG DR capsule Take 1 capsule (20 mg) by mouth 2 times daily     pimecrolimus (ELIDEL) 1 % external cream Apply topically 2 times daily - use on back of neck and hands     potassium chloride ER (KLOR-CON M) 10 MEQ CR tablet Take 1 tablet  (10 mEq) by mouth 2 times daily 20 mEq daily Take by mouth 2 times daily 20 mEq daily     PROAIR  (90 Base) MCG/ACT inhaler INHALE 2 PUFFS BY MOUTH EVERY 6 HOURS AS NEEDED FOR WHEEZING     promethazine (PHENERGAN) 25 MG suppository Place 25 mg rectally every 6 hours as needed For nusea     promethazine (PHENERGAN) 25 MG tablet Take 1 tablet (25 mg) by mouth every 6 hours as needed for nausea or other (Meniere's) For nusea     psyllium (METAMUCIL) 58.6 % POWD Take by mouth daily PRN     triamterene-HCTZ (DYAZIDE) 37.5-25 MG capsule Take 1 capsule by mouth daily 1 capsule every morning1 capsule every morning     UNABLE TO FIND MEDICATION NAME: Tumeric 1000 mg every day for pain     vitamin D2 (ERGOCALCIFEROL) 30750 units (1250 mcg) capsule Take 1 capsule (50,000 Units) by mouth once a week     EPINEPHrine (ANY BX GENERIC EQUIV) 0.3 MG/0.3ML injection 2-pack Inject 0.3 mLs (0.3 mg) into the muscle as needed for anaphylaxis (Patient not taking: Reported on 4/14/2021)     EPINEPHrine 0.3 MG/0.3ML SOLN PRN for Anaphylaxis     fentaNYL (DURAGESIC) 12 mcg/hr 72 hr patch Place 1 patch onto the skin every 72 hours remove old patch. Fill 05/05/21 to start 05/06/21     naloxone (NARCAN) 4 MG/0.1ML nasal spray Spray 1 spray (4 mg) into one nostril alternating nostrils once as needed for opioid reversal every 2-3 minutes until assistance arrives (Patient not taking: Reported on 4/14/2021)     STATIN NOT PRESCRIBED (INTENTIONAL) Please choose reason not prescribed, below     thin (NO BRAND SPECIFIED) lancets Use to test blood sugar 2 times daily or as directed. To accompany: Blood Glucose Monitor Brands: per insurance.     No current facility-administered medications for this visit.          Social History   See HPI    Family History     Family History   Problem Relation Age of Onset     C.A.D. Mother      Alzheimer Disease Mother      Cardiovascular Mother      Eye Disorder Mother      Thyroid Disease Mother       "Hypertension Other         grandmother     Cerebrovascular Disease Father      Alcohol/Drug Father      Depression Father      Obesity Father         aunt     Asthma Father      Alcohol/Drug Brother      Cancer Brother      Alcohol/Drug Sister         aunt     Cancer Sister      Allergies Other         All family members     Arthritis Other         aunt     Obesity Sister      Thyroid Disease Sister      Asthma Sister         daughter       Physical Exam     Temp Readings from Last 3 Encounters:   05/11/21 98.8  F (37.1  C) (Oral)   04/13/21 97.7  F (36.5  C) (Oral)   03/16/21 98.3  F (36.8  C) (Oral)     BP Readings from Last 5 Encounters:   05/21/21 124/65   05/12/21 135/78   05/11/21 138/70   04/14/21 123/68   04/13/21 121/70     Pulse Readings from Last 1 Encounters:   05/21/21 85     Resp Readings from Last 1 Encounters:   04/13/21 14     Estimated body mass index is 29.92 kg/m  as calculated from the following:    Height as of this encounter: 1.575 m (5' 2\").    Weight as of this encounter: 74.2 kg (163 lb 9.6 oz).      GEN: NAD. Healthy appearing adult.   HEENT: MMM.  Anicteric, noninjected sclera. No obvious external lesions of the ear and nose. Hearing intact.  PULM: No increased work of breathing  MSK: MCPs, PIPs, DIPs, wrists, elbows, shoulders, knees, ankles, and MTPs were diffusely tender to palpation but no swelling, increased warmth, or overlying erythema.  All fibromyalgia tender points positive.    SKIN: No rash or jaundice seen  PSYCH: Alert. Appropriate.      Labs / Imaging (select studies)   RF/CCP  Recent Labs   Lab Test 01/14/20  1221   CCPIGG 1   RHF 20*     CBC  Recent Labs   Lab Test 01/19/21  1130 10/28/20  1537 04/09/20  1226   WBC 6.5 7.1 3.8*   RBC 4.46 4.56 4.37   HGB 11.8 12.3 12.7   HCT 36.6 37.5 38.1   MCV 82 82 87   RDW 14.4 13.7 14.0    162 88*   MCH 26.5 27.0 29.1   MCHC 32.2 32.8 33.3   NEUTROPHIL 65.0 66.1 48.3   LYMPH 25.3 25.8 39.5   MONOCYTE 5.1 5.1 7.4   EOSINOPHIL " 4.0 2.5 4.2   BASOPHIL 0.3 0.1 0.3   ANEU 4.2 4.7 1.8   ALYM 1.6 1.8 1.5   MOISE 0.3 0.4 0.3   AEOS 0.3 0.2 0.2   ABAS 0.0 0.0 0.0     CMP  Recent Labs   Lab Test 03/16/21  1130 01/19/21  1130 01/08/21  1535 12/09/20  0851 10/28/20  1537 04/09/20  1226 01/14/20  1221 01/14/20  1221   NA  --   --  135  --   --  141  --  137   POTASSIUM  --   --  4.2  --   --  4.0  --  4.0   CHLORIDE  --   --  99  --   --  108  --  103   CO2  --   --  32  --   --  27  --  28   ANIONGAP  --   --  4  --   --  6  --  6   GLC  --   --  155*  --   --  121*  --  120*   BUN  --   --  16  --   --  17  --  13   CR 0.83 0.88 0.80  --  0.83 0.70  --  0.70   GFRESTIMATED 78 72 81  --  77 >90  --  >90   GFRESTBLACK >90 84 >90  --  90 >90  --  >90   MIRNA 10.2* 9.2 9.7  --  9.3 9.1   < > 9.5   BILITOTAL  --  0.4 0.5  --  0.6 0.4  --  0.5   ALBUMIN  --  3.9 4.1  --  4.1 4.0  --  4.0   PROTTOTAL  --  7.7 8.2  --  7.9 7.4  --  7.9   ALKPHOS  --  117 100  --  101 80  --  111   AST  --  23 23 22 21 36  --  30   ALT  --  36 45 37 37 52*  --  37    < > = values in this interval not displayed.     Calcium/VitaminD  Recent Labs   Lab Test 03/16/21  1130 01/19/21  1130 01/08/21  1535 10/28/20  1537 01/14/20  1221 01/14/20  1221   MIRNA 10.2* 9.2 9.7 9.3   < > 9.5   VITDT  --  25  --  33  --  40    < > = values in this interval not displayed.     ESR/CRP  Recent Labs   Lab Test 01/19/21  1130 01/08/21  1535 10/28/20  1537   SED 24 21 27   CRP 3.0 4.6 3.9     Hepatitis B  Recent Labs   Lab Test 01/14/20  1221   HBCAB Nonreactive   HEPBANG Nonreactive     Hepatitis C  Recent Labs   Lab Test 01/14/20  1221   HCVAB Nonreactive     Lyme ab screening  Recent Labs   Lab Test 01/14/20  1221   LYMEGM 0.05     Tuberculosis Screening  Recent Labs   Lab Test 01/14/20  1221   TBRES Negative     HIV Screening  Recent Labs   Lab Test 08/14/20  0736   HIAGAB Nonreactive         [facility]   7/18/2013 CCP negative  8/5/2010 CCP negative  3/27/2013 hepatitis  B surface antigen negative and hepatitis B core antibody negative  9/2/2011 hepatitis B surface antigen negative and hepatitis B core antibody negative  3/27/2013 hepatitis C antibody negative    Immunization History     Immunization History   Administered Date(s) Administered     COVID-19,PF,Pfizer 03/19/2021, 04/09/2021     FLU 6-35 months 10/22/2013     Influenza Vaccine IM > 6 months Valent IIV4 10/21/2015, 10/05/2016, 10/11/2018, 09/25/2019, 09/24/2020     Influenza Vaccine, 6+MO IM (QUADRIVALENT W/PRESERVATIVES) 09/18/2014, 10/03/2017     Pneumo Conj 13-V (2010&after) 10/11/2018     Pneumococcal 23 valent 12/09/2020     TD (ADULT, 7+) 04/20/1997, 04/17/2019     TDAP Vaccine (Adacel) 01/15/2009     Zoster vaccine recombinant adjuvanted (SHINGRIX) 10/11/2018          Chart documentation done in part with Dragon Voice recognition Software. Although reviewed after completion, some word and grammatical error may remain.    Terrence Carrillo MD

## 2021-05-21 NOTE — NURSING NOTE
RAPID3 (0-30) Cumulative Score  9.7          RAPID3 Weighted Score (divide #4 by 3 and that is the weighted score)  3.2

## 2021-05-25 DIAGNOSIS — Z76.0 ENCOUNTER FOR MEDICATION REFILL: ICD-10-CM

## 2021-05-25 DIAGNOSIS — E11.65 TYPE 2 DIABETES MELLITUS WITH HYPERGLYCEMIA, WITHOUT LONG-TERM CURRENT USE OF INSULIN (H): ICD-10-CM

## 2021-05-25 DIAGNOSIS — I10 HYPERTENSION GOAL BP (BLOOD PRESSURE) < 140/90: ICD-10-CM

## 2021-05-26 NOTE — TELEPHONE ENCOUNTER
Patient is out of her blood pressure medication, she has missed a couple doses already. She said she will run out of trulicity before her appointment as well and requested this be added on.   Routing refill request to provider for review/approval because:  Drug interaction warning      Asiya Coyle RN

## 2021-05-26 NOTE — TELEPHONE ENCOUNTER
Patient is out of medication does not have any to take. Patient stated patient was to take more a couple days ago. Patient  Would like a call back at [phone]. Patient stated a detailed voicemail can be left if patient does not answer.

## 2021-05-27 RX ORDER — TRIAMTERENE AND HYDROCHLOROTHIAZIDE 37.5; 25 MG/1; MG/1
1 CAPSULE ORAL DAILY
Qty: 90 CAPSULE | Refills: 2 | Status: SHIPPED | OUTPATIENT
Start: 2021-05-27 | End: 2022-01-28

## 2021-06-01 DIAGNOSIS — M06.09 RHEUMATOID ARTHRITIS OF MULTIPLE SITES WITHOUT RHEUMATOID FACTOR (H): ICD-10-CM

## 2021-06-01 DIAGNOSIS — G89.4 CHRONIC PAIN SYNDROME: ICD-10-CM

## 2021-06-01 DIAGNOSIS — F11.20 CONTINUOUS OPIOID DEPENDENCE (H): ICD-10-CM

## 2021-06-01 NOTE — TELEPHONE ENCOUNTER
Reason for call:  Medication   If this is a refill request, has the caller requested the refill from the pharmacy already? No  Will the patient be using a [facility]? No  Name of the pharmacy and phone number for the current request: [facility] [address], [place] - [id number] [address]    Name of the medication requested: HYDROcodone-acetaminophen (NORCO) 7.5-325 MG per tablet----------------fentaNYL (DURAGESIC) 12 mcg/hr 72 hr patch    Phone number to reach patient:  Home number on file [phone] (home)    Can we leave a detailed message on this number?  YES    Travel screening: Not Applicable       Yoly SHARIF    [facility] [place]

## 2021-06-01 NOTE — TELEPHONE ENCOUNTER
Patient requesting refill(s) of HYDROcodone-acetaminophen (NORCO) 7.5-325 MG per tablet  Last dispensed from pharmacy on 5/5/2021    fentaNYL (DURAGESIC) 12 mcg/hr 72 hr patch  Last dispensed from pharmacy on 5/5/2021    Patient's last office/virtual visit by prescribing provider on 5/12/2021  Next office/virtual appointment scheduled for 6/17/2021    Last urine drug screen date 10/12/2020  Current opioid agreement on file (completed within the last year) Yes Date of opioid agreement: 10/12/2020    E-prescribe to [facility] [address] [place] [address] [place]     Will route to nursing [place] for review and preparation of prescription(s).

## 2021-06-02 RX ORDER — FENTANYL 12.5 UG/1
1 PATCH TRANSDERMAL
Qty: 10 PATCH | Refills: 0 | Status: SHIPPED | OUTPATIENT
Start: 2021-06-02 | End: 2021-06-17

## 2021-06-02 RX ORDER — HYDROCODONE BITARTRATE AND ACETAMINOPHEN 7.5; 325 MG/1; MG/1
.5-1 TABLET ORAL EVERY 6 HOURS PRN
Qty: 30 TABLET | Refills: 0 | Status: SHIPPED | OUTPATIENT
Start: 2021-06-02 | End: 2021-06-17

## 2021-06-02 NOTE — TELEPHONE ENCOUNTER
Spoke to patient, notified that prescriptions were sent to preferred pharmacy.    Able to fill 06/03/21 and start 06/05/21      Lisette Pabon MA  [facility] [place]

## 2021-06-02 NOTE — TELEPHONE ENCOUNTER
Medication refill information reviewed.     Due date for      HYDROcodone-acetaminophen (NORCO) 7.5-325 MG per tablet     fentaNYL (DURAGESIC) 12 mcg/hr 72 hr patch is  06/05/21     Prescriptions prepped for review.     Will route to provider.

## 2021-06-02 NOTE — TELEPHONE ENCOUNTER
Script Eprescribed to pharmacy  [place] Prescription Monitoring Program checked    Will send this to MA team to notify patient.    Signed Prescriptions:                        Disp   Refills    fentaNYL (DURAGESIC) 12 mcg/hr 72 hr patch 10 pat*0        Sig: Place 1 patch onto the skin every 72 hours remove old           patch. Fill 06/03/21 to start 06/05/21  Authorizing Provider: SANTOS ISLAS    HYDROcodone-acetaminophen (NORCO) 7.5-325 *30 tab*0        Sig: Take 0.5-1 tablets by mouth every 6 hours as needed           for severe pain . Fill 06/03/21 to start on/after           06/05/21  Authorizing Provider: SANTOS ISLAS MD  [facility]

## 2021-06-07 ENCOUNTER — THERAPY VISIT (OUTPATIENT)
Dept: PHYSICAL THERAPY | Facility: CLINIC | Age: 61
End: 2021-06-07
Payer: COMMERCIAL

## 2021-06-07 DIAGNOSIS — R42 DIZZINESS: Primary | ICD-10-CM

## 2021-06-07 PROCEDURE — 97112 NEUROMUSCULAR REEDUCATION: CPT | Mod: GP | Performed by: PHYSICAL THERAPIST

## 2021-06-09 ENCOUNTER — OFFICE VISIT (OUTPATIENT)
Dept: FAMILY MEDICINE | Facility: CLINIC | Age: 61
End: 2021-06-09
Payer: COMMERCIAL

## 2021-06-09 VITALS
BODY MASS INDEX: 29.26 KG/M2 | HEART RATE: 84 BPM | DIASTOLIC BLOOD PRESSURE: 67 MMHG | WEIGHT: 160 LBS | TEMPERATURE: 98.5 F | SYSTOLIC BLOOD PRESSURE: 118 MMHG | RESPIRATION RATE: 16 BRPM | OXYGEN SATURATION: 97 %

## 2021-06-09 DIAGNOSIS — F11.20 CONTINUOUS OPIOID DEPENDENCE (H): ICD-10-CM

## 2021-06-09 DIAGNOSIS — F13.20 BENZODIAZEPINE DEPENDENCE, EPISODIC (H): ICD-10-CM

## 2021-06-09 DIAGNOSIS — F32.0 MILD MAJOR DEPRESSION (H): ICD-10-CM

## 2021-06-09 DIAGNOSIS — K59.03 DRUG-INDUCED CONSTIPATION: ICD-10-CM

## 2021-06-09 DIAGNOSIS — L71.9 ROSACEA: ICD-10-CM

## 2021-06-09 DIAGNOSIS — J45.20 MILD INTERMITTENT ASTHMA WITHOUT COMPLICATION: ICD-10-CM

## 2021-06-09 DIAGNOSIS — E11.65 TYPE 2 DIABETES MELLITUS WITH HYPERGLYCEMIA, WITHOUT LONG-TERM CURRENT USE OF INSULIN (H): Primary | ICD-10-CM

## 2021-06-09 DIAGNOSIS — G89.4 CHRONIC PAIN SYNDROME: ICD-10-CM

## 2021-06-09 LAB
CHOLEST SERPL-MCNC: 225 MG/DL
HBA1C MFR BLD: 7.1 % (ref 0–5.6)
HDLC SERPL-MCNC: 53 MG/DL
LDLC SERPL CALC-MCNC: 130 MG/DL
NONHDLC SERPL-MCNC: 172 MG/DL
TRIGL SERPL-MCNC: 212 MG/DL

## 2021-06-09 PROCEDURE — 36415 COLL VENOUS BLD VENIPUNCTURE: CPT | Performed by: PHYSICIAN ASSISTANT

## 2021-06-09 PROCEDURE — 83036 HEMOGLOBIN GLYCOSYLATED A1C: CPT | Performed by: PHYSICIAN ASSISTANT

## 2021-06-09 PROCEDURE — 80061 LIPID PANEL: CPT | Performed by: PHYSICIAN ASSISTANT

## 2021-06-09 PROCEDURE — 99214 OFFICE O/P EST MOD 30 MIN: CPT | Performed by: PHYSICIAN ASSISTANT

## 2021-06-09 RX ORDER — CLONAZEPAM 0.5 MG/1
0.5 TABLET ORAL 2 TIMES DAILY PRN
Qty: 100 TABLET | Refills: 1 | Status: SHIPPED | OUTPATIENT
Start: 2021-06-09 | End: 2021-10-04

## 2021-06-09 RX ORDER — DOCUSATE SODIUM 100 MG/1
100 CAPSULE, LIQUID FILLED ORAL 2 TIMES DAILY
Qty: 180 CAPSULE | Refills: 3 | Status: SHIPPED | OUTPATIENT
Start: 2021-06-09 | End: 2022-07-22

## 2021-06-09 RX ORDER — GLIMEPIRIDE 2 MG/1
2 TABLET ORAL
Qty: 90 TABLET | Refills: 0 | Status: SHIPPED | OUTPATIENT
Start: 2021-06-09 | End: 2021-09-04

## 2021-06-09 RX ORDER — ALBUTEROL SULFATE 90 UG/1
1-2 AEROSOL, METERED RESPIRATORY (INHALATION) EVERY 4 HOURS PRN
Qty: 18 G | Refills: 5 | Status: SHIPPED | OUTPATIENT
Start: 2021-06-09 | End: 2022-08-19

## 2021-06-09 RX ORDER — METRONIDAZOLE 7.5 MG/G
GEL TOPICAL 2 TIMES DAILY
Qty: 45 G | Refills: 5 | Status: SHIPPED | OUTPATIENT
Start: 2021-06-09

## 2021-06-09 ASSESSMENT — ANXIETY QUESTIONNAIRES
3. WORRYING TOO MUCH ABOUT DIFFERENT THINGS: SEVERAL DAYS
7. FEELING AFRAID AS IF SOMETHING AWFUL MIGHT HAPPEN: NOT AT ALL
5. BEING SO RESTLESS THAT IT IS HARD TO SIT STILL: NOT AT ALL
GAD7 TOTAL SCORE: 2
6. BECOMING EASILY ANNOYED OR IRRITABLE: NOT AT ALL
IF YOU CHECKED OFF ANY PROBLEMS ON THIS QUESTIONNAIRE, HOW DIFFICULT HAVE THESE PROBLEMS MADE IT FOR YOU TO DO YOUR WORK, TAKE CARE OF THINGS AT HOME, OR GET ALONG WITH OTHER PEOPLE: SOMEWHAT DIFFICULT
2. NOT BEING ABLE TO STOP OR CONTROL WORRYING: SEVERAL DAYS
1. FEELING NERVOUS, ANXIOUS, OR ON EDGE: NOT AT ALL

## 2021-06-09 ASSESSMENT — PATIENT HEALTH QUESTIONNAIRE - PHQ9
SUM OF ALL RESPONSES TO PHQ QUESTIONS 1-9: 4
5. POOR APPETITE OR OVEREATING: NOT AT ALL

## 2021-06-09 NOTE — PROGRESS NOTES
"    Assessment & Plan     1. Type 2 diabetes mellitus with hyperglycemia, without long-term current use of insulin (H)    2. Benzodiazepine dependence, episodic (H)    3. Chronic pain syndrome    4. Continuous opioid dependence (H)    5. Drug-induced constipation    6. Mild major depression (H)    7. Mild intermittent asthma without complication    8. Rosacea        1) A1c has improved, but she is having diarrhea with Trulicity. Will switch to Glimepiride 2mg daily. Recheck A1c in 3 months.     2,7,8) Meds renewed, no changes. Intermittent asthma diagnosis added to problem list.     3-5) Will re-certify her for medical cannabis. This was previously certified by Dr. Ken. UDS up to date. Docusate renewed.     6) Referral to counseling per patient request. PHQ and BONNIE at goal.      Review of the result(s) of each unique test - A1c  Ordering of each unique test  Prescription drug management         BMI:   Estimated body mass index is 29.26 kg/m  as calculated from the following:    Height as of 5/21/21: 1.575 m (5' 2\").    Weight as of this encounter: 72.6 kg (160 lb).       Return in about 3 months (around 9/9/2021) for repeat A1c, lab only ok.    Katerina Judd PA-C  [facility] KIMBERLY Leggett is a 61 year old who presents for the following health issues  accompanied by her daughter aBilee:    HPI     Diabetes Follow-up    How often are you checking your blood sugar? A few times a month  What time of day are you checking your blood sugars (select all that apply)?  afternoon  Have you had any blood sugars above 200?  No  Have you had any blood sugars below 70?  Yes 65    What symptoms do you notice when your blood sugar is low?  Shaky    What concerns do you have today about your diabetes? Other: diarrhea and bruising on injection site       Do you have any of these symptoms? (Select all that apply)  Numbness in feet and Blurry vision    Every other day - watery diarrhea       BP " Readings from Last 2 Encounters:   06/09/21 118/67   05/21/21 124/65     Hemoglobin A1C (%)   Date Value   06/09/2021 7.1 (H)   03/09/2021 8.0 (H)     LDL Cholesterol Calculated (mg/dL)   Date Value   12/09/2020 129 (H)   08/14/2020 123 (H)                 Uses medial cannabis for chronic pain syndrome  [email or web address]    Needing refills for clonazepam, docusate, metronidazole cream, and albuterol  Fam hx of asthma - chest/airway tightness  Would like a referral to counseling      Review of Systems   Constitutional, pulmonary, GI, musculoskeletal, endocrine and psych systems are negative, except as otherwise noted.      Objective    /67   Pulse 84   Temp 98.5  F (36.9  C) (Tympanic)   Resp 16   Wt 72.6 kg (160 lb)   SpO2 97%   BMI 29.26 kg/m    Body mass index is 29.26 kg/m .  Physical Exam   GENERAL: healthy, alert and no distress  MS: no gross musculoskeletal defects noted, no edema  SKIN: no suspicious lesions or rashes  NEURO: Normal strength and tone, mentation intact and speech normal  PSYCH: mentation appears normal, affect normal/bright    Results for orders placed or performed in visit on 06/09/21 (from the past 24 hour(s))   Hemoglobin A1c   Result Value Ref Range    Hemoglobin A1C 7.1 (H) 0 - 5.6 %

## 2021-06-09 NOTE — PROGRESS NOTES
"PEG 3 item pain scale     1. What number best describes your pain on average in the past week, on a scale from 0 to 10 where 0 is \"no pain\" and 10 is \"pain as bad as you can imagine\"? 5     The following two questions ask you to describe how, during the past week, pain has interfered with your life on a \"0 to 10\" scale, where 0 is \"does not interfere at all\" and 10 is \"completely interferes.\"     2. What number best describes how, during the past week, pain has interfered with your enjoyment of life? 8     3. What number best describes how, during the past week, pain has interfered with your general activity? 8     Scoring: The PEG score is the average of the 3 individual item scores. For clinical use, round to the nearest whole number. 7      If using medical cannabis, have you experienced any of the following negative effects? (check all that apply):   a. Physical side effects related to medical cannabis use (stomach upset, fatigue, headache, blurred vision, etc.)  no  b. Mental/cognitive side effects related to medical cannabis use (mental clouding, confusion, depression, etc.)  no  c. Worsening of symptoms related to the condition being treated  no  d. Difficulty/inconvenience in accessing medical cannabis  no  e. Other negative effect(s)  no   "

## 2021-06-10 ASSESSMENT — ANXIETY QUESTIONNAIRES: GAD7 TOTAL SCORE: 2

## 2021-06-14 ENCOUNTER — THERAPY VISIT (OUTPATIENT)
Dept: PHYSICAL THERAPY | Facility: CLINIC | Age: 61
End: 2021-06-14
Payer: COMMERCIAL

## 2021-06-14 DIAGNOSIS — R42 DIZZINESS: Primary | ICD-10-CM

## 2021-06-14 PROCEDURE — 97112 NEUROMUSCULAR REEDUCATION: CPT | Mod: GP | Performed by: PHYSICAL THERAPIST

## 2021-06-16 ENCOUNTER — OFFICE VISIT (OUTPATIENT)
Dept: URGENT CARE | Facility: URGENT CARE | Age: 61
End: 2021-06-16
Payer: COMMERCIAL

## 2021-06-16 ENCOUNTER — NURSE TRIAGE (OUTPATIENT)
Dept: NURSING | Facility: CLINIC | Age: 61
End: 2021-06-16

## 2021-06-16 VITALS
HEART RATE: 96 BPM | OXYGEN SATURATION: 96 % | WEIGHT: 159.2 LBS | DIASTOLIC BLOOD PRESSURE: 66 MMHG | BODY MASS INDEX: 29.12 KG/M2 | SYSTOLIC BLOOD PRESSURE: 136 MMHG | TEMPERATURE: 99.2 F

## 2021-06-16 DIAGNOSIS — H66.012 ACUTE SUPPURATIVE OTITIS MEDIA OF LEFT EAR WITH SPONTANEOUS RUPTURE OF TYMPANIC MEMBRANE, RECURRENCE NOT SPECIFIED: ICD-10-CM

## 2021-06-16 DIAGNOSIS — K11.21 PAROTITIS, ACUTE: Primary | ICD-10-CM

## 2021-06-16 PROCEDURE — 99214 OFFICE O/P EST MOD 30 MIN: CPT | Mod: 25 | Performed by: FAMILY MEDICINE

## 2021-06-16 PROCEDURE — 96372 THER/PROPH/DIAG INJ SC/IM: CPT | Performed by: FAMILY MEDICINE

## 2021-06-16 RX ORDER — CEFDINIR 300 MG/1
300 CAPSULE ORAL 2 TIMES DAILY
Qty: 20 CAPSULE | Refills: 0 | Status: SHIPPED | OUTPATIENT
Start: 2021-06-16 | End: 2021-06-26

## 2021-06-16 RX ORDER — METRONIDAZOLE 500 MG/1
500 TABLET ORAL 3 TIMES DAILY
Qty: 30 TABLET | Refills: 0 | Status: SHIPPED | OUTPATIENT
Start: 2021-06-16 | End: 2021-06-17

## 2021-06-16 RX ORDER — OFLOXACIN 3 MG/ML
10 SOLUTION AURICULAR (OTIC) DAILY
Qty: 4 ML | Refills: 0 | Status: SHIPPED | OUTPATIENT
Start: 2021-06-16 | End: 2021-06-23

## 2021-06-16 RX ORDER — CEFTRIAXONE SODIUM 1 G
1 VIAL (EA) INJECTION ONCE
Status: COMPLETED | OUTPATIENT
Start: 2021-06-16 | End: 2021-06-16

## 2021-06-16 RX ADMIN — Medication 1 G: at 15:53

## 2021-06-16 NOTE — TELEPHONE ENCOUNTER
Patient calling.    Reporting waking at noon with swelling behind left ear down to neck, cheek. Sore throat. Swelling is tender to touch and slightly red. Temp 99.9 Oral during triage. Reporting some blotchy redness over swelling.    Patient does wear dentures. Denies any pain in gums.     Blood sugar 197 this morning.     Disposition to be seen in office now.    Patient will go to [facility].      Dee Aviles RN  [facility]      COVID 19 Nurse Triage Plan/Patient Instructions    Please be aware that novel coronavirus (COVID-19) may be circulating in the community. If you develop symptoms such as fever, cough, or SOB or if you have concerns about the presence of another infection including coronavirus (COVID-19), please contact your health care provider or visit [email or web address].     Disposition/Instructions    In-Person Visit with provider recommended. Reference Visit Selection Guide.    Thank you for taking steps to prevent the spread of this virus.  o Limit your contact with others.  o Wear a simple mask to cover your cough.  o Wash your hands well and often.    Resources    [facility]: About COVID-19: [email or web address]    CDC: What to Do If You're Sick: www.cdc.gov/coronavirus/2019-ncov/about/steps-when-sick.html    CDC: Ending Home Isolation: www.cdc.gov/coronavirus/2019-ncov/hcp/disposition-in-home-patients.html     CDC: Caring for Someone: www.cdc.gov/coronavirus/2019-ncov/if-you-are-sick/care-for-someone.html     [facility]: Interim Guidance for Hospital Discharge to Home: www.health.[facility].[place]/diseases/coronavirus/hcp/hospdischarge.pdf    [facility] clinical trials (COVID-19 research studies): [email or web address]     Below are the COVID-19 hotlines at the [facility] ([facility]). Interpreters are available.   o For health questions: Call 657-813-6935 or 1-519.774.3784 (7 a.m. to 7 p.m.)  o For questions  about schools and childcare: Call 251-489-2741 or 1-752.446.1422 (7 a.m. to 7 p.m.)                     Reason for Disposition    Looks infected (e.g., spreading redness, pus)    Additional Information    Negative: Life-threatening reaction (anaphylaxis) in the past to similar substance (e.g., food, insect bite/sting, chemical, etc.) and < 2 hours since exposure    Negative: Unresponsive, passed out or very weak    Negative: Swollen tongue    Negative: Difficulty breathing or wheezing    Negative: Sounds like a life-threatening emergency to the triager    Negative: Followed an injury to face    Negative: Bee sting and within last 24 hours    Negative: Mosquito bite suspected    Negative: Insect bite suspected    Negative: SEVERE swelling of entire face and < 2 hours since exposure to high-risk allergen (e.g., peanuts, tree nuts, fish, shellfish or 1st dose of drug) and no serious symptoms AND [4] no serious allergic reaction in the past    Negative: Pregnant > 20 weeks    Negative: Postpartum (from 0 to 6 weeks after delivery)    Negative: Fever    Negative: Taking an ACE Inhibitor medication  (e.g., benazepril/LOTENSIN, captopril/CAPOTEN, enalapril/VASOTEC, lisinopril/ZESTRIL)    Negative: Patient sounds very sick or weak to the triager    Negative: SEVERE swelling of the entire face    Negative: Swelling began after taking a drug    Protocols used: FACE SWELLING-A-[place]

## 2021-06-16 NOTE — PROGRESS NOTES
Chief complaint: jaw and ear pain    Very swollen left parotid gland and ear pain  Just in the past 2-3 hours  Also noticed some ear drainage as well    No documented fevers    Started very quickly  Other symptoms:  no cough, colds, sinus congestion  Fever: No  Tried over the counter medications without relief  No fevers or chills chest pain or shortness of breath   No rash  Ill-contacts: none   Because of persistent and worsening symptoms came in to be seen    Problem list and histories reviewed & adjusted, as indicated.  Additional history: as documented    Problem list, Medication list, Allergies, and Medical/Social/Surgical histories reviewed in [facility] and updated as appropriate.    ROS:  Constitutional, HEENT, cardiovascular, pulmonary, gi and gu systems are negative, except as otherwise noted.    OBJECTIVE:                                                    /66   Pulse 96   Temp 99.2  F (37.3  C) (Tympanic)   Wt 72.2 kg (159 lb 3.2 oz)   SpO2 96%   BMI 29.12 kg/m    Body mass index is 29.12 kg/m .  GENERAL: healthy, alert and no distress  EYES: pink palpebral conjunctiva, anicteric sclera, pupils equally reactive to light and accomodation, extraocular muscles intact full and equal.  ENT: midline nasal septum, no nasal congestion   Left ear:pos tragal tenderness, no mastoid tenderness not visualized secondary to drainage tympaninc membrane   Left parotid grand very swollen and tender   Right ear: no tragal tenderness, no mastoid tenderness normal tympaninc membrane   NECK: no adenopathy, no asymmetry or  masses  RESP: lungs clear to auscultation - no rales, rhonchi or wheezes  CV: regular rate and rhythm, normal S1 S2, no S3 or S4, no murmur, click or rub, no peripheral edema and peripheral pulses strong  MS: no gross musculoskeletal defects noted, no edema  NEURO: Normal strength and tone, mentation intact and speech normal    Diagnostic Test Results:  No results found for this or any previous visit  (from the past 24 hour(s)).     ASSESSMENT/PLAN:                                                        ICD-10-CM    1. Parotitis, acute  K11.21 cefTRIAXone (ROCEPHIN) injection 1 g     cefdinir (OMNICEF) 300 MG capsule     metroNIDAZOLE (FLAGYL) 500 MG tablet   2. Acute suppurative otitis media of left ear with spontaneous rupture of tympanic membrane, recurrence not specified  H66.012 ofloxacin (FLOXIN) 0.3 % otic solution     Concern over very rapid development of symptoms   Given one dose of IM rocephin here.  Patient to ER if worsening symptoms or concerns  Start omnicef tomorrow, start flagyl today  Warned about possible cross-reactivity/allergy of cephalosporins with amoxicillin. Patient did not have any life-threatening reaction to amoxicillin and will be monitoring for any reaction.  Has tolerated cephalosporins in the past per patient   Prescribed with ofloxacin   Recommend to follow up with primary care provider in 2 days  Follow up with ent as well for re-evaluation earliest possible , at least within 1-2 weeks  Alarm signs or symptoms discussed, if present recommend go to ER   Prescribed with metronidazole or flagyl. Warned about metallic taste and advised no alcohol until 24 hours after the last dose.       MD Radha Correia MD  [facility]

## 2021-06-17 ENCOUNTER — VIRTUAL VISIT (OUTPATIENT)
Dept: PALLIATIVE MEDICINE | Facility: CLINIC | Age: 61
End: 2021-06-17
Payer: COMMERCIAL

## 2021-06-17 DIAGNOSIS — F11.20 CONTINUOUS OPIOID DEPENDENCE (H): ICD-10-CM

## 2021-06-17 DIAGNOSIS — M06.09 RHEUMATOID ARTHRITIS OF MULTIPLE SITES WITHOUT RHEUMATOID FACTOR (H): Primary | ICD-10-CM

## 2021-06-17 DIAGNOSIS — M47.816 LUMBAR FACET ARTHROPATHY: ICD-10-CM

## 2021-06-17 DIAGNOSIS — G89.4 CHRONIC PAIN SYNDROME: ICD-10-CM

## 2021-06-17 PROCEDURE — 99213 OFFICE O/P EST LOW 20 MIN: CPT | Mod: 95 | Performed by: PSYCHIATRY & NEUROLOGY

## 2021-06-17 RX ORDER — FENTANYL 12.5 UG/1
1 PATCH TRANSDERMAL
Qty: 10 PATCH | Refills: 0 | Status: SHIPPED | OUTPATIENT
Start: 2021-06-17 | End: 2021-08-02

## 2021-06-17 RX ORDER — HYDROCODONE BITARTRATE AND ACETAMINOPHEN 7.5; 325 MG/1; MG/1
.5-1 TABLET ORAL EVERY 6 HOURS PRN
Qty: 30 TABLET | Refills: 0 | Status: SHIPPED | OUTPATIENT
Start: 2021-06-17 | End: 2021-08-02

## 2021-06-17 NOTE — PATIENT INSTRUCTIONS
1. Schedule follow up in 2 months.  2. Refills sent in.    ----------------------------------------------------------------  Clinic Number:  [phone]     Call with any questions about your care and for scheduling assistance.     Calls are returned Monday through Friday between 8 AM and 4:30 PM. We usually get back to you within 2 business days depending on the issue/request.    If we are prescribing your medications:    For opioid medication refills, call the clinic or send a [employer] message 7 days in advance.  Please include:    Name of requested medication    Name of the pharmacy.    For non-opioid medications, call your pharmacy directly to request a refill. Please allow 3-4 days to be processed.     Per [place] State Law:    All controlled substance prescriptions must be filled within 30 days of being written.      For those controlled substances allowing refills, pickup must occur within 30 days of last fill.      We believe regular attendance is key to your success in our program!      Any time you are unable to keep your appointment we ask that you call us at least 24 hours in advance to cancel.This will allow us to offer the appointment time to another patient.     Multiple missed appointments may lead to dismissal from the clinic.

## 2021-06-17 NOTE — NURSING NOTE
Clinic Administered Medication Documentation      Injectable Medication Documentation    Patient was given Ceftriaxone Sodium (Rocephin). Prior to medication administration, verified patients identity using patient s name and date of birth. Please see MAR and medication order for additional information. Patient instructed to remain in clinic for 15 minutes.      Was entire vial of medication used? Yes  Vial/Syringe: Single dose vial  Expiration Date:  05/30/2022  Was this medication supplied by the patient? No

## 2021-06-17 NOTE — PROGRESS NOTES
Assessment & Plan   Problem List Items Addressed This Visit     None      Visit Diagnoses     Parotitis    -  Primary    Relevant Medications    cefdinir (OMNICEF) 300 MG capsule    metroNIDAZOLE (FLAGYL) 500 MG tablet    Rosacea             Likely resolving parotitis.  Appears well and non-toxic and I have low suspicion for systemic illness, impending airway obstruction or respiratory distress. Improving per patient and daughter. Will extend Flagyl/Omnicef course to 14 days total unless she is completely better with 10 days. Follow up as needed.    Complete history and physical exam as below. AF with normal VS.    DDx and Dx discussed with and explained to the pt to their satisfaction.  All questions were answered at this time. Pt expressed understanding of and agreement with this dx, tx, and plan. No further workup warranted and standard medication warnings given. I have given the patient a list of pertinent indications for re-evaluation. Will go to the Emergency Department if symptoms worsen or new concerning symptoms arise. Patient left in no apparent distress.     28 minutes spent on the date of the encounter doing chart review, history and exam, documentation and further activities per the note    See Patient Instructions    Return in about 2 weeks (around 7/2/2021) for a recheck of your symptoms if not improving, or call 911/go to an ER anytime if worsening.    [place]  [facility] KIMBERLY Leggett is a 61 year old who presents for the following health issues  accompanied by her daughter:    HPI     Concern - Swelling  Onset: 2 days  Description: face, down left side of neck, left ear  Intensity: moderate  Progression of Symptoms:  improving  Accompanying Signs & Symptoms: No  Previous history of similar problem: Yes, ear swelling  Precipitating factors:        Worsened by: No  Alleviating factors:        Improved by: No  Therapies tried and outcome: antibiotics,  pain pill    Review of Systems   Constitutional, HEENT, cardiovascular, pulmonary, gi and gu systems are negative, except as otherwise noted.      Objective    /63   Pulse 74   Temp 98.3  F (36.8  C) (Tympanic)   Resp 14   Wt 73.5 kg (162 lb)   SpO2 99%   BMI 29.63 kg/m    Body mass index is 29.63 kg/m .  Physical Exam  Vitals signs and nursing note reviewed.   Constitutional:       General: She is not in acute distress.     Appearance: Normal appearance. She is not diaphoretic.   HENT:      Head: Normocephalic and atraumatic.      Nose: Nose normal.      Mouth/Throat:      Mouth: Mucous membranes are moist.      Pharynx: Oropharynx is clear.      Comments: Sublingual space soft. No trismus.  Eyes:      Conjunctiva/sclera: Conjunctivae normal.   Neck:      Musculoskeletal: Normal range of motion and neck supple. No neck rigidity.      Comments: Left sided submandibular adenopathy. Tender along the masseter and submandibular space with mild induration and trace erythema. No fluctuance.   Pulmonary:      Effort: Pulmonary effort is normal. No respiratory distress.   Skin:     General: Skin is dry.      Coloration: Skin is not jaundiced or pale.   Neurological:      General: No focal deficit present.      Mental Status: She is alert. Mental status is at baseline.   Psychiatric:         Mood and Affect: Mood normal.         Behavior: Behavior normal.

## 2021-06-17 NOTE — PROGRESS NOTES
Is Pt currently in [place]? Yes  Betsey is a 61 year old who is being evaluated via a billable video visit.      How would you like to obtain your AVS? MyChart  If the video visit is dropped, the invitation should be resent by: Text to cell phone: [phone]  Will anyone else be joining your video visit? No       Geena Farmer CMA (AAMA)      Video Start Time: 1330  Video-Visit Details    Type of service:  Video Visit    Video End Time:1339    Originating Location (pt. Location): Home    Distant Location (provider location):  [facility] [place]     Platform used for Video Visit: Ustream                                 [facility]    Date of visit: 6/17/2021     Chief complaint:   Chief Complaint   Patient presents with     Pain       Interval history:  Betsey Vanessa was last seen by me on 4/14/21    Recommendations/plan at the last visit included:  1. Physical Therapy: she is scheduled again for PT with Renae. Advised she can also schedule hand therapy  2. Sports med referral for bilateral knee pain- likely osteoarthritis compoent.  3. Clinical Health Psychologist to address issues of relaxation, behavioral change, coping style, and other factors important to improvement.  prn  4. Diagnostic Studies:  none  5. Medication Management:    1. No changes to opioids- have made number reflect use.  2. We have discussed opioids/benzos. Recommended that she continue to work on lowering the clonazepam use. She describes some of it is for her Meneires.  6. Further procedures recommended: given inflammation and long lasting nature, will repeat the facet joint injections for the low back.  7. Recommendations to PCP: none  8. Follow up: 2 months, virtual    Since her last visit, Btesey Vanessa reports:  -she has been sick with an ear infection  -she had lumbar facet joint injections- they worked well for her and her back is doing welel.  -she missed her last treatment for her Orencia due to  transportation. She waiting a lot time.  -  Pain scores:  Data Unavailable  6/10      Current pain treatments:   Celebrex 100mg daily- lower dose   Fentanyl 12mcg/hr- has decreased in the past, concerned about coming down on that right now.  Norco 7.5/325- using 0-1day, rare 2.  #30/month working  Medical cannabis- helps but causes side effects like dizziness and dry mouth.  Diclofenac gel- helps   salanpas patch- over the counter  Emu oil    Clonazepam 0.5mg tabs- currently on taper with PCP  Narcan ordered    Previous medication treatments included:  Gabapentin - fast heartbeat, sweating  Cymbalta- hyperactivity  topamax- confusion  Amitriptyline- hyperactivity  Tramadol  darvocet    Other treatments have included:  Betsey Vanessa has been seen at a pain clinic in the past.    PT: hand therapy.- last about 3 years ago.  Acupuncture: no  TENs Unit: no  Injections:   Hand steroid injections-   Neck injections    Side Effects: none    Medications:  Current Outpatient Medications   Medication Sig Dispense Refill     alcohol swab prep pads Use to swab area of injection/rosie as directed 100 each 3     blood glucose (NO BRAND SPECIFIED) test strip Use to test blood sugar 2 times daily or as directed. To accompany: Blood Glucose Monitor Brands: per insurance. 100 strip 6     blood glucose calibration (NO BRAND SPECIFIED) solution Use to calibrate blood glucose monitor as needed as directed. To accompany: Blood Glucose Monitor Brands: per insurance. 1 Bottle 3     cefdinir (OMNICEF) 300 MG capsule Take 1 capsule (300 mg) by mouth 2 times daily for 10 days Start tomorrow morning 20 capsule 0     celecoxib (CELEBREX) 100 MG capsule Take 1 capsule (100 mg) by mouth 2 times daily 180 capsule 1     cetirizine (ZYRTEC) 10 MG tablet Take 1 tablet (10 mg) by mouth daily 90 tablet 3     cevimeline (EVOXAC) 30 MG capsule Take 1 capsule (30 mg) by mouth 3 times daily 270 capsule 3     clonazePAM (KLONOPIN) 0.5 MG tablet Take 1 tablet  (0.5 mg) by mouth 2 times daily as needed for anxiety OK to take 1-2 tabs at night as needed for sleep. Should last about 90 days 100 tablet 1     diclofenac (VOLTAREN) 1 % topical gel Place 2-4 g onto the skin 4 times daily . Max of 8g per dose. No more than 32g/day 100 g 3     docusate sodium (COLACE) 100 MG capsule Take 1 capsule (100 mg) by mouth 2 times daily 180 capsule 3     escitalopram (LEXAPRO) 20 MG tablet Take 1 tablet (20 mg) by mouth daily 90 tablet 1     fentaNYL (DURAGESIC) 12 mcg/hr 72 hr patch Place 1 patch onto the skin every 72 hours remove old patch. Fill 06/03/21 to start 06/05/21 10 patch 0     fluticasone (FLONASE) 50 MCG/ACT nasal spray Spray 1-2 sprays into both nostrils daily 16 g 11     fluvastatin (LESCOL) 20 MG capsule Take 1 capsule (20 mg) by mouth At Bedtime 90 capsule 1     glimepiride (AMARYL) 2 MG tablet Take 1 tablet (2 mg) by mouth every morning (before breakfast) 90 tablet 0     glucose blood VI test strips strip daily       HYDROcodone-acetaminophen (NORCO) 7.5-325 MG per tablet Take 0.5-1 tablets by mouth every 6 hours as needed for severe pain . Fill 06/03/21 to start on/after 06/05/21 30 tablet 0     hydroxychloroquine (PLAQUENIL) 200 MG tablet Hydroxychloroquine 200mg daily; and an additional 200mg every other day. 135 tablet 1     hydrOXYzine (ATARAX) 25 MG tablet Take 1 tablet (25 mg) by mouth 3 times daily as needed for itching 40 tablet 0     lifitegrast (XIIDRA) 5 % opthalmic solution Place 1 drop into both eyes 2 times daily 24 each 6     medical cannabis (Patient's own supply) Take 1 Dose by mouth See Admin Instructions (The purpose of this order is to document that the patient reports taking medical cannabis.  This is not a prescription, and is not used to certify that the patient has a qualifying medical condition.)       metFORMIN (GLUCOPHAGE) 500 MG tablet Take 2 tablets (1,000 mg) by mouth 2 times daily (with meals) 1000 mg 2 x a day 360 tablet 3      metroNIDAZOLE (METROGEL) 0.75 % external gel Apply topically 2 times daily Apply to face daily 45 g 5     nystatin (MYCOSTATIN) 712747 UNIT/GM external powder Apply topically 2 times daily as needed 2 x daily PRN for rash 60 g 4     ofloxacin (FLOXIN) 0.3 % otic solution Place 10 drops Into the left ear daily for 7 days 4 mL 0     omeprazole (PRILOSEC) 20 MG DR capsule Take 1 capsule (20 mg) by mouth 2 times daily 180 capsule 3     pimecrolimus (ELIDEL) 1 % external cream Apply topically 2 times daily - use on back of neck and hands 60 g 5     potassium chloride ER (KLOR-CON M) 10 MEQ CR tablet Take 1 tablet (10 mEq) by mouth 2 times daily 20 mEq daily Take by mouth 2 times daily 20 mEq daily 180 tablet 3     PROAIR  (90 Base) MCG/ACT inhaler Inhale 1-2 puffs into the lungs every 4 hours as needed for shortness of breath / dyspnea or wheezing 18 g 5     promethazine (PHENERGAN) 25 MG suppository Place 25 mg rectally every 6 hours as needed For nusea       promethazine (PHENERGAN) 25 MG tablet Take 1 tablet (25 mg) by mouth every 6 hours as needed for nausea or other (Meniere's) For nusea 90 tablet 0     psyllium (METAMUCIL) 58.6 % POWD Take by mouth daily PRN       triamterene-HCTZ (DYAZIDE) 37.5-25 MG capsule Take 1 capsule by mouth daily 90 capsule 2     vitamin D2 (ERGOCALCIFEROL) 99735 units (1250 mcg) capsule Take 1 capsule (50,000 Units) by mouth once a week 12 capsule 2     EPINEPHrine (ANY BX GENERIC EQUIV) 0.3 MG/0.3ML injection 2-pack Inject 0.3 mLs (0.3 mg) into the muscle as needed for anaphylaxis (Patient not taking: Reported on 4/14/2021) 2 each 0     EPINEPHrine 0.3 MG/0.3ML SOLN PRN for Anaphylaxis       naloxone (NARCAN) 4 MG/0.1ML nasal spray Spray 1 spray (4 mg) into one nostril alternating nostrils once as needed for opioid reversal every 2-3 minutes until assistance arrives (Patient not taking: Reported on 6/17/2021) 0.2 mL 1     STATIN NOT PRESCRIBED (INTENTIONAL) Please choose reason  not prescribed, below       thin (NO BRAND SPECIFIED) lancets Use to test blood sugar 2 times daily or as directed. To accompany: Blood Glucose Monitor Brands: per insurance. 1 each 3     UNABLE TO FIND MEDICATION NAME: Tumeric 1000 mg every day for pain         Medical History: any changes in medical history since they were last seen? None    Physical Exam:  There were no vitals taken for this visit.  Awake, alert   Normal speech.  Remainder of exam deferred.    THE 4 A's OF OPIOID MAINTENANCE ANALGESIA    Analgesia: good    Activity: improved with meds    Adverse effects: none    Adherence to Rx protocol: good.  We have discussed risks with benzo/opioid. She will try to avoid at night. She has limited her use of benzo but can't get off.    [place] Board of Pharmacy Data Base Reviewed:    YES; 6/17/2021 no concerns.  UDS/CSA  10/12/20        Assessment:   1. Rheumatoid arthritis  2. fibromyalgia  3. Sjogren's Syndrome  4. Meniere's disease with intermittent vertigo and hearing loss  5. Type 2 diabetes      Plan:  1. Physical Therapy: continue PRN    2. Clinical Health Psychologist to address issues of relaxation, behavioral change, coping style, and other factors important to improvement.  prn  3. Diagnostic Studies:  none  4. Medication Management:    1. No changes to opioids- have made number reflect use.  2. We have discussed opioids/benzos. Recommended that she continue to work on lowering the clonazepam use. She describes some of it is for her Meneires.  5. Further procedures recommended: may need repeat facet joint injections vs radiofrequency ablation in the future.  6. Recommendations to PCP: none  7. Follow up: 2 months, virtual- use [employer]    21 minutes spent on the date of encounter doing chart review, history, and exam documentation and further activities as noted above.     Bailee Houser MD

## 2021-06-18 ENCOUNTER — OFFICE VISIT (OUTPATIENT)
Dept: FAMILY MEDICINE | Facility: CLINIC | Age: 61
End: 2021-06-18
Payer: COMMERCIAL

## 2021-06-18 VITALS
TEMPERATURE: 98.3 F | OXYGEN SATURATION: 99 % | HEART RATE: 74 BPM | WEIGHT: 162 LBS | DIASTOLIC BLOOD PRESSURE: 63 MMHG | SYSTOLIC BLOOD PRESSURE: 129 MMHG | RESPIRATION RATE: 14 BRPM | BODY MASS INDEX: 29.63 KG/M2

## 2021-06-18 DIAGNOSIS — L71.9 ROSACEA: ICD-10-CM

## 2021-06-18 DIAGNOSIS — K11.20 PAROTITIS: Primary | ICD-10-CM

## 2021-06-18 PROCEDURE — 99213 OFFICE O/P EST LOW 20 MIN: CPT | Performed by: PHYSICIAN ASSISTANT

## 2021-06-18 RX ORDER — METRONIDAZOLE 500 MG/1
500 TABLET ORAL 3 TIMES DAILY
Qty: 12 TABLET | Refills: 0 | Status: SHIPPED | OUTPATIENT
Start: 2021-06-18 | End: 2021-06-22

## 2021-06-18 RX ORDER — CEFDINIR 300 MG/1
300 CAPSULE ORAL 2 TIMES DAILY
Qty: 8 CAPSULE | Refills: 0 | Status: SHIPPED | OUTPATIENT
Start: 2021-06-18 | End: 2021-06-22

## 2021-06-18 NOTE — PATIENT INSTRUCTIONS
Yohan Leggett,    Thank you for allowing [facility] to manage your care.    If you develop worsening/changing symptoms at any time, please call 911 or go to the emergency department for evaluation.    Drink 8-10 glasses of fluid daily to stay well-hydrated.    If you have any questions or concerns, please feel free to call us at [phone]    Sincerely,    Eduardo Fraga PA-C    Did you know?      You can schedule a video visit for follow-up appointments as well as future appointments for certain conditions.  Please see the below link.     [email or web address]    If you have not already done so,  I encourage you to sign up for "[employer]" ([email or web address]).  This will allow you to review your results, securely communicate with a provider, and schedule virtual visits as well.      Patient Education     Salivary Gland Infection  Salivary glands make saliva. Saliva is mostly water. It also has minerals and proteins that help break down food and keep the mouth and teeth healthy. There are 3 pairs of salivary glands:     Parotid glands. In front of the ear.    Submandibular glands. Below the jaw.    Sublingual glands. Below the tongue.  A salivary gland can become infected by germs (bacteria). Things that make this more likely include dehydration and taking medicines that affect saliva flow. Infection is also more likely when the tube (duct) that carries saliva from the gland to the mouth is blocked. It may be blocked by a salivary gland stone. This is a collection of minerals that forms in the salivary gland.   Symptoms of infection include fever, severe pain in the gland, and redness and swelling over the gland. It may hurt to open your mouth. Symptoms may be worse when saliva flow is stimulated, such as by the smell of food.    Antibiotics are used to treat the infection. Draining the infection with a simple surgery may be needed. If you have a salivary gland  stone, a procedure may be done to remove it.   Home care    Take antibiotics as directed until they are finished. Do this even if you start to feel better after only a few days.    Unless another medicine was prescribed, take over-the-counter medicines to help ease pain. These include acetaminophen or ibuprofen.    Moist heat can also help ease swelling and pain. Wet a cloth with warm water and put it over the sore gland for 10 to 15 minutes several times a day.    Gently massage the gland a few times a day.     Suck on lemon or other tart hard candies to make saliva flow.  To help prevent stones and infections:     Drink 6 to 8 glasses of fluid per day (such as water, tea, and clear soup) to keep well-hydrated.    If you smoke, ask your healthcare provider for help to quit. Smoking makes salivary gland stones more likely.    Keep good dental hygiene. Brush and floss your teeth daily. See your dentist for regular cleanings.  Follow-up care  Follow up with your healthcare provider, or as advised.    When to call your healthcare provider   Call your healthcare provider if any of the following occur:     Fever over 100.4 F (38 C) after 2 days of taking antibiotics    Symptoms that get worse or don't get better in a few days  Call 911  Call 911 if you have trouble breathing or swallowing.   [employer] last reviewed this educational content on 3/1/2020    4199-7053 [employer]. All rights reserved. This information is not intended as a substitute for professional medical care. Always follow your healthcare professional's instructions.

## 2021-06-19 ASSESSMENT — ASTHMA QUESTIONNAIRES: ACT_TOTALSCORE: 22

## 2021-06-28 ENCOUNTER — TELEPHONE (OUTPATIENT)
Dept: RHEUMATOLOGY | Facility: CLINIC | Age: 61
End: 2021-06-28

## 2021-06-28 NOTE — TELEPHONE ENCOUNTER
Called patient who has her Orencia scheduled for tomorrow. Advised patient that she should hold off on infusion until she has been cleared of infection and is done with antibiotic therapy. Patient verbalized understanding and has no questions.    Jaime SOLOMON RN....6/28/2021 8:39 AM

## 2021-07-13 ENCOUNTER — INFUSION THERAPY VISIT (OUTPATIENT)
Dept: INFUSION THERAPY | Facility: CLINIC | Age: 61
End: 2021-07-13
Payer: COMMERCIAL

## 2021-07-13 VITALS
HEART RATE: 80 BPM | BODY MASS INDEX: 30 KG/M2 | SYSTOLIC BLOOD PRESSURE: 117 MMHG | DIASTOLIC BLOOD PRESSURE: 49 MMHG | OXYGEN SATURATION: 98 % | RESPIRATION RATE: 16 BRPM | TEMPERATURE: 98.1 F | WEIGHT: 164 LBS

## 2021-07-13 DIAGNOSIS — M06.09 RHEUMATOID ARTHRITIS OF MULTIPLE SITES WITH NEGATIVE RHEUMATOID FACTOR (H): Primary | ICD-10-CM

## 2021-07-13 PROCEDURE — 99207 PR NO CHARGE LOS: CPT

## 2021-07-13 PROCEDURE — 96365 THER/PROPH/DIAG IV INF INIT: CPT | Performed by: NURSE PRACTITIONER

## 2021-07-13 RX ORDER — HEPARIN SODIUM,PORCINE 10 UNIT/ML
5 VIAL (ML) INTRAVENOUS
Status: CANCELLED | OUTPATIENT
Start: 2021-08-03

## 2021-07-13 RX ORDER — HEPARIN SODIUM (PORCINE) LOCK FLUSH IV SOLN 100 UNIT/ML 100 UNIT/ML
5 SOLUTION INTRAVENOUS
Status: CANCELLED | OUTPATIENT
Start: 2021-08-03

## 2021-07-13 ASSESSMENT — PAIN SCALES - GENERAL: PAINLEVEL: MILD PAIN (3)

## 2021-07-13 NOTE — PROGRESS NOTES
Infusion Nursing Note:  Betsey Vanessa presents today for Orencia.    Patient seen by provider today: No   present during visit today: Not Applicable.    Note: N/A.  Patient did meet criteria for an asymptomatic covid-19 PCR test in infusion today. Patient declined the covid-19 test.    Intravenous Access:  Peripheral IV placed.    Treatment Conditions:  Biological Infusion Checklist:  ~~~ NOTE: If the patient answers yes to any of the questions below, hold the infusion and contact ordering provider or on-call provider.    1. Have you recently had an elevated temperature, fever, chills, productive cough, coughing for 3 weeks or longer or hemoptysis, abnormal vital signs, night sweats,  chest pain or have you noticed a decrease in your appetite, unexplained weight loss or fatigue? No  2. Do you have any open wounds or new incisions? No  3. Do you have any recent or upcoming hospitalizations, surgeries or dental procedures? No  4. Do you currently have or recently have had any signs of illness or infection or are you on any antibiotics? No  5. Have you had any new, sudden or worsening abdominal pain? No  6. Have you or anyone in your household received a live vaccination in the past 4 weeks? Please note:  No live vaccines while on biologic/chemotherapy until 6 months after the last treatment.  Patient can receive the flu vaccine (shot only) and the pneumovax.  It is optimal for the patient to get these vaccines mid cycle, but they can be given at any time as long as it is not on the day of the infusion. No  7. Have you recently been diagnosed with any new nervous system diseases (ie. Multiple sclerosis, Guillain [place], seizures, neurological changes) or cancer diagnosis? No  8. Are you on any form of radiation or chemotherapy? No  9. Are you pregnant or breast feeding or do you have plans of pregnancy in the future? No  10. Have you been having any signs of worsening depression or suicidal ideations?   (benlysta only) No  11. Have there been any other new onset medical symptoms? No      Post Infusion Assessment:  Patient tolerated infusion without incident.  Blood return noted pre and post infusion.  Site patent and intact, free from redness, edema or discomfort.  No evidence of extravasations.  Access discontinued per protocol.       Discharge Plan:   Patient will return 8/10/2021 for next appointment.   Patient discharged in stable condition accompanied by: self.  Departure Mode: Ambulatory.    Millicent Hernandez RN-BSN, PHN, OCN  [facility]

## 2021-07-27 ENCOUNTER — TELEPHONE (OUTPATIENT)
Dept: OTOLARYNGOLOGY | Facility: CLINIC | Age: 61
End: 2021-07-27

## 2021-07-28 ENCOUNTER — ANCILLARY PROCEDURE (OUTPATIENT)
Dept: MAMMOGRAPHY | Facility: CLINIC | Age: 61
End: 2021-07-28
Attending: PHYSICIAN ASSISTANT
Payer: COMMERCIAL

## 2021-07-28 DIAGNOSIS — Z12.31 ENCOUNTER FOR SCREENING MAMMOGRAM FOR BREAST CANCER: ICD-10-CM

## 2021-07-28 PROCEDURE — 77063 BREAST TOMOSYNTHESIS BI: CPT | Mod: TC | Performed by: RADIOLOGY

## 2021-07-28 PROCEDURE — 77067 SCR MAMMO BI INCL CAD: CPT | Mod: TC | Performed by: RADIOLOGY

## 2021-08-02 DIAGNOSIS — G89.4 CHRONIC PAIN SYNDROME: ICD-10-CM

## 2021-08-02 DIAGNOSIS — F11.20 CONTINUOUS OPIOID DEPENDENCE (H): ICD-10-CM

## 2021-08-02 DIAGNOSIS — M06.09 RHEUMATOID ARTHRITIS OF MULTIPLE SITES WITHOUT RHEUMATOID FACTOR (H): ICD-10-CM

## 2021-08-02 DIAGNOSIS — E11.65 TYPE 2 DIABETES MELLITUS WITH HYPERGLYCEMIA, WITHOUT LONG-TERM CURRENT USE OF INSULIN (H): ICD-10-CM

## 2021-08-02 RX ORDER — HYDROCODONE BITARTRATE AND ACETAMINOPHEN 7.5; 325 MG/1; MG/1
.5-1 TABLET ORAL EVERY 6 HOURS PRN
Qty: 30 TABLET | Refills: 0 | Status: SHIPPED | OUTPATIENT
Start: 2021-08-02 | End: 2021-09-02

## 2021-08-02 RX ORDER — FENTANYL 12.5 UG/1
1 PATCH TRANSDERMAL
Qty: 10 PATCH | Refills: 0 | Status: SHIPPED | OUTPATIENT
Start: 2021-08-02 | End: 2021-09-02

## 2021-08-02 NOTE — TELEPHONE ENCOUNTER
Received call from patient requesting refill(s) of fentaNYL (DURAGESIC) 12 mcg/hr 72 hr patch   HYDROcodone-acetaminophen (NORCO) 7.5-325 MG per tablet    Last dispensed from pharmacy on both on 7/3/21    Patient's last office/virtual visit by prescribing provider on 6/17/21  Next office/virtual appointment scheduled for 9/2/21    Last urine drug screen date 10/12/20  Current opioid agreement on file (completed within the last year) Yes Date of opioid agreement: 1/11/21    E-prescribe to [facility] [address] [place] [address]  pharmacy    Will route to nursing Kyle for review and preparation of prescription(s).

## 2021-08-02 NOTE — TELEPHONE ENCOUNTER
Reason for call:  Medication   If this is a refill request, has the caller requested the refill from the pharmacy already? No  Will the patient be using a [facility]? No  Name of the pharmacy and phone number for the current request: [id number] - [place], [place] - [address]    Name of the medication requested:  HYDROcodone-acetaminophen (NORCO) 7.5-325 MG per tablet    fentaNYL (DURAGESIC) 12 mcg/hr 72 hr patch    Other request: none    Phone number to reach patient:  Cell number on file:    Telephone Information:   Mobile [phone]       Best Time:  anytime    Can we leave a detailed message on this number?  YES    Travel screening: Not Applicable     Dee CONNORS    [facility]

## 2021-08-02 NOTE — TELEPHONE ENCOUNTER
Script Eprescribed to pharmacy  [place] Prescription Monitoring Program checked    Will send this to MA team to notify patient.    Signed Prescriptions:                        Disp   Refills    HYDROcodone-acetaminophen (NORCO) 7.5-325 *30 tab*0        Sig: Take 0.5-1 tablets by mouth every 6 hours as needed           for severe pain . Fill 8/2/21 to start on/after           8/4/21  Authorizing Provider: SANTOS ISLAS    fentaNYL (DURAGESIC) 12 mcg/hr 72 hr patch 10 pat*0        Sig: Place 1 patch onto the skin every 72 hours remove old           patch. Fill 8/2/21 to start 8/4/21  Authorizing Provider: SANTOS ISLAS MD  [facility]

## 2021-08-02 NOTE — TELEPHONE ENCOUNTER
Medication refill information reviewed.     Due date for fentaNYL (DURAGESIC) 12 mcg/hr 72 hr patch is 8/4/21     Due date for HYDROcodone-acetaminophen (NORCO) 7.5-325 MG per tablet is 8/4/21     Prescriptions prepped for review.     Will route to provider.     Alexandr Martínez RN  Patient Care Supervisor   [facility] [place]

## 2021-08-03 DIAGNOSIS — E11.65 TYPE 2 DIABETES MELLITUS WITH HYPERGLYCEMIA, WITHOUT LONG-TERM CURRENT USE OF INSULIN (H): ICD-10-CM

## 2021-08-03 NOTE — TELEPHONE ENCOUNTER
Reason for Call:  Other prescription    Detailed comments: patient would like her metformin refilled    Phone Number Patient can be reached at: Home number on file [phone] (home)    Best Time: anytime    Can we leave a detailed message on this number? YES    Call taken on 8/3/2021 at 2:08 PM by Marsha Escobedo

## 2021-08-03 NOTE — TELEPHONE ENCOUNTER
Spoke with patient and she does want to have Katerina Judd as her PCP.  PCP changed  Medication pended for approval, dose verified.  Pended with reminder appt due in 9/2021.  Mallika Marquis RN  [facility]

## 2021-08-03 NOTE — TELEPHONE ENCOUNTER
LVM for patient that prescriptions were sent to pharmacy. Confirmed pharmacy.      Gave fill date today.    Gave start date tomorrow.    Naty Contreras [facility] [place]

## 2021-08-04 ENCOUNTER — OFFICE VISIT (OUTPATIENT)
Dept: NEUROLOGY | Facility: CLINIC | Age: 61
End: 2021-08-04
Attending: PSYCHIATRY & NEUROLOGY
Payer: COMMERCIAL

## 2021-08-04 DIAGNOSIS — R26.89 IMBALANCE: ICD-10-CM

## 2021-08-04 DIAGNOSIS — G56.21 ULNAR NEUROPATHY AT ELBOW OF RIGHT UPPER EXTREMITY: Primary | ICD-10-CM

## 2021-08-04 PROCEDURE — 95912 NRV CNDJ TEST 11-12 STUDIES: CPT | Performed by: PSYCHIATRY & NEUROLOGY

## 2021-08-04 PROCEDURE — 95885 MUSC TST DONE W/NERV TST LIM: CPT | Mod: 59 | Performed by: PSYCHIATRY & NEUROLOGY

## 2021-08-04 PROCEDURE — 95886 MUSC TEST DONE W/N TEST COMP: CPT | Performed by: PSYCHIATRY & NEUROLOGY

## 2021-08-04 NOTE — TELEPHONE ENCOUNTER
Medication is being filled for 1 time refill only due to:  Patient needs labs in Sept.   Klaudia Brown RN

## 2021-08-04 NOTE — LETTER
8/4/2021       RE: Betsey Vanessa  [address] [facility] [address]     Dear Colleague,    Thank you for referring your patient, Betsey Vanessa, to the [facility] EMG CLINIC [place] at [facility]. Please see a copy of my visit note below.      [facility]  Electrodiagnostic Laboratory    Nerve Conduction & EMG Report          Patient:       Betsey Vanessa  Patient ID:    [id number]  Gender:        Female  YOB: 1960  Age:           61 Years 2 Months        History & Examination:  61 year old woman with low back pain and gait difficulty. The left leg is worse than the right. She also reports numbness and weakness in her hands, Evaluate for focal neuropathy vs radiculopathy.     Techniques: Motor and sensory conduction studies were done with surface recording electrodes. EMG was done with a concentric needle electrode.      Results:  Nerve conduction studies:   1. Right ulnar-D5 sensory response is absent.   2. Right median-D2, bilateral sural and bilateral superficial peroneal sensory responses are normal.   3. Right ulnar-ADM motor response showed mildly prolonged DL, moderately reduced amplitude, moderate CV slowing across the elbow, and marked temporal dispersion across the elbow.   4. Right ulnar-FDI motor response showed moderately reduced amplitude, moderate CV slowing across the elbow, and marked temporal dispersion across the elbow.   5. Right median-APB, right peroneal-EDB and bilateral tibial-AH motor responses are normal.     Needle EMG of selected proximal and distal right upper limb muscles was performed as tabulated below. No abnormal spontaneous activity was observed in the sampled muscles. Motor unit potential morphology and recruitment patterns were normal.     Interpretation:  This is an abnormal study. There is electrophysiologic evidence of a severe right-sided ulnar neuropathy across the elbow. There is no evidence of  a large-fiber polyneuropathy or lumbosacral radiculopathy affecting the lower limbs on the basis of this study. Clinical correlation is recommended.     Anmol Cox MD  Department of Neurology         Sensory NCS      Nerve / Sites Rec. Site Onset Peak Ref. NP Amp Ref. PP Amp Dist Garrett Ref. Temp     ms ms ms  V  V  V cm m/s m/s  C   R MEDIAN - Dig II Anti      Wrist Dig II 2.81 3.85  26.0 10.0 40.9 14 49.8 48.0 31.1   R ULNAR - Dig V Anti      Wrist Dig V NR NR  NR 8.0 NR 12.5 NR 48.0 31.1   R SURAL - Lat Mall      Calf Ankle 2.71 3.59  13.2 8.0 13.9 14 51.7 38.0 30.2   L SURAL - Lat Mall 60      Calf Ankle 2.45 3.28  13.5 5.0 15.1 14 57.2 38.0 30.4   R SUP PERONEAL      Lat Leg Moser 2.60 3.39  11.2  13.2 12.5 48.0 38.0 29.9   L SUP PERONEAL      Lat Leg Moser 2.66 3.49  7.6  6.2 12.5 47.1 38.0 30.4       Motor NCS      Nerve / Sites Rec. Site Lat Ref. Amp Ref. Rel Amp Dist Garrett Ref. Dur. Area Temp.     ms ms mV mV % cm m/s m/s ms %  C   R MEDIAN - APB      Wrist APB 3.96 4.40 6.2 5.0 100 8   7.03 100 32.6      Elbow APB 7.50  5.7  92.7 19 53.6 48.0 7.24 87.9 32.6   R ULNAR - ADM      Wrist ADM 3.59 3.50 2.7 5.0 100 8   5.36 100 29.9      B.Elbow ADM 7.29  2.4  89.3 18 48.7 48.0 4.84 78.9 30.3      A.Elbow ADM 9.58  1.8  68.5 9 39.3 48.0 14.32 188 30.1      Axilla ADM 11.25  1.6  59.6 9 54.0 48.0 14.58 181 29.7   R DEEP PERONEAL - EDB      Ankle EDB 3.65 6.00 5.6 2.5 100 8   6.09 100 30.3      FibHead EDB 10.05  5.0  89.5 30 46.8 38.0 6.41 95.8 30.3      Pop Fos EDB 11.98  4.9  86.9 11 57.1 38.0 6.46 96.4 30.3   R TIBIAL - AH      Ankle AH 3.44 6.00 9.9 4.0 100 8   5.36 100 30.4      Pop Fos AH 12.19  9.0  91.3 38 43.4 38.0 5.68 107 30.4   L TIBIAL - AH      Ankle AH 3.75 6.00 11.0 4.0 100 8   5.26 100 30.4   R ULNAR - FDI      Wrist FDI 3.23  3.5  100    6.51 100 31.9      B.Elbow FDI 6.72  2.0  56.6 16 45.9  7.81 89.3 30.8      A.Elbow FDI 9.95  2.1  60.8 11 34.1  11.04 160 30.7       F  Wave      Nerve Min F Lat  Max F Lat Mean FLat Temp.    ms ms ms  C   R TIBIAL 44.84 57.19 49.84 30.4       EMG Summary Table     Spontaneous MUAP Recruitment    IA Fib/PSW Fasc H.F. Amp Dur. PPP Pattern   L. VAST LATERALIS N None None None N N N Normal   L. TIB ANTERIOR N None None None N N N Normal   L. GASTROCN (MED) N None None None N N N Normal   L. PERON LONGUS N None None None N N N Normal   L. TIB POSTERIOR N None None None N N N Normal   R. VAST LATERALIS N None None None N N N Normal   R. TIB ANTERIOR N None None None N N N Normal   R. GASTROCN (MED) N None None None N N N Normal                                        Again, thank you for allowing me to participate in the care of your patient.      Sincerely,    Anmol Cox MD

## 2021-08-04 NOTE — PROGRESS NOTES
[facility]  Electrodiagnostic Laboratory    Nerve Conduction & EMG Report          Patient:       Betsey Vanessa  Patient ID:    [id number]  Gender:        Female  YOB: 1960  Age:           61 Years 2 Months        History & Examination:  61 year old woman with low back pain and gait difficulty. The left leg is worse than the right. She also reports numbness and weakness in her hands, Evaluate for focal neuropathy vs radiculopathy.     Techniques: Motor and sensory conduction studies were done with surface recording electrodes. EMG was done with a concentric needle electrode.      Results:  Nerve conduction studies:   1. Right ulnar-D5 sensory response is absent.   2. Right median-D2, bilateral sural and bilateral superficial peroneal sensory responses are normal.   3. Right ulnar-ADM motor response showed mildly prolonged DL, moderately reduced amplitude, moderate CV slowing across the elbow, and marked temporal dispersion across the elbow.   4. Right ulnar-FDI motor response showed moderately reduced amplitude, moderate CV slowing across the elbow, and marked temporal dispersion across the elbow.   5. Right median-APB, right peroneal-EDB and bilateral tibial-AH motor responses are normal.     Needle EMG of selected proximal and distal right upper limb muscles was performed as tabulated below. No abnormal spontaneous activity was observed in the sampled muscles. Motor unit potential morphology and recruitment patterns were normal.     Interpretation:  This is an abnormal study. There is electrophysiologic evidence of a severe right-sided ulnar neuropathy across the elbow. There is no evidence of a large-fiber polyneuropathy or lumbosacral radiculopathy affecting the lower limbs on the basis of this study. Clinical correlation is recommended.     Anmol Cox MD  Department of Neurology         Sensory NCS      Nerve / Sites Rec. Site Onset Peak Ref. NP Amp Ref. PP Amp Dist Garrett Ref.  Temp     ms ms ms  V  V  V cm m/s m/s  C   R MEDIAN - Dig II Anti      Wrist Dig II 2.81 3.85  26.0 10.0 40.9 14 49.8 48.0 31.1   R ULNAR - Dig V Anti      Wrist Dig V NR NR  NR 8.0 NR 12.5 NR 48.0 31.1   R SURAL - Lat Mall      Calf Ankle 2.71 3.59  13.2 8.0 13.9 14 51.7 38.0 30.2   L SURAL - Lat Mall 60      Calf Ankle 2.45 3.28  13.5 5.0 15.1 14 57.2 38.0 30.4   R SUP PERONEAL      Lat Leg Moser 2.60 3.39  11.2  13.2 12.5 48.0 38.0 29.9   L SUP PERONEAL      Lat Leg Moser 2.66 3.49  7.6  6.2 12.5 47.1 38.0 30.4       Motor NCS      Nerve / Sites Rec. Site Lat Ref. Amp Ref. Rel Amp Dist Garrett Ref. Dur. Area Temp.     ms ms mV mV % cm m/s m/s ms %  C   R MEDIAN - APB      Wrist APB 3.96 4.40 6.2 5.0 100 8   7.03 100 32.6      Elbow APB 7.50  5.7  92.7 19 53.6 48.0 7.24 87.9 32.6   R ULNAR - ADM      Wrist ADM 3.59 3.50 2.7 5.0 100 8   5.36 100 29.9      B.Elbow ADM 7.29  2.4  89.3 18 48.7 48.0 4.84 78.9 30.3      A.Elbow ADM 9.58  1.8  68.5 9 39.3 48.0 14.32 188 30.1      Axilla ADM 11.25  1.6  59.6 9 54.0 48.0 14.58 181 29.7   R DEEP PERONEAL - EDB      Ankle EDB 3.65 6.00 5.6 2.5 100 8   6.09 100 30.3      FibHead EDB 10.05  5.0  89.5 30 46.8 38.0 6.41 95.8 30.3      Pop Fos EDB 11.98  4.9  86.9 11 57.1 38.0 6.46 96.4 30.3   R TIBIAL - AH      Ankle AH 3.44 6.00 9.9 4.0 100 8   5.36 100 30.4      Pop Fos AH 12.19  9.0  91.3 38 43.4 38.0 5.68 107 30.4   L TIBIAL - AH      Ankle AH 3.75 6.00 11.0 4.0 100 8   5.26 100 30.4   R ULNAR - FDI      Wrist FDI 3.23  3.5  100    6.51 100 31.9      B.Elbow FDI 6.72  2.0  56.6 16 45.9  7.81 89.3 30.8      A.Elbow FDI 9.95  2.1  60.8 11 34.1  11.04 160 30.7       F  Wave      Nerve Min F Lat Max F Lat Mean FLat Temp.    ms ms ms  C   R TIBIAL 44.84 57.19 49.84 30.4       EMG Summary Table     Spontaneous MUAP Recruitment    IA Fib/PSW Fasc H.F. Amp Dur. PPP Pattern   L. VAST LATERALIS N None None None N N N Normal   L. TIB ANTERIOR N None None None N N N Normal   L. GASTROCN (MED)  N None None None N N N Normal   L. PERON LONGUS N None None None N N N Normal   L. TIB POSTERIOR N None None None N N N Normal   R. VAST LATERALIS N None None None N N N Normal   R. TIB ANTERIOR N None None None N N N Normal   R. GASTROCN (MED) N None None None N N N Normal

## 2021-08-17 ENCOUNTER — INFUSION THERAPY VISIT (OUTPATIENT)
Dept: INFUSION THERAPY | Facility: CLINIC | Age: 61
End: 2021-08-17
Payer: COMMERCIAL

## 2021-08-17 VITALS
OXYGEN SATURATION: 97 % | WEIGHT: 165.7 LBS | RESPIRATION RATE: 16 BRPM | TEMPERATURE: 97.4 F | HEART RATE: 78 BPM | BODY MASS INDEX: 30.31 KG/M2 | DIASTOLIC BLOOD PRESSURE: 68 MMHG | SYSTOLIC BLOOD PRESSURE: 124 MMHG

## 2021-08-17 DIAGNOSIS — M06.9 RHEUMATOID ARTHRITIS (H): Primary | ICD-10-CM

## 2021-08-17 PROCEDURE — 96365 THER/PROPH/DIAG IV INF INIT: CPT | Performed by: NURSE PRACTITIONER

## 2021-08-17 PROCEDURE — 99207 PR NO CHARGE LOS: CPT

## 2021-08-17 RX ORDER — HEPARIN SODIUM,PORCINE 10 UNIT/ML
5 VIAL (ML) INTRAVENOUS
Status: CANCELLED | OUTPATIENT
Start: 2021-09-07

## 2021-08-17 RX ORDER — HEPARIN SODIUM (PORCINE) LOCK FLUSH IV SOLN 100 UNIT/ML 100 UNIT/ML
5 SOLUTION INTRAVENOUS
Status: CANCELLED | OUTPATIENT
Start: 2021-09-07

## 2021-08-17 NOTE — PROGRESS NOTES
Infusion Nursing Note:  Betseyfrancisco javier Jayon presents today for orencia infusion.    Patient seen by provider today: No   present during visit today: Not Applicable.    Note: reports no medical changes.      Intravenous Access:  Peripheral IV placed.    Treatment Conditions:  Biological Infusion Checklist:  ~~~ NOTE: If the patient answers yes to any of the questions below, hold the infusion and contact ordering provider or on-call provider.    1. Have you recently had an elevated temperature, fever, chills, productive cough, coughing for 3 weeks or longer or hemoptysis, abnormal vital signs, night sweats,  chest pain or have you noticed a decrease in your appetite, unexplained weight loss or fatigue? No  2. Do you have any open wounds or new incisions? No  3. Do you have any recent or upcoming hospitalizations, surgeries or dental procedures? No  4. Do you currently have or recently have had any signs of illness or infection or are you on any antibiotics? No  5. Have you had any new, sudden or worsening abdominal pain? No  6. Have you or anyone in your household received a live vaccination in the past 4 weeks? Please note:  No live vaccines while on biologic/chemotherapy until 6 months after the last treatment.  Patient can receive the flu vaccine (shot only) and the pneumovax.  It is optimal for the patient to get these vaccines mid cycle, but they can be given at any time as long as it is not on the day of the infusion. No  7. Have you recently been diagnosed with any new nervous system diseases (ie. Multiple sclerosis, Guillain [place], seizures, neurological changes) or cancer diagnosis? No  8. Are you on any form of radiation or chemotherapy? No  9. Are you pregnant or breast feeding or do you have plans of pregnancy in the future? No  10. Have you been having any signs of worsening depression or suicidal ideations?  (benlysta only) No  11. Have there been any other new onset medical symptoms? No        Post  Infusion Assessment:  Patient tolerated infusion without incident.       Discharge Plan:   Patient will return 9/14 for next appointment.   Patient discharged in stable condition accompanied by: self.  Departure Mode: Ambulatory.      Rosina Rios RN

## 2021-08-19 ENCOUNTER — OFFICE VISIT (OUTPATIENT)
Dept: OTOLARYNGOLOGY | Facility: CLINIC | Age: 61
End: 2021-08-19
Payer: COMMERCIAL

## 2021-08-19 VITALS
HEART RATE: 78 BPM | SYSTOLIC BLOOD PRESSURE: 120 MMHG | BODY MASS INDEX: 29.84 KG/M2 | OXYGEN SATURATION: 100 % | WEIGHT: 163.14 LBS | DIASTOLIC BLOOD PRESSURE: 52 MMHG | TEMPERATURE: 97.1 F

## 2021-08-19 DIAGNOSIS — H72.91 PERFORATION OF RIGHT TYMPANIC MEMBRANE: ICD-10-CM

## 2021-08-19 DIAGNOSIS — H61.23 BILATERAL IMPACTED CERUMEN: ICD-10-CM

## 2021-08-19 DIAGNOSIS — H81.03 MENIERE'S DISEASE, BILATERAL: Primary | ICD-10-CM

## 2021-08-19 PROCEDURE — 99212 OFFICE O/P EST SF 10 MIN: CPT | Mod: 25 | Performed by: OTOLARYNGOLOGY

## 2021-08-19 PROCEDURE — 69210 REMOVE IMPACTED EAR WAX UNI: CPT | Performed by: OTOLARYNGOLOGY

## 2021-08-19 ASSESSMENT — PAIN SCALES - GENERAL: PAINLEVEL: MODERATE PAIN (4)

## 2021-08-19 NOTE — PROGRESS NOTES
Otolaryngology Clinic      Name: Betsey Vanessa  MRN: [id number]  Age: 61 year old  : 2019      Chief Complaint:   Follow up    History of Present Illness:   Betsey Vanessa is a 61 year old female who presents for follow up. I last saw the patient on  and she was doing well. I recommended vestibular testing at that time which she declined.    Today, the patient states her ears have been doing well and denies any increase in drainage. She notes she has been wearing her BAHA regularly. No new vertigo and unchanged chronic imbalance.    Physical Exam:   /52   Pulse 78   Temp 97.1  F (36.2  C)   Wt 74 kg (163 lb 2.3 oz)   SpO2 100%   BMI 29.84 kg/m       Female in no acute distress.  Alert and answering questions appropriately.  HB 1/6 bilaterally.  Bilateral ears examined under the microscope.  Bilateral ears with partially obstructing cerumen which I removed with a curette and alligator. She does have dry flaky skin around both meati. 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 Left Clarissa bobbin tube had extruded and was sitting on the TM surrounded by crust, this was removed and the granulation tissue on the TM was suctioned off, TM intact underneath, middle ear aerated. Please note the microscope was necessary to examine both ears due to the perforation on the right and PE tube on the left.     Assessment and Plan:  I recommended she try using cortisone cream for her ear dryness and flakiness. We discussed wearing her BAHA rather than a hearing aid to help prevent ear drainage. We discussed her persistent mild sensation of moisture and how this will likely be chronic on the right due to the perforation. I do not think I need to treat this at this time as it does not appear infected. We discussed continued follow-up for right ear maintenance. She should return in 6 months.     Follow-up: Return in about 6 months (around 2022) for Follow up w/o DAVID.       Scribe Disclosure:  I, Maddie Faye, am serving as a scribe to document services personally performed by Laura Stallings MD at this visit, based upon the provider's statements to me. All documentation has been reviewed by the aforementioned provider prior to being entered into the official medical record.    The documentation recorded by the scribe accurately reflects the services I personally performed and the decisions made by me.

## 2021-08-19 NOTE — PATIENT INSTRUCTIONS
1. You were seen in the ENT Clinic today by Dr. Stallings.  If you have any questions or concerns after your appointment, please call   - Option 1: ENT Clinic: [phone]   - Option 2: Ni (Dr. Stallings's Nurse): [phone]                   Joyce(Dr. Stallings's Nurse): [phone]    2.   Plan to return to clinic in 6 months    Ni Galvan LPN  [facility] - Otolaryngology

## 2021-08-19 NOTE — NURSING NOTE
Chief Complaint   Patient presents with     RECHECK     6 month follow up      Blood pressure 120/52, pulse 78, temperature 97.1  F (36.2  C), weight 74 kg (163 lb 2.3 oz), SpO2 100 %.    Leonides Cook LPN

## 2021-08-19 NOTE — Clinical Note
2021       RE: Betsey Vanessa  [address]  [facility] [address]     Dear Colleague,    Thank you for referring your patient, Betsey Vanessa, to the [facility] EAR NOSE AND THROAT CLINIC [place] at [facility]. Please see a copy of my visit note below.      Otolaryngology Clinic      Name: Betsey Vanessa  MRN: [id number]  Age: 61 year old  : 2019      Chief Complaint:   Follow up    History of Present Illness:   Betsey Vanessa is a 61 year old female who presents for follow up. I last saw the patient on  and she was doing well. I recommended vestibular testing at that time.     Today, the patient states her ears have been doing well and denies any increase in drainage. She notes she has been wearing a Baha.     Physical Exam:   /52   Pulse 78   Temp 97.1  F (36.2  C)   Wt 74 kg (163 lb 2.3 oz)   SpO2 100%   BMI 29.84 kg/m       Physical examination:  Female in no acute distress.  Alert and answering questions appropriately.  HB 1/6 bilaterally.  Bilateral ears examined under the microscope.  Bilateral ears with partially obstructing cerumen which I removed with a curette and alligator. 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 Left Clarissa bobbin tube had extruded and was sitting on the TM surrounded by crust, this was removed and the granulation tissue on the TM was suctioned off, TM intact underneath, middle ear aerated.     Assessment and Plan:  I recommended she try using Cortaid for her ear dryness and flakiness. We discussed wearing her Baha rather than a hearing aid to help prevent ear drainage. She had some cerumen present in her right ear that I cleaned today. Her left tube had fallen out so I removed this today as well. We discussed her persistent mild drainage and how this will likely be chronic. I do not think I need to treat this at this time as I am not concerned for infection. We  discussed continued follow-up for right ear maintenance. She should return in 6 months.     Follow-up: Return in about 6 months (around 2/19/2022) for Follow up w/o DAVID.         Scribe Disclosure:  I, Maddie Faye, am serving as a scribe to document services personally performed by Laura Stallings MD at this visit, based upon the provider's statements to me. All documentation has been reviewed by the aforementioned provider prior to being entered into the official medical record.      Again, thank you for allowing me to participate in the care of your patient.      Sincerely,    Laura Stallings MD

## 2021-08-19 NOTE — LETTER
2021      RE: Betsey Vanessa  [address]  [place] [address]         Otolaryngology Clinic      Name: Betsey Vanessa  MRN: [id number]  Age: 61 year old  : 2019      Chief Complaint:   Follow up    History of Present Illness:   Betsey Vanessa is a 61 year old female who presents for follow up. I last saw the patient on  and she was doing well. I recommended vestibular testing at that time which she declined.    Today, the patient states her ears have been doing well and denies any increase in drainage. She notes she has been wearing her BAHA regularly. No new vertigo and unchanged chronic imbalance.    Physical Exam:   /52   Pulse 78   Temp 97.1  F (36.2  C)   Wt 74 kg (163 lb 2.3 oz)   SpO2 100%   BMI 29.84 kg/m       Female in no acute distress.  Alert and answering questions appropriately.  HB 1/6 bilaterally.  Bilateral ears examined under the microscope.  Bilateral ears with partially obstructing cerumen which I removed with a curette and alligator. She does have dry flaky skin around both meati. 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 Left Clarissa bobbin tube had extruded and was sitting on the TM surrounded by crust, this was removed and the granulation tissue on the TM was suctioned off, TM intact underneath, middle ear aerated. Please note the microscope was necessary to examine both ears due to the perforation on the right and PE tube on the left.     Assessment and Plan:  I recommended she try using cortisone cream for her ear dryness and flakiness. We discussed wearing her BAHA rather than a hearing aid to help prevent ear drainage. We discussed her persistent mild sensation of moisture and how this will likely be chronic on the right due to the perforation. I do not think I need to treat this at this time as it does not appear infected. We discussed continued follow-up for right ear maintenance. She should return in 6 months.      Follow-up: Return in about 6 months (around 2/19/2022) for Follow up w/o DAVID.      Scribe Disclosure:  I, Maddie Faye, am serving as a scribe to document services personally performed by Laura Stallings MD at this visit, based upon the provider's statements to me. All documentation has been reviewed by the aforementioned provider prior to being entered into the official medical record.    The documentation recorded by the scribe accurately reflects the services I personally performed and the decisions made by me.        Laura Stallings MD

## 2021-09-02 ENCOUNTER — VIRTUAL VISIT (OUTPATIENT)
Dept: PALLIATIVE MEDICINE | Facility: CLINIC | Age: 61
End: 2021-09-02
Payer: COMMERCIAL

## 2021-09-02 ENCOUNTER — TELEPHONE (OUTPATIENT)
Dept: PALLIATIVE MEDICINE | Facility: CLINIC | Age: 61
End: 2021-09-02

## 2021-09-02 DIAGNOSIS — G89.4 CHRONIC PAIN SYNDROME: ICD-10-CM

## 2021-09-02 DIAGNOSIS — F11.20 CONTINUOUS OPIOID DEPENDENCE (H): ICD-10-CM

## 2021-09-02 DIAGNOSIS — E11.65 TYPE 2 DIABETES MELLITUS WITH HYPERGLYCEMIA, WITHOUT LONG-TERM CURRENT USE OF INSULIN (H): ICD-10-CM

## 2021-09-02 DIAGNOSIS — M06.09 RHEUMATOID ARTHRITIS OF MULTIPLE SITES WITHOUT RHEUMATOID FACTOR (H): ICD-10-CM

## 2021-09-02 DIAGNOSIS — M06.09 RHEUMATOID ARTHRITIS OF MULTIPLE SITES WITH NEGATIVE RHEUMATOID FACTOR (H): ICD-10-CM

## 2021-09-02 PROCEDURE — 99212 OFFICE O/P EST SF 10 MIN: CPT | Mod: 95 | Performed by: PSYCHIATRY & NEUROLOGY

## 2021-09-02 RX ORDER — CELECOXIB 100 MG/1
100 CAPSULE ORAL 2 TIMES DAILY
Qty: 180 CAPSULE | Refills: 1 | Status: SHIPPED | OUTPATIENT
Start: 2021-09-02 | End: 2022-01-19

## 2021-09-02 RX ORDER — FENTANYL 12.5 UG/1
1 PATCH TRANSDERMAL
Qty: 10 PATCH | Refills: 0 | Status: SHIPPED | OUTPATIENT
Start: 2021-09-02 | End: 2021-10-01

## 2021-09-02 RX ORDER — HYDROCODONE BITARTRATE AND ACETAMINOPHEN 7.5; 325 MG/1; MG/1
.5-1 TABLET ORAL EVERY 6 HOURS PRN
Qty: 30 TABLET | Refills: 0 | Status: SHIPPED | OUTPATIENT
Start: 2021-09-02 | End: 2021-10-01

## 2021-09-02 ASSESSMENT — PAIN SCALES - GENERAL: PAINLEVEL: MILD PAIN (3)

## 2021-09-02 NOTE — PROGRESS NOTES
Is Pt currently in [place]? Yes  Betsey is a 61 year old who is being evaluated via a billable video visit.      How would you like to obtain your AVS? MyChart  If the video visit is dropped, the invitation should be resent by: Text to cell phone: [phone]  Will anyone else be joining your video visit? No       Geena Farmer CMA ([facility])      Video Start Time: 1134  Video-Visit Details    Type of service:  Video Visit    Video End Time:1153    Originating Location (pt. Location): Home    Distant Location (provider location):  [facility]     Platform used for Video Visit: Doximity                               [facility]    Date of visit: 9/2/21    Chief complaint:   Chief Complaint   Patient presents with     Pain       Interval history:  Betsey Vanessa was last seen by me on 6/17/21    Recommendations/plan at the last visit included:  1. Physical Therapy: continue PRN    2. Clinical Health Psychologist to address issues of relaxation, behavioral change, coping style, and other factors important to improvement.  prn  3. Diagnostic Studies:  none  4. Medication Management:    1. No changes to opioids- have made number reflect use.  2. We have discussed opioids/benzos. Recommended that she continue to work on lowering the clonazepam use. She describes some of it is for her Meneires.  5. Further procedures recommended: may need repeat facet joint injections vs radiofrequency ablation in the future.  6. Recommendations to PCP: none  7. Follow up: 2 months, virtual- use Doximity    Since her last visit, Betsey Vanessa reports:  -she is on the Orencia- she is feeling slightly better  -her back is better currently  -overall feels like she is doing better.      Pain scores:  Mild Pain (3)      Current pain treatments:   Celebrex 100mg daily- lower dose   Fentanyl 12mcg/hr- has decreased in the past, concerned about coming down on that right now.  Norco 7.5/325- using 0-1day,  rare 2.  #30/month working  Medical cannabis- helps but causes side effects like dizziness and dry mouth.  Diclofenac gel- helps   salanpas patch- over the counter  Emu oil    Clonazepam 0.5mg tabs- currently on taper with PCP but also uses for Meineres- uses between 0-4 tabs/day, but doesn't use 100 tabs.- last fills have been 3-3.5 months.  Narcan ordered    Previous medication treatments included:  Gabapentin - fast heartbeat, sweating  Cymbalta- hyperactivity  topamax- confusion  Amitriptyline- hyperactivity  Tramadol  darvocet    Other treatments have included:  Betsey VANESSA Jayon has been seen at a pain clinic in the past.    PT: hand therapy.- last about 3 years ago.  Acupuncture: no  TENs Unit: no  Injections:   Hand steroid injections-   Neck injections    Side Effects: none    Medications:  Current Outpatient Medications   Medication Sig Dispense Refill     alcohol swab prep pads Use to swab area of injection/rosie as directed 100 each 3     blood glucose (NO BRAND SPECIFIED) test strip Use to test blood sugar 2 times daily or as directed. To accompany: Blood Glucose Monitor Brands: per insurance. 100 strip 6     blood glucose calibration (NO BRAND SPECIFIED) solution Use to calibrate blood glucose monitor as needed as directed. To accompany: Blood Glucose Monitor Brands: per insurance. 1 Bottle 3     celecoxib (CELEBREX) 100 MG capsule Take 1 capsule (100 mg) by mouth 2 times daily 180 capsule 1     cetirizine (ZYRTEC) 10 MG tablet Take 1 tablet (10 mg) by mouth daily 90 tablet 3     cevimeline (EVOXAC) 30 MG capsule Take 1 capsule (30 mg) by mouth 3 times daily 270 capsule 3     clonazePAM (KLONOPIN) 0.5 MG tablet Take 1 tablet (0.5 mg) by mouth 2 times daily as needed for anxiety OK to take 1-2 tabs at night as needed for sleep. Should last about 90 days 100 tablet 1     diclofenac (VOLTAREN) 1 % topical gel Place 2-4 g onto the skin 4 times daily . Max of 8g per dose. No more than 32g/day 100 g 3      docusate sodium (COLACE) 100 MG capsule Take 1 capsule (100 mg) by mouth 2 times daily 180 capsule 3     escitalopram (LEXAPRO) 20 MG tablet Take 1 tablet (20 mg) by mouth daily 90 tablet 1     fentaNYL (DURAGESIC) 12 mcg/hr 72 hr patch Place 1 patch onto the skin every 72 hours remove old patch. Fill 8/2/21 to start 8/4/21 10 patch 0     fluticasone (FLONASE) 50 MCG/ACT nasal spray Spray 1-2 sprays into both nostrils daily 16 g 11     fluvastatin (LESCOL) 20 MG capsule Take 1 capsule (20 mg) by mouth At Bedtime 90 capsule 1     glimepiride (AMARYL) 2 MG tablet Take 1 tablet (2 mg) by mouth every morning (before breakfast) 90 tablet 0     glucose blood VI test strips strip daily       HYDROcodone-acetaminophen (NORCO) 7.5-325 MG per tablet Take 0.5-1 tablets by mouth every 6 hours as needed for severe pain . Fill 8/2/21 to start on/after 8/4/21 30 tablet 0     hydroxychloroquine (PLAQUENIL) 200 MG tablet Hydroxychloroquine 200mg daily; and an additional 200mg every other day. 135 tablet 1     hydrOXYzine (ATARAX) 25 MG tablet Take 1 tablet (25 mg) by mouth 3 times daily as needed for itching 40 tablet 0     lifitegrast (XIIDRA) 5 % opthalmic solution Place 1 drop into both eyes 2 times daily 24 each 6     medical cannabis (Patient's own supply) Take 1 Dose by mouth See Admin Instructions (The purpose of this order is to document that the patient reports taking medical cannabis.  This is not a prescription, and is not used to certify that the patient has a qualifying medical condition.)       metFORMIN (GLUCOPHAGE) 500 MG tablet Take 2 tablets (1,000 mg) by mouth 2 times daily (with meals) 360 tablet 0     metFORMIN (GLUCOPHAGE) 500 MG tablet Take 2 tablets (1,000 mg) by mouth 2 times daily (with meals) +++NEED LABS IN SEPT.+++1000 mg 2 x a day 360 tablet 0     metroNIDAZOLE (METROGEL) 0.75 % external gel Apply topically 2 times daily Apply to face daily 45 g 5     nystatin (MYCOSTATIN) 796685 UNIT/GM external powder  Apply topically 2 times daily as needed 2 x daily PRN for rash 60 g 4     omeprazole (PRILOSEC) 20 MG DR capsule Take 1 capsule (20 mg) by mouth 2 times daily 180 capsule 3     pimecrolimus (ELIDEL) 1 % external cream Apply topically 2 times daily - use on back of neck and hands 60 g 5     potassium chloride ER (KLOR-CON M) 10 MEQ CR tablet Take 1 tablet (10 mEq) by mouth 2 times daily 20 mEq daily Take by mouth 2 times daily 20 mEq daily 180 tablet 3     PROAIR  (90 Base) MCG/ACT inhaler Inhale 1-2 puffs into the lungs every 4 hours as needed for shortness of breath / dyspnea or wheezing 18 g 5     promethazine (PHENERGAN) 25 MG suppository Place 25 mg rectally every 6 hours as needed For nusea       promethazine (PHENERGAN) 25 MG tablet Take 1 tablet (25 mg) by mouth every 6 hours as needed for nausea or other (Meniere's) For nusea 90 tablet 0     psyllium (METAMUCIL) 58.6 % POWD Take by mouth daily PRN       thin (NO BRAND SPECIFIED) lancets Use to test blood sugar 2 times daily or as directed. To accompany: Blood Glucose Monitor Brands: per insurance. 1 each 3     triamterene-HCTZ (DYAZIDE) 37.5-25 MG capsule Take 1 capsule by mouth daily 90 capsule 2     vitamin D2 (ERGOCALCIFEROL) 29306 units (1250 mcg) capsule Take 1 capsule (50,000 Units) by mouth once a week 12 capsule 2     EPINEPHrine (ANY BX GENERIC EQUIV) 0.3 MG/0.3ML injection 2-pack Inject 0.3 mLs (0.3 mg) into the muscle as needed for anaphylaxis (Patient not taking: Reported on 9/2/2021) 2 each 0     EPINEPHrine 0.3 MG/0.3ML SOLN PRN for Anaphylaxis (Patient not taking: Reported on 9/2/2021)       naloxone (NARCAN) 4 MG/0.1ML nasal spray Spray 1 spray (4 mg) into one nostril alternating nostrils as needed for opioid reversal every 2-3 minutes until assistance arrives (Patient not taking: Reported on 9/2/2021) 0.2 mL 0     naloxone (NARCAN) 4 MG/0.1ML nasal spray Spray 1 spray (4 mg) into one nostril alternating nostrils once as needed for  opioid reversal every 2-3 minutes until assistance arrives (Patient not taking: Reported on 9/2/2021) 0.2 mL 1     STATIN NOT PRESCRIBED (INTENTIONAL) Please choose reason not prescribed, below       UNABLE TO FIND MEDICATION NAME: Tumeric 1000 mg every day for pain (Patient not taking: Reported on 9/2/2021)         Medical History: any changes in medical history since they were last seen? None    Physical Exam:  There were no vitals taken for this visit.  Awake, alert   Normal speech.  Remainder of exam deferred.    THE 4 A's OF OPIOID MAINTENANCE ANALGESIA    Analgesia: good    Activity: improved with meds    Adverse effects: none    Adherence to Rx protocol: good.  We have discussed risks with benzo/opioid. She will try to avoid at night. She has limited her use of benzo but can't get off.    [place] Board of Pharmacy Data Base Reviewed:    YES; 9/2/2021  no concerns.  UDS/CSA  10/12/20        Assessment:   1. Rheumatoid arthritis  2. fibromyalgia  3. Sjogren's Syndrome  4. Meniere's disease with intermittent vertigo and hearing loss  5. Type 2 diabetes      Plan:  1. Physical Therapy: continue PRN  2. Clinical Health Psychologist to address issues of relaxation, behavioral change, coping style, and other factors important to improvement.  prn  3. Diagnostic Studies:  none  4. Medication Management:    1. No changes  2. We have discussed opioids/benzos. Recommended that she continue to work on lowering the clonazepam use. She describes some of it is for her Meneires.  5. Further procedures recommended: may need repeat facet joint injections vs radiofrequency ablation in the future.  6. Recommendations to PCP: none  7. Follow up: 2-3 months- in person      19 minutes spent on the date of encounter doing chart review, history, and exam documentation and further activities as noted above.     Bailee Houser MD

## 2021-09-02 NOTE — TELEPHONE ENCOUNTER
There refills were prescribed today at visit with Dr. Houser.    Sanjuanita, RN-BSN  [facility]

## 2021-09-02 NOTE — PATIENT INSTRUCTIONS
----------------------------------------------------------------  [facility] Number:  [phone]     Call with any questions about your care and for scheduling assistance.     Calls are returned Monday through Friday between 8 AM and 4:30 PM. We usually get back to you within 2 business days depending on the issue/request.    If we are prescribing your medications:    For opioid medication refills, call the clinic or send a Mobile Pulse message 7 days in advance.  Please include:    Name of requested medication    Name of the pharmacy.    For non-opioid medications, call your pharmacy directly to request a refill. Please allow 3-4 days to be processed.     Per [place] State Law:    All controlled substance prescriptions must be filled within 30 days of being written.      For those controlled substances allowing refills, pickup must occur within 30 days of last fill.      We believe regular attendance is key to your success in our program!      Any time you are unable to keep your appointment we ask that you call us at least 24 hours in advance to cancel.This will allow us to offer the appointment time to another patient.     Multiple missed appointments may lead to dismissal from the clinic.

## 2021-09-02 NOTE — TELEPHONE ENCOUNTER
Received call from patient requesting refill(s) of fentaNYL (DURAGESIC) 12 mcg/hr 72 hr patch and HYDROcodone-acetaminophen (NORCO) 7.5-325 MG per tablet    Last dispensed from pharmacy on 08/02/21 for both    Patient's last office/virtual visit by prescribing provider on 06/17/21  Next office/virtual appointment scheduled for 09/02/21    Last urine drug screen date 10/12/20  Current opioid agreement on file (completed within the last year) Yes Date of opioid agreement: 10/12/20    E-prescribe to pharmacy-[facility] [address] [address] [address]     Will route to nursing [place] for review and preparation of prescription(s).

## 2021-09-04 RX ORDER — GLIMEPIRIDE 2 MG/1
TABLET ORAL
Qty: 90 TABLET | Refills: 0 | Status: SHIPPED | OUTPATIENT
Start: 2021-09-04 | End: 2021-12-06

## 2021-09-04 NOTE — TELEPHONE ENCOUNTER
Prescription approved per [facility] Refill Protocol.  Pt has lab appt scheduled- due for repeat A1c.  Klaudia Brown RN

## 2021-09-12 ENCOUNTER — HEALTH MAINTENANCE LETTER (OUTPATIENT)
Age: 61
End: 2021-09-12

## 2021-09-14 ENCOUNTER — INFUSION THERAPY VISIT (OUTPATIENT)
Dept: INFUSION THERAPY | Facility: CLINIC | Age: 61
End: 2021-09-14
Payer: COMMERCIAL

## 2021-09-14 VITALS
HEART RATE: 83 BPM | TEMPERATURE: 98.4 F | RESPIRATION RATE: 16 BRPM | OXYGEN SATURATION: 95 % | BODY MASS INDEX: 30.36 KG/M2 | SYSTOLIC BLOOD PRESSURE: 111 MMHG | DIASTOLIC BLOOD PRESSURE: 60 MMHG | WEIGHT: 166 LBS

## 2021-09-14 DIAGNOSIS — M06.09 RHEUMATOID ARTHRITIS OF MULTIPLE SITES WITH NEGATIVE RHEUMATOID FACTOR (H): Primary | ICD-10-CM

## 2021-09-14 PROCEDURE — 96365 THER/PROPH/DIAG IV INF INIT: CPT | Performed by: NURSE PRACTITIONER

## 2021-09-14 PROCEDURE — 99207 PR NO CHARGE LOS: CPT

## 2021-09-14 RX ORDER — HEPARIN SODIUM (PORCINE) LOCK FLUSH IV SOLN 100 UNIT/ML 100 UNIT/ML
5 SOLUTION INTRAVENOUS
Status: CANCELLED | OUTPATIENT
Start: 2021-10-12

## 2021-09-14 RX ORDER — HEPARIN SODIUM,PORCINE 10 UNIT/ML
5 VIAL (ML) INTRAVENOUS
Status: CANCELLED | OUTPATIENT
Start: 2021-10-12

## 2021-09-14 ASSESSMENT — PAIN SCALES - GENERAL: PAINLEVEL: MILD PAIN (3)

## 2021-09-14 NOTE — PROGRESS NOTES
Infusion Nursing Note:  Betsey Vanessa presents today for Orencia.    Patient seen by provider today: No   present during visit today: Not Applicable.    Note: N/A.  Patient did meet criteria for an asymptomatic covid-19 PCR test in infusion today. Patient declined the covid-19 test.    Intravenous Access:  Peripheral IV placed.    Treatment Conditions:  Biological Infusion Checklist:  ~~~ NOTE: If the patient answers yes to any of the questions below, hold the infusion and contact ordering provider or on-call provider.    1. Have you recently had an elevated temperature, fever, chills, productive cough, coughing for 3 weeks or longer or hemoptysis, abnormal vital signs, night sweats,  chest pain or have you noticed a decrease in your appetite, unexplained weight loss or fatigue? No  2. Do you have any open wounds or new incisions? No  3. Do you have any recent or upcoming hospitalizations, surgeries or dental procedures? No  4. Do you currently have or recently have had any signs of illness or infection or are you on any antibiotics? No  5. Have you had any new, sudden or worsening abdominal pain? No  6. Have you or anyone in your household received a live vaccination in the past 4 weeks? Please note:  No live vaccines while on biologic/chemotherapy until 6 months after the last treatment.  Patient can receive the flu vaccine (shot only) and the pneumovax.  It is optimal for the patient to get these vaccines mid cycle, but they can be given at any time as long as it is not on the day of the infusion. No  7. Have you recently been diagnosed with any new nervous system diseases (ie. Multiple sclerosis, Guillain [place], seizures, neurological changes) or cancer diagnosis? No  8. Are you on any form of radiation or chemotherapy? No  9. Are you pregnant or breast feeding or do you have plans of pregnancy in the future? No  10. Have you been having any signs of worsening depression or suicidal ideations?   (benlysta only) No  11. Have there been any other new onset medical symptoms? No      Post Infusion Assessment:  Patient tolerated infusion without incident.  Blood return noted pre and post infusion.  Site patent and intact, free from redness, edema or discomfort.  No evidence of extravasations.  Access discontinued per protocol.       Discharge Plan:   Patient will return 10/12/2021 for next appointment.   Patient discharged in stable condition accompanied by: self.  Departure Mode: Ambulatory.    Millicent Hernandez RN-BSN, PHN, OCN  [facility]

## 2021-09-16 NOTE — PATIENT INSTRUCTIONS
RHEUMATOLOGY    Dr. Terrence Carrillo    [facility] [place]  [address] [place] [address] [place] [address]  Phone number: [phone]  Fax number: [phone]    Thank you for choosing [facility]!    Madeline Cheung CMA Rheumatology    ----------------  A single additional dose of the Pfizer or Moderna COVID-19 vaccine is recommended at least 28 days after the completion of the 2-dose mRNA vaccine series for patients receiving any immunosuppressive or immunomodulatory therapy. Attempts should be made to match the additional mRNA dose type to the type given in the mRNA primary series; however, if that is not feasible, a booster dose with the alternative mRNA vaccine is permitted.    Based on our current understanding (and this may change over time as we learn more), medications should be adjusted as noted below, if disease activity allows:  - NSAIDs and Acetaminophen: hold for 24 hours prior to vaccination if able to do so  - Orencia (abatacept) intravenous (IV): the booster mRNA COVID19 vaccination should occur 4 weeks after an Orencia infusion and then postpone the subsequent Orencia infusion by 1 week

## 2021-09-17 ENCOUNTER — LAB (OUTPATIENT)
Dept: LAB | Facility: CLINIC | Age: 61
End: 2021-09-17
Payer: COMMERCIAL

## 2021-09-17 ENCOUNTER — OFFICE VISIT (OUTPATIENT)
Dept: RHEUMATOLOGY | Facility: CLINIC | Age: 61
End: 2021-09-17
Payer: COMMERCIAL

## 2021-09-17 VITALS
BODY MASS INDEX: 30.11 KG/M2 | HEIGHT: 62 IN | OXYGEN SATURATION: 96 % | DIASTOLIC BLOOD PRESSURE: 66 MMHG | HEART RATE: 89 BPM | SYSTOLIC BLOOD PRESSURE: 130 MMHG | WEIGHT: 163.6 LBS

## 2021-09-17 DIAGNOSIS — M06.09 RHEUMATOID ARTHRITIS OF MULTIPLE SITES WITH NEGATIVE RHEUMATOID FACTOR (H): Primary | ICD-10-CM

## 2021-09-17 DIAGNOSIS — M35.01 SJOGREN'S SYNDROME WITH KERATOCONJUNCTIVITIS SICCA (H): ICD-10-CM

## 2021-09-17 DIAGNOSIS — M06.09 RHEUMATOID ARTHRITIS OF MULTIPLE SITES WITH NEGATIVE RHEUMATOID FACTOR (H): ICD-10-CM

## 2021-09-17 DIAGNOSIS — Z79.899 HIGH RISK MEDICATIONS (NOT ANTICOAGULANTS) LONG-TERM USE: ICD-10-CM

## 2021-09-17 LAB
ALBUMIN SERPL-MCNC: 4.1 G/DL (ref 3.4–5)
ALP SERPL-CCNC: 113 U/L (ref 40–150)
ALT SERPL W P-5'-P-CCNC: 41 U/L (ref 0–50)
AST SERPL W P-5'-P-CCNC: 24 U/L (ref 0–45)
BASOPHILS # BLD AUTO: 0 10E3/UL (ref 0–0.2)
BASOPHILS NFR BLD AUTO: 0 %
BILIRUB DIRECT SERPL-MCNC: 0.1 MG/DL (ref 0–0.2)
BILIRUB SERPL-MCNC: 0.4 MG/DL (ref 0.2–1.3)
CREAT SERPL-MCNC: 0.91 MG/DL (ref 0.52–1.04)
CRP SERPL-MCNC: 4.4 MG/L (ref 0–8)
EOSINOPHIL # BLD AUTO: 0.3 10E3/UL (ref 0–0.7)
EOSINOPHIL NFR BLD AUTO: 4 %
ERYTHROCYTE [DISTWIDTH] IN BLOOD BY AUTOMATED COUNT: 14.9 % (ref 10–15)
ERYTHROCYTE [SEDIMENTATION RATE] IN BLOOD BY WESTERGREN METHOD: 19 MM/HR (ref 0–30)
GFR SERPL CREATININE-BSD FRML MDRD: 69 ML/MIN/1.73M2
HCT VFR BLD AUTO: 35.3 % (ref 35–47)
HGB BLD-MCNC: 11.8 G/DL (ref 11.7–15.7)
LYMPHOCYTES # BLD AUTO: 1.9 10E3/UL (ref 0.8–5.3)
LYMPHOCYTES NFR BLD AUTO: 25 %
MCH RBC QN AUTO: 27.1 PG (ref 26.5–33)
MCHC RBC AUTO-ENTMCNC: 33.4 G/DL (ref 31.5–36.5)
MCV RBC AUTO: 81 FL (ref 78–100)
MONOCYTES # BLD AUTO: 0.6 10E3/UL (ref 0–1.3)
MONOCYTES NFR BLD AUTO: 7 %
NEUTROPHILS # BLD AUTO: 4.8 10E3/UL (ref 1.6–8.3)
NEUTROPHILS NFR BLD AUTO: 63 %
PLATELET # BLD AUTO: 169 10E3/UL (ref 150–450)
PROT SERPL-MCNC: 7.8 G/DL (ref 6.8–8.8)
RBC # BLD AUTO: 4.35 10E6/UL (ref 3.8–5.2)
WBC # BLD AUTO: 7.7 10E3/UL (ref 4–11)

## 2021-09-17 PROCEDURE — 99214 OFFICE O/P EST MOD 30 MIN: CPT | Performed by: INTERNAL MEDICINE

## 2021-09-17 PROCEDURE — 80076 HEPATIC FUNCTION PANEL: CPT

## 2021-09-17 PROCEDURE — 36415 COLL VENOUS BLD VENIPUNCTURE: CPT

## 2021-09-17 PROCEDURE — 82565 ASSAY OF CREATININE: CPT

## 2021-09-17 PROCEDURE — 86481 TB AG RESPONSE T-CELL SUSP: CPT

## 2021-09-17 PROCEDURE — 85652 RBC SED RATE AUTOMATED: CPT

## 2021-09-17 PROCEDURE — 85025 COMPLETE CBC W/AUTO DIFF WBC: CPT

## 2021-09-17 PROCEDURE — 86140 C-REACTIVE PROTEIN: CPT

## 2021-09-17 RX ORDER — HYDROXYCHLOROQUINE SULFATE 200 MG/1
TABLET, FILM COATED ORAL
Qty: 135 TABLET | Refills: 1 | Status: SHIPPED | OUTPATIENT
Start: 2021-09-17 | End: 2022-01-19

## 2021-09-17 RX ORDER — CEVIMELINE HYDROCHLORIDE 30 MG/1
30 CAPSULE ORAL 3 TIMES DAILY
Qty: 270 CAPSULE | Refills: 2 | Status: SHIPPED | OUTPATIENT
Start: 2021-09-17 | End: 2022-05-04

## 2021-09-17 ASSESSMENT — MIFFLIN-ST. JEOR: SCORE: 1270.33

## 2021-09-17 NOTE — NURSING NOTE
RAPID3 (0-30) Cumulative Score  10.0          RAPID3 Weighted Score (divide #4 by 3 and that is the weighted score)  3.3

## 2021-09-17 NOTE — PROGRESS NOTES
Rheumatology Clinic Visit      Betsey Vanessa MRN# [id number]   YOB: 1960 Age: 61 year old      Date of visit: 9/17/21   PCP: Dr. Velma Ibanez    Chief Complaint   Patient presents with:  Rheumatoid Arthritis: Doing ok, little sore    Assessment and Plan     1.  Rheumatoid arthritis: Reportedly diagnosed many years ago.  Previously treated by Dr. Stacy at [facility] in [place], [place]. Previously on methotrexate + rituximab, Enbrel + leflunomide; had elevated liver enzymes preventing oral DMARDs; most recent effective tx was with tocilizumab 8mg/kg IV monthly per her last rheumatology notes.  ALEGRIA hx prevents several oral DMARDs and LFT elevations with MTX in the past.  Previously used Actemra 4mg/kg IV but this resulted in lower platelets and she did not find much benefit from it, so it was discontinued.  Orencia was then started, with the first dose on 5/7/2020 and she improved significantly with Orencia.  RA is well controlled at this time.  Symptoms at this time are related to chronic pain syndrome. Chronic illness, stable.    - Continue orencia 750mg IV every 4 weeks:   - Continue hydroxychloroquine 200 mg daily with an additional 200 mg every other day  - Continue Celebrex 100 mg BID   - Ophthalmology referral for hydroxychloroquine toxicity monitoring   - Labs today: CBC, Creatinine, Hepatic Panel, ESR, CRP    2. Sjogren's Syndrome: reportedly had punctal plugs in the past.  Doing okay with artificial tears, Xiidra, and Evoxac.  Worse symptoms when she is out of Xiidra as she has been for the past 7 days due to her pharmacy not being able to fill despite having an active prescription; a new prescription was given to her today, printed so that she could bring it to the pharmacy.  Encouraged frequent sips of water, visits with a dentist at least every 6months, avoidance of sugary foods/drinks.  Chronic illness, stable.       3. Chronic pain syndrome: Managed at the  pain clinic.  Encouraged low impact physical activities such as walking or using a stationary bike    4. Osteopenia: based on 7/2019 DEXA report that she brought with her; FRAX score shows a 22% risk of major osteoporotic fracture in the next 10 years and a 1.9% risk for osteoporotic hip fracture in the next 10 years.  Based on FRAX tx is indicated.  She already received one dose of boniva in Sept 2019; and failed alendronate due to GI upset.  Reclast received 2/13/2020 and 3/16/2021.  Chronic illness  - Continue calcium 1000mg daily  - Continue vitamin D 50,000 IU once weekly  - Continue Reclast yearly, next due in March 2022  - Labs today: Vitamin D    # Status-post 2 doses of the Pfizer COVID-19 vaccine, received 3/19/2021 and 4/9/2021      A single additional dose of the Pfizer or Moderna COVID-19 vaccine is recommended at least 28 days after the completion of the 2-dose mRNA vaccine series for patients receiving any immunosuppressive or immunomodulatory therapy. Attempts should be made to match the additional mRNA dose type to the type given in the mRNA primary series; however, if that is not feasible, a booster dose with the alternative mRNA vaccine is permitted.    Based on our current understanding (and this may change over time as we learn more), medications should be adjusted as noted below, if disease activity allows:  - NSAIDs and Acetaminophen: hold for 24 hours prior to vaccination if able to do so  - Orencia (abatacept) intravenous (IV): the booster mRNA COVID19 vaccination should occur 4 weeks after an Orencia infusion and then postpone the subsequent Orencia infusion by 1 week    Total minutes spent in evaluation with patient, documentation, , and review of pertinent studies and chart notes: 17       Ms. Vanessa verbalized agreement with and understanding of the rational for the diagnosis and treatment plan.  All questions were answered to best of my ability and the patient's satisfaction.  Ms. Vanessa was advised to contact the clinic with any questions that may arise after the clinic visit.      Thank you for involving me in the care of the patient    Return to clinic: 3 months    HPI   Betsey Vanessa is a 61 year old female with a past medical history significant for hypertension, depression, type 2 diabetes, Ménière's disease, and rheumatoid arthritis who is seen in consultation for follow-up of RA.     Today, 9/17/2021: Doing well at this time with regard to rheumatoid arthritis.  Morning stiffness for less than 10-15 minutes.  No joint swelling.  Tolerating medications well.  Whole body ache due to chronic pain syndrome and she is following in the pain clinic.    Denies fevers, chills, nausea, or vomiting.  Occasional constipation or diarrhea. No abdominal pain. No chest pain/pressure, palpitations, or shortness of breath. No LE swelling.   No oral or nasal sores.  No rash.  Dry eyes treated with artificial tears and Xiidra; she plans to establish with an ophthalmologist because she was followed by ophthalmology when in [place]; hx of punctal plugs.  Dry mouth is treated with frequent sips of water and Evoxac.    Tobacco: Quit smoking in 1998  EtOH: None  Drugs: None    ROS   12 point review of system was completed and negative except as noted in the HPI     Active Problem List     Patient Active Problem List   Diagnosis     Type 2 diabetes mellitus with hyperglycemia, without long-term current use of insulin (H)     Hypertension goal BP (blood pressure) < 140/90     Meniere's disease, unspecified laterality     Rheumatoid arthritis, involving unspecified site, unspecified rheumatoid factor presence     Valvular heart disease     Fibromyalgia     Mild major depression (H)     Osteopenia of multiple sites     History of bisphosphonate therapy     Post-menopausal     Benzodiazepine dependence, episodic (H)     Continuous opioid dependence (H)     Mitral stenosis     Age-related osteoporosis  without current pathological fracture     Mixed conductive and sensorineural hearing loss of right ear     Polyarticular arthritis     Encounter for long-term use of opiate analgesic     Chronic pain     Tympanic membrane perforation     Myofascial pain     Migraine with vertigo     Gastroesophageal reflux disease, esophagitis presence not specified     Fungal skin infection     Anaphylaxis, sequela     Hyperlipidemia, unspecified hyperlipidemia type     Past Medical History     Past Medical History:   Diagnosis Date     Anemia     r/t menses     Anxiety      Arthritis      Constipation      Depression      Diabetes (H)      Diarrhea      Double vision      Headache(784.0)      Hearing loss      Heart murmur      Heartburn      Hypertension      Indigestion      Nasal congestion      Night sweats      Numbness      Problems related to lack of adequate sleep      Rash      Sneezing      Tinnitus      Visual loss      Weakness      Weight gain     because of Prednisone     Past Surgical History     Past Surgical History:   Procedure Laterality Date     ------------OTHER-------------      D&C     HYSTERECTOMY       INNER EAR SURGERY       RELEASE CARPAL TUNNEL BILATERAL Bilateral 2008     Allergy     Allergies   Allergen Reactions     Duloxetine Other (See Comments)     Topiramate Other (See Comments)     Other reaction(s): Confusion     Amitriptyline Embonate [Amitriptyline]      Hyper activity     Azithromycin Hives     Cymbalta      Hyper activity     Gabapentin Hives     Hmg-Coa-R Inhibitors Other (See Comments)     Liver function studies abnormal on Lipitor / Crestor     Macrobid [Nitrofurantoin] Hives     Paxil [Paroxetine] Other (See Comments)     Hyper behavior     Penicillins Hives     Tetracycline Hives     Current Medication List     Current Outpatient Medications   Medication Sig     celecoxib (CELEBREX) 100 MG capsule Take 1 capsule (100 mg) by mouth 2 times daily     cetirizine (ZYRTEC) 10 MG tablet Take  1 tablet (10 mg) by mouth daily     cevimeline (EVOXAC) 30 MG capsule Take 1 capsule (30 mg) by mouth 3 times daily     clonazePAM (KLONOPIN) 0.5 MG tablet Take 1 tablet (0.5 mg) by mouth 2 times daily as needed for anxiety OK to take 1-2 tabs at night as needed for sleep. Should last about 90 days     diclofenac (VOLTAREN) 1 % topical gel Place 2-4 g onto the skin 4 times daily . Max of 8g per dose. No more than 32g/day     docusate sodium (COLACE) 100 MG capsule Take 1 capsule (100 mg) by mouth 2 times daily     escitalopram (LEXAPRO) 20 MG tablet Take 1 tablet (20 mg) by mouth daily     fentaNYL (DURAGESIC) 12 mcg/hr 72 hr patch Place 1 patch onto the skin every 72 hours remove old patch. Fill 9/2/21 to start on/after  9/3/21     fluticasone (FLONASE) 50 MCG/ACT nasal spray Spray 1-2 sprays into both nostrils daily     fluvastatin (LESCOL) 20 MG capsule Take 1 capsule (20 mg) by mouth At Bedtime     glimepiride (AMARYL) 2 MG tablet TAKE 1 TABLET BY MOUTH IN THE MORNING BEFORE BREAKFAST. STOP TRULICITY     HYDROcodone-acetaminophen (NORCO) 7.5-325 MG per tablet Take 0.5-1 tablets by mouth every 6 hours as needed for severe pain . Fill 9/2/21 to start on/after  9/3/21     hydroxychloroquine (PLAQUENIL) 200 MG tablet Hydroxychloroquine 200mg daily; and an additional 200mg every other day.     hydrOXYzine (ATARAX) 25 MG tablet Take 1 tablet (25 mg) by mouth 3 times daily as needed for itching     lifitegrast (XIIDRA) 5 % opthalmic solution Place 1 drop into both eyes 2 times daily     medical cannabis (Patient's own supply) Take 1 Dose by mouth See Admin Instructions (The purpose of this order is to document that the patient reports taking medical cannabis.  This is not a prescription, and is not used to certify that the patient has a qualifying medical condition.)     metFORMIN (GLUCOPHAGE) 500 MG tablet Take 2 tablets (1,000 mg) by mouth 2 times daily (with meals)     metFORMIN (GLUCOPHAGE) 500 MG tablet Take 2  tablets (1,000 mg) by mouth 2 times daily (with meals) +++NEED LABS IN SEPT.+++1000 mg 2 x a day     metroNIDAZOLE (METROGEL) 0.75 % external gel Apply topically 2 times daily Apply to face daily     nystatin (MYCOSTATIN) 118166 UNIT/GM external powder Apply topically 2 times daily as needed 2 x daily PRN for rash     omeprazole (PRILOSEC) 20 MG DR capsule Take 1 capsule (20 mg) by mouth 2 times daily     pimecrolimus (ELIDEL) 1 % external cream Apply topically 2 times daily - use on back of neck and hands     potassium chloride ER (KLOR-CON M) 10 MEQ CR tablet Take 1 tablet (10 mEq) by mouth 2 times daily 20 mEq daily Take by mouth 2 times daily 20 mEq daily     PROAIR  (90 Base) MCG/ACT inhaler Inhale 1-2 puffs into the lungs every 4 hours as needed for shortness of breath / dyspnea or wheezing     promethazine (PHENERGAN) 25 MG suppository Place 25 mg rectally every 6 hours as needed For nusea     promethazine (PHENERGAN) 25 MG tablet Take 1 tablet (25 mg) by mouth every 6 hours as needed for nausea or other (Meniere's) For nusea     psyllium (METAMUCIL) 58.6 % POWD Take by mouth daily PRN     thin (NO BRAND SPECIFIED) lancets Use to test blood sugar 2 times daily or as directed. To accompany: Blood Glucose Monitor Brands: per insurance.     triamterene-HCTZ (DYAZIDE) 37.5-25 MG capsule Take 1 capsule by mouth daily     vitamin D2 (ERGOCALCIFEROL) 40028 units (1250 mcg) capsule Take 1 capsule (50,000 Units) by mouth once a week     alcohol swab prep pads Use to swab area of injection/rosie as directed     blood glucose (NO BRAND SPECIFIED) test strip Use to test blood sugar 2 times daily or as directed. To accompany: Blood Glucose Monitor Brands: per insurance.     blood glucose calibration (NO BRAND SPECIFIED) solution Use to calibrate blood glucose monitor as needed as directed. To accompany: Blood Glucose Monitor Brands: per insurance.     EPINEPHrine (ANY BX GENERIC EQUIV) 0.3 MG/0.3ML injection 2-pack  "Inject 0.3 mLs (0.3 mg) into the muscle as needed for anaphylaxis (Patient not taking: Reported on 9/2/2021)     EPINEPHrine 0.3 MG/0.3ML SOLN PRN for Anaphylaxis (Patient not taking: Reported on 9/2/2021)     glucose blood VI test strips strip daily     naloxone (NARCAN) 4 MG/0.1ML nasal spray Spray 1 spray (4 mg) into one nostril alternating nostrils as needed for opioid reversal every 2-3 minutes until assistance arrives (Patient not taking: Reported on 9/2/2021)     STATIN NOT PRESCRIBED (INTENTIONAL) Please choose reason not prescribed, below     UNABLE TO FIND MEDICATION NAME: Tumeric 1000 mg every day for pain (Patient not taking: Reported on 9/2/2021)     No current facility-administered medications for this visit.         Social History   See HPI    Family History     Family History   Problem Relation Age of Onset     C.A.D. Mother      Alzheimer Disease Mother      Cardiovascular Mother      Eye Disorder Mother      Thyroid Disease Mother      Hypertension Other         grandmother     Cerebrovascular Disease Father      Alcohol/Drug Father      Depression Father      Obesity Father         aunt     Asthma Father      Alcohol/Drug Brother      Cancer Brother      Alcohol/Drug Sister         aunt     Cancer Sister      Allergies Other         All family members     Arthritis Other         aunt     Obesity Sister      Thyroid Disease Sister      Asthma Sister         daughter       Physical Exam     Temp Readings from Last 3 Encounters:   09/14/21 98.4  F (36.9  C) (Oral)   08/19/21 97.1  F (36.2  C)   08/17/21 97.4  F (36.3  C) (Oral)     BP Readings from Last 5 Encounters:   09/17/21 130/66   09/14/21 111/60   08/19/21 120/52   08/17/21 124/68   07/13/21 117/49     Pulse Readings from Last 1 Encounters:   09/17/21 89     Resp Readings from Last 1 Encounters:   09/14/21 16     Estimated body mass index is 29.92 kg/m  as calculated from the following:    Height as of this encounter: 1.575 m (5' 2\").    " Weight as of this encounter: 74.2 kg (163 lb 9.6 oz).      GEN: NAD. Healthy appearing adult.   HEENT: MMM.  Anicteric, noninjected sclera. No obvious external lesions of the ear and nose. Hearing intact.  PULM: No increased work of breathing  MSK: MCPs, PIPs, DIPs, wrists, elbows, shoulders, knees, ankles, and MTPs were diffusely tender to palpation but no swelling, increased warmth, or overlying erythema.  All fibromyalgia tender points positive.    SKIN: No rash or jaundice seen  PSYCH: Alert. Appropriate.      Labs / Imaging (select studies)     RF/CCP  Recent Labs   Lab Test 01/14/20  1221   CCPIGG 1   RHF 20*     CBC  Recent Labs   Lab Test 05/21/21  0954 01/19/21  1130 10/28/20  1537   WBC 6.6 6.5 7.1   RBC 4.41 4.46 4.56   HGB 11.6* 11.8 12.3   HCT 35.7 36.6 37.5   MCV 81 82 82   RDW 15.3* 14.4 13.7    159 162   MCH 26.3* 26.5 27.0   MCHC 32.5 32.2 32.8   NEUTROPHIL 53.1 65.0 66.1   LYMPH 36.2 25.3 25.8   MONOCYTE 7.4 5.1 5.1   EOSINOPHIL 3.0 4.0 2.5   BASOPHIL 0.3 0.3 0.1   ANEU 3.5 4.2 4.7   ALYM 2.4 1.6 1.8   MOISE 0.5 0.3 0.4   AEOS 0.2 0.3 0.2   ABAS 0.0 0.0 0.0     CMP  Recent Labs   Lab Test 05/21/21  0954 03/16/21  1130 01/19/21  1130 01/08/21  1535 01/08/21  1535 10/28/20  1537 04/09/20  1226 02/07/20  1040 01/14/20  1221   NA  --   --   --   --  135  --  141  --  137   POTASSIUM  --   --   --   --  4.2  --  4.0  --  4.0   CHLORIDE  --   --   --   --  99  --  108  --  103   CO2  --   --   --   --  32  --  27  --  28   ANIONGAP  --   --   --   --  4  --  6  --  6   GLC  --   --   --   --  155*  --  121*  --  120*   BUN  --   --   --   --  16  --  17  --  13   CR 0.92 0.83 0.88   < > 0.80   < > 0.70  --  0.70   GFRESTIMATED 68 78 72   < > 81   < > >90  --  >90   GFRESTBLACK 79 >90 84   < > >90   < > >90  --  >90   MIRNA  --  10.2* 9.2  --  9.7   < > 9.1   < > 9.5   BILITOTAL 0.3  --  0.4  --  0.5   < > 0.4  --  0.5   ALBUMIN 4.0  --  3.9  --  4.1   < > 4.0  --  4.0   PROTTOTAL 7.7  --  7.7  --   8.2   < > 7.4  --  7.9   ALKPHOS 83  --  117  --  100   < > 80  --  111   AST 20  --  23  --  23   < > 36  --  30   ALT 35  --  36  --  45   < > 52*  --  37    < > = values in this interval not displayed.     Calcium/VitaminD  Recent Labs   Lab Test 05/21/21  0954 03/16/21  1130 01/19/21  1130 01/08/21  1535 10/28/20  1537 10/28/20  1537   MIRNA  --  10.2* 9.2 9.7   < > 9.3   VITDT 39  --  25  --   --  33    < > = values in this interval not displayed.     ESR/CRP  Recent Labs   Lab Test 05/21/21  0954 01/19/21  1130 01/08/21  1535   SED 23 24 21   CRP 5.6 3.0 4.6       Hepatitis B  Recent Labs   Lab Test 01/14/20  1221   HBCAB Nonreactive   HEPBANG Nonreactive     Hepatitis C  Recent Labs   Lab Test 01/14/20  1221   HCVAB Nonreactive     Lyme ab screening  Recent Labs   Lab Test 01/14/20  1221   LYMEGM 0.05     Tuberculosis Screening  Recent Labs   Lab Test 01/14/20  1221   TBRES Negative     HIV Screening  Recent Labs   Lab Test 08/14/20  0736   HIAGAB Nonreactive     CareEverywhere [facility]   7/18/2013 CCP negative  8/5/2010 CCP negative  3/27/2013 hepatitis B surface antigen negative and hepatitis B core antibody negative  9/2/2011 hepatitis B surface antigen negative and hepatitis B core antibody negative  3/27/2013 hepatitis C antibody negative    Immunization History     Immunization History   Administered Date(s) Administered     COVID-19,PF,Pfizer 03/19/2021, 04/09/2021     FLU 6-35 months 10/22/2013     Influenza Vaccine IM > 6 months Valent IIV4 (Alfuria,Fluzone) 10/21/2015, 10/05/2016, 10/11/2018, 09/25/2019, 09/24/2020     Influenza Vaccine, 6+MO IM (QUADRIVALENT W/PRESERVATIVES) 09/18/2014, 10/03/2017     Pneumo Conj 13-V (2010&after) 10/11/2018     Pneumococcal 23 valent 12/09/2020     TD (ADULT, 7+) 04/20/1997, 04/17/2019     TDAP Vaccine (Adacel) 01/15/2009     Zoster vaccine recombinant adjuvanted (SHINGRIX) 10/11/2018          Chart documentation done in part with Dragon Voice recognition  Software. Although reviewed after completion, some word and grammatical error may remain.    Terrence Carrillo MD

## 2021-09-19 LAB
GAMMA INTERFERON BACKGROUND BLD IA-ACNC: 0.03 IU/ML
M TB IFN-G BLD-IMP: NEGATIVE
M TB IFN-G CD4+ BCKGRND COR BLD-ACNC: 9.97 IU/ML
MITOGEN IGNF BCKGRD COR BLD-ACNC: -0.01 IU/ML
MITOGEN IGNF BCKGRD COR BLD-ACNC: 0.03 IU/ML
QUANTIFERON MITOGEN: 10 IU/ML
QUANTIFERON NIL TUBE: 0.03 IU/ML
QUANTIFERON TB1 TUBE: 0.02 IU/ML
QUANTIFERON TB2 TUBE: 0.06

## 2021-09-24 ENCOUNTER — LAB (OUTPATIENT)
Dept: LAB | Facility: CLINIC | Age: 61
End: 2021-09-24
Payer: COMMERCIAL

## 2021-09-24 DIAGNOSIS — E11.65 TYPE 2 DIABETES MELLITUS WITH HYPERGLYCEMIA, WITHOUT LONG-TERM CURRENT USE OF INSULIN (H): ICD-10-CM

## 2021-09-24 LAB — HBA1C MFR BLD: 7.2 % (ref 0–5.6)

## 2021-09-24 PROCEDURE — 83036 HEMOGLOBIN GLYCOSYLATED A1C: CPT

## 2021-09-24 PROCEDURE — 36415 COLL VENOUS BLD VENIPUNCTURE: CPT

## 2021-10-01 DIAGNOSIS — M06.09 RHEUMATOID ARTHRITIS OF MULTIPLE SITES WITHOUT RHEUMATOID FACTOR (H): ICD-10-CM

## 2021-10-01 DIAGNOSIS — F11.20 CONTINUOUS OPIOID DEPENDENCE (H): ICD-10-CM

## 2021-10-01 DIAGNOSIS — G89.4 CHRONIC PAIN SYNDROME: ICD-10-CM

## 2021-10-01 RX ORDER — FENTANYL 12.5 UG/1
1 PATCH TRANSDERMAL
Qty: 10 PATCH | Refills: 0 | Status: SHIPPED | OUTPATIENT
Start: 2021-10-01 | End: 2021-10-28

## 2021-10-01 RX ORDER — HYDROCODONE BITARTRATE AND ACETAMINOPHEN 7.5; 325 MG/1; MG/1
.5-1 TABLET ORAL EVERY 6 HOURS PRN
Qty: 30 TABLET | Refills: 0 | Status: SHIPPED | OUTPATIENT
Start: 2021-10-01 | End: 2021-10-28

## 2021-10-01 NOTE — TELEPHONE ENCOUNTER
Reason for call:  Medication   If this is a refill request, has the caller requested the refill from the pharmacy already? No  Will the patient be using a [facility]? No  Name of the pharmacy and phone number for the current request: [id number] - [place], [place] - [address]     Name of the medication requested:  HYDROcodone-acetaminophen (NORCO) 7.5-325 MG per tablet     fentaNYL (DURAGESIC) 12 mcg/hr 72 hr patch     Other request: none     Phone number to reach patient:  Cell number on file:        Telephone Information:   Mobile [phone]         Best Time:  anytime     Can we leave a detailed message on this number?  YES     Travel screening: Not Applicable

## 2021-10-01 NOTE — TELEPHONE ENCOUNTER
Medication refill information reviewed.     Due date for HYDROcodone-acetaminophen (NORCO) 7.5-325 MG per tablet is 10/3/21     Due date for   fentaNYL (DURAGESIC) 12 mcg/hr 72 hr patch is 10/3/21     Prescriptions prepped for review.     Will route to provider.     Alexandr Martínez RN  Patient Care Supervisor   [facility] [place]

## 2021-10-01 NOTE — TELEPHONE ENCOUNTER
Received call from patient requesting refill(s) of   HYDROcodone-acetaminophen (NORCO) 7.5-325 MG per tablet  Last dispensed from pharmacy on 09/02/2021    fentaNYL (DURAGESIC) 12 mcg/hr 72 hr patch  Last dispensed from pharmacy on 09/02/2021    Patient's last office/virtual visit by prescribing provider on 09/02/2021    Next office/virtual appointment scheduled for None    Last urine drug screen date 10/12/2020  Current opioid agreement on file (completed within the last year) Yes Date of opioid agreement: 10/12/2020    E-prescribe to pharmacy [facility] [id number] [facility] [address] [address]    Will route to nursing [place] for review and preparation of prescription(s).

## 2021-10-01 NOTE — TELEPHONE ENCOUNTER
Spoke to patient. Advised prescriptions were sent to pharmacy. Confirmed pharmacy.     Gave fill date of today.     Gave start date of 10/03/21    Patient stated understanding.    Naty Contreras [facility]

## 2021-10-01 NOTE — TELEPHONE ENCOUNTER
Script Eprescribed to pharmacy  [place] Prescription Monitoring Program checked    Will send this to MA team to notify patient.    Signed Prescriptions:                        Disp   Refills    fentaNYL (DURAGESIC) 12 mcg/hr 72 hr patch 10 pat*0        Sig: Place 1 patch onto the skin every 72 hours remove old           patch. Fill 10/1/21 to start on/after  10/3/21  Authorizing Provider: SANTOS ISLAS    HYDROcodone-acetaminophen (NORCO) 7.5-325 *30 tab*0        Sig: Take 0.5-1 tablets by mouth every 6 hours as needed           for severe pain . Fill 10/1/21 to start on/after            10/3/21  Authorizing Provider: SANTOS ISLAS MD  [facility]

## 2021-10-19 PROBLEM — F32.9 MAJOR DEPRESSION: Status: ACTIVE | Noted: 2020-01-12

## 2021-10-25 ENCOUNTER — INFUSION THERAPY VISIT (OUTPATIENT)
Dept: INFUSION THERAPY | Facility: CLINIC | Age: 61
End: 2021-10-25
Payer: COMMERCIAL

## 2021-10-25 VITALS
WEIGHT: 164.5 LBS | SYSTOLIC BLOOD PRESSURE: 132 MMHG | DIASTOLIC BLOOD PRESSURE: 70 MMHG | OXYGEN SATURATION: 98 % | BODY MASS INDEX: 30.09 KG/M2 | HEART RATE: 83 BPM | RESPIRATION RATE: 16 BRPM | TEMPERATURE: 97.8 F

## 2021-10-25 DIAGNOSIS — M06.09 RHEUMATOID ARTHRITIS OF MULTIPLE SITES WITH NEGATIVE RHEUMATOID FACTOR (H): Primary | ICD-10-CM

## 2021-10-25 PROCEDURE — 96365 THER/PROPH/DIAG IV INF INIT: CPT | Performed by: NURSE PRACTITIONER

## 2021-10-25 PROCEDURE — 99207 PR NO CHARGE LOS: CPT

## 2021-10-25 RX ORDER — HEPARIN SODIUM (PORCINE) LOCK FLUSH IV SOLN 100 UNIT/ML 100 UNIT/ML
5 SOLUTION INTRAVENOUS
Status: CANCELLED | OUTPATIENT
Start: 2021-11-09

## 2021-10-25 RX ORDER — HEPARIN SODIUM,PORCINE 10 UNIT/ML
5 VIAL (ML) INTRAVENOUS
Status: CANCELLED | OUTPATIENT
Start: 2021-11-09

## 2021-10-25 ASSESSMENT — PAIN SCALES - GENERAL: PAINLEVEL: MILD PAIN (3)

## 2021-10-25 NOTE — PROGRESS NOTES
Infusion Nursing Note:  Betsey Vanessa presents today for Orencia.    Patient seen by provider today: No   present during visit today: Not Applicable.    Note: N/A.      Intravenous Access:  Peripheral IV placed.    Treatment Conditions:  Biological Infusion Checklist:  ~~~ NOTE: If the patient answers yes to any of the questions below, hold the infusion and contact ordering provider or on-call provider.    1. Have you recently had an elevated temperature, fever, chills, productive cough, coughing for 3 weeks or longer or hemoptysis, abnormal vital signs, night sweats,  chest pain or have you noticed a decrease in your appetite, unexplained weight loss or fatigue? No  2. Do you have any open wounds or new incisions? No  3. Do you have any recent or upcoming hospitalizations, surgeries or dental procedures? No  4. Do you currently have or recently have had any signs of illness or infection or are you on any antibiotics? No  5. Have you had any new, sudden or worsening abdominal pain? No  6. Have you or anyone in your household received a live vaccination in the past 4 weeks? Please note:  No live vaccines while on biologic/chemotherapy until 6 months after the last treatment.  Patient can receive the flu vaccine (shot only) and the pneumovax.  It is optimal for the patient to get these vaccines mid cycle, but they can be given at any time as long as it is not on the day of the infusion. No  7. Have you recently been diagnosed with any new nervous system diseases (ie. Multiple sclerosis, Guillain [place], seizures, neurological changes) or cancer diagnosis? No  8. Are you on any form of radiation or chemotherapy? No  9. Are you pregnant or breast feeding or do you have plans of pregnancy in the future? No  10. Have you been having any signs of worsening depression or suicidal ideations?  (benlysta only) No  11. Have there been any other new onset medical symptoms? No        Post Infusion Assessment:  Patient  tolerated infusion without incident.  Site patent and intact, free from redness, edema or discomfort.  No evidence of extravasations.  Access discontinued per protocol.  Biologic Infusion Post Education: Call the triage nurse at your clinic or seek medical attention if you have chills and/or temperature greater than or equal to 100.5, uncontrolled nausea/vomiting, diarrhea, constipation, dizziness, shortness of breath, chest pain, heart palpitations, weakness or any other new or concerning symptoms, questions or concerns.  You cannot have any live virus vaccines prior to or during treatment or up to 6 months post infusion.  If you have an upcoming surgery, medical procedure or dental procedure during treatment, this should be discussed with your ordering physician and your surgeon/dentist.  If you are having any concerning symptom, if you are unsure if you should get your next infusion or wish to speak to a provider before your next infusion, please call your care coordinator or triage nurse at your clinic to notify them so we can adequately serve you.       Discharge Plan:   AVS to patient via [employer].  Patient will return 11/22/2021 for next appointment.   Patient discharged in stable condition accompanied by: self.  Departure Mode: Ambulatory.      Zainab Day RN

## 2021-10-28 DIAGNOSIS — M06.09 RHEUMATOID ARTHRITIS OF MULTIPLE SITES WITHOUT RHEUMATOID FACTOR (H): ICD-10-CM

## 2021-10-28 DIAGNOSIS — F11.20 CONTINUOUS OPIOID DEPENDENCE (H): ICD-10-CM

## 2021-10-28 DIAGNOSIS — G89.4 CHRONIC PAIN SYNDROME: ICD-10-CM

## 2021-10-28 RX ORDER — FENTANYL 12.5 UG/1
1 PATCH TRANSDERMAL
Qty: 10 PATCH | Refills: 0 | Status: SHIPPED | OUTPATIENT
Start: 2021-10-28 | End: 2021-12-01

## 2021-10-28 RX ORDER — HYDROCODONE BITARTRATE AND ACETAMINOPHEN 7.5; 325 MG/1; MG/1
.5-1 TABLET ORAL EVERY 6 HOURS PRN
Qty: 30 TABLET | Refills: 0 | Status: SHIPPED | OUTPATIENT
Start: 2021-10-28 | End: 2021-12-01

## 2021-10-28 NOTE — TELEPHONE ENCOUNTER
Reason for call:  Medication   If this is a refill request, has the caller requested the refill from the pharmacy already? No  Will the patient be using a [facility]? No  Name of the pharmacy and phone number for the current request: [id number] - [place], [place] - [address]    Name of the medication requested:   fentaNYL (DURAGESIC) 12 mcg/hr 72 hr patch    HYDROcodone-acetaminophen (NORCO) 7.5-325 MG per tablet    Other request: none    Phone number to reach patient:  Home number on file [phone] (home)    Best Time:  anytime    Can we leave a detailed message on this number?  YES    Travel screening: Not Applicable     Dee CONNORS    [facility]

## 2021-10-28 NOTE — TELEPHONE ENCOUNTER
Medication refill information reviewed. Patient is due for UDS/CSA. Can we change her 11/11/21 video visit to an in clinic appointment?    Due date for      fentaNYL (DURAGESIC) 12 mcg/hr 72 hr patch     HYDROcodone-acetaminophen (NORCO) 7.5-325 MG per tablet is 11/02/21     Prescriptions prepped for review.     Will route to provider.

## 2021-10-28 NOTE — TELEPHONE ENCOUNTER
Patient requesting refill(s) of fentaNYL (DURAGESIC) 12 mcg/hr 72 hr patch  Last dispensed from pharmacy on 10/01/121    HYDROcodone-acetaminophen (NORCO) 7.5-325 MG per tablet  Last dispensed from pharmacy on 10/01/21    Patient's last office/virtual visit by prescribing provider on 9/2/21  Next office/virtual appointment scheduled for 11/11/21    Last urine drug screen date 10/12/2020  Current opioid agreement on file (completed within the last year) No Date of opioid agreement: 10/12/2020    E-prescribe to [facility] [address] [place] [address] [id number]     Will route to nursing [facility] for review and preparation of prescription(s).

## 2021-10-28 NOTE — TELEPHONE ENCOUNTER
Script Eprescribed to pharmacy  [place] Prescription Monitoring Program checked    Will send this to MA team to notify patient.  Please let her know we are due for her controlled substance agreement, and would want her to change video to in person appt.    Signed Prescriptions:                        Disp   Refills    fentaNYL (DURAGESIC) 12 mcg/hr 72 hr patch 10 pat*0        Sig: Place 1 patch onto the skin every 72 hours remove old           patch. Fill 10/31/21 to start on 11/02/21  Authorizing Provider: SANTOS ISLAS    HYDROcodone-acetaminophen (NORCO) 7.5-325 *30 tab*0        Sig: Take 0.5-1 tablets by mouth every 6 hours as needed           for severe pain . Fill 10/31/21 to start on/after           11/02/21  Authorizing Provider: SANTOS ISLAS MD  [facility]

## 2021-10-29 NOTE — TELEPHONE ENCOUNTER
George message sent with Rx approval / message from provider.  Felicia  Pain Clinic Management Team

## 2021-11-04 ENCOUNTER — TELEPHONE (OUTPATIENT)
Dept: FAMILY MEDICINE | Facility: CLINIC | Age: 61
End: 2021-11-04

## 2021-11-04 NOTE — TELEPHONE ENCOUNTER
Reason for Call:  Medication refill:    Name of the pharmacy and phone number for the current request:  [facility]  [phone]    Name of the medication requested: metFORMIN (GLUCOPHAGE) 500 MG tablet AND triamterene-HCTZ (DYAZIDE) 37.5-25 MG capsule    Other request: patient is almost out of meds    Can we leave a detailed message on this number? YES    Phone number patient can be reached at: Home number on file [phone]         Call taken on 11/4/2021 at 12:36 PM by Geena Morocho

## 2021-11-04 NOTE — TELEPHONE ENCOUNTER
"Patient has refills for both medications \"on file\" at her pharmacy ([facility] [address] [place] [address], [place] [facility] [address]     They will dispense for patient today.     Vianney Casper RN BSN  [facility]    "

## 2021-11-05 NOTE — TELEPHONE ENCOUNTER
Called [phone] (home)     Did patient answer the phone: No, left a message on voicemail to return call to the [facility] at [phone].    Patti RN,BSN  Triage Nurse  [facility]: [facility]

## 2021-11-17 ENCOUNTER — OFFICE VISIT (OUTPATIENT)
Dept: URGENT CARE | Facility: URGENT CARE | Age: 61
End: 2021-11-17
Payer: COMMERCIAL

## 2021-11-17 ENCOUNTER — VIRTUAL VISIT (OUTPATIENT)
Dept: PALLIATIVE MEDICINE | Facility: CLINIC | Age: 61
End: 2021-11-17
Payer: COMMERCIAL

## 2021-11-17 VITALS
TEMPERATURE: 99.3 F | DIASTOLIC BLOOD PRESSURE: 69 MMHG | SYSTOLIC BLOOD PRESSURE: 144 MMHG | OXYGEN SATURATION: 96 % | WEIGHT: 165 LBS | BODY MASS INDEX: 30.18 KG/M2 | HEART RATE: 97 BPM

## 2021-11-17 DIAGNOSIS — M79.7 FIBROMYALGIA: Primary | ICD-10-CM

## 2021-11-17 DIAGNOSIS — H66.002 LEFT ACUTE SUPPURATIVE OTITIS MEDIA: Primary | ICD-10-CM

## 2021-11-17 DIAGNOSIS — E11.9 TYPE 2 DIABETES MELLITUS WITHOUT COMPLICATION, UNSPECIFIED WHETHER LONG TERM INSULIN USE (H): ICD-10-CM

## 2021-11-17 DIAGNOSIS — J02.9 SORE THROAT: ICD-10-CM

## 2021-11-17 DIAGNOSIS — G89.4 CHRONIC PAIN SYNDROME: ICD-10-CM

## 2021-11-17 DIAGNOSIS — M06.09 RHEUMATOID ARTHRITIS OF MULTIPLE SITES WITHOUT RHEUMATOID FACTOR (H): ICD-10-CM

## 2021-11-17 DIAGNOSIS — Z20.822 SUSPECTED 2019 NOVEL CORONAVIRUS INFECTION: ICD-10-CM

## 2021-11-17 LAB
DEPRECATED S PYO AG THROAT QL EIA: NEGATIVE
GROUP A STREP BY PCR: NOT DETECTED

## 2021-11-17 PROCEDURE — U0005 INFEC AGEN DETEC AMPLI PROBE: HCPCS | Mod: 90 | Performed by: NURSE PRACTITIONER

## 2021-11-17 PROCEDURE — U0003 INFECTIOUS AGENT DETECTION BY NUCLEIC ACID (DNA OR RNA); SEVERE ACUTE RESPIRATORY SYNDROME CORONAVIRUS 2 (SARS-COV-2) (CORONAVIRUS DISEASE [COVID-19]), AMPLIFIED PROBE TECHNIQUE, MAKING USE OF HIGH THROUGHPUT TECHNOLOGIES AS DESCRIBED BY CMS-2020-01-R: HCPCS | Mod: 90 | Performed by: NURSE PRACTITIONER

## 2021-11-17 PROCEDURE — 99212 OFFICE O/P EST SF 10 MIN: CPT | Mod: 95 | Performed by: PSYCHIATRY & NEUROLOGY

## 2021-11-17 PROCEDURE — 99214 OFFICE O/P EST MOD 30 MIN: CPT | Performed by: NURSE PRACTITIONER

## 2021-11-17 PROCEDURE — 87651 STREP A DNA AMP PROBE: CPT | Performed by: NURSE PRACTITIONER

## 2021-11-17 PROCEDURE — 99000 SPECIMEN HANDLING OFFICE-LAB: CPT | Performed by: NURSE PRACTITIONER

## 2021-11-17 RX ORDER — MULTIVITAMIN WITH IRON
1 TABLET ORAL DAILY
COMMUNITY

## 2021-11-17 RX ORDER — CLINDAMYCIN HCL 300 MG
300 CAPSULE ORAL 3 TIMES DAILY
Qty: 21 CAPSULE | Refills: 0 | Status: SHIPPED | OUTPATIENT
Start: 2021-11-17 | End: 2021-11-24

## 2021-11-17 ASSESSMENT — ENCOUNTER SYMPTOMS
CHILLS: 1
SORE THROAT: 1
COUGH: 1
FATIGUE: 1
MYALGIAS: 1

## 2021-11-17 ASSESSMENT — PAIN SCALES - GENERAL: PAINLEVEL: SEVERE PAIN (6)

## 2021-11-17 NOTE — PROGRESS NOTES
Betsey is a 61 year old who is being evaluated via a billable video visit.      How would you like to obtain your AVS? MyChart  If the video visit is dropped, the invitation should be resent by: Text to cell phone: [phone]  Will anyone else be joining your video visit? No   Is Pt currently in [place]? Yes    Hilary Barr MA      NOTE:  If Pt is not in [place], Appointment needs to be canceled and rescheduled.        Video Start Time: 1349  Video-Visit Details    Type of service:  Video Visit    Due to technical difficulties, she was unable to make the video work.  Phone call duration: 14 minutes                        [facility]    Date of visit: 11/17/2021     Chief complaint:   Chief Complaint   Patient presents with     Pain     Video visit due to COVID-19        Interval history:  Betsey Vanessa was last seen by me on 9/2/21    Recommendations/plan at the last visit included:  1. Physical Therapy: continue PRN  2. Clinical Health Psychologist to address issues of relaxation, behavioral change, coping style, and other factors important to improvement.  prn  3. Diagnostic Studies:  none  4. Medication Management:    1. No changes  2. We have discussed opioids/benzos. Recommended that she continue to work on lowering the clonazepam use. She describes some of it is for her Meneires.  5. Further procedures recommended: may need repeat facet joint injections vs radiofrequency ablation in the future.  6. Recommendations to PCP: none  7. Follow up: 2-3 months- in person    Since her last visit, Betsey Vanessa reports:  -she has a fever, sore throat, aches.  She feels short of breath at times.  She has had chest pain that almost feels like heartburn/GERD, and is using robitussin.  Her back hurts as well.    -she lives with her daughter, son in law, and grandchildren.  -otherwise chronic pain is the same, no new concerns.    Pain scores:  Severe Pain (6)      Current pain treatments:    Celebrex 100mg daily- lower dose   Fentanyl 12mcg/hr- has decreased in the past, concerned about coming down on that right now.  Norco 7.5/325- using 0-1day, rare 2.  #30/month working  Medical cannabis- helps but causes side effects like dizziness and dry mouth.  Diclofenac gel- helps   salanpas patch- over the counter  Emu oil    Clonazepam 0.5mg tabs- currently on taper with PCP but also uses for Meineres- uses between 0-4 tabs/day, but doesn't use 100 tabs.- last fills have been 3-3.5 months.  Narcan ordered    Previous medication treatments included:  Gabapentin - fast heartbeat, sweating  Cymbalta- hyperactivity  topamax- confusion  Amitriptyline- hyperactivity  Tramadol  darvocet    Other treatments have included:  Betsey Vanessa has been seen at a pain clinic in the past.    PT: hand therapy.- last about 3 years ago.  Acupuncture: no  TENs Unit: no  Injections:   Hand steroid injections-   Neck injections    Side Effects: None    Medications:  Current Outpatient Medications   Medication Sig Dispense Refill     alcohol swab prep pads Use to swab area of injection/rosie as directed 100 each 3     blood glucose (NO BRAND SPECIFIED) test strip Use to test blood sugar 2 times daily or as directed. To accompany: Blood Glucose Monitor Brands: per insurance. 100 strip 6     blood glucose calibration (NO BRAND SPECIFIED) solution Use to calibrate blood glucose monitor as needed as directed. To accompany: Blood Glucose Monitor Brands: per insurance. 1 Bottle 3     celecoxib (CELEBREX) 100 MG capsule Take 1 capsule (100 mg) by mouth 2 times daily 180 capsule 1     cetirizine (ZYRTEC) 10 MG tablet Take 1 tablet (10 mg) by mouth daily 90 tablet 3     cevimeline (EVOXAC) 30 MG capsule Take 1 capsule (30 mg) by mouth 3 times daily 270 capsule 2     clonazePAM (KLONOPIN) 0.5 MG tablet TAKE 1 TABLET BY MOUTH TWICE DAILY AS NEEDED FOR ANXIETY OK TO TAKE 1 TO 2 AT NIGHT AS NEEDED FOR SLEEP .  SHOULD  LAST  ABOUT  90  DAYS 100  tablet 0     diclofenac (VOLTAREN) 1 % topical gel Place 2-4 g onto the skin 4 times daily . Max of 8g per dose. No more than 32g/day 100 g 3     docusate sodium (COLACE) 100 MG capsule Take 1 capsule (100 mg) by mouth 2 times daily 180 capsule 3     escitalopram (LEXAPRO) 20 MG tablet Take 1 tablet (20 mg) by mouth daily 90 tablet 1     fluticasone (FLONASE) 50 MCG/ACT nasal spray Spray 1-2 sprays into both nostrils daily 16 g 11     fluvastatin (LESCOL) 20 MG capsule Take 1 capsule (20 mg) by mouth At Bedtime 90 capsule 1     glucose blood VI test strips strip daily       hydroxychloroquine (PLAQUENIL) 200 MG tablet Hydroxychloroquine 200mg daily; and an additional 200mg every other day. Yearly eye exam including 10-2 VF and SD- tablet 1     hydrOXYzine (ATARAX) 25 MG tablet Take 1 tablet (25 mg) by mouth 3 times daily as needed for itching 40 tablet 0     lifitegrast (XIIDRA) 5 % opthalmic solution Place 1 drop into both eyes 2 times daily 30 each 6     magnesium 250 MG tablet Take 1 tablet by mouth daily       medical cannabis (Patient's own supply) Take 1 Dose by mouth See Admin Instructions (The purpose of this order is to document that the patient reports taking medical cannabis.  This is not a prescription, and is not used to certify that the patient has a qualifying medical condition.)       metFORMIN (GLUCOPHAGE) 500 MG tablet Take 2 tablets (1,000 mg) by mouth 2 times daily (with meals) 360 tablet 0     metroNIDAZOLE (METROGEL) 0.75 % external gel Apply topically 2 times daily Apply to face daily 45 g 5     nystatin (MYCOSTATIN) 689441 UNIT/GM external powder Apply topically 2 times daily as needed 2 x daily PRN for rash 60 g 4     omeprazole (PRILOSEC) 20 MG DR capsule Take 1 capsule (20 mg) by mouth 2 times daily 180 capsule 3     pimecrolimus (ELIDEL) 1 % external cream Apply topically 2 times daily - use on back of neck and hands 60 g 5     potassium chloride ER (KLOR-CON M) 10 MEQ CR tablet  Take 1 tablet (10 mEq) by mouth 2 times daily 20 mEq daily Take by mouth 2 times daily 20 mEq daily 180 tablet 3     PROAIR  (90 Base) MCG/ACT inhaler Inhale 1-2 puffs into the lungs every 4 hours as needed for shortness of breath / dyspnea or wheezing 18 g 5     promethazine (PHENERGAN) 25 MG tablet Take 1 tablet (25 mg) by mouth every 6 hours as needed for nausea or other (Meniere's) For nusea 90 tablet 0     psyllium (METAMUCIL) 58.6 % POWD Take by mouth daily PRN       STATIN NOT PRESCRIBED (INTENTIONAL) Please choose reason not prescribed, below       thin (NO BRAND SPECIFIED) lancets Use to test blood sugar 2 times daily or as directed. To accompany: Blood Glucose Monitor Brands: per insurance. 1 each 3     triamterene-HCTZ (DYAZIDE) 37.5-25 MG capsule Take 1 capsule by mouth daily 90 capsule 2     vitamin D2 (ERGOCALCIFEROL) 51220 units (1250 mcg) capsule Take 1 capsule (50,000 Units) by mouth once a week 12 capsule 2     EPINEPHrine (ANY BX GENERIC EQUIV) 0.3 MG/0.3ML injection 2-pack Inject 0.3 mLs (0.3 mg) into the muscle as needed for anaphylaxis 2 each 0     EPINEPHrine 0.3 MG/0.3ML SOLN PRN for Anaphylaxis       fentaNYL (DURAGESIC) 12 mcg/hr 72 hr patch Place 1 patch onto the skin every 72 hours remove old patch. Fill 12/01/21 to start on/after 12/02/21 10 patch 0     glimepiride (AMARYL) 2 MG tablet TAKE 1 TABLET BY MOUTH IN THE MORNING BEFORE BREAKFAST 90 tablet 0     HYDROcodone-acetaminophen (NORCO) 7.5-325 MG per tablet Take 0.5-1 tablets by mouth every 6 hours as needed for severe pain . Fill 12/01/21 to start on/after 12/02/21 30 tablet 0     metFORMIN (GLUCOPHAGE) 500 MG tablet Take 2 tablets (1,000 mg) by mouth 2 times daily (with meals) +++NEED LABS IN SEPT.+++1000 mg 2 x a day 360 tablet 0     naloxone (NARCAN) 4 MG/0.1ML nasal spray Spray 1 spray (4 mg) into one nostril alternating nostrils as needed for opioid reversal every 2-3 minutes until assistance arrives 0.2 mL 0      promethazine (PHENERGAN) 25 MG suppository Place 25 mg rectally every 6 hours as needed For dali       UNABLE TO FIND MEDICATION NAME: Tumeric 1000 mg every day for pain          Medical History: any changes in medical history since they were last seen? As above    Physical Exam:  There were no vitals taken for this visit.  Awake, alert   Normal speech.  Remainder of exam deferred.    THE 4 A's OF OPIOID MAINTENANCE ANALGESIA    Analgesia: good    Activity: improved with meds    Adverse effects: none    Adherence to Rx protocol: good.  We have discussed risks with benzo/opioid. She will try to avoid at night. She has limited her use of benzo but can't get off.    [place] Board of Pharmacy Data Base Reviewed:    YES; 11/17/21 no concerns.  UDS/CSA  10/12/20- unable to get today due to illness        Assessment:   1. Rheumatoid arthritis  2. fibromyalgia  3. Sjogren's Syndrome  4. Meniere's disease with intermittent vertigo and hearing loss  5. Type 2 diabetes    Advised she needs to think about COVID and recommended she be tested.recommended urgen care or mychart E visit.    Plan:  1. Physical Therapy: continue PRN  2. Clinical Health Psychologist to address issues of relaxation, behavioral change, coping style, and other factors important to improvement.  prn  3. Diagnostic Studies:  none  4. Medication Management:    1. No changes  2. We have discussed opioids/benzos. Uses somewhat for Meneires  5. Further procedures recommended: may need repeat facet joint injections vs radiofrequency ablation in the future.  6. Recommendations to PCP: none  7. Follow up: 2-3 months- in person      16 minutes spent on the date of encounter doing chart review, history, and exam documentation and further activities as noted above.     Bailee Houser MD

## 2021-11-17 NOTE — PATIENT INSTRUCTIONS
----------------------------------------------------------------  [facility] Number:  [phone]     Call with any questions about your care and for scheduling assistance.     Calls are returned Monday through Friday between 8 AM and 4:30 PM. We usually get back to you within 2 business days depending on the issue/request.    If we are prescribing your medications:    For opioid medication refills, call the clinic or send a StepOut message 7 days in advance.  Please include:    Name of requested medication    Name of the pharmacy.    For non-opioid medications, call your pharmacy directly to request a refill. Please allow 3-4 days to be processed.     Per [place] State Law:    All controlled substance prescriptions must be filled within 30 days of being written.      For those controlled substances allowing refills, pickup must occur within 30 days of last fill.      We believe regular attendance is key to your success in our program!      Any time you are unable to keep your appointment we ask that you call us at least 24 hours in advance to cancel.This will allow us to offer the appointment time to another patient.     Multiple missed appointments may lead to dismissal from the clinic.

## 2021-11-17 NOTE — PATIENT INSTRUCTIONS
Patient Education     Middle Ear Infection (Otitis Media) in Adults  What is a middle ear infection?  A middle ear infection occurs behind the eardrum. It is most often caused by a virus or bacteria. Most kids have at least one middle ear infection by the time they are 3 years old. But adults can also get them.   What causes middle ear infections?  Inflammation in the middle ear most often starts after you ve had a sore throat, cold, or other upper respiratory problem. The infection spreads to the middle ear and causes fluid buildup behind the eardrum.    What are the symptoms of a middle ear infection?  These are the most common symptoms of middle ear infections in adults:     Ear pain    Feeling of fullness in the ear    Fluid draining from the ear    Fever    Hearing loss  These symptoms may look like other conditions or health problems. Always talk with your healthcare provider for a diagnosis.   How is a middle ear infection diagnosed?  Your healthcare provider will review your health history and do a physical exam. They will check the outer ear and the eardrum using an otoscope. This is a lighted tool that lets the healthcare provider see inside the ear. A pneumatic otoscope blows a puff of air into the ear to test eardrum movement. When there is fluid or infection in the middle ear, movement is decreased.   Your provider may also do a tympanometry. This is a test that directs air and sound to the middle ear.   If you have ear infections often, your healthcare provider may suggest having a hearing test.   How is a middle ear infection treated?  Treatment will depend on your symptoms, age, and general health. It will also depend on how severe the condition is.   Treatment may include:    Antibiotics    Pain relievers    Placing small tubes in the eardrum for chronic ear infections   What are possible complications of a middle ear infection?   Untreated ear infections can lead to:    Infection in other parts  of the head    Lasting (permanent) hearing loss    Speech and language problems  Can middle ear infections be prevented?  Cold and allergy medicines don't seem to prevent ear infections. And currently there is no vaccine that can prevent the disease. But check with your healthcare provider and make sure your vaccines are up-to-date. Living in a home where cigarettes are smoked can increase the chances of ear infections. So can living in a home where vaping devices, such as e-cigarettes and electronic nicotine, are used   Key points about middle ear infections    Middle ear infections can affect both children and adults.    Pain and fever can be the most common symptoms.    Without treatment, permanent hearing loss may happen.    Take antibiotics as prescribed and finish all of the prescription. This can help prevent antibiotic-resistant infections or incomplete treatment with the infection returning.    Keeping your home smoke-free or free of vaping devices can decrease the chances of ear infections.    Next steps  Tips to help you get the most from a visit to your healthcare provider:    Know the reason for your visit and what you want to happen.    Before your visit, write down questions you want answered.    Bring someone with you to help you ask questions and remember what your provider tells you.    At the visit, write down the name of a new diagnosis, and any new medicines, treatments, or tests. Also write down any new instructions your provider gives you.    Know why a new medicine or treatment is prescribed, and how it will help you. Also know what the side effects are.    Ask if your condition can be treated in other ways.    Know why a test or procedure is recommended and what the results could mean.    Know what to expect if you do not take the medicine or have the test or procedure.    If you have a follow-up appointment, write down the date, time, and purpose for that visit.    Know how you can contact  your provider if you have questions.  Janes last reviewed this educational content on 1/1/2021 2000-2021 [employer]. All rights reserved. This information is not intended as a substitute for professional medical care. Always follow your healthcare professional's instructions.

## 2021-11-17 NOTE — PROGRESS NOTES
SUBJECTIVE:   Betsey Vanessa is a 61 year old female presenting with a chief complaint of   Chief Complaint   Patient presents with     Covid Concern     Cough     Sore throat      Nasal Congestion     Chest Pain     From coughing      Headache       She is an established patient of [facility].    URI Adult    Onset of symptoms was 3 day(s) ago.  Course of illness is worsening.    Severity moderate  Current and Associated symptoms: chills, runny nose, stuffy nose, cough - non-productive, sore throat,both ears hurt, chest pain from coughing, headache and fatigue  Treatment measures tried include OTC Cough med.  Predisposing factors include None.  Patient has type 2 diabetes, has been checking her blood sugars but reports no concerns      Review of Systems   Constitutional: Positive for chills and fatigue.   HENT: Positive for ear pain and sore throat.    Respiratory: Positive for cough.    Cardiovascular: Positive for chest pain.   Musculoskeletal: Positive for myalgias.   All other systems reviewed and are negative.      Past Medical History:   Diagnosis Date     Anemia     r/t menses     Anxiety      Arthritis      Constipation      Depression      Diabetes (H)      Diarrhea      Double vision      Headache(784.0)      Hearing loss      Heart murmur      Heartburn      Hypertension      Indigestion      Nasal congestion      Night sweats      Numbness      Problems related to lack of adequate sleep      Rash      Sneezing      Tinnitus      Visual loss      Weakness      Weight gain     because of Prednisone     Family History   Problem Relation Age of Onset     C.A.D. Mother      Alzheimer Disease Mother      Cardiovascular Mother      Eye Disorder Mother      Thyroid Disease Mother      Hypertension Other         grandmother     Cerebrovascular Disease Father      Alcohol/Drug Father      Depression Father      Obesity Father         aunt     Asthma Father      Alcohol/Drug Brother      Cancer Brother       Alcohol/Drug Sister         aunt     Cancer Sister      Allergies Other         All family members     Arthritis Other         aunt     Obesity Sister      Thyroid Disease Sister      Asthma Sister         daughter     Current Outpatient Medications   Medication Sig Dispense Refill     alcohol swab prep pads Use to swab area of injection/rosie as directed 100 each 3     blood glucose (NO BRAND SPECIFIED) test strip Use to test blood sugar 2 times daily or as directed. To accompany: Blood Glucose Monitor Brands: per insurance. 100 strip 6     blood glucose calibration (NO BRAND SPECIFIED) solution Use to calibrate blood glucose monitor as needed as directed. To accompany: Blood Glucose Monitor Brands: per insurance. 1 Bottle 3     celecoxib (CELEBREX) 100 MG capsule Take 1 capsule (100 mg) by mouth 2 times daily 180 capsule 1     cetirizine (ZYRTEC) 10 MG tablet Take 1 tablet (10 mg) by mouth daily 90 tablet 3     cevimeline (EVOXAC) 30 MG capsule Take 1 capsule (30 mg) by mouth 3 times daily 270 capsule 2     clindamycin (CLEOCIN) 300 MG capsule Take 1 capsule (300 mg) by mouth 3 times daily for 7 days 21 capsule 0     clonazePAM (KLONOPIN) 0.5 MG tablet TAKE 1 TABLET BY MOUTH TWICE DAILY AS NEEDED FOR ANXIETY OK TO TAKE 1 TO 2 AT NIGHT AS NEEDED FOR SLEEP .  SHOULD  LAST  ABOUT  90  DAYS 100 tablet 0     diclofenac (VOLTAREN) 1 % topical gel Place 2-4 g onto the skin 4 times daily . Max of 8g per dose. No more than 32g/day 100 g 3     docusate sodium (COLACE) 100 MG capsule Take 1 capsule (100 mg) by mouth 2 times daily 180 capsule 3     EPINEPHrine (ANY BX GENERIC EQUIV) 0.3 MG/0.3ML injection 2-pack Inject 0.3 mLs (0.3 mg) into the muscle as needed for anaphylaxis 2 each 0     EPINEPHrine 0.3 MG/0.3ML SOLN PRN for Anaphylaxis       escitalopram (LEXAPRO) 20 MG tablet Take 1 tablet (20 mg) by mouth daily 90 tablet 1     fentaNYL (DURAGESIC) 12 mcg/hr 72 hr patch Place 1 patch onto the skin every 72 hours remove  old patch. Fill 10/31/21 to start on 11/02/21 10 patch 0     fluticasone (FLONASE) 50 MCG/ACT nasal spray Spray 1-2 sprays into both nostrils daily 16 g 11     fluvastatin (LESCOL) 20 MG capsule Take 1 capsule (20 mg) by mouth At Bedtime 90 capsule 1     glimepiride (AMARYL) 2 MG tablet TAKE 1 TABLET BY MOUTH IN THE MORNING BEFORE BREAKFAST. STOP TRULICITY 90 tablet 0     glucose blood VI test strips strip daily       HYDROcodone-acetaminophen (NORCO) 7.5-325 MG per tablet Take 0.5-1 tablets by mouth every 6 hours as needed for severe pain . Fill 10/31/21 to start on/after  11/02/21 30 tablet 0     hydroxychloroquine (PLAQUENIL) 200 MG tablet Hydroxychloroquine 200mg daily; and an additional 200mg every other day. Yearly eye exam including 10-2 VF and SD- tablet 1     hydrOXYzine (ATARAX) 25 MG tablet Take 1 tablet (25 mg) by mouth 3 times daily as needed for itching 40 tablet 0     lifitegrast (XIIDRA) 5 % opthalmic solution Place 1 drop into both eyes 2 times daily 30 each 6     magnesium 250 MG tablet Take 1 tablet by mouth daily       medical cannabis (Patient's own supply) Take 1 Dose by mouth See Admin Instructions (The purpose of this order is to document that the patient reports taking medical cannabis.  This is not a prescription, and is not used to certify that the patient has a qualifying medical condition.)       metFORMIN (GLUCOPHAGE) 500 MG tablet Take 2 tablets (1,000 mg) by mouth 2 times daily (with meals) 360 tablet 0     metFORMIN (GLUCOPHAGE) 500 MG tablet Take 2 tablets (1,000 mg) by mouth 2 times daily (with meals) +++NEED LABS IN SEPT.+++1000 mg 2 x a day 360 tablet 0     metroNIDAZOLE (METROGEL) 0.75 % external gel Apply topically 2 times daily Apply to face daily 45 g 5     naloxone (NARCAN) 4 MG/0.1ML nasal spray Spray 1 spray (4 mg) into one nostril alternating nostrils as needed for opioid reversal every 2-3 minutes until assistance arrives 0.2 mL 0     nystatin (MYCOSTATIN) 063084  UNIT/GM external powder Apply topically 2 times daily as needed 2 x daily PRN for rash 60 g 4     omeprazole (PRILOSEC) 20 MG DR capsule Take 1 capsule (20 mg) by mouth 2 times daily 180 capsule 3     pimecrolimus (ELIDEL) 1 % external cream Apply topically 2 times daily - use on back of neck and hands 60 g 5     potassium chloride ER (KLOR-CON M) 10 MEQ CR tablet Take 1 tablet (10 mEq) by mouth 2 times daily 20 mEq daily Take by mouth 2 times daily 20 mEq daily 180 tablet 3     PROAIR  (90 Base) MCG/ACT inhaler Inhale 1-2 puffs into the lungs every 4 hours as needed for shortness of breath / dyspnea or wheezing 18 g 5     promethazine (PHENERGAN) 25 MG suppository Place 25 mg rectally every 6 hours as needed For nusea       promethazine (PHENERGAN) 25 MG tablet Take 1 tablet (25 mg) by mouth every 6 hours as needed for nausea or other (Meniere's) For nusea 90 tablet 0     psyllium (METAMUCIL) 58.6 % POWD Take by mouth daily PRN       STATIN NOT PRESCRIBED (INTENTIONAL) Please choose reason not prescribed, below       thin (NO BRAND SPECIFIED) lancets Use to test blood sugar 2 times daily or as directed. To accompany: Blood Glucose Monitor Brands: per insurance. 1 each 3     triamterene-HCTZ (DYAZIDE) 37.5-25 MG capsule Take 1 capsule by mouth daily 90 capsule 2     UNABLE TO FIND MEDICATION NAME: Tumeric 1000 mg every day for pain        vitamin D2 (ERGOCALCIFEROL) 20544 units (1250 mcg) capsule Take 1 capsule (50,000 Units) by mouth once a week 12 capsule 2     Social History     Tobacco Use     Smoking status: Former Smoker     Start date: 1978     Quit date: 1998     Years since quittin.8     Smokeless tobacco: Never Used     Tobacco comment: 1 pack a day    Substance Use Topics     Alcohol use: No       OBJECTIVE  BP (!) 144/69   Pulse 97   Temp 99.3  F (37.4  C) (Tympanic)   Wt 74.8 kg (165 lb)   SpO2 96%   BMI 30.18 kg/m      Physical Exam  Vitals and nursing note reviewed.    Constitutional:       General: She is not in acute distress.     Appearance: She is well-developed. She is not diaphoretic.   HENT:      Head: Normocephalic and atraumatic.      Right Ear: Tympanic membrane and external ear normal.      Left Ear: Tympanic membrane and external ear normal.   Eyes:      Pupils: Pupils are equal, round, and reactive to light.   Pulmonary:      Effort: Pulmonary effort is normal. No respiratory distress.      Breath sounds: Normal breath sounds.   Musculoskeletal:      Cervical back: Normal range of motion and neck supple.   Lymphadenopathy:      Cervical: No cervical adenopathy.   Skin:     General: Skin is warm and dry.   Neurological:      Mental Status: She is alert.      Cranial Nerves: No cranial nerve deficit.       ASSESSMENT:      ICD-10-CM    1. Left acute suppurative otitis media  H66.002 clindamycin (CLEOCIN) 300 MG capsule   2. Sore throat  J02.9    3. Suspected 2019 novel coronavirus infection  Z20.822    4. Type 2 diabetes mellitus without complication, unspecified whether long term insulin use (H)  E11.9           PLAN:  I discussed lab results with the patient.  Plan of care as above  I recommend follow up with PCP in 3 days or sooner if symptoms are getting worse  Advised to take medications as prescribed  Side effects of medications discussed  Over the counter medications discussed  Worrisome symptoms are discussed and instructions to go to the ER.  In addition to the above, diabetes was also briefly addressed today. Health maintenance, medications and recent results reviewed.  All questions are answered and patient verbalized understanding and agrees with this plan.  Bridgett Calvillo  [facility]  Family Nurse Practitoner            Patient Instructions     Patient Education     Middle Ear Infection (Otitis Media) in Adults  What is a middle ear infection?  A middle ear infection occurs behind the eardrum. It is most often caused by a virus or bacteria. Most kids have at  least one middle ear infection by the time they are 3 years old. But adults can also get them.   What causes middle ear infections?  Inflammation in the middle ear most often starts after you ve had a sore throat, cold, or other upper respiratory problem. The infection spreads to the middle ear and causes fluid buildup behind the eardrum.    What are the symptoms of a middle ear infection?  These are the most common symptoms of middle ear infections in adults:     Ear pain    Feeling of fullness in the ear    Fluid draining from the ear    Fever    Hearing loss  These symptoms may look like other conditions or health problems. Always talk with your healthcare provider for a diagnosis.   How is a middle ear infection diagnosed?  Your healthcare provider will review your health history and do a physical exam. They will check the outer ear and the eardrum using an otoscope. This is a lighted tool that lets the healthcare provider see inside the ear. A pneumatic otoscope blows a puff of air into the ear to test eardrum movement. When there is fluid or infection in the middle ear, movement is decreased.   Your provider may also do a tympanometry. This is a test that directs air and sound to the middle ear.   If you have ear infections often, your healthcare provider may suggest having a hearing test.   How is a middle ear infection treated?  Treatment will depend on your symptoms, age, and general health. It will also depend on how severe the condition is.   Treatment may include:    Antibiotics    Pain relievers    Placing small tubes in the eardrum for chronic ear infections   What are possible complications of a middle ear infection?   Untreated ear infections can lead to:    Infection in other parts of the head    Lasting (permanent) hearing loss    Speech and language problems  Can middle ear infections be prevented?  Cold and allergy medicines don't seem to prevent ear infections. And currently there is no vaccine  that can prevent the disease. But check with your healthcare provider and make sure your vaccines are up-to-date. Living in a home where cigarettes are smoked can increase the chances of ear infections. So can living in a home where vaping devices, such as e-cigarettes and electronic nicotine, are used   Key points about middle ear infections    Middle ear infections can affect both children and adults.    Pain and fever can be the most common symptoms.    Without treatment, permanent hearing loss may happen.    Take antibiotics as prescribed and finish all of the prescription. This can help prevent antibiotic-resistant infections or incomplete treatment with the infection returning.    Keeping your home smoke-free or free of vaping devices can decrease the chances of ear infections.    Next steps  Tips to help you get the most from a visit to your healthcare provider:    Know the reason for your visit and what you want to happen.    Before your visit, write down questions you want answered.    Bring someone with you to help you ask questions and remember what your provider tells you.    At the visit, write down the name of a new diagnosis, and any new medicines, treatments, or tests. Also write down any new instructions your provider gives you.    Know why a new medicine or treatment is prescribed, and how it will help you. Also know what the side effects are.    Ask if your condition can be treated in other ways.    Know why a test or procedure is recommended and what the results could mean.    Know what to expect if you do not take the medicine or have the test or procedure.    If you have a follow-up appointment, write down the date, time, and purpose for that visit.    Know how you can contact your provider if you have questions.  [employer] last reviewed this educational content on 1/1/2021 2000-2021 [employer]. All rights reserved. This information is not intended as a substitute for  professional medical care. Always follow your healthcare professional's instructions.                  medical staff

## 2021-11-18 LAB
SARS-COV-2 RNA RESP QL NAA+PROBE: NORMAL
SARS-COV-2 RNA RESP QL NAA+PROBE: NOT DETECTED

## 2021-12-01 DIAGNOSIS — F11.20 CONTINUOUS OPIOID DEPENDENCE (H): ICD-10-CM

## 2021-12-01 DIAGNOSIS — G89.4 CHRONIC PAIN SYNDROME: ICD-10-CM

## 2021-12-01 DIAGNOSIS — M06.09 RHEUMATOID ARTHRITIS OF MULTIPLE SITES WITHOUT RHEUMATOID FACTOR (H): ICD-10-CM

## 2021-12-01 NOTE — TELEPHONE ENCOUNTER
Medication refill information reviewed. Annuals are due. Can update at 01/06/21 appt. Appt note placed.    Due date for     HYDROcodone-acetaminophen (NORCO) 7.5-325 MG per tablet and fentaNYL (DURAGESIC) 12 mcg/hr 72 hr patch is 12/02/21     Prescriptions prepped for review.     Will route to provider.

## 2021-12-01 NOTE — TELEPHONE ENCOUNTER
Reason for call:  Medication   If this is a refill request, has the caller requested the refill from the pharmacy already? No  Will the patient be using a [facility]? No  Name of the pharmacy and phone number for the current request: [id number] - [place], [place] - [phone]    Name of the medication requested: HYDROcodone-acetaminophen (NORCO) 7.5-325 MG per tablet  fentaNYL (DURAGESIC) 12 mcg/hr 72 hr patch    Phone number to reach patient:  Home number on file [phone] (home)    Can we leave a detailed message on this number?  YES    Travel screening: Not Applicable         Yoly SHARIF    [facility] [place]

## 2021-12-01 NOTE — TELEPHONE ENCOUNTER
Received call from patient requesting refill(s) of HYDROcodone-acetaminophen (NORCO) 7.5-325 MG per tablet  Last dispensed from pharmacy on 10/31/21    fentaNYL (DURAGESIC) 12 mcg/hr 72 hr patch  Last dispensed from pharmacy on 10/31/2021    Patient's last office/virtual visit by prescribing provider on 11/17/2021  Next office/virtual appointment scheduled for 1/6/22    Last urine drug screen date 10/12/2020  Current opioid agreement on file (completed within the last year) No Date of opioid agreement: 10/12/2020    E-prescribe to [facility] [place], [place] - [address]     Will route to nursing [place] for review and preparation of prescription(s).

## 2021-12-02 RX ORDER — HYDROCODONE BITARTRATE AND ACETAMINOPHEN 7.5; 325 MG/1; MG/1
.5-1 TABLET ORAL EVERY 6 HOURS PRN
Qty: 30 TABLET | Refills: 0 | Status: SHIPPED | OUTPATIENT
Start: 2021-12-02 | End: 2022-01-03

## 2021-12-02 RX ORDER — FENTANYL 12.5 UG/1
1 PATCH TRANSDERMAL
Qty: 10 PATCH | Refills: 0 | Status: SHIPPED | OUTPATIENT
Start: 2021-12-02 | End: 2022-01-03

## 2021-12-02 NOTE — TELEPHONE ENCOUNTER
[employer] message sent with Rx approval from provider.  Felicia  Pain Clinic Management Team

## 2021-12-02 NOTE — TELEPHONE ENCOUNTER
Script Eprescribed to pharmacy  [place] Prescription Monitoring Program checked    Will send this to MA team to notify patient.    Signed Prescriptions:                        Disp   Refills    fentaNYL (DURAGESIC) 12 mcg/hr 72 hr patch 10 pat*0        Sig: Place 1 patch onto the skin every 72 hours remove old           patch. Fill 12/01/21 to start on/after 12/02/21  Authorizing Provider: SANTOS ISLAS    HYDROcodone-acetaminophen (NORCO) 7.5-325 *30 tab*0        Sig: Take 0.5-1 tablets by mouth every 6 hours as needed           for severe pain . Fill 12/01/21 to start on/after           12/02/21  Authorizing Provider: SANTOS ISLAS MD  [facility]

## 2021-12-15 ENCOUNTER — INFUSION THERAPY VISIT (OUTPATIENT)
Dept: INFUSION THERAPY | Facility: CLINIC | Age: 61
End: 2021-12-15
Payer: COMMERCIAL

## 2021-12-15 VITALS
RESPIRATION RATE: 16 BRPM | WEIGHT: 170 LBS | HEART RATE: 78 BPM | OXYGEN SATURATION: 96 % | TEMPERATURE: 97.8 F | SYSTOLIC BLOOD PRESSURE: 150 MMHG | BODY MASS INDEX: 31.09 KG/M2 | DIASTOLIC BLOOD PRESSURE: 52 MMHG

## 2021-12-15 DIAGNOSIS — M06.09 RHEUMATOID ARTHRITIS OF MULTIPLE SITES WITH NEGATIVE RHEUMATOID FACTOR (H): Primary | ICD-10-CM

## 2021-12-15 PROCEDURE — 96365 THER/PROPH/DIAG IV INF INIT: CPT | Performed by: NURSE PRACTITIONER

## 2021-12-15 PROCEDURE — 99207 PR NO CHARGE LOS: CPT

## 2021-12-15 RX ORDER — HEPARIN SODIUM,PORCINE 10 UNIT/ML
5 VIAL (ML) INTRAVENOUS
Status: CANCELLED | OUTPATIENT
Start: 2021-12-20

## 2021-12-15 RX ORDER — HEPARIN SODIUM (PORCINE) LOCK FLUSH IV SOLN 100 UNIT/ML 100 UNIT/ML
5 SOLUTION INTRAVENOUS
Status: CANCELLED | OUTPATIENT
Start: 2021-12-20

## 2021-12-15 ASSESSMENT — PAIN SCALES - GENERAL: PAINLEVEL: MILD PAIN (3)

## 2021-12-15 NOTE — PROGRESS NOTES
Infusion Nursing Note:  Betseyfrancisco javier Jayon presents today for Orencia.    Patient seen by provider today: No   present during visit today: Not Applicable.    Note: Pt reports continuing to tolerate Orencia well. No concerns at this time.      Intravenous Access:  Peripheral IV placed.    Treatment Conditions:  Biological Infusion Checklist:  ~~~ NOTE: If the patient answers yes to any of the questions below, hold the infusion and contact ordering provider or on-call provider.    1. Have you recently had an elevated temperature, fever, chills, productive cough, coughing for 3 weeks or longer or hemoptysis, abnormal vital signs, night sweats,  chest pain or have you noticed a decrease in your appetite, unexplained weight loss or fatigue? No  2. Do you have any open wounds or new incisions? No  3. Do you have any recent or upcoming hospitalizations, surgeries or dental procedures? No  4. Do you currently have or recently have had any signs of illness or infection or are you on any antibiotics? No  5. Have you had any new, sudden or worsening abdominal pain? No  6. Have you or anyone in your household received a live vaccination in the past 4 weeks? Please note:  No live vaccines while on biologic/chemotherapy until 6 months after the last treatment.  Patient can receive the flu vaccine (shot only) and the pneumovax.  It is optimal for the patient to get these vaccines mid cycle, but they can be given at any time as long as it is not on the day of the infusion. No  7. Have you recently been diagnosed with any new nervous system diseases (ie. Multiple sclerosis, Guillain [place], seizures, neurological changes) or cancer diagnosis? No  8. Are you on any form of radiation or chemotherapy? No  9. Are you pregnant or breast feeding or do you have plans of pregnancy in the future? No  10. Have you been having any signs of worsening depression or suicidal ideations?  (benlysta only) No  11. Have there been any other new  onset medical symptoms? No        Post Infusion Assessment:  Patient tolerated infusion without incident.  Site patent and intact, free from redness, edema or discomfort.  No evidence of extravasations.  Access discontinued per protocol.       Discharge Plan:   AVS to patient via MYCHART.  Patient will return 1/12 for next appointment.   Patient discharged in stable condition accompanied by: self.  Departure Mode: Ambulatory.      Ronda Landon RN

## 2021-12-29 DIAGNOSIS — F32.0 MILD MAJOR DEPRESSION (H): ICD-10-CM

## 2021-12-29 NOTE — TELEPHONE ENCOUNTER
Routing refill request to provider for review/approval because:  Needs provider approval. Has appt 1/21/22.  Mallika Marquis RN  [facility]

## 2021-12-29 NOTE — TELEPHONE ENCOUNTER
"Requested Prescriptions   Pending Prescriptions Disp Refills    escitalopram (LEXAPRO) 20 MG tablet 90 tablet 1     Sig: Take 1 tablet (20 mg) by mouth daily        SSRIs Protocol Failed - 12/29/2021  3:49 PM        Failed - PHQ-9 score less than 5 in past 6 months     Please review last PHQ-9 score.           Passed - Medication is active on med list        Passed - Patient is age 18 or older        Passed - No active pregnancy on record        Passed - No positive pregnancy test in last 12 months        Passed - Recent (6 mo) or future (30 days) visit within the authorizing provider's specialty     Patient had office visit in the last 6 months or has a visit in the next 30 days with authorizing provider or within the authorizing provider's specialty.  See \"Patient Info\" tab in inbasket, or \"Choose Columns\" in Meds & Orders section of the refill encounter.                Routing refill request to provider for review/approval because:  Failed protocol.  Patti Last RN, BSN  Triage nurse  [facility]: [place]    "

## 2021-12-30 RX ORDER — ESCITALOPRAM OXALATE 20 MG/1
20 TABLET ORAL DAILY
Qty: 90 TABLET | Refills: 0 | Status: SHIPPED | OUTPATIENT
Start: 2021-12-30 | End: 2022-02-18

## 2022-01-02 ENCOUNTER — HEALTH MAINTENANCE LETTER (OUTPATIENT)
Age: 62
End: 2022-01-02

## 2022-01-03 DIAGNOSIS — M06.09 RHEUMATOID ARTHRITIS OF MULTIPLE SITES WITHOUT RHEUMATOID FACTOR (H): ICD-10-CM

## 2022-01-03 DIAGNOSIS — G89.4 CHRONIC PAIN SYNDROME: ICD-10-CM

## 2022-01-03 DIAGNOSIS — F11.20 CONTINUOUS OPIOID DEPENDENCE (H): ICD-10-CM

## 2022-01-03 RX ORDER — FENTANYL 12.5 UG/1
1 PATCH TRANSDERMAL
Qty: 10 PATCH | Refills: 0 | Status: SHIPPED | OUTPATIENT
Start: 2022-01-03 | End: 2022-02-02

## 2022-01-03 RX ORDER — HYDROCODONE BITARTRATE AND ACETAMINOPHEN 7.5; 325 MG/1; MG/1
.5-1 TABLET ORAL EVERY 6 HOURS PRN
Qty: 30 TABLET | Refills: 0 | Status: SHIPPED | OUTPATIENT
Start: 2022-01-03 | End: 2022-02-02

## 2022-01-03 NOTE — TELEPHONE ENCOUNTER
Medication refill information reviewed.     Due date for HYDROcodone-acetaminophen (NORCO) 7.5-325 MG per tablet and fentaNYL (DURAGESIC) 12 mcg/hr 72 hr patch is 01/03/22     Prescriptions prepped for review.     Will route to provider.

## 2022-01-03 NOTE — TELEPHONE ENCOUNTER
Script Eprescribed to pharmacy  [place]cription [place]    Notified pt on VM    Signed Prescriptions:                        Disp   Refills    fentaNYL (DURAGESIC) 12 mcg/hr 72 hr patch 10 pat*0        Sig: Place 1 patch onto the skin every 72 hours remove old           patch. Fill/start 01/03/22  Authorizing Provider: SANTOS ISLAS    HYDROcodone-acetaminophen (NORCO) 7.5-325 *30 tab*0        Sig: Take 0.5-1 tablets by mouth every 6 hours as needed           for severe pain . Fill/start on/after 01/03/22  Authorizing Provider: SANTOS ISLAS MD  [facility]

## 2022-01-03 NOTE — TELEPHONE ENCOUNTER
Patient requesting refill(s) of HYDROcodone-acetaminophen (NORCO) 7.5-325 MG per tablet   Last dispensed from pharmacy on 12/02/21    fentaNYL (DURAGESIC) 12 mcg/hr 72 hr patch  Last dispensed from pharmacy on 12/02/2021    Patient's last office/virtual visit by prescribing provider on 11/17/2021  Next office/virtual appointment scheduled for 1/6/22    Last urine drug screen date 10/12/2020  Current opioid agreement on file (completed within the last year) No Date of opioid agreement: 10/12/2020    E-prescribe to [facility] [address] [place] [address]  pharmacy    Will route to nursing De[place] for review and preparation of prescription(s).

## 2022-01-03 NOTE — TELEPHONE ENCOUNTER
Reason for call:  Medication   If this is a refill request, has the caller requested the refill from the pharmacy already? No  Will the patient be using a [facility]? No  Name of the pharmacy and phone number for the current request: [facility] - [place], [place] - [phone]     Name of the medication requested: HYDROcodone-acetaminophen (NORCO) 7.5-325 MG per tablet  fentaNYL (DURAGESIC) 12 mcg/hr 72 hr patch     Phone number to reach patient:  Home number on file [phone] (home)     Can we leave a detailed message on this number?  YES     Travel screening: Not Applicable

## 2022-01-06 ENCOUNTER — LAB (OUTPATIENT)
Dept: LAB | Facility: CLINIC | Age: 62
End: 2022-01-06
Payer: COMMERCIAL

## 2022-01-06 ENCOUNTER — OFFICE VISIT (OUTPATIENT)
Dept: PALLIATIVE MEDICINE | Facility: CLINIC | Age: 62
End: 2022-01-06
Payer: COMMERCIAL

## 2022-01-06 VITALS — DIASTOLIC BLOOD PRESSURE: 69 MMHG | HEART RATE: 80 BPM | SYSTOLIC BLOOD PRESSURE: 125 MMHG

## 2022-01-06 DIAGNOSIS — M25.512 CHRONIC LEFT SHOULDER PAIN: ICD-10-CM

## 2022-01-06 DIAGNOSIS — Z51.81 ENCOUNTER FOR MONITORING OPIOID MAINTENANCE THERAPY: ICD-10-CM

## 2022-01-06 DIAGNOSIS — G56.23 ULNAR NEUROPATHY OF BOTH UPPER EXTREMITIES: ICD-10-CM

## 2022-01-06 DIAGNOSIS — Z51.81 ENCOUNTER FOR MONITORING OPIOID MAINTENANCE THERAPY: Primary | ICD-10-CM

## 2022-01-06 DIAGNOSIS — Z79.891 ENCOUNTER FOR MONITORING OPIOID MAINTENANCE THERAPY: ICD-10-CM

## 2022-01-06 DIAGNOSIS — G89.29 CHRONIC LEFT SHOULDER PAIN: ICD-10-CM

## 2022-01-06 DIAGNOSIS — Z79.891 ENCOUNTER FOR MONITORING OPIOID MAINTENANCE THERAPY: Primary | ICD-10-CM

## 2022-01-06 LAB
CANNABINOIDS UR QL SCN: NORMAL
CREAT UR-MCNC: 255 MG/DL

## 2022-01-06 PROCEDURE — 80307 DRUG TEST PRSMV CHEM ANLYZR: CPT | Mod: 59

## 2022-01-06 PROCEDURE — 80307 DRUG TEST PRSMV CHEM ANLYZR: CPT

## 2022-01-06 ASSESSMENT — PAIN SCALES - GENERAL: PAINLEVEL: MODERATE PAIN (4)

## 2022-01-06 NOTE — PROGRESS NOTES
[facility]    Date of visit: 1/6/2022     Chief complaint:   Chief Complaint   Patient presents with     Pain       Interval history:  Betsey Vanessa was last seen by me on 11/17/21    Recommendations/plan at the last visit included:  1. Physical Therapy: continue PRN  2. Clinical Health Psychologist to address issues of relaxation, behavioral change, coping style, and other factors important to improvement.  prn  3. Diagnostic Studies:  none  4. Medication Management:    1. No changes  2. We have discussed opioids/benzos. Uses somewhat for Meneires  5. Further procedures recommended: may need repeat facet joint injections vs radiofrequency ablation in the future.  6. Recommendations to PCP: none  7. Follow up: 2-3 months- in person    Since her last visit, Betsey Vanessa reports:  -she feels like she is getting worse  -her hands, wrist, feet are the worst  -she does tend to have problems with weather.  -she has been using more of the norco. Previously using 1/2 tab per day, but now using 1/day, at times using 3/day.  Was out of med 4 days early this past month.  -she has had an EMG.  Has had carpal tunnel surgery in the past.  EMG done 8/4/21- showed right ulnar nerve severe neuropathy.  Left side was not tested.  Typically she wakes up in the morning with that pain- but can be any time.  -she feels that her Rheumatoid has been bad off and on.  Left shoulder has also been painful.  Having problems with extending arm with severe pain.  No injury    Pain scores:  Moderate Pain (4)      Current pain treatments:   Celebrex 100mg daily- lower dose   Fentanyl 12mcg/hr- has decreased in the past, concerned about coming down on that right now.  Norco 7.5/325- using 1-3/day.  Previously was using 1/day.  #30/month working.    Medical cannabis- helps but causes side effects like dizziness and dry mouth.  Diclofenac gel- helps   salanpas patch- over the counter  Emu  oil    Clonazepam 0.5mg tabs- currently on taper with PCP but also uses for Meineres- uses between 0-4 tabs/day, but doesn't use 100 tabs.- last fills have been 3-3.5 months.  Narcan ordered    Previous medication treatments included:  Gabapentin - fast heartbeat, sweating  Cymbalta- hyperactivity  topamax- confusion  Amitriptyline- hyperactivity  Tramadol  darvocet    Other treatments have included:  Betsey Vanessa has been seen at a pain clinic in the past.    PT: hand therapy.- last about 3 years ago.  Acupuncture: no  TENs Unit: no  Injections:   Hand steroid injections-   Neck injections    Side Effects: None    Medications:  Current Outpatient Medications   Medication Sig Dispense Refill     alcohol swab prep pads Use to swab area of injection/rosie as directed 100 each 3     blood glucose (NO BRAND SPECIFIED) test strip Use to test blood sugar 2 times daily or as directed. To accompany: Blood Glucose Monitor Brands: per insurance. 100 strip 6     blood glucose calibration (NO BRAND SPECIFIED) solution Use to calibrate blood glucose monitor as needed as directed. To accompany: Blood Glucose Monitor Brands: per insurance. 1 Bottle 3     celecoxib (CELEBREX) 100 MG capsule Take 1 capsule (100 mg) by mouth 2 times daily 180 capsule 1     cetirizine (ZYRTEC) 10 MG tablet Take 1 tablet (10 mg) by mouth daily 90 tablet 3     cevimeline (EVOXAC) 30 MG capsule Take 1 capsule (30 mg) by mouth 3 times daily 270 capsule 2     clonazePAM (KLONOPIN) 0.5 MG tablet TAKE 1 TABLET BY MOUTH TWICE DAILY AS NEEDED FOR ANXIETY OK TO TAKE 1 TO 2 AT NIGHT AS NEEDED FOR SLEEP .  SHOULD  LAST  ABOUT  90  DAYS 100 tablet 0     diclofenac (VOLTAREN) 1 % topical gel Place 2-4 g onto the skin 4 times daily . Max of 8g per dose. No more than 32g/day 100 g 3     docusate sodium (COLACE) 100 MG capsule Take 1 capsule (100 mg) by mouth 2 times daily 180 capsule 3     escitalopram (LEXAPRO) 20 MG tablet Take 1 tablet (20 mg) by mouth daily 90  tablet 0     fentaNYL (DURAGESIC) 12 mcg/hr 72 hr patch Place 1 patch onto the skin every 72 hours remove old patch. Fill/start 01/03/22 10 patch 0     fluticasone (FLONASE) 50 MCG/ACT nasal spray Spray 1-2 sprays into both nostrils daily 16 g 11     fluvastatin (LESCOL) 20 MG capsule Take 1 capsule (20 mg) by mouth At Bedtime 90 capsule 1     glimepiride (AMARYL) 2 MG tablet TAKE 1 TABLET BY MOUTH IN THE MORNING BEFORE BREAKFAST 90 tablet 0     glucose blood VI test strips strip daily       HYDROcodone-acetaminophen (NORCO) 7.5-325 MG per tablet Take 0.5-1 tablets by mouth every 6 hours as needed for severe pain . Fill/start on/after 01/03/22 30 tablet 0     hydroxychloroquine (PLAQUENIL) 200 MG tablet Hydroxychloroquine 200mg daily; and an additional 200mg every other day. Yearly eye exam including 10-2 VF and SD- tablet 1     hydrOXYzine (ATARAX) 25 MG tablet Take 1 tablet (25 mg) by mouth 3 times daily as needed for itching 40 tablet 0     lifitegrast (XIIDRA) 5 % opthalmic solution Place 1 drop into both eyes 2 times daily 30 each 6     magnesium 250 MG tablet Take 1 tablet by mouth daily       medical cannabis (Patient's own supply) Take 1 Dose by mouth See Admin Instructions (The purpose of this order is to document that the patient reports taking medical cannabis.  This is not a prescription, and is not used to certify that the patient has a qualifying medical condition.)       metFORMIN (GLUCOPHAGE) 500 MG tablet Take 2 tablets (1,000 mg) by mouth 2 times daily (with meals) 360 tablet 0     metFORMIN (GLUCOPHAGE) 500 MG tablet Take 2 tablets (1,000 mg) by mouth 2 times daily (with meals) +++NEED LABS IN SEPT.+++1000 mg 2 x a day 360 tablet 0     metroNIDAZOLE (METROGEL) 0.75 % external gel Apply topically 2 times daily Apply to face daily 45 g 5     naloxone (NARCAN) 4 MG/0.1ML nasal spray Spray 1 spray (4 mg) into one nostril alternating nostrils as needed for opioid reversal every 2-3 minutes until  assistance arrives 0.2 mL 0     nystatin (MYCOSTATIN) 778766 UNIT/GM external powder Apply topically 2 times daily as needed 2 x daily PRN for rash 60 g 4     omeprazole (PRILOSEC) 20 MG DR capsule Take 1 capsule (20 mg) by mouth 2 times daily 180 capsule 3     pimecrolimus (ELIDEL) 1 % external cream Apply topically 2 times daily - use on back of neck and hands 60 g 5     potassium chloride ER (KLOR-CON M) 10 MEQ CR tablet Take 1 tablet (10 mEq) by mouth 2 times daily 20 mEq daily Take by mouth 2 times daily 20 mEq daily 180 tablet 3     PROAIR  (90 Base) MCG/ACT inhaler Inhale 1-2 puffs into the lungs every 4 hours as needed for shortness of breath / dyspnea or wheezing 18 g 5     promethazine (PHENERGAN) 25 MG suppository Place 25 mg rectally every 6 hours as needed For nusea       promethazine (PHENERGAN) 25 MG tablet Take 1 tablet (25 mg) by mouth every 6 hours as needed for nausea or other (Meniere's) For nusea 90 tablet 0     psyllium (METAMUCIL) 58.6 % POWD Take by mouth daily PRN       STATIN NOT PRESCRIBED (INTENTIONAL) Please choose reason not prescribed, below       thin (NO BRAND SPECIFIED) lancets Use to test blood sugar 2 times daily or as directed. To accompany: Blood Glucose Monitor Brands: per insurance. 1 each 3     triamterene-HCTZ (DYAZIDE) 37.5-25 MG capsule Take 1 capsule by mouth daily 90 capsule 2     UNABLE TO FIND MEDICATION NAME: Tumeric 1000 mg every day for pain        vitamin D2 (ERGOCALCIFEROL) 70315 units (1250 mcg) capsule Take 1 capsule (50,000 Units) by mouth once a week 12 capsule 2     EPINEPHrine (ANY BX GENERIC EQUIV) 0.3 MG/0.3ML injection 2-pack Inject 0.3 mLs (0.3 mg) into the muscle as needed for anaphylaxis (Patient not taking: Reported on 1/6/2022) 2 each 0     EPINEPHrine 0.3 MG/0.3ML SOLN PRN for Anaphylaxis (Patient not taking: Reported on 1/6/2022)         Medical History: any changes in medical history since they were last seen? ***    Physical Exam:***  BP  125/69   Pulse 80   Awake, alert   Normal speech.  Remainder of exam deferred.    THE 4 A's OF OPIOID MAINTENANCE ANALGESIA    Analgesia: good    Activity: improved with meds    Adverse effects: none    Adherence to Rx protocol: good.  We have discussed risks with benzo/opioid. She will try to avoid at night. She has limited her use of benzo but can't get off.    [place] Board of Pharmacy Data Base Reviewed:    YES; 11/17/21 no concerns.  UDS/CSA  1- getting today        Assessment:   1. Rheumatoid arthritis  2. fibromyalgia  3. Sjogren's Syndrome  4. Meniere's disease with intermittent vertigo and hearing loss  5. Type 2 diabetes      Plan:***  8. Physical Therapy: continue PRN  9. Clinical Health Psychologist to address issues of relaxation, behavioral change, coping style, and other factors important to improvement.  prn  10. Diagnostic Studies:  none  11. Medication Management:    3. No changes  4. We have discussed opioids/benzos. Uses somewhat for Meneires  12. Further procedures recommended: may need repeat facet joint injections vs radiofrequency ablation in the future.  13. Recommendations to PCP: none  14. Follow up: 2-3 months- in person      *** minutes spent on the date of encounter doing chart review, history, and exam documentation and further activities as noted above.     Bailee Houser MD

## 2022-01-06 NOTE — LETTER
Opioid / Opioid Plus Controlled Substance Agreement    This is an agreement between you and your provider about the safe and appropriate use of controlled substance/opioids prescribed by your care team. Controlled substances are medicines that can cause physical and mental dependence (abuse).    There are strict laws about having and using these medicines. We here at [facility] are committing to working with you in your efforts to get better. To support you in this work, we ll help you schedule regular office appointments for medicine refills. If we must cancel or change your appointment for any reason, we ll make sure you have enough medicine to last until your next appointment.     As a Provider, I will:    Listen carefully to your concerns and treat you with respect.     Recommend a treatment plan that I believe is in your best interest. This plan may involve therapies other than opioid pain medication.     Talk with you often about the possible benefits, and the risk of harm of any medicine that we prescribe for you.     Provide a plan on how to taper (discontinue or go off) using this medicine if the decision is made to stop its use.    As a Patient, I understand that opioid(s):     Are a controlled substance prescribed by my care team to help me function or work and manage my condition(s).     Are strong medicines and can cause serious side effects such as:    Drowsiness, which can seriously affect my driving ability    A lower breathing rate, enough to cause death    Harm to my thinking ability     Depression     Abuse of and addiction to this medicine    Need to be taken exactly as prescribed. Combining opioids with certain medicines or chemicals (such as illegal drugs, sedatives, sleeping pills, and benzodiazepines) can be dangerous or even fatal. If I stop opioids suddenly, I may have severe withdrawal symptoms.    Do not work for all types of pain nor for all patients. If they re not helpful, I may  be asked to stop them.      The risks, benefits and side effects of these medicine(s) were explained to me. I agree that:  1. I will take part in other treatments as advised by my care team. This may be psychiatry or counseling, physical therapy, behavioral therapy, group treatment or a referral to a specialist.     2. I will keep all my appointments. I understand that this is part of the monitoring of opioids. My care team may require an office visit for EVERY opioid/controlled substance refill. If I miss appointments or don t follow instructions, my care team may stop my medicine.    3. I will take my medicines as prescribed. I will not change the dose or schedule unless my care team tells me to. There will be no refills if I run out early.     4. I may be asked to come to the clinic and complete a urine drug test or complete a pill count at any time. If I don t give a urine sample or participate in a pill count, the care team may stop my medicine.    5. I will only receive prescriptions from this clinic for chronic pain. If I am treated by another provider for acute pain issues, I will tell them that I am taking opioid pain medication for chronic pain and that I have a treatment agreement with this provider. I will inform my [facility] care team within one business day if I am given a prescription for any pain medication by another healthcare provider. My [facility] care team can contact other providers and pharmacists about my use of any medicines.    6. It is up to me to make sure that I don t run out of my medicines on weekends or holidays. If my care team is willing to refill my opioid prescription without a visit, I must request refills only during office hours. Refills may take up to 3 business days to process. I will use one pharmacy to fill all my opioid and other controlled substance prescriptions. I will notify the clinic about any changes to my insurance or medication  availability.    7. I am responsible for my prescriptions. If the medicine/prescription is lost, stolen or destroyed, it will not be replaced. I also agree not to share controlled substance medicines with anyone.    8. I am aware I should not use any illegal or recreational drugs. I agree not to drink alcohol unless my care team says I can.       9. If I enroll in the [place] Medical Cannabis program, I will tell my care team prior to my next refill.     10. I will tell my care team right away if I become pregnant, have a new medical problem treated outside of my regular clinic, or have a change in my medications.    11. I understand that this medicine can affect my thinking, judgment and reaction time. Alcohol and drugs affect the brain and body, which can affect the safety of my driving. Being under the influence of alcohol or drugs can affect my decision-making, behaviors, personal safety, and the safety of others. Driving while impaired (DWI) can occur if a person is driving, operating, or in physical control of a car, motorcycle, boat, snowmobile, ATV, motorbike, off-road vehicle, or any other motor vehicle ([place] Statute 169A.20). I understand the risk if I choose to drive or operate any vehicle or machinery.    I understand that if I do not follow any of the conditions above, my prescriptions or treatment may be stopped or changed.          Opioids  What You Need to Know    What are opioids?   Opioids are pain medicines that must be prescribed by a doctor. They are also known as narcotics.     Examples are:   1. morphine (MS Contin, Janessa)  2. oxycodone (Oxycontin)  3. oxycodone and acetaminophen (Percocet)  4. hydrocodone and acetaminophen (Vicodin, Norco)   5. fentanyl patch (Duragesic)   6. hydromorphone (Dilaudid)   7. methadone  8. codeine (Tylenol #3)     What do opioids do well?   Opioids are best for severe short-term pain such as after a surgery or injury. They may work well for cancer pain. They may  help some people with long-lasting (chronic) pain.     What do opioids NOT do well?   Opioids never get rid of pain entirely, and they don t work well for most patients with chronic pain. Opioids don t reduce swelling, one of the causes of pain.                                    Other ways to manage chronic pain and improve function include:       Treat the health problem that may be causing pain    Anti-inflammation medicines, which reduce swelling and tenderness, such as ibuprofen (Advil, Motrin) or naproxen (Aleve)    Acetaminophen (Tylenol)    Antidepressants and anti-seizure medicines, especially for nerve pain    Topical treatments such as patches or creams    Injections or nerve blocks    Chiropractic or osteopathic treatment    Acupuncture, massage, deep breathing, meditation, visual imagery, aromatherapy    Use heat or ice at the pain site    Physical therapy     Exercise    Stop smoking    Take part in therapy       Risks and side effects     Talk to your doctor before you start or decide to keep taking opioids. Possible side effects include:      Lowering your breathing rate enough to cause death    Overdose, including death, especially if taking higher than prescribed doses    Worse depression symptoms; less pleasure in things you usually enjoy    Feeling tired or sluggish    Slower thoughts or cloudy thinking    Being more sensitive to pain over time; pain is harder to control    Trouble sleeping or restless sleep    Changes in hormone levels (for example, less testosterone)    Changes in sex drive or ability to have sex    Constipation    Unsafe driving    Itching and sweating    Dizziness    Nausea, throwing up and dry mouth    What else should I know about opioids?    Opioids may lead to dependence, tolerance, or addiction.      Dependence means that if you stop or reduce the medicine too quickly, you will have withdrawal symptoms. These include loose poop (diarrhea), jitters, flu-like symptoms,  nervousness and tremors. Dependence is not the same as addiction.                       Tolerance means needing higher doses over time to get the same effect. This may increase the chance of serious side effects.      Addiction is when people improperly use a substance that harms their body, their mind or their relations with others. Use of opiates can cause a relapse of addiction if you have a history of drug or alcohol abuse.      People who have used opioids for a long time may have a lower quality of life, worse depression, higher levels of pain and more visits to doctors.    You can overdose on opioids. Take these steps to lower your risk of overdose:    1. Recognize the signs:  Signs of overdose include decrease or loss of consciousness (blackout), slowed breathing, trouble waking up and blue lips. If someone is worried about overdose, they should call 911.    2. Talk to your doctor about Narcan (naloxone).   If you are at risk for overdose, you may be given a prescription for Narcan. This medicine very quickly reverses the effects of opioids.   If you overdose, a friend or family member can give you Narcan while waiting for the ambulance. They need to know the signs of overdose and how to give Narcan.     3. Don't use alcohol or street drugs.   Taking them with opioids can cause death.    4. Do not take any of these medicines unless your doctor says it s OK. Taking these with opioids can cause death:    Benzodiazepines, such as lorazepam (Ativan), alprazolam (Xanax) or diazepam (Valium)    Muscle relaxers, such as cyclobenzaprine (Flexeril)    Sleeping pills like zolpidem (Ambien)     Other opioids      How to keep you and other people safe while taking opioids:    1. Never share your opioids with others.  Opioid medicines are regulated by the Drug Enforcement Agency ([place]). Selling or sharing medications is a criminal act.    2. Be sure to store opioids in a secure place, locked up if possible. Young children  can easily swallow them and overdose.    3. When you are traveling with your medicines, keep them in the original bottles. If you use a pill box, be sure you also carry a copy of your medicine list from your clinic or pharmacy.    4. Safe disposal of opioids    Most pharmacies have places to get rid of medicine, called disposal kiosks. Medicine disposal options are also available in every [facility]. Search your county and  medication disposal  to find more options. You can find more details at:  [email or web address]     I agree that my provider, clinic care team, and pharmacy may work with any city, state or federal law enforcement agency that investigates the misuse, sale, or other diversion of my controlled medicine. I will allow my provider to discuss my care with, or share a copy of, this agreement with any other treating provider, pharmacy or emergency room where I receive care.    I have read this agreement and have asked questions about anything I did not understand.    _______________________________________________________  Patient Signature - Betsey Vanessa _____________________                   Date     _______________________________________________________  Provider Signature - Shantal Houser MD   _____________________                   Date     _______________________________________________________  Witness Signature (required if provider not present while patient signing)   _____________________                   Date

## 2022-01-06 NOTE — PATIENT INSTRUCTIONS
1. Stop a the lab downstairs for a urine drug screen.    2. Schedule for shoulder with sport medicine.  They call you.  To contact [facility] to schedule, call [phone].     3. Schedule EMG [phone].    4. Start Lyrica  Lyrica 1 tab= 25mg   The prescription will not give the full details as outlined below.    AM    PM                     0    25mg (1 tab)  If tolerating, after 3-4 days, increase as tolerated to next line   25mg (1 tab)           25mg (1 tab)  If tolerating, after 3-4 days, increase as tolerated to next line   25mg (1 tab)     50mg (2 tabs) Call us when you are at this dose, or with any concerns.     Don't drive on this medication until you know how it effects you.  Common side effects are drowsiness and dizziness  You can go slower if any side effects  Once on higher doses, you cannot stop this medication abruptly.  Tapering instructions would need to be provided by a medical professional.      ----------------------------------------------------------------  [facility] Number:  [phone]     Call with any questions about your care and for scheduling assistance.     Calls are returned Monday through Friday between 8 AM and 4:30 PM. We usually get back to you within 2 business days depending on the issue/request.    If we are prescribing your medications:    For opioid medication refills, call the clinic or send a [employer] message 7 days in advance.  Please include:    Name of requested medication    Name of the pharmacy.    For non-opioid medications, call your pharmacy directly to request a refill. Please allow 3-4 days to be processed.     Per [place] State Law:    All controlled substance prescriptions must be filled within 30 days of being written.      For those controlled substances allowing refills, pickup must occur within 30 days of last fill.      We believe regular attendance is key to your success in our program!      Any time you are unable to keep your appointment we ask that  you call us at least 24 hours in advance to cancel.This will allow us to offer the appointment time to another patient.     Multiple missed appointments may lead to dismissal from the clinic.

## 2022-01-10 LAB
7AMINOCLONAZEPAM UR QL CFM: PRESENT
DHC UR CFM-MCNC: 1680 NG/ML
DHC/CREAT UR: 659 NG/MG {CREAT}
FENTANYL UR CFM-MCNC: 46 NG/ML
FENTANYL/CREAT UR: 18 NG/MG {CREAT}
HYDROCODONE UR CFM-MCNC: 1240 NG/ML
HYDROCODONE/CREAT UR: 486 NG/MG {CREAT}
HYDROMORPHONE UR CFM-MCNC: 104 NG/ML
HYDROMORPHONE/CREAT UR: 41 NG/MG {CREAT}
NORFENTANYL UR CFM-MCNC: 160 NG/ML
NORFENTANYL/CREAT UR: 63 NG/MG {CREAT}

## 2022-01-13 ENCOUNTER — TELEPHONE (OUTPATIENT)
Dept: PALLIATIVE MEDICINE | Facility: CLINIC | Age: 62
End: 2022-01-13
Payer: COMMERCIAL

## 2022-01-13 DIAGNOSIS — M79.7 FIBROMYALGIA: Primary | ICD-10-CM

## 2022-01-13 RX ORDER — PREGABALIN 25 MG/1
25 CAPSULE ORAL EVERY EVENING
Qty: 90 CAPSULE | Refills: 3 | Status: SHIPPED | OUTPATIENT
Start: 2022-01-13 | End: 2022-03-29 | Stop reason: SINTOL

## 2022-01-13 NOTE — TELEPHONE ENCOUNTER
Script Eprescribed to pharmacy  [place] Prescription Monitoring Program checked    Will send this to MA team to notify patient. Please apologize that I didn't send it right away    Signed Prescriptions:                        Disp   Refills    pregabalin (LYRICA) 25 MG capsule          90 cap*3        Sig: Take 1 capsule (25 mg) by mouth every evening .           Titrate as instructed in clinic to 25mg (1 tab)           in the morning and 50mg (2 tabs) in the evening.  Authorizing Provider: SANTOS ISLAS MD  [facility]

## 2022-01-13 NOTE — TELEPHONE ENCOUNTER
Reason for call:  Other   Patient called regarding (reason for call): call back  Additional comments: Pt calling to state she has been waiting for a prescription of Lyrica to be sent to [facility] pharmacy off [address].    Phone number to reach patient:  Cell number on file:    Telephone Information:   Mobile [phone]       Best Time:  anytime    Can we leave a detailed message on this number?  YES    Travel screening: Not Applicable     Dee CONNORS    [facility]

## 2022-01-13 NOTE — TELEPHONE ENCOUNTER
This will be a new medication for this patient. Routing to Dr Houser to review patients request for Lyrica 25 MG capsule. She uses [facility] in [place]. Titration instructions were provided at the 01/06/22 office visit.     LEIDY Stauffer, RN  Care Coordinator  [facility] [place]

## 2022-01-14 ENCOUNTER — LAB (OUTPATIENT)
Dept: LAB | Facility: CLINIC | Age: 62
End: 2022-01-14
Payer: COMMERCIAL

## 2022-01-14 DIAGNOSIS — Z79.899 HIGH RISK MEDICATIONS (NOT ANTICOAGULANTS) LONG-TERM USE: ICD-10-CM

## 2022-01-14 DIAGNOSIS — M85.89 OSTEOPENIA OF MULTIPLE SITES: ICD-10-CM

## 2022-01-14 DIAGNOSIS — M06.09 RHEUMATOID ARTHRITIS OF MULTIPLE SITES WITH NEGATIVE RHEUMATOID FACTOR (H): ICD-10-CM

## 2022-01-14 LAB
ALBUMIN SERPL-MCNC: 3.9 G/DL (ref 3.4–5)
ALP SERPL-CCNC: 97 U/L (ref 40–150)
ALT SERPL W P-5'-P-CCNC: 32 U/L (ref 0–50)
AST SERPL W P-5'-P-CCNC: 18 U/L (ref 0–45)
BASOPHILS # BLD AUTO: 0 10E3/UL (ref 0–0.2)
BASOPHILS NFR BLD AUTO: 0 %
BILIRUB DIRECT SERPL-MCNC: <0.1 MG/DL (ref 0–0.2)
BILIRUB SERPL-MCNC: 0.3 MG/DL (ref 0.2–1.3)
CREAT SERPL-MCNC: 0.93 MG/DL (ref 0.52–1.04)
CRP SERPL-MCNC: 5.1 MG/L (ref 0–8)
EOSINOPHIL # BLD AUTO: 0.4 10E3/UL (ref 0–0.7)
EOSINOPHIL NFR BLD AUTO: 6 %
ERYTHROCYTE [DISTWIDTH] IN BLOOD BY AUTOMATED COUNT: 14.3 % (ref 10–15)
ERYTHROCYTE [SEDIMENTATION RATE] IN BLOOD BY WESTERGREN METHOD: 19 MM/HR (ref 0–30)
GFR SERPL CREATININE-BSD FRML MDRD: 70 ML/MIN/1.73M2
HCT VFR BLD AUTO: 36.7 % (ref 35–47)
HGB BLD-MCNC: 11.8 G/DL (ref 11.7–15.7)
LYMPHOCYTES # BLD AUTO: 2 10E3/UL (ref 0.8–5.3)
LYMPHOCYTES NFR BLD AUTO: 28 %
MCH RBC QN AUTO: 26.7 PG (ref 26.5–33)
MCHC RBC AUTO-ENTMCNC: 32.2 G/DL (ref 31.5–36.5)
MCV RBC AUTO: 83 FL (ref 78–100)
MONOCYTES # BLD AUTO: 0.4 10E3/UL (ref 0–1.3)
MONOCYTES NFR BLD AUTO: 6 %
NEUTROPHILS # BLD AUTO: 4.4 10E3/UL (ref 1.6–8.3)
NEUTROPHILS NFR BLD AUTO: 60 %
PLATELET # BLD AUTO: 167 10E3/UL (ref 150–450)
PROT SERPL-MCNC: 7.5 G/DL (ref 6.8–8.8)
RBC # BLD AUTO: 4.42 10E6/UL (ref 3.8–5.2)
WBC # BLD AUTO: 7.3 10E3/UL (ref 4–11)

## 2022-01-14 PROCEDURE — 82306 VITAMIN D 25 HYDROXY: CPT

## 2022-01-14 PROCEDURE — 36415 COLL VENOUS BLD VENIPUNCTURE: CPT

## 2022-01-14 PROCEDURE — 85652 RBC SED RATE AUTOMATED: CPT

## 2022-01-14 PROCEDURE — 86140 C-REACTIVE PROTEIN: CPT

## 2022-01-14 PROCEDURE — 85025 COMPLETE CBC W/AUTO DIFF WBC: CPT

## 2022-01-14 PROCEDURE — 82565 ASSAY OF CREATININE: CPT

## 2022-01-14 PROCEDURE — 80076 HEPATIC FUNCTION PANEL: CPT

## 2022-01-14 PROCEDURE — 82310 ASSAY OF CALCIUM: CPT | Performed by: INTERNAL MEDICINE

## 2022-01-19 ENCOUNTER — OFFICE VISIT (OUTPATIENT)
Dept: RHEUMATOLOGY | Facility: CLINIC | Age: 62
End: 2022-01-19
Payer: COMMERCIAL

## 2022-01-19 VITALS
SYSTOLIC BLOOD PRESSURE: 122 MMHG | WEIGHT: 166 LBS | BODY MASS INDEX: 30.55 KG/M2 | DIASTOLIC BLOOD PRESSURE: 68 MMHG | HEIGHT: 62 IN | HEART RATE: 85 BPM | OXYGEN SATURATION: 95 %

## 2022-01-19 DIAGNOSIS — Z92.29 HISTORY OF BISPHOSPHONATE THERAPY: ICD-10-CM

## 2022-01-19 DIAGNOSIS — Z79.1 LONG TERM (CURRENT) USE OF NON-STEROIDAL ANTI-INFLAMMATORIES (NSAID): ICD-10-CM

## 2022-01-19 DIAGNOSIS — M85.89 OSTEOPENIA OF MULTIPLE SITES: ICD-10-CM

## 2022-01-19 DIAGNOSIS — M35.01 SJOGREN'S SYNDROME WITH KERATOCONJUNCTIVITIS SICCA (H): ICD-10-CM

## 2022-01-19 DIAGNOSIS — Z78.0 POST-MENOPAUSAL: ICD-10-CM

## 2022-01-19 DIAGNOSIS — M06.09 RHEUMATOID ARTHRITIS OF MULTIPLE SITES WITH NEGATIVE RHEUMATOID FACTOR (H): Primary | ICD-10-CM

## 2022-01-19 DIAGNOSIS — Z79.899 HIGH RISK MEDICATIONS (NOT ANTICOAGULANTS) LONG-TERM USE: ICD-10-CM

## 2022-01-19 LAB
CALCIUM SERPL-MCNC: 8.9 MG/DL (ref 8.5–10.1)
DEPRECATED CALCIDIOL+CALCIFEROL SERPL-MC: 34 UG/L (ref 20–75)

## 2022-01-19 PROCEDURE — 99214 OFFICE O/P EST MOD 30 MIN: CPT | Performed by: INTERNAL MEDICINE

## 2022-01-19 RX ORDER — DIPHENHYDRAMINE HYDROCHLORIDE 50 MG/ML
50 INJECTION INTRAMUSCULAR; INTRAVENOUS
Status: CANCELLED
Start: 2022-03-16

## 2022-01-19 RX ORDER — CHOLECALCIFEROL (VITAMIN D3) 50 MCG
2 TABLET ORAL DAILY
Qty: 180 TABLET | Refills: 1 | Status: SHIPPED | OUTPATIENT
Start: 2022-01-19 | End: 2022-08-17

## 2022-01-19 RX ORDER — NALOXONE HYDROCHLORIDE 0.4 MG/ML
0.2 INJECTION, SOLUTION INTRAMUSCULAR; INTRAVENOUS; SUBCUTANEOUS
Status: CANCELLED | OUTPATIENT
Start: 2022-03-16

## 2022-01-19 RX ORDER — ALBUTEROL SULFATE 0.83 MG/ML
2.5 SOLUTION RESPIRATORY (INHALATION)
Status: CANCELLED | OUTPATIENT
Start: 2022-03-16

## 2022-01-19 RX ORDER — METHYLPREDNISOLONE SODIUM SUCCINATE 125 MG/2ML
125 INJECTION, POWDER, LYOPHILIZED, FOR SOLUTION INTRAMUSCULAR; INTRAVENOUS
Status: CANCELLED
Start: 2022-03-16

## 2022-01-19 RX ORDER — MEPERIDINE HYDROCHLORIDE 25 MG/ML
25 INJECTION INTRAMUSCULAR; INTRAVENOUS; SUBCUTANEOUS EVERY 30 MIN PRN
Status: CANCELLED | OUTPATIENT
Start: 2022-03-16

## 2022-01-19 RX ORDER — HYDROXYCHLOROQUINE SULFATE 200 MG/1
TABLET, FILM COATED ORAL
Qty: 135 TABLET | Refills: 2 | Status: SHIPPED | OUTPATIENT
Start: 2022-01-19 | End: 2022-11-18

## 2022-01-19 RX ORDER — EPINEPHRINE 1 MG/ML
0.3 INJECTION, SOLUTION, CONCENTRATE INTRAVENOUS EVERY 5 MIN PRN
Status: CANCELLED | OUTPATIENT
Start: 2022-03-16

## 2022-01-19 RX ORDER — HEPARIN SODIUM,PORCINE 10 UNIT/ML
5 VIAL (ML) INTRAVENOUS
Status: CANCELLED | OUTPATIENT
Start: 2022-03-16

## 2022-01-19 RX ORDER — ALBUTEROL SULFATE 90 UG/1
1-2 AEROSOL, METERED RESPIRATORY (INHALATION)
Status: CANCELLED
Start: 2022-03-16

## 2022-01-19 RX ORDER — HEPARIN SODIUM (PORCINE) LOCK FLUSH IV SOLN 100 UNIT/ML 100 UNIT/ML
5 SOLUTION INTRAVENOUS
Status: CANCELLED | OUTPATIENT
Start: 2022-03-16

## 2022-01-19 RX ORDER — ZOLEDRONIC ACID 5 MG/100ML
5 INJECTION, SOLUTION INTRAVENOUS ONCE
Status: CANCELLED
Start: 2022-03-16 | End: 2022-03-16

## 2022-01-19 RX ORDER — CELECOXIB 100 MG/1
100 CAPSULE ORAL 2 TIMES DAILY
Qty: 180 CAPSULE | Refills: 2 | Status: SHIPPED | OUTPATIENT
Start: 2022-01-19 | End: 2022-05-04

## 2022-01-19 ASSESSMENT — MIFFLIN-ST. JEOR: SCORE: 1281.22

## 2022-01-19 NOTE — PATIENT INSTRUCTIONS
RHEUMATOLOGY    Dr. Terrence Carrillo    [facility] [place]  [address] [place] [address] [place] [address]  Phone number: [phone]  Fax number: [phone]      Thank you for choosing [facility]!    Madeline Cheung CMA Rheumatology    -------------------------------    Please get a 4th mRNA (Pfizer or Moderna) COVID19 vaccine on or shortly after 3/12/2022.  Please align this to be on the day that you would have gotten an Orencia infusion, so that the vaccine is 4 weeks after the last orencia infusion.  Then delay that orencia infusion by 1 week    Please call to schedule Reclast to be on or after 3/16/2022

## 2022-01-19 NOTE — NURSING NOTE
RAPID3 (0-30) Cumulative Score  10.2          RAPID3 Weighted Score (divide #4 by 3 and that is the weighted score)  3.4

## 2022-01-19 NOTE — PROGRESS NOTES
Rheumatology Clinic Visit      Betsey Vanessa MRN# [id number]   YOB: 1960 Age: 61 year old      Date of visit: 1/19/22   PCP: Dr. Velma Ibanez    Chief Complaint   Patient presents with:  Rheumatoid Arthritis: Doing good    Assessment and Plan     1.  Rheumatoid arthritis: Reportedly diagnosed many years ago.  Previously treated by Dr. Stacy at [facility] in [place], [place]. Previously on methotrexate + rituximab, Enbrel + leflunomide; had elevated liver enzymes preventing oral DMARDs; most recent effective tx was with tocilizumab 8mg/kg IV monthly per her last rheumatology notes.  ALEGRIA hx prevents several oral DMARDs and LFT elevations with MTX in the past.  Previously used Actemra 4mg/kg IV but this resulted in lower platelets and she did not find much benefit from it, so it was discontinued.  Orencia was then started, with the first dose on 5/7/2020 and she improved significantly with Orencia.  RA is well controlled at this time.   Chronic illness, stable.    - Continue orencia 750mg IV every 4 weeks   - Continue hydroxychloroquine 200 mg daily with an additional 200 mg every other day  - Continue Celebrex 100 mg BID   - Ophthalmology referral for hydroxychloroquine toxicity monitoring given previously and she says that an appointment has been scheduled  - Labs in 3 months: CBC, Creatinine, Hepatic Panel, ESR, CRP    2. Sjogren's Syndrome: reportedly had punctal plugs in the past and reportedly ducts are now cauterized.  Doing okay with artificial tears, Xiidra, and Evoxac.   Encouraged frequent sips of water, visits with a dentist at least every 6months, avoidance of sugary foods/drinks.  She is also going to see ophthalmology soon.  Chronic illness, stable.       3. Chronic pain syndrome: Managed at the pain clinic.  Encouraged low impact physical activities such as walking or using a stationary bike    4. Osteopenia: based on 7/2019 DEXA report that she brought with  her; FRAX score shows a 22% risk of major osteoporotic fracture in the next 10 years and a 1.9% risk for osteoporotic hip fracture in the next 10 years.  Based on FRAX tx is indicated.  She already received one dose of boniva in Sept 2019; and failed alendronate due to GI upset.  Reclast received 2/13/2020 and 3/16/2021.  Chronic illness  - Continue calcium 1000mg daily  - Continue vitamin D 50,000 IU once weekly  - Continue Reclast yearly, next due in March 2022  - Labs today: Calcium, vitamin D    5. numbness and tingling in both distal upper extremities: History of carpal tunnel surgery in the past.  He has already scheduled to have an EMG performed at the direction of her pain management provider.    6.  Vaccinations: Vaccinations reviewed with Ms. Vanessa.  Risks and benefits of vaccinations were discussed.    - Influenza: up to date  - [id number]: up to date  - [id number]: up to date  - Shingrix: 1 dose received she had a reaction so she does not want a second dose.  - COVID-19: has received the Pfizer COVID-19 vaccine, received 3/19/2021 and 4/9/2021 and 10/12/2021    A single additional dose of the Pfizer or Moderna COVID-19 vaccine is recommended at least 28 days after the completion of the 2-dose mRNA vaccine series for patients receiving any immunosuppressive or immunomodulatory therapy. Attempts should be made to match the additional mRNA dose type to the type given in the mRNA primary series; however, if that is not feasible, a booster dose with the alternative mRNA vaccine is permitted.    Based on our current understanding (and this may change over time as we learn more), medications should be adjusted as noted below, if disease activity allows:  - NSAIDs and Acetaminophen: hold for 24 hours prior to vaccination if able to do so  - Orencia (abatacept) intravenous (IV): the booster mRNA COVID19 vaccination should occur 4 weeks after an Orencia infusion and then postpone the subsequent Orencia infusion  by 1 week    Total minutes spent in evaluation with patient, documentation, , and review of pertinent studies and chart notes: 20      Ms. Vanessa verbalized agreement with and understanding of the rational for the diagnosis and treatment plan.  All questions were answered to best of my ability and the patient's satisfaction. Ms. Vanessa was advised to contact the clinic with any questions that may arise after the clinic visit.      Thank you for involving me in the care of the patient    Return to clinic: 3 months    HPI   Betsey Vanessa is a 61 year old female with a past medical history significant for hypertension, depression, type 2 diabetes, Ménière's disease, and rheumatoid arthritis who is seen in consultation for follow-up of RA.     Today, 1/19/2022: RA controlled.  Tolerating Orencia well.  Using Celebrex that is effective.  Hydroxychloroquine toxicity monitoring eye exam is [facility]  Morning stiffness for approximately 30 minutes.  No gelling.  Dry mouth treated with frequent sips of water and Evoxac that is effective.  Dry eyes treated with artificial tears that are not preservative-free but she is going to change to be preservative-free artificial tears, and Xiidra.  She plans to establish care with an ophthalmologist for dry eye treatment as well.  History of punctal plugs and cauterization of the ducts per patient.    Tobacco: Quit smoking in 1998  EtOH: None  Drugs: None    ROS   12 point review of system was completed and negative except as noted in the HPI     Active Problem List     Patient Active Problem List   Diagnosis     Type 2 diabetes mellitus with hyperglycemia, without long-term current use of insulin (H)     Hypertension goal BP (blood pressure) < 140/90     Meniere's disease, unspecified laterality     Rheumatoid arthritis, involving unspecified site, unspecified rheumatoid factor presence     Valvular heart disease     Fibromyalgia      Mild major depression (H)     Osteopenia of multiple sites     History of bisphosphonate therapy     Post-menopausal     Benzodiazepine dependence, episodic (H)     Continuous opioid dependence (H)     Mitral stenosis     Age-related osteoporosis without current pathological fracture     Mixed conductive and sensorineural hearing loss of right ear     Polyarticular arthritis     Encounter for long-term use of opiate analgesic     Chronic pain     Tympanic membrane perforation     Myofascial pain     Migraine with vertigo     Gastroesophageal reflux disease, esophagitis presence not specified     Fungal skin infection     Anaphylaxis, sequela     Hyperlipidemia, unspecified hyperlipidemia type     Past Medical History     Past Medical History:   Diagnosis Date     Anemia     r/t menses     Anxiety      Arthritis      Constipation      Depression      Diabetes (H)      Diarrhea      Double vision      Headache(784.0)      Hearing loss      Heart murmur      Heartburn      Hypertension      Indigestion      Nasal congestion      Night sweats      Numbness      Problems related to lack of adequate sleep      Rash      Sneezing      Tinnitus      Visual loss      Weakness      Weight gain     because of Prednisone     Past Surgical History     Past Surgical History:   Procedure Laterality Date     ------------OTHER-------------      D&C     HYSTERECTOMY       INNER EAR SURGERY       RELEASE CARPAL TUNNEL BILATERAL Bilateral 2008     Allergy     Allergies   Allergen Reactions     Duloxetine Other (See Comments)     Topiramate Other (See Comments)     Other reaction(s): Confusion     Amitriptyline Embonate [Amitriptyline]      Hyper activity     Azithromycin Hives     Cymbalta      Hyper activity     Gabapentin Hives     Hmg-Coa-R Inhibitors Other (See Comments)     Liver function studies abnormal on Lipitor / Crestor     Macrobid [Nitrofurantoin] Hives     Paxil [Paroxetine] Other (See Comments)     Hyper behavior      Penicillins Hives     Tetracycline Hives     Current Medication List     Current Outpatient Medications   Medication Sig     celecoxib (CELEBREX) 100 MG capsule Take 1 capsule (100 mg) by mouth 2 times daily     cetirizine (ZYRTEC) 10 MG tablet Take 1 tablet (10 mg) by mouth daily     cevimeline (EVOXAC) 30 MG capsule Take 1 capsule (30 mg) by mouth 3 times daily     clonazePAM (KLONOPIN) 0.5 MG tablet TAKE 1 TABLET BY MOUTH TWICE DAILY AS NEEDED FOR ANXIETY OK TO TAKE 1 TO 2 AT NIGHT AS NEEDED FOR SLEEP .  SHOULD  LAST  ABOUT  90  DAYS     diclofenac (VOLTAREN) 1 % topical gel Place 2-4 g onto the skin 4 times daily . Max of 8g per dose. No more than 32g/day     docusate sodium (COLACE) 100 MG capsule Take 1 capsule (100 mg) by mouth 2 times daily     escitalopram (LEXAPRO) 20 MG tablet Take 1 tablet (20 mg) by mouth daily     fentaNYL (DURAGESIC) 12 mcg/hr 72 hr patch Place 1 patch onto the skin every 72 hours remove old patch. Fill/start 01/03/22     fluticasone (FLONASE) 50 MCG/ACT nasal spray Spray 1-2 sprays into both nostrils daily     fluvastatin (LESCOL) 20 MG capsule Take 1 capsule (20 mg) by mouth At Bedtime     glimepiride (AMARYL) 2 MG tablet TAKE 1 TABLET BY MOUTH IN THE MORNING BEFORE BREAKFAST     glucose blood VI test strips strip daily     HYDROcodone-acetaminophen (NORCO) 7.5-325 MG per tablet Take 0.5-1 tablets by mouth every 6 hours as needed for severe pain . Fill/start on/after 01/03/22     hydroxychloroquine (PLAQUENIL) 200 MG tablet Hydroxychloroquine 200mg daily; and an additional 200mg every other day. Yearly eye exam including 10-2 VF and SD-OCT     hydrOXYzine (ATARAX) 25 MG tablet Take 1 tablet (25 mg) by mouth 3 times daily as needed for itching     lifitegrast (XIIDRA) 5 % opthalmic solution Place 1 drop into both eyes 2 times daily     magnesium 250 MG tablet Take 1 tablet by mouth daily     medical cannabis (Patient's own supply) Take 1 Dose by mouth See Admin Instructions (The  purpose of this order is to document that the patient reports taking medical cannabis.  This is not a prescription, and is not used to certify that the patient has a qualifying medical condition.)     metFORMIN (GLUCOPHAGE) 500 MG tablet Take 2 tablets (1,000 mg) by mouth 2 times daily (with meals)     metroNIDAZOLE (METROGEL) 0.75 % external gel Apply topically 2 times daily Apply to face daily     nystatin (MYCOSTATIN) 840815 UNIT/GM external powder Apply topically 2 times daily as needed 2 x daily PRN for rash     omeprazole (PRILOSEC) 20 MG DR capsule Take 1 capsule (20 mg) by mouth 2 times daily     pimecrolimus (ELIDEL) 1 % external cream Apply topically 2 times daily - use on back of neck and hands     potassium chloride ER (KLOR-CON M) 10 MEQ CR tablet Take 1 tablet (10 mEq) by mouth 2 times daily 20 mEq daily Take by mouth 2 times daily 20 mEq daily     pregabalin (LYRICA) 25 MG capsule Take 1 capsule (25 mg) by mouth every evening . Titrate as instructed in clinic to 25mg (1 tab) in the morning and 50mg (2 tabs) in the evening.     PROAIR  (90 Base) MCG/ACT inhaler Inhale 1-2 puffs into the lungs every 4 hours as needed for shortness of breath / dyspnea or wheezing     promethazine (PHENERGAN) 25 MG suppository Place 25 mg rectally every 6 hours as needed For nusea     promethazine (PHENERGAN) 25 MG tablet Take 1 tablet (25 mg) by mouth every 6 hours as needed for nausea or other (Meniere's) For nusea     psyllium (METAMUCIL) 58.6 % POWD Take by mouth daily PRN     STATIN NOT PRESCRIBED (INTENTIONAL) Please choose reason not prescribed, below     thin (NO BRAND SPECIFIED) lancets Use to test blood sugar 2 times daily or as directed. To accompany: Blood Glucose Monitor Brands: per insurance.     triamterene-HCTZ (DYAZIDE) 37.5-25 MG capsule Take 1 capsule by mouth daily     UNABLE TO FIND MEDICATION NAME: Tumeric 1000 mg every day for pain      vitamin D2 (ERGOCALCIFEROL) 74113 units (1250 mcg)  capsule Take 1 capsule (50,000 Units) by mouth once a week     alcohol swab prep pads Use to swab area of injection/rosie as directed     blood glucose (NO BRAND SPECIFIED) test strip Use to test blood sugar 2 times daily or as directed. To accompany: Blood Glucose Monitor Brands: per insurance.     blood glucose calibration (NO BRAND SPECIFIED) solution Use to calibrate blood glucose monitor as needed as directed. To accompany: Blood Glucose Monitor Brands: per insurance.     EPINEPHrine (ANY BX GENERIC EQUIV) 0.3 MG/0.3ML injection 2-pack Inject 0.3 mLs (0.3 mg) into the muscle as needed for anaphylaxis (Patient not taking: Reported on 1/6/2022)     EPINEPHrine 0.3 MG/0.3ML SOLN PRN for Anaphylaxis (Patient not taking: Reported on 1/6/2022)     metFORMIN (GLUCOPHAGE) 500 MG tablet Take 2 tablets (1,000 mg) by mouth 2 times daily (with meals) +++NEED LABS IN SEPT.+++1000 mg 2 x a day (Patient not taking: Reported on 1/19/2022)     naloxone (NARCAN) 4 MG/0.1ML nasal spray Spray 1 spray (4 mg) into one nostril alternating nostrils as needed for opioid reversal every 2-3 minutes until assistance arrives     No current facility-administered medications for this visit.         Social History   See HPI    Family History     Family History   Problem Relation Age of Onset     C.A.D. Mother      Alzheimer Disease Mother      Cardiovascular Mother      Eye Disorder Mother      Thyroid Disease Mother      Hypertension Other         grandmother     Cerebrovascular Disease Father      Alcohol/Drug Father      Depression Father      Obesity Father         aunt     Asthma Father      Alcohol/Drug Brother      Cancer Brother      Alcohol/Drug Sister         aunt     Cancer Sister      Allergies Other         All family members     Arthritis Other         aunt     Obesity Sister      Thyroid Disease Sister      Asthma Sister         daughter       Physical Exam     Temp Readings from Last 3 Encounters:   12/15/21 97.8  F (36.6  C)  "(Oral)   11/17/21 99.3  F (37.4  C) (Tympanic)   10/25/21 97.8  F (36.6  C) (Oral)     BP Readings from Last 5 Encounters:   01/19/22 122/68   01/06/22 125/69   12/15/21 (!) 150/52   11/17/21 (!) 144/69   10/25/21 132/70     Pulse Readings from Last 1 Encounters:   01/19/22 85     Resp Readings from Last 1 Encounters:   12/15/21 16     Estimated body mass index is 30.36 kg/m  as calculated from the following:    Height as of this encounter: 1.575 m (5' 2\").    Weight as of this encounter: 75.3 kg (166 lb).      GEN: NAD. Healthy appearing adult.   HEENT: MMM.  Anicteric, noninjected sclera. No obvious external lesions of the ear and nose. Hearing intact.  PULM: No increased work of breathing  MSK: MCPs, PIPs, DIPs, wrists, elbows, shoulders, knees, ankles, and MTPs were diffusely tender to palpation but no swelling, increased warmth, or overlying erythema.  Fibromyalgia tender points positive.    SKIN: No rash or jaundice seen  PSYCH: Alert. Appropriate.      Labs / Imaging (select studies)     RF/CCP  Recent Labs   Lab Test 01/14/20  1221   CCPIGG 1   RHF 20*     CBC  Recent Labs   Lab Test 01/14/22  0718 09/17/21  0945 05/21/21  0954 01/19/21  1130 10/28/20  1537   WBC 7.3 7.7 6.6 6.5 7.1   RBC 4.42 4.35 4.41 4.46 4.56   HGB 11.8 11.8 11.6* 11.8 12.3   HCT 36.7 35.3 35.7 36.6 37.5   MCV 83 81 81 82 82   RDW 14.3 14.9 15.3* 14.4 13.7    169 160 159 162   MCH 26.7 27.1 26.3* 26.5 27.0   MCHC 32.2 33.4 32.5 32.2 32.8   NEUTROPHIL 60 63 53.1 65.0 66.1   LYMPH 28 25 36.2 25.3 25.8   MONOCYTE 6 7 7.4 5.1 5.1   EOSINOPHIL 6 4 3.0 4.0 2.5   BASOPHIL 0 0 0.3 0.3 0.1   ANEU  --   --  3.5 4.2 4.7   ALYM  --   --  2.4 1.6 1.8   MOISE  --   --  0.5 0.3 0.4   AEOS  --   --  0.2 0.3 0.2   ABAS  --   --  0.0 0.0 0.0   ANEUTAUTO 4.4 4.8  --   --   --    ALYMPAUTO 2.0 1.9  --   --   --    AMONOAUTO 0.4 0.6  --   --   --    AEOSAUTO 0.4 0.3  --   --   --    ABSBASO 0.0 0.0  --   --   --      CMP  Recent Labs   Lab Test " 01/14/22  0718 09/17/21  0945 05/21/21  0954 03/16/21  1130 01/19/21  1130 01/08/21  1535 10/28/20  1537 04/09/20  1226 02/07/20  1040 01/14/20  1221   NA  --   --   --   --   --  135  --  141  --  137   POTASSIUM  --   --   --   --   --  4.2  --  4.0  --  4.0   CHLORIDE  --   --   --   --   --  99  --  108  --  103   CO2  --   --   --   --   --  32  --  27  --  28   ANIONGAP  --   --   --   --   --  4  --  6  --  6   GLC  --   --   --   --   --  155*  --  121*  --  120*   BUN  --   --   --   --   --  16  --  17  --  13   CR 0.93 0.91 0.92 0.83 0.88 0.80   < > 0.70  --  0.70   GFRESTIMATED 70 69 68 78 72 81   < > >90  --  >90   GFRESTBLACK  --   --  79 >90 84 >90   < > >90  --  >90   MIRNA  --   --   --  10.2* 9.2 9.7   < > 9.1   < > 9.5   BILITOTAL 0.3 0.4 0.3  --  0.4 0.5   < > 0.4  --  0.5   ALBUMIN 3.9 4.1 4.0  --  3.9 4.1   < > 4.0  --  4.0   PROTTOTAL 7.5 7.8 7.7  --  7.7 8.2   < > 7.4  --  7.9   ALKPHOS 97 113 83  --  117 100   < > 80  --  111   AST 18 24 20  --  23 23   < > 36  --  30   ALT 32 41 35  --  36 45   < > 52*  --  37    < > = values in this interval not displayed.     Calcium/VitaminD  Recent Labs   Lab Test 05/21/21  0954 03/16/21  1130 01/19/21  1130 01/08/21  1535 10/28/20  1537   MIRNA  --  10.2* 9.2 9.7 9.3   VITDT 39  --  25  --  33     ESR/CRP  Recent Labs   Lab Test 01/14/22  0718 09/17/21  0945 05/21/21  0954   SED 19 19 23   CRP 5.1 4.4 5.6     Lipid Panel  Recent Labs   Lab Test 06/09/21  0749 12/09/20  0851 08/14/20  0736   CHOL 225* 221* 199   TRIG 212* 164* 143   HDL 53 59 47*   * 129* 123*   NHDL 172* 162* 152*     Hepatitis B  Recent Labs   Lab Test 01/14/20  1221   HBCAB Nonreactive   HEPBANG Nonreactive     Hepatitis C  Recent Labs   Lab Test 01/14/20  1221   HCVAB Nonreactive     Lyme ab screening  Recent Labs   Lab Test 01/14/20  1221   LYMEGM 0.05     Tuberculosis Screening  Recent Labs   Lab Test 09/17/21  0945 01/14/20  1221   TBRES Negative Negative     HIV  Screening  Recent Labs   Lab Test 08/14/20  0736   HIAGAB Nonreactive       [facility]   7/18/2013 CCP negative  8/5/2010 CCP negative  3/27/2013 hepatitis B surface antigen negative and hepatitis B core antibody negative  9/2/2011 hepatitis B surface antigen negative and hepatitis B core antibody negative  3/27/2013 hepatitis C antibody negative    Immunization History     Immunization History   Administered Date(s) Administered     COVID-19,PF,Pfizer (12+ Yrs) 03/19/2021, 04/09/2021, 10/12/2021     FLU 6-35 months 10/22/2013     Influenza Quad, Recombinant, pf(RIV4) (Flublok) 10/12/2021     Influenza Vaccine IM > 6 months Valent IIV4 (Alfuria,Fluzone) 10/21/2015, 10/05/2016, 10/11/2018, 09/25/2019, 09/24/2020     Influenza Vaccine, 6+MO IM (QUADRIVALENT W/PRESERVATIVES) 09/18/2014, 10/03/2017     Pneumo Conj 13-V (2010&after) 10/11/2018     Pneumococcal 23 valent 12/09/2020     TD (ADULT, 7+) 04/20/1997, 04/17/2019     TDAP Vaccine (Adacel) 01/15/2009     Zoster vaccine recombinant adjuvanted (SHINGRIX) 10/11/2018          Chart documentation done in part with Dragon Voice recognition Software. Although reviewed after completion, some word and grammatical error may remain.    Terrence Carrillo MD

## 2022-01-25 DIAGNOSIS — J30.89 PERENNIAL ALLERGIC RHINITIS: ICD-10-CM

## 2022-01-25 DIAGNOSIS — Z76.0 ENCOUNTER FOR MEDICATION REFILL: ICD-10-CM

## 2022-01-25 DIAGNOSIS — I10 HYPERTENSION GOAL BP (BLOOD PRESSURE) < 140/90: ICD-10-CM

## 2022-01-27 ENCOUNTER — INFUSION THERAPY VISIT (OUTPATIENT)
Dept: INFUSION THERAPY | Facility: CLINIC | Age: 62
End: 2022-01-27
Payer: COMMERCIAL

## 2022-01-27 VITALS
BODY MASS INDEX: 30.73 KG/M2 | TEMPERATURE: 98.3 F | SYSTOLIC BLOOD PRESSURE: 134 MMHG | RESPIRATION RATE: 16 BRPM | HEART RATE: 88 BPM | WEIGHT: 168 LBS | DIASTOLIC BLOOD PRESSURE: 57 MMHG | OXYGEN SATURATION: 97 %

## 2022-01-27 DIAGNOSIS — M06.09 RHEUMATOID ARTHRITIS OF MULTIPLE SITES WITH NEGATIVE RHEUMATOID FACTOR (H): Primary | ICD-10-CM

## 2022-01-27 PROCEDURE — 96365 THER/PROPH/DIAG IV INF INIT: CPT | Performed by: NURSE PRACTITIONER

## 2022-01-27 PROCEDURE — 99207 PR NO CHARGE LOS: CPT

## 2022-01-27 RX ORDER — HEPARIN SODIUM (PORCINE) LOCK FLUSH IV SOLN 100 UNIT/ML 100 UNIT/ML
5 SOLUTION INTRAVENOUS
Status: CANCELLED | OUTPATIENT
Start: 2022-02-09

## 2022-01-27 RX ORDER — HEPARIN SODIUM,PORCINE 10 UNIT/ML
5 VIAL (ML) INTRAVENOUS
Status: CANCELLED | OUTPATIENT
Start: 2022-02-09

## 2022-01-27 NOTE — PROGRESS NOTES
Infusion Nursing Note:  Betsey Vanessa presents today for Orencia.    Patient seen by provider today: No   present during visit today: Not Applicable.    Note: no changes.      Intravenous Access:  Peripheral IV placed.    Treatment Conditions:  Biological Infusion Checklist:  ~~~ NOTE: If the patient answers yes to any of the questions below, hold the infusion and contact ordering provider or on-call provider.    1. Have you recently had an elevated temperature, fever, chills, productive cough, coughing for 3 weeks or longer or hemoptysis, abnormal vital signs, night sweats,  chest pain or have you noticed a decrease in your appetite, unexplained weight loss or fatigue? No  2. Do you have any open wounds or new incisions? No  3. Do you have any recent or upcoming hospitalizations, surgeries or dental procedures? No  4. Do you currently have or recently have had any signs of illness or infection or are you on any antibiotics? No  5. Have you had any new, sudden or worsening abdominal pain? No  6. Have you or anyone in your household received a live vaccination in the past 4 weeks? Please note:  No live vaccines while on biologic/chemotherapy until 6 months after the last treatment.  Patient can receive the flu vaccine (shot only) and the pneumovax.  It is optimal for the patient to get these vaccines mid cycle, but they can be given at any time as long as it is not on the day of the infusion. No  7. Have you recently been diagnosed with any new nervous system diseases (ie. Multiple sclerosis, Guillain Zuni, seizures, neurological changes) or cancer diagnosis? No  8. Are you on any form of radiation or chemotherapy? No  9. Are you pregnant or breast feeding or do you have plans of pregnancy in the future? No  10. Have there been any other new onset medical symptoms? No        Post Infusion Assessment:  Patient tolerated infusion without incident.  Site patent and intact, free from redness, edema or  discomfort.  No evidence of extravasations.  Access discontinued per protocol.       Discharge Plan:   AVS to patient via MYCHART.  Patient will return 2/23 for next appointment.   Patient discharged in stable condition accompanied by: self.  Departure Mode: Ambulatory.      Rosina Rios RN

## 2022-01-27 NOTE — TELEPHONE ENCOUNTER
Routing refill request to provider for review/approval because:  Labs not current:    Potassium   Date Value Ref Range Status   01/08/2021 4.2 3.4 - 5.3 mmol/L Final

## 2022-01-28 RX ORDER — FLUTICASONE PROPIONATE 50 MCG
1-2 SPRAY, SUSPENSION (ML) NASAL DAILY
Qty: 16 G | Refills: 11 | Status: SHIPPED | OUTPATIENT
Start: 2022-01-28 | End: 2023-03-13

## 2022-01-28 RX ORDER — TRIAMTERENE AND HYDROCHLOROTHIAZIDE 37.5; 25 MG/1; MG/1
CAPSULE ORAL
Qty: 90 CAPSULE | Refills: 0 | Status: SHIPPED | OUTPATIENT
Start: 2022-01-28 | End: 2022-02-18

## 2022-01-28 NOTE — TELEPHONE ENCOUNTER
Routed back to PCP to refill, see below, patient is scheduled.    Ashly Daniels RN  [facility]

## 2022-02-01 ENCOUNTER — TRANSFERRED RECORDS (OUTPATIENT)
Dept: MULTI SPECIALTY CLINIC | Facility: CLINIC | Age: 62
End: 2022-02-01
Payer: COMMERCIAL

## 2022-02-01 LAB — RETINOPATHY: NORMAL

## 2022-02-02 DIAGNOSIS — E78.5 HYPERLIPIDEMIA, UNSPECIFIED HYPERLIPIDEMIA TYPE: ICD-10-CM

## 2022-02-02 DIAGNOSIS — I10 HYPERTENSION GOAL BP (BLOOD PRESSURE) < 140/90: ICD-10-CM

## 2022-02-02 DIAGNOSIS — G89.4 CHRONIC PAIN SYNDROME: ICD-10-CM

## 2022-02-02 DIAGNOSIS — E11.65 TYPE 2 DIABETES MELLITUS WITH HYPERGLYCEMIA, WITHOUT LONG-TERM CURRENT USE OF INSULIN (H): ICD-10-CM

## 2022-02-02 DIAGNOSIS — F11.20 CONTINUOUS OPIOID DEPENDENCE (H): ICD-10-CM

## 2022-02-02 DIAGNOSIS — M06.09 RHEUMATOID ARTHRITIS OF MULTIPLE SITES WITHOUT RHEUMATOID FACTOR (H): ICD-10-CM

## 2022-02-02 DIAGNOSIS — Z76.0 ENCOUNTER FOR MEDICATION REFILL: ICD-10-CM

## 2022-02-02 RX ORDER — FLUVASTATIN 20 MG/1
20 CAPSULE ORAL AT BEDTIME
Qty: 90 CAPSULE | Refills: 1 | Status: CANCELLED | OUTPATIENT
Start: 2022-02-02

## 2022-02-02 RX ORDER — FENTANYL 12.5 UG/1
1 PATCH TRANSDERMAL
Qty: 10 PATCH | Refills: 0 | Status: SHIPPED | OUTPATIENT
Start: 2022-02-02 | End: 2022-03-03

## 2022-02-02 RX ORDER — HYDROCODONE BITARTRATE AND ACETAMINOPHEN 7.5; 325 MG/1; MG/1
.5-1 TABLET ORAL EVERY 6 HOURS PRN
Qty: 30 TABLET | Refills: 0 | Status: SHIPPED | OUTPATIENT
Start: 2022-02-02 | End: 2022-02-24

## 2022-02-02 NOTE — TELEPHONE ENCOUNTER
Received call from patient requesting refill(s) of HYDROcodone-acetaminophen (NORCO) 7.5-325 MG per tablet   Last dispensed from pharmacy on 01/03/2022    Received call from patient requesting refill(s) of fentaNYL (DURAGESIC) 12 mcg/hr 72 hr patch   Last dispensed from pharmacy on 01/03/2022    Patient's last office/virtual visit by prescribing provider on 01/06/2022  Next office/virtual appointment scheduled for 02/24/2022    Last urine drug screen date 01/06/2022  Current opioid agreement on file (completed within the last year) Yes Date of opioid agreement: 01/06/2022    E-prescribe to [facility] [address] [facility] [place] - [address] [address]    Will route to nursing [place] for review and preparation of prescription(s).     Urszula Lyons MA  [facility]

## 2022-02-02 NOTE — TELEPHONE ENCOUNTER
Reason for call:  Medication   If this is a refill request, has the caller requested the refill from the pharmacy already? No  Will the patient be using a [facility]? No  Name of the pharmacy and phone number for the current request: [id number] - [place], [place] - [address]    Name of the medication requested:   HYDROcodone-acetaminophen (NORCO) 7.5-325 MG per tablet  fentaNYL (DURAGESIC) 12 mcg/hr 72 hr patch    Phone number to reach patient:  Home number on file [phone] (home)    Can we leave a detailed message on this number?  YES    Travel screening: Not Applicable        Yoly Castro    [facility] [place]

## 2022-02-02 NOTE — TELEPHONE ENCOUNTER
Script Eprescribed to pharmacy  [place] Pres[place] Monitoring [place] checked    CAlled  Patient.  Reminded her to call earlier.    Signed Prescriptions:                        Disp   Refills    HYDROcodone-acetaminophen (NORCO) 7.5-325 *30 tab*0        Sig: Take 0.5-1 tablets by mouth every 6 hours as needed           for severe pain . Fill/start on/after 02/02/22  Authorizing Provider: SANTOS ISLAS    fentaNYL (DURAGESIC) 12 mcg/hr 72 hr patch 10 pat*0        Sig: Place 1 patch onto the skin every 72 hours remove old           patch. Fill/start 02/02/22  Authorizing Provider: SANTOS ISLAS MD  [facility]

## 2022-02-02 NOTE — TELEPHONE ENCOUNTER
Medication refill information reviewed.     Due date for HYDROcodone-acetaminophen (NORCO) 7.5-325 MG per tablet and fentaNYL (DURAGESIC) 12 mcg/hr hr patch  is 02/02/22     Prescriptions prepped for review.     Will route to provider.

## 2022-02-03 DIAGNOSIS — E78.5 HYPERLIPIDEMIA, UNSPECIFIED HYPERLIPIDEMIA TYPE: ICD-10-CM

## 2022-02-03 DIAGNOSIS — E11.65 TYPE 2 DIABETES MELLITUS WITH HYPERGLYCEMIA, WITHOUT LONG-TERM CURRENT USE OF INSULIN (H): ICD-10-CM

## 2022-02-06 RX ORDER — FLUVASTATIN 20 MG/1
CAPSULE ORAL
Qty: 90 CAPSULE | Refills: 0 | Status: SHIPPED | OUTPATIENT
Start: 2022-02-06 | End: 2022-05-17

## 2022-02-07 ENCOUNTER — TRANSFERRED RECORDS (OUTPATIENT)
Dept: HEALTH INFORMATION MANAGEMENT | Facility: CLINIC | Age: 62
End: 2022-02-07
Payer: COMMERCIAL

## 2022-02-07 LAB — RETINOPATHY: NORMAL

## 2022-02-07 NOTE — TELEPHONE ENCOUNTER
Routing refill request to provider for review/approval because:  Failed labs: potassium.       Asiya Coyle RN

## 2022-02-08 RX ORDER — POTASSIUM CHLORIDE 750 MG/1
10 TABLET, EXTENDED RELEASE ORAL 2 TIMES DAILY
Qty: 180 TABLET | Refills: 0 | Status: SHIPPED | OUTPATIENT
Start: 2022-02-08 | End: 2022-08-19

## 2022-02-17 PROBLEM — K21.9 GASTROESOPHAGEAL REFLUX DISEASE: Status: ACTIVE | Noted: 2020-08-14

## 2022-02-17 NOTE — PROGRESS NOTES
"  Assessment & Plan     1. Type 2 diabetes mellitus with hyperglycemia, without long-term current use of insulin (H)    2. Hypertension goal BP (blood pressure) < 140/90    3. Hyperlipidemia, unspecified hyperlipidemia type    4. Valvular heart disease    5. Benzodiazepine dependence, episodic (H)    6. Mild major depression (H)    7. Gastroesophageal reflux disease, unspecified whether esophagitis present        1-3) A1c has improved. Meds renewed, no changes. Diabetes is under good control and blood pressure at goal.    4) Referral back to cardiology    5-7) Meds renewed, no changes      Ordering of each unique test  Prescription drug management         BMI:   Estimated body mass index is 30.29 kg/m  as calculated from the following:    Height as of 1/19/22: 1.575 m (5' 2\").    Weight as of this encounter: 75.1 kg (165 lb 9.6 oz).         Return in about 6 months (around 8/18/2022) for a med check, chronic pain follow up - 40 mins.    Katerina Judd PA-C  [facility] [place]    I attest that I was present during the patient visit, have verified the HPI, and performed an examination today.    Jose Leggett is a 61 year old who presents for the following health issues     HPI     Diabetes Follow-up  How often are you checking your blood sugars? A few times a week   What time of day are you checking your blood sugars  Before and after meals   Have you had any blood sugars above 200? No   Have you had any blood sugars below 70? Yes   Which symptoms do you notice when your blood sugar is low? Shaky    Weak    Other   What concerns do you have today about your diabetes? None   Do you have any of these symptoms? Blurry vision    Weight gain   Have you had a diabetic eye exam within the last year? Yes   Date of last eye exam: Feb 2022   Where was this eye exam done? [facility] [place]             Hyperlipidemia Follow-Up    Are you regularly taking any medication or supplement to lower your " cholesterol? Yes   Are you having muscle aches or other side effects that you think could be caused by your cholesterol lowering medication? No     Hypertension Follow-up      Do you check your blood pressure regularly outside of the clinic? No     Are you following a low salt diet? Yes    Are your blood pressures ever more than 140 on the top number (systolic) OR more   than 90 on the bottom number (diastolic), for example 140/90? No    BP Readings from Last 2 Encounters:   22 125/71   22 134/57     Hemoglobin A1C POCT (%)   Date Value   2021 7.1 (H)   2021 8.0 (H)     Hemoglobin A1C (%)   Date Value   2022 6.6 (H)   2021 7.2 (H)     LDL Cholesterol Calculated (mg/dL)   Date Value   2021 130 (H)   2020 129 (H)       How many servings of fruits and vegetables do you eat daily? 2-3   On average, how many sweetened beverages do you drink each day (Examples: soda, juice, sweet tea, etc.  Do NOT count diet or artificially sweetened beverages)? 0   How many minutes a day do you exercise enough to make your heart beat faster? 10 to 19   How many days a week do you exercise enough to make your heart beat faster?    How many days per week do you miss taking your medication? 0         Depression and Anxiety Follow-Up  How are you doing with your depression since your last visit? No change   How are you doing with your anxiety since your last visit? Medium   Are you having any other symptoms that might be associated with depression?  No   Are you having any other symptoms that might be associated with anxiety? No   Have you had a significant life event? No   Are you feeling anxious or having panic attacks? Yes   Do you have any concerns with your use of alcohol or other drugs? No     Social History     Tobacco Use     Smoking status: Former Smoker     Start date: 1978     Quit date: 1998     Years since quittin.1     Smokeless tobacco: Never Used     Tobacco  comment: 1 pack a day    Vaping Use     Vaping Use: Never used   Substance Use Topics     Alcohol use: No     Drug use: Yes     Types: Marijuana     PHQ 12/9/2020 6/9/2021 2/18/2022   PHQ-9 Total Score 3 4 6   Q9: Thoughts of better off dead/self-harm past 2 weeks Not at all Not at all Not at all     BONNIE-7 SCORE 12/9/2020 6/9/2021 2/18/2022   Total Score - - 4 (minimal anxiety)   Total Score 1 2 4     Suicide Assessment Five-step Evaluation and Treatment (SAFE-T)        Asthma Follow-Up    Was ACT completed today?    Yes    ACT Total Scores 2/18/2022   ACT TOTAL SCORE (Goal Greater than or Equal to 20) 19   In the past 12 months, how many times did you visit the emergency room for your asthma without being admitted to the hospital? 0   In the past 12 months, how many times were you hospitalized overnight because of your asthma? 0       Do you have a cough? Yes   Are you experiencing any wheezing in your chest? Yes   Do you have any shortness of breath? No   How often are you using a short-acting (rescue) inhaler or nebulizer, such as Albuterol? 2-3 times per day   How many days per week do you miss taking your asthma controller medication? I do not have an asthma controller medication   Please describe any recent triggers for your asthma. pollens    upper respiratory infections   Have you had any Emergency Room visits, Urgent Care visits, or Hospital Admissions since your last office visit? No       Review of Systems   Constitutional, cardiovascular, pulmonary, musculoskeletal, neuro, skin, endocrine and psych systems are negative, except as otherwise noted.      Objective    /71   Pulse 81   Temp 98.3  F (36.8  C) (Tympanic)   Resp 16   Wt 75.1 kg (165 lb 9.6 oz)   LMP  (LMP Unknown)   SpO2 97%   Breastfeeding No   BMI 30.29 kg/m    Body mass index is 30.29 kg/m .  Physical Exam   GENERAL: healthy, alert and no distress  RESP: lungs clear to auscultation - no rales, rhonchi or wheezes  CV: regular  rates and rhythm and grade 3/6 systolic murmur   MS: no gross musculoskeletal defects noted, no edema  SKIN: no suspicious lesions or rashes  NEURO: Normal strength and tone, mentation intact and speech normal  PSYCH: mentation appears normal, affect normal/bright  Diabetic foot exam: normal DP and PT pulses, no trophic changes or ulcerative lesions, normal sensory exam and normal monofilament exam    Results for orders placed or performed in visit on 02/18/22 (from the past 24 hour(s))   Extra Tube    Narrative    The following orders were created for panel order Extra Tube.  Procedure                               Abnormality         Status                     ---------                               -----------         ------                     Extra Red Top Tube[[id number]]                               Final result               Extra Green Top (Lithium...[[id number]]                      Final result               Extra Purple Top Tube[[id number]]                                                         Please view results for these tests on the individual orders.   HEMOGLOBIN A1C   Result Value Ref Range    Hemoglobin A1C 6.6 (H) 0.0 - 5.6 %   Extra Red Top Tube   Result Value Ref Range    Hold Specimen JIC    Extra Green Top (Lithium Heparin) Tube   Result Value Ref Range    Hold Specimen JIC        ----- Services Performed by a MEDICAL STUDENT in Presence of ATTENDING Physician-------

## 2022-02-18 ENCOUNTER — OFFICE VISIT (OUTPATIENT)
Dept: FAMILY MEDICINE | Facility: CLINIC | Age: 62
End: 2022-02-18
Payer: COMMERCIAL

## 2022-02-18 VITALS
DIASTOLIC BLOOD PRESSURE: 71 MMHG | RESPIRATION RATE: 16 BRPM | OXYGEN SATURATION: 97 % | TEMPERATURE: 98.3 F | WEIGHT: 165.6 LBS | BODY MASS INDEX: 30.29 KG/M2 | SYSTOLIC BLOOD PRESSURE: 125 MMHG | HEART RATE: 81 BPM

## 2022-02-18 DIAGNOSIS — I38 VALVULAR HEART DISEASE: ICD-10-CM

## 2022-02-18 DIAGNOSIS — F32.0 MILD MAJOR DEPRESSION (H): ICD-10-CM

## 2022-02-18 DIAGNOSIS — E11.65 TYPE 2 DIABETES MELLITUS WITH HYPERGLYCEMIA, WITHOUT LONG-TERM CURRENT USE OF INSULIN (H): Primary | ICD-10-CM

## 2022-02-18 DIAGNOSIS — F13.20 BENZODIAZEPINE DEPENDENCE, EPISODIC (H): ICD-10-CM

## 2022-02-18 DIAGNOSIS — J30.89 PERENNIAL ALLERGIC RHINITIS: ICD-10-CM

## 2022-02-18 DIAGNOSIS — K21.9 GASTROESOPHAGEAL REFLUX DISEASE, UNSPECIFIED WHETHER ESOPHAGITIS PRESENT: ICD-10-CM

## 2022-02-18 DIAGNOSIS — E78.5 HYPERLIPIDEMIA, UNSPECIFIED HYPERLIPIDEMIA TYPE: ICD-10-CM

## 2022-02-18 DIAGNOSIS — I10 HYPERTENSION GOAL BP (BLOOD PRESSURE) < 140/90: ICD-10-CM

## 2022-02-18 PROBLEM — M81.0 AGE-RELATED OSTEOPOROSIS WITHOUT CURRENT PATHOLOGICAL FRACTURE: Status: RESOLVED | Noted: 2019-07-28 | Resolved: 2022-02-18

## 2022-02-18 LAB
ANION GAP SERPL CALCULATED.3IONS-SCNC: 8 MMOL/L (ref 3–14)
BUN SERPL-MCNC: 19 MG/DL (ref 7–30)
CALCIUM SERPL-MCNC: 9.1 MG/DL (ref 8.5–10.1)
CHLORIDE BLD-SCNC: 102 MMOL/L (ref 94–109)
CHOLEST SERPL-MCNC: 187 MG/DL
CO2 SERPL-SCNC: 27 MMOL/L (ref 20–32)
CREAT SERPL-MCNC: 0.87 MG/DL (ref 0.52–1.04)
CREAT UR-MCNC: 204 MG/DL
FASTING STATUS PATIENT QL REPORTED: YES
GFR SERPL CREATININE-BSD FRML MDRD: 76 ML/MIN/1.73M2
GLUCOSE BLD-MCNC: 201 MG/DL (ref 70–99)
HBA1C MFR BLD: 6.6 % (ref 0–5.6)
HDLC SERPL-MCNC: 50 MG/DL
HOLD SPECIMEN: NORMAL
HOLD SPECIMEN: NORMAL
LDLC SERPL CALC-MCNC: 106 MG/DL
MICROALBUMIN UR-MCNC: 11 MG/L
MICROALBUMIN/CREAT UR: 5.39 MG/G CR (ref 0–25)
NONHDLC SERPL-MCNC: 137 MG/DL
POTASSIUM BLD-SCNC: 3.8 MMOL/L (ref 3.4–5.3)
SODIUM SERPL-SCNC: 137 MMOL/L (ref 133–144)
TRIGL SERPL-MCNC: 153 MG/DL

## 2022-02-18 PROCEDURE — 83036 HEMOGLOBIN GLYCOSYLATED A1C: CPT | Performed by: PHYSICIAN ASSISTANT

## 2022-02-18 PROCEDURE — 96127 BRIEF EMOTIONAL/BEHAV ASSMT: CPT | Mod: 59 | Performed by: PHYSICIAN ASSISTANT

## 2022-02-18 PROCEDURE — 80061 LIPID PANEL: CPT | Performed by: PHYSICIAN ASSISTANT

## 2022-02-18 PROCEDURE — 36415 COLL VENOUS BLD VENIPUNCTURE: CPT | Performed by: PHYSICIAN ASSISTANT

## 2022-02-18 PROCEDURE — 99207 PR FOOT EXAM NO CHARGE: CPT | Performed by: PHYSICIAN ASSISTANT

## 2022-02-18 PROCEDURE — 82043 UR ALBUMIN QUANTITATIVE: CPT | Performed by: PHYSICIAN ASSISTANT

## 2022-02-18 PROCEDURE — 99214 OFFICE O/P EST MOD 30 MIN: CPT | Performed by: PHYSICIAN ASSISTANT

## 2022-02-18 PROCEDURE — 80048 BASIC METABOLIC PNL TOTAL CA: CPT | Performed by: PHYSICIAN ASSISTANT

## 2022-02-18 RX ORDER — CLONAZEPAM 0.5 MG/1
TABLET ORAL
Qty: 100 TABLET | Refills: 1 | Status: SHIPPED | OUTPATIENT
Start: 2022-02-18 | End: 2022-08-19

## 2022-02-18 RX ORDER — ESCITALOPRAM OXALATE 20 MG/1
20 TABLET ORAL DAILY
Qty: 90 TABLET | Refills: 3 | Status: SHIPPED | OUTPATIENT
Start: 2022-02-18 | End: 2023-01-24

## 2022-02-18 RX ORDER — TRIAMTERENE AND HYDROCHLOROTHIAZIDE 37.5; 25 MG/1; MG/1
1 CAPSULE ORAL DAILY
Qty: 90 CAPSULE | Refills: 3 | Status: SHIPPED | OUTPATIENT
Start: 2022-02-18 | End: 2023-01-24

## 2022-02-18 RX ORDER — FLUOROMETHOLONE 0.1 %
SUSPENSION, DROPS(FINAL DOSAGE FORM)(ML) OPHTHALMIC (EYE)
COMMUNITY
Start: 2022-02-11

## 2022-02-18 ASSESSMENT — ANXIETY QUESTIONNAIRES
7. FEELING AFRAID AS IF SOMETHING AWFUL MIGHT HAPPEN: NOT AT ALL
GAD7 TOTAL SCORE: 4
3. WORRYING TOO MUCH ABOUT DIFFERENT THINGS: SEVERAL DAYS
2. NOT BEING ABLE TO STOP OR CONTROL WORRYING: SEVERAL DAYS
4. TROUBLE RELAXING: NOT AT ALL
6. BECOMING EASILY ANNOYED OR IRRITABLE: SEVERAL DAYS
7. FEELING AFRAID AS IF SOMETHING AWFUL MIGHT HAPPEN: NOT AT ALL
GAD7 TOTAL SCORE: 4
GAD7 TOTAL SCORE: 4
5. BEING SO RESTLESS THAT IT IS HARD TO SIT STILL: NOT AT ALL
1. FEELING NERVOUS, ANXIOUS, OR ON EDGE: SEVERAL DAYS

## 2022-02-18 ASSESSMENT — PAIN SCALES - GENERAL: PAINLEVEL: MODERATE PAIN (5)

## 2022-02-18 ASSESSMENT — ASTHMA QUESTIONNAIRES: ACT_TOTALSCORE: 19

## 2022-02-18 ASSESSMENT — PATIENT HEALTH QUESTIONNAIRE - PHQ9
10. IF YOU CHECKED OFF ANY PROBLEMS, HOW DIFFICULT HAVE THESE PROBLEMS MADE IT FOR YOU TO DO YOUR WORK, TAKE CARE OF THINGS AT HOME, OR GET ALONG WITH OTHER PEOPLE: NOT DIFFICULT AT ALL
SUM OF ALL RESPONSES TO PHQ QUESTIONS 1-9: 6
SUM OF ALL RESPONSES TO PHQ QUESTIONS 1-9: 6

## 2022-02-18 NOTE — LETTER
My Asthma Action Plan    Name: Betsey Vanessa   YOB: 1960  Date: 2/18/2022   My doctor: Katerina Judd PA-C   My clinic: [facility] [place]        My Rescue Medicine:   Albuterol inhaler (Proair/Ventolin/Proventil HFA)  2-4 puffs EVERY 4 HOURS as needed. Use a spacer if recommended by your provider.   My Asthma Severity:   Mile Intermittent  Know your asthma triggers: upper respiratory infections and pollens             GREEN ZONE   Good Control    I feel good    No cough or wheeze    Can work, sleep and play without asthma symptoms       Take your asthma control medicine every day.     1. If exercise triggers your asthma, take your rescue medication    15 minutes before exercise or sports, and    During exercise if you have asthma symptoms  2. Spacer to use with inhaler: If you have a spacer, make sure to use it with your inhaler             YELLOW ZONE Getting Worse  I have ANY of these:    I do not feel good    Cough or wheeze    Chest feels tight    Wake up at night   1. Keep taking your Green Zone medications  2. Start taking your rescue medicine:    every 20 minutes for up to 1 hour. Then every 4 hours for 24-48 hours.  3. If you stay in the Yellow Zone for more than 12-24 hours, contact your doctor.  4. If you do not return to the Green Zone in 12-24 hours or you get worse, start taking your oral steroid medicine if prescribed by your provider.           RED ZONE Medical Alert - Get Help  I have ANY of these:    I feel awful    Medicine is not helping    Breathing getting harder    Trouble walking or talking    Nose opens wide to breathe       1. Take your rescue medicine NOW  2. If your provider has prescribed an oral steroid medicine, start taking it NOW  3. Call your doctor NOW  4. If you are still in the Red Zone after 20 minutes and you have not reached your doctor:    Take your rescue medicine again and    Call 911 or go to the emergency room right away    See your  regular doctor within 2 weeks of an Emergency Room or Urgent Care visit for follow-up treatment.          Annual Reminders:  Meet with Asthma Educator,  Flu Shot in the Fall, consider Pneumonia Vaccination for patients with asthma (aged 19 and older).    Pharmacy: [facility] [address] [facility] [address]    Electronically signed by Katerina Judd PA-C   Date: 02/18/22                    Asthma Triggers  How To Control Things That Make Your Asthma Worse    Triggers are things that make your asthma worse.  Look at the list below to help you find your triggers and   what you can do about them. You can help prevent asthma flare-ups by staying away from your triggers.      Trigger                                                          What you can do   Cigarette Smoke  Tobacco smoke can make asthma worse. Do not allow smoking in your home, car or around you.  Be sure no one smokes at a child s day care or school.  If you smoke, ask your health care provider for ways to help you quit.  Ask family members to quit too.  Ask your health care provider for a referral to Quit Plan to help you quit smoking, or call 7-847-333-PLAN.     Colds, Flu, Bronchitis  These are common triggers of asthma. Wash your hands often.  Don t touch your eyes, nose or mouth.  Get a flu shot every year.     Dust Mites  These are tiny bugs that live in cloth or carpet. They are too small to see. Wash sheets and blankets in hot water every week.   Encase pillows and mattress in dust mite proof covers.  Avoid having carpet if you can. If you have carpet, vacuum weekly.   Use a dust mask and HEPA vacuum.   Pollen and Outdoor Mold  Some people are allergic to trees, grass, or weed pollen, or molds. Try to keep your windows closed.  Limit time out doors when pollen count is high.   Ask you health care provider about taking medicine during allergy season.     Animal Dander  Some people are allergic to skin flakes, urine or saliva  from pets with fur or feathers. Keep pets with fur or feathers out of your home.    If you can t keep the pet outdoors, then keep the pet out of your bedroom.  Keep the bedroom door closed.  Keep pets off cloth furniture and away from stuffed toys.     Mice, Rats, and Cockroaches  Some people are allergic to the waste from these pests.   Cover food and garbage.  Clean up spills and food crumbs.  Store grease in the refrigerator.   Keep food out of the bedroom.   Indoor Mold  This can be a trigger if your home has high moisture. Fix leaking faucets, pipes, or other sources of water.   Clean moldy surfaces.  Dehumidify basement if it is damp and smelly.   Smoke, Strong Odors, and Sprays  These can reduce air quality. Stay away from strong odors and sprays, such as perfume, powder, hair spray, paints, smoke incense, paint, cleaning products, candles and new carpet.   Exercise or Sports  Some people with asthma have this trigger. Be active!  Ask your doctor about taking medicine before sports or exercise to prevent symptoms.    Warm up for 5-10 minutes before and after sports or exercise.     Other Triggers of Asthma  Cold air:  Cover your nose and mouth with a scarf.  Sometimes laughing or crying can be a trigger.  Some medicines and food can trigger asthma.

## 2022-02-19 ASSESSMENT — ANXIETY QUESTIONNAIRES: GAD7 TOTAL SCORE: 4

## 2022-02-20 RX ORDER — CETIRIZINE HYDROCHLORIDE 10 MG/1
TABLET, FILM COATED ORAL
Qty: 90 TABLET | Refills: 1 | Status: SHIPPED | OUTPATIENT
Start: 2022-02-20 | End: 2023-02-01

## 2022-02-24 DIAGNOSIS — G89.4 CHRONIC PAIN SYNDROME: ICD-10-CM

## 2022-02-24 DIAGNOSIS — F11.20 CONTINUOUS OPIOID DEPENDENCE (H): ICD-10-CM

## 2022-02-24 DIAGNOSIS — M06.09 RHEUMATOID ARTHRITIS OF MULTIPLE SITES WITHOUT RHEUMATOID FACTOR (H): ICD-10-CM

## 2022-02-24 RX ORDER — HYDROCODONE BITARTRATE AND ACETAMINOPHEN 7.5; 325 MG/1; MG/1
.5-1 TABLET ORAL EVERY 6 HOURS PRN
Qty: 30 TABLET | Refills: 0 | Status: SHIPPED | OUTPATIENT
Start: 2022-02-24 | End: 2022-03-29

## 2022-02-24 NOTE — TELEPHONE ENCOUNTER
Medication refill information reviewed.     Due date for HYDROcodone-acetaminophen (NORCO) 7.5-325 MG per tablet is 03/04/22     Prescriptions prepped for review.     Will route to provider.

## 2022-02-24 NOTE — TELEPHONE ENCOUNTER
Reason for call:  Medication   If this is a refill request, has the caller requested the refill from the pharmacy already? No  Will the patient be using a [facility]? No  Name of the pharmacy and phone number for the current request: [id number] - [place], [place] - [address]    Name of the medication requested:   fentaNYL (DURAGESIC) 12 mcg/hr 72 hr patch    HYDROcodone-acetaminophen (NORCO) 7.5-325 MG per tablet    Other request: none    Phone number to reach patient:  Home number on file [phone] (home)    Best Time:  anytime    Can we leave a detailed message on this number?  YES    Travel screening: Not Applicable     Dee CONNORS    [facility]

## 2022-02-24 NOTE — TELEPHONE ENCOUNTER
Script Eprescribed to pharmacy  [place]    Will send this to MA team to notify patient.    Signed Prescriptions:                        Disp   Refills    HYDROcodone-acetaminophen (NORCO) 7.5-325 *30 tab*0        Sig: Take 0.5-1 tablets by mouth every 6 hours as needed           for severe pain . Fill 03/02/22 to start on/after           03/04/22  Authorizing Provider: SANTOS ISLAS MD  [facility]

## 2022-02-24 NOTE — TELEPHONE ENCOUNTER
Patient requesting refill(s) of HYDROcodone-acetaminophen (NORCO) 7.5-325 MG per tablet    Last dispensed from pharmacy on 2/2/22    Patient's last office/virtual visit by prescribing provider on 1/6/22  Next office/virtual appointment scheduled for None    Last urine drug screen date 1/6/22  Current opioid agreement on file (completed within the last year) Yes Date of opioid agreement: 1/6/22    E-prescribe to [facility] [address] [facility] [place], [place] - [address] [address]    Will route to nursing [place] for review and preparation of prescription(s).

## 2022-02-27 ENCOUNTER — HEALTH MAINTENANCE LETTER (OUTPATIENT)
Age: 62
End: 2022-02-27

## 2022-03-02 ENCOUNTER — INFUSION THERAPY VISIT (OUTPATIENT)
Dept: INFUSION THERAPY | Facility: CLINIC | Age: 62
End: 2022-03-02
Payer: COMMERCIAL

## 2022-03-02 VITALS
RESPIRATION RATE: 16 BRPM | WEIGHT: 169 LBS | TEMPERATURE: 97.8 F | BODY MASS INDEX: 30.91 KG/M2 | DIASTOLIC BLOOD PRESSURE: 51 MMHG | SYSTOLIC BLOOD PRESSURE: 119 MMHG | HEART RATE: 86 BPM | OXYGEN SATURATION: 98 %

## 2022-03-02 DIAGNOSIS — M06.09 RHEUMATOID ARTHRITIS OF MULTIPLE SITES WITH NEGATIVE RHEUMATOID FACTOR (H): Primary | ICD-10-CM

## 2022-03-02 PROCEDURE — 99207 PR NO CHARGE LOS: CPT

## 2022-03-02 PROCEDURE — 96365 THER/PROPH/DIAG IV INF INIT: CPT | Performed by: NURSE PRACTITIONER

## 2022-03-02 RX ORDER — HEPARIN SODIUM,PORCINE 10 UNIT/ML
5 VIAL (ML) INTRAVENOUS
Status: CANCELLED | OUTPATIENT
Start: 2022-03-24

## 2022-03-02 RX ORDER — HEPARIN SODIUM (PORCINE) LOCK FLUSH IV SOLN 100 UNIT/ML 100 UNIT/ML
5 SOLUTION INTRAVENOUS
Status: CANCELLED | OUTPATIENT
Start: 2022-03-24

## 2022-03-02 ASSESSMENT — PAIN SCALES - GENERAL: PAINLEVEL: SEVERE PAIN (7)

## 2022-03-02 NOTE — PROGRESS NOTES
Infusion Nursing Note:  Betsey Vanessa presents today for Orencia.    Patient seen by provider today: No   present during visit today: Not Applicable.    Note: N/A.      Intravenous Access:  Peripheral IV placed.    Treatment Conditions:  Biological Infusion Checklist:  ~~~ NOTE: If the patient answers yes to any of the questions below, hold the infusion and contact ordering provider or on-call provider.    1. Have you recently had an elevated temperature, fever, chills, productive cough, coughing for 3 weeks or longer or hemoptysis, abnormal vital signs, night sweats,  chest pain or have you noticed a decrease in your appetite, unexplained weight loss or fatigue? No  2. Do you have any open wounds or new incisions? No  3. Do you have any recent or upcoming hospitalizations, surgeries or dental procedures? No  4. Do you currently have or recently have had any signs of illness or infection or are you on any antibiotics? No  5. Have you had any new, sudden or worsening abdominal pain? No  6. Have you or anyone in your household received a live vaccination in the past 4 weeks? Please note:  No live vaccines while on biologic/chemotherapy until 6 months after the last treatment.  Patient can receive the flu vaccine (shot only) and the pneumovax.  It is optimal for the patient to get these vaccines mid cycle, but they can be given at any time as long as it is not on the day of the infusion. No  7. Have you recently been diagnosed with any new nervous system diseases (ie. Multiple sclerosis, Guillain [place], seizures, neurological changes) or cancer diagnosis? No  8. Are you on any form of radiation or chemotherapy? No  9. Are you pregnant or breast feeding or do you have plans of pregnancy in the future? No  10. Have you been having any signs of worsening depression or suicidal ideations?  (benlysta only) No  11. Have there been any other new onset medical symptoms? No    Post Infusion Assessment:  Patient  tolerated infusion without incident.  Site patent and intact, free from redness, edema or discomfort.  No evidence of extravasations.  Access discontinued per protocol.       Discharge Plan:   AVS to patient via MYCHART.  Patient will return 3/30 for next appointment.   Patient discharged in stable condition accompanied by: self.  Departure Mode: Ambulatory.      Ronda Landon RN

## 2022-03-03 RX ORDER — FENTANYL 12.5 UG/1
1 PATCH TRANSDERMAL
Qty: 10 PATCH | Refills: 0 | Status: SHIPPED | OUTPATIENT
Start: 2022-03-03 | End: 2022-03-29

## 2022-03-03 NOTE — TELEPHONE ENCOUNTER
and last OV note reviewed, fentanyl refill appears appropriate and was approved on behalf of Dr. Houser who is on leave.    Amado Bueno DO  [facility]

## 2022-03-03 NOTE — TELEPHONE ENCOUNTER
[employer] message sent with Rx approval from provider.  Felicia  Pain Clinic Management Team

## 2022-03-03 NOTE — TELEPHONE ENCOUNTER
Per pt she never got her refill for fentaNYL (DURAGESIC) 12 mcg/hr 72 hr patch. Per the pharmacy they never got the orders to fill.      Yoly Castro    [facility] [place]

## 2022-03-03 NOTE — TELEPHONE ENCOUNTER
Medication refill information reviewed.     Due date for fentaNYL (DURAGESIC) 12 mcg/hr 72 hr patch is 03/04/22     Prescriptions prepped for review.     Will route to provider.

## 2022-03-28 DIAGNOSIS — G89.4 CHRONIC PAIN SYNDROME: ICD-10-CM

## 2022-03-28 DIAGNOSIS — F11.20 CONTINUOUS OPIOID DEPENDENCE (H): ICD-10-CM

## 2022-03-28 DIAGNOSIS — M06.09 RHEUMATOID ARTHRITIS OF MULTIPLE SITES WITHOUT RHEUMATOID FACTOR (H): ICD-10-CM

## 2022-03-28 NOTE — TELEPHONE ENCOUNTER
Please process a refill of NORCO AND FENTANYL PATCH 12MCG to       [facility] - [place], [place] - [address]  [address] [place] [address]  Phone: [phone] Fax: [phone]

## 2022-03-28 NOTE — TELEPHONE ENCOUNTER
Reason for call:  Medication   If this is a refill request, has the caller requested the refill from the pharmacy already? No  Will the patient be using a [facility]? No  Name of the pharmacy and phone number for the current request: [facility] [place]    Name of the medication requested: NORCO AND FENTANYL PATCH 12MCG    Other request: PATIENT IS UNAWARE, ROUTING TO REVIEW      Shena CABRERA    [facility]

## 2022-03-29 ENCOUNTER — TELEPHONE (OUTPATIENT)
Dept: PALLIATIVE MEDICINE | Facility: CLINIC | Age: 62
End: 2022-03-29

## 2022-03-29 RX ORDER — FENTANYL 12.5 UG/1
1 PATCH TRANSDERMAL
Qty: 10 PATCH | Refills: 0 | Status: SHIPPED | OUTPATIENT
Start: 2022-03-29 | End: 2022-05-02

## 2022-03-29 RX ORDER — HYDROCODONE BITARTRATE AND ACETAMINOPHEN 7.5; 325 MG/1; MG/1
.5-1 TABLET ORAL EVERY 6 HOURS PRN
Qty: 30 TABLET | Refills: 0 | Status: SHIPPED | OUTPATIENT
Start: 2022-03-29 | End: 2022-05-02

## 2022-03-29 NOTE — TELEPHONE ENCOUNTER
Received call from patient requesting refill(s) of      fentaNYL (DURAGESIC) 12 mcg/hr 72 hr patch  Last dispensed from pharmacy on 03/03/22    HYDROcodone-acetaminophen (NORCO) 7.5-325 MG per tablet  Last dispensed from pharmacy on 03/02/22    Patient's last office/virtual visit by prescribing provider on 01/06/22  Next office/virtual appointment scheduled for None    Last urine drug screen date 01/06/22  Current opioid agreement on file (completed within the last year) Yes Date of opioid agreement: 01/06/22    E-prescribe to   [facility] [address]  [place] - [address]  [address] [place] [address]  Phone: [phone] Fax: [phone]    Will route to nursing [place] for review and preparation of prescription(s).       Lisette Pabon MA  [facility] [place]

## 2022-03-29 NOTE — TELEPHONE ENCOUNTER
Medication refill information reviewed.     Due date for     fentaNYL (DURAGESIC) 12 mcg/hr 72 hr patch and HYDROcodone-acetaminophen (NORCO) 7.5-325 MG per tablet is 04/03/22     Prescriptions prepped for review.     Transfer plan is back to PCP. Patient notified.     Will route to provider.

## 2022-03-29 NOTE — TELEPHONE ENCOUNTER
and last OV note reviewed. Refilled based on this plan.    Signed Prescriptions:                        Disp   Refills    fentaNYL (DURAGESIC) 12 mcg/hr 72 hr patch 10 pat*0        Sig: Place 1 patch onto the skin every 72 hours remove old           patch. Fill 04/01/22 to start 04/03/22  Authorizing Provider: VICK ROLDAN    HYDROcodone-acetaminophen (NORCO) 7.5-325 *30 tab*0        Sig: Take 0.5-1 tablets by mouth every 6 hours as needed           for severe pain . Fill 04/01/22 to start on/after           04/03/22  Authorizing Provider: VICK ROLDAN      Reviewed [place] Prescription Monitoring Program, appears appropriate.     ANIBAL Dwyer [facility]

## 2022-03-29 NOTE — TELEPHONE ENCOUNTER
Spoke with Betsey she is very stable on her regimen her back is feeling good right now she doesn't want to pursue any procedures. She has had facet joint injections in the past and her PCP can order a repeat of those when and if needed. She is a good candidate to transfer her care to PCP.     Sandee Monsivais, SURYN, RN  Care Coordinator  [facility] [place]

## 2022-03-29 NOTE — TELEPHONE ENCOUNTER
Spoke to patient, notified that prescriptions were sent to preferred pharmacy.    Able to fill 04/01/22 and start 04/03/22      Lisette Pabon MA  [facility] [place]

## 2022-03-30 ENCOUNTER — INFUSION THERAPY VISIT (OUTPATIENT)
Dept: INFUSION THERAPY | Facility: CLINIC | Age: 62
End: 2022-03-30
Attending: INTERNAL MEDICINE
Payer: COMMERCIAL

## 2022-03-30 ENCOUNTER — LAB (OUTPATIENT)
Dept: LAB | Facility: CLINIC | Age: 62
End: 2022-03-30

## 2022-03-30 VITALS
HEART RATE: 77 BPM | OXYGEN SATURATION: 96 % | WEIGHT: 167.3 LBS | DIASTOLIC BLOOD PRESSURE: 70 MMHG | SYSTOLIC BLOOD PRESSURE: 130 MMHG | BODY MASS INDEX: 30.6 KG/M2 | RESPIRATION RATE: 14 BRPM | TEMPERATURE: 98.1 F

## 2022-03-30 DIAGNOSIS — M85.89 OSTEOPENIA OF MULTIPLE SITES: ICD-10-CM

## 2022-03-30 DIAGNOSIS — Z79.899 HIGH RISK MEDICATIONS (NOT ANTICOAGULANTS) LONG-TERM USE: ICD-10-CM

## 2022-03-30 DIAGNOSIS — Z79.1 LONG TERM (CURRENT) USE OF NON-STEROIDAL ANTI-INFLAMMATORIES (NSAID): ICD-10-CM

## 2022-03-30 DIAGNOSIS — Z92.29 HISTORY OF BISPHOSPHONATE THERAPY: ICD-10-CM

## 2022-03-30 DIAGNOSIS — M85.89 OSTEOPENIA OF MULTIPLE SITES: Primary | ICD-10-CM

## 2022-03-30 DIAGNOSIS — Z78.0 POST-MENOPAUSAL: Primary | ICD-10-CM

## 2022-03-30 DIAGNOSIS — M06.09 RHEUMATOID ARTHRITIS OF MULTIPLE SITES WITH NEGATIVE RHEUMATOID FACTOR (H): ICD-10-CM

## 2022-03-30 DIAGNOSIS — Z78.0 POST-MENOPAUSAL: ICD-10-CM

## 2022-03-30 LAB
ALBUMIN SERPL-MCNC: 3.9 G/DL (ref 3.4–5)
ALP SERPL-CCNC: 86 U/L (ref 40–150)
ALT SERPL W P-5'-P-CCNC: 44 U/L (ref 0–50)
AST SERPL W P-5'-P-CCNC: 26 U/L (ref 0–45)
BASOPHILS # BLD AUTO: 0 10E3/UL (ref 0–0.2)
BASOPHILS NFR BLD AUTO: 0 %
BILIRUB DIRECT SERPL-MCNC: 0.1 MG/DL (ref 0–0.2)
BILIRUB SERPL-MCNC: 0.4 MG/DL (ref 0.2–1.3)
CALCIUM SERPL-MCNC: 9.4 MG/DL (ref 8.5–10.1)
CREAT SERPL-MCNC: 0.88 MG/DL (ref 0.52–1.04)
CRP SERPL-MCNC: <2.9 MG/L (ref 0–8)
EOSINOPHIL # BLD AUTO: 0.2 10E3/UL (ref 0–0.7)
EOSINOPHIL NFR BLD AUTO: 4 %
ERYTHROCYTE [DISTWIDTH] IN BLOOD BY AUTOMATED COUNT: 14 % (ref 10–15)
ERYTHROCYTE [SEDIMENTATION RATE] IN BLOOD BY WESTERGREN METHOD: 20 MM/HR (ref 0–30)
GFR SERPL CREATININE-BSD FRML MDRD: 75 ML/MIN/1.73M2
HCT VFR BLD AUTO: 34.9 % (ref 35–47)
HGB BLD-MCNC: 11.5 G/DL (ref 11.7–15.7)
HOLD SPECIMEN: NORMAL
IMM GRANULOCYTES # BLD: 0 10E3/UL
IMM GRANULOCYTES NFR BLD: 0 %
LYMPHOCYTES # BLD AUTO: 1.9 10E3/UL (ref 0.8–5.3)
LYMPHOCYTES NFR BLD AUTO: 28 %
MCH RBC QN AUTO: 26.3 PG (ref 26.5–33)
MCHC RBC AUTO-ENTMCNC: 33 G/DL (ref 31.5–36.5)
MCV RBC AUTO: 80 FL (ref 78–100)
MONOCYTES # BLD AUTO: 0.4 10E3/UL (ref 0–1.3)
MONOCYTES NFR BLD AUTO: 5 %
NEUTROPHILS # BLD AUTO: 4.2 10E3/UL (ref 1.6–8.3)
NEUTROPHILS NFR BLD AUTO: 63 %
NRBC # BLD AUTO: 0 10E3/UL
NRBC BLD AUTO-RTO: 0 /100
PLATELET # BLD AUTO: 176 10E3/UL (ref 150–450)
PROT SERPL-MCNC: 7.4 G/DL (ref 6.8–8.8)
RBC # BLD AUTO: 4.37 10E6/UL (ref 3.8–5.2)
WBC # BLD AUTO: 6.7 10E3/UL (ref 4–11)

## 2022-03-30 PROCEDURE — 99207 PR NO CHARGE LOS: CPT

## 2022-03-30 PROCEDURE — 85652 RBC SED RATE AUTOMATED: CPT

## 2022-03-30 PROCEDURE — 80076 HEPATIC FUNCTION PANEL: CPT

## 2022-03-30 PROCEDURE — 96365 THER/PROPH/DIAG IV INF INIT: CPT | Performed by: NURSE PRACTITIONER

## 2022-03-30 PROCEDURE — 86140 C-REACTIVE PROTEIN: CPT

## 2022-03-30 PROCEDURE — 36415 COLL VENOUS BLD VENIPUNCTURE: CPT

## 2022-03-30 PROCEDURE — 85025 COMPLETE CBC W/AUTO DIFF WBC: CPT

## 2022-03-30 PROCEDURE — 82565 ASSAY OF CREATININE: CPT

## 2022-03-30 PROCEDURE — 96367 TX/PROPH/DG ADDL SEQ IV INF: CPT | Performed by: NURSE PRACTITIONER

## 2022-03-30 PROCEDURE — 82310 ASSAY OF CALCIUM: CPT | Performed by: INTERNAL MEDICINE

## 2022-03-30 RX ORDER — ZOLEDRONIC ACID 5 MG/100ML
5 INJECTION, SOLUTION INTRAVENOUS ONCE
Status: COMPLETED | OUTPATIENT
Start: 2022-03-30 | End: 2022-03-30

## 2022-03-30 RX ORDER — ALBUTEROL SULFATE 90 UG/1
1-2 AEROSOL, METERED RESPIRATORY (INHALATION)
Status: CANCELLED
Start: 2022-03-30

## 2022-03-30 RX ORDER — ZOLEDRONIC ACID 5 MG/100ML
5 INJECTION, SOLUTION INTRAVENOUS ONCE
Status: CANCELLED
Start: 2022-03-30 | End: 2022-03-30

## 2022-03-30 RX ORDER — HEPARIN SODIUM (PORCINE) LOCK FLUSH IV SOLN 100 UNIT/ML 100 UNIT/ML
5 SOLUTION INTRAVENOUS
Status: CANCELLED | OUTPATIENT
Start: 2022-04-27

## 2022-03-30 RX ORDER — HEPARIN SODIUM,PORCINE 10 UNIT/ML
5 VIAL (ML) INTRAVENOUS
Status: CANCELLED | OUTPATIENT
Start: 2022-03-30

## 2022-03-30 RX ORDER — MEPERIDINE HYDROCHLORIDE 25 MG/ML
25 INJECTION INTRAMUSCULAR; INTRAVENOUS; SUBCUTANEOUS EVERY 30 MIN PRN
Status: CANCELLED | OUTPATIENT
Start: 2022-03-30

## 2022-03-30 RX ORDER — HEPARIN SODIUM (PORCINE) LOCK FLUSH IV SOLN 100 UNIT/ML 100 UNIT/ML
5 SOLUTION INTRAVENOUS
Status: CANCELLED | OUTPATIENT
Start: 2022-03-30

## 2022-03-30 RX ORDER — NALOXONE HYDROCHLORIDE 0.4 MG/ML
0.2 INJECTION, SOLUTION INTRAMUSCULAR; INTRAVENOUS; SUBCUTANEOUS
Status: CANCELLED | OUTPATIENT
Start: 2022-03-30

## 2022-03-30 RX ORDER — EPINEPHRINE 1 MG/ML
0.3 INJECTION, SOLUTION, CONCENTRATE INTRAVENOUS EVERY 5 MIN PRN
Status: CANCELLED | OUTPATIENT
Start: 2022-03-30

## 2022-03-30 RX ORDER — EPINEPHRINE 1 MG/ML
0.3 INJECTION, SOLUTION INTRAMUSCULAR; SUBCUTANEOUS EVERY 5 MIN PRN
Status: CANCELLED | OUTPATIENT
Start: 2022-03-30

## 2022-03-30 RX ORDER — ALBUTEROL SULFATE 0.83 MG/ML
2.5 SOLUTION RESPIRATORY (INHALATION)
Status: CANCELLED | OUTPATIENT
Start: 2022-03-30

## 2022-03-30 RX ORDER — DIPHENHYDRAMINE HYDROCHLORIDE 50 MG/ML
50 INJECTION INTRAMUSCULAR; INTRAVENOUS
Status: CANCELLED
Start: 2022-03-30

## 2022-03-30 RX ORDER — METHYLPREDNISOLONE SODIUM SUCCINATE 125 MG/2ML
125 INJECTION, POWDER, LYOPHILIZED, FOR SOLUTION INTRAMUSCULAR; INTRAVENOUS
Status: CANCELLED
Start: 2022-03-30

## 2022-03-30 RX ORDER — HEPARIN SODIUM,PORCINE 10 UNIT/ML
5 VIAL (ML) INTRAVENOUS
Status: CANCELLED | OUTPATIENT
Start: 2022-04-27

## 2022-03-30 RX ADMIN — ZOLEDRONIC ACID 5 MG: 0.05 INJECTION, SOLUTION INTRAVENOUS at 15:25

## 2022-03-30 RX ADMIN — Medication 250 ML: at 15:25

## 2022-03-30 NOTE — PROGRESS NOTES
Infusion Nursing Note:  Betsey Vanessa presents today for Reclast and Orencia infusion.    Patient seen by provider today: No   present during visit today: Not Applicable.    Note:   Patient denies dental issues at this time.  States last dental exam was greater than three years ago.  Patient will inform dentist that she receives Reclast infusions, prior to any invasive dental work in the future.    Reminded patient to drink 6-8 glasses of water today, and tomorrow, to protect kidneys.    Patient states she had the stomach flu for 2 days, two weeks ago.  Fully recovered now.    Intravenous Access:  Peripheral IV placed.    Treatment Conditions:  Biological Infusion Checklist:  ~~~ NOTE: If the patient answers yes to any of the questions below, hold the infusion and contact ordering provider or on-call provider.    1. Have you recently had an elevated temperature, fever, chills, productive cough, coughing for 3 weeks or longer or hemoptysis, abnormal vital signs, night sweats,  chest pain or have you noticed a decrease in your appetite, unexplained weight loss or fatigue? No  2. Do you have any open wounds or new incisions? No  3. Do you have any recent or upcoming hospitalizations, surgeries or dental procedures? No  4. Do you currently have or recently have had any signs of illness or infection or are you on any antibiotics? No  5. Have you had any new, sudden or worsening abdominal pain? No  6. Have you or anyone in your household received a live vaccination in the past 4 weeks? Please note:  No live vaccines while on biologic/chemotherapy until 6 months after the last treatment.  Patient can receive the flu vaccine (shot only) and the pneumovax.  It is optimal for the patient to get these vaccines mid cycle, but they can be given at any time as long as it is not on the day of the infusion. No  7. Have you recently been diagnosed with any new nervous system diseases (ie. Multiple sclerosis, Guillain  Radnor, seizures, neurological changes) or cancer diagnosis? No  8. Are you on any form of radiation or chemotherapy? No  9. Have there been any other new onset medical symptoms? No    Post Infusion Assessment:  Patient tolerated infusion without incident.  Blood return noted pre and post infusion.  Site patent and intact, free from redness, edema or discomfort.  No evidence of extravasations.  Access discontinued per protocol.       Discharge Plan:   AVS to patient via MYCHART.  Patient will return 4/27/22 for next appointment.   Patient discharged in stable condition accompanied by: self.  Departure Mode: Ambulatory.      Fariba Quezada RN

## 2022-03-31 ENCOUNTER — OFFICE VISIT (OUTPATIENT)
Dept: CARDIOLOGY | Facility: CLINIC | Age: 62
End: 2022-03-31
Attending: PHYSICIAN ASSISTANT
Payer: COMMERCIAL

## 2022-03-31 VITALS
SYSTOLIC BLOOD PRESSURE: 121 MMHG | OXYGEN SATURATION: 97 % | BODY MASS INDEX: 30.18 KG/M2 | DIASTOLIC BLOOD PRESSURE: 63 MMHG | HEART RATE: 86 BPM | WEIGHT: 165 LBS

## 2022-03-31 DIAGNOSIS — I38 VALVULAR HEART DISEASE: Primary | ICD-10-CM

## 2022-03-31 PROCEDURE — 99214 OFFICE O/P EST MOD 30 MIN: CPT | Performed by: INTERNAL MEDICINE

## 2022-03-31 NOTE — NURSING NOTE
"Chief Complaint   Patient presents with     RECHECK     Per Katerina Judd, f/u with cardiology. 2 year follow up. Pt reports some chest pain and JOHNSTON.       Initial /63 (BP Location: Left arm, Patient Position: Chair, Cuff Size: Adult Regular)   Pulse 86   Wt 74.8 kg (165 lb)   LMP  (LMP Unknown)   SpO2 97%   BMI 30.18 kg/m   Estimated body mass index is 30.18 kg/m  as calculated from the following:    Height as of 1/19/22: 1.575 m (5' 2\").    Weight as of this encounter: 74.8 kg (165 lb)..  BP completed using cuff size: regular    EDGAR Rodríguez  "

## 2022-03-31 NOTE — PATIENT INSTRUCTIONS
Thank you for coming to the [facility] @ [facility]; please note the following instructions:    1. Echocardiogram now    2.  Dr. Hope recommends to follow up in 1 year with an echo prior.  The cardiology team will contact you to schedule when the time gets closer.          If you have any questions regarding your visit please contact your care team:     Cardiology  Telephone Number   Neli SOLOMON., RN  Lida HUSSEIN, RN   Jessie GILES, RMA  Chela SANCHEZ, RMA  Jimenez RUSS, DOMINICN  Bailee LEE, Visit Facilitator   [phone] (option [phone])   For scheduling appts:     [phone] (select option 1)       For the Device Clinic (Pacemakers and ICD's)  RN's :  Vianney Ron   During business hours: [phone]    *After business hours:  [phone] (select option 4)      Normal test result notifications will be released via [employer]de[employer] or mailed within 7 business days.  All other test results, will be communicated via telephone once reviewed by your cardiologist.    If you need a medication refill please contact your pharmacy.  Please allow 3 business days for your refill to be completed.    As always, thank you for trusting us with your health care needs!

## 2022-03-31 NOTE — PROGRESS NOTES
SUBJECTIVE:  Betsey Vanessa is a 61 year old female who presents for evaluation of valvular heart disease.  Patient is from [place].  Moved to [place] few years ago.  Known to have valvular heart disease.  Outside echocardiogram on presentation reported severe aortic stenosis, mild to moderate mitral regurgitation, mild mitral stenosis mild to moderate aortic regurgitation.  Patient with multiple medical issues including hypertension, hyperlipidemia type 2 diabetes opioid and benzodiazepine dependence Ménière's disease.  Echocardiogram in 2020 showed moderate aortic stenosis with a peak velocity of 3.6 m/s and valve area of 1.1 cm .  Also had mild to moderate mitral stenosis, mitral regurgitation and aortic regurgitation.  Patient was lost to follow-up for 2 years due to COVID-19 pandemic.  Today at clinic patient denied any symptoms related to valvular heart disease.  Overall she is doing well though she is walking with a cane due to Ménière's disease and balance issues.    Patient Active Problem List    Diagnosis Date Noted     Gastroesophageal reflux disease, esophagitis presence not specified 08/14/2020     Priority: Medium     IMO Regulatory Load OCT 2020       Fungal skin infection 08/14/2020     Priority: Medium     Anaphylaxis, sequela 08/14/2020     Priority: Medium     Hyperlipidemia, unspecified hyperlipidemia type 08/14/2020     Priority: Medium     Benzodiazepine dependence, episodic (H) 02/19/2020     Priority: Medium     Continuous opioid dependence (H) 02/19/2020     Priority: Medium     Osteopenia of multiple sites 02/04/2020     Priority: Medium     History of bisphosphonate therapy 02/04/2020     Priority: Medium     Post-menopausal 02/04/2020     Priority: Medium     Type 2 diabetes mellitus with hyperglycemia, without long-term current use of insulin (H) 01/12/2020     Priority: Medium     Hypertension goal BP (blood pressure) < 140/90 01/12/2020     Priority: Medium     Meniere's  disease, unspecified laterality 01/12/2020     Priority: Medium     Rheumatoid arthritis, involving unspecified site, unspecified rheumatoid factor presence 01/12/2020     Priority: Medium     IMO Regulatory Load OCT 2020       Valvular heart disease 01/12/2020     Priority: Medium     Mild major depression (H) 01/12/2020     Priority: Medium     Mixed conductive and sensorineural hearing loss of right ear 03/05/2015     Priority: Medium     Migraine with vertigo 03/05/2015     Priority: Medium     Tympanic membrane perforation 02/12/2015     Priority: Medium     Myofascial pain 06/18/2014     Priority: Medium     Mitral stenosis 04/14/2014     Priority: Medium     Fibromyalgia 10/28/2011     Priority: Medium     Polyarticular arthritis 10/28/2011     Priority: Medium     Encounter for long-term use of opiate analgesic 10/28/2011     Priority: Medium     Chronic pain 10/28/2011     Priority: Medium    .  Current Outpatient Medications   Medication Sig     celecoxib (CELEBREX) 100 MG capsule Take 1 capsule (100 mg) by mouth 2 times daily     cevimeline (EVOXAC) 30 MG capsule Take 1 capsule (30 mg) by mouth 3 times daily     clonazePAM (KLONOPIN) 0.5 MG tablet TAKE 1 TABLET BY MOUTH TWICE DAILY AS NEEDED FOR ANXIETY OK TO TAKE 1 TO 2 AT NIGHT AS NEEDED FOR SLEEP. SHOULD LAST ABOUT 90 DAYS     diclofenac (VOLTAREN) 1 % topical gel Place 2-4 g onto the skin 4 times daily . Max of 8g per dose. No more than 32g/day     docusate sodium (COLACE) 100 MG capsule Take 1 capsule (100 mg) by mouth 2 times daily     EQ ALLERGY RELIEF, CETIRIZINE, 10 MG tablet Take 1 tablet by mouth once daily     escitalopram (LEXAPRO) 20 MG tablet Take 1 tablet (20 mg) by mouth daily     fentaNYL (DURAGESIC) 12 mcg/hr 72 hr patch Place 1 patch onto the skin every 72 hours remove old patch. Fill 04/01/22 to start 04/03/22     fluorometholone (FML LIQUIFILM) 0.1 % ophthalmic suspension INSTILL 1 DROP INTO EACH EYE IN THE MORNING     fluticasone  (FLONASE) 50 MCG/ACT nasal spray Spray 1-2 sprays into both nostrils daily     fluvastatin (LESCOL) 20 MG capsule Take 1 capsule by mouth at bedtime     glimepiride (AMARYL) 2 MG tablet Take 1 tablet (2 mg) by mouth every morning (before breakfast) +++NEED RECHECK+++     HYDROcodone-acetaminophen (NORCO) 7.5-325 MG per tablet Take 0.5-1 tablets by mouth every 6 hours as needed for severe pain . Fill 04/01/22 to start on/after 04/03/22     hydroxychloroquine (PLAQUENIL) 200 MG tablet Hydroxychloroquine 200mg daily; and an additional 200mg every other day. Yearly eye exam including 10-2 VF and SD-OCT     hydrOXYzine (ATARAX) 25 MG tablet Take 1 tablet (25 mg) by mouth 3 times daily as needed for itching     lifitegrast (XIIDRA) 5 % opthalmic solution Place 1 drop into both eyes 2 times daily     magnesium 250 MG tablet Take 1 tablet by mouth daily     medical cannabis (Patient's own supply) Take 1 Dose by mouth See Admin Instructions (The purpose of this order is to document that the patient reports taking medical cannabis.  This is not a prescription, and is not used to certify that the patient has a qualifying medical condition.)     metFORMIN (GLUCOPHAGE) 500 MG tablet Take 2 tablets (1,000 mg) by mouth 2 times daily (with meals)     metFORMIN (GLUCOPHAGE) 500 MG tablet Take 2 tablets (1,000 mg) by mouth 2 times daily (with meals) +++NEED LABS IN SEPT.+++1000 mg 2 x a day     metroNIDAZOLE (METROGEL) 0.75 % external gel Apply topically 2 times daily Apply to face daily     nystatin (MYCOSTATIN) 346387 UNIT/GM external powder Apply topically 2 times daily as needed 2 x daily PRN for rash     omeprazole (PRILOSEC) 20 MG DR capsule Take 1 capsule (20 mg) by mouth 2 times daily     pimecrolimus (ELIDEL) 1 % external cream Apply topically 2 times daily - use on back of neck and hands     potassium chloride ER (KLOR-CON M) 10 MEQ CR tablet Take 1 tablet (10 mEq) by mouth 2 times daily     PROAIR  (90 Base) MCG/ACT  inhaler Inhale 1-2 puffs into the lungs every 4 hours as needed for shortness of breath / dyspnea or wheezing     promethazine (PHENERGAN) 25 MG suppository Place 25 mg rectally every 6 hours as needed For nusea     psyllium (METAMUCIL) 58.6 % POWD Take by mouth daily PRN     triamterene-HCTZ (DYAZIDE) 37.5-25 MG capsule Take 1 capsule by mouth daily     vitamin D3 (CHOLECALCIFEROL) 50 mcg (2000 units) tablet Take 2 tablets (100 mcg) by mouth daily     alcohol swab prep pads Use to swab area of injection/rosie as directed     blood glucose (NO BRAND SPECIFIED) test strip Use to test blood sugar 2 times daily or as directed. To accompany: Blood Glucose Monitor Brands: per insurance.     blood glucose calibration (NO BRAND SPECIFIED) solution Use to calibrate blood glucose monitor as needed as directed. To accompany: Blood Glucose Monitor Brands: per insurance.     EPINEPHrine (ANY BX GENERIC EQUIV) 0.3 MG/0.3ML injection 2-pack Inject 0.3 mLs (0.3 mg) into the muscle as needed for anaphylaxis (Patient not taking: Reported on 1/6/2022)     EPINEPHrine 0.3 MG/0.3ML SOLN PRN for Anaphylaxis (Patient not taking: Reported on 1/6/2022)     glucose blood VI test strips strip daily     naloxone (NARCAN) 4 MG/0.1ML nasal spray Spray 1 spray (4 mg) into one nostril alternating nostrils as needed for opioid reversal every 2-3 minutes until assistance arrives     promethazine (PHENERGAN) 25 MG tablet Take 1 tablet (25 mg) by mouth every 6 hours as needed for nausea or other (Meniere's) For nusea     STATIN NOT PRESCRIBED (INTENTIONAL) Please choose reason not prescribed, below     thin (NO BRAND SPECIFIED) lancets Use to test blood sugar 2 times daily or as directed. To accompany: Blood Glucose Monitor Brands: per insurance.     No current facility-administered medications for this visit.     Past Medical History:   Diagnosis Date     Anemia     r/t menses     Anxiety      Arthritis      Constipation      Depression       Diabetes (H)      Diarrhea      Double vision      Headache(784.0)      Hearing loss      Heart murmur      Heartburn      Hypertension      Indigestion      Nasal congestion      Night sweats      Numbness      Problems related to lack of adequate sleep      Rash      Sneezing      Tinnitus      Visual loss      Weakness      Weight gain     because of Prednisone     Past Surgical History:   Procedure Laterality Date     ------------OTHER-------------      D&C     HYSTERECTOMY       INNER EAR SURGERY       RELEASE CARPAL TUNNEL BILATERAL Bilateral 2008     Allergies   Allergen Reactions     Duloxetine Other (See Comments)     Topiramate Other (See Comments)     Other reaction(s): Confusion     Amitriptyline Embonate [Amitriptyline]      Hyper activity     Azithromycin Hives     Cymbalta      Hyper activity     Gabapentin Hives     Hmg-Coa-R Inhibitors Other (See Comments)     Liver function studies abnormal on Lipitor / Crestor     Macrobid [Nitrofurantoin] Hives     Paxil [Paroxetine] Other (See Comments)     Hyper behavior     Penicillins Hives     Tetracycline Hives     Social History     Socioeconomic History     Marital status: Single     Spouse name: Not on file     Number of children: Not on file     Years of education: Not on file     Highest education level: Not on file   Occupational History     Not on file   Tobacco Use     Smoking status: Former Smoker     Start date: 1978     Quit date: 1998     Years since quittin.2     Smokeless tobacco: Never Used     Tobacco comment: 1 pack a day    Vaping Use     Vaping Use: Never used   Substance and Sexual Activity     Alcohol use: No     Drug use: Yes     Types: Marijuana     Sexual activity: Not Currently   Other Topics Concern     Not on file   Social History Narrative    From [place] in [place] - lived after in [place], [place]    Moved to daughter's house in [place], [place] in late     Daughter, Bailee Siu and 2 gkids    Enjoys crocheting,  fishing     Social Determinants of Health     Financial Resource Strain: Not on file   Food Insecurity: Not on file   Transportation Needs: Not on file   Physical Activity: Not on file   Stress: Not on file   Social Connections: Not on file   Intimate Partner Violence: Not on file   Housing Stability: Not on file     Family History   Problem Relation Age of Onset     C.A.D. Mother      Alzheimer Disease Mother      Cardiovascular Mother      Eye Disorder Mother      Thyroid Disease Mother      Hypertension Other         grandmother     Cerebrovascular Disease Father      Alcohol/Drug Father      Depression Father      Obesity Father         aunt     Asthma Father      Alcohol/Drug Brother      Cancer Brother      Alcohol/Drug Sister         aunt     Cancer Sister      Allergies Other         All family members     Arthritis Other         aunt     Obesity Sister      Thyroid Disease Sister      Asthma Sister         daughter          REVIEW OF SYSTEMS:  General: negative, fever, chills, night sweats  Skin: negative, acne, rash and scaling  Eyes: negative, double vision, eye pain and photophobia  Ears/Nose/Throat: negative, nasal congestion and purulent rhinorrhea  Respiratory: No dyspnea on exertion, No cough, No hemoptysis and negative  Cardiovascular: negative, palpitations, tachycardia, irregular heart beat, chest pain, exertional chest pain or pressure, paroxysmal nocturnal dyspnea, dyspnea on exertion and orthopnea       OBJECTIVE:  Blood pressure 121/63, pulse 86, weight 74.8 kg (165 lb), SpO2 97 %, not currently breastfeeding.  General Appearance: alert and no distress  Head: Normocephalic. No masses, lesions, tenderness or abnormalities  Eyes: conjuctiva clear, PERRL, EOM intact  Ears: External ears normal. Canals clear. TM's normal.  Nose: Nares normal  Mouth: normal  Neck: Supple, no cervical adenopathy, no thyromegaly  Lungs: clear to auscultation  Cardiac: regular rate and rhythm, normal S1 and S2,  ESM.         ASSESSMENT/PLAN:  Patient with mitral and aortic valve disease.  Also significant rheumatoid arthritis and Ménière's disease.  This makes her functional status limited.  But she does go for walks without cardiac symptoms.  Particularly denied exertional chest pain, shortness of breath or loss of consciousness.  Patient's last echocardiogram was in 2020.  This showed moderate aortic stenosis with mean gradient of 31 mmHg, peak velocity of 3.66 m/s and calculated aortic valve area of 1.1 cm quiet.  Also had mild to moderate aortic regurgitation, mild mitral insufficiency, mild to moderate mitral stenosis.  We will plan for an echocardiogram now to reassess aortic valve disease.  Patient will be contacted with the result and further management plan.  If there is no significant progression of aortic stenosis patient will return to clinic in 1 year with a repeat echocardiogram.  Total visit duration 30 minutes.  This include face-to-face interview, physical exam, chart review, review of echocardiogram and documentation.

## 2022-03-31 NOTE — LETTER
3/31/2022      RE: Betsey Vanessa  [address]  [facility] [address]       Dear Colleague,    Thank you for the opportunity to participate in the care of your patient, Betsey Vanessa, at the [facility]. Please see a copy of my visit note below.       SUBJECTIVE:  Betsey Vanessa is a 61 year old female who presents for evaluation of valvular heart disease.  Patient is from [place].  Moved to [place] few years ago.  Known to have valvular heart disease.  Outside echocardiogram on presentation reported severe aortic stenosis, mild to moderate mitral regurgitation, mild mitral stenosis mild to moderate aortic regurgitation.  Patient with multiple medical issues including hypertension, hyperlipidemia type 2 diabetes opioid and benzodiazepine dependence Ménière's disease.  Echocardiogram in 2020 showed moderate aortic stenosis with a peak velocity of 3.6 m/s and valve area of 1.1 cm .  Also had mild to moderate mitral stenosis, mitral regurgitation and aortic regurgitation.  Patient was lost to follow-up for 2 years due to COVID-19 pandemic.  Today at clinic patient denied any symptoms related to valvular heart disease.  Overall she is doing well though she is walking with a cane due to Ménière's disease and balance issues.    Patient Active Problem List    Diagnosis Date Noted     Gastroesophageal reflux disease, esophagitis presence not specified 08/14/2020     Priority: Medium     IMO Regulatory Load OCT 2020       Fungal skin infection 08/14/2020     Priority: Medium     Anaphylaxis, sequela 08/14/2020     Priority: Medium     Hyperlipidemia, unspecified hyperlipidemia type 08/14/2020     Priority: Medium     Benzodiazepine dependence, episodic (H) 02/19/2020     Priority: Medium     Continuous opioid dependence (H) 02/19/2020     Priority: Medium     Osteopenia of multiple sites 02/04/2020     Priority: Medium     History of  bisphosphonate therapy 02/04/2020     Priority: Medium     Post-menopausal 02/04/2020     Priority: Medium     Type 2 diabetes mellitus with hyperglycemia, without long-term current use of insulin (H) 01/12/2020     Priority: Medium     Hypertension goal BP (blood pressure) < 140/90 01/12/2020     Priority: Medium     Meniere's disease, unspecified laterality 01/12/2020     Priority: Medium     Rheumatoid arthritis, involving unspecified site, unspecified rheumatoid factor presence 01/12/2020     Priority: Medium     IMO Regulatory Load OCT 2020       Valvular heart disease 01/12/2020     Priority: Medium     Mild major depression (H) 01/12/2020     Priority: Medium     Mixed conductive and sensorineural hearing loss of right ear 03/05/2015     Priority: Medium     Migraine with vertigo 03/05/2015     Priority: Medium     Tympanic membrane perforation 02/12/2015     Priority: Medium     Myofascial pain 06/18/2014     Priority: Medium     Mitral stenosis 04/14/2014     Priority: Medium     Fibromyalgia 10/28/2011     Priority: Medium     Polyarticular arthritis 10/28/2011     Priority: Medium     Encounter for long-term use of opiate analgesic 10/28/2011     Priority: Medium     Chronic pain 10/28/2011     Priority: Medium    .  Current Outpatient Medications   Medication Sig     celecoxib (CELEBREX) 100 MG capsule Take 1 capsule (100 mg) by mouth 2 times daily     cevimeline (EVOXAC) 30 MG capsule Take 1 capsule (30 mg) by mouth 3 times daily     clonazePAM (KLONOPIN) 0.5 MG tablet TAKE 1 TABLET BY MOUTH TWICE DAILY AS NEEDED FOR ANXIETY OK TO TAKE 1 TO 2 AT NIGHT AS NEEDED FOR SLEEP. SHOULD LAST ABOUT 90 DAYS     diclofenac (VOLTAREN) 1 % topical gel Place 2-4 g onto the skin 4 times daily . Max of 8g per dose. No more than 32g/day     docusate sodium (COLACE) 100 MG capsule Take 1 capsule (100 mg) by mouth 2 times daily     EQ ALLERGY RELIEF, CETIRIZINE, 10 MG tablet Take 1 tablet by mouth once daily      escitalopram (LEXAPRO) 20 MG tablet Take 1 tablet (20 mg) by mouth daily     fentaNYL (DURAGESIC) 12 mcg/hr 72 hr patch Place 1 patch onto the skin every 72 hours remove old patch. Fill 04/01/22 to start 04/03/22     fluorometholone (FML LIQUIFILM) 0.1 % ophthalmic suspension INSTILL 1 DROP INTO EACH EYE IN THE MORNING     fluticasone (FLONASE) 50 MCG/ACT nasal spray Spray 1-2 sprays into both nostrils daily     fluvastatin (LESCOL) 20 MG capsule Take 1 capsule by mouth at bedtime     glimepiride (AMARYL) 2 MG tablet Take 1 tablet (2 mg) by mouth every morning (before breakfast) +++NEED RECHECK+++     HYDROcodone-acetaminophen (NORCO) 7.5-325 MG per tablet Take 0.5-1 tablets by mouth every 6 hours as needed for severe pain . Fill 04/01/22 to start on/after 04/03/22     hydroxychloroquine (PLAQUENIL) 200 MG tablet Hydroxychloroquine 200mg daily; and an additional 200mg every other day. Yearly eye exam including 10-2 VF and SD-OCT     hydrOXYzine (ATARAX) 25 MG tablet Take 1 tablet (25 mg) by mouth 3 times daily as needed for itching     lifitegrast (XIIDRA) 5 % opthalmic solution Place 1 drop into both eyes 2 times daily     magnesium 250 MG tablet Take 1 tablet by mouth daily     medical cannabis (Patient's own supply) Take 1 Dose by mouth See Admin Instructions (The purpose of this order is to document that the patient reports taking medical cannabis.  This is not a prescription, and is not used to certify that the patient has a qualifying medical condition.)     metFORMIN (GLUCOPHAGE) 500 MG tablet Take 2 tablets (1,000 mg) by mouth 2 times daily (with meals)     metFORMIN (GLUCOPHAGE) 500 MG tablet Take 2 tablets (1,000 mg) by mouth 2 times daily (with meals) +++NEED LABS IN SEPT.+++1000 mg 2 x a day     metroNIDAZOLE (METROGEL) 0.75 % external gel Apply topically 2 times daily Apply to face daily     nystatin (MYCOSTATIN) 496474 UNIT/GM external powder Apply topically 2 times daily as needed 2 x daily PRN for  rash     omeprazole (PRILOSEC) 20 MG DR capsule Take 1 capsule (20 mg) by mouth 2 times daily     pimecrolimus (ELIDEL) 1 % external cream Apply topically 2 times daily - use on back of neck and hands     potassium chloride ER (KLOR-CON M) 10 MEQ CR tablet Take 1 tablet (10 mEq) by mouth 2 times daily     PROAIR  (90 Base) MCG/ACT inhaler Inhale 1-2 puffs into the lungs every 4 hours as needed for shortness of breath / dyspnea or wheezing     promethazine (PHENERGAN) 25 MG suppository Place 25 mg rectally every 6 hours as needed For nusea     psyllium (METAMUCIL) 58.6 % POWD Take by mouth daily PRN     triamterene-HCTZ (DYAZIDE) 37.5-25 MG capsule Take 1 capsule by mouth daily     vitamin D3 (CHOLECALCIFEROL) 50 mcg (2000 units) tablet Take 2 tablets (100 mcg) by mouth daily     alcohol swab prep pads Use to swab area of injection/rosie as directed     blood glucose (NO BRAND SPECIFIED) test strip Use to test blood sugar 2 times daily or as directed. To accompany: Blood Glucose Monitor Brands: per insurance.     blood glucose calibration (NO BRAND SPECIFIED) solution Use to calibrate blood glucose monitor as needed as directed. To accompany: Blood Glucose Monitor Brands: per insurance.     EPINEPHrine (ANY BX GENERIC EQUIV) 0.3 MG/0.3ML injection 2-pack Inject 0.3 mLs (0.3 mg) into the muscle as needed for anaphylaxis (Patient not taking: Reported on 1/6/2022)     EPINEPHrine 0.3 MG/0.3ML SOLN PRN for Anaphylaxis (Patient not taking: Reported on 1/6/2022)     glucose blood VI test strips strip daily     naloxone (NARCAN) 4 MG/0.1ML nasal spray Spray 1 spray (4 mg) into one nostril alternating nostrils as needed for opioid reversal every 2-3 minutes until assistance arrives     promethazine (PHENERGAN) 25 MG tablet Take 1 tablet (25 mg) by mouth every 6 hours as needed for nausea or other (Meniere's) For nusea     STATIN NOT PRESCRIBED (INTENTIONAL) Please choose reason not prescribed, below     thin (NO BRAND  SPECIFIED) lancets Use to test blood sugar 2 times daily or as directed. To accompany: Blood Glucose Monitor Brands: per insurance.     No current facility-administered medications for this visit.     Past Medical History:   Diagnosis Date     Anemia     r/t menses     Anxiety      Arthritis      Constipation      Depression      Diabetes (H)      Diarrhea      Double vision      Headache(784.0)      Hearing loss      Heart murmur      Heartburn      Hypertension      Indigestion      Nasal congestion      Night sweats      Numbness      Problems related to lack of adequate sleep      Rash      Sneezing      Tinnitus      Visual loss      Weakness      Weight gain     because of Prednisone     Past Surgical History:   Procedure Laterality Date     ------------OTHER-------------      D&C     HYSTERECTOMY       INNER EAR SURGERY       RELEASE CARPAL TUNNEL BILATERAL Bilateral      Allergies   Allergen Reactions     Duloxetine Other (See Comments)     Topiramate Other (See Comments)     Other reaction(s): Confusion     Amitriptyline Embonate [Amitriptyline]      Hyper activity     Azithromycin Hives     Cymbalta      Hyper activity     Gabapentin Hives     Hmg-Coa-R Inhibitors Other (See Comments)     Liver function studies abnormal on Lipitor / Crestor     Macrobid [Nitrofurantoin] Hives     Paxil [Paroxetine] Other (See Comments)     Hyper behavior     Penicillins Hives     Tetracycline Hives     Social History     Socioeconomic History     Marital status: Single     Spouse name: Not on file     Number of children: Not on file     Years of education: Not on file     Highest education level: Not on file   Occupational History     Not on file   Tobacco Use     Smoking status: Former Smoker     Start date: 1978     Quit date: 1998     Years since quittin.2     Smokeless tobacco: Never Used     Tobacco comment: 1 pack a day    Vaping Use     Vaping Use: Never used   Substance and Sexual Activity      Alcohol use: No     Drug use: Yes     Types: Marijuana     Sexual activity: Not Currently   Other Topics Concern     Not on file   Social History Narrative    From [place] - lived after in [place], [place]    Moved to daughter's house in [place], [place] in late 2019    Daughter, Bailee Siu and 2 gkids    Enjoys crocheting, fishing     Social Determinants of Health     Financial Resource Strain: Not on file   Food Insecurity: Not on file   Transportation Needs: Not on file   Physical Activity: Not on file   Stress: Not on file   Social Connections: Not on file   Intimate Partner Violence: Not on file   Housing Stability: Not on file     Family History   Problem Relation Age of Onset     C.A.D. Mother      Alzheimer Disease Mother      Cardiovascular Mother      Eye Disorder Mother      Thyroid Disease Mother      Hypertension Other         grandmother     Cerebrovascular Disease Father      Alcohol/Drug Father      Depression Father      Obesity Father         aunt     Asthma Father      Alcohol/Drug Brother      Cancer Brother      Alcohol/Drug Sister         aunt     Cancer Sister      Allergies Other         All family members     Arthritis Other         aunt     Obesity Sister      Thyroid Disease Sister      Asthma Sister         daughter          REVIEW OF SYSTEMS:  General: negative, fever, chills, night sweats  Skin: negative, acne, rash and scaling  Eyes: negative, double vision, eye pain and photophobia  Ears/Nose/Throat: negative, nasal congestion and purulent rhinorrhea  Respiratory: No dyspnea on exertion, No cough, No hemoptysis and negative  Cardiovascular: negative, palpitations, tachycardia, irregular heart beat, chest pain, exertional chest pain or pressure, paroxysmal nocturnal dyspnea, dyspnea on exertion and orthopnea       OBJECTIVE:  Blood pressure 121/63, pulse 86, weight 74.8 kg (165 lb), SpO2 97 %, not currently breastfeeding.  General Appearance: alert and no distress  Head:  Normocephalic. No masses, lesions, tenderness or abnormalities  Eyes: conjuctiva clear, PERRL, EOM intact  Ears: External ears normal. Canals clear. TM's normal.  Nose: Nares normal  Mouth: normal  Neck: Supple, no cervical adenopathy, no thyromegaly  Lungs: clear to auscultation  Cardiac: regular rate and rhythm, normal S1 and S2, ESM.         ASSESSMENT/PLAN:  Patient with mitral and aortic valve disease.  Also significant rheumatoid arthritis and Ménière's disease.  This makes her functional status limited.  But she does go for walks without cardiac symptoms.  Particularly denied exertional chest pain, shortness of breath or loss of consciousness.  Patient's last echocardiogram was in 2020.  This showed moderate aortic stenosis with mean gradient of 31 mmHg, peak velocity of 3.66 m/s and calculated aortic valve area of 1.1 cm quiet.  Also had mild to moderate aortic regurgitation, mild mitral insufficiency, mild to moderate mitral stenosis.  We will plan for an echocardiogram now to reassess aortic valve disease.  Patient will be contacted with the result and further management plan.  If there is no significant progression of aortic stenosis patient will return to clinic in 1 year with a repeat echocardiogram.  Total visit duration 30 minutes.  This include face-to-face interview, physical exam, chart review, review of echocardiogram and documentation.      Please do not hesitate to contact me if you have any questions/concerns.     Sincerely,     DEBRA To MD

## 2022-04-15 ENCOUNTER — OFFICE VISIT (OUTPATIENT)
Dept: URGENT CARE | Facility: URGENT CARE | Age: 62
End: 2022-04-15
Payer: COMMERCIAL

## 2022-04-15 ENCOUNTER — NURSE TRIAGE (OUTPATIENT)
Dept: FAMILY MEDICINE | Facility: CLINIC | Age: 62
End: 2022-04-15
Payer: COMMERCIAL

## 2022-04-15 ENCOUNTER — MYC MEDICAL ADVICE (OUTPATIENT)
Dept: FAMILY MEDICINE | Facility: CLINIC | Age: 62
End: 2022-04-15
Payer: COMMERCIAL

## 2022-04-15 VITALS
SYSTOLIC BLOOD PRESSURE: 127 MMHG | HEART RATE: 82 BPM | OXYGEN SATURATION: 98 % | RESPIRATION RATE: 16 BRPM | BODY MASS INDEX: 30.54 KG/M2 | DIASTOLIC BLOOD PRESSURE: 69 MMHG | WEIGHT: 167 LBS | TEMPERATURE: 99.4 F

## 2022-04-15 DIAGNOSIS — B97.89 VIRAL SINUSITIS: ICD-10-CM

## 2022-04-15 DIAGNOSIS — J32.9 VIRAL SINUSITIS: ICD-10-CM

## 2022-04-15 DIAGNOSIS — H60.391 INFECTIVE OTITIS EXTERNA, RIGHT: Primary | ICD-10-CM

## 2022-04-15 PROCEDURE — 99213 OFFICE O/P EST LOW 20 MIN: CPT | Performed by: NURSE PRACTITIONER

## 2022-04-15 RX ORDER — OFLOXACIN 3 MG/ML
10 SOLUTION AURICULAR (OTIC) DAILY
Qty: 5 ML | Refills: 0 | Status: SHIPPED | OUTPATIENT
Start: 2022-04-15 | End: 2022-04-22

## 2022-04-15 ASSESSMENT — PAIN SCALES - GENERAL: PAINLEVEL: SEVERE PAIN (7)

## 2022-04-15 NOTE — TELEPHONE ENCOUNTER
Will need to contact patient by phone to triage (and start a phone/triage encounter).    I see history of tympanic membrane perforation on problem list and hearing loss in right ear.       See triage encounter.  Ashly Daniels RN  [facility]

## 2022-04-15 NOTE — PROGRESS NOTES
Assessment & Plan     Infective otitis externa, right    - ofloxacin (FLOXIN) 0.3 % otic solution  Dispense: 5 mL; Refill: 0    Viral sinusitis       Prescription sent to pharmacy for ofloxacin daily for 7 days for right otitis externa. Recommend keeping ear clean and dry, tylenol as needed, may apply heating pad. Discussed sinus infections are usually caused by viruses and usually resolve within 7-10 days and antibiotic not indicated currently. Recommended rest, fluids especially warm clear liquids such as tea with honey and lemon, decreasing dairy which can increase congestion, humidifier, steam, nasal irrigation with saline, flonase nasal spray.     Follow-up with PCP if symptoms persist for 4 days, and sooner if symptoms worsen or new symptoms develop.     Discussed red flag symptoms which warrant immediate visit in emergency room    All questions were answered and patient verbalized understanding. AVS reviewed with patient.     Rani Crain, DNP, APRN, CNP 4/15/2022 5:48 PM  [facility] [place]          Jose Leggett is a 61 year old female who presents to clinic today with her daughter for the following health issues:  Chief Complaint   Patient presents with     Ear Problem     Right ear soreness, and draining for 3 days      Sinus Problem     Running nose, facial pain, draining and headache for couple days.     Patient presents for evaluation of right ear pain and drainage for the past 3 days. Drainage is greenish and thick with some blood. Pain is moderate. Associated symptoms: nasal congestion, headache, sinus pressure. Denies fever, cough, shortness of breath. She has been taking Zyrtec and flonase which helps temporarily. She has a history of Meniere's, fibromyalgia, type 2 diabetes.     Problem list, Medication list, Allergies, and Medical history reviewed in EPIC.    ROS:  Review of systems negative except for noted above        Objective    /69   Pulse 82   Temp 99.4   F (37.4  C) (Tympanic)   Resp 16   Wt 75.8 kg (167 lb)   LMP  (LMP Unknown)   SpO2 98%   Breastfeeding No   BMI 30.54 kg/m    Physical Exam  Constitutional:       General: She is not in acute distress.     Appearance: She is not toxic-appearing or diaphoretic.   HENT:      Head: Normocephalic and atraumatic.      Right Ear: Tympanic membrane normal.      Left Ear: Tympanic membrane, ear canal and external ear normal.      Ears:      Comments: Tenderness with palpation right pinna and tragus with mild erythema, swelling, and purulent drainage right ear canal     Nose:      Right Sinus: Maxillary sinus tenderness present. No frontal sinus tenderness.      Left Sinus: Maxillary sinus tenderness present. No frontal sinus tenderness.      Comments: Moderate nasal congestion     Mouth/Throat:      Mouth: Mucous membranes are moist.      Pharynx: Oropharynx is clear. No oropharyngeal exudate or posterior oropharyngeal erythema.      Tonsils: No tonsillar abscesses. 0 on the right. 0 on the left.   Eyes:      Conjunctiva/sclera: Conjunctivae normal.   Cardiovascular:      Rate and Rhythm: Normal rate and regular rhythm.      Heart sounds: Normal heart sounds.   Pulmonary:      Effort: Pulmonary effort is normal. No respiratory distress.      Breath sounds: Normal breath sounds. No wheezing, rhonchi or rales.   Skin:     General: Skin is warm and dry.   Neurological:      Mental Status: She is alert.

## 2022-04-15 NOTE — TELEPHONE ENCOUNTER
"See mychart message, I called patient to triage:          SEE IN OFFICE TODAY:   * You need to be examined today. Let me give you an appointment.  * Patient states she does not drive and no one is around to give her a ride today, will try to schedule virtual visit.    No openings at [facility] or with [facility], PCP is done for the day.  I found a 4pm \"acute\" at [facility], scheduled phone visit.    Ashly Daniels RN  [facility]    Reason for Disposition    Ear pain    Additional Information    Negative: Bloody discharge and it followed ear trauma    Negative: Earwax is the main concern    Negative: Followed head or face injury and clear or bloody fluid    Negative: Unexplained bleeding and lasts > 10 minutes or large amount (Exception: if a few drops of blood, continue with triage)    Negative: Fever > 103 F (39.4 C)    Negative: Patient sounds very sick or weak to the triager    Answer Assessment - Initial Assessment Questions  1. LOCATION: \"Which ear is involved?\"       Right ear  2. COLOR: \"What is the color of the discharge?\"       Greenish and bloody, sometimes has blood from this ear but the green drainage is new  3. CONSISTENCY: \"How runny is the discharge? Could it be water?\"       Thick like mucus  4. ONSET: \"When did you first notice the discharge?\"      2 days ago  5. PAIN: \"Is there any earache?\" \"How bad is it?\"  (Scale 1-10; or mild, moderate, severe)      6  6. OBJECTS: \"Any use of q-tips or have you inserted anything else in your ear?\"      No, only uses q-tips to outer ears  7. OTHER SYMPTOMS: \"Do you have any other symptoms?\" (e.g., headache, fever, dizziness, vomiting, runny nose)      Taking generic zyrtec for allergy symptoms, stuffy nose, denies fever, has Meniere's  8. PREGNANCY: \"Is there any chance you are pregnant?\" \"When was your last menstrual period?\"      n/a    Protocols used: EAR - [id number]      "

## 2022-04-21 ENCOUNTER — ANCILLARY PROCEDURE (OUTPATIENT)
Dept: CARDIOLOGY | Facility: CLINIC | Age: 62
End: 2022-04-21
Attending: INTERNAL MEDICINE
Payer: COMMERCIAL

## 2022-04-21 DIAGNOSIS — I38 VALVULAR HEART DISEASE: ICD-10-CM

## 2022-04-21 LAB — LVEF ECHO: NORMAL

## 2022-04-21 PROCEDURE — 93306 TTE W/DOPPLER COMPLETE: CPT | Performed by: STUDENT IN AN ORGANIZED HEALTH CARE EDUCATION/TRAINING PROGRAM

## 2022-04-25 ENCOUNTER — DOCUMENTATION ONLY (OUTPATIENT)
Dept: LAB | Facility: CLINIC | Age: 62
End: 2022-04-25
Payer: COMMERCIAL

## 2022-04-25 NOTE — PROGRESS NOTES
Hi, patient has lab appointment on 04/27/2022 with no orders. Can you please place orders for appointment.    Thank You Sherri IRBY

## 2022-04-27 ENCOUNTER — INFUSION THERAPY VISIT (OUTPATIENT)
Dept: INFUSION THERAPY | Facility: CLINIC | Age: 62
End: 2022-04-27
Payer: COMMERCIAL

## 2022-04-27 VITALS
HEART RATE: 75 BPM | RESPIRATION RATE: 18 BRPM | DIASTOLIC BLOOD PRESSURE: 62 MMHG | OXYGEN SATURATION: 95 % | SYSTOLIC BLOOD PRESSURE: 134 MMHG | WEIGHT: 167.3 LBS | BODY MASS INDEX: 30.6 KG/M2 | TEMPERATURE: 98.1 F

## 2022-04-27 DIAGNOSIS — M06.09 RHEUMATOID ARTHRITIS OF MULTIPLE SITES WITH NEGATIVE RHEUMATOID FACTOR (H): Primary | ICD-10-CM

## 2022-04-27 PROCEDURE — 96365 THER/PROPH/DIAG IV INF INIT: CPT | Performed by: NURSE PRACTITIONER

## 2022-04-27 PROCEDURE — 99207 PR NO CHARGE LOS: CPT

## 2022-04-27 RX ORDER — HEPARIN SODIUM (PORCINE) LOCK FLUSH IV SOLN 100 UNIT/ML 100 UNIT/ML
5 SOLUTION INTRAVENOUS
Status: CANCELLED | OUTPATIENT
Start: 2022-05-25

## 2022-04-27 RX ORDER — HEPARIN SODIUM,PORCINE 10 UNIT/ML
5 VIAL (ML) INTRAVENOUS
Status: CANCELLED | OUTPATIENT
Start: 2022-05-25

## 2022-04-27 NOTE — PROGRESS NOTES
Infusion Nursing Note:  Betsey Vanessa presents today for Orencia    Patient seen by provider today: No   present during visit today: Not Applicable.    Note: Pt denies any changes since her last infusion, tolerating Orencia. Will treat as ordered.       Intravenous Access:  Peripheral IV placed - Left hand 245 gauge IV placed after 1 attempt.    Treatment Conditions:  Biological Infusion Checklist:  ~~~ NOTE: If the patient answers yes to any of the questions below, hold the infusion and contact ordering provider or on-call provider.    1. Have you recently had an elevated temperature, fever, chills, productive cough, coughing for 3 weeks or longer or hemoptysis, abnormal vital signs, night sweats,  chest pain or have you noticed a decrease in your appetite, unexplained weight loss or fatigue? No  2. Do you have any open wounds or new incisions? No  3. Do you have any recent or upcoming hospitalizations, surgeries or dental procedures? Yes, monday dentist for check up  4. Do you currently have or recently have had any signs of illness or infection or are you on any antibiotics? No  5. Have you had any new, sudden or worsening abdominal pain? No  6. Have you or anyone in your household received a live vaccination in the past 4 weeks? Please note:  No live vaccines while on biologic/chemotherapy until 6 months after the last treatment.  Patient can receive the flu vaccine (shot only) and the pneumovax.  It is optimal for the patient to get these vaccines mid cycle, but they can be given at any time as long as it is not on the day of the infusion. No  7. Have you recently been diagnosed with any new nervous system diseases (ie. Multiple sclerosis, Guillain Delano, seizures, neurological changes) or cancer diagnosis? No  8. Are you on any form of radiation or chemotherapy? No  9. Are you pregnant or breast feeding or do you have plans of pregnancy in the future? No  10. Have there been any other new onset  medical symptoms? No        Post Infusion Assessment:  Patient tolerated infusion without incident.  Blood return noted pre and post infusion.  Site patent and intact, free from redness, edema or discomfort.  No evidence of extravasations.  Access discontinued per protocol.  Biologic Infusion Post Education: Call the triage nurse at your clinic or seek medical attention if you have chills and/or temperature greater than or equal to 100.5, uncontrolled nausea/vomiting, diarrhea, constipation, dizziness, shortness of breath, chest pain, heart palpitations, weakness or any other new or concerning symptoms, questions or concerns.  You cannot have any live virus vaccines prior to or during treatment or up to 6 months post infusion.  If you have an upcoming surgery, medical procedure or dental procedure during treatment, this should be discussed with your ordering physician and your surgeon/dentist.  If you are having any concerning symptom, if you are unsure if you should get your next infusion or wish to speak to a provider before your next infusion, please call your care coordinator or triage nurse at your clinic to notify them so we can adequately serve you.       Discharge Plan:   Return with Dr Terrence Carrillo on 05/04/2022, Orencia infusion on 05/23/2022.  Discharge instructions reviewed with: Patient.  Patient and/or family verbalized understanding of discharge instructions and all questions answered.  Patient discharged in stable condition accompanied by: self.  Departure Mode: Ambulatory.      Rosina Rios RN

## 2022-05-04 ENCOUNTER — OFFICE VISIT (OUTPATIENT)
Dept: RHEUMATOLOGY | Facility: CLINIC | Age: 62
End: 2022-05-04
Payer: COMMERCIAL

## 2022-05-04 VITALS — SYSTOLIC BLOOD PRESSURE: 115 MMHG | HEART RATE: 79 BPM | DIASTOLIC BLOOD PRESSURE: 55 MMHG | OXYGEN SATURATION: 95 %

## 2022-05-04 DIAGNOSIS — M85.89 OSTEOPENIA OF MULTIPLE SITES: Primary | ICD-10-CM

## 2022-05-04 DIAGNOSIS — M06.09 RHEUMATOID ARTHRITIS OF MULTIPLE SITES WITH NEGATIVE RHEUMATOID FACTOR (H): ICD-10-CM

## 2022-05-04 DIAGNOSIS — M35.01 SJOGREN'S SYNDROME WITH KERATOCONJUNCTIVITIS SICCA (H): ICD-10-CM

## 2022-05-04 PROCEDURE — 99214 OFFICE O/P EST MOD 30 MIN: CPT | Performed by: INTERNAL MEDICINE

## 2022-05-04 RX ORDER — CELECOXIB 100 MG/1
100 CAPSULE ORAL 2 TIMES DAILY
Qty: 180 CAPSULE | Refills: 2 | Status: SHIPPED | OUTPATIENT
Start: 2022-05-04 | End: 2022-12-05

## 2022-05-04 RX ORDER — CEVIMELINE HYDROCHLORIDE 30 MG/1
30 CAPSULE ORAL 3 TIMES DAILY
Qty: 270 CAPSULE | Refills: 2 | Status: SHIPPED | OUTPATIENT
Start: 2022-05-04 | End: 2022-12-05

## 2022-05-04 NOTE — PATIENT INSTRUCTIONS
RHEUMATOLOGY    Dr. Terrence Carrillo    [facility] [place]  [address] [place] [address] [place] [address]  Phone number: [phone]  Fax number: [phone]      Thank you for choosing [facility]!    Madeline Cheung CMA Rheumatology    -----------------------------------------    COVID-19 vaccination:    A 4th dose (1st booster) of the mRNA COVID-19 vaccination is recommended to be received 3 months after the third mRNA COVID-19 vaccination was received.  A 5th mRNA vaccine (2nd booster) may be received at least 4 months after the 4th dose.     Based on our current understanding (and this may change over time as we learn more), medications should be adjusted as noted below, if disease activity allows:  - NSAIDs and Acetaminophen: hold for 24 hours prior to vaccination if able to do so  - Orencia (abatacept) intravenous (IV): time the COVID19 vaccination so that it occurs 1 week prior to the next dose of IV Orencia

## 2022-05-04 NOTE — PROGRESS NOTES
"  Rheumatology Clinic Visit      Betsey Vanessa MRN# [id number]   YOB: 1960 Age: 61 year old      Date of visit: 5/04/22   PCP: Dr. Velma Ibaenz    Chief Complaint   Patient presents with:  Rheumatoid Arthritis: Some days are bad with pain and stiffness    Assessment and Plan     1.  Rheumatoid arthritis: Reportedly diagnosed \"many years ago\", when she first tablet care with me in 2020.  Previously treated by Dr. Stacy at [facility] in [place], [place]. Previously on methotrexate + rituximab, Enbrel + leflunomide; had elevated liver enzymes preventing oral DMARDs; then with Actemra 8 mg/kg IV monthly that was effective but was found to be associated with thrombocytopenia and the patient reported not much benefit from medication.   ALEGRIA hx prevents several oral DMARDs and LFT elevations with MTX in the past.  Therefore, changed to Orencia with the first dose on 5/7/2020 and has been doing well with regard to rheumatoid arthritis.   RA is controlled at this time.   Chronic illness, stable.    - Continue orencia 750mg IV every 4 weeks   - Continue hydroxychloroquine 200 mg daily with an additional 200 mg every other day.  Refill by MD only.  Eye exam overdue  - Continue Celebrex 100 mg BID   - Ophthalmology referral for hydroxychloroquine toxicity monitoring given previously and she says that this will be completed.  This is required for future refills of hydroxychloroquine.  - Labs in 3 months: CBC, Creatinine, Hepatic Panel, ESR, CRP    2. Sjogren's Syndrome: reportedly had punctal plugs in the past and reportedly ducts are now cauterized.  Doing okay with artificial tears, Xiidra, and Evoxac.   Encouraged frequent sips of water, visits with a dentist at least every 6months, avoidance of sugary foods/drinks.  She is also going to see ophthalmology soon.  Chronic illness, stable.     - Continue xiidra  - Continue cevimeline 30mg TID    3. Chronic pain syndrome: Managed at the " pain clinic.  Encouraged increased frequency of low impact physical activities such as walking or using a stationary bike.  Exam today is most consistent with chronic pain syndrome, not active inflammatory arthritis    4. Osteopenia: based on 7/2019 DEXA report that she brought with her; FRAX score shows a 22% risk of major osteoporotic fracture in the next 10 years and a 1.9% risk for osteoporotic hip fracture in the next 10 years.  Based on FRAX tx is indicated.  She already received one dose of boniva in Sept 2019; and failed alendronate due to GI upset.  Reclast received 2/13/2020, 3/16/2021, 3/30/22; plan to receive once more in 2023 before changing to prolia or taking a drug holiday.   Need updated DEXA.   Chronic illness, stable.    - Continue calcium 1000mg daily  - Continue vitamin D 50,000 IU once weekly  - Continue Reclast yearly, next due in March 2023  - DEXA ordered; advised that she call to schedule  - Labs in 3 months: Vitamin D    5. Numbness and tingling in both distal upper extremities: History of carpal tunnel surgery in the past.  He has already scheduled to have an EMG performed at the direction of her pain management provider.    6.  Vaccinations: Vaccinations reviewed with Ms. Otf.  Risks and benefits of vaccinations were discussed.    - Influenza: up to date  - [id number]: up to date  - [id number]: up to date  - Shingrix: 1 dose received she had a reaction so she does not want a second dose.  - COVID-19: has received the Pfizer COVID-19 vaccine, received 3/19/2021 and 4/9/2021 and 10/12/2021. A 4th dose (1st booster) of the mRNA COVID-19 vaccination is recommended to be received 3 months after the third mRNA COVID-19 vaccination was received.  A 5th mRNA vaccine (2nd booster) may be received at least 4 months after the 4th dose.   Based on our current understanding (and this may change over time as we learn more), medications should be adjusted as noted below, if disease activity  allows:  - NSAIDs and Acetaminophen: hold for 24 hours prior to vaccination if able to do so  - Orencia (abatacept) intravenous (IV): time the COVID19 vaccination so that it occurs 1 week prior to the next dose of IV Orencia     Total minutes spent in evaluation with patient, documentation, , and review of pertinent studies and chart notes: 22      Ms. Vanessa verbalized agreement with and understanding of the rational for the diagnosis and treatment plan.  All questions were answered to best of my ability and the patient's satisfaction. Ms. Vanessa was advised to contact the clinic with any questions that may arise after the clinic visit.      Thank you for involving me in the care of the patient    Return to clinic: 3 months    HPI   Betsey Vanessa is a 61 year old female with a past medical history significant for hypertension, depression, type 2 diabetes, Ménière's disease, and rheumatoid arthritis who is seen in consultation for follow-up of RA.     Today, 5/4/2022: RA is doing well.  Chronic pain syndrome active; clothing causes skin pain, even having the sheets on top of her at night causes pain.  Tolerating Orencia, Celebrex, xiidra, cevimeline, and hydroxychloroquine well.  No stomach upset.  No black or bloody stools.  States that she will schedule an eye exam for hydroxychloroquine toxicity monitoring.    Tobacco: Quit smoking in 1998  EtOH: None  Drugs: None    ROS   12 point review of system was completed and negative except as noted in the HPI     Active Problem List     Patient Active Problem List   Diagnosis     Type 2 diabetes mellitus with hyperglycemia, without long-term current use of insulin (H)     Hypertension goal BP (blood pressure) < 140/90     Meniere's disease, unspecified laterality     Rheumatoid arthritis, involving unspecified site, unspecified rheumatoid factor presence     Valvular heart disease     Fibromyalgia     Mild major depression (H)     Osteopenia of multiple sites      History of bisphosphonate therapy     Post-menopausal     Benzodiazepine dependence, episodic (H)     Continuous opioid dependence (H)     Mitral stenosis     Mixed conductive and sensorineural hearing loss of right ear     Polyarticular arthritis     Encounter for long-term use of opiate analgesic     Chronic pain     Tympanic membrane perforation     Myofascial pain     Migraine with vertigo     Gastroesophageal reflux disease, esophagitis presence not specified     Fungal skin infection     Anaphylaxis, sequela     Hyperlipidemia, unspecified hyperlipidemia type     Past Medical History     Past Medical History:   Diagnosis Date     Anemia     r/t menses     Anxiety      Arthritis      Constipation      Depression      Diabetes (H)      Diarrhea      Double vision      Headache(784.0)      Hearing loss      Heart murmur      Heartburn      Hypertension      Indigestion      Nasal congestion      Night sweats      Numbness      Problems related to lack of adequate sleep      Rash      Sneezing      Tinnitus      Visual loss      Weakness      Weight gain     because of Prednisone     Past Surgical History     Past Surgical History:   Procedure Laterality Date     ------------OTHER-------------      D&C     HYSTERECTOMY       INNER EAR SURGERY       RELEASE CARPAL TUNNEL BILATERAL Bilateral 2008     Allergy     Allergies   Allergen Reactions     Duloxetine Other (See Comments)     Topiramate Other (See Comments)     Other reaction(s): Confusion     Amitriptyline Embonate [Amitriptyline]      Hyper activity     Azithromycin Hives     Cymbalta      Hyper activity     Gabapentin Hives     Hmg-Coa-R Inhibitors Other (See Comments)     Liver function studies abnormal on Lipitor / Crestor     Macrobid [Nitrofurantoin] Hives     Paxil [Paroxetine] Other (See Comments)     Hyper behavior     Penicillins Hives     Tetracycline Hives     Current Medication List     Current Outpatient Medications   Medication Sig      celecoxib (CELEBREX) 100 MG capsule Take 1 capsule (100 mg) by mouth 2 times daily     cevimeline (EVOXAC) 30 MG capsule Take 1 capsule (30 mg) by mouth 3 times daily     clonazePAM (KLONOPIN) 0.5 MG tablet TAKE 1 TABLET BY MOUTH TWICE DAILY AS NEEDED FOR ANXIETY OK TO TAKE 1 TO 2 AT NIGHT AS NEEDED FOR SLEEP. SHOULD LAST ABOUT 90 DAYS     diclofenac (VOLTAREN) 1 % topical gel Place 2-4 g onto the skin 4 times daily . Max of 8g per dose. No more than 32g/day     docusate sodium (COLACE) 100 MG capsule Take 1 capsule (100 mg) by mouth 2 times daily     escitalopram (LEXAPRO) 20 MG tablet Take 1 tablet (20 mg) by mouth daily     fentaNYL (DURAGESIC) 12 mcg/hr 72 hr patch Place 1 patch onto the skin every 72 hours remove old patch. Fill 05/02/22 to start 05/03/22     fluorometholone (FML LIQUIFILM) 0.1 % ophthalmic suspension INSTILL 1 DROP INTO EACH EYE IN THE MORNING     fluticasone (FLONASE) 50 MCG/ACT nasal spray Spray 1-2 sprays into both nostrils daily     fluvastatin (LESCOL) 20 MG capsule Take 1 capsule by mouth at bedtime     glimepiride (AMARYL) 2 MG tablet Take 1 tablet (2 mg) by mouth every morning (before breakfast) +++NEED RECHECK+++     HYDROcodone-acetaminophen (NORCO) 7.5-325 MG per tablet Take 0.5-1 tablets by mouth every 6 hours as needed for severe pain . Fill 05/02/22 to start on/after 05/03/22     hydroxychloroquine (PLAQUENIL) 200 MG tablet Hydroxychloroquine 200mg daily; and an additional 200mg every other day. Yearly eye exam including 10-2 VF and SD-OCT     hydrOXYzine (ATARAX) 25 MG tablet Take 1 tablet (25 mg) by mouth 3 times daily as needed for itching     lifitegrast (XIIDRA) 5 % opthalmic solution Place 1 drop into both eyes 2 times daily     magnesium 250 MG tablet Take 1 tablet by mouth daily     medical cannabis (Patient's own supply) Take 1 Dose by mouth See Admin Instructions (The purpose of this order is to document that the patient reports taking medical cannabis.  This is not a  prescription, and is not used to certify that the patient has a qualifying medical condition.)     metFORMIN (GLUCOPHAGE) 500 MG tablet Take 2 tablets (1,000 mg) by mouth 2 times daily (with meals) +++NEED LABS IN SEPT.+++1000 mg 2 x a day     metroNIDAZOLE (METROGEL) 0.75 % external gel Apply topically 2 times daily Apply to face daily     naloxone (NARCAN) 4 MG/0.1ML nasal spray Spray 1 spray (4 mg) into one nostril alternating nostrils as needed for opioid reversal every 2-3 minutes until assistance arrives     nystatin (MYCOSTATIN) 705679 UNIT/GM external powder Apply topically 2 times daily as needed 2 x daily PRN for rash     omeprazole (PRILOSEC) 20 MG DR capsule Take 1 capsule (20 mg) by mouth 2 times daily     pimecrolimus (ELIDEL) 1 % external cream Apply topically 2 times daily - use on back of neck and hands     potassium chloride ER (KLOR-CON M) 10 MEQ CR tablet Take 1 tablet (10 mEq) by mouth 2 times daily     PROAIR  (90 Base) MCG/ACT inhaler Inhale 1-2 puffs into the lungs every 4 hours as needed for shortness of breath / dyspnea or wheezing     promethazine (PHENERGAN) 25 MG suppository Place 25 mg rectally every 6 hours as needed For nusea     promethazine (PHENERGAN) 25 MG tablet Take 1 tablet (25 mg) by mouth every 6 hours as needed for nausea or other (Meniere's) For nusea     psyllium (METAMUCIL) 58.6 % POWD Take by mouth daily PRN     triamterene-HCTZ (DYAZIDE) 37.5-25 MG capsule Take 1 capsule by mouth daily     vitamin D3 (CHOLECALCIFEROL) 50 mcg (2000 units) tablet Take 2 tablets (100 mcg) by mouth daily     alcohol swab prep pads Use to swab area of injection/rosie as directed     blood glucose (NO BRAND SPECIFIED) test strip Use to test blood sugar 2 times daily or as directed. To accompany: Blood Glucose Monitor Brands: per insurance.     blood glucose calibration (NO BRAND SPECIFIED) solution Use to calibrate blood glucose monitor as needed as directed. To accompany: Blood  Glucose Monitor Brands: per insurance.     EPINEPHrine (ANY BX GENERIC EQUIV) 0.3 MG/0.3ML injection 2-pack Inject 0.3 mLs (0.3 mg) into the muscle as needed for anaphylaxis (Patient not taking: No sig reported)     EPINEPHrine 0.3 MG/0.3ML SOLN PRN for Anaphylaxis (Patient not taking: No sig reported)     EQ ALLERGY RELIEF, CETIRIZINE, 10 MG tablet Take 1 tablet by mouth once daily     glucose blood VI test strips strip daily     metFORMIN (GLUCOPHAGE) 500 MG tablet Take 2 tablets (1,000 mg) by mouth 2 times daily (with meals) (Patient not taking: Reported on 5/4/2022)     STATIN NOT PRESCRIBED (INTENTIONAL) Please choose reason not prescribed, below     thin (NO BRAND SPECIFIED) lancets Use to test blood sugar 2 times daily or as directed. To accompany: Blood Glucose Monitor Brands: per insurance.     No current facility-administered medications for this visit.         Social History   See HPI    Family History     Family History   Problem Relation Age of Onset     C.A.D. Mother      Alzheimer Disease Mother      Cardiovascular Mother      Eye Disorder Mother      Thyroid Disease Mother      Hypertension Other         grandmother     Cerebrovascular Disease Father      Alcohol/Drug Father      Depression Father      Obesity Father         aunt     Asthma Father      Alcohol/Drug Brother      Cancer Brother      Alcohol/Drug Sister         aunt     Cancer Sister      Allergies Other         All family members     Arthritis Other         aunt     Obesity Sister      Thyroid Disease Sister      Asthma Sister         daughter       Physical Exam     Temp Readings from Last 3 Encounters:   04/27/22 98.1  F (36.7  C) (Oral)   04/15/22 99.4  F (37.4  C) (Tympanic)   03/30/22 98.1  F (36.7  C) (Oral)     BP Readings from Last 5 Encounters:   05/04/22 115/55   04/27/22 134/62   04/15/22 127/69   03/31/22 121/63   03/30/22 130/70     Pulse Readings from Last 1 Encounters:   05/04/22 79     Resp Readings from Last 1  "Encounters:   04/27/22 18     Estimated body mass index is 30.6 kg/m  as calculated from the following:    Height as of 1/19/22: 1.575 m (5' 2\").    Weight as of 4/27/22: 75.9 kg (167 lb 4.8 oz).      GEN: NAD. Healthy appearing adult.   HEENT: MMM.  Anicteric, noninjected sclera. No obvious external lesions of the ear and nose. Hearing intact.  PULM: No increased work of breathing  MSK: MCPs, PIPs, DIPs, wrists, elbows, shoulders, knees, ankles, and MTPs were diffusely tender to palpation but no swelling, increased warmth, or overlying erythema.  All fibromyalgia tender points positive.    SKIN: No rash or jaundice seen  PSYCH: Alert. Appropriate.      Labs / Imaging (select studies)     RF/CCP  Recent Labs   Lab Test 01/14/20  1221   CCPIGG 1   RHF 20*     CBC  Recent Labs   Lab Test 03/30/22  1428 01/14/22  0718 09/17/21  0945 05/21/21  0954 01/19/21  1130 10/28/20  1537   WBC 6.7 7.3 7.7 6.6 6.5 7.1   RBC 4.37 4.42 4.35 4.41 4.46 4.56   HGB 11.5* 11.8 11.8 11.6* 11.8 12.3   HCT 34.9* 36.7 35.3 35.7 36.6 37.5   MCV 80 83 81 81 82 82   RDW 14.0 14.3 14.9 15.3* 14.4 13.7    167 169 160 159 162   MCH 26.3* 26.7 27.1 26.3* 26.5 27.0   MCHC 33.0 32.2 33.4 32.5 32.2 32.8   NEUTROPHIL 63 60 63 53.1 65.0 66.1   LYMPH 28 28 25 36.2 25.3 25.8   MONOCYTE 5 6 7 7.4 5.1 5.1   EOSINOPHIL 4 6 4 3.0 4.0 2.5   BASOPHIL 0 0 0 0.3 0.3 0.1   ANEU  --   --   --  3.5 4.2 4.7   ALYM  --   --   --  2.4 1.6 1.8   MOISE  --   --   --  0.5 0.3 0.4   AEOS  --   --   --  0.2 0.3 0.2   ABAS  --   --   --  0.0 0.0 0.0   ANEUTAUTO 4.2 4.4 4.8  --   --   --    ALYMPAUTO 1.9 2.0 1.9  --   --   --    AMONOAUTO 0.4 0.4 0.6  --   --   --    AEOSAUTO 0.2 0.4 0.3  --   --   --    ABSBASO 0.0 0.0 0.0  --   --   --      [facility]  Recent Labs   Lab Test 03/30/22  1428 02/18/22  0657 01/14/22  0718 09/17/21  0945 09/17/21  0945 05/21/21  0954 03/16/21  1130 01/19/21  1130 01/08/21  1535 10/28/20  1537 04/09/20  1226   NA  --  137  --   --   --   --   --  "  --  135  --  141   POTASSIUM  --  3.8  --   --   --   --   --   --  4.2  --  4.0   CHLORIDE  --  102  --   --   --   --   --   --  99  --  108   CO2  --  27  --   --   --   --   --   --  32  --  27   ANIONGAP  --  8  --   --   --   --   --   --  4  --  6   GLC  --  201*  --   --   --   --   --   --  155*  --  121*   BUN  --  19  --   --   --   --   --   --  16  --  17   CR 0.88 0.87 0.93   < > 0.91 0.92 0.83 0.88 0.80   < > 0.70   GFRESTIMATED 75 76 70   < > 69 68 78 72 81   < > >90   GFRESTBLACK  --   --   --   --   --  79 >90 84 >90   < > >90   MIRNA 9.4 9.1 8.9  --   --   --  10.2* 9.2 9.7   < > 9.1   BILITOTAL 0.4  --  0.3  --  0.4 0.3  --  0.4 0.5   < > 0.4   ALBUMIN 3.9  --  3.9  --  4.1 4.0  --  3.9 4.1   < > 4.0   PROTTOTAL 7.4  --  7.5  --  7.8 7.7  --  7.7 8.2   < > 7.4   ALKPHOS 86  --  97  --  113 83  --  117 100   < > 80   AST 26  --  18  --  24 20  --  23 23   < > 36   ALT 44  --  32  --  41 35  --  36 45   < > 52*    < > = values in this interval not displayed.     Calcium/VitaminD  Recent Labs   Lab Test 03/30/22  1428 02/18/22  0657 01/14/22  0718 05/21/21  0954 03/16/21  1130 01/19/21  1130   MIRNA 9.4 9.1 8.9  --    < > 9.2   VITDT  --   --  34 39  --  25    < > = values in this interval not displayed.     ESR/CRP  Recent Labs   Lab Test 03/30/22  1428 01/14/22  0718 09/17/21  0945   SED 20 19 19   CRP <2.9 5.1 4.4     Hepatitis B  Recent Labs   Lab Test 01/14/20  1221   HBCAB Nonreactive   HEPBANG Nonreactive     Hepatitis C  Recent Labs   Lab Test 01/14/20  1221   HCVAB Nonreactive     Lyme ab screening  Recent Labs   Lab Test 01/14/20  1221   LYMEGM 0.05       Tuberculosis Screening  Recent Labs   Lab Test 09/17/21  0945 01/14/20  1221   TBRES Negative Negative     HIV Screening  Recent Labs   Lab Test 08/14/20  0736   HIAGAB Nonreactive     CareEverywhere [facility]   7/18/2013 CCP negative  8/5/2010 CCP negative  3/27/2013 hepatitis B surface antigen negative and hepatitis B core  antibody negative  9/2/2011 hepatitis B surface antigen negative and hepatitis B core antibody negative  3/27/2013 hepatitis C antibody negative    Immunization History     Immunization History   Administered Date(s) Administered     COVID-19,PF,Pfizer (12+ Yrs) 03/19/2021, 04/09/2021, 10/12/2021     FLU 6-35 months 10/22/2013     Influenza Quad, Recombinant, pf(RIV4) (Flublok) 10/12/2021     Influenza Vaccine IM > 6 months Valent IIV4 (Alfuria,Fluzone) 10/21/2015, 10/05/2016, 10/11/2018, 09/25/2019, 09/24/2020     Influenza Vaccine, 6+MO IM (QUADRIVALENT W/PRESERVATIVES) 09/18/2014, 10/03/2017     Pneumo Conj 13-V (2010&after) 10/11/2018     Pneumococcal 23 valent 12/09/2020     TD (ADULT, 7+) 04/20/1997, 04/17/2019     TDAP Vaccine (Adacel) 01/15/2009     Zoster vaccine recombinant adjuvanted (SHINGRIX) 10/11/2018          Chart documentation done in part with Dragon Voice recognition Software. Although reviewed after completion, some word and grammatical error may remain.    Terrence Carrillo MD

## 2022-05-16 DIAGNOSIS — E78.5 HYPERLIPIDEMIA, UNSPECIFIED HYPERLIPIDEMIA TYPE: ICD-10-CM

## 2022-05-17 RX ORDER — FLUVASTATIN 20 MG/1
CAPSULE ORAL
Qty: 90 CAPSULE | Refills: 0 | Status: SHIPPED | OUTPATIENT
Start: 2022-05-17 | End: 2022-08-19

## 2022-05-18 NOTE — TELEPHONE ENCOUNTER
Prescription approved per [facility] Refill Protocol.  Klaudia Brown RN

## 2022-05-23 ENCOUNTER — INFUSION THERAPY VISIT (OUTPATIENT)
Dept: INFUSION THERAPY | Facility: CLINIC | Age: 62
End: 2022-05-23
Payer: COMMERCIAL

## 2022-05-23 VITALS
WEIGHT: 169.1 LBS | OXYGEN SATURATION: 97 % | DIASTOLIC BLOOD PRESSURE: 63 MMHG | RESPIRATION RATE: 18 BRPM | HEART RATE: 90 BPM | BODY MASS INDEX: 30.93 KG/M2 | SYSTOLIC BLOOD PRESSURE: 117 MMHG | TEMPERATURE: 98 F

## 2022-05-23 DIAGNOSIS — M06.09 RHEUMATOID ARTHRITIS OF MULTIPLE SITES WITH NEGATIVE RHEUMATOID FACTOR (H): Primary | ICD-10-CM

## 2022-05-23 PROCEDURE — 96365 THER/PROPH/DIAG IV INF INIT: CPT | Performed by: NURSE PRACTITIONER

## 2022-05-23 PROCEDURE — 99207 PR NO CHARGE LOS: CPT

## 2022-05-23 RX ORDER — HEPARIN SODIUM (PORCINE) LOCK FLUSH IV SOLN 100 UNIT/ML 100 UNIT/ML
5 SOLUTION INTRAVENOUS
Status: CANCELLED | OUTPATIENT
Start: 2022-05-25

## 2022-05-23 RX ORDER — HEPARIN SODIUM,PORCINE 10 UNIT/ML
5 VIAL (ML) INTRAVENOUS
Status: CANCELLED | OUTPATIENT
Start: 2022-05-25

## 2022-05-23 NOTE — PROGRESS NOTES
Infusion Nursing Note:  Betsey Vanessa presents today for Orencia.    Patient seen by provider today: No   present during visit today: Not Applicable.    Note: Patient has had Orencia infusion in the past without any complications. She has no new concerns today. Says she felt really sick Friday having nausea and vomiting. She says her dizziness goes hand in hand with getting nauesous and throws up takes. She does have and take prn medications.   Intravenous Access:  Peripheral IV placed.    Treatment Conditions:  Biological Infusion Checklist:  ~~~ NOTE: If the patient answers yes to any of the questions below, hold the infusion and contact ordering provider or on-call provider.    1. Have you recently had an elevated temperature, fever, chills, productive cough, coughing for 3 weeks or longer or hemoptysis, abnormal vital signs, night sweats,  chest pain or have you noticed a decrease in your appetite, unexplained weight loss or fatigue? No  2. Do you have any open wounds or new incisions? No  3. Do you have any recent or upcoming hospitalizations, surgeries or dental procedures? No  4. Do you currently have or recently have had any signs of illness or infection or are you on any antibiotics? No  5. Have you had any new, sudden or worsening abdominal pain? No  6. Have you or anyone in your household received a live vaccination in the past 4 weeks? Please note:  No live vaccines while on biologic/chemotherapy until 6 months after the last treatment.  Patient can receive the flu vaccine (shot only) and the pneumovax.  It is optimal for the patient to get these vaccines mid cycle, but they can be given at any time as long as it is not on the day of the infusion. No  7. Have you recently been diagnosed with any new nervous system diseases (ie. Multiple sclerosis, Guillain [facility], seizures, neurological changes) or cancer diagnosis? No  8. Are you on any form of radiation or chemotherapy? No  9. Are you  pregnant or breast feeding or do you have plans of pregnancy in the future? No  10. Have there been any other new onset medical symptoms? No        Post Infusion Assessment:  Patient tolerated infusion without incident.  Site patent and intact, free from redness, edema or discomfort.  No evidence of extravasations.  Access discontinued per protocol.       Discharge Plan:   AVS to patient via MYCHART.  Patient will return 6/20 for next appointment.   Patient discharged in stable condition accompanied by: self.  Departure Mode: Ambulatory.      Rosina Rios RN

## 2022-05-26 ENCOUNTER — LAB (OUTPATIENT)
Dept: URGENT CARE | Facility: URGENT CARE | Age: 62
End: 2022-05-26
Payer: COMMERCIAL

## 2022-05-26 DIAGNOSIS — E11.65 TYPE 2 DIABETES MELLITUS WITH HYPERGLYCEMIA, WITHOUT LONG-TERM CURRENT USE OF INSULIN (H): ICD-10-CM

## 2022-05-26 DIAGNOSIS — Z20.822 SUSPECTED COVID-19 VIRUS INFECTION: ICD-10-CM

## 2022-05-26 PROCEDURE — U0003 INFECTIOUS AGENT DETECTION BY NUCLEIC ACID (DNA OR RNA); SEVERE ACUTE RESPIRATORY SYNDROME CORONAVIRUS 2 (SARS-COV-2) (CORONAVIRUS DISEASE [COVID-19]), AMPLIFIED PROBE TECHNIQUE, MAKING USE OF HIGH THROUGHPUT TECHNOLOGIES AS DESCRIBED BY CMS-2020-01-R: HCPCS

## 2022-05-26 PROCEDURE — U0005 INFEC AGEN DETEC AMPLI PROBE: HCPCS

## 2022-05-27 LAB — SARS-COV-2 RNA RESP QL NAA+PROBE: NEGATIVE

## 2022-05-27 RX ORDER — GLIMEPIRIDE 2 MG/1
2 TABLET ORAL
Qty: 90 TABLET | Refills: 0 | Status: SHIPPED | OUTPATIENT
Start: 2022-05-27 | End: 2022-08-19

## 2022-05-27 NOTE — TELEPHONE ENCOUNTER
Prescription approved per [facility] Refill Protocol.  Klaudia Brown RN

## 2022-06-01 DIAGNOSIS — M06.09 RHEUMATOID ARTHRITIS OF MULTIPLE SITES WITHOUT RHEUMATOID FACTOR (H): ICD-10-CM

## 2022-06-01 DIAGNOSIS — G89.4 CHRONIC PAIN SYNDROME: ICD-10-CM

## 2022-06-01 DIAGNOSIS — F11.20 CONTINUOUS OPIOID DEPENDENCE (H): ICD-10-CM

## 2022-06-01 NOTE — TELEPHONE ENCOUNTER
Received call from patient requesting refill(s) of      fentaNYL (DURAGESIC) 12 mcg/hr 72 hr patch  Last dispensed from pharmacy on 05/02/22     HYDROcodone-acetaminophen (NORCO) 7.5-325 MG per tablet  Last dispensed from pharmacy on 05/02/22    Patient's last office/virtual visit by prescribing provider on 01/06/22  Next office/virtual appointment scheduled for 06/03/22    Last urine drug screen date 01/06/22  Current opioid agreement on file (completed within the last year) Yes Date of opioid agreement: 01/06/22    E-prescribe to   [id number]  [place] - [address]  [address] [place] [address]  Phone: [phone] Fax: [phone]    Will route to nursing [place] for review and preparation of prescription(s).       Lisette Pabon MA  [facility]

## 2022-06-01 NOTE — TELEPHONE ENCOUNTER
Reason for call:  Medication   If this is a refill request, has the caller requested the refill from the pharmacy already? No  Will the patient be using a [facility]? No  Name of the pharmacy and phone number for the current request: [id number] - [place], [place] - [address]     Name of the medication requested:   fentaNYL (DURAGESIC) 12 mcg/hr 72 hr patch     HYDROcodone-acetaminophen (NORCO) 7.5-325 MG per tablet     Other request: none     Phone number to reach patient:  Home number on file [phone] (home)     Best Time:  anytime     Can we leave a detailed message on this number?  YES     Travel screening: Not Applicable

## 2022-06-02 RX ORDER — HYDROCODONE BITARTRATE AND ACETAMINOPHEN 7.5; 325 MG/1; MG/1
.5-1 TABLET ORAL EVERY 6 HOURS PRN
Qty: 15 TABLET | Refills: 0 | Status: SHIPPED | OUTPATIENT
Start: 2022-06-02 | End: 2022-06-03

## 2022-06-02 RX ORDER — FENTANYL 12.5 UG/1
1 PATCH TRANSDERMAL
Qty: 5 PATCH | Refills: 0 | Status: SHIPPED | OUTPATIENT
Start: 2022-06-02 | End: 2022-06-03

## 2022-06-02 NOTE — TELEPHONE ENCOUNTER
Signed Prescriptions:                        Disp   Refills    fentaNYL (DURAGESIC) 12 mcg/hr 72 hr patch 5 patch0        Sig: Place 1 patch onto the skin every 72 hours remove old           patch. Fill and begin 6/2/22. 15 day script  Authorizing Provider: KLAUDIA SHORE    HYDROcodone-acetaminophen (NORCO) 7.5-325 *15 tab*0        Sig: Take 0.5-1 tablets by mouth every 6 hours as needed           for severe pain . Fill & start on/after 06/02/22           to last 15 days.  Authorizing Provider: KLAUDIA SHORE    Reviewed [place]  June 2, 2022- no concerning fills.  Patient has an appointment with me tomorrow 6/3/2022 but needs to begin these meds today. I am only giving a 15 day supply of each as she has not been seen in the clinic since January 2022.   Klaudia BARNETT, RN CNP, FNP  [facility]  [facility]

## 2022-06-02 NOTE — TELEPHONE ENCOUNTER
Medication refill information reviewed.   Patient's last office/virtual visit by prescribing provider on 01/06/22  Next office/virtual appointment scheduled for 06/03/22    Due date for fentaNYL (DURAGESIC) 12 mcg/hr 72 hr patch  Last dispensed from pharmacy on 05/02/22      HYDROcodone-acetaminophen (NORCO) 7.5-325 MG per tablet  Last dispensed from pharmacy on 05/02/22      is 06/02/22- recommend considering waiting until appointment tomorrow 6/3/22 since last seen 1/6/22    Prescriptions prepped for review.     Will route to provider,     Felicitas Brito RN, BSN, CMSRN  RN Care Coordinator  [facility]

## 2022-06-03 ENCOUNTER — OFFICE VISIT (OUTPATIENT)
Dept: PALLIATIVE MEDICINE | Facility: CLINIC | Age: 62
End: 2022-06-03
Payer: COMMERCIAL

## 2022-06-03 VITALS — SYSTOLIC BLOOD PRESSURE: 138 MMHG | HEART RATE: 91 BPM | DIASTOLIC BLOOD PRESSURE: 50 MMHG

## 2022-06-03 DIAGNOSIS — M79.642 BILATERAL HAND PAIN: ICD-10-CM

## 2022-06-03 DIAGNOSIS — M06.09 RHEUMATOID ARTHRITIS OF MULTIPLE SITES WITHOUT RHEUMATOID FACTOR (H): ICD-10-CM

## 2022-06-03 DIAGNOSIS — F11.20 CONTINUOUS OPIOID DEPENDENCE (H): ICD-10-CM

## 2022-06-03 DIAGNOSIS — M25.50 MULTIPLE JOINT PAIN: ICD-10-CM

## 2022-06-03 DIAGNOSIS — M79.641 BILATERAL HAND PAIN: ICD-10-CM

## 2022-06-03 DIAGNOSIS — M47.816 SPONDYLOSIS OF LUMBAR REGION WITHOUT MYELOPATHY OR RADICULOPATHY: ICD-10-CM

## 2022-06-03 DIAGNOSIS — M54.59 LUMBAR FACET JOINT PAIN: Primary | ICD-10-CM

## 2022-06-03 DIAGNOSIS — G89.4 CHRONIC PAIN SYNDROME: ICD-10-CM

## 2022-06-03 PROCEDURE — 99215 OFFICE O/P EST HI 40 MIN: CPT | Performed by: NURSE PRACTITIONER

## 2022-06-03 RX ORDER — FENTANYL 12.5 UG/1
1 PATCH TRANSDERMAL
Qty: 10 PATCH | Refills: 0 | Status: SHIPPED | OUTPATIENT
Start: 2022-06-03 | End: 2022-06-28

## 2022-06-03 RX ORDER — FLUTICASONE PROPIONATE 50 MCG
1 SPRAY, SUSPENSION (ML) NASAL DAILY
COMMUNITY
End: 2022-06-03

## 2022-06-03 RX ORDER — HYDROCODONE BITARTRATE AND ACETAMINOPHEN 7.5; 325 MG/1; MG/1
.5-1 TABLET ORAL EVERY 6 HOURS PRN
Qty: 45 TABLET | Refills: 0 | Status: SHIPPED | OUTPATIENT
Start: 2022-06-03 | End: 2022-06-28

## 2022-06-03 ASSESSMENT — PAIN SCALES - GENERAL: PAINLEVEL: MODERATE PAIN (5)
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
## 2022-06-03 NOTE — PATIENT INSTRUCTIONS
PLAN:  Physical Therapy: none  Clinical Health Psychologist to address issues of relaxation, behavioral change, coping style, and other factors important to improvement: none  Diagnostic Studies: none  Medication Management:   Continue Fentanyl 12 mcg/hr transdermal patch change every 72 hours, fill/begin 6/3  Slight increase in Norco, 5/325mg, may take 1-2 tabs per day, #45 tabs, so 15 days out of the month may take 2 tabs per day. Fill/begin 6/3/2022  Further procedures recommended: repeat lumbar facet joint steroid injections, our office to contact you  Try using a paraffin wax bath for your hands that are so painful. Often you can find a unit online for $40 to $60   Urine toxicology screen today: none today  Sign CSA with me  Recommendations/follow-up for PCP:  See above  Release of information: none  Follow up: 10-12 weeks in-person. Please call [phone] to make your follow-up appointment with me.     ----------------------------------------------------------------  Clinic Number:  [phone]   Call with any questions about your care and for scheduling assistance.   Calls are returned Monday through Friday between 8 AM and 4:30 PM. We usually get back to you within 2 business days depending on the issue/request.    If we are prescribing your medications:  For opioid medication refills, call the clinic or send a PacerPro message 7 days in advance.  Please include:  Name of requested medication  Name of the pharmacy.  For non-opioid medications, call your pharmacy directly to request a refill. Please allow 3-4 days to be processed.   Per [place] State Law:  All controlled substance prescriptions must be filled within 30 days of being written.    For those controlled substances allowing refills, pickup must occur within 30 days of last fill.      We believe regular attendance is key to your success in our program!    Any time you are unable to keep your appointment we ask that you call us at least 24 hours in  advance to cancel.This will allow us to offer the appointment time to another patient.   Multiple missed appointments may lead to dismissal from the clinic.

## 2022-06-03 NOTE — LETTER
Opioid / Opioid Plus Controlled Substance Agreement    This is an agreement between you and your provider about the safe and appropriate use of controlled substance/opioids prescribed by your care team. Controlled substances are medicines that can cause physical and mental dependence (abuse).    There are strict laws about having and using these medicines. We here at [facility] are committing to working with you in your efforts to get better. To support you in this work, we ll help you schedule regular office appointments for medicine refills. If we must cancel or change your appointment for any reason, we ll make sure you have enough medicine to last until your next appointment.     As a Provider, I will:    Listen carefully to your concerns and treat you with respect.     Recommend a treatment plan that I believe is in your best interest. This plan may involve therapies other than opioid pain medication.     Talk with you often about the possible benefits, and the risk of harm of any medicine that we prescribe for you.     Provide a plan on how to taper (discontinue or go off) using this medicine if the decision is made to stop its use.    As a Patient, I understand that opioid(s):     Are a controlled substance prescribed by my care team to help me function or work and manage my condition(s).     Are strong medicines and can cause serious side effects such as:    Drowsiness, which can seriously affect my driving ability    A lower breathing rate, enough to cause death    Harm to my thinking ability     Depression     Abuse of and addiction to this medicine    Need to be taken exactly as prescribed. Combining opioids with certain medicines or chemicals (such as illegal drugs, sedatives, sleeping pills, and benzodiazepines) can be dangerous or even fatal. If I stop opioids suddenly, I may have severe withdrawal symptoms.    Do not work for all types of pain nor for all patients. If they re not helpful, I may  be asked to stop them.        The risks, benefits and side effects of these medicine(s) were explained to me. I agree that:  1. I will take part in other treatments as advised by my care team. This may be psychiatry or counseling, physical therapy, behavioral therapy, group treatment or a referral to a specialist.     2. I will keep all my appointments. I understand that this is part of the monitoring of opioids. My care team may require an office visit for EVERY opioid/controlled substance refill. If I miss appointments or don t follow instructions, my care team may stop my medicine.    3. I will take my medicines as prescribed. I will not change the dose or schedule unless my care team tells me to. There will be no refills if I run out early.     4. I may be asked to come to the clinic and complete a urine drug test or complete a pill count at any time. If I don t give a urine sample or participate in a pill count, the care team may stop my medicine.    5. I will only receive prescriptions from this clinic for chronic pain. If I am treated by another provider for acute pain issues, I will tell them that I am taking opioid pain medication for chronic pain and that I have a treatment agreement with this provider. I will inform my [facility] care team within one business day if I am given a prescription for any pain medication by another healthcare provider. My [facility] care team can contact other providers and pharmacists about my use of any medicines.    6. It is up to me to make sure that I don t run out of my medicines on weekends or holidays. If my care team is willing to refill my opioid prescription without a visit, I must request refills only during office hours. Refills may take up to 3 business days to process. I will use one pharmacy to fill all my opioid and other controlled substance prescriptions. I will notify the clinic about any changes to my insurance or medication  availability.    7. I am responsible for my prescriptions. If the medicine/prescription is lost, stolen or destroyed, it will not be replaced. I also agree not to share controlled substance medicines with anyone.    8. I am aware I should not use any illegal or recreational drugs. I agree not to drink alcohol unless my care team says I can.       9. If I enroll in the [place] [facility] Cannabis program, I will tell my care team prior to my next refill.     10. I will tell my care team right away if I become pregnant, have a new medical problem treated outside of my regular clinic, or have a change in my medications.    11. I understand that this medicine can affect my thinking, judgment and reaction time. Alcohol and drugs affect the brain and body, which can affect the safety of my driving. Being under the influence of alcohol or drugs can affect my decision-making, behaviors, personal safety, and the safety of others. Driving while impaired (DWI) can occur if a person is driving, operating, or in physical control of a car, motorcycle, boat, snowmobile, ATV, motorbike, off-road vehicle, or any other motor vehicle ([place] Statute 169A.20). I understand the risk if I choose to drive or operate any vehicle or machinery.    I understand that if I do not follow any of the conditions above, my prescriptions or treatment may be stopped or changed.          Opioids  What You Need to Know    What are opioids?   Opioids are pain medicines that must be prescribed by a doctor. They are also known as narcotics.     Examples are:   1. morphine (MS Contin, Janessa)  2. oxycodone (Oxycontin)  3. oxycodone and acetaminophen (Percocet)  4. hydrocodone and acetaminophen (Vicodin, Norco)   5. fentanyl patch (Duragesic)   6. hydromorphone (Dilaudid)   7. methadone  8. codeine (Tylenol #3)     What do opioids do well?   Opioids are best for severe short-term pain such as after a surgery or injury. They may work well for cancer pain. They may  help some people with long-lasting (chronic) pain.     What do opioids NOT do well?   Opioids never get rid of pain entirely, and they don t work well for most patients with chronic pain. Opioids don t reduce swelling, one of the causes of pain.                                    Other ways to manage chronic pain and improve function include:       Treat the health problem that may be causing pain    Anti-inflammation medicines, which reduce swelling and tenderness, such as ibuprofen (Advil, Motrin) or naproxen (Aleve)    Acetaminophen (Tylenol)    Antidepressants and anti-seizure medicines, especially for nerve pain    Topical treatments such as patches or creams    Injections or nerve blocks    Chiropractic or osteopathic treatment    Acupuncture, massage, deep breathing, meditation, visual imagery, aromatherapy    Use heat or ice at the pain site    Physical therapy     Exercise    Stop smoking    Take part in therapy       Risks and side effects     Talk to your doctor before you start or decide to keep taking opioids. Possible side effects include:      Lowering your breathing rate enough to cause death    Overdose, including death, especially if taking higher than prescribed doses    Worse depression symptoms; less pleasure in things you usually enjoy    Feeling tired or sluggish    Slower thoughts or cloudy thinking    Being more sensitive to pain over time; pain is harder to control    Trouble sleeping or restless sleep    Changes in hormone levels (for example, less testosterone)    Changes in sex drive or ability to have sex    Constipation    Unsafe driving    Itching and sweating    Dizziness    Nausea, throwing up and dry mouth    What else should I know about opioids?    Opioids may lead to dependence, tolerance, or addiction.      Dependence means that if you stop or reduce the medicine too quickly, you will have withdrawal symptoms. These include loose poop (diarrhea), jitters, flu-like symptoms,  nervousness and tremors. Dependence is not the same as addiction.                       Tolerance means needing higher doses over time to get the same effect. This may increase the chance of serious side effects.      Addiction is when people improperly use a substance that harms their body, their mind or their relations with others. Use of opiates can cause a relapse of addiction if you have a history of drug or alcohol abuse.      People who have used opioids for a long time may have a lower quality of life, worse depression, higher levels of pain and more visits to doctors.    You can overdose on opioids. Take these steps to lower your risk of overdose:    1. Recognize the signs:  Signs of overdose include decrease or loss of consciousness (blackout), slowed breathing, trouble waking up and blue lips. If someone is worried about overdose, they should call 911.    2. Talk to your doctor about Narcan (naloxone).   If you are at risk for overdose, you may be given a prescription for Narcan. This medicine very quickly reverses the effects of opioids.   If you overdose, a friend or family member can give you Narcan while waiting for the ambulance. They need to know the signs of overdose and how to give Narcan.     3. Don't use alcohol or street drugs.   Taking them with opioids can cause death.    4. Do not take any of these medicines unless your doctor says it s OK. Taking these with opioids can cause death:    Benzodiazepines, such as lorazepam (Ativan), alprazolam (Xanax) or diazepam (Valium)    Muscle relaxers, such as cyclobenzaprine (Flexeril)    Sleeping pills like zolpidem (Ambien)     Other opioids      How to keep you and other people safe while taking opioids:    1. Never share your opioids with others.  Opioid medicines are regulated by the Drug Enforcement Agency ([place]). Selling or sharing medications is a criminal act.    2. Be sure to store opioids in a secure place, locked up if possible. Young children  can easily swallow them and overdose.    3. When you are traveling with your medicines, keep them in the original bottles. If you use a pill box, be sure you also carry a copy of your medicine list from your clinic or pharmacy.    4. Safe disposal of opioids    Most pharmacies have places to get rid of medicine, called disposal kiosks. Medicine disposal options are also available in every [facility]. Search your county and  medication disposal  to find more options. You can find more details at:  [email or web address]-[email or web address]-medications     I agree that my provider, clinic care team, and pharmacy may work with any city, state or federal law enforcement agency that investigates the misuse, sale, or other diversion of my controlled medicine. I will allow my provider to discuss my care with, or share a copy of, this agreement with any other treating provider, pharmacy or emergency room where I receive care.    I have read this agreement and have asked questions about anything I did not understand.    _______________________________________________________  Patient Signature - Betsey Vanessa _____________________                   Date     _______________________________________________________  Provider Signature - ANIBAL Huynh CNP   _____________________                   Date     _______________________________________________________  Witness Signature (required if provider not present while patient signing)   _____________________                   Date

## 2022-06-19 ENCOUNTER — HEALTH MAINTENANCE LETTER (OUTPATIENT)
Age: 62
End: 2022-06-19

## 2022-06-20 ENCOUNTER — INFUSION THERAPY VISIT (OUTPATIENT)
Dept: INFUSION THERAPY | Facility: CLINIC | Age: 62
End: 2022-06-20
Payer: COMMERCIAL

## 2022-06-20 VITALS
DIASTOLIC BLOOD PRESSURE: 71 MMHG | WEIGHT: 168.4 LBS | OXYGEN SATURATION: 97 % | BODY MASS INDEX: 30.8 KG/M2 | SYSTOLIC BLOOD PRESSURE: 136 MMHG | RESPIRATION RATE: 18 BRPM | HEART RATE: 84 BPM | TEMPERATURE: 98.5 F

## 2022-06-20 DIAGNOSIS — M06.09 RHEUMATOID ARTHRITIS OF MULTIPLE SITES WITH NEGATIVE RHEUMATOID FACTOR (H): Primary | ICD-10-CM

## 2022-06-20 PROCEDURE — 96365 THER/PROPH/DIAG IV INF INIT: CPT | Performed by: NURSE PRACTITIONER

## 2022-06-20 PROCEDURE — 99207 PR NO CHARGE LOS: CPT

## 2022-06-20 RX ORDER — HEPARIN SODIUM (PORCINE) LOCK FLUSH IV SOLN 100 UNIT/ML 100 UNIT/ML
5 SOLUTION INTRAVENOUS
Status: CANCELLED | OUTPATIENT
Start: 2022-06-22

## 2022-06-20 RX ORDER — HEPARIN SODIUM,PORCINE 10 UNIT/ML
5 VIAL (ML) INTRAVENOUS
Status: CANCELLED | OUTPATIENT
Start: 2022-06-22

## 2022-06-20 NOTE — PROGRESS NOTES
Infusion Nursing Note:  Betsey Vanessa presents today for  Orencia.    Patient seen by provider today: No   present during visit today: Not Applicable.    Note: N/A    Intravenous Access:  Peripheral IV placed.    Treatment Conditions:  Biological Infusion Checklist:  ~~~ NOTE: If the patient answers yes to any of the questions below, hold the infusion and contact ordering provider or on-call provider.    1. Have you recently had an elevated temperature, fever, chills, productive cough, coughing for 3 weeks or longer or hemoptysis, abnormal vital signs, night sweats,  chest pain or have you noticed a decrease in your appetite, unexplained weight loss or fatigue? No  2. Do you have any open wounds or new incisions? No  3. Do you have any recent or upcoming hospitalizations, surgeries or dental procedures? No  4. Do you currently have or recently have had any signs of illness or infection or are you on any antibiotics? NO  5. Have you had any new, sudden or worsening abdominal pain? No  6. Have you or anyone in your household received a live vaccination in the past 4 weeks? Please note:  No live vaccines while on biologic/chemotherapy until 6 months after the last treatment.  Patient can receive the flu vaccine (shot only) and the pneumovax.  It is optimal for the patient to get these vaccines mid cycle, but they can be given at any time as long as it is not on the day of the infusion. No  7. Have you recently been diagnosed with any new nervous system diseases (ie. Multiple sclerosis, Guillain [place], seizures, neurological changes) or cancer diagnosis? No  8. Are you on any form of radiation or chemotherapy? No  9. Are you pregnant or breast feeding or do you have plans of pregnancy in the future? No  10. Have you been having any signs of worsening depression or suicidal ideations?  (benlysta only) No  11. Have there been any other new onset medical symptoms? No      Post Infusion Assessment:  Patient  tolerated infusion without incident.  Blood return noted pre and post infusion.  Site patent and intact, free from redness, edema or discomfort.  No evidence of extravasations.  Access discontinued per protocol.  Biologic Infusion Post Education: Call the triage nurse at your clinic or seek medical attention if you have chills and/or temperature greater than or equal to 100.5, uncontrolled nausea/vomiting, diarrhea, constipation, dizziness, shortness of breath, chest pain, heart palpitations, weakness or any other new or concerning symptoms, questions or concerns.  You cannot have any live virus vaccines prior to or during treatment or up to 6 months post infusion.  If you have an upcoming surgery, medical procedure or dental procedure during treatment, this should be discussed with your ordering physician and your surgeon/dentist.  If you are having any concerning symptom, if you are unsure if you should get your next infusion or wish to speak to a provider before your next infusion, please call your care coordinator or triage nurse at your clinic to notify them so we can adequately serve you.     Discharge Plan:   Discharge instructions reviewed with: Patient.  Patient and/or family verbalized understanding of discharge instructions and all questions answered.  Patient discharged in stable condition accompanied by: self.  Departure Mode: Ambulatory.      Aurora Schultz RN

## 2022-06-27 DIAGNOSIS — G89.4 CHRONIC PAIN SYNDROME: ICD-10-CM

## 2022-06-27 DIAGNOSIS — M06.09 RHEUMATOID ARTHRITIS OF MULTIPLE SITES WITHOUT RHEUMATOID FACTOR (H): ICD-10-CM

## 2022-06-27 DIAGNOSIS — F11.20 CONTINUOUS OPIOID DEPENDENCE (H): ICD-10-CM

## 2022-06-27 NOTE — TELEPHONE ENCOUNTER
Reason for call:  Medication   If this is a refill request, has the caller requested the refill from the pharmacy already? No  Will the patient be using a [facility]? No  Name of the pharmacy and phone number for the current request: [id number] - [place], [place] - [address]    Name of the medication requested:   fentaNYL (DURAGESIC) 12 mcg/hr 72 hr patch  HYDROcodone-acetaminophen (NORCO) 7.5-325 MG per tablet    Phone number to reach patient:  Home number on file [phone] (home)    Can we leave a detailed message on this number?  YES    Travel screening: Not Applicable        Yoly Castro    [facility] [place]

## 2022-06-27 NOTE — TELEPHONE ENCOUNTER
Received call from patient requesting refill(s) of fentaNYL (DURAGESIC) 12 mcg/hr 72 hr patch   Last dispensed from pharmacy on 06/03/2022    Received call from patient requesting refill(s) of HYDROcodone-acetaminophen (NORCO) 7.5-325 MG per tablet   Last dispensed from pharmacy on 06/03/2022    Patient's last office/virtual visit by prescribing provider on 06/03/2022  Next office/virtual appointment scheduled for 08/19/2022    Last urine drug screen date 01/06/2022  Current opioid agreement on file (completed within the last year) Yes Date of opioid agreement: 06/07/2022    E-prescribe to [facility] [address] [facility] [place], [place] - [address]    Will route to nursing Binf[place] for review and preparation of prescription(s).     Urszula Palacios MA  [facility]

## 2022-06-28 RX ORDER — HYDROCODONE BITARTRATE AND ACETAMINOPHEN 7.5; 325 MG/1; MG/1
.5-1 TABLET ORAL EVERY 6 HOURS PRN
Qty: 45 TABLET | Refills: 0 | Status: SHIPPED | OUTPATIENT
Start: 2022-06-28 | End: 2022-07-28

## 2022-06-28 RX ORDER — FENTANYL 12.5 UG/1
1 PATCH TRANSDERMAL
Qty: 10 PATCH | Refills: 0 | Status: SHIPPED | OUTPATIENT
Start: 2022-06-28 | End: 2022-07-27

## 2022-06-28 NOTE — TELEPHONE ENCOUNTER
Medication refill information reviewed.     Due date for fentaNYL (DURAGESIC) 12 mcg/hr 72 hr patch  and HYDROcodone-acetaminophen (NORCO) 7.5-325 MG per tablet is 07/03/22     Prescriptions prepped for review.     Will route to provider.

## 2022-07-05 ENCOUNTER — RADIOLOGY INJECTION OFFICE VISIT (OUTPATIENT)
Dept: PALLIATIVE MEDICINE | Facility: CLINIC | Age: 62
End: 2022-07-05
Attending: NURSE PRACTITIONER
Payer: COMMERCIAL

## 2022-07-05 VITALS — SYSTOLIC BLOOD PRESSURE: 130 MMHG | DIASTOLIC BLOOD PRESSURE: 72 MMHG | HEART RATE: 79 BPM

## 2022-07-05 DIAGNOSIS — M47.816 SPONDYLOSIS OF LUMBAR REGION WITHOUT MYELOPATHY OR RADICULOPATHY: ICD-10-CM

## 2022-07-05 DIAGNOSIS — M54.59 LUMBAR FACET JOINT PAIN: ICD-10-CM

## 2022-07-05 PROCEDURE — 64493 INJ PARAVERT F JNT L/S 1 LEV: CPT | Mod: 50 | Performed by: PAIN MEDICINE

## 2022-07-05 PROCEDURE — 64494 INJ PARAVERT F JNT L/S 2 LEV: CPT | Mod: LT | Performed by: PAIN MEDICINE

## 2022-07-05 PROCEDURE — 64494 INJ PARAVERT F JNT L/S 2 LEV: CPT | Mod: RT | Performed by: PAIN MEDICINE

## 2022-07-05 RX ORDER — TRIAMCINOLONE ACETONIDE 40 MG/ML
40 INJECTION, SUSPENSION INTRA-ARTICULAR; INTRAMUSCULAR ONCE
Status: COMPLETED | OUTPATIENT
Start: 2022-07-05 | End: 2022-07-05

## 2022-07-05 RX ADMIN — TRIAMCINOLONE ACETONIDE 40 MG: 40 INJECTION, SUSPENSION INTRA-ARTICULAR; INTRAMUSCULAR at 09:45

## 2022-07-05 ASSESSMENT — PAIN SCALES - GENERAL
PAINLEVEL: SEVERE PAIN (7)
PAINLEVEL: MILD PAIN (3)

## 2022-07-05 NOTE — NURSING NOTE
Discharge Information    IV Discontiued Time:  NA    Amount of Fluid Infused:  NA    Discharge Criteria = When patient returns to baseline or as per MD order    Consciousness:  Pt is fully awake    Circulation:  BP +/- 20% of pre-procedure level    Respiration:  Patient is able to breathe deeply    O2 Sat:  Patient is able to maintain O2 Sat >92% on room air    Activity:  Moves 4 extremities on command    Ambulation:  Patient is able to stand and walk or stand and pivot into wheelchair    Dressing:  Clean/dry or No Dressing    Notes:   Discharge instructions and AVS given to patient    Patient meets criteria for discharge?  YES    Admitted to PCU?  No    Responsible adult present to accompany patient home?  Yes    Signature/Title:    Felicitas Rojo RN  RN Care Coordinator  [facility] [place]

## 2022-07-05 NOTE — PROGRESS NOTES
Pre procedure Diagnosis: lumbar spondylosis without myelopathy   Post procedure Diagnosis: Same  Procedure performed: Bilateral facet joint injections at L4-5, L5-S1, fluoroscopically guided, contrast controlled  Indication:  Therapeutic for pain  Anesthesia: none  Complication: None  Operators: Akil Sanchez MD    Indications:   Betsey Vanessa is a 62 year old female was sent by for facet joint injection.  The patient has a history of axial low back pain.  Exam shows positive Kemps and reproduction of pain with extension and lateral rotation.  They have tried conservative treatment including meds/PT/prior injections with significant benefit.    Options/alternatives, benefits and risks were discussed with the patient including bleeding, infection, flared pain, tissue trauma, exposure to radiation, reaction to medications including seizure, spinal cord injury, paralysis, weakness, numbness and headache.     Questions were answered to her satisfaction and she wishes to proceed. Voluntary informed consent was obtained and signed.     Vitals were reviewed: Yes  Allergies were reviewed:  Yes  Medications were reviewed:  Yes  Pre-procedure pain score: 7/10    Procedure:  After obtaining signed informed consent, the patient was brought into the procedure suite and was placed in a prone position on the procedure table.   A Pause for the Cause was performed.  The patient was prepped and draped in the usual sterile fashion.     Under AP fluoroscopic guidance the L4-5, 5-S1 facet joints on the bilaterally side were identified, and the C-arm was rotated obliquely to the affected side to open the joint space. A total of 2 ml of 1% lidocaine was injected at the needle entry point and needle tract. Then a 22 gauge 3.5 inch spinal needle was inserted and advanced under fluoroscopic guidance targeting the superior articular process of each joint. Once the needle*made contact with the SAP, it was rotated and advanced into the  joint.    AP and lateral fluoroscopic views were obtained to confirm the needle placement. Then,  Omnipaque 0.25 contrast dye was injected after negative aspiration for heme and CSF in each joint, confirming appropriate needle placement.  A total of 1 ml of Omnipaque was used.  Omnipaque wasted: 9 ml.    Then, each joint was injected with 1 ml of a combination of Kenalog 40 mg and 0.25% bupivacaine 3 ml (total injectate volume 4 ml) and the needles were flushed with local anesthetic as they were withdrawn.       Hemostasis was achieved, the area was cleaned, and bandaids were placed when appropriate.  The patient tolerated the procedure well, and was taken to the recovery room.    Images were saved to PACS.    Post-procedure pain score: 3/10  Follow-up includes:   -f/u phone call in one week  -f/u with provider    Akil Sanchez MD  [facility]

## 2022-07-05 NOTE — NURSING NOTE
Pre-procedure Intake  If YES to any questions or NO to having a   Please complete laminated checklist and leave on the computer keyboard for Provider, verbally inform provider if able.    For SCS Trial, RFA's or any sedation procedure:  Have you been fasting? NA    If yes, for how long? NA    Are you taking any any blood thinners such as Coumadin, Warfarin, Jantoven, Pradaxa Xarelto, Eliquis, Edoxaban, Enoxaparin, Lovenox, Heparin, Arixtra, Fondaparinux, or Fragmin? OR Antiplatelet medication such as Plavix, Brilinta, or Effient?   No     If yes, when did you take your last dose? NA    Do you take aspirin?  No    If cervical procedure, have you held aspirin for 6 days?   NA    Do you have any allergies to contrast dye, iodine, steroid and/or numbing medications?  NO    Are you currently taking antibiotics or have an active infection?  NO    Have you had a fever/elevated temperature within the past week? NO    Are you currently taking oral steroids? NO    Do you have a ? Yes    Are you pregnant or breastfeeding?  NO    Have you received the COVID-19 vaccine? Yes    If yes, was it your 1st, 2nd or only dose needed? 3rd    Date of most recent vaccine: 10/12/21    Notify provider and RNs if systolic BP >170, diastolic BP >100, P >100 or O2 sats < 90%      Geena Farmer CMA (AAMA)

## 2022-07-05 NOTE — PATIENT INSTRUCTIONS
[facility]   Procedure Discharge Instructions    Today you saw:    Dr. Akil Sanchez    You had an:  Facet Joint Injection       Medications used:  Lidocaine   Bupivacaine   Dexamethasone Omnipaque          Be cautious when walking. Numbness and/or weakness in the lower extremities may occur for up to 6-8 hours after the procedure due to effect of the local anesthetic  Do not drive for 6 hours. The effect of the local anesthetic could slow your reflexes.   You may resume your regular activities after 24 hours  Avoid strenuous activity for the first 24 hours  You may shower, however avoid swimming, tub baths or hot tubs for 24 hours following your procedure  You may have a mild to moderate increase in pain for several days following the injection.  It may take up to 14 days for the steroid medication to start working although you may feel the effect as early as a few days after the procedure.     You may use ice packs for 10-15 minutes, 3 to 4 times a day at the injection site for comfort  Do not use heat to painful areas for 6 to 8 hours. This will give the local anesthetic time to wear off and prevent you from accidentally burning your skin.   Unless you have been directed to avoid the use of anti-inflammatory medications (NSAIDS), you may use medications such as ibuprofen, Aleve or Tylenol for pain control if needed.   If you were fasting, you may resume your normal diet and medications after the procedure  If you have diabetes, check your blood sugar more frequently than usual as your blood sugar may be higher than normal for 10-14 days following a steroid injection. Contact your doctor who manages your diabetes if your blood sugar is higher than usual  Possible side effects of steroids that you may experience include flushing, elevated blood pressure, increased appetite, mild headaches and restlessness.  All of these symptoms will get better with time.  If you experience any of  the following, call the Pain Clinic during work hours (Mon-Friday 8-4:30 pm) at [phone] or the Provider Line after hours at [phone]:  -Fever over 100 degree F  -Swelling, bleeding, redness, drainage, warmth at the injection site  -Progressive weakness or numbness in your legs  -Loss of bowel or bladder function  -Unusual headache that is not relieved by Tylenol or other pain reliever  -Unusual new onset of pain that is not improving

## 2022-07-18 ENCOUNTER — INFUSION THERAPY VISIT (OUTPATIENT)
Dept: INFUSION THERAPY | Facility: CLINIC | Age: 62
End: 2022-07-18
Payer: COMMERCIAL

## 2022-07-18 ENCOUNTER — LAB (OUTPATIENT)
Dept: LAB | Facility: CLINIC | Age: 62
End: 2022-07-18
Payer: COMMERCIAL

## 2022-07-18 VITALS
RESPIRATION RATE: 16 BRPM | HEART RATE: 75 BPM | SYSTOLIC BLOOD PRESSURE: 120 MMHG | WEIGHT: 166 LBS | DIASTOLIC BLOOD PRESSURE: 64 MMHG | TEMPERATURE: 97.6 F | BODY MASS INDEX: 30.36 KG/M2 | OXYGEN SATURATION: 98 %

## 2022-07-18 DIAGNOSIS — M06.09 RHEUMATOID ARTHRITIS OF MULTIPLE SITES WITH NEGATIVE RHEUMATOID FACTOR (H): ICD-10-CM

## 2022-07-18 DIAGNOSIS — M06.09 RHEUMATOID ARTHRITIS OF MULTIPLE SITES WITH NEGATIVE RHEUMATOID FACTOR (H): Primary | ICD-10-CM

## 2022-07-18 DIAGNOSIS — M85.89 OSTEOPENIA OF MULTIPLE SITES: ICD-10-CM

## 2022-07-18 LAB
ALBUMIN SERPL-MCNC: 3.8 G/DL (ref 3.4–5)
ALP SERPL-CCNC: 87 U/L (ref 40–150)
ALT SERPL W P-5'-P-CCNC: 26 U/L (ref 0–50)
AST SERPL W P-5'-P-CCNC: 17 U/L (ref 0–45)
BASOPHILS # BLD AUTO: 0 10E3/UL (ref 0–0.2)
BASOPHILS NFR BLD AUTO: 1 %
BILIRUB DIRECT SERPL-MCNC: <0.1 MG/DL (ref 0–0.2)
BILIRUB SERPL-MCNC: 0.3 MG/DL (ref 0.2–1.3)
CREAT SERPL-MCNC: 0.89 MG/DL (ref 0.52–1.04)
CRP SERPL-MCNC: 5.6 MG/L (ref 0–8)
EOSINOPHIL # BLD AUTO: 0.3 10E3/UL (ref 0–0.7)
EOSINOPHIL NFR BLD AUTO: 4 %
ERYTHROCYTE [DISTWIDTH] IN BLOOD BY AUTOMATED COUNT: 15.3 % (ref 10–15)
ERYTHROCYTE [SEDIMENTATION RATE] IN BLOOD BY WESTERGREN METHOD: 22 MM/HR (ref 0–30)
GFR SERPL CREATININE-BSD FRML MDRD: 74 ML/MIN/1.73M2
HCT VFR BLD AUTO: 34.5 % (ref 35–47)
HGB BLD-MCNC: 11 G/DL (ref 11.7–15.7)
IMM GRANULOCYTES # BLD: 0 10E3/UL
IMM GRANULOCYTES NFR BLD: 0 %
LYMPHOCYTES # BLD AUTO: 1.8 10E3/UL (ref 0.8–5.3)
LYMPHOCYTES NFR BLD AUTO: 23 %
MCH RBC QN AUTO: 26.1 PG (ref 26.5–33)
MCHC RBC AUTO-ENTMCNC: 31.9 G/DL (ref 31.5–36.5)
MCV RBC AUTO: 82 FL (ref 78–100)
MONOCYTES # BLD AUTO: 0.5 10E3/UL (ref 0–1.3)
MONOCYTES NFR BLD AUTO: 6 %
NEUTROPHILS # BLD AUTO: 5.2 10E3/UL (ref 1.6–8.3)
NEUTROPHILS NFR BLD AUTO: 66 %
NRBC # BLD AUTO: 0 10E3/UL
NRBC BLD AUTO-RTO: 0 /100
PLATELET # BLD AUTO: 174 10E3/UL (ref 150–450)
PROT SERPL-MCNC: 7.3 G/DL (ref 6.8–8.8)
RBC # BLD AUTO: 4.22 10E6/UL (ref 3.8–5.2)
WBC # BLD AUTO: 7.9 10E3/UL (ref 4–11)

## 2022-07-18 PROCEDURE — 96365 THER/PROPH/DIAG IV INF INIT: CPT | Performed by: NURSE PRACTITIONER

## 2022-07-18 PROCEDURE — 36415 COLL VENOUS BLD VENIPUNCTURE: CPT

## 2022-07-18 PROCEDURE — 82306 VITAMIN D 25 HYDROXY: CPT

## 2022-07-18 PROCEDURE — 80076 HEPATIC FUNCTION PANEL: CPT

## 2022-07-18 PROCEDURE — 85652 RBC SED RATE AUTOMATED: CPT

## 2022-07-18 PROCEDURE — 86140 C-REACTIVE PROTEIN: CPT

## 2022-07-18 PROCEDURE — 99207 PR NO CHARGE LOS: CPT

## 2022-07-18 PROCEDURE — 85025 COMPLETE CBC W/AUTO DIFF WBC: CPT

## 2022-07-18 PROCEDURE — 82565 ASSAY OF CREATININE: CPT

## 2022-07-18 RX ORDER — HEPARIN SODIUM (PORCINE) LOCK FLUSH IV SOLN 100 UNIT/ML 100 UNIT/ML
5 SOLUTION INTRAVENOUS
Status: CANCELLED | OUTPATIENT
Start: 2022-07-20

## 2022-07-18 RX ORDER — HEPARIN SODIUM,PORCINE 10 UNIT/ML
5 VIAL (ML) INTRAVENOUS
Status: CANCELLED | OUTPATIENT
Start: 2022-07-20

## 2022-07-18 NOTE — PROGRESS NOTES
Infusion Nursing Note:  Betseyfrancisco javier Jayon presents today for Orencia.    Patient seen by provider today: No   present during visit today: Not Applicable.    Note: Patient has tolerated Orencia infusions in the past without complications.     Intravenous Access:  Peripheral IV placed.    Treatment Conditions:  Biological Infusion Checklist:  ~~~ NOTE: If the patient answers yes to any of the questions below, hold the infusion and contact ordering provider or on-call provider.    1. Have you recently had an elevated temperature, fever, chills, productive cough, coughing for 3 weeks or longer or hemoptysis, abnormal vital signs, night sweats,  chest pain or have you noticed a decrease in your appetite, unexplained weight loss or fatigue? No  2. Do you have any open wounds or new incisions? No  3. Do you have any recent or upcoming hospitalizations, surgeries or dental procedures? No  4. Do you currently have or recently have had any signs of illness or infection or are you on any antibiotics? No  5. Have you had any new, sudden or worsening abdominal pain? No  6. Have you or anyone in your household received a live vaccination in the past 4 weeks? Please note:  No live vaccines while on biologic/chemotherapy until 6 months after the last treatment.  Patient can receive the flu vaccine (shot only) and the pneumovax.  It is optimal for the patient to get these vaccines mid cycle, but they can be given at any time as long as it is not on the day of the infusion. No  7. Have you recently been diagnosed with any new nervous system diseases (ie. Multiple sclerosis, Guillain Livingston, seizures, neurological changes) or cancer diagnosis? No  8. Are you on any form of radiation or chemotherapy? No  9. Are you pregnant or breast feeding or do you have plans of pregnancy in the future? No  10. Have you been having any signs of worsening depression or suicidal ideations?  (benlysta only) No  11. Have there been any other new  onset medical symptoms? No      Post Infusion Assessment:  Patient tolerated infusion without incident.  Site patent and intact, free from redness, edema or discomfort.  No evidence of extravasations.  Access discontinued per protocol.  Biologic Infusion Post Education: Call the triage nurse at your clinic or seek medical attention if you have chills and/or temperature greater than or equal to 100.5, uncontrolled nausea/vomiting, diarrhea, constipation, dizziness, shortness of breath, chest pain, heart palpitations, weakness or any other new or concerning symptoms, questions or concerns.  You cannot have any live virus vaccines prior to or during treatment or up to 6 months post infusion.  If you have an upcoming surgery, medical procedure or dental procedure during treatment, this should be discussed with your ordering physician and your surgeon/dentist.  If you are having any concerning symptom, if you are unsure if you should get your next infusion or wish to speak to a provider before your next infusion, please call your care coordinator or triage nurse at your clinic to notify them so we can adequately serve you.     Discharge Plan:   AVS to patient via [employer].  Patient will return 8/15 for next appointment.   Patient discharged in stable condition accompanied by: self.  Departure Mode: Ambulatory.      Rosina Rios RN

## 2022-07-19 LAB — DEPRECATED CALCIDIOL+CALCIFEROL SERPL-MC: 39 UG/L (ref 20–75)

## 2022-07-27 DIAGNOSIS — M06.09 RHEUMATOID ARTHRITIS OF MULTIPLE SITES WITHOUT RHEUMATOID FACTOR (H): ICD-10-CM

## 2022-07-27 DIAGNOSIS — G89.4 CHRONIC PAIN SYNDROME: ICD-10-CM

## 2022-07-27 RX ORDER — FENTANYL 12.5 UG/1
1 PATCH TRANSDERMAL
Qty: 10 PATCH | Refills: 0 | Status: SHIPPED | OUTPATIENT
Start: 2022-08-02 | End: 2022-08-11

## 2022-07-27 NOTE — TELEPHONE ENCOUNTER
[facility] Call Center    Phone Message    May a detailed message be left on voicemail: yes     Reason for Call: Medication Refill Request    Has the patient contacted the pharmacy for the refill? Yes   Name of medication being requested: fentaNYL (DURAGESIC) 12 mcg/hr 72 hr patch  Provider who prescribed the medication: Johnny  Pharmacy: [id number] - [place], [place] - [address]    Date medication is needed: as soon as possible        Action Taken: Other: pain    Travel Screening: Not Applicable

## 2022-07-27 NOTE — TELEPHONE ENCOUNTER
Received call from patient requesting refill(s) of Fentanyl     Last dispensed from pharmacy on 7/1/22    Patient's last office/virtual visit by prescribing provider on 6/3/22  Next office/virtual appointment scheduled for 8/19/22    Last urine drug screen date 1/2022  Current opioid agreement on file (completed within the last year) Yes Date of opioid agreement: 6/2022    E-prescribe to [facility] pharmacy  Pending Prescriptions:                       Disp   Refills    fentaNYL (DURAGESIC) 12 mcg/hr 72 hr patch10 pat*0            Sig: Place 1 patch onto the skin every 72 hours remove           old patch. Fill 07/31/22 start on/after 8/2/22 to           last 30 days

## 2022-07-28 DIAGNOSIS — G89.4 CHRONIC PAIN SYNDROME: ICD-10-CM

## 2022-07-28 DIAGNOSIS — F11.20 CONTINUOUS OPIOID DEPENDENCE (H): ICD-10-CM

## 2022-07-28 DIAGNOSIS — M06.09 RHEUMATOID ARTHRITIS OF MULTIPLE SITES WITHOUT RHEUMATOID FACTOR (H): ICD-10-CM

## 2022-07-28 NOTE — TELEPHONE ENCOUNTER
Pending Prescriptions:                       Disp   Refills    HYDROcodone-acetaminophen (NORCO) 7.5-325*45 tab*0            Sig: Take 0.5-1 tablets by mouth every 6 hours as           needed for severe pain Can take 1 tab per day and           on 15 days of the month can take 2 tabs.  Fill           07/31/22 start on/after 08/02/22 to last 30 days    Reviewed [place]  July 28, 2022- no concerning fills.    Klaudia BARNETT, RN CNP, FNP  [facility]  [facility]

## 2022-07-28 NOTE — TELEPHONE ENCOUNTER
Signed Prescriptions:                        Disp   Refills    fentaNYL (DURAGESIC) 12 mcg/hr 72 hr patch 10 pat*0        Sig: Place 1 patch onto the skin every 72 hours remove old           patch. Fill 07/31/22 start on/after 8/2/22 to           last 30 days  Authorizing Provider: KLAUDIA SHORE    Reviewed [place]  July 27, 2022- no concerning fills.  Please ask patient if she also meant to call in refills for her hydrocodone, because that was not included in this refill request...      Klaudia BARNETT, RN CNP, FNP  [facility]  [facility]

## 2022-07-28 NOTE — TELEPHONE ENCOUNTER
Received call from patient requesting refill(s) of HYDROcodone-acetaminophen (NORCO) 7.5-325 MG per tablet    Last dispensed from pharmacy on 07/01/22    Patient's last office/virtual visit by prescribing provider on 0603/22  Next office/virtual appointment scheduled for 08/19/22    Last urine drug screen date 01/06/22  Current opioid agreement on file (completed within the last year) Yes Date of opioid agreement: 01/06/22    [facility] [address] [facility] [place] [facility] [address] [address]     Will route to nursing [place] for review and preparation of prescription(s).

## 2022-07-28 NOTE — TELEPHONE ENCOUNTER
Medication refill information reviewed.     Due date for  HYDROcodone-acetaminophen (NORCO) 7.5-325 MG per tablet is 08/02/22     Prescriptions prepped for review.     Will route to provider.

## 2022-07-29 RX ORDER — HYDROCODONE BITARTRATE AND ACETAMINOPHEN 7.5; 325 MG/1; MG/1
.5-1 TABLET ORAL EVERY 6 HOURS PRN
Qty: 45 TABLET | Refills: 0 | Status: SHIPPED | OUTPATIENT
Start: 2022-07-29 | End: 2022-08-11

## 2022-07-29 NOTE — TELEPHONE ENCOUNTER
Called pt and notified of Rx for HYDROcodone-acetaminophen (NORCO) 7.5-325*45 was signed and sent to [facility]. Fill 07/31/22 start on/after 08/02/22 to last 30 days.     Hilary Barr MA

## 2022-07-29 NOTE — TELEPHONE ENCOUNTER
Signed Prescriptions:                        Disp   Refills    HYDROcodone-acetaminophen (NORCO) 7.5-325 *45 tab*0        Sig: Take 0.5-1 tablets by mouth every 6 hours as needed           for severe pain Can take 1 tab per day and on 15           days of the month can take 2 tabs.  Fill 07/31/22           start on/after 08/02/22 to last 30 days  Authorizing Provider: KLAUDIA SHORE    Reviewed [place]  July 29, 2022- no concerning fills.    Klaudia Shore APRN, RN CNP, FNP  [facility]  [facility]

## 2022-08-01 ENCOUNTER — MYC MEDICAL ADVICE (OUTPATIENT)
Dept: FAMILY MEDICINE | Facility: CLINIC | Age: 62
End: 2022-08-01

## 2022-08-01 DIAGNOSIS — E11.65 TYPE 2 DIABETES MELLITUS WITH HYPERGLYCEMIA, WITHOUT LONG-TERM CURRENT USE OF INSULIN (H): Primary | ICD-10-CM

## 2022-08-01 DIAGNOSIS — H72.91 PERFORATION OF RIGHT TYMPANIC MEMBRANE: Primary | ICD-10-CM

## 2022-08-02 ENCOUNTER — OFFICE VISIT (OUTPATIENT)
Dept: AUDIOLOGY | Facility: CLINIC | Age: 62
End: 2022-08-02
Payer: COMMERCIAL

## 2022-08-02 DIAGNOSIS — H90.71 MIXED HEARING LOSS OF RIGHT EAR: Primary | ICD-10-CM

## 2022-08-02 PROCEDURE — 92567 TYMPANOMETRY: CPT | Performed by: AUDIOLOGIST

## 2022-08-02 PROCEDURE — 92557 COMPREHENSIVE HEARING TEST: CPT | Mod: 52 | Performed by: AUDIOLOGIST

## 2022-08-02 NOTE — TELEPHONE ENCOUNTER
Routing to PCP-     Pt calling in to clarify gilles reyes    Pt is needing to renew her Medicial marijuana rx before it expires on 8/18/22. She has a f/u with you on 8/19/22 but needs this before then.    Please responsed back to pt via mychart or pt can be called on cell and okay to leave detailed message.    Kassidy Marcelo RN

## 2022-08-02 NOTE — PROGRESS NOTES
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 She is here with her daughter. She has been wearing her BAHA continually since our last appointment although she continues to express some problems with the current set-up that she wears. There is feedback, it falls off sometimes, and she doesn't think the hearing is as good as her previous in the ear hearing aids. She has had some discomfort over the area of her BAHA site, but none today. She does note a history of yeast infections in multiple areas of her body.     Physical Exam:   /70 (BP Location: Right arm, Patient Position: Chair, Cuff Size: Adult Large)   Pulse 86   Wt 76.2 kg (168 lb)   LMP  (LMP Unknown)   BMI 30.73 kg/m       Female in no acute distress.  Alert and answering questions appropriately.  HB 1/6 bilaterally.  Both ears examined under the microscope. Her right canal has what appears to be a fungal infection on top of dry cerumen. This was removed with a straight pick and alligator, the skin underlying the cerumen is healthy. The TM is somewhat erythematous and there is some wet debris around her stable perforation. This was cultured and then the debris was suctioned. Left TM clear without effusion or retraction. Clear canal.     Audiogram:  AUDIOGRAM: She underwent an audiogram today. This demonstrates: right profound mixed loss, left not tested as it is known to be a profound loss.      Right: Speech reception threshold is 90 dB with 96% word recognition at 2015 dB. Tympanogram B type   Left: Tympanogram B type     Audiogram was independently reviewed. Right hearing is stable.     Assessment and Plan:  Betsey Vanessa is a 62 year old female who presents for follow up. She does appear to have a  surface fungal infection in the right ear today while the left is clean. I cultured the right ear today and we will treat her accordingly. I did encourage her to continue to use her BAHA and to continue brushing. I did discuss that BAHA is the best option for her as she was chronically infected when she wore a traditional aid on the right. She has discussed processor placement with Audiology in the past and, due to her degree of hearing loss, she needs a powered processor which, unfortunately, gives her feedback. We discussed placing a new abutment on the right and possibly even placing one on the left for her profound loss but placing the new right abutment further posterior and superior to allow for better placement of the processor. I would like for her to discuss this possibility with Audiology; her next appointment is scheduled for September. We will follow-up pending her culture result and get her on appropriate therapy.     Scribe Disclosure:  I, Miguel Nix, am serving as a scribe to document services personally performed by Laura Stallings MD at this visit, based upon the provider's statements to me. All documentation has been reviewed by the aforementioned provider prior to being entered into the official medical record.     The documentation recorded by the scribe accurately reflects the services I personally performed and the decisions made by me.

## 2022-08-02 NOTE — PROGRESS NOTES
AUDIOLOGY REPORT    SUMMARY: Audiology visit completed. See audiogram for results.      RECOMMENDATIONS: Follow-up with ENT.    Huber Pozo, [facility]  Licensed Audiologist  [place] License #[id number]

## 2022-08-03 ENCOUNTER — OFFICE VISIT (OUTPATIENT)
Dept: OTOLARYNGOLOGY | Facility: CLINIC | Age: 62
End: 2022-08-03
Payer: COMMERCIAL

## 2022-08-03 VITALS
DIASTOLIC BLOOD PRESSURE: 70 MMHG | BODY MASS INDEX: 30.73 KG/M2 | SYSTOLIC BLOOD PRESSURE: 131 MMHG | HEART RATE: 86 BPM | WEIGHT: 168 LBS

## 2022-08-03 DIAGNOSIS — H81.03 MENIERE'S DISEASE, BILATERAL: ICD-10-CM

## 2022-08-03 DIAGNOSIS — H72.91 PERFORATION OF RIGHT TYMPANIC MEMBRANE: ICD-10-CM

## 2022-08-03 DIAGNOSIS — H92.11 OTORRHEA, RIGHT: Primary | ICD-10-CM

## 2022-08-03 DIAGNOSIS — H90.A31 MIXED CONDUCTIVE AND SENSORINEURAL HEARING LOSS OF RIGHT EAR WITH RESTRICTED HEARING OF LEFT EAR: ICD-10-CM

## 2022-08-03 PROCEDURE — 87102 FUNGUS ISOLATION CULTURE: CPT | Mod: 90 | Performed by: PATHOLOGY

## 2022-08-03 PROCEDURE — 99214 OFFICE O/P EST MOD 30 MIN: CPT | Mod: 25 | Performed by: OTOLARYNGOLOGY

## 2022-08-03 PROCEDURE — 87077 CULTURE AEROBIC IDENTIFY: CPT | Mod: 90 | Performed by: PATHOLOGY

## 2022-08-03 PROCEDURE — 99000 SPECIMEN HANDLING OFFICE-LAB: CPT | Performed by: PATHOLOGY

## 2022-08-03 PROCEDURE — 92504 EAR MICROSCOPY EXAMINATION: CPT | Performed by: OTOLARYNGOLOGY

## 2022-08-03 PROCEDURE — 87070 CULTURE OTHR SPECIMN AEROBIC: CPT | Mod: 90 | Performed by: PATHOLOGY

## 2022-08-03 PROCEDURE — 87107 FUNGI IDENTIFICATION MOLD: CPT | Mod: 90 | Performed by: PATHOLOGY

## 2022-08-03 ASSESSMENT — PAIN SCALES - GENERAL: PAINLEVEL: MILD PAIN (3)

## 2022-08-03 NOTE — LETTER
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 She is here with her daughter. She has been wearing her BAHA continually since our last appointment although she continues to express some problems with the current set-up that she wears. There is feedback, it falls off sometimes, and she doesn't think the hearing is as good as her previous in the ear hearing aids. She has had some discomfort over the area of her BAHA site, but none today. She does note a history of yeast infections in multiple areas of her body.     Physical Exam:   /70 (BP Location: Right arm, Patient Position: Chair, Cuff Size: Adult Large)   Pulse 86   Wt 76.2 kg (168 lb)   LMP  (LMP Unknown)   BMI 30.73 kg/m       Female in no acute distress.  Alert and answering questions appropriately.  HB 1/6 bilaterally.  Both ears examined under the microscope. Her right canal has what appears to be a fungal infection on top of dry cerumen. This was removed with a straight pick and alligator, the skin underlying the cerumen is healthy. The TM is somewhat erythematous and there is some wet debris around her stable perforation. This was cultured and then the debris was suctioned. Left TM clear without effusion or retraction. Clear canal.     Audiogram:  AUDIOGRAM: She underwent an audiogram today. This demonstrates: right profound mixed loss, left not tested as it is known to be a profound loss.      Right: Speech reception threshold is 90 dB with 96% word recognition at 2015 dB. Tympanogram B type   Left: Tympanogram B type     Audiogram was independently reviewed. Right hearing is stable.     Assessment and Plan:  Betsey Vanessa is a 62  year old female who presents for follow up. She does appear to have a surface fungal infection in the right ear today while the left is clean. I cultured the right ear today and we will treat her accordingly. I did encourage her to continue to use her BAHA and to continue brushing. I did discuss that BAHA is the best option for her as she was chronically infected when she wore a traditional aid on the right. She has discussed processor placement with Audiology in the past and, due to her degree of hearing loss, she needs a powered processor which, unfortunately, gives her feedback. We discussed placing a new abutment on the right and possibly even placing one on the left for her profound loss but placing the new right abutment further posterior and superior to allow for better placement of the processor. I would like for her to discuss this possibility with Audiology; her next appointment is scheduled for September. We will follow-up pending her culture result and get her on appropriate therapy.     Scribe Disclosure:  I, Miguel Nix, am serving as a scribe to document services personally performed by Laura Stallings MD at this visit, based upon the provider's statements to me. All documentation has been reviewed by the aforementioned provider prior to being entered into the official medical record.     The documentation recorded by the scribe accurately reflects the services I personally performed and the decisions made by me.      Chief Complaint   Patient presents with     RECHECK     Blood pressure 131/70, pulse 86, weight 76.2 kg (168 lb), not currently breastfeeding.    MATEO Christian MD

## 2022-08-03 NOTE — Clinical Note
8/3/2022       RE: Betsey Vanessa  [address]  [facility] [address]     Dear Colleague,    Thank you for referring your patient, Betsey Vanessa, to the [facility] [place] at [facility]. Please see a copy of my visit note below.      Otolaryngology Clinic      Name: Betsey Vanessa  MRN: [id number]  Age: 62 year old  : 2020      Chief Complaint:   Follow up    History of Present Illness:   Betsey Vanessa is a 62 year old female with a history of BAHA hearing device as well as Menière's disease who presents for follow up. She has been wearing her BAHA continually since our last appointment. Although, she expresses some problems with the current set-up that she wears. There is feedback, it falls off sometimes, and she doesn't think the hearing is as good as her previous in ear-aids. She has had some discomfort over the area of her BAHA site, but none today. She does note a history of yeast infections in multiple areas of her body.     Physical Exam:   /70 (BP Location: Right arm, Patient Position: Chair, Cuff Size: Adult Large)   Pulse 86   Wt 76.2 kg (168 lb)   LMP  (LMP Unknown)   BMI 30.73 kg/m       Physical examination:  Female in no acute distress.  Alert and answering questions appropriately.  HB 1/6 bilaterally.  Both ears examined under the microscope. Her right canal has what appears to be a fungal or yeast infection. There is a ball of crusting on the superior aspect of her TM that appears to be coated in a yeast overgrowth. The TM is otherwise erythematous and there is some wet debris around the base of her stable perforation. This was swabbed to be cultured and then the debris was suctioned. Left TM clear without effusion or retraction. Clear canal.     Audiogram:  AUDIOGRAM: She underwent an audiogram today. This demonstrates:      Right: Speech reception threshold is 90 dB with 96% word  recognition at 2015 dB. Tympanogram B type   Left: Tympanogram B type     Audiogram was independently reviewed.       Assessment and Plan:  Betsey Vanessa is a 62 year old female who presents for follow up and ear clean. She does appear to have a surface fungal infection in the right ear today while the left is clean. I cultured the right ear today and we will treat her accordingly. I do encourage her to continue to use her BAHA and to continue brushing. I did discuss that BAHA is the best option for her as she was chronically infected with traditional aids. We could move the left to the right and move it to a more superior portion of the mastoid. I would like for her to discuss with Audiology to evaluate BAHA options; her next appointment is scheduled for September. We will follow-up pending treatment.         Scribe Disclosure:  I, Miguel Nix, am serving as a scribe to document services personally performed by Laura Stallings MD at this visit, based upon the provider's statements to me. All documentation has been reviewed by the aforementioned provider prior to being entered into the official medical record.     Chief Complaint   Patient presents with     RECHECK     Blood pressure 131/70, pulse 86, weight 76.2 kg (168 lb), not currently breastfeeding.    Ni Galvan LPN        Again, thank you for allowing me to participate in the care of your patient.      Sincerely,    Laura Stallings MD

## 2022-08-03 NOTE — TELEPHONE ENCOUNTER
We can try moving her 40 mins appt up sooner if possible. Otherwise, I'll take care of her re-cert at her appt

## 2022-08-03 NOTE — PROGRESS NOTES
Chief Complaint   Patient presents with     RECHECK     Blood pressure 131/70, pulse 86, weight 76.2 kg (168 lb), not currently breastfeeding.    Ni Galvan LPN

## 2022-08-03 NOTE — PATIENT INSTRUCTIONS
1. You were seen in the ENT Clinic today by Dr. Stallings.  If you have any questions or concerns after your appointment, please call   - Option 1: ENT Clinic: [phone]   - Option 2: Ni (Dr. Stallings's Nurse): [phone]                   Joyce(Dr. Stallings's Nurse): [phone]    2.   Plan to return to clinic to be determined    3. Right ear culture- call with results    Ni Galvan LPN  [facility] - Ot[facility]

## 2022-08-05 ENCOUNTER — DOCUMENTATION ONLY (OUTPATIENT)
Dept: LAB | Facility: CLINIC | Age: 62
End: 2022-08-05

## 2022-08-05 NOTE — TELEPHONE ENCOUNTER
I called and spoke to patient, there are no 40 minute in person visits before 8/19/22.   She says if the recert lapses then she has to go through a whole long process again.    She says she has a lab appt on 8/10 for her A1C.    I don't see any future orders.   It says it is labs for Dr. Carrillo.   Patient says she did those labs already.    I changed the reason for visit on that appointment, routed to Katerina Judd to put in any desired future orders.    She says she will ask pain clinic provider if they can do the recert when she sees them on 8/11/22 as they offered to do this in the past.    Routed to PCP to address lab visit/future orders and if pain clinic might be appropriate for cannabis recert?    Ashly Daniels RN  [facility]

## 2022-08-05 NOTE — PROGRESS NOTES
Patient has upcoming lab only appointment, please review and place future orders that may needed. If the lab is not needed, please let your care team fallow up with patient.   Thank you  [place] harman

## 2022-08-08 DIAGNOSIS — B36.9 FUNGAL EAR INFECTION: Primary | ICD-10-CM

## 2022-08-08 DIAGNOSIS — H62.40 FUNGAL EAR INFECTION: Primary | ICD-10-CM

## 2022-08-08 RX ORDER — CLOTRIMAZOLE 1 G/ML
SOLUTION TOPICAL
Qty: 10 ML | Refills: 1 | Status: SHIPPED | OUTPATIENT
Start: 2022-08-08 | End: 2023-02-01

## 2022-08-08 NOTE — PROGRESS NOTES
"\"She's got fungus in the right ear which is what it looked like. Would you see if she could tolerate clotrimazole 5 drops bid x 21 days? If not, amphotericin then for 21 days. I'll see her 9/21.\" per Dr. Stallings    Sent myctellyt msg Ni Galvan LPN    "
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
## 2022-08-10 NOTE — PROGRESS NOTES
Labs are up to date and I see that patient had cancelled the lab appointment.     Terrence Carrillo MD  8/10/2022

## 2022-08-11 ENCOUNTER — LAB (OUTPATIENT)
Dept: LAB | Facility: CLINIC | Age: 62
End: 2022-08-11

## 2022-08-11 ENCOUNTER — OFFICE VISIT (OUTPATIENT)
Dept: PALLIATIVE MEDICINE | Facility: CLINIC | Age: 62
End: 2022-08-11
Payer: COMMERCIAL

## 2022-08-11 VITALS — HEART RATE: 77 BPM | DIASTOLIC BLOOD PRESSURE: 68 MMHG | SYSTOLIC BLOOD PRESSURE: 135 MMHG

## 2022-08-11 DIAGNOSIS — M06.09 RHEUMATOID ARTHRITIS OF MULTIPLE SITES WITHOUT RHEUMATOID FACTOR (H): ICD-10-CM

## 2022-08-11 DIAGNOSIS — Z51.81 ENCOUNTER FOR THERAPEUTIC DRUG MONITORING: ICD-10-CM

## 2022-08-11 DIAGNOSIS — B36.9 FUNGAL EAR INFECTION: Primary | ICD-10-CM

## 2022-08-11 DIAGNOSIS — H62.40 FUNGAL EAR INFECTION: Primary | ICD-10-CM

## 2022-08-11 DIAGNOSIS — Z79.899 CONTROLLED SUBSTANCE AGREEMENT SIGNED: ICD-10-CM

## 2022-08-11 DIAGNOSIS — F11.20 CONTINUOUS OPIOID DEPENDENCE (H): ICD-10-CM

## 2022-08-11 DIAGNOSIS — M54.59 LUMBAR FACET JOINT PAIN: ICD-10-CM

## 2022-08-11 DIAGNOSIS — M47.816 SPONDYLOSIS OF LUMBAR REGION WITHOUT MYELOPATHY OR RADICULOPATHY: ICD-10-CM

## 2022-08-11 DIAGNOSIS — G89.4 CHRONIC PAIN SYNDROME: Primary | ICD-10-CM

## 2022-08-11 LAB
BACTERIA SPEC CULT: ABNORMAL
BACTERIA SPEC CULT: ABNORMAL

## 2022-08-11 PROCEDURE — 99215 OFFICE O/P EST HI 40 MIN: CPT

## 2022-08-11 PROCEDURE — 80307 DRUG TEST PRSMV CHEM ANLYZR: CPT

## 2022-08-11 RX ORDER — HYDROCODONE BITARTRATE AND ACETAMINOPHEN 7.5; 325 MG/1; MG/1
.5-1 TABLET ORAL EVERY 6 HOURS PRN
Qty: 45 TABLET | Refills: 0 | Status: SHIPPED | OUTPATIENT
Start: 2022-08-11 | End: 2022-09-28

## 2022-08-11 RX ORDER — FENTANYL 12.5 UG/1
1 PATCH TRANSDERMAL
Qty: 10 PATCH | Refills: 0 | Status: SHIPPED | OUTPATIENT
Start: 2022-08-11 | End: 2022-09-28

## 2022-08-11 ASSESSMENT — PAIN SCALES - GENERAL: PAINLEVEL: SEVERE PAIN (7)

## 2022-08-11 NOTE — PATIENT INSTRUCTIONS
1.  Pain Physical Therapy:  Continue home exercise program as tolerated, walking and yoga    2.  Pain Psychologist to address relaxation, behavioral change, coping style, and other factors important to improvement.  NO - she is coping well with chronic pain at this time. May consider in the future if needed.    3.  Diagnostic Studies:  None    4.  Medication Management:   Continue Norco and fentanyl as prescribed. September refills sent to pharmacy.   We will refill naloxone today, as home supply is .   We renewed certification for medical cannabis today.   We had preliminary discussion about buprenorphine and will revisit at future visit in the next 6 months   5.  Urine toxicology screen performed today    6.  Opioid agreement signed today    8.  Follow up with ANIBAL Frank CNP in 8 weeks, video visit is okay for next appointment if preferred   ----------------------------------------------------------------  Clinic Number:  [phone]   Call with any questions about your care and for scheduling assistance.   Calls are returned Monday through Friday between 8 AM and 4:30 PM. We usually get back to you within 2 business days depending on the issue/request.    If we are prescribing your medications:  For opioid medication refills, call the clinic or send a [employer] message 7 days in advance.  Please include:  Name of requested medication  Name of the pharmacy.  For non-opioid medications, call your pharmacy directly to request a refill. Please allow 3-4 days to be processed.   Per [place] State Law:  All controlled substance prescriptions must be filled within 30 days of being written.    For those controlled substances allowing refills, pickup must occur within 30 days of last fill.      We believe regular attendance is key to your success in our program!    Any time you are unable to keep your appointment we ask that you call us at least 24 hours in advance to cancel.This will allow us to offer the  appointment time to another patient.   Multiple missed appointments may lead to dismissal from the clinic.

## 2022-08-11 NOTE — PROGRESS NOTES
Notified pt of fungal infection, medication sent to the compound pharmacy. Pt had no further questions.    Ni Galvan LPN

## 2022-08-11 NOTE — LETTER
Opioid / Opioid Plus Controlled Substance Agreement    This is an agreement between you and your provider about the safe and appropriate use of controlled substance/opioids prescribed by your care team. Controlled substances are medicines that can cause physical and mental dependence (abuse).    There are strict laws about having and using these medicines. We here at [facility] are committing to working with you in your efforts to get better. To support you in this work, we ll help you schedule regular office appointments for medicine refills. If we must cancel or change your appointment for any reason, we ll make sure you have enough medicine to last until your next appointment.     As a Provider, I will:    Listen carefully to your concerns and treat you with respect.     Recommend a treatment plan that I believe is in your best interest. This plan may involve therapies other than opioid pain medication.     Talk with you often about the possible benefits, and the risk of harm of any medicine that we prescribe for you.     Provide a plan on how to taper (discontinue or go off) using this medicine if the decision is made to stop its use.    As a Patient, I understand that opioid(s):     Are a controlled substance prescribed by my care team to help me function or work and manage my condition(s).     Are strong medicines and can cause serious side effects such as:    Drowsiness, which can seriously affect my driving ability    A lower breathing rate, enough to cause death    Harm to my thinking ability     Depression     Abuse of and addiction to this medicine    Need to be taken exactly as prescribed. Combining opioids with certain medicines or chemicals (such as illegal drugs, sedatives, sleeping pills, and benzodiazepines) can be dangerous or even fatal. If I stop opioids suddenly, I may have severe withdrawal symptoms.    Do not work for all types of pain nor for all patients. If they re not helpful, I may  be asked to stop them.      The risks, benefits and side effects of these medicine(s) were explained to me. I agree that:  1. I will take part in other treatments as advised by my care team. This may be psychiatry or counseling, physical therapy, behavioral therapy, group treatment or a referral to a specialist.     2. I will keep all my appointments. I understand that this is part of the monitoring of opioids. My care team may require an office visit for EVERY opioid/controlled substance refill. If I miss appointments or don t follow instructions, my care team may stop my medicine.    3. I will take my medicines as prescribed. I will not change the dose or schedule unless my care team tells me to. There will be no refills if I run out early.     4. I may be asked to come to the clinic and complete a urine drug test or complete a pill count at any time. If I don t give a urine sample or participate in a pill count, the care team may stop my medicine.    5. I will only receive prescriptions from this clinic for chronic pain. If I am treated by another provider for acute pain issues, I will tell them that I am taking opioid pain medication for chronic pain and that I have a treatment agreement with this provider. I will inform my [facility] care team within one business day if I am given a prescription for any pain medication by another healthcare provider. My [facility] care team can contact other providers and pharmacists about my use of any medicines.    6. It is up to me to make sure that I don t run out of my medicines on weekends or holidays. If my care team is willing to refill my opioid prescription without a visit, I must request refills only during office hours. Refills may take up to 3 business days to process. I will use one pharmacy to fill all my opioid and other controlled substance prescriptions. I will notify the clinic about any changes to my insurance or medication  availability.    7. I am responsible for my prescriptions. If the medicine/prescription is lost, stolen or destroyed, it will not be replaced. I also agree not to share controlled substance medicines with anyone.    8. I am aware I should not use any illegal or recreational drugs. I agree not to drink alcohol unless my care team says I can.       9. If I enroll in the [place] [facility] program, I will tell my care team prior to my next refill.     10. I will tell my care team right away if I become pregnant, have a new medical problem treated outside of my regular clinic, or have a change in my medications.    11. I understand that this medicine can affect my thinking, judgment and reaction time. Alcohol and drugs affect the brain and body, which can affect the safety of my driving. Being under the influence of alcohol or drugs can affect my decision-making, behaviors, personal safety, and the safety of others. Driving while impaired (DWI) can occur if a person is driving, operating, or in physical control of a car, motorcycle, boat, snowmobile, ATV, motorbike, off-road vehicle, or any other motor vehicle ([place] Statute 169A.20). I understand the risk if I choose to drive or operate any vehicle or machinery.    I understand that if I do not follow any of the conditions above, my prescriptions or treatment may be stopped or changed.          Opioids  What You Need to Know    What are opioids?   Opioids are pain medicines that must be prescribed by a doctor. They are also known as narcotics.     Examples are:   1. morphine (MS Contin, Janessa)  2. oxycodone (Oxycontin)  3. oxycodone and acetaminophen (Percocet)  4. hydrocodone and acetaminophen (Vicodin, Norco)   5. fentanyl patch (Duragesic)   6. hydromorphone (Dilaudid)   7. methadone  8. codeine (Tylenol #3)     What do opioids do well?   Opioids are best for severe short-term pain such as after a surgery or injury. They may work well for cancer pain. They may  help some people with long-lasting (chronic) pain.     What do opioids NOT do well?   Opioids never get rid of pain entirely, and they don t work well for most patients with chronic pain. Opioids don t reduce swelling, one of the causes of pain.                                    Other ways to manage chronic pain and improve function include:       Treat the health problem that may be causing pain    Anti-inflammation medicines, which reduce swelling and tenderness, such as ibuprofen (Advil, Motrin) or naproxen (Aleve)    Acetaminophen (Tylenol)    Antidepressants and anti-seizure medicines, especially for nerve pain    Topical treatments such as patches or creams    Injections or nerve blocks    Chiropractic or osteopathic treatment    Acupuncture, massage, deep breathing, meditation, visual imagery, aromatherapy    Use heat or ice at the pain site    Physical therapy     Exercise    Stop smoking    Take part in therapy       Risks and side effects     Talk to your doctor before you start or decide to keep taking opioids. Possible side effects include:      Lowering your breathing rate enough to cause death    Overdose, including death, especially if taking higher than prescribed doses    Worse depression symptoms; less pleasure in things you usually enjoy    Feeling tired or sluggish    Slower thoughts or cloudy thinking    Being more sensitive to pain over time; pain is harder to control    Trouble sleeping or restless sleep    Changes in hormone levels (for example, less testosterone)    Changes in sex drive or ability to have sex    Constipation    Unsafe driving    Itching and sweating    Dizziness    Nausea, throwing up and dry mouth    What else should I know about opioids?    Opioids may lead to dependence, tolerance, or addiction.      Dependence means that if you stop or reduce the medicine too quickly, you will have withdrawal symptoms. These include loose poop (diarrhea), jitters, flu-like symptoms,  nervousness and tremors. Dependence is not the same as addiction.                       Tolerance means needing higher doses over time to get the same effect. This may increase the chance of serious side effects.      Addiction is when people improperly use a substance that harms their body, their mind or their relations with others. Use of opiates can cause a relapse of addiction if you have a history of drug or alcohol abuse.      People who have used opioids for a long time may have a lower quality of life, worse depression, higher levels of pain and more visits to doctors.    You can overdose on opioids. Take these steps to lower your risk of overdose:    1. Recognize the signs:  Signs of overdose include decrease or loss of consciousness (blackout), slowed breathing, trouble waking up and blue lips. If someone is worried about overdose, they should call 911.    2. Talk to your doctor about Narcan (naloxone).   If you are at risk for overdose, you may be given a prescription for Narcan. This medicine very quickly reverses the effects of opioids.   If you overdose, a friend or family member can give you Narcan while waiting for the ambulance. They need to know the signs of overdose and how to give Narcan.     3. Don't use alcohol or street drugs.   Taking them with opioids can cause death.    4. Do not take any of these medicines unless your doctor says it s OK. Taking these with opioids can cause death:    Benzodiazepines, such as lorazepam (Ativan), alprazolam (Xanax) or diazepam (Valium)    Muscle relaxers, such as cyclobenzaprine (Flexeril)    Sleeping pills like zolpidem (Ambien)     Other opioids      How to keep you and other people safe while taking opioids:    1. Never share your opioids with others.  Opioid medicines are regulated by the Drug Enforcement Agency ([place]). Selling or sharing medications is a criminal act.    2. Be sure to store opioids in a secure place, locked up if possible. Young children  can easily swallow them and overdose.    3. When you are traveling with your medicines, keep them in the original bottles. If you use a pill box, be sure you also carry a copy of your medicine list from your clinic or pharmacy.    4. Safe disposal of opioids    Most pharmacies have places to get rid of medicine, called disposal kiosks. Medicine disposal options are also available in every [facility]. Search your county and  medication disposal  to find more options. You can find more details at:  [email or web address]     I agree that my provider, clinic care team, and pharmacy may work with any city, state or federal law enforcement agency that investigates the misuse, sale, or other diversion of my controlled medicine. I will allow my provider to discuss my care with, or share a copy of, this agreement with any other treating provider, pharmacy or emergency room where I receive care.    I have read this agreement and have asked questions about anything I did not understand.    _______________________________________________________  Patient Signature - Betsey Vanessa _____________________                   Date     _______________________________________________________  Provider Signature - ANIBAL Frank CNP   _____________________                   Date     _______________________________________________________  Witness Signature (required if provider not present while patient signing)   _____________________                   Date

## 2022-08-12 LAB — CREAT UR-MCNC: 195 MG/DL

## 2022-08-16 LAB
DHC UR CFM-MCNC: 1200 NG/ML
DHC/CREAT UR: 615 NG/MG {CREAT}
FENTANYL UR CFM-MCNC: 33 NG/ML
FENTANYL/CREAT UR: 17 NG/MG {CREAT}
HYDROCODONE UR CFM-MCNC: 3580 NG/ML
HYDROCODONE/CREAT UR: 1836 NG/MG {CREAT}
HYDROMORPHONE UR CFM-MCNC: 220 NG/ML
HYDROMORPHONE/CREAT UR: 113 NG/MG {CREAT}
NORFENTANYL UR CFM-MCNC: 140 NG/ML
NORFENTANYL/CREAT UR: 72 NG/MG {CREAT}

## 2022-08-17 ENCOUNTER — OFFICE VISIT (OUTPATIENT)
Dept: RHEUMATOLOGY | Facility: CLINIC | Age: 62
End: 2022-08-17
Payer: COMMERCIAL

## 2022-08-17 VITALS
BODY MASS INDEX: 30.69 KG/M2 | DIASTOLIC BLOOD PRESSURE: 68 MMHG | HEART RATE: 87 BPM | OXYGEN SATURATION: 95 % | WEIGHT: 167.8 LBS | SYSTOLIC BLOOD PRESSURE: 146 MMHG

## 2022-08-17 DIAGNOSIS — Z92.29 HISTORY OF BISPHOSPHONATE THERAPY: ICD-10-CM

## 2022-08-17 DIAGNOSIS — D64.9 ANEMIA, UNSPECIFIED TYPE: ICD-10-CM

## 2022-08-17 DIAGNOSIS — Z79.1 LONG TERM (CURRENT) USE OF NON-STEROIDAL ANTI-INFLAMMATORIES (NSAID): ICD-10-CM

## 2022-08-17 DIAGNOSIS — M06.09 RHEUMATOID ARTHRITIS OF MULTIPLE SITES WITH NEGATIVE RHEUMATOID FACTOR (H): Primary | ICD-10-CM

## 2022-08-17 DIAGNOSIS — M85.89 OSTEOPENIA OF MULTIPLE SITES: ICD-10-CM

## 2022-08-17 DIAGNOSIS — Z78.0 POST-MENOPAUSAL: ICD-10-CM

## 2022-08-17 PROCEDURE — 99214 OFFICE O/P EST MOD 30 MIN: CPT | Performed by: INTERNAL MEDICINE

## 2022-08-17 RX ORDER — CHOLECALCIFEROL (VITAMIN D3) 50 MCG
2 TABLET ORAL DAILY
Qty: 180 TABLET | Refills: 2 | Status: SHIPPED | OUTPATIENT
Start: 2022-08-17 | End: 2022-08-19

## 2022-08-17 NOTE — PROGRESS NOTES
"  Rheumatology Clinic Visit      Betsey Vanessa MRN# [id number]   YOB: 1960 Age: 62 year old      Date of visit: 8/17/22   PCP: Katerina Judd     Chief Complaint   Patient presents with:  Rheumatoid Arthritis    Assessment and Plan     1.  Rheumatoid arthritis: Reportedly diagnosed \"many years ago\", when she first tablet care with me in 2020.  Previously treated by Dr. Stacy at [facility] in [place], [place]. Previously on methotrexate + rituximab, Enbrel + leflunomide; had elevated liver enzymes preventing oral DMARDs; then with Actemra 8 mg/kg IV monthly that was effective but was found to be associated with thrombocytopenia and the patient reported not much benefit from medication.   ALEGRIA hx prevents several oral DMARDs and LFT elevations with MTX in the past.  Therefore, changed to Orencia with the first dose on 5/7/2020 and has been doing well with regard to rheumatoid arthritis. RA is controlled at this time.  Try to reduce Celebrex as noted below.   Chronic illness, stable.    - Continue orencia 750mg IV every 4 weeks   - Continue hydroxychloroquine 200 mg daily with an additional 200 mg every other day.  Refill by MD only.  Eye exam overdue  - currently taking: Celebrex 100 mg BID; Reduce celebrex to be 100mg daily. See if this lower dose is tolerated.  If you continue to do well after 1 month then discontinue celebrex.   - Ophthalmology referral for hydroxychloroquine toxicity monitoring given previously and she says that this will be completed.  This is required for future refills of hydroxychloroquine.  - Labs in 3 months: CBC, Creatinine, Hepatic Panel, ESR, CRP    2. Sjogren's Syndrome: reportedly had punctal plugs in the past and reportedly ducts are now cauterized.  Doing okay with artificial tears, Xiidra, and Evoxac.   Encouraged frequent sips of water, visits with a dentist at least every 6months, avoidance of sugary foods/drinks.  She is also going to see " ophthalmology soon.  Chronic illness, stable.     - Continue xiidra  - Continue cevimeline 30mg TID    3. Chronic pain syndrome: Managed at the pain clinic.  Encouraged increased frequency of low impact physical activities such as walking or using a stationary bike.  Exam today is most consistent with chronic pain syndrome, not active inflammatory arthritis    4. Osteopenia: based on 7/2019 DEXA report that she brought with her; FRAX score shows a 22% risk of major osteoporotic fracture in the next 10 years and a 1.9% risk for osteoporotic hip fracture in the next 10 years.  Based on FRAX tx is indicated.  She already received one dose of boniva in Sept 2019; and failed alendronate due to GI upset.  Reclast received 2/13/2020, 3/16/2021, 3/30/22; plan to receive once more in 2023 before changing to prolia or taking a drug holiday.   Need updated DEXA.   Chronic illness, stable.    - Continue calcium 1000mg daily  - Continue vitamin D 4000 IU daily  - Continue Reclast yearly, next due in March 2023  - DEXA ordered previously and advised again today that she call to schedule    5. Anemia: check iron studies with next labs.      6.  Vaccinations: Vaccinations reviewed with Ms. Vanessa.  Risks and benefits of vaccinations were discussed.    - Influenza: up to date  - [id number]: up to date  - [id number]: up to date  - Shingrix: 1 dose received she had a reaction so she does not want a second dose.  - COVID-19: has received the Pfizer COVID-19 vaccine, received 3/19/2021 and 4/9/2021 and 10/12/2021. A 4th dose (1st booster) of the mRNA COVID-19 vaccination is recommended to be received 3 months after the third mRNA COVID-19 vaccination was received.  A 5th mRNA vaccine (2nd booster) may be received at least 4 months after the 4th dose.   Based on our current understanding (and this may change over time as we learn more), medications should be adjusted as noted below, if disease activity allows:  - NSAIDs and  Acetaminophen: hold for 24 hours prior to vaccination if able to do so  - Orencia (abatacept) intravenous (IV): time the COVID19 vaccination so that it occurs 1 week prior to the next dose of IV Orencia     Total minutes spent in evaluation with patient, documentation, , and review of pertinent studies and chart notes: 22      Ms. Vanessa verbalized agreement with and understanding of the rational for the diagnosis and treatment plan.  All questions were answered to best of my ability and the patient's satisfaction. Ms. Vanessa was advised to contact the clinic with any questions that may arise after the clinic visit.      Thank you for involving me in the care of the patient    Return to clinic: 3 months    HPI   Betsey Vanessa is a 62 year old female with a past medical history significant for hypertension, depression, type 2 diabetes, Ménière's disease, and rheumatoid arthritis who is seen in consultation for follow-up of RA.     Today, 8/17/2022: RA controlled.  No joint pain or swelling.  No morning stiffness or gelling phenomenon.  Tolerating Orencia, Xiidra, cevimeline, and hydroxychloroquine well.  Dry eye and dry mouth controlled.    Tobacco: Quit smoking in 1998  EtOH: None  Drugs: None    ROS   12 point review of system was completed and negative except as noted in the HPI     Active Problem List     Patient Active Problem List   Diagnosis     Type 2 diabetes mellitus with hyperglycemia, without long-term current use of insulin (H)     Hypertension goal BP (blood pressure) < 140/90     Meniere's disease, unspecified laterality     Rheumatoid arthritis, involving unspecified site, unspecified rheumatoid factor presence     Valvular heart disease     Fibromyalgia     Mild major depression (H)     Osteopenia of multiple sites     History of bisphosphonate therapy     Post-menopausal     Benzodiazepine dependence, episodic (H)     Continuous opioid dependence (H)     Mitral stenosis     Mixed  conductive and sensorineural hearing loss of right ear     Polyarticular arthritis     Encounter for long-term use of opiate analgesic     Chronic pain     Tympanic membrane perforation     Myofascial pain     Migraine with vertigo     Gastroesophageal reflux disease, esophagitis presence not specified     Fungal skin infection     Anaphylaxis, sequela     Hyperlipidemia, unspecified hyperlipidemia type     Past Medical History     Past Medical History:   Diagnosis Date     Anemia     r/t menses     Anxiety      Arthritis      Constipation      Depression      Diabetes (H)      Diarrhea      Double vision      Headache(784.0)      Hearing loss      Heart murmur      Heartburn      Hypertension      Indigestion      Nasal congestion      Night sweats      Numbness      Problems related to lack of adequate sleep      Rash      Sneezing      Tinnitus      Visual loss      Weakness      Weight gain     because of Prednisone     Past Surgical History     Past Surgical History:   Procedure Laterality Date     ------------OTHER-------------      D&C     HYSTERECTOMY       INNER EAR SURGERY       RELEASE CARPAL TUNNEL BILATERAL Bilateral 2008     Allergy     Allergies   Allergen Reactions     Duloxetine Other (See Comments)     Topiramate Other (See Comments)     Other reaction(s): Confusion     Amitriptyline Embonate [Amitriptyline]      Hyper activity     Azithromycin Hives     Cymbalta      Hyper activity     Gabapentin Hives     Hmg-Coa-R Inhibitors Other (See Comments)     Liver function studies abnormal on Lipitor / Crestor     Macrobid [Nitrofurantoin] Hives     Paxil [Paroxetine] Other (See Comments)     Hyper behavior     Penicillins Hives     Tetracycline Hives     Current Medication List     Current Outpatient Medications   Medication Sig     alcohol swab prep pads Use to swab area of injection/rosie as directed     blood glucose (NO BRAND SPECIFIED) test strip Use to test blood sugar 2 times daily or as  directed. To accompany: Blood Glucose Monitor Brands: per insurance.     blood glucose calibration (NO BRAND SPECIFIED) solution Use to calibrate blood glucose monitor as needed as directed. To accompany: Blood Glucose Monitor Brands: per insurance.     celecoxib (CELEBREX) 100 MG capsule Take 1 capsule (100 mg) by mouth 2 times daily     cevimeline (EVOXAC) 30 MG capsule Take 1 capsule (30 mg) by mouth 3 times daily     clonazePAM (KLONOPIN) 0.5 MG tablet TAKE 1 TABLET BY MOUTH TWICE DAILY AS NEEDED FOR ANXIETY OK TO TAKE 1 TO 2 AT NIGHT AS NEEDED FOR SLEEP. SHOULD LAST ABOUT 90 DAYS     clotrimazole (LOTRIMIN) 1 % external solution Instill 5 drops to the right ear 5 minutes for 21 days.     COMPOUNDED NON-CONTROLLED SUBSTANCE (CMPD RX) - PHARMACY TO MIX COMPOUNDED MEDICATION Amphotericin 5mg/ml- 5 drops in the right ear twice daily x 21 days     diclofenac (VOLTAREN) 1 % topical gel Place 2-4 g onto the skin 4 times daily . Max of 8g per dose. No more than 32g/day     EPINEPHrine (ANY BX GENERIC EQUIV) 0.3 MG/0.3ML injection 2-pack Inject 0.3 mLs (0.3 mg) into the muscle as needed for anaphylaxis     EPINEPHrine 0.3 MG/0.3ML SOLN PRN for Anaphylaxis     EQ ALLERGY RELIEF, CETIRIZINE, 10 MG tablet Take 1 tablet by mouth once daily     EQ STOOL SOFTENER 100 MG capsule Take 1 capsule by mouth twice daily     escitalopram (LEXAPRO) 20 MG tablet Take 1 tablet (20 mg) by mouth daily     fentaNYL (DURAGESIC) 12 mcg/hr 72 hr patch Place 1 patch onto the skin every 72 hours remove old patch. Fill 8/30/22 start on/after 09/1/22 to last 30 days     fluorometholone (FML LIQUIFILM) 0.1 % ophthalmic suspension INSTILL 1 DROP INTO EACH EYE IN THE MORNING     fluticasone (FLONASE) 50 MCG/ACT nasal spray Spray 1-2 sprays into both nostrils daily     fluvastatin (LESCOL) 20 MG capsule TAKE 1 CAPSULE BY MOUTH AT BEDTIME . APPOINTMENT REQUIRED FOR FUTURE REFILLS     glimepiride (AMARYL) 2 MG tablet Take 1 tablet (2 mg) by mouth every  morning (before breakfast)     glucose blood VI test strips strip daily     HYDROcodone-acetaminophen (NORCO) 7.5-325 MG per tablet Take 0.5-1 tablets by mouth every 6 hours as needed for severe pain Can take 1 tab per day and on 15 days of the month can take 2 tabs.  Fill 08/30/22 start on/after 09/01/22 to last 30 days     hydroxychloroquine (PLAQUENIL) 200 MG tablet Hydroxychloroquine 200mg daily; and an additional 200mg every other day. Yearly eye exam including 10-2 VF and SD-OCT     hydrOXYzine (ATARAX) 25 MG tablet Take 1 tablet (25 mg) by mouth 3 times daily as needed for itching     lifitegrast (XIIDRA) 5 % opthalmic solution Place 1 drop into both eyes 2 times daily     magnesium 250 MG tablet Take 1 tablet by mouth daily     medical cannabis (Patient's own supply) Take 1 Dose by mouth See Admin Instructions (The purpose of this order is to document that the patient reports taking medical cannabis.  This is not a prescription, and is not used to certify that the patient has a qualifying medical condition.)     metFORMIN (GLUCOPHAGE) 500 MG tablet Take 2 tablets (1,000 mg) by mouth 2 times daily (with meals)     metroNIDAZOLE (METROGEL) 0.75 % external gel Apply topically 2 times daily Apply to face daily     naloxone (NARCAN) 4 MG/0.1ML nasal spray Spray 1 spray (4 mg) into one nostril alternating nostrils as needed for opioid reversal every 2-3 minutes until assistance arrives     nystatin (MYCOSTATIN) 140848 UNIT/GM external powder Apply topically 2 times daily as needed 2 x daily PRN for rash     omeprazole (PRILOSEC) 20 MG DR capsule Take 1 capsule (20 mg) by mouth 2 times daily     pimecrolimus (ELIDEL) 1 % external cream Apply topically 2 times daily - use on back of neck and hands     potassium chloride ER (KLOR-CON M) 10 MEQ CR tablet Take 1 tablet (10 mEq) by mouth 2 times daily     PROAIR  (90 Base) MCG/ACT inhaler Inhale 1-2 puffs into the lungs every 4 hours as needed for shortness of  breath / dyspnea or wheezing     promethazine (PHENERGAN) 25 MG suppository Place 25 mg rectally every 6 hours as needed For nusea     promethazine (PHENERGAN) 25 MG tablet Take 1 tablet (25 mg) by mouth every 6 hours as needed for nausea or other (Meniere's) For nusea     psyllium (METAMUCIL) 58.6 % POWD Take by mouth daily PRN (Patient not taking: No sig reported)     STATIN NOT PRESCRIBED (INTENTIONAL) Please choose reason not prescribed, below     thin (NO BRAND SPECIFIED) lancets Use to test blood sugar 2 times daily or as directed. To accompany: Blood Glucose Monitor Brands: per insurance.     triamterene-HCTZ (DYAZIDE) 37.5-25 MG capsule Take 1 capsule by mouth daily     vitamin D3 (CHOLECALCIFEROL) 50 mcg (2000 units) tablet Take 2 tablets (100 mcg) by mouth daily     No current facility-administered medications for this visit.         Social History   See HPI    Family History     Family History   Problem Relation Age of Onset     C.A.D. Mother      Alzheimer Disease Mother      Cardiovascular Mother      Eye Disorder Mother      Thyroid Disease Mother      Hypertension Other         grandmother     Cerebrovascular Disease Father      Alcohol/Drug Father      Depression Father      Obesity Father         aunt     Asthma Father      Alcohol/Drug Brother      Cancer Brother      Alcohol/Drug Sister         aunt     Cancer Sister      Allergies Other         All family members     Arthritis Other         aunt     Obesity Sister      Thyroid Disease Sister      Asthma Sister         daughter       Physical Exam     Temp Readings from Last 3 Encounters:   07/18/22 97.6  F (36.4  C) (Oral)   06/20/22 98.5  F (36.9  C) (Oral)   05/23/22 98  F (36.7  C) (Oral)     BP Readings from Last 5 Encounters:   08/17/22 (!) 146/68   08/11/22 135/68   08/03/22 131/70   07/18/22 120/64   07/05/22 130/72     Pulse Readings from Last 1 Encounters:   08/17/22 87     Resp Readings from Last 1 Encounters:   07/18/22 16  "    Estimated body mass index is 30.69 kg/m  as calculated from the following:    Height as of 1/19/22: 1.575 m (5' 2\").    Weight as of this encounter: 76.1 kg (167 lb 12.8 oz).    GEN: NAD. Healthy appearing adult.   HEENT: MMM.  Anicteric, noninjected sclera. No obvious external lesions of the ear and nose. Hearing intact.  CV: S1, S2. RRR. No m/r/g.  PULM: No increased work of breathing. CTA bilaterally   MSK: MCPs, PIPs, DIPs, wrists, elbows, shoulders, knees, ankles, and MTPs were diffusely tender to palpation but no swelling, increased warmth, or overlying erythema.    SKIN: No rash or jaundice seen  PSYCH: Alert. Appropriate.      Labs / Imaging (select studies)     RF/CCP  Recent Labs   Lab Test 01/14/20  1221   CCPIGG 1   RHF 20*     CBC  Recent Labs   Lab Test 07/18/22  1411 03/30/22  1428 01/14/22  0718 09/17/21  0945 05/21/21  0954 01/19/21  1130 10/28/20  1537   WBC 7.9 6.7 7.3   < > 6.6 6.5 7.1   RBC 4.22 4.37 4.42   < > 4.41 4.46 4.56   HGB 11.0* 11.5* 11.8   < > 11.6* 11.8 12.3   HCT 34.5* 34.9* 36.7   < > 35.7 36.6 37.5   MCV 82 80 83   < > 81 82 82   RDW 15.3* 14.0 14.3   < > 15.3* 14.4 13.7    176 167   < > 160 159 162   MCH 26.1* 26.3* 26.7   < > 26.3* 26.5 27.0   MCHC 31.9 33.0 32.2   < > 32.5 32.2 32.8   NEUTROPHIL 66 63 60   < > 53.1 65.0 66.1   LYMPH 23 28 28   < > 36.2 25.3 25.8   MONOCYTE 6 5 6   < > 7.4 5.1 5.1   EOSINOPHIL 4 4 6   < > 3.0 4.0 2.5   BASOPHIL 1 0 0   < > 0.3 0.3 0.1   ANEU  --   --   --   --  3.5 4.2 4.7   ALYM  --   --   --   --  2.4 1.6 1.8   MOISE  --   --   --   --  0.5 0.3 0.4   AEOS  --   --   --   --  0.2 0.3 0.2   ABAS  --   --   --   --  0.0 0.0 0.0   ANEUTAUTO 5.2 4.2 4.4   < >  --   --   --    ALYMPAUTO 1.8 1.9 2.0   < >  --   --   --    AMONOAUTO 0.5 0.4 0.4   < >  --   --   --    AEOSAUTO 0.3 0.2 0.4   < >  --   --   --    ABSBASO 0.0 0.0 0.0   < >  --   --   --     < > = values in this interval not displayed.     CMP  Recent Labs   Lab Test " 07/18/22  1411 03/30/22  1428 02/18/22  0657 01/14/22  0718 09/17/21  0945 05/21/21  0954 03/16/21  1130 01/19/21  1130 01/08/21  1535 10/28/20  1537 04/09/20  1226   NA  --   --  137  --   --   --   --   --  135  --  141   POTASSIUM  --   --  3.8  --   --   --   --   --  4.2  --  4.0   CHLORIDE  --   --  102  --   --   --   --   --  99  --  108   CO2  --   --  27  --   --   --   --   --  32  --  27   ANIONGAP  --   --  8  --   --   --   --   --  4  --  6   GLC  --   --  201*  --   --   --   --   --  155*  --  121*   BUN  --   --  19  --   --   --   --   --  16  --  17   CR 0.89 0.88 0.87 0.93   < > 0.92 0.83 0.88 0.80   < > 0.70   GFRESTIMATED 74 75 76 70   < > 68 78 72 81   < > >90   GFRESTBLACK  --   --   --   --   --  79 >90 84 >90   < > >90   MIRNA  --  9.4 9.1 8.9  --   --  10.2* 9.2 9.7   < > 9.1   BILITOTAL 0.3 0.4  --  0.3   < > 0.3  --  0.4 0.5   < > 0.4   ALBUMIN 3.8 3.9  --  3.9   < > 4.0  --  3.9 4.1   < > 4.0   PROTTOTAL 7.3 7.4  --  7.5   < > 7.7  --  7.7 8.2   < > 7.4   ALKPHOS 87 86  --  97   < > 83  --  117 100   < > 80   AST 17 26  --  18   < > 20  --  23 23   < > 36   ALT 26 44  --  32   < > 35  --  36 45   < > 52*    < > = values in this interval not displayed.     Calcium/VitaminD  Recent Labs   Lab Test 07/18/22  1411 03/30/22  1428 02/18/22  0657 01/14/22  0718 05/21/21  0954   MIRNA  --  9.4 9.1 8.9  --    VITDT 39  --   --  34 39     ESR/CRP  Recent Labs   Lab Test 07/18/22  1411 03/30/22  1428 01/14/22  0718   SED 22 20 19   CRP 5.6 <2.9 5.1     Hepatitis B  Recent Labs   Lab Test 01/14/20  1221   HBCAB Nonreactive   HEPBANG Nonreactive     Hepatitis C  Recent Labs   Lab Test 01/14/20  1221   HCVAB Nonreactive     Lyme ab screening  Recent Labs   Lab Test 01/14/20  1221   LYMEGM 0.05     Tuberculosis Screening  Recent Labs   Lab Test 09/17/21  0945 01/14/20  1221   TBRES Negative Negative     HIV Screening  Recent Labs   Lab Test 08/14/20  0736   HIAGAB Nonreactive     [facility]   7/18/2013 CCP negative  8/5/2010 CCP negative  3/27/2013 hepatitis B surface antigen negative and hepatitis B core antibody negative  9/2/2011 hepatitis B surface antigen negative and hepatitis B core antibody negative  3/27/2013 hepatitis C antibody negative    Immunization History     Immunization History   Administered Date(s) Administered     COVID-19,PF,Pfizer (12+ Yrs) 03/19/2021, 04/09/2021, 10/12/2021     FLU 6-35 months 10/22/2013     Influenza Quad, Recombinant, pf(RIV4) (Flublok) 10/12/2021     Influenza Vaccine IM > 6 months Valent IIV4 (Alfuria,Fluzone) 10/21/2015, 10/05/2016, 10/11/2018, 09/25/2019, 09/24/2020     Influenza Vaccine, 6+MO IM (QUADRIVALENT W/PRESERVATIVES) 09/18/2014, 10/03/2017     Pneumo Conj 13-V (2010&after) 10/11/2018     Pneumococcal 23 valent 12/09/2020     TD (ADULT, 7+) 04/20/1997, 04/17/2019     TDAP Vaccine (Adacel) 01/15/2009     Zoster vaccine recombinant adjuvanted (SHINGRIX) 10/11/2018          Chart documentation done in part with Dragon Voice recognition Software. Although reviewed after completion, some word and grammatical error may remain.    Terrence Carrillo MD

## 2022-08-17 NOTE — PATIENT INSTRUCTIONS
RHEUMATOLOGY    Dr. Terrence Carrillo    [facility] [place]  [address] [place] [address]  [place] [address]  Phone number: [phone]  Fax number: [phone]      Thank you for choosing [facility]!    Madeline Cheung CMA Rheumatology    --------------------------    Reduce celebrex to be 100mg daily. See if this lower dose is tolerated.  If you continue to do well after 1 month then discontinue celebrex.         DEXA Scan (bone scan) please call and schedule your appointment at [phone].

## 2022-08-19 ENCOUNTER — OFFICE VISIT (OUTPATIENT)
Dept: FAMILY MEDICINE | Facility: CLINIC | Age: 62
End: 2022-08-19
Payer: COMMERCIAL

## 2022-08-19 VITALS
BODY MASS INDEX: 29.15 KG/M2 | TEMPERATURE: 98.4 F | SYSTOLIC BLOOD PRESSURE: 121 MMHG | RESPIRATION RATE: 20 BRPM | HEART RATE: 72 BPM | OXYGEN SATURATION: 96 % | WEIGHT: 159.4 LBS | DIASTOLIC BLOOD PRESSURE: 66 MMHG

## 2022-08-19 DIAGNOSIS — I10 HYPERTENSION GOAL BP (BLOOD PRESSURE) < 140/90: ICD-10-CM

## 2022-08-19 DIAGNOSIS — Z23 HIGH PRIORITY FOR 2019-NCOV VACCINE: ICD-10-CM

## 2022-08-19 DIAGNOSIS — J45.20 MILD INTERMITTENT ASTHMA WITHOUT COMPLICATION: ICD-10-CM

## 2022-08-19 DIAGNOSIS — T78.2XXS ANAPHYLAXIS, SEQUELA: ICD-10-CM

## 2022-08-19 DIAGNOSIS — J30.89 PERENNIAL ALLERGIC RHINITIS: ICD-10-CM

## 2022-08-19 DIAGNOSIS — E87.6 HYPOKALEMIA: ICD-10-CM

## 2022-08-19 DIAGNOSIS — F13.20 BENZODIAZEPINE DEPENDENCE, EPISODIC (H): ICD-10-CM

## 2022-08-19 DIAGNOSIS — E78.5 HYPERLIPIDEMIA, UNSPECIFIED HYPERLIPIDEMIA TYPE: ICD-10-CM

## 2022-08-19 DIAGNOSIS — E11.65 TYPE 2 DIABETES MELLITUS WITH HYPERGLYCEMIA, WITHOUT LONG-TERM CURRENT USE OF INSULIN (H): Primary | ICD-10-CM

## 2022-08-19 LAB
CHOLEST SERPL-MCNC: 202 MG/DL
FASTING STATUS PATIENT QL REPORTED: YES
HBA1C MFR BLD: 6.4 % (ref 0–5.6)
HDLC SERPL-MCNC: 57 MG/DL
LDLC SERPL CALC-MCNC: 127 MG/DL
NONHDLC SERPL-MCNC: 145 MG/DL
TRIGL SERPL-MCNC: 88 MG/DL

## 2022-08-19 PROCEDURE — 80061 LIPID PANEL: CPT | Performed by: PHYSICIAN ASSISTANT

## 2022-08-19 PROCEDURE — 36415 COLL VENOUS BLD VENIPUNCTURE: CPT | Performed by: PHYSICIAN ASSISTANT

## 2022-08-19 PROCEDURE — 0054A COVID-19,PF,PFIZER (12+ YRS): CPT | Performed by: PHYSICIAN ASSISTANT

## 2022-08-19 PROCEDURE — 83036 HEMOGLOBIN GLYCOSYLATED A1C: CPT | Performed by: PHYSICIAN ASSISTANT

## 2022-08-19 PROCEDURE — 96127 BRIEF EMOTIONAL/BEHAV ASSMT: CPT | Performed by: PHYSICIAN ASSISTANT

## 2022-08-19 PROCEDURE — 91305 COVID-19,PF,PFIZER (12+ YRS): CPT | Performed by: PHYSICIAN ASSISTANT

## 2022-08-19 PROCEDURE — 99214 OFFICE O/P EST MOD 30 MIN: CPT | Mod: 25 | Performed by: PHYSICIAN ASSISTANT

## 2022-08-19 RX ORDER — ALBUTEROL SULFATE 90 UG/1
1-2 AEROSOL, METERED RESPIRATORY (INHALATION) EVERY 4 HOURS PRN
Qty: 18 G | Refills: 5 | Status: SHIPPED | OUTPATIENT
Start: 2022-08-19 | End: 2023-01-01

## 2022-08-19 RX ORDER — CETIRIZINE HYDROCHLORIDE 10 MG/1
10 TABLET ORAL DAILY
Qty: 90 TABLET | Refills: 3 | Status: SHIPPED | OUTPATIENT
Start: 2022-08-19 | End: 2023-01-01

## 2022-08-19 RX ORDER — FLUVASTATIN 20 MG/1
20 CAPSULE ORAL AT BEDTIME
Qty: 90 CAPSULE | Refills: 3 | Status: SHIPPED | OUTPATIENT
Start: 2022-08-19 | End: 2023-01-01

## 2022-08-19 RX ORDER — GLIMEPIRIDE 2 MG/1
2 TABLET ORAL
Qty: 90 TABLET | Refills: 1 | Status: SHIPPED | OUTPATIENT
Start: 2022-08-19 | End: 2023-01-24

## 2022-08-19 RX ORDER — EPINEPHRINE 0.3 MG/.3ML
0.3 INJECTION SUBCUTANEOUS PRN
Qty: 2 EACH | Refills: 0 | Status: SHIPPED | OUTPATIENT
Start: 2022-08-19 | End: 2023-01-01

## 2022-08-19 RX ORDER — CLONAZEPAM 0.5 MG/1
TABLET ORAL
Qty: 100 TABLET | Refills: 1 | Status: SHIPPED | OUTPATIENT
Start: 2022-08-19 | End: 2023-02-01

## 2022-08-19 RX ORDER — POTASSIUM CHLORIDE 750 MG/1
10 TABLET, EXTENDED RELEASE ORAL 2 TIMES DAILY
Qty: 180 TABLET | Refills: 3 | Status: SHIPPED | OUTPATIENT
Start: 2022-08-19 | End: 2024-01-01

## 2022-08-19 ASSESSMENT — ASTHMA QUESTIONNAIRES
QUESTION_1 LAST FOUR WEEKS HOW MUCH OF THE TIME DID YOUR ASTHMA KEEP YOU FROM GETTING AS MUCH DONE AT WORK, SCHOOL OR AT HOME: A LITTLE OF THE TIME
ACT_TOTALSCORE: 22
QUESTION_5 LAST FOUR WEEKS HOW WOULD YOU RATE YOUR ASTHMA CONTROL: WELL CONTROLLED
ACT_TOTALSCORE: 22
QUESTION_4 LAST FOUR WEEKS HOW OFTEN HAVE YOU USED YOUR RESCUE INHALER OR NEBULIZER MEDICATION (SUCH AS ALBUTEROL): NOT AT ALL
QUESTION_3 LAST FOUR WEEKS HOW OFTEN DID YOUR ASTHMA SYMPTOMS (WHEEZING, COUGHING, SHORTNESS OF BREATH, CHEST TIGHTNESS OR PAIN) WAKE YOU UP AT NIGHT OR EARLIER THAN USUAL IN THE MORNING: NOT AT ALL
QUESTION_2 LAST FOUR WEEKS HOW OFTEN HAVE YOU HAD SHORTNESS OF BREATH: ONCE OR TWICE A WEEK

## 2022-08-19 ASSESSMENT — ANXIETY QUESTIONNAIRES
IF YOU CHECKED OFF ANY PROBLEMS ON THIS QUESTIONNAIRE, HOW DIFFICULT HAVE THESE PROBLEMS MADE IT FOR YOU TO DO YOUR WORK, TAKE CARE OF THINGS AT HOME, OR GET ALONG WITH OTHER PEOPLE: SOMEWHAT DIFFICULT
6. BECOMING EASILY ANNOYED OR IRRITABLE: NOT AT ALL
GAD7 TOTAL SCORE: 3
8. IF YOU CHECKED OFF ANY PROBLEMS, HOW DIFFICULT HAVE THESE MADE IT FOR YOU TO DO YOUR WORK, TAKE CARE OF THINGS AT HOME, OR GET ALONG WITH OTHER PEOPLE?: SOMEWHAT DIFFICULT
7. FEELING AFRAID AS IF SOMETHING AWFUL MIGHT HAPPEN: NOT AT ALL
2. NOT BEING ABLE TO STOP OR CONTROL WORRYING: SEVERAL DAYS
GAD7 TOTAL SCORE: 3
7. FEELING AFRAID AS IF SOMETHING AWFUL MIGHT HAPPEN: NOT AT ALL
4. TROUBLE RELAXING: NOT AT ALL
GAD7 TOTAL SCORE: 3
3. WORRYING TOO MUCH ABOUT DIFFERENT THINGS: SEVERAL DAYS
5. BEING SO RESTLESS THAT IT IS HARD TO SIT STILL: NOT AT ALL
1. FEELING NERVOUS, ANXIOUS, OR ON EDGE: SEVERAL DAYS

## 2022-08-19 ASSESSMENT — PATIENT HEALTH QUESTIONNAIRE - PHQ9
10. IF YOU CHECKED OFF ANY PROBLEMS, HOW DIFFICULT HAVE THESE PROBLEMS MADE IT FOR YOU TO DO YOUR WORK, TAKE CARE OF THINGS AT HOME, OR GET ALONG WITH OTHER PEOPLE: SOMEWHAT DIFFICULT
SUM OF ALL RESPONSES TO PHQ QUESTIONS 1-9: 3
SUM OF ALL RESPONSES TO PHQ QUESTIONS 1-9: 3

## 2022-08-19 ASSESSMENT — PAIN SCALES - GENERAL: PAINLEVEL: SEVERE PAIN (6)

## 2022-08-19 NOTE — PATIENT INSTRUCTIONS
Follow up with pain management in October with a video visit.  Follow up with Dr. Carrillo in November.

## 2022-08-19 NOTE — PROGRESS NOTES
"  Assessment & Plan       ICD-10-CM    1. Type 2 diabetes mellitus with hyperglycemia, without long-term current use of insulin (H)  E11.65 Hemoglobin A1c     glimepiride (AMARYL) 2 MG tablet     metFORMIN (GLUCOPHAGE) 500 MG tablet   2. Hyperlipidemia, unspecified hyperlipidemia type  E78.5 Lipid panel reflex to direct LDL Non-fasting     fluvastatin (LESCOL) 20 MG capsule     Lipid panel reflex to direct LDL Non-fasting   3. Hypertension goal BP (blood pressure) < 140/90  I10 potassium chloride ER (KLOR-CON M) 10 MEQ CR tablet   4. Hypokalemia  E87.6    5. Benzodiazepine dependence, episodic (H)  F13.20 clonazePAM (KLONOPIN) 0.5 MG tablet   6. Mild intermittent asthma without complication  J45.20 PROAIR  (90 Base) MCG/ACT inhaler   7. Perennial allergic rhinitis  J30.89 cetirizine (ZYRTEC) 10 MG tablet   8. Anaphylaxis, sequela  T78.2XXS EPINEPHrine (ANY BX GENERIC EQUIV) 0.3 MG/0.3ML injection 2-pack   9. High priority for 2019-nCoV vaccine  Z23         1-4) Labs in process. Meds renewed, no changes. Blood pressure at goal on recheck.     5) Clonazepam renewed, no change    6-8) Meds renewed, no changes      Ordering of each unique test  Prescription drug management         BMI:   Estimated body mass index is 29.15 kg/m  as calculated from the following:    Height as of 1/19/22: 1.575 m (5' 2\").    Weight as of this encounter: 72.3 kg (159 lb 6.4 oz).       Return in about 6 months (around 2/19/2023) for a med check, fasting labs, with Katerina, in person.    ROBY [facility] KIMBERLY Leggett is a 62 year old, presenting for the following health issues:  Recheck Medication and Pain      History of Present Illness       Diabetes:   She presents for follow up of diabetes.  She is checking home blood glucose a few times a month. She checks blood glucose before meals.  Blood glucose is never over 200 and sometimes under 70. She is aware of hypoglycemia symptoms " including confusion and other. She has no concerns regarding her diabetes at this time.  She is not experiencing numbness or burning in feet, excessive thirst, blurry vision, weight changes or redness, sores or blisters on feet.         She eats 2-3 servings of fruits and vegetables daily.She consumes 2 sweetened beverage(s) daily.She exercises with enough effort to increase her heart rate 10 to 19 minutes per day.  She exercises with enough effort to increase her heart rate 3 or less days per week.   She is taking medications regularly.    Today's PHQ-9         PHQ-9 Total Score: 3    PHQ-9 Q9 Thoughts of better off dead/self-harm past 2 weeks :   Not at all    How difficult have these problems made it for you to do your work, take care of things at home, or get along with other people: Somewhat difficult  Today's BONNIE-7 Score: 3     Hyperlipidemia Follow-Up      Are you regularly taking any medication or supplement to lower your cholesterol?   Yes- fluvastatin (LESCOL) 20 MG capsule    Are you having muscle aches or other side effects that you think could be caused by your cholesterol lowering medication?  Yes- possibly - patient is unsure    Hypertension Follow-up      Do you check your blood pressure regularly outside of the clinic? No     Are you following a low salt diet? Yes    Are your blood pressures ever more than 140 on the top number (systolic) OR more   than 90 on the bottom number (diastolic), for example 140/90? No    BP Readings from Last 2 Encounters:   08/19/22 121/66   08/17/22 (!) 146/68     Hemoglobin A1C (%)   Date Value   02/18/2022 6.6 (H)   09/24/2021 7.2 (H)   06/09/2021 7.1 (H)   03/09/2021 8.0 (H)     LDL Cholesterol Calculated (mg/dL)   Date Value   02/18/2022 106 (H)   06/09/2021 130 (H)   12/09/2020 129 (H)       Depression and Anxiety Follow-Up    How are you doing with your depression since your last visit? Up and down    How are you doing with your anxiety since your last visit?  Up  and down    Are you having other symptoms that might be associated with depression or anxiety? Yes:  panic    Have you had a significant life event? No     Do you have any concerns with your use of alcohol or other drugs? No    Social History     Tobacco Use     Smoking status: Former Smoker     Start date: 1978     Quit date: 1998     Years since quittin.6     Smokeless tobacco: Never Used     Tobacco comment: 1 pack a day    Vaping Use     Vaping Use: Never used   Substance Use Topics     Alcohol use: Yes     Comment: very rare     Drug use: Yes     Types: Marijuana     PHQ 2021   PHQ-9 Total Score 4 6 3   Q9: Thoughts of better off dead/self-harm past 2 weeks Not at all Not at all Not at all     BONNIE-7 SCORE 2021   Total Score - 4 (minimal anxiety) 3 (minimal anxiety)   Total Score 2 4 3     Suicide Assessment Five-step Evaluation and Treatment (SAFE-T)      Pain History:  When did you first notice your pain? - Chronic Pain   Have you seen this provider for your pain in the past?   Yes   Where in your body do you hearave pain? All over - has RA  Are you seeing anyone else for your pain? Yes - Dr. Carrillo [facility]    Seeing pain management      PHQ-9 SCORE 2021   PHQ-9 Total Score MyChart - 6 (Mild depression) 3 (Minimal depression)   PHQ-9 Total Score 4 6 3       BONNIE-7 SCORE 2021   Total Score - 4 (minimal anxiety) 3 (minimal anxiety)   Total Score 2 4 3           PEG Score 2022   PEG Total Score 5.67 5 5     PDMP Review       Value Time User    State PDMP site checked  Yes 2022  3:01 PM Ronda Vasquez APRN CNP        Last CSA Agreement:   CSA -- Patient Level:    Controlled Substance Agreement - Opioid - Scan on 2022 11:38 AM  Controlled Substance Agreement - Opioid - Scan on 2022  9:56 AM  Controlled Substance Agreement - Opioid - Scan on 2022 11:01  AM  Controlled Substance Agreement - Opioid - Scan on 1/11/2021  4:28 PM  Controlled Substance Agreement - Opioid - Scan on 10/21/2020  4:50 PM       Last UDS: 8/16/2022          Review of Systems   Constitutional, cardiovascular, musculoskeletal, neuro, endocrine and psych systems are negative, except as otherwise noted.      Objective    /66 (BP Location: Left arm, Patient Position: Sitting, Cuff Size: Adult Regular)   Pulse 72   Temp 98.4  F (36.9  C) (Tympanic)   Resp 20   Wt 72.3 kg (159 lb 6.4 oz)   LMP  (LMP Unknown)   SpO2 96%   Breastfeeding No   BMI 29.15 kg/m    Body mass index is 29.15 kg/m .  Physical Exam   GENERAL: healthy, alert and no distress  MS: no gross musculoskeletal defects noted, no edema  SKIN: no suspicious lesions or rashes  NEURO: Normal strength and tone, mentation intact and speech normal  PSYCH: mentation appears normal, affect normal/bright              .  ..

## 2022-08-31 LAB — BACTERIA SPEC CULT: ABNORMAL

## 2022-09-21 ENCOUNTER — OFFICE VISIT (OUTPATIENT)
Dept: OTOLARYNGOLOGY | Facility: CLINIC | Age: 62
End: 2022-09-21
Payer: COMMERCIAL

## 2022-09-21 VITALS
DIASTOLIC BLOOD PRESSURE: 54 MMHG | WEIGHT: 172 LBS | SYSTOLIC BLOOD PRESSURE: 126 MMHG | BODY MASS INDEX: 31.65 KG/M2 | TEMPERATURE: 97.5 F | HEIGHT: 62 IN | OXYGEN SATURATION: 99 %

## 2022-09-21 DIAGNOSIS — H72.91 PERFORATION OF RIGHT TYMPANIC MEMBRANE: ICD-10-CM

## 2022-09-21 DIAGNOSIS — H92.11 OTORRHEA, RIGHT: Primary | ICD-10-CM

## 2022-09-21 DIAGNOSIS — H90.A31 MIXED CONDUCTIVE AND SENSORINEURAL HEARING LOSS OF RIGHT EAR WITH RESTRICTED HEARING OF LEFT EAR: ICD-10-CM

## 2022-09-21 PROCEDURE — 87102 FUNGUS ISOLATION CULTURE: CPT | Mod: 90 | Performed by: PATHOLOGY

## 2022-09-21 PROCEDURE — 99000 SPECIMEN HANDLING OFFICE-LAB: CPT | Performed by: PATHOLOGY

## 2022-09-21 PROCEDURE — 87107 FUNGI IDENTIFICATION MOLD: CPT | Mod: 90 | Performed by: PATHOLOGY

## 2022-09-21 PROCEDURE — 92504 EAR MICROSCOPY EXAMINATION: CPT | Performed by: OTOLARYNGOLOGY

## 2022-09-21 PROCEDURE — 87070 CULTURE OTHR SPECIMN AEROBIC: CPT | Mod: 90 | Performed by: PATHOLOGY

## 2022-09-21 PROCEDURE — 99213 OFFICE O/P EST LOW 20 MIN: CPT | Mod: 25 | Performed by: OTOLARYNGOLOGY

## 2022-09-21 ASSESSMENT — PAIN SCALES - GENERAL: PAINLEVEL: MODERATE PAIN (5)

## 2022-09-21 ASSESSMENT — PATIENT HEALTH QUESTIONNAIRE - PHQ9: SUM OF ALL RESPONSES TO PHQ QUESTIONS 1-9: 10

## 2022-09-21 NOTE — LETTER
"2022      RE: Betsey Vanessa  [address]  [place] [address]         Otolaryngology Clinic      Name: Betsey Vanessa  MRN: [id number]  Age: 62 year old  : 2020      Chief Complaint:   Follow up    History of Present Illness:   Betsey Vanessa is a 62 year old female with a right BAHA as well as Menière's disease s/p left labyrinthectomy who presents for follow up. At our last appointment on 8/3/22, she endorsed some set up issues with her BAHA, and she did not feel that her hearing was improved with the BAHA when compared to a right traditional hearing aid. However, using a traditional hearing aid leads to recurrent infections with otorrhea and then she cannot wear the hearing aid.    Today, she reports she has been using amphotericin drops for the fungal ear infection. She endorses mild pain in the right ear. She continues to have difficulty with her BAHA set up.     Physical Exam:   /54   Temp 97.5  F (36.4  C)   Ht 1.575 m (5' 2\")   Wt 78 kg (172 lb)   LMP  (LMP Unknown)   SpO2 99%   BMI 31.46 kg/m       Female in no acute distress.  Alert and answering questions appropriately.  HB 1/6 bilaterally.  Bilateral ears examined under the microscope. Right ear canal clear, granulation tissue on TM on the thickened TM with otorrhea, middle ear mucosa edematous with otorrhea. This was again cultured. Left ear canal clear, TM intact, aerated middle ear.     Assessment and Plan:  Betsey Vanessa is a 62 year old female who presents for follow up. Right ear was cultured. We will contact her with results. I advised continued use of drops. If she grows fungus again, we will request the lab run sensitivities as we had not done that with the last culture. We will arrange her next follow up once we have the culture results and get her on directed therapy.    With the excessive feedback and sound issues with her BAHA, we discussed replacing her current BAHA with another type of BAHA as her " current abutment is too close to her auricle to wear the processor on the right. She has an appointment in October with Audiology to discuss other osseointegrated implant with sound processor options and she may be a candidate for an Osia. She is quite eager to have a different type of BAHA.      Scribe Disclosure:  Bonita PHILIP, am serving as a scribe to document services personally performed by Laura Stallings MD at this visit, based upon the provider's statements to me. All documentation has been reviewed by the aforementioned provider prior to being entered into the official medical record.     Scribe Preparation Attestation:  Bonita PHILIP, a scribe, prepared the chart for today's encounter.      The documentation recorded by the scribe accurately reflects the services I personally performed and the decisions made by me.        Laura Stallings MD

## 2022-09-21 NOTE — NURSING NOTE
"Chief Complaint   Patient presents with     RECHECK     Follow up      Blood pressure 126/54, temperature 97.5  F (36.4  C), height 1.575 m (5' 2\"), weight 78 kg (172 lb), SpO2 99 %, not currently breastfeeding.    Leonides Cook LPN    "

## 2022-09-21 NOTE — PATIENT INSTRUCTIONS
1. You were seen in the ENT Clinic today by Dr. Stallings.  If you have any questions or concerns after your appointment, please call   - Option 1: ENT Clinic: [phone]   - Option 2: Ni (Dr. Stallings's Nurse): [phone]                   (Dr. Stallings's Nurse): [phone]    2.   Plan to return to clinic to do BAHA consult and follow up same day with Dr. Stallings    3. [facility] ear culture- will call with the results    How to Contact Us:  Send a Un[employer] message to your provider. Our team will respond to you via Un[employer]. Occasionally, we will need to call you to get further information.  For urgent matters (Monday-Friday), call the ENT Clinic: [phone] and speak with a call center team member - they will route your call appropriately.   If you'd like to speak directly with a nurse, please find our contact information below. We do our best to check voicemail frequently throughout the day, and will work to call you back within 1-2 days. For urgent matters, please use the general clinic phone numbers listed above.       Ni Galvan LPN  [facility] - Otolaryngology to

## 2022-09-21 NOTE — PROGRESS NOTES
"  Otolaryngology Clinic      Name: Betsey Vanessa  MRN: [id number]  Age: 62 year old  : 2020      Chief Complaint:   Follow up    History of Present Illness:   Betsey Vanessa is a 62 year old female with a right BAHA as well as Menière's disease s/p left labyrinthectomy who presents for follow up. At our last appointment on 8/3/22, she endorsed some set up issues with her BAHA, and she did not feel that her hearing was improved with the BAHA when compared to a right traditional hearing aid. However, using a traditional hearing aid leads to recurrent infections with otorrhea and then she cannot wear the hearing aid.    Today, she reports she has been using amphotericin drops for the fungal ear infection. She endorses mild pain in the right ear. She continues to have difficulty with her BAHA set up.     Physical Exam:   /54   Temp 97.5  F (36.4  C)   Ht 1.575 m (5' 2\")   Wt 78 kg (172 lb)   LMP  (LMP Unknown)   SpO2 99%   BMI 31.46 kg/m       Female in no acute distress.  Alert and answering questions appropriately.  HB 1/6 bilaterally.  Bilateral ears examined under the microscope. Right ear canal clear, granulation tissue on TM on the thickened TM with otorrhea, middle ear mucosa edematous with otorrhea. This was again cultured. Left ear canal clear, TM intact, aerated middle ear.     Assessment and Plan:  Betsey Vanessa is a 62 year old female who presents for follow up. Right ear was cultured. We will contact her with results. I advised continued use of drops. If she grows fungus again, we will request the lab run sensitivities as we had not done that with the last culture. We will arrange her next follow up once we have the culture results and get her on directed therapy.    With the excessive feedback and sound issues with her BAHA, we discussed replacing her current BAHA with another type of BAHA as her current abutment is too close to her auricle to wear the processor on the right. " She has an appointment in October with Audiology to discuss other osseointegrated implant with sound processor options and she may be a candidate for an Osia. She is quite eager to have a different type of BAHA.      Scribe Disclosure:  I, Bonita Irvin, am serving as a scribe to document services personally performed by Laura Stallings MD at this visit, based upon the provider's statements to me. All documentation has been reviewed by the aforementioned provider prior to being entered into the official medical record.     Scribe Preparation Attestation:  Bonita PHILIP, a scribe, prepared the chart for today's encounter.      The documentation recorded by the scribe accurately reflects the services I personally performed and the decisions made by me.

## 2022-09-21 NOTE — Clinical Note
"2022       RE: Betsey Vanessa  [address]  [place] [facility] [address]     Dear Colleague,    Thank you for referring your patient, Betsey Vanessa, to the [facility] [place] [facility]. Please see a copy of my visit note below.      Otolaryngology Clinic      Name: Betsey Vanessa  MRN: [id number]  Age: 62 year old  : 2020      Chief Complaint:   Follow up    History of Present Illness:   Betsey Vanessa is a 62 year old female with a right BAHA as well as Menière's disease who presents for follow up. At our last appointment on 8/3/22, she endorsed some set up issues with her BAHA, and she did not feel that her hearing was improved when compared to the hearing aids. The discomfort along the BAHA site was nonexistent at the time.    Today, she reports she has been using amphotericin drops for the fungal ear infection. She endorses ear soreness in the right ear. Here, there is a lot of feedback from the BAHA.     Physical Exam:   /54   Temp 97.5  F (36.4  C)   Ht 1.575 m (5' 2\")   Wt 78 kg (172 lb)   LMP  (LMP Unknown)   SpO2 99%   BMI 31.46 kg/m       Physical examination:  Female in no acute distress.  Alert and answering questions appropriately.  HB 1/6 bilaterally.  Bilateral ears examined under the microscope. Right ear granulation tissue on TM. Middle ear mucosa is swollen with drainage. TM thickened. Left ear is healthy.     Assessment and Plan:  Betsey Vanessa is a 62 year old female who presents for follow up. Right ear was cultured. We will contact her with results. I advised continued use of drops. With the excessive feedback, we discussed replacing her current BAHA with another hearing aid, like Osia, and I explained that we may need to remove the implant as well.    Follow-up: No follow-ups on file.       Scribe Disclosure:  I, Bonita Irvin, am serving as a scribe to document services " "personally performed by Laura Stallings MD at this visit, based upon the provider's statements to me. All documentation has been reviewed by the aforementioned provider prior to being entered into the official medical record.     Scribe Preparation Attestation:  Bonita PHILIP, a scribe, prepared the chart for today's encounter.          Otolaryngology Clinic      Name: Betsey Vanessa  MRN: [id number]  Age: 62 year old  : 2020      Chief Complaint:   Follow up    History of Present Illness:   Betsey Vanessa is a 62 year old female with a right BAHA as well as Menière's disease s/p left labyrinthectomy who presents for follow up. At our last appointment on 8/3/22, she endorsed some set up issues with her BAHA, and she did not feel that her hearing was improved with the BAHA when compared to a right traditional hearing aid. However, using a traditional hearing aid leads to recurrent infections with otorrhea and then she cannot wear the hearing aid.    Today, she reports she has been using amphotericin drops for the fungal ear infection. She endorses mild pain in the right ear. She continues to have difficulty with her BAHA set up.     Physical Exam:   /54   Temp 97.5  F (36.4  C)   Ht 1.575 m (5' 2\")   Wt 78 kg (172 lb)   LMP  (LMP Unknown)   SpO2 99%   BMI 31.46 kg/m       Female in no acute distress.  Alert and answering questions appropriately.  HB 1/6 bilaterally.  Bilateral ears examined under the microscope. Right ear canal clear, granulation tissue on TM on the thickened TM with otorrhea, middle ear mucosa edematous with otorrhea. This was again cultured. Left ear canal clear, TM intact, aerated middle ear.     Assessment and Plan:  Betsey Vanessa is a 62 year old female who presents for follow up. Right ear was cultured. We will contact her with results. I advised continued use of drops. If she grows fungus again, we will request the lab run sensitivities as we had not done that " with the last culture. We will arrange her next follow up once we have the culture results and get her on directed therapy.    With the excessive feedback and sound issues with her BAHA, we discussed replacing her current BAHA with another type of BAHA as her current abutment is too close to her auricle to wear the processor on the right. She has an appointment in October with Audiology to discuss other osseointegrated implant with sound processor options and she may be a candidate for an Osia. She is quite eager to have a different type of BAHA.      Scribe Disclosure:  Bonita PHILIP, am serving as a scribe to document services personally performed by Laura Stallings MD at this visit, based upon the provider's statements to me. All documentation has been reviewed by the aforementioned provider prior to being entered into the official medical record.     Scribe Preparation Attestation:  Bonita PHILIP, a scribe, prepared the chart for today's encounter.      The documentation recorded by the scribe accurately reflects the services I personally performed and the decisions made by me.        Again, thank you for allowing me to participate in the care of your patient.      Sincerely,    Laura Stallings MD

## 2022-09-23 LAB — BACTERIA SPEC CULT: NO GROWTH

## 2022-09-27 DIAGNOSIS — M06.09 RHEUMATOID ARTHRITIS OF MULTIPLE SITES WITHOUT RHEUMATOID FACTOR (H): ICD-10-CM

## 2022-09-27 DIAGNOSIS — F11.20 CONTINUOUS OPIOID DEPENDENCE (H): ICD-10-CM

## 2022-09-27 DIAGNOSIS — G89.4 CHRONIC PAIN SYNDROME: ICD-10-CM

## 2022-09-27 NOTE — TELEPHONE ENCOUNTER
Patient requesting refill(s) of HYDROcodone-acetaminophen (NORCO) 7.5-325 MG per tablet   Last dispensed from pharmacy on 09/01/22    fentaNYL (DURAGESIC) 12 mcg/hr 72 hr patch  Last dispensed from pharmacy on 09/01/22    Patient's last office/virtual visit by prescribing provider on 8/11/22  Next office/virtual appointment scheduled for 10/06/22    Last urine drug screen date 8/11/22  Current opioid agreement on file (completed within the last year) Yes Date of opioid agreement: 8/12/22    E-prescribe to [facility] [address] [place] [address], [place]     Will route to nursing [place] for review and preparation of prescription(s).

## 2022-09-27 NOTE — TELEPHONE ENCOUNTER
[facility] Call Center    Phone Message    May a detailed message be left on voicemail: yes     Reason for Call: Medication Refill Request    Has the patient contacted the pharmacy for the refill? Yes   Name of medication being requested: HYDROcodone-acetaminophen (NORCO) 7.5-325 MG per tablet, fentaNYL (DURAGESIC) 12 mcg/hr 72 hr patch  Provider who prescribed the medication: Christina  Pharmacy:    [facility] [address] [place] [address] [id number]      Date medication is needed: As soon as possible         Action Taken: Other: Pain    Travel Screening: Not Applicable

## 2022-09-28 NOTE — TELEPHONE ENCOUNTER
Medication refill information reviewed.     Last due FOR BOTH:  Fill 8/30/22 start on/after 09/1/22 to last 30 days   Due date:  10/1/22      Prescriptions prepped for review.     Sanjuanita RN-BSN  [facility]

## 2022-09-29 RX ORDER — HYDROCODONE BITARTRATE AND ACETAMINOPHEN 7.5; 325 MG/1; MG/1
.5-1 TABLET ORAL EVERY 6 HOURS PRN
Qty: 45 TABLET | Refills: 0 | Status: SHIPPED | OUTPATIENT
Start: 2022-09-29 | End: 2022-10-18

## 2022-09-29 RX ORDER — FENTANYL 12.5 UG/1
1 PATCH TRANSDERMAL
Qty: 10 PATCH | Refills: 0 | Status: SHIPPED | OUTPATIENT
Start: 2022-09-29 | End: 2022-10-18

## 2022-09-29 NOTE — TELEPHONE ENCOUNTER
Request reviewed -  appears appropriate. Refills sent to pharmacy.     Ronda Vasquez, VIDHI, APRN, AGNP-C  [facility]

## 2022-10-07 ENCOUNTER — TELEPHONE (OUTPATIENT)
Dept: OTOLARYNGOLOGY | Facility: CLINIC | Age: 62
End: 2022-10-07

## 2022-10-11 ENCOUNTER — VIRTUAL VISIT (OUTPATIENT)
Dept: PALLIATIVE MEDICINE | Facility: CLINIC | Age: 62
End: 2022-10-11
Payer: COMMERCIAL

## 2022-10-11 DIAGNOSIS — M06.09 RHEUMATOID ARTHRITIS OF MULTIPLE SITES WITHOUT RHEUMATOID FACTOR (H): Primary | ICD-10-CM

## 2022-10-11 DIAGNOSIS — F11.20 CONTINUOUS OPIOID DEPENDENCE (H): ICD-10-CM

## 2022-10-11 DIAGNOSIS — G89.4 CHRONIC PAIN SYNDROME: ICD-10-CM

## 2022-10-11 DIAGNOSIS — H62.40 FUNGAL EAR INFECTION: ICD-10-CM

## 2022-10-11 DIAGNOSIS — M79.18 MYOFASCIAL PAIN: ICD-10-CM

## 2022-10-11 DIAGNOSIS — B36.9 FUNGAL EAR INFECTION: ICD-10-CM

## 2022-10-11 PROCEDURE — 99214 OFFICE O/P EST MOD 30 MIN: CPT | Mod: 95

## 2022-10-11 ASSESSMENT — PAIN SCALES - GENERAL: PAINLEVEL: MODERATE PAIN (5)

## 2022-10-11 NOTE — PATIENT INSTRUCTIONS
1.  Pain Physical Therapy:  Continue home exercise program as tolerated, walking and yoga               2.  Pain Psychologist to address relaxation, behavioral change, coping style, and other factors important to improvement.  NO - she is coping well with chronic pain at this time. May consider in the future if needed.               3.  Diagnostic Studies:  None               4.  Medication Management:   Continue Norco and fentanyl as prescribed.  October refills sent to pharmacy.   Keep naloxone on hand at home in the event of accidental overdose.    Continue medical cannabis.   We will consider buprenorphine in the future. We discussed this again today, though I will need to collaborate with my colleague on transitioning from fentanyl patch to buprenorphine. I advised Betsey that we will revisit this at her next visit and decide from there if we think it is a reasonable option for her to trial.               5.  Urine toxicology screen - completed on 8/11/22.               6.  Opioid agreement - completed on 8/11/22.               8.  Follow up with ANIBAL Frank CNP in 8 weeks, video visit is okay for next appointment if preferred       ----------------------------------------------------------------  Clinic Number:  [phone]   Call with any questions about your care and for scheduling assistance.   Calls are returned Monday through Friday between 8 AM and 4:30 PM. We usually get back to you within 2 business days depending on the issue/request.    If we are prescribing your medications:  For opioid medication refills, call the clinic or send a Audiosocket message 7 days in advance.  Please include:  Name of requested medication  Name of the pharmacy.  For non-opioid medications, call your pharmacy directly to request a refill. Please allow 3-4 days to be processed.   Per [place] State Law:  All controlled substance prescriptions must be filled within 30 days of being written.    For those controlled substances  allowing refills, pickup must occur within 30 days of last fill.      We believe regular attendance is key to your success in our program!    Any time you are unable to keep your appointment we ask that you call us at least 24 hours in advance to cancel.This will allow us to offer the appointment time to another patient.   Multiple missed appointments may lead to dismissal from the clinic.

## 2022-10-11 NOTE — PROGRESS NOTES
Video-Visit Details    Video Start Time: 10:37 AM    Type of service:  Video Visit    Video End Time:11:01 AM    Originating Location (pt. Location): Home    Distant Location (provider location):  [facility] [place]     Platform used for Video Visit: Suman SHARIF [facility]     Date of visit: 10/11/2022      Assessment:   Chronic pain syndrome - Onset of back pain occurred many years ago, and she has pain in multiple joints. Etiology of pain is associated with multiple factors, including but not limited to, lumbar spondylosis, lumbar facet arthropathy, rheumatoid arthritis, and myofascial component.     Visit Diagnoses:  1. Rheumatoid arthritis of multiple sites without rheumatoid factor (H)    2. Myofascial pain    3. Chronic pain syndrome    4. Continuous opioid dependence (H)        Plan:  Diagnosis reviewed, treatment option addressed, and risk/benifits discussed.  Self-care instructions given.  I am recommending a multidisciplinary treatment plan to help this patient better manage their pain.      1.  Pain Physical Therapy:  Continue home exercise program as tolerated, walking and yoga               2.  Pain Psychologist to address relaxation, behavioral change, coping style, and other factors important to improvement.  NO - she is coping well with chronic pain at this time. May consider in the future if needed.               3.  Diagnostic Studies:  None               4.  Medication Management:   1. Continue Norco and fentanyl as prescribed.  October refills sent to pharmacy.   2. Keep naloxone on hand at home in the event of accidental overdose.    3. Continue medical cannabis.   4. We will consider buprenorphine in the future. We discussed this again today, though I will need to collaborate with my colleague on transitioning from fentanyl patch to buprenorphine. I advised Betsey that we will revisit this at her next visit and decide from there if we think it is a reasonable  option for her to trial.               5.  Urine toxicology screen - completed on 22.               6.  Opioid agreement - completed on 22.               8.  Follow up with ANIBAL Frank CNP in 8 weeks, video visit is okay for next appointment if preferred     Review of Electronic Chart: Today I have also reviewed available medical information in the patient's medical record at [facility] and Care Everywhere (if available), including relevant provider notes, laboratory work, and imaging.     Ronda Vasquez, VIDHI, ANIBAL, AGNP-C  [facility]     -------------------------------------------------    Subjective:    Chief complaint:   Chief Complaint   Patient presents with     Pain     Video visit due to COVID-19        Interval history:  Betsey Vanessa is a 62 year old female last seen on 22.  She is a patient of mine seen in follow up.     Recommendations/plan at the last visit included:  1.  Pain Physical Therapy:  Continue home exercise program as tolerated, walking and yoga               2.  Pain Psychologist to address relaxation, behavioral change, coping style, and other factors important to improvement.  NO - she is coping well with chronic pain at this time. May consider in the future if needed.               3.  Diagnostic Studies:  None               4.  Medication Management:   1. Continue Norco and fentanyl as prescribed.  September refills sent to pharmacy.   2. We will refill naloxone today, as home supply is .   3. We renewed certification for medical cannabis today. She was previously certified by different providers, but I am agreeable to manage annual certification in an effort to consolidate care, as her main reason for use is chronic intractable pain. She will bring information about products she is using to her next appointment.   4. We had a preliminary discussion about buprenorphine and will revisit at future visit in the next 6 months        "       5.  Urine toxicology screen performed today               6.  Opioid agreement signed today               8.  Follow up with ANIBAL Frank CNP in 8 weeks, video visit is okay for next appointment if preferred     Since her last visit, Betsey Vanessa reports:    -Pain has increased since last visit, \"pain has been nasty\" lately.   -She is still dealing with fungal infection, has follow up 10/27/22. Does not feel like infection is getting better and has   -She is still struggling with dizziness and nausea r/t fungal infection in ear.   -She has missed Orencia injection that she is using to tx RA due to fungal infection.   -She is interested in repeat lumbar facet injections with steroid, will need to check with ENT first.   -Medical cannabis:    1. Charleen SL spray (1:1 CBD and THC) - use 1-2 sprays up to 5 times daily PRN; uses this on her bad days, but often takes at bedtime, takes for short acting     2. Charleen oil tincture for long acting       Pain Information:   Pain rating: averages 7/10 on a 0-10 scale.      Current pain medications:   -Voltaren gel 1% PRN (somewhat helpful)  -Medical cannabis PRN (helpful)  -Celebrex 100mg BID (helpful)  -Fentanyl 12mcg/hr transdermal patch Q 72 hours (helpful)  -Norco 7.5/325mg take 0.5-1 tab Q 6 hours PRN pain (helpful, has been using this more)  -plaquenil 200mg every day and 400mg every other day  -hydroxyzine 25mg TID PRN severe itching (helpful, but doesn't need to use often)    Current MME: 40 (daily average with fentanyl patch and 1.5 tabs Norco)    Review of [place] Prescription Monitoring Program (): No concern for abuse or misuse of controlled medications based on this report. Reviewed on 10/11/22 - appears appropriate.     Annual Controlled Substance Agreement/UDS due date: Completed on 8/11/22    Injections:   -10/12/2020 bilateral L4-5, L5-S1 facet joint injections with Dr. Bailee Houser  (quite helpful)  -5/12/2021 bilateral L4-5, L5-S1 facet " joint injections with Dr. Bailee Houser  (quite helpful)  -7/5/22 bilateral L4-5, L5-S1 facet joint injections with Dr. Lion       Medications:  Current Outpatient Medications   Medication Sig Dispense Refill     alcohol swab prep pads Use to swab area of injection/rosie as directed 100 each 3     blood glucose (NO BRAND SPECIFIED) test strip Use to test blood sugar 2 times daily or as directed. To accompany: Blood Glucose Monitor Brands: per insurance. 100 strip 6     blood glucose calibration (NO BRAND SPECIFIED) solution Use to calibrate blood glucose monitor as needed as directed. To accompany: Blood Glucose Monitor Brands: per insurance. 1 Bottle 3     celecoxib (CELEBREX) 100 MG capsule Take 1 capsule (100 mg) by mouth 2 times daily 180 capsule 2     cetirizine (ZYRTEC) 10 MG tablet Take 1 tablet (10 mg) by mouth daily 90 tablet 3     cevimeline (EVOXAC) 30 MG capsule Take 1 capsule (30 mg) by mouth 3 times daily 270 capsule 2     clonazePAM (KLONOPIN) 0.5 MG tablet TAKE 1 TABLET BY MOUTH TWICE DAILY AS NEEDED FOR ANXIETY OK TO TAKE 1 TO 2 AT NIGHT AS NEEDED FOR SLEEP. SHOULD LAST ABOUT 90 DAYS 100 tablet 1     clotrimazole (LOTRIMIN) 1 % external solution Instill 5 drops to the right ear 5 minutes for 21 days. 10 mL 1     diclofenac (VOLTAREN) 1 % topical gel Place 2-4 g onto the skin 4 times daily . Max of 8g per dose. No more than 32g/day 100 g 3     EPINEPHrine (ANY BX GENERIC EQUIV) 0.3 MG/0.3ML injection 2-pack Inject 0.3 mLs (0.3 mg) into the muscle as needed for anaphylaxis 2 each 0     EQ ALLERGY RELIEF, CETIRIZINE, 10 MG tablet Take 1 tablet by mouth once daily 90 tablet 1     EQ STOOL SOFTENER 100 MG capsule Take 1 capsule by mouth twice daily 180 capsule 0     escitalopram (LEXAPRO) 20 MG tablet Take 1 tablet (20 mg) by mouth daily 90 tablet 3     fentaNYL (DURAGESIC) 12 mcg/hr 72 hr patch Place 1 patch onto the skin every 72 hours remove old patch. Fill 10/27/22 start on/after 10/29/22 to last  30 days 10 patch 0     fluorometholone (FML LIQUIFILM) 0.1 % ophthalmic suspension INSTILL 1 DROP INTO EACH EYE IN THE MORNING       fluticasone (FLONASE) 50 MCG/ACT nasal spray Spray 1-2 sprays into both nostrils daily 16 g 11     fluvastatin (LESCOL) 20 MG capsule Take 1 capsule (20 mg) by mouth At Bedtime 90 capsule 3     glimepiride (AMARYL) 2 MG tablet Take 1 tablet (2 mg) by mouth every morning (before breakfast) 90 tablet 1     glucose blood VI test strips strip daily       HYDROcodone-acetaminophen (NORCO) 7.5-325 MG per tablet Take 0.5-1 tablets by mouth every 6 hours as needed for severe pain Can take 1 tab per day and on 15 days of the month can take 2 tabs.  Fill 10/27/22 start on/after 10/29/22 to last 30 days 45 tablet 0     hydroxychloroquine (PLAQUENIL) 200 MG tablet Hydroxychloroquine 200mg daily; and an additional 200mg every other day. Yearly eye exam including 10-2 VF and SD- tablet 2     hydrOXYzine (ATARAX) 25 MG tablet Take 1 tablet (25 mg) by mouth 3 times daily as needed for itching 40 tablet 0     lifitegrast (XIIDRA) 5 % opthalmic solution Place 1 drop into both eyes 2 times daily 30 each 6     magnesium 250 MG tablet Take 1 tablet by mouth daily       medical cannabis (Patient's own supply) Take 1 Dose by mouth See Admin Instructions (The purpose of this order is to document that the patient reports taking medical cannabis.  This is not a prescription, and is not used to certify that the patient has a qualifying medical condition.)       metFORMIN (GLUCOPHAGE) 500 MG tablet Take 2 tablets (1,000 mg) by mouth 2 times daily (with meals) 360 tablet 1     metroNIDAZOLE (METROGEL) 0.75 % external gel Apply topically 2 times daily Apply to face daily 45 g 5     nystatin (MYCOSTATIN) 495155 UNIT/GM external powder Apply topically 2 times daily as needed 2 x daily PRN for rash 60 g 4     omeprazole (PRILOSEC) 20 MG DR capsule Take 1 capsule (20 mg) by mouth 2 times daily 180 capsule 3      pimecrolimus (ELIDEL) 1 % external cream Apply topically 2 times daily - use on back of neck and hands 60 g 5     potassium chloride ER (KLOR-CON M) 10 MEQ CR tablet Take 1 tablet (10 mEq) by mouth 2 times daily 180 tablet 3     PROAIR  (90 Base) MCG/ACT inhaler Inhale 1-2 puffs into the lungs every 4 hours as needed for shortness of breath / dyspnea or wheezing 18 g 5     promethazine (PHENERGAN) 25 MG tablet Take 1 tablet (25 mg) by mouth every 6 hours as needed for nausea or other (Meniere's) For nusea 90 tablet 0     psyllium (METAMUCIL) 58.6 % POWD Take by mouth daily PRN       thin (NO BRAND SPECIFIED) lancets Use to test blood sugar 2 times daily or as directed. To accompany: Blood Glucose Monitor Brands: per insurance. 1 each 3     triamterene-HCTZ (DYAZIDE) 37.5-25 MG capsule Take 1 capsule by mouth daily 90 capsule 3     COMPOUNDED NON-CONTROLLED SUBSTANCE (CMPD RX) - PHARMACY TO MIX COMPOUNDED MEDICATION Amphotericin 5mg/ml- 5 drops in the right ear twice daily x 30 days 15 mL 1     naloxone (NARCAN) 4 MG/0.1ML nasal spray Spray 1 spray (4 mg) into one nostril alternating nostrils as needed for opioid reversal every 2-3 minutes until assistance arrives (Patient not taking: Reported on 10/11/2022) 0.2 mL 0       Medical History: any changes in medical history since they were last seen? No - she is still being treated by ENT for fungal ear infection.     Review of Systems: A 10-point review of systems was negative, unless noted otherwise in HPI.      Objective:    Physical Exam:  GENERAL: Healthy, alert and no distress  EYES: Eyes grossly normal to inspection.  No discharge or erythema, or obvious scleral/conjunctival abnormalities.  RESP: No audible wheeze, cough, or visible cyanosis.  No visible retractions or increased work of breathing.    SKIN: Visible skin clear. No significant rash, abnormal pigmentation or lesions.  NEURO: Cranial nerves grossly intact.  Mentation and speech appropriate for  age.  PSYCH: Mentation appears normal, affect normal/bright, judgement and insight intact, normal speech and appearance well-groomed.    Imaging:  MR LUMBAR SPINE W/O CONTRAST 3/6/2020 1:42 PM     Provided History: Radiculopathy, > 6wks conservative tx, persistent  sx; Imbalance     ICD-10: Imbalance     Comparison: None available.     Technique: Sagittal T1-weighted, sagittal STIR, 3D volumetric axial  and sagittal reconstructed T2-weighted images of the lumbar spine were  obtained without intravenous contrast.      Findings: There are 5 lumbar-type vertebrae assumed for the purposes  of this dictation.  The tip of the conus medullaris is at L2.  There  is mild grade 1 anterolisthesis of L4 on L5..  There is multilevel  disc height narrowing and loss of intravenous hyperintense signal,  most pronounced at L4-5 and L5-S1 with mild disc height loss. Only,  there is severe disc height loss at T11-12.  Normal marrow signal.     On a level by level basis:     T12-L1: Minimal posterior disc bulge. Facet arthropathy bilaterally.  Ligamentum flavum hypertrophy. No spinal canal or neural foraminal  stenosis.     L1-2: Small circumferential disc bulge. Facet arthropathy bilaterally.  Mild ligamentum flavum thickening. Mild neural foraminal stenosis  bilaterally. Spinal canal is patent.     L2-3: Facet arthropathy bilaterally. Ligamentum flavum thickening. No  spinal canal or neural foraminal stenosis.     L3-4: Facet arthropathy and ligamentum flavum flavum thickening  bilaterally. No spinal canal or neural foraminal stenosis.     L4-5: Circumferential disc bulge narrows the lateral recesses and may  impinge upon the traversing L5 nerve roots bilaterally. Facet  arthropathy and ligamentum flavum thickening. Moderate neural  foraminal stenosis bilaterally. Moderate spinal canal stenosis.     L5-S1: Small posterior disc bulge with a superimposed central disc  protrusion. Facet arthropathy bilaterally. Ligamentum  flavum  thickening. Moderate right and mild to moderate left neural foraminal  stenosis. Mild spinal canal stenosis.     Paraspinous tissues are within normal limits.                                                                      Impression: Multilevel lumbar spondylosis, most pronounced at L4-5  with moderate neural foraminal stenosis bilaterally and moderate  spinal canal stenosis. Additionally, there is narrowing of the lateral  recesses at L4-5 with possible impingement of the traversing L5 nerve  roots bilaterally. Further degenerative disease as described above.     DAO DE LA PAZ MD    BILLING TIME DOCUMENTATION:   The total TIME spent on this patient on the date of the encounter/appointment was 35 minutes.      TOTAL TIME includes:   Time spent preparing to see the patient (reviewing records and tests)   Time spent face to face (or over the phone) with the patient   Time spent ordering tests, medications, procedures and referrals   Time spent Referring and communicating with other healthcare professionals   Time spent documenting clinical information in Epic

## 2022-10-11 NOTE — PROGRESS NOTES
Betsey is a 62 year old who is being evaluated via a billable video visit.      How would you like to obtain your AVS? MyChart  If the video visit is dropped, the invitation should be resent by: Text to cell phone: [phone]  Will anyone else be joining your video visit? No   Is Pt currently in [place]? Yes    Hilary Barr MA      NOTE:  If Pt is not in [place], Appointment needs to be canceled and rescheduled.

## 2022-10-18 RX ORDER — FENTANYL 12.5 UG/1
1 PATCH TRANSDERMAL
Qty: 10 PATCH | Refills: 0 | Status: SHIPPED | OUTPATIENT
Start: 2022-10-18 | End: 2022-11-29

## 2022-10-18 RX ORDER — HYDROCODONE BITARTRATE AND ACETAMINOPHEN 7.5; 325 MG/1; MG/1
.5-1 TABLET ORAL EVERY 6 HOURS PRN
Qty: 45 TABLET | Refills: 0 | Status: SHIPPED | OUTPATIENT
Start: 2022-10-18 | End: 2022-11-29

## 2022-10-19 LAB — BACTERIA SPEC CULT: ABNORMAL

## 2022-10-27 ENCOUNTER — OFFICE VISIT (OUTPATIENT)
Dept: OTOLARYNGOLOGY | Facility: CLINIC | Age: 62
End: 2022-10-27
Payer: COMMERCIAL

## 2022-10-27 ENCOUNTER — OFFICE VISIT (OUTPATIENT)
Dept: AUDIOLOGY | Facility: CLINIC | Age: 62
End: 2022-10-27
Payer: COMMERCIAL

## 2022-10-27 VITALS
SYSTOLIC BLOOD PRESSURE: 130 MMHG | HEART RATE: 90 BPM | WEIGHT: 169 LBS | DIASTOLIC BLOOD PRESSURE: 59 MMHG | BODY MASS INDEX: 31.1 KG/M2 | HEIGHT: 62 IN | OXYGEN SATURATION: 97 % | TEMPERATURE: 97.7 F

## 2022-10-27 DIAGNOSIS — H92.11 OTORRHEA, RIGHT: ICD-10-CM

## 2022-10-27 DIAGNOSIS — H72.91 PERFORATION OF RIGHT TYMPANIC MEMBRANE: ICD-10-CM

## 2022-10-27 DIAGNOSIS — H90.A31 MIXED CONDUCTIVE AND SENSORINEURAL HEARING LOSS OF RIGHT EAR WITH RESTRICTED HEARING OF LEFT EAR: Primary | ICD-10-CM

## 2022-10-27 PROCEDURE — 99213 OFFICE O/P EST LOW 20 MIN: CPT | Performed by: OTOLARYNGOLOGY

## 2022-10-27 PROCEDURE — 92590 PR HEARING AID EXAM MONAURAL: CPT | Performed by: AUDIOLOGIST

## 2022-10-27 ASSESSMENT — PAIN SCALES - GENERAL: PAINLEVEL: SEVERE PAIN (7)

## 2022-10-27 NOTE — LETTER
"10/27/2022      RE: Betsey Vanessa  [address]  [facility] [address]         Otolaryngology Clinic      Name: Betsey Vanessa  MRN: [id number]  Age: 62 year old  : 1960  10/27/2022      Chief Complaint:   Follow up    History of Present Illness:   Betsey Vanessa is a 62 year old female with a history of right BAHA and right Meniere's disease s/p left labyrinthectomy who presents for follow up. At our last appointment on 22 she reported continued difficulty with her BAHA set up and was continuing to use amphotericin drops for fungal ear infection. We cultured her and there was growth of Scedosporium apiospermum complex.     Physical Exam:   /59   Pulse 90   Temp 97.7  F (36.5  C)   Ht 1.575 m (5' 2\")   Wt 76.7 kg (169 lb)   LMP  (LMP Unknown)   SpO2 97%   BMI 30.91 kg/m       Female in no acute distress.  Alert and answering questions appropriately.  HB 1/6 bilaterally.  Right ear examined with otoscope. There is drop precipitant in the right ear canal. There is swelling and active middle ear drainage on the right. It does however appear improved since last visit.      Assessment and Plan:  Betsey Vanessa is a 62 year old female with history of Meniere's s/p left labyrinthectomy who presents for follow up of recurrent right fungal infection and BAHA check. She does have improving appearance of the infection in her right and I advised her to continue drops. I informed her that fungal infections may take some time to kiara. For her hearing loss she should continue with her audiology consult today and discuss which device will be the best for her hearing loss. We will review this and then go ahead with placing an abutment more superior than where hers is currently to avoid the feedback issues she has which precludes her from obtaining maximum benefit from the device. She is not a candidate for a traditional hearing aid on the right due to her recurrent infections and perforation.    The benefits " and risks were discussed. The risks include but are not limited to:  Wound infection/wound breakdown with possible need for further surgery and failure of osseointegration requiring further surgery. There may be a need for prolonged wound cares after surgery. We discussed the healing period before the external processor can be fitted. She is eager to proceed.      Scribe Disclosure:  I, Miguel Nix, am serving as a scribe to document services personally performed by Laura Stallings MD at this visit, based upon the provider's statements to me. All documentation has been reviewed by the aforementioned provider prior to being entered into the official medical record.     The documentation recorded by the scribe accurately reflects the services I personally performed and the decisions made by me.        Luara Stallings MD

## 2022-10-27 NOTE — Clinical Note
"10/27/2022       RE: Betsey Vanessa  [address]  [facility] [address]     Dear Colleague,    Thank you for referring your patient, Betsey Vanessa, to the [facility] [place] [facility]. Please see a copy of my visit note below.      Otolaryngology Clinic      Name: Betsey Vanessa  MRN: [id number]  Age: 62 year old  : 1960  10/27/2022      Chief Complaint:   Follow up    History of Present Illness:   Betsey Vanessa is a 62 year old female with a history of right BAHA and right Meniere's disease  S/p left labyrinthectomy who presents for follow up. At our last appointment on 22 she reported continued difficulty with her BAHA set up and was continuing to use amphotericin drops for fungal ear infection. We cultured her and there was growth of Scedosporium apiospermum complex.     Physical Exam:   /59   Pulse 90   Temp 97.7  F (36.5  C)   Ht 1.575 m (5' 2\")   Wt 76.7 kg (169 lb)   LMP  (LMP Unknown)   SpO2 97%   BMI 30.91 kg/m       Physical examination:  Female in no acute distress.  Alert and answering questions appropriately.  HB 1/6 bilaterally.  Bilateral ears examined with otoscope. There is drop precipitant in the ear canal. There is swelling and active middle ear draining of the right. It does however appear improved since last.      Assessment and Plan:  Betsey Vanessa is a 62 year old female with history of Meniere's s/p left labyrinthectomywho presents for follow up of recurrent fungal infection and BAHA check. She does have improving appearance of the infection in her right and I advised her to continue drops. I informed her that fungal infections may take some time to kiara. For her hearing loss she should continue with her audiology consult today and discuss which device will be the best for her hearing loss. We will review this and then go ahead with placing an abutment more superior than where hers " is currently. ***          Scribe Disclosure:  I, Miguel Nix, am serving as a scribe to document services personally performed by Laura Stallings MD at this visit, based upon the provider's statements to me. All documentation has been reviewed by the aforementioned provider prior to being entered into the official medical record.       Again, thank you for allowing me to participate in the care of your patient.      Sincerely,    Laura Stallings MD

## 2022-10-27 NOTE — PROGRESS NOTES
AUDIOLOGY REPORT    SUBJECTIVE: Betsey Vanessa is a 62 year old female who was seen in the Audiology Clinic at the [facility], on October 27, 2022 for bone anchored hearing aid consultation, referred by Laura Stallings M.D. She recently had a hearing evaluation on 8/2/2022 and results revealed a right mixed hearing loss, she has a complete loss of hearing in the left ear. She was implanted with a right [employer] BAHA in [place], [place] but has had extensive issues regarding feedback of the device. She currently uses a Cochlear Super Power BAHA 5 device but has to wear the microphone on the opposite ear and flipped upside down, otherwise she gets feedback to the extent that she cannot wear the device. Betsey was accompanied by their daughter.    Betsey was seen by Dr. Stallings prior to the audiology consultation today. Dr. Stallings is recommending a surgery to move the abutment to a more appropriate location (likely higher on the hear).    OBJECTIVE:   A consultation was conducted in which Betsey was presented with osseointegrated device options from [employer] and [employer]. The appointment was spent outlining and comparing the options available. She has an Android phone but is not interested in streaming.    A power Cochlear BAHA 5 was tried again. Due to the position of the abutment, significant feedback occurred until the gain was decreased about10 dB. She was getting feedback from the bottom of the device being too close to her head. It is also too close to the bow of her glasses which contributes to the feedback and close to the collar of her shirt, again, contributing to feedback.    The patient would like to move forward. She would be interested in a different  as she needs a powerful device and [employer] has recently released a new Super Power device.     Right: [employer] Ponto 5 SP   COLOR: Blonde   ACCESSORY CHOSEN: [employer]    Patient was counseled regarding a new  device. It was reviewed that she will hopefully get more volume and less feedback with a new abutment placement and device. The limitations of the technology were reviewed and she expressed understanding. She expressed that she would be excited to have an easier time wearing the device and hopefully experience less feedback.      ASSESSMENT:   The patient was presented device options from [employer] and [employer]. She would be interested in pursuing [employer].      PLAN:  Patient was counseled regarding hearing loss and impact on communication. Betsey has decided to purse a surgical BAHA. ENT financial support and nursing will be altered to the patient's decision, and Betsey will be contacted by ENT services regarding financial information and coverage for the surgery. They will return to Audiology for fitting of the processor around 3 months post-surgery, or when cleared by the surgeon. It was discussed that post surgery, she could be fit with a device on a softband to help her hear until she can be fit with the device on the abutment.  Please contact the clinic with any questions regarding today's appointment.          Alee Lutz  Audiologist  [place] License  #[id number]

## 2022-10-27 NOTE — PATIENT INSTRUCTIONS
1. You were seen in the ENT Clinic today by Dr. Stallings.  If you have any questions or concerns after your appointment, please call   - Option 1: ENT Clinic: [phone]   - Option 2: Ni (Dr. Stallings's Nurse): [phone]          Ofe (Dr. Stallings's Nurse): [phone]     2.   Plan to return to clinic in 6 weeks    How to Contact Us:  Send a DTVCast message to your provider. Our team will respond to you via DTVCast. Occasionally, we will need to call you to get further information.  For urgent matters (Monday-Friday), call the ENT Clinic: [phone] and speak with a call center team member - they will route your call appropriately.   If you'd like to speak directly with a nurse, please find our contact information below. We do our best to check voicemail frequently throughout the day, and will work to call you back within 1-2 days. For urgent matters, please use the general clinic phone numbers listed above.        Ni GILES LPN  [facility] - Otolaryngology

## 2022-10-27 NOTE — NURSING NOTE
"Chief Complaint   Patient presents with     RECHECK     Follow up     Blood pressure 130/59, pulse 90, temperature 97.7  F (36.5  C), height 1.575 m (5' 2\"), weight 76.7 kg (169 lb), SpO2 97 %, not currently breastfeeding.    Leonides Cook LPN    "

## 2022-10-27 NOTE — PROGRESS NOTES
"  Otolaryngology Clinic      Name: Betsey Vanessa  MRN: [id number]  Age: 62 year old  : 1960  10/27/2022      Chief Complaint:   Follow up    History of Present Illness:   Betsey Vanessa is a 62 year old female with a history of right BAHA and right Meniere's disease s/p left labyrinthectomy who presents for follow up. At our last appointment on 22 she reported continued difficulty with her BAHA set up and was continuing to use amphotericin drops for fungal ear infection. We cultured her and there was growth of Scedosporium apiospermum complex.     Physical Exam:   /59   Pulse 90   Temp 97.7  F (36.5  C)   Ht 1.575 m (5' 2\")   Wt 76.7 kg (169 lb)   LMP  (LMP Unknown)   SpO2 97%   BMI 30.91 kg/m       Female in no acute distress.  Alert and answering questions appropriately.  HB 1/6 bilaterally.  Right ear examined with otoscope. There is drop precipitant in the right ear canal. There is swelling and active middle ear drainage on the right. It does however appear improved since last visit.      Assessment and Plan:  Betsey Vanessa is a 62 year old female with history of Meniere's s/p left labyrinthectomy who presents for follow up of recurrent right fungal infection and BAHA check. She does have improving appearance of the infection in her right and I advised her to continue drops. I informed her that fungal infections may take some time to kiara. For her hearing loss she should continue with her audiology consult today and discuss which device will be the best for her hearing loss. We will review this and then go ahead with placing an abutment more superior than where hers is currently to avoid the feedback issues she has which precludes her from obtaining maximum benefit from the device. She is not a candidate for a traditional hearing aid on the right due to her recurrent infections and perforation.    The benefits and risks were discussed. The risks include but are not limited to:  Wound " infection/wound breakdown with possible need for further surgery and failure of osseointegration requiring further surgery. There may be a need for prolonged wound cares after surgery. We discussed the healing period before the external processor can be fitted. She is eager to proceed.      Scribe Disclosure:  I, Miguel Nix, am serving as a scribe to document services personally performed by Laura Stallings MD at this visit, based upon the provider's statements to me. All documentation has been reviewed by the aforementioned provider prior to being entered into the official medical record.     The documentation recorded by the scribe accurately reflects the services I personally performed and the decisions made by me.

## 2022-10-28 RX ORDER — CLINDAMYCIN PHOSPHATE 900 MG/50ML
900 INJECTION, SOLUTION INTRAVENOUS
Status: CANCELLED | OUTPATIENT
Start: 2022-10-28

## 2022-10-28 RX ORDER — ACETAMINOPHEN 325 MG/1
975 TABLET ORAL ONCE
Status: CANCELLED | OUTPATIENT
Start: 2022-10-28 | End: 2022-10-28

## 2022-10-28 RX ORDER — CLINDAMYCIN PHOSPHATE 900 MG/50ML
900 INJECTION, SOLUTION INTRAVENOUS SEE ADMIN INSTRUCTIONS
Status: CANCELLED | OUTPATIENT
Start: 2022-10-28

## 2022-10-28 RX ORDER — DEXAMETHASONE SODIUM PHOSPHATE 4 MG/ML
10 INJECTION, SOLUTION INTRA-ARTICULAR; INTRALESIONAL; INTRAMUSCULAR; INTRAVENOUS; SOFT TISSUE ONCE
Status: CANCELLED | OUTPATIENT
Start: 2022-10-28 | End: 2022-10-28

## 2022-10-31 PROBLEM — H92.11 OTORRHEA, RIGHT: Status: ACTIVE | Noted: 2022-10-31

## 2022-11-16 ENCOUNTER — TELEPHONE (OUTPATIENT)
Dept: RHEUMATOLOGY | Facility: CLINIC | Age: 62
End: 2022-11-16

## 2022-11-16 DIAGNOSIS — M06.09 RHEUMATOID ARTHRITIS OF MULTIPLE SITES WITH NEGATIVE RHEUMATOID FACTOR (H): ICD-10-CM

## 2022-11-16 NOTE — TELEPHONE ENCOUNTER
[facility] Call Center    Phone Message    May a detailed message be left on voicemail: yes     Reason for Call: Other: Orencia        Pt wants to get Orencia on 11/22/22 and would like know if she is able to get it. Pt still has fungal infection in her ear. Requesting a call back asap, she will need to set up a cab ride.         Action Taken: Other: [facility]    Travel Screening: Not Applicable

## 2022-11-16 NOTE — TELEPHONE ENCOUNTER
Returned call to pt and let her know we do need to hold off for now on the orencia pt has c/o worsening s/s d/t being off her meds,   Noted present for last 2-3 weeks. Hands- bi lat- thumbs at base worse,  +hot, not red, + swollen, palmar side of hand sensitive.   Knees- bi lat- not red, not hot, some what swollen, more so in the front  Back- lower back- no redness, no swelling, no warmth  Feet- bi lat feet- not red, not warm, +swollen especially around ankles and toes. Trouble with wearing shoes- using mostly wearing slippers. Worse at night d/t not moving, difficulty sleeping.    just ran out of the HCQ 2nd day. Do not see 10-2 [facility] or [facility] DARRIN MENDEZ RN Specialty Triage 11/16/2022 4:28 PM

## 2022-11-18 RX ORDER — HYDROXYCHLOROQUINE SULFATE 200 MG/1
TABLET, FILM COATED ORAL
Qty: 135 TABLET | Refills: 2 | Status: SHIPPED | OUTPATIENT
Start: 2022-11-18 | End: 2023-01-01

## 2022-11-18 NOTE — TELEPHONE ENCOUNTER
Rheumatology Telephone Note:    I called and spoke with Ms. Vanessa.  She says that she is being tx'd for a fungal ear infection. I see in the chart that there is a procedure on 12/5/2022 with ENT but patient is unaware of this.  Unclear how serious of an infection is present but to be cautious would prefer to skip the next Orencia dose and she is in agreement.  Will not use prednisone because this may result in poorly controlled diabetes.  She is already using Celebrex.  She reports that she is using Norco with a total daily acetaminophen dose from this of 650 mg.  Therefore, she may add additional acetaminophen PRN, as long she does not exceed acetaminophen 4000 mg within a 24-hour period from all sources.    All questions were answered and she thanked me for the call.     Terrence Carrillo MD  11/18/2022 2:53 PM

## 2022-11-19 ENCOUNTER — HEALTH MAINTENANCE LETTER (OUTPATIENT)
Age: 62
End: 2022-11-19

## 2022-11-21 ENCOUNTER — TELEPHONE (OUTPATIENT)
Dept: OTOLARYNGOLOGY | Facility: CLINIC | Age: 62
End: 2022-11-21

## 2022-11-21 NOTE — TELEPHONE ENCOUNTER
[facility] Call Center    Phone Message    May a detailed message be left on voicemail: yes     Reason for Call: Other: The pt has questions about whether the BAHA is as strong at the [place] HA that she has. And questions about the surgery etc. Please call to discuss. Thanks.     Action Taken: Message routed to:  [facility] ENT    Travel Screening: Not Applicable

## 2022-11-23 ENCOUNTER — TELEPHONE (OUTPATIENT)
Dept: RHEUMATOLOGY | Facility: CLINIC | Age: 62
End: 2022-11-23

## 2022-11-23 NOTE — TELEPHONE ENCOUNTER
Prior auth is now required for Hydroxychlorquine with the pt's [employer] plan. The alternatives provided are NOT an acceptable replacement for the rheumatological diagnosis in which hydroxychlorquine is being used for. PA team can you please submit this request on or after 1/1/23

## 2022-11-28 DIAGNOSIS — G89.4 CHRONIC PAIN SYNDROME: ICD-10-CM

## 2022-11-28 DIAGNOSIS — F11.20 CONTINUOUS OPIOID DEPENDENCE (H): ICD-10-CM

## 2022-11-28 DIAGNOSIS — M06.09 RHEUMATOID ARTHRITIS OF MULTIPLE SITES WITHOUT RHEUMATOID FACTOR (H): ICD-10-CM

## 2022-11-28 NOTE — TELEPHONE ENCOUNTER
[facility] Call Center    Phone Message    May a detailed message be left on voicemail: yes     Reason for Call: Medication Refill Request    Has the patient contacted the pharmacy for the refill? Yes   Name of medication being requested: HYDROcodone-acetaminophen (NORCO) 7.5-325 MG per tablet, fentaNYL (DURAGESIC) 12 mcg/hr 72 hr patch  Provider who prescribed the medication: Johnny  Pharmacy:    [facility] [address] [place] [address] [id number]      Date medication is needed: as soon as possible        Action Taken: Other: Pain    Travel Screening: Not Applicable

## 2022-11-29 RX ORDER — HYDROCODONE BITARTRATE AND ACETAMINOPHEN 7.5; 325 MG/1; MG/1
.5-1 TABLET ORAL EVERY 6 HOURS PRN
Qty: 45 TABLET | Refills: 0 | Status: SHIPPED | OUTPATIENT
Start: 2022-11-29 | End: 2022-11-30

## 2022-11-29 RX ORDER — FENTANYL 12.5 UG/1
1 PATCH TRANSDERMAL
Qty: 10 PATCH | Refills: 0 | Status: SHIPPED | OUTPATIENT
Start: 2022-11-29 | End: 2022-12-28

## 2022-11-29 NOTE — TELEPHONE ENCOUNTER
Medication refill information reviewed.     Last due for both:  Fill 10/27/22 start on/after 10/29/22 to last 30 days   Due date:  Past due. 11/28/22      Prescriptions prepped for review.     Sanjuanita RN-BSN  [facility]

## 2022-11-29 NOTE — TELEPHONE ENCOUNTER
[facility] Call Center    Phone Message    May a detailed message be left on voicemail: yes     Reason for Call: Other: Please resend medication to the [facility] [place] since her other pharmacy does not carry the medications anymore.      Action Taken: Other: Pain    Travel Screening: Not Applicable

## 2022-11-29 NOTE — TELEPHONE ENCOUNTER
Chart reviewed - request appears appropriate. Refilled for 30 day supply.     Ronda Vasquez DNP, APRN, AGNP-C  [facility]

## 2022-11-29 NOTE — TELEPHONE ENCOUNTER
Received call from patient requesting refill(s) of      HYDROcodone-acetaminophen (NORCO) 7.5-325 MG per tablet    fentaNYL (DURAGESIC) 12 mcg/hr 72 hr patch    Last dispensed from pharmacy on 10/29/22    Patient's last office/virtual visit by prescribing provider on 10/11/22  Next office/virtual appointment scheduled for 01/10/23    Last urine drug screen date 08/11/22  Current opioid agreement on file (completed within the last year) Yes Date of opioid agreement: 0811/22    E-prescribe to   [id number] [address] [place] - [address]  [address] [address]  Phone: [phone] Fax: [phone]    Will route to nursing [place] for review and preparation of prescription(s).       Lisette Pabon MA  [facility] [place]

## 2022-11-30 RX ORDER — HYDROCODONE BITARTRATE AND ACETAMINOPHEN 7.5; 325 MG/1; MG/1
.5-1 TABLET ORAL EVERY 6 HOURS PRN
Qty: 45 TABLET | Refills: 0 | Status: SHIPPED | OUTPATIENT
Start: 2022-11-30 | End: 2022-12-28

## 2022-11-30 RX ORDER — FENTANYL 12.5 UG/1
1 PATCH TRANSDERMAL
Qty: 10 PATCH | Refills: 0 | Status: CANCELLED | OUTPATIENT
Start: 2022-11-30

## 2022-11-30 NOTE — TELEPHONE ENCOUNTER
Patient called back and asked to have medications filled at [facility]. States that [facility] no longer carries. Please cancel at [facility] and re-prep for provider to send to [facility]. Thank you.

## 2022-11-30 NOTE — TELEPHONE ENCOUNTER
fwding to [facility] to be completed at beginning of new year.    Mara MENDEZ RN Specialty Triage 11/30/2022 10:49 AM

## 2022-11-30 NOTE — TELEPHONE ENCOUNTER
Chart reviewed - request appears appropriate. Refill approved.     Ronda Vasquez DNP, APRN, AGNP-C  [facility]

## 2022-11-30 NOTE — TELEPHONE ENCOUNTER
Called [facility].   The fentanyl is available and ready for .  They do not stock norco 7.5mg.    Called pt.  She will fill the fentanyl at [facility]. She just needs the norco sent to  [facility] as [facility] does not stock 7.5mg.    Called [facility] back and had them cancel the 11/29/22 norco script.      Routed to Ronda Vasquez DNP, APRN, AGNP-C to review and sign norco scriptbarrett Gann RN-BSN  [facility]

## 2022-12-01 ENCOUNTER — TELEPHONE (OUTPATIENT)
Dept: OTOLARYNGOLOGY | Facility: CLINIC | Age: 62
End: 2022-12-01

## 2022-12-01 NOTE — TELEPHONE ENCOUNTER
Called patient to schedule surgery with Dr. Stallings    Date of Surgery: 2/10    Location of surgery: [facility]    Pre-Op H&P: PCP Katerina Judd PA-C    Pre/Post Imaging:  Not Applicable    Discussed COVID-19 Testing: Yes home test    Post-Op Appt Date: 1 week    Surgery Packet Mailed: 12/1      Additional comments: patient requested a Friday after the new year        Meggan Hall on 12/1/2022 at 2:06 PM

## 2022-12-05 ENCOUNTER — VIRTUAL VISIT (OUTPATIENT)
Dept: RHEUMATOLOGY | Facility: CLINIC | Age: 62
End: 2022-12-05
Payer: COMMERCIAL

## 2022-12-05 DIAGNOSIS — M85.89 OSTEOPENIA OF MULTIPLE SITES: ICD-10-CM

## 2022-12-05 DIAGNOSIS — M06.09 RHEUMATOID ARTHRITIS OF MULTIPLE SITES WITH NEGATIVE RHEUMATOID FACTOR (H): Primary | ICD-10-CM

## 2022-12-05 DIAGNOSIS — Z79.83 LONG-TERM CURRENT USE OF BISPHOSPHONATE: ICD-10-CM

## 2022-12-05 DIAGNOSIS — M35.01 SJOGREN'S SYNDROME WITH KERATOCONJUNCTIVITIS SICCA (H): ICD-10-CM

## 2022-12-05 PROCEDURE — 99214 OFFICE O/P EST MOD 30 MIN: CPT | Mod: 95 | Performed by: INTERNAL MEDICINE

## 2022-12-05 RX ORDER — ZOLEDRONIC ACID 5 MG/100ML
5 INJECTION, SOLUTION INTRAVENOUS ONCE
Status: CANCELLED
Start: 2023-01-01

## 2022-12-05 RX ORDER — HEPARIN SODIUM (PORCINE) LOCK FLUSH IV SOLN 100 UNIT/ML 100 UNIT/ML
5 SOLUTION INTRAVENOUS
Status: CANCELLED | OUTPATIENT
Start: 2023-01-01

## 2022-12-05 RX ORDER — MEPERIDINE HYDROCHLORIDE 25 MG/ML
25 INJECTION INTRAMUSCULAR; INTRAVENOUS; SUBCUTANEOUS EVERY 30 MIN PRN
Status: CANCELLED | OUTPATIENT
Start: 2023-01-01

## 2022-12-05 RX ORDER — CEVIMELINE HYDROCHLORIDE 30 MG/1
30 CAPSULE ORAL 3 TIMES DAILY
Qty: 270 CAPSULE | Refills: 2 | Status: SHIPPED | OUTPATIENT
Start: 2022-12-05 | End: 2023-01-01

## 2022-12-05 RX ORDER — ALBUTEROL SULFATE 90 UG/1
1-2 AEROSOL, METERED RESPIRATORY (INHALATION)
Status: CANCELLED
Start: 2023-01-01

## 2022-12-05 RX ORDER — EPINEPHRINE 1 MG/ML
0.3 INJECTION, SOLUTION, CONCENTRATE INTRAVENOUS EVERY 5 MIN PRN
Status: CANCELLED | OUTPATIENT
Start: 2023-01-01

## 2022-12-05 RX ORDER — ALBUTEROL SULFATE 0.83 MG/ML
2.5 SOLUTION RESPIRATORY (INHALATION)
Status: CANCELLED | OUTPATIENT
Start: 2023-01-01

## 2022-12-05 RX ORDER — METHYLPREDNISOLONE SODIUM SUCCINATE 125 MG/2ML
125 INJECTION, POWDER, LYOPHILIZED, FOR SOLUTION INTRAMUSCULAR; INTRAVENOUS
Status: CANCELLED
Start: 2023-01-01

## 2022-12-05 RX ORDER — HEPARIN SODIUM,PORCINE 10 UNIT/ML
5 VIAL (ML) INTRAVENOUS
Status: CANCELLED | OUTPATIENT
Start: 2023-01-01

## 2022-12-05 RX ORDER — CELECOXIB 100 MG/1
100 CAPSULE ORAL 2 TIMES DAILY
Qty: 180 CAPSULE | Refills: 2 | Status: SHIPPED | OUTPATIENT
Start: 2022-12-05 | End: 2023-01-01

## 2022-12-05 RX ORDER — DIPHENHYDRAMINE HYDROCHLORIDE 50 MG/ML
50 INJECTION INTRAMUSCULAR; INTRAVENOUS
Status: CANCELLED
Start: 2023-01-01

## 2022-12-05 NOTE — PROGRESS NOTES
"Betsey Vanessa  is a 62 year old year old who is being evaluated via a billable video visit.      How would you like to obtain your AVS? MyChart  If the video visit is dropped, the invitation should be resent by: Text to cell phone: [phone]   Will anyone else be joining your video visit? No     Rheumatology Video Visit      Betsey Vanessa MRN# [id number]   YOB: 1960 Age: 62 year old      Date of visit: 12/05/22   PCP: Katerina Judd     Chief Complaint   Patient presents with:  Rheumatoid Arthritis    Assessment and Plan     1.  Rheumatoid arthritis: Reportedly diagnosed \"many years ago\", when she first tablet care with me in 2020.  Previously treated by Dr. Stacy at [facility] in [place], [place]. Previously on methotrexate + rituximab, Enbrel + leflunomide; had elevated liver enzymes preventing oral DMARDs; then with Actemra 8 mg/kg IV monthly that was effective but was found to be associated with thrombocytopenia and the patient reported not much benefit from medication.   ALEGRIA hx prevents several oral DMARDs and LFT elevations with MTX in the past.  Therefore, changed to Orencia with the first dose on 5/7/2020 and has been doing well with regard to rheumatoid arthritis.  Using Celebrex 100 mg twice daily.  Hydroxychloroquine requires an eye exam.  Orencia is currently on hold with the last dose received in July 2022; was being held because of fungal ear infection but now is being held also because she is not sure if she wants restarted and possibly she does not need it; we discussed the option of restarting Orencia after the fungal infection is cleared or plan to wait and see how her arthritis does and follow-up in clinic prior to restarting.  She says that she is not entirely sure but right now she feels okay and would like to hold off on restarting Orencia so will await re-evaluation to determine if Orencia reinitiation is needed.  Chronic illness    - Discontinue orencia " 750mg IV every 4 weeks, but may restart in the future if needed and will need to evaluate with rheumatology visit prior to restarting  - Continue hydroxychloroquine 200 mg daily with an additional 200 mg every other day, if eye exam is okay.  Refill by MD only.  Eye exam overdue  - Continue Celebrex 100 mg BID; did not tolerate dose reduction previously  - Ophthalmology referral for hydroxychloroquine toxicity monitoring given previously and she says that this will be completed.  This is required for future refills of hydroxychloroquine.     2. Sjogren's Syndrome: reportedly had punctal plugs in the past and reportedly ducts are now cauterized.  Doing okay with artificial tears, Xiidra, and Evoxac.   Encouraged frequent sips of water, visits with a dentist at least every 6months, avoidance of sugary foods/drinks.  She is also going to see ophthalmology soon.  Chronic illness, stable.     - Continue xiidra  - Continue cevimeline 30mg TID    3. Chronic pain syndrome: Managed at the pain clinic.  Encouraged increased frequency of low impact physical activities such as walking or using a stationary bike.  Exam today is most consistent with chronic pain syndrome, not active inflammatory arthritis    4. Osteopenia: based on 7/2019 DEXA report that she brought with her; FRAX score shows a 22% risk of major osteoporotic fracture in the next 10 years and a 1.9% risk for osteoporotic hip fracture in the next 10 years.  Based on FRAX tx is indicated.  She already received one dose of boniva in Sept 2019; and failed alendronate due to GI upset.  Reclast received 2/13/2020, 3/16/2021, 3/30/22; plan to receive once more in 2023 before changing to prolia or taking a drug holiday.   Need updated DEXA.   Chronic illness, stable.    - Continue calcium 1000mg daily  - Continue vitamin D 4000 IU daily  - Continue Reclast yearly, next due on or shortly after 3/30/2023  - DEXA ordered previously and advised again today that she call to  schedule  - Labs in 3 months: Creatinine, calcium, vitamin D    5. Anemia: check iron studies with next labs.      6.  Vaccinations: Vaccinations reviewed with Ms. Vanessa.  Risks and benefits of vaccinations were discussed.    - Influenza: up to date  - [id number]: up to date  - [id number]: up to date  - Shingrix: 1 dose received she had a reaction so she does not want a second dose.  - COVID-19: Advised updating    Total minutes spent in evaluation with patient, documentation, , and review of pertinent studies and chart notes: 18      Ms. Vanessa verbalized agreement with and understanding of the rational for the diagnosis and treatment plan.  All questions were answered to best of my ability and the patient's satisfaction. Ms. Vanessa was advised to contact the clinic with any questions that may arise after the clinic visit.      Thank you for involving me in the care of the patient    Return to clinic: 3-4 months    HPI   Betsey Vanessa is a 62 year old female with a past medical history significant for hypertension, depression, type 2 diabetes, Ménière's disease, and rheumatoid arthritis who is seen in consultation for follow-up of RA.     Today, 12/5/2022: Has not taken Orencia since July 2022 because of a fungal ear infection that needs to be cleared before she has ear surgery.  Feels like her joints have been somewhat stable since stopping Orencia so she questions if it needs to be restarted.  She would like to see how she does for a longer duration without Orencia.  She reports that she does have occasional pain in her back, knees, feet, ankles, and hands but this pain may occur at anytime of day, is not affected by activity, and morning stiffness is less than 20 minutes.  Xiidra and cevimeline are effective.  Hydroxychloroquine is well-tolerated and seems to be controlling arthritis well per patient.  Dry eyes and dry mouth are controlled at this time.      Tobacco: Quit smoking in 1998  EtOH:  None  Drugs: None    ROS   12 point review of system was completed and negative except as noted in the HPI     Active Problem List     Patient Active Problem List   Diagnosis     Type 2 diabetes mellitus with hyperglycemia, without long-term current use of insulin (H)     Hypertension goal BP (blood pressure) < 140/90     Meniere's disease, unspecified laterality     Rheumatoid arthritis, involving unspecified site, unspecified rheumatoid factor presence     Valvular heart disease     Fibromyalgia     Mild major depression (H)     Osteopenia of multiple sites     History of bisphosphonate therapy     Post-menopausal     Benzodiazepine dependence, episodic (H)     Continuous opioid dependence (H)     Mitral stenosis     Mixed conductive and sensorineural hearing loss of right ear     Polyarticular arthritis     Encounter for long-term use of opiate analgesic     Chronic pain     Tympanic membrane perforation     Myofascial pain     Migraine with vertigo     Gastroesophageal reflux disease, esophagitis presence not specified     Fungal skin infection     Anaphylaxis, sequela     Hyperlipidemia, unspecified hyperlipidemia type     Otorrhea, right     Past Medical History     Past Medical History:   Diagnosis Date     Anemia     r/t menses     Anxiety      Arthritis      Constipation      Depression      Diabetes (H)      Diarrhea      Double vision      Headache(784.0)      Hearing loss      Heart murmur      Heartburn      Hypertension      Indigestion      Nasal congestion      Night sweats      Numbness      Problems related to lack of adequate sleep      Rash      Sneezing      Tinnitus      Visual loss      Weakness      Weight gain     because of Prednisone     Past Surgical History     Past Surgical History:   Procedure Laterality Date     ------------OTHER-------------      D&C     HYSTERECTOMY       INNER EAR SURGERY       RELEASE CARPAL TUNNEL BILATERAL Bilateral 2008     Allergy     Allergies   Allergen  Reactions     Duloxetine Other (See Comments)     Topiramate Other (See Comments)     Other reaction(s): Confusion     Amitriptyline Embonate [Amitriptyline]      Hyper activity     Azithromycin Hives     Cymbalta      Hyper activity     Gabapentin Hives     Hmg-Coa-R Inhibitors Other (See Comments)     Liver function studies abnormal on Lipitor / Crestor     Macrobid [Nitrofurantoin] Hives     Paxil [Paroxetine] Other (See Comments)     Hyper behavior     Penicillins Hives     Tetracycline Hives     Current Medication List     Current Outpatient Medications   Medication Sig     celecoxib (CELEBREX) 100 MG capsule Take 1 capsule (100 mg) by mouth 2 times daily     cetirizine (ZYRTEC) 10 MG tablet Take 1 tablet (10 mg) by mouth daily     cevimeline (EVOXAC) 30 MG capsule Take 1 capsule (30 mg) by mouth 3 times daily     clonazePAM (KLONOPIN) 0.5 MG tablet TAKE 1 TABLET BY MOUTH TWICE DAILY AS NEEDED FOR ANXIETY OK TO TAKE 1 TO 2 AT NIGHT AS NEEDED FOR SLEEP. SHOULD LAST ABOUT 90 DAYS     clotrimazole (LOTRIMIN) 1 % external solution Instill 5 drops to the right ear 5 minutes for 21 days.     diclofenac (VOLTAREN) 1 % topical gel Place 2-4 g onto the skin 4 times daily . Max of 8g per dose. No more than 32g/day     EQ ALLERGY RELIEF, CETIRIZINE, 10 MG tablet Take 1 tablet by mouth once daily     EQ STOOL SOFTENER 100 MG capsule Take 1 capsule by mouth twice daily     escitalopram (LEXAPRO) 20 MG tablet Take 1 tablet (20 mg) by mouth daily     fentaNYL (DURAGESIC) 12 mcg/hr 72 hr patch Place 1 patch onto the skin every 72 hours remove old patch. Fill/start 11/29/22 to last 30 days     fluorometholone (FML LIQUIFILM) 0.1 % ophthalmic suspension INSTILL 1 DROP INTO EACH EYE IN THE MORNING     fluticasone (FLONASE) 50 MCG/ACT nasal spray Spray 1-2 sprays into both nostrils daily     fluvastatin (LESCOL) 20 MG capsule Take 1 capsule (20 mg) by mouth At Bedtime     glimepiride (AMARYL) 2 MG tablet Take 1 tablet (2 mg) by  mouth every morning (before breakfast)     HYDROcodone-acetaminophen (NORCO) 7.5-325 MG per tablet Take 0.5-1 tablets by mouth every 6 hours as needed for severe pain (7-10) Can take 1 tab per day and on 15 days of the month can take 2 tabs.  Fill/start 11/30/22 to last 30 days     hydroxychloroquine (PLAQUENIL) 200 MG tablet Hydroxychloroquine 200mg daily; and an additional 200mg every other day. Yearly eye exam including 10-2 VF and SD-OCT     hydrOXYzine (ATARAX) 25 MG tablet Take 1 tablet (25 mg) by mouth 3 times daily as needed for itching     lifitegrast (XIIDRA) 5 % opthalmic solution Place 1 drop into both eyes 2 times daily     magnesium 250 MG tablet Take 1 tablet by mouth daily     medical cannabis (Patient's own supply) Take 1 Dose by mouth See Admin Instructions (The purpose of this order is to document that the patient reports taking medical cannabis.  This is not a prescription, and is not used to certify that the patient has a qualifying medical condition.)     nystatin (MYCOSTATIN) 398907 UNIT/GM external powder Apply topically 2 times daily as needed 2 x daily PRN for rash     omeprazole (PRILOSEC) 20 MG DR capsule Take 1 capsule (20 mg) by mouth 2 times daily     pimecrolimus (ELIDEL) 1 % external cream Apply topically 2 times daily - use on back of neck and hands     potassium chloride ER (KLOR-CON M) 10 MEQ CR tablet Take 1 tablet (10 mEq) by mouth 2 times daily     PROAIR  (90 Base) MCG/ACT inhaler Inhale 1-2 puffs into the lungs every 4 hours as needed for shortness of breath / dyspnea or wheezing     promethazine (PHENERGAN) 25 MG tablet Take 1 tablet (25 mg) by mouth every 6 hours as needed for nausea or other (Meniere's) For nusea     psyllium (METAMUCIL) 58.6 % POWD Take by mouth daily PRN     thin (NO BRAND SPECIFIED) lancets Use to test blood sugar 2 times daily or as directed. To accompany: Blood Glucose Monitor Brands: per insurance.     triamterene-HCTZ (DYAZIDE) 37.5-25 MG  capsule Take 1 capsule by mouth daily     alcohol swab prep pads Use to swab area of injection/rosie as directed     blood glucose (NO BRAND SPECIFIED) test strip Use to test blood sugar 2 times daily or as directed. To accompany: Blood Glucose Monitor Brands: per insurance.     blood glucose calibration (NO BRAND SPECIFIED) solution Use to calibrate blood glucose monitor as needed as directed. To accompany: Blood Glucose Monitor Brands: per insurance.     COMPOUNDED NON-CONTROLLED SUBSTANCE (CMPD RX) - PHARMACY TO MIX COMPOUNDED MEDICATION Amphotericin 5mg/ml- 5 drops in the right ear twice daily x 30 days     EPINEPHrine (ANY BX GENERIC EQUIV) 0.3 MG/0.3ML injection 2-pack Inject 0.3 mLs (0.3 mg) into the muscle as needed for anaphylaxis     glucose blood VI test strips strip daily     metFORMIN (GLUCOPHAGE) 500 MG tablet Take 2 tablets (1,000 mg) by mouth 2 times daily (with meals)     metroNIDAZOLE (METROGEL) 0.75 % external gel Apply topically 2 times daily Apply to face daily     naloxone (NARCAN) 4 MG/0.1ML nasal spray Spray 1 spray (4 mg) into one nostril alternating nostrils as needed for opioid reversal every 2-3 minutes until assistance arrives     No current facility-administered medications for this visit.         Social History   See HPI    Family History     Family History   Problem Relation Age of Onset     C.A.D. Mother      Alzheimer Disease Mother      Cardiovascular Mother      Eye Disorder Mother      Thyroid Disease Mother      Hypertension Other         grandmother     Cerebrovascular Disease Father      Alcohol/Drug Father      Depression Father      Obesity Father         aunt     Asthma Father      Alcohol/Drug Brother      Cancer Brother      Alcohol/Drug Sister         aunt     Cancer Sister      Allergies Other         All family members     Arthritis Other         aunt     Obesity Sister      Thyroid Disease Sister      Asthma Sister         daughter       Physical Exam     Temp  "Readings from Last 3 Encounters:   10/27/22 97.7  F (36.5  C)   09/21/22 97.5  F (36.4  C)   08/19/22 98.4  F (36.9  C) (Tympanic)     BP Readings from Last 5 Encounters:   10/27/22 130/59   09/21/22 126/54   08/19/22 121/66   08/17/22 (!) 146/68   08/11/22 135/68     Pulse Readings from Last 1 Encounters:   10/27/22 90     Resp Readings from Last 1 Encounters:   08/19/22 20     Estimated body mass index is 30.91 kg/m  as calculated from the following:    Height as of 10/27/22: 1.575 m (5' 2\").    Weight as of 10/27/22: 76.7 kg (169 lb).      GEN: NAD. Healthy appearing adult.   HEENT: MMM.  Anicteric, noninjected sclera. No obvious external lesions of the ear and nose. Hearing intact.  PULM: No increased work of breathing  MSK:  Hands and wrists without swelling.   SKIN: No rash or jaundice seen  PSYCH: Alert. Appropriate.         Labs / Imaging (select studies)     RF/CCP  Recent Labs   Lab Test 01/14/20  1221   CCPIGG 1   RHF 20*     CBC  Recent Labs   Lab Test 07/18/22  1411 03/30/22  1428 01/14/22  0718 09/17/21  0945 05/21/21  0954 01/19/21  1130 10/28/20  1537   WBC 7.9 6.7 7.3   < > 6.6 6.5 7.1   RBC 4.22 4.37 4.42   < > 4.41 4.46 4.56   HGB 11.0* 11.5* 11.8   < > 11.6* 11.8 12.3   HCT 34.5* 34.9* 36.7   < > 35.7 36.6 37.5   MCV 82 80 83   < > 81 82 82   RDW 15.3* 14.0 14.3   < > 15.3* 14.4 13.7    176 167   < > 160 159 162   MCH 26.1* 26.3* 26.7   < > 26.3* 26.5 27.0   MCHC 31.9 33.0 32.2   < > 32.5 32.2 32.8   NEUTROPHIL 66 63 60   < > 53.1 65.0 66.1   LYMPH 23 28 28   < > 36.2 25.3 25.8   MONOCYTE 6 5 6   < > 7.4 5.1 5.1   EOSINOPHIL 4 4 6   < > 3.0 4.0 2.5   BASOPHIL 1 0 0   < > 0.3 0.3 0.1   ANEU  --   --   --   --  3.5 4.2 4.7   ALYM  --   --   --   --  2.4 1.6 1.8   MOISE  --   --   --   --  0.5 0.3 0.4   AEOS  --   --   --   --  0.2 0.3 0.2   ABAS  --   --   --   --  0.0 0.0 0.0   ANEUTAUTO 5.2 4.2 4.4   < >  --   --   --    ALYMPAUTO 1.8 1.9 2.0   < >  --   --   --    AMONOAUTO 0.5 0.4 0.4   < " >  --   --   --    AEOSAUTO 0.3 0.2 0.4   < >  --   --   --    ABSBASO 0.0 0.0 0.0   < >  --   --   --     < > = values in this interval not displayed.     CMP  Recent Labs   Lab Test 07/18/22  1411 03/30/22  1428 02/18/22  0657 01/14/22  0718 09/17/21  0945 05/21/21  0954 03/16/21  1130 01/19/21  1130 01/08/21  1535 10/28/20  1537 04/09/20  1226   NA  --   --  137  --   --   --   --   --  135  --  141   POTASSIUM  --   --  3.8  --   --   --   --   --  4.2  --  4.0   CHLORIDE  --   --  102  --   --   --   --   --  99  --  108   CO2  --   --  27  --   --   --   --   --  32  --  27   ANIONGAP  --   --  8  --   --   --   --   --  4  --  6   GLC  --   --  201*  --   --   --   --   --  155*  --  121*   BUN  --   --  19  --   --   --   --   --  16  --  17   CR 0.89 0.88 0.87 0.93   < > 0.92 0.83 0.88 0.80   < > 0.70   GFRESTIMATED 74 75 76 70   < > 68 78 72 81   < > >90   GFRESTBLACK  --   --   --   --   --  79 >90 84 >90   < > >90   MIRNA  --  9.4 9.1 8.9  --   --  10.2* 9.2 9.7   < > 9.1   BILITOTAL 0.3 0.4  --  0.3   < > 0.3  --  0.4 0.5   < > 0.4   ALBUMIN 3.8 3.9  --  3.9   < > 4.0  --  3.9 4.1   < > 4.0   PROTTOTAL 7.3 7.4  --  7.5   < > 7.7  --  7.7 8.2   < > 7.4   ALKPHOS 87 86  --  97   < > 83  --  117 100   < > 80   AST 17 26  --  18   < > 20  --  23 23   < > 36   ALT 26 44  --  32   < > 35  --  36 45   < > 52*    < > = values in this interval not displayed.     Calcium/VitaminD  Recent Labs   Lab Test 07/18/22  1411 03/30/22  1428 02/18/22  0657 01/14/22  0718 05/21/21  0954   MIRNA  --  9.4 9.1 8.9  --    VITDT 39  --   --  34 39     ESR/CRP  Recent Labs   Lab Test 07/18/22  1411 03/30/22  1428 01/14/22  0718   SED 22 20 19   CRP 5.6 <2.9 5.1     Hepatitis B  Recent Labs   Lab Test 01/14/20  1221   HBCAB Nonreactive   HEPBANG Nonreactive     Hepatitis C  Recent Labs   Lab Test 01/14/20  1221   HCVAB Nonreactive     Lyme ab screening  Recent Labs   Lab Test 01/14/20  1221   LYMEGM 0.05     Tuberculosis  Screening  Recent Labs   Lab Test 09/17/21  0945 01/14/20  1221   TBRES Negative Negative     HIV Screening  Recent Labs   Lab Test 08/14/20  0736   HIAGAB Nonreactive     [facility]where [facility]   7/18/2013 CCP negative  8/5/2010 CCP negative  3/27/2013 hepatitis B surface antigen negative and hepatitis B core antibody negative  9/2/2011 hepatitis B surface antigen negative and hepatitis B core antibody negative  3/27/2013 hepatitis C antibody negative    Immunization History     Immunization History   Administered Date(s) Administered     COVID-19 Vaccine 12+ (Pfizer 2022) 08/19/2022     COVID-19 Vaccine 12+ (Pfizer) 03/19/2021, 04/09/2021, 10/12/2021     FLU 6-35 months 10/22/2013     Influenza Vaccine 50-64 or 18-64 w/egg allergy (Flublok) 10/12/2021     Influenza Vaccine >6 months (Alfuria,Fluzone) 10/21/2015, 10/05/2016, 10/11/2018, 09/25/2019, 09/24/2020     Influenza Vaccine, 6+MO IM (QUADRIVALENT W/PRESERVATIVES) 09/18/2014, 10/03/2017     Pneumo Conj 13-V (2010&after) 10/11/2018     Pneumococcal 23 valent 12/09/2020     TD (ADULT, 7+) 04/20/1997, 04/17/2019     TDAP Vaccine (Adacel) 01/15/2009     Zoster vaccine recombinant adjuvanted (SHINGRIX) 10/11/2018          Chart documentation done in part with Dragon Voice recognition Software. Although reviewed after completion, some word and grammatical error may remain.      Video-Visit Details    Type of service:  Video Visit    Video Start Time: 11:34 AM    Video End Time:11:46 AM    Originating Location (pt. Location): [place], [place]    Distant Location (provider location):  Off-site [place]    Platform used for Video Visit: Alek Carrillo MD

## 2022-12-05 NOTE — PATIENT INSTRUCTIONS
RHEUMATOLOGY    Dr. Terrence Carrillo    [facility]  [address] [place] [address]  [place] [address]  Phone number: [phone]  Fax number: [phone]    You may schedule your FLU shot by calling [phone] or if you would like to get your shot at a [facility] pharmacy you may schedule online at [email or web address].    Thank you for choosing [facility]!    Madeline Cheung CMA Rheumatology                  DISPLAY PLAN FREE TEXT

## 2022-12-07 NOTE — PROGRESS NOTES
"  Otolaryngology Clinic      Name: Betsey Vanessa  MRN: [id number]  Age: 62 year old  : 2022      Chief Complaint:   Follow up    History of Present Illness:   Betsey Vanessa is a 62 year old female with a history of right BAHA and right Meniere's disease s/p left labyrinthectomy  who presents for follow up. At our last appointment on 10/27/2022, her fungal infection was still active. We also planned to place abutment more superior and posterior to her current location which is scheduled for 2/10/2023.    Today, she reports that she has been using drops for her infection. She is scheduled for BAHA 2/10/2022.    Physical Exam:   /78   Pulse 54   Temp 96.8  F (36  C)   Ht 1.575 m (5' 2\")   Wt 76.7 kg (169 lb)   LMP  (LMP Unknown)   SpO2 98%   BMI 30.91 kg/m       Female in no acute distress.  Alert and answering questions appropriately.  HB 1/6 bilaterally.  Both ears examined under the microscope. There is drop precipitant in the right ear canal. Stable perforation with clean middle ear. No longer appears infected. Swab for culture obtained from right ear from the middle ear mucosa. Left ear with TM intact, aerated middle ear.     Assessment and Plan:  Betsey Vanessa is a 62 year old female with a history of right BAHA and right Meniere's disease s/p left labyrinthectomy  who presents for follow up of recurrent right fungal infection. Her infection is improved today. I obtained a swab for culture from the right ear, and she should finish her current bottle of drops. We will contact her with the culture results. She is scheduled remove her old abutment and place a new one on 2/10/23.      Scribe Disclosure:  I, John Goodwin, am serving as a scribe to document services personally performed by Laura Stallings MD at this visit, based upon the provider's statements to me. All documentation has been reviewed by the aforementioned provider prior to being entered into the official medical " record.    The documentation recorded by the scribe accurately reflects the services I personally performed and the decisions made by me.

## 2022-12-08 ENCOUNTER — TELEPHONE (OUTPATIENT)
Dept: AUDIOLOGY | Facility: CLINIC | Age: 62
End: 2022-12-08

## 2022-12-08 ENCOUNTER — OFFICE VISIT (OUTPATIENT)
Dept: OTOLARYNGOLOGY | Facility: CLINIC | Age: 62
End: 2022-12-08
Payer: COMMERCIAL

## 2022-12-08 VITALS
HEART RATE: 54 BPM | WEIGHT: 169 LBS | HEIGHT: 62 IN | DIASTOLIC BLOOD PRESSURE: 78 MMHG | TEMPERATURE: 96.8 F | SYSTOLIC BLOOD PRESSURE: 125 MMHG | OXYGEN SATURATION: 98 % | BODY MASS INDEX: 31.1 KG/M2

## 2022-12-08 DIAGNOSIS — H92.11 OTORRHEA, RIGHT: Primary | ICD-10-CM

## 2022-12-08 DIAGNOSIS — H72.91 PERFORATION OF RIGHT TYMPANIC MEMBRANE: ICD-10-CM

## 2022-12-08 DIAGNOSIS — H90.A31 MIXED CONDUCTIVE AND SENSORINEURAL HEARING LOSS OF RIGHT EAR WITH RESTRICTED HEARING OF LEFT EAR: ICD-10-CM

## 2022-12-08 PROCEDURE — 92504 EAR MICROSCOPY EXAMINATION: CPT | Performed by: OTOLARYNGOLOGY

## 2022-12-08 PROCEDURE — 99000 SPECIMEN HANDLING OFFICE-LAB: CPT | Performed by: PATHOLOGY

## 2022-12-08 PROCEDURE — 99212 OFFICE O/P EST SF 10 MIN: CPT | Mod: 25 | Performed by: OTOLARYNGOLOGY

## 2022-12-08 PROCEDURE — 87102 FUNGUS ISOLATION CULTURE: CPT | Mod: 90 | Performed by: PATHOLOGY

## 2022-12-08 PROCEDURE — 87070 CULTURE OTHR SPECIMN AEROBIC: CPT | Mod: 90 | Performed by: PATHOLOGY

## 2022-12-08 ASSESSMENT — PAIN SCALES - GENERAL: PAINLEVEL: MILD PAIN (3)

## 2022-12-08 NOTE — TELEPHONE ENCOUNTER
[facility] Call Center    Phone Message    May a detailed message be left on voicemail: yes     Reason for Call: Other: Per pt wants to know about her hearing band and what she can doing about hearing as she recovers. Please call to discuss. Thank you    Action Taken: Message routed to:  [facility] ([facility] AUDIO    Travel Screening: Not Applicable

## 2022-12-08 NOTE — Clinical Note
"2022       RE: Betsey Vanessa  [address]  [facility] [address]     Dear Colleague,    Thank you for referring your patient, Betsey Vanessa, to the [facility] at [facility]. Please see a copy of my visit note below.      Otolaryngology Clinic      Name: Betsey Vanessa  MRN: [id number]  Age: 62 year old  : 2020      Chief Complaint:   Follow up    History of Present Illness:   Betsey Vanessa is a 62 year old female with a history of right BAHA and right Meniere's disease s/p left labyrinthectomy  who presents for follow up. At our last appointment on 10/27/2022, her fungal infection was improving. We also planned to place abutment more superior to her current location which is scheduled for 2/10/2023.    Today, she reports that she has been using drops for her infection. She is scheduled for BAHA 2/10/2022.    Physical Exam:   /78   Pulse 54   Temp 96.8  F (36  C)   Ht 1.575 m (5' 2\")   Wt 76.7 kg (169 lb)   LMP  (LMP Unknown)   SpO2 98%   BMI 30.91 kg/m       Physical examination:  Female in no acute distress.  Alert and answering questions appropriately.  HB 1/6 bilaterally.  Both ears examined under the microscope. There is drop precipitant in the right ear canal. Stable perforation with clean middle ear. No longer appears infected. Swab for culture obtained from right ear. Left ear, TM intact, aerated middle ear.     Assessment and Plan:  Betsey Vanessa is a 62 year old female with a history of right BAHA and right Meniere's disease s/p left labyrinthectomy  who presents for follow up of recurrent right fungal infection. Her infection is improved today. I obtained a swab for culture from the right ear, and she should finish her current bottle of drops. She is scheduled to place abutment more superior to where hers is currently on 2/10/23.      Scribe Disclosure:  I, John Goodwin, " "am serving as a scribe to document services personally performed by Laura Stallings MD at this visit, based upon the provider's statements to me. All documentation has been reviewed by the aforementioned provider prior to being entered into the official medical record.      Otolaryngology Clinic      Name: Betsey Vanessa  MRN: [id number]  Age: 62 year old  : 2020      Chief Complaint:   Follow up    History of Present Illness:   Betsey Vanessa is a 62 year old female with a history of right BAHA and right Meniere's disease s/p left labyrinthectomy  who presents for follow up. At our last appointment on 10/27/2022, her fungal infection was still active. We also planned to place abutment more superior and posterior to her current location which is scheduled for 2/10/2023.    Today, she reports that she has been using drops for her infection. She is scheduled for BAHA 2/10/2022.    Physical Exam:   /78   Pulse 54   Temp 96.8  F (36  C)   Ht 1.575 m (5' 2\")   Wt 76.7 kg (169 lb)   LMP  (LMP Unknown)   SpO2 98%   BMI 30.91 kg/m       Female in no acute distress.  Alert and answering questions appropriately.  HB 1/6 bilaterally.  Both ears examined under the microscope. There is drop precipitant in the right ear canal. Stable perforation with clean middle ear. No longer appears infected. Swab for culture obtained from right ear from the middle ear mucosa. Left ear with TM intact, aerated middle ear.     Assessment and Plan:  Betsey Vanessa is a 62 year old female with a history of right BAHA and right Meniere's disease s/p left labyrinthectomy  who presents for follow up of recurrent right fungal infection. Her infection is improved today. I obtained a swab for culture from the right ear, and she should finish her current bottle of drops. We will contact her with the culture results. She is scheduled remove her old abutment and place a new one on 2/10/23.      Scribe Disclosure:  John PHILIP " Buddy, am serving as a scribe to document services personally performed by Laura Stallings MD at this visit, based upon the provider's statements to me. All documentation has been reviewed by the aforementioned provider prior to being entered into the official medical record.    The documentation recorded by the scribe accurately reflects the services I personally performed and the decisions made by me.        Again, thank you for allowing me to participate in the care of your patient.      Sincerely,    Laura Stallings MD

## 2022-12-08 NOTE — NURSING NOTE
"Chief Complaint   Patient presents with     RECHECK     6 week follow up     Blood pressure 125/78, pulse 54, temperature 96.8  F (36  C), height 1.575 m (5' 2\"), weight 76.7 kg (169 lb), SpO2 98 %, not currently breastfeeding.    Leonides Cook LPN      "

## 2022-12-08 NOTE — LETTER
"2022      RE: Betsey Vanessa  [address]  [place] [address]         Otolaryngology Clinic      Name: Betsey Vansesa  MRN: [id number]  Age: 62 year old  : 2020      Chief Complaint:   Follow up    History of Present Illness:   Betsey Vanessa is a 62 year old female with a history of right BAHA and right Meniere's disease s/p left labyrinthectomy  who presents for follow up. At our last appointment on 10/27/2022, her fungal infection was still active. We also planned to place abutment more superior and posterior to her current location which is scheduled for 2/10/2023.    Today, she reports that she has been using drops for her infection. She is scheduled for BAHA 2/10/2022.    Physical Exam:   /78   Pulse 54   Temp 96.8  F (36  C)   Ht 1.575 m (5' 2\")   Wt 76.7 kg (169 lb)   LMP  (LMP Unknown)   SpO2 98%   BMI 30.91 kg/m       Female in no acute distress.  Alert and answering questions appropriately.  HB 1/6 bilaterally.  Both ears examined under the microscope. There is drop precipitant in the right ear canal. Stable perforation with clean middle ear. No longer appears infected. Swab for culture obtained from right ear from the middle ear mucosa. Left ear with TM intact, aerated middle ear.     Assessment and Plan:  Betsey Vanessa is a 62 year old female with a history of right BAHA and right Meniere's disease s/p left labyrinthectomy  who presents for follow up of recurrent right fungal infection. Her infection is improved today. I obtained a swab for culture from the right ear, and she should finish her current bottle of drops. We will contact her with the culture results. She is scheduled remove her old abutment and place a new one on 2/10/23.      Scribe Disclosure:  I, John Goodwin, am serving as a scribe to document services personally performed by Laura Stallings MD at this visit, based upon the provider's statements to me. All documentation has been reviewed by " the aforementioned provider prior to being entered into the official medical record.    The documentation recorded by the scribe accurately reflects the services I personally performed and the decisions made by me.        Laura Stallings MD

## 2022-12-08 NOTE — PATIENT INSTRUCTIONS
You were seen in the ENT Clinic today by Dr. Stallings. If you have any questions or concerns after your appointment, please contact us (see below)      2.   Please return to clinic after your surgery.   3. We will call you with your culture results.        How to Contact Us:  Send a RunAlong message to your provider. Our team will respond to you via RunAlong. Occasionally, we will need to call you to get further information.  For urgent matters (Monday-Friday), call the ENT Clinic: [phone] and speak with a call center team member - they will route your call appropriately.   If you'd like to speak directly with a nurse, please find our contact information below. We do our best to check voicemail frequently throughout the day, and will work to call you back within 1-2 days. For urgent matters, please use the general clinic phone numbers listed above.      Ofe BORJAS RN  ENT RN Care Coordinator  Direct: [phone]    Ni PORRAS LPN  Direct: [phone]

## 2022-12-10 LAB — BACTERIA SPEC CULT: NORMAL

## 2022-12-12 NOTE — TELEPHONE ENCOUNTER
Attempted to call patient but line just rang. Sent response via [employer] instead      Alee Lutz  Audiologist  [place] License  #[id number]

## 2022-12-18 ENCOUNTER — HEALTH MAINTENANCE LETTER (OUTPATIENT)
Age: 62
End: 2022-12-18

## 2022-12-26 ENCOUNTER — TELEPHONE (OUTPATIENT)
Dept: OTOLARYNGOLOGY | Facility: CLINIC | Age: 62
End: 2022-12-26

## 2022-12-27 DIAGNOSIS — G89.4 CHRONIC PAIN SYNDROME: ICD-10-CM

## 2022-12-27 DIAGNOSIS — F11.20 CONTINUOUS OPIOID DEPENDENCE (H): ICD-10-CM

## 2022-12-27 DIAGNOSIS — M06.09 RHEUMATOID ARTHRITIS OF MULTIPLE SITES WITHOUT RHEUMATOID FACTOR (H): ICD-10-CM

## 2022-12-27 NOTE — TELEPHONE ENCOUNTER
[facility] Call Center    Phone Message    May a detailed message be left on voicemail: yes     Reason for Call: Medication Refill Request    Has the patient contacted the pharmacy for the refill? Yes   Name of medication being requested: HYDROcodone-acetaminophen (NORCO) 7.5-325 MG per tablet, fentaNYL (DURAGESIC) 12 mcg/hr 72 hr patch  Provider who prescribed the medication: Christina  Pharmacy:    Fentanyl patches go to [facility] [place], [place] - [address]    Hydrocodone goes to [facility] [place] - [address] [facility]      Date medication is needed: by 12/28         Action Taken: Other: Pain    Travel Screening: Not Applicable

## 2022-12-28 RX ORDER — HYDROCODONE BITARTRATE AND ACETAMINOPHEN 7.5; 325 MG/1; MG/1
.5-1 TABLET ORAL EVERY 6 HOURS PRN
Qty: 45 TABLET | Refills: 0 | Status: SHIPPED | OUTPATIENT
Start: 2022-12-28 | End: 2023-01-10

## 2022-12-28 RX ORDER — FENTANYL 12.5 UG/1
1 PATCH TRANSDERMAL
Qty: 10 PATCH | Refills: 0 | Status: SHIPPED | OUTPATIENT
Start: 2022-12-28 | End: 2023-01-10

## 2022-12-28 NOTE — TELEPHONE ENCOUNTER
Script Eprescribed to pharmacy  [place] Prescription Monitoring Program checked    Will send this to MA team to notify patient.    Signed Prescriptions:                        Disp   Refills    HYDROcodone-acetaminophen (NORCO) 7.5-325 *45 tab*0        Sig: Take 0.5-1 tablets by mouth every 6 hours as needed           for severe pain (7-10) Can take 1 tab per day and           on 15 days of the month can take 2 tabs.  Fill           now. Start 12/30/22 to last 30 days  Authorizing Provider: NANCY CALLE    fentaNYL (DURAGESIC) 12 mcg/hr 72 hr patch 10 pat*0        Sig: Place 1 patch onto the skin every 72 hours remove old           patch. Fill now/start 12/29/22 to last 30 days  Authorizing Provider: NANCY CALLE, KALYAN

## 2022-12-28 NOTE — TELEPHONE ENCOUNTER
Medication refill information reviewed.      last due:    Fentanyl: Fill/start 11/29/22 to last 30 days   Norco:   Fill/start 11/30/22 to last 30 days     Due date:    Fentanyl: 12/29/22  Norco: 12/30/22      Prescriptions prepped for review.     Sanjuanita RN-BSN  [facility]

## 2022-12-28 NOTE — TELEPHONE ENCOUNTER
Received call from patient requesting refill(s)   HYDROcodone-acetaminophen (NORCO) 7.5-325 MG per tablet,  Last dispensed from pharmacy on 11/30/2022     fentaNYL (DURAGESIC) 12 mcg/hr 72 hr patch  Last dispensed from pharmacy on 11/29/2022    Patient's last office/virtual visit by prescribing provider on 10/11/2022  Next office/virtual appointment scheduled for 1/10/2023    Last urine drug screen date 08/11/2022  Current opioid agreement on file (completed within the last year) Yes Date of opioid agreement: 08/11/2022    E-prescribe to      [facility] [address] [place] [address] [address] [facility] - [place], [place] - [address] , pharmacy    Will route to nursing Olin for review and preparation of prescription(s).     Nereyda Bourgeois LPN   [facility]

## 2023-01-01 ENCOUNTER — MYC REFILL (OUTPATIENT)
Dept: FAMILY MEDICINE | Facility: CLINIC | Age: 63
End: 2023-01-01

## 2023-01-01 ENCOUNTER — TELEPHONE (OUTPATIENT)
Dept: PALLIATIVE MEDICINE | Facility: CLINIC | Age: 63
End: 2023-01-01
Payer: COMMERCIAL

## 2023-01-01 ENCOUNTER — MYC REFILL (OUTPATIENT)
Dept: FAMILY MEDICINE | Facility: CLINIC | Age: 63
End: 2023-01-01
Payer: COMMERCIAL

## 2023-01-01 ENCOUNTER — LAB (OUTPATIENT)
Dept: LAB | Facility: CLINIC | Age: 63
End: 2023-01-01
Attending: INTERNAL MEDICINE
Payer: COMMERCIAL

## 2023-01-01 ENCOUNTER — TELEPHONE (OUTPATIENT)
Dept: PHARMACY | Facility: OTHER | Age: 63
End: 2023-01-01
Payer: COMMERCIAL

## 2023-01-01 ENCOUNTER — OFFICE VISIT (OUTPATIENT)
Dept: RHEUMATOLOGY | Facility: CLINIC | Age: 63
End: 2023-01-01
Payer: COMMERCIAL

## 2023-01-01 ENCOUNTER — MYC REFILL (OUTPATIENT)
Dept: RHEUMATOLOGY | Facility: CLINIC | Age: 63
End: 2023-01-01
Payer: COMMERCIAL

## 2023-01-01 ENCOUNTER — OFFICE VISIT (OUTPATIENT)
Dept: PALLIATIVE MEDICINE | Facility: CLINIC | Age: 63
End: 2023-01-01
Payer: COMMERCIAL

## 2023-01-01 ENCOUNTER — OFFICE VISIT (OUTPATIENT)
Dept: FAMILY MEDICINE | Facility: CLINIC | Age: 63
End: 2023-01-01
Payer: COMMERCIAL

## 2023-01-01 ENCOUNTER — ANCILLARY PROCEDURE (OUTPATIENT)
Dept: MAMMOGRAPHY | Facility: CLINIC | Age: 63
End: 2023-01-01
Attending: PHYSICIAN ASSISTANT
Payer: COMMERCIAL

## 2023-01-01 ENCOUNTER — OFFICE VISIT (OUTPATIENT)
Dept: OTOLARYNGOLOGY | Facility: CLINIC | Age: 63
End: 2023-01-01
Payer: COMMERCIAL

## 2023-01-01 ENCOUNTER — OFFICE VISIT (OUTPATIENT)
Dept: URGENT CARE | Facility: URGENT CARE | Age: 63
End: 2023-01-01
Payer: COMMERCIAL

## 2023-01-01 ENCOUNTER — LAB (OUTPATIENT)
Dept: LAB | Facility: CLINIC | Age: 63
End: 2023-01-01
Payer: COMMERCIAL

## 2023-01-01 ENCOUNTER — INFUSION THERAPY VISIT (OUTPATIENT)
Dept: INFUSION THERAPY | Facility: CLINIC | Age: 63
End: 2023-01-01
Attending: INTERNAL MEDICINE
Payer: COMMERCIAL

## 2023-01-01 ENCOUNTER — TELEPHONE (OUTPATIENT)
Dept: RHEUMATOLOGY | Facility: CLINIC | Age: 63
End: 2023-01-01
Payer: COMMERCIAL

## 2023-01-01 ENCOUNTER — TRANSFERRED RECORDS (OUTPATIENT)
Dept: HEALTH INFORMATION MANAGEMENT | Facility: CLINIC | Age: 63
End: 2023-01-01

## 2023-01-01 ENCOUNTER — ANCILLARY PROCEDURE (OUTPATIENT)
Dept: BONE DENSITY | Facility: CLINIC | Age: 63
End: 2023-01-01
Attending: INTERNAL MEDICINE
Payer: COMMERCIAL

## 2023-01-01 ENCOUNTER — OFFICE VISIT (OUTPATIENT)
Dept: AUDIOLOGY | Facility: CLINIC | Age: 63
End: 2023-01-01
Payer: COMMERCIAL

## 2023-01-01 ENCOUNTER — VIRTUAL VISIT (OUTPATIENT)
Dept: PALLIATIVE MEDICINE | Facility: CLINIC | Age: 63
End: 2023-01-01
Payer: COMMERCIAL

## 2023-01-01 ENCOUNTER — ALLIED HEALTH/NURSE VISIT (OUTPATIENT)
Dept: PALLIATIVE MEDICINE | Facility: CLINIC | Age: 63
End: 2023-01-01
Payer: COMMERCIAL

## 2023-01-01 ENCOUNTER — MYC MEDICAL ADVICE (OUTPATIENT)
Dept: PALLIATIVE MEDICINE | Facility: CLINIC | Age: 63
End: 2023-01-01

## 2023-01-01 ENCOUNTER — TRANSFERRED RECORDS (OUTPATIENT)
Dept: HEALTH INFORMATION MANAGEMENT | Facility: CLINIC | Age: 63
End: 2023-01-01
Payer: COMMERCIAL

## 2023-01-01 ENCOUNTER — MYC REFILL (OUTPATIENT)
Dept: OTOLARYNGOLOGY | Facility: CLINIC | Age: 63
End: 2023-01-01

## 2023-01-01 ENCOUNTER — TELEPHONE (OUTPATIENT)
Dept: FAMILY MEDICINE | Facility: CLINIC | Age: 63
End: 2023-01-01
Payer: COMMERCIAL

## 2023-01-01 VITALS
HEART RATE: 98 BPM | SYSTOLIC BLOOD PRESSURE: 113 MMHG | BODY MASS INDEX: 30.55 KG/M2 | HEIGHT: 62 IN | WEIGHT: 166 LBS | DIASTOLIC BLOOD PRESSURE: 62 MMHG | OXYGEN SATURATION: 95 % | TEMPERATURE: 97.5 F

## 2023-01-01 VITALS
WEIGHT: 166.2 LBS | OXYGEN SATURATION: 96 % | BODY MASS INDEX: 30.4 KG/M2 | SYSTOLIC BLOOD PRESSURE: 145 MMHG | DIASTOLIC BLOOD PRESSURE: 69 MMHG | HEART RATE: 106 BPM

## 2023-01-01 VITALS
BODY MASS INDEX: 30.27 KG/M2 | HEART RATE: 82 BPM | TEMPERATURE: 97.9 F | OXYGEN SATURATION: 96 % | DIASTOLIC BLOOD PRESSURE: 89 MMHG | SYSTOLIC BLOOD PRESSURE: 150 MMHG | RESPIRATION RATE: 18 BRPM | WEIGHT: 164.5 LBS | HEIGHT: 62 IN

## 2023-01-01 VITALS
HEART RATE: 87 BPM | RESPIRATION RATE: 16 BRPM | WEIGHT: 168 LBS | SYSTOLIC BLOOD PRESSURE: 117 MMHG | HEIGHT: 62 IN | OXYGEN SATURATION: 94 % | DIASTOLIC BLOOD PRESSURE: 58 MMHG | BODY MASS INDEX: 30.91 KG/M2

## 2023-01-01 VITALS — HEART RATE: 79 BPM | SYSTOLIC BLOOD PRESSURE: 134 MMHG | DIASTOLIC BLOOD PRESSURE: 74 MMHG

## 2023-01-01 VITALS
DIASTOLIC BLOOD PRESSURE: 65 MMHG | SYSTOLIC BLOOD PRESSURE: 145 MMHG | TEMPERATURE: 99 F | RESPIRATION RATE: 20 BRPM | OXYGEN SATURATION: 95 % | WEIGHT: 167 LBS | BODY MASS INDEX: 30.54 KG/M2 | HEART RATE: 88 BPM

## 2023-01-01 VITALS
HEIGHT: 62 IN | TEMPERATURE: 98.6 F | BODY MASS INDEX: 30.18 KG/M2 | OXYGEN SATURATION: 94 % | WEIGHT: 164 LBS | HEART RATE: 89 BPM | SYSTOLIC BLOOD PRESSURE: 132 MMHG | DIASTOLIC BLOOD PRESSURE: 52 MMHG

## 2023-01-01 VITALS
WEIGHT: 168 LBS | TEMPERATURE: 97.4 F | HEIGHT: 62 IN | BODY MASS INDEX: 30.91 KG/M2 | SYSTOLIC BLOOD PRESSURE: 136 MMHG | OXYGEN SATURATION: 97 % | HEART RATE: 86 BPM | DIASTOLIC BLOOD PRESSURE: 72 MMHG

## 2023-01-01 VITALS — HEART RATE: 89 BPM | OXYGEN SATURATION: 97 % | DIASTOLIC BLOOD PRESSURE: 67 MMHG | SYSTOLIC BLOOD PRESSURE: 131 MMHG

## 2023-01-01 VITALS — HEART RATE: 89 BPM | SYSTOLIC BLOOD PRESSURE: 132 MMHG | DIASTOLIC BLOOD PRESSURE: 72 MMHG

## 2023-01-01 VITALS
WEIGHT: 167 LBS | TEMPERATURE: 98.6 F | BODY MASS INDEX: 29.59 KG/M2 | SYSTOLIC BLOOD PRESSURE: 130 MMHG | HEIGHT: 63 IN | OXYGEN SATURATION: 100 % | HEART RATE: 88 BPM | DIASTOLIC BLOOD PRESSURE: 60 MMHG

## 2023-01-01 DIAGNOSIS — M25.50 MULTIPLE JOINT PAIN: ICD-10-CM

## 2023-01-01 DIAGNOSIS — G89.4 CHRONIC PAIN SYNDROME: ICD-10-CM

## 2023-01-01 DIAGNOSIS — R11.0 NAUSEA: ICD-10-CM

## 2023-01-01 DIAGNOSIS — M79.642 BILATERAL HAND PAIN: ICD-10-CM

## 2023-01-01 DIAGNOSIS — H90.A31 MIXED CONDUCTIVE AND SENSORINEURAL HEARING LOSS OF RIGHT EAR WITH RESTRICTED HEARING OF LEFT EAR: Primary | ICD-10-CM

## 2023-01-01 DIAGNOSIS — M06.09 RHEUMATOID ARTHRITIS OF MULTIPLE SITES WITHOUT RHEUMATOID FACTOR (H): ICD-10-CM

## 2023-01-01 DIAGNOSIS — H81.03 MENIERE'S DISEASE, BILATERAL: ICD-10-CM

## 2023-01-01 DIAGNOSIS — F32.0 MILD MAJOR DEPRESSION (H): ICD-10-CM

## 2023-01-01 DIAGNOSIS — M79.18 MYOFASCIAL PAIN: ICD-10-CM

## 2023-01-01 DIAGNOSIS — J45.30 MILD PERSISTENT ASTHMA WITHOUT COMPLICATION: ICD-10-CM

## 2023-01-01 DIAGNOSIS — F11.20 CONTINUOUS OPIOID DEPENDENCE (H): ICD-10-CM

## 2023-01-01 DIAGNOSIS — Z96.21 STATUS POST PLACEMENT OF BONE ANCHORED HEARING AID (BAHA): ICD-10-CM

## 2023-01-01 DIAGNOSIS — M06.09 RHEUMATOID ARTHRITIS OF MULTIPLE SITES WITH NEGATIVE RHEUMATOID FACTOR (H): ICD-10-CM

## 2023-01-01 DIAGNOSIS — J30.89 PERENNIAL ALLERGIC RHINITIS: ICD-10-CM

## 2023-01-01 DIAGNOSIS — Z79.83 LONG-TERM CURRENT USE OF BISPHOSPHONATE: ICD-10-CM

## 2023-01-01 DIAGNOSIS — J02.9 ACUTE PHARYNGITIS, UNSPECIFIED ETIOLOGY: ICD-10-CM

## 2023-01-01 DIAGNOSIS — Z79.899 HIGH RISK MEDICATIONS (NOT ANTICOAGULANTS) LONG-TERM USE: ICD-10-CM

## 2023-01-01 DIAGNOSIS — M35.01 SJOGREN'S SYNDROME WITH KERATOCONJUNCTIVITIS SICCA (H): ICD-10-CM

## 2023-01-01 DIAGNOSIS — M79.7 FIBROMYALGIA: ICD-10-CM

## 2023-01-01 DIAGNOSIS — M85.89 OSTEOPENIA OF MULTIPLE SITES: ICD-10-CM

## 2023-01-01 DIAGNOSIS — J01.90 ACUTE SINUSITIS WITH SYMPTOMS > 10 DAYS: Primary | ICD-10-CM

## 2023-01-01 DIAGNOSIS — Z79.899 CONTROLLED SUBSTANCE AGREEMENT SIGNED: Primary | ICD-10-CM

## 2023-01-01 DIAGNOSIS — K59.03 THERAPEUTIC OPIOID INDUCED CONSTIPATION: ICD-10-CM

## 2023-01-01 DIAGNOSIS — E11.65 TYPE 2 DIABETES MELLITUS WITH HYPERGLYCEMIA, WITHOUT LONG-TERM CURRENT USE OF INSULIN (H): Primary | ICD-10-CM

## 2023-01-01 DIAGNOSIS — Z51.81 ENCOUNTER FOR THERAPEUTIC DRUG MONITORING: ICD-10-CM

## 2023-01-01 DIAGNOSIS — H72.91 PERFORATION OF RIGHT TYMPANIC MEMBRANE: ICD-10-CM

## 2023-01-01 DIAGNOSIS — M85.89 OSTEOPENIA OF MULTIPLE SITES: Primary | ICD-10-CM

## 2023-01-01 DIAGNOSIS — T78.2XXS ANAPHYLAXIS, SEQUELA: ICD-10-CM

## 2023-01-01 DIAGNOSIS — H73.21 MYRINGITIS OF RIGHT EAR: Primary | ICD-10-CM

## 2023-01-01 DIAGNOSIS — H81.02 MENIERE'S DISEASE OF LEFT EAR: ICD-10-CM

## 2023-01-01 DIAGNOSIS — M06.09 RHEUMATOID ARTHRITIS OF MULTIPLE SITES WITHOUT RHEUMATOID FACTOR (H): Primary | ICD-10-CM

## 2023-01-01 DIAGNOSIS — T40.2X5A THERAPEUTIC OPIOID INDUCED CONSTIPATION: ICD-10-CM

## 2023-01-01 DIAGNOSIS — E11.65 TYPE 2 DIABETES MELLITUS WITH HYPERGLYCEMIA, WITHOUT LONG-TERM CURRENT USE OF INSULIN (H): ICD-10-CM

## 2023-01-01 DIAGNOSIS — H90.A31 MIXED CONDUCTIVE AND SENSORINEURAL HEARING LOSS OF RIGHT EAR WITH RESTRICTED HEARING OF LEFT EAR: ICD-10-CM

## 2023-01-01 DIAGNOSIS — Z78.0 POST-MENOPAUSAL: ICD-10-CM

## 2023-01-01 DIAGNOSIS — F13.20 BENZODIAZEPINE DEPENDENCE, EPISODIC (H): ICD-10-CM

## 2023-01-01 DIAGNOSIS — Z79.899 CONTROLLED SUBSTANCE AGREEMENT SIGNED: ICD-10-CM

## 2023-01-01 DIAGNOSIS — M54.59 LUMBAR FACET JOINT PAIN: Primary | ICD-10-CM

## 2023-01-01 DIAGNOSIS — M79.7 FIBROMYALGIA: Primary | ICD-10-CM

## 2023-01-01 DIAGNOSIS — M06.09 RHEUMATOID ARTHRITIS OF MULTIPLE SITES WITH NEGATIVE RHEUMATOID FACTOR (H): Primary | ICD-10-CM

## 2023-01-01 DIAGNOSIS — M79.641 BILATERAL HAND PAIN: ICD-10-CM

## 2023-01-01 DIAGNOSIS — M25.50 MULTIPLE JOINT PAIN: Primary | ICD-10-CM

## 2023-01-01 DIAGNOSIS — Z79.1 LONG TERM (CURRENT) USE OF NON-STEROIDAL ANTI-INFLAMMATORIES (NSAID): ICD-10-CM

## 2023-01-01 DIAGNOSIS — Z92.29 HISTORY OF BISPHOSPHONATE THERAPY: ICD-10-CM

## 2023-01-01 DIAGNOSIS — M54.59 LUMBAR FACET JOINT PAIN: ICD-10-CM

## 2023-01-01 DIAGNOSIS — L20.82 FLEXURAL ECZEMA: ICD-10-CM

## 2023-01-01 DIAGNOSIS — Z12.31 VISIT FOR SCREENING MAMMOGRAM: ICD-10-CM

## 2023-01-01 DIAGNOSIS — E78.5 HYPERLIPIDEMIA, UNSPECIFIED HYPERLIPIDEMIA TYPE: ICD-10-CM

## 2023-01-01 LAB
7AMINOCLONAZEPAM UR QL CFM: PRESENT
ALBUMIN SERPL BCG-MCNC: 4.5 G/DL (ref 3.5–5.2)
ALBUMIN SERPL BCG-MCNC: 4.6 G/DL (ref 3.5–5.2)
ALP SERPL-CCNC: 61 U/L (ref 35–104)
ALP SERPL-CCNC: 72 U/L (ref 35–104)
ALT SERPL W P-5'-P-CCNC: 19 U/L (ref 0–50)
ALT SERPL W P-5'-P-CCNC: 29 U/L (ref 10–35)
AST SERPL W P-5'-P-CCNC: 26 U/L (ref 0–45)
AST SERPL W P-5'-P-CCNC: 30 U/L (ref 10–35)
BACTERIA SPEC CULT: ABNORMAL
BACTERIA SPEC CULT: NO GROWTH
BACTERIA SPEC CULT: NO GROWTH
BASOPHILS # BLD AUTO: 0 10E3/UL (ref 0–0.2)
BASOPHILS # BLD AUTO: 0 10E3/UL (ref 0–0.2)
BASOPHILS NFR BLD AUTO: 0 %
BASOPHILS NFR BLD AUTO: 0 %
BILIRUB DIRECT SERPL-MCNC: <0.2 MG/DL (ref 0–0.3)
BILIRUB DIRECT SERPL-MCNC: <0.2 MG/DL (ref 0–0.3)
BILIRUB SERPL-MCNC: 0.3 MG/DL
BILIRUB SERPL-MCNC: 0.4 MG/DL
CALCIUM SERPL-MCNC: 9.8 MG/DL (ref 8.8–10.2)
CHOLEST SERPL-MCNC: 206 MG/DL
CREAT SERPL-MCNC: 0.88 MG/DL (ref 0.51–0.95)
CREAT SERPL-MCNC: 0.88 MG/DL (ref 0.51–0.95)
CREAT SERPL-MCNC: 0.94 MG/DL (ref 0.51–0.95)
CREAT UR-MCNC: 62 MG/DL
CRP SERPL-MCNC: 4.81 MG/L
CRP SERPL-MCNC: 9.01 MG/L
DEPRECATED S PYO AG THROAT QL EIA: NEGATIVE
DHC UR CFM-MCNC: 378 NG/ML
DHC/CREAT UR: 610 NG/MG {CREAT}
EGFRCR SERPLBLD CKD-EPI 2021: 69 ML/MIN/1.73M2
EOSINOPHIL # BLD AUTO: 0.3 10E3/UL (ref 0–0.7)
EOSINOPHIL # BLD AUTO: 0.5 10E3/UL (ref 0–0.7)
EOSINOPHIL NFR BLD AUTO: 4 %
EOSINOPHIL NFR BLD AUTO: 8 %
ERYTHROCYTE [DISTWIDTH] IN BLOOD BY AUTOMATED COUNT: 14.4 % (ref 10–15)
ERYTHROCYTE [DISTWIDTH] IN BLOOD BY AUTOMATED COUNT: 15.5 % (ref 10–15)
ERYTHROCYTE [SEDIMENTATION RATE] IN BLOOD BY WESTERGREN METHOD: 17 MM/HR (ref 0–30)
ERYTHROCYTE [SEDIMENTATION RATE] IN BLOOD BY WESTERGREN METHOD: 19 MM/HR (ref 0–30)
FENTANYL UR CFM-MCNC: 5 NG/ML
FENTANYL/CREAT UR: 8 NG/MG {CREAT}
GFR SERPL CREATININE-BSD FRML MDRD: 74 ML/MIN/1.73M2
GFR SERPL CREATININE-BSD FRML MDRD: 74 ML/MIN/1.73M2
GROUP A STREP BY PCR: NOT DETECTED
HBA1C MFR BLD: 6.4 % (ref 0–5.6)
HCT VFR BLD AUTO: 33.8 % (ref 35–47)
HCT VFR BLD AUTO: 34.9 % (ref 35–47)
HDLC SERPL-MCNC: 40 MG/DL
HGB BLD-MCNC: 11 G/DL (ref 11.7–15.7)
HGB BLD-MCNC: 11.4 G/DL (ref 11.7–15.7)
HOLD SPECIMEN: NORMAL
HYDROCODONE UR CFM-MCNC: 850 NG/ML
HYDROCODONE/CREAT UR: 1371 NG/MG {CREAT}
IMM GRANULOCYTES # BLD: 0 10E3/UL
IMM GRANULOCYTES NFR BLD: 0 %
LDLC SERPL CALC-MCNC: 124 MG/DL
LYMPHOCYTES # BLD AUTO: 1.3 10E3/UL (ref 0.8–5.3)
LYMPHOCYTES # BLD AUTO: 1.7 10E3/UL (ref 0.8–5.3)
LYMPHOCYTES NFR BLD AUTO: 20 %
LYMPHOCYTES NFR BLD AUTO: 26 %
MCH RBC QN AUTO: 25.7 PG (ref 26.5–33)
MCH RBC QN AUTO: 26.4 PG (ref 26.5–33)
MCHC RBC AUTO-ENTMCNC: 32.5 G/DL (ref 31.5–36.5)
MCHC RBC AUTO-ENTMCNC: 32.7 G/DL (ref 31.5–36.5)
MCV RBC AUTO: 79 FL (ref 78–100)
MCV RBC AUTO: 81 FL (ref 78–100)
MONOCYTES # BLD AUTO: 0.4 10E3/UL (ref 0–1.3)
MONOCYTES # BLD AUTO: 0.4 10E3/UL (ref 0–1.3)
MONOCYTES NFR BLD AUTO: 6 %
MONOCYTES NFR BLD AUTO: 6 %
NEUTROPHILS # BLD AUTO: 4.1 10E3/UL (ref 1.6–8.3)
NEUTROPHILS # BLD AUTO: 4.5 10E3/UL (ref 1.6–8.3)
NEUTROPHILS NFR BLD AUTO: 63 %
NEUTROPHILS NFR BLD AUTO: 66 %
NONHDLC SERPL-MCNC: 166 MG/DL
NORFENTANYL UR CFM-MCNC: 29 NG/ML
NORFENTANYL/CREAT UR: 47 NG/MG {CREAT}
PLATELET # BLD AUTO: 158 10E3/UL (ref 150–450)
PLATELET # BLD AUTO: 159 10E3/UL (ref 150–450)
PREGABALIN UR QL CFM: PRESENT
PROT SERPL-MCNC: 7.4 G/DL (ref 6.4–8.3)
PROT SERPL-MCNC: 7.5 G/DL (ref 6.4–8.3)
RBC # BLD AUTO: 4.28 10E6/UL (ref 3.8–5.2)
RBC # BLD AUTO: 4.32 10E6/UL (ref 3.8–5.2)
TRIGL SERPL-MCNC: 208 MG/DL
WBC # BLD AUTO: 6.4 10E3/UL (ref 4–11)
WBC # BLD AUTO: 6.7 10E3/UL (ref 4–11)

## 2023-01-01 PROCEDURE — 80076 HEPATIC FUNCTION PANEL: CPT | Performed by: INTERNAL MEDICINE

## 2023-01-01 PROCEDURE — 99207 PR NO CHARGE LOS: CPT

## 2023-01-01 PROCEDURE — 99214 OFFICE O/P EST MOD 30 MIN: CPT | Mod: 24

## 2023-01-01 PROCEDURE — 99214 OFFICE O/P EST MOD 30 MIN: CPT | Performed by: PHYSICIAN ASSISTANT

## 2023-01-01 PROCEDURE — 92504 EAR MICROSCOPY EXAMINATION: CPT | Performed by: OTOLARYNGOLOGY

## 2023-01-01 PROCEDURE — 99215 OFFICE O/P EST HI 40 MIN: CPT

## 2023-01-01 PROCEDURE — 99207 PR ASSESSMENT FOR HEARING AID: CPT | Performed by: AUDIOLOGIST

## 2023-01-01 PROCEDURE — 77063 BREAST TOMOSYNTHESIS BI: CPT | Mod: TC | Performed by: RADIOLOGY

## 2023-01-01 PROCEDURE — 83036 HEMOGLOBIN GLYCOSYLATED A1C: CPT

## 2023-01-01 PROCEDURE — 99024 POSTOP FOLLOW-UP VISIT: CPT | Performed by: OTOLARYNGOLOGY

## 2023-01-01 PROCEDURE — 86140 C-REACTIVE PROTEIN: CPT | Performed by: INTERNAL MEDICINE

## 2023-01-01 PROCEDURE — 87077 CULTURE AEROBIC IDENTIFY: CPT | Mod: 90 | Performed by: PATHOLOGY

## 2023-01-01 PROCEDURE — 82565 ASSAY OF CREATININE: CPT | Performed by: INTERNAL MEDICINE

## 2023-01-01 PROCEDURE — 36415 COLL VENOUS BLD VENIPUNCTURE: CPT

## 2023-01-01 PROCEDURE — 85025 COMPLETE CBC W/AUTO DIFF WBC: CPT | Performed by: INTERNAL MEDICINE

## 2023-01-01 PROCEDURE — 99214 OFFICE O/P EST MOD 30 MIN: CPT | Performed by: INTERNAL MEDICINE

## 2023-01-01 PROCEDURE — 99000 SPECIMEN HANDLING OFFICE-LAB: CPT | Performed by: PATHOLOGY

## 2023-01-01 PROCEDURE — 87102 FUNGUS ISOLATION CULTURE: CPT | Mod: 90 | Performed by: PATHOLOGY

## 2023-01-01 PROCEDURE — V5266 BATTERY FOR HEARING DEVICE: HCPCS | Performed by: AUDIOLOGIST

## 2023-01-01 PROCEDURE — 99214 OFFICE O/P EST MOD 30 MIN: CPT | Mod: VID

## 2023-01-01 PROCEDURE — 80061 LIPID PANEL: CPT

## 2023-01-01 PROCEDURE — 99215 OFFICE O/P EST HI 40 MIN: CPT | Mod: 95

## 2023-01-01 PROCEDURE — 36415 COLL VENOUS BLD VENIPUNCTURE: CPT | Performed by: INTERNAL MEDICINE

## 2023-01-01 PROCEDURE — 85652 RBC SED RATE AUTOMATED: CPT | Performed by: INTERNAL MEDICINE

## 2023-01-01 PROCEDURE — 87070 CULTURE OTHR SPECIMN AEROBIC: CPT | Mod: 90 | Performed by: PATHOLOGY

## 2023-01-01 PROCEDURE — 87651 STREP A DNA AMP PROBE: CPT | Performed by: PHYSICIAN ASSISTANT

## 2023-01-01 PROCEDURE — 77080 DXA BONE DENSITY AXIAL: CPT | Performed by: INTERNAL MEDICINE

## 2023-01-01 PROCEDURE — 99213 OFFICE O/P EST LOW 20 MIN: CPT | Performed by: PHYSICIAN ASSISTANT

## 2023-01-01 PROCEDURE — 99213 OFFICE O/P EST LOW 20 MIN: CPT | Mod: 25 | Performed by: OTOLARYNGOLOGY

## 2023-01-01 PROCEDURE — G0480 DRUG TEST DEF 1-7 CLASSES: HCPCS

## 2023-01-01 PROCEDURE — 99207 PR NO CHARGE NURSE ONLY: CPT

## 2023-01-01 PROCEDURE — 82310 ASSAY OF CALCIUM: CPT | Performed by: INTERNAL MEDICINE

## 2023-01-01 PROCEDURE — 92700 UNLISTED ORL SERVICE/PX: CPT | Performed by: AUDIOLOGIST

## 2023-01-01 PROCEDURE — 77067 SCR MAMMO BI INCL CAD: CPT | Mod: TC | Performed by: RADIOLOGY

## 2023-01-01 PROCEDURE — 96365 THER/PROPH/DIAG IV INF INIT: CPT | Performed by: NURSE PRACTITIONER

## 2023-01-01 PROCEDURE — 92592 PR HEARING AID CHECK, MONAURAL: CPT | Performed by: AUDIOLOGIST

## 2023-01-01 RX ORDER — MEPERIDINE HYDROCHLORIDE 25 MG/ML
25 INJECTION INTRAMUSCULAR; INTRAVENOUS; SUBCUTANEOUS EVERY 30 MIN PRN
Status: CANCELLED | OUTPATIENT
Start: 2023-01-01

## 2023-01-01 RX ORDER — ALBUTEROL SULFATE 90 UG/1
1-2 AEROSOL, METERED RESPIRATORY (INHALATION)
Status: CANCELLED
Start: 2023-01-01

## 2023-01-01 RX ORDER — CELECOXIB 100 MG/1
100 CAPSULE ORAL 2 TIMES DAILY
Qty: 180 CAPSULE | Refills: 2 | Status: SHIPPED | OUTPATIENT
Start: 2023-01-01

## 2023-01-01 RX ORDER — CETIRIZINE HYDROCHLORIDE 10 MG/1
10 TABLET, FILM COATED ORAL DAILY
Qty: 90 TABLET | Refills: 3 | Status: SHIPPED | OUTPATIENT
Start: 2023-01-01

## 2023-01-01 RX ORDER — ALBUTEROL SULFATE 0.83 MG/ML
2.5 SOLUTION RESPIRATORY (INHALATION)
Status: CANCELLED | OUTPATIENT
Start: 2023-01-01

## 2023-01-01 RX ORDER — HYDROXYCHLOROQUINE SULFATE 200 MG/1
TABLET, FILM COATED ORAL
Qty: 135 TABLET | Refills: 3 | Status: SHIPPED | OUTPATIENT
Start: 2023-01-01

## 2023-01-01 RX ORDER — HEPARIN SODIUM,PORCINE 10 UNIT/ML
5 VIAL (ML) INTRAVENOUS
Status: CANCELLED | OUTPATIENT
Start: 2023-01-01

## 2023-01-01 RX ORDER — ZOLEDRONIC ACID 5 MG/100ML
5 INJECTION, SOLUTION INTRAVENOUS ONCE
Status: COMPLETED | OUTPATIENT
Start: 2023-01-01 | End: 2023-01-01

## 2023-01-01 RX ORDER — DIPHENHYDRAMINE HYDROCHLORIDE 50 MG/ML
50 INJECTION INTRAMUSCULAR; INTRAVENOUS
Status: CANCELLED
Start: 2023-01-01

## 2023-01-01 RX ORDER — FLUTICASONE PROPIONATE 110 UG/1
1 AEROSOL, METERED RESPIRATORY (INHALATION) 2 TIMES DAILY
Qty: 36 G | Refills: 1 | Status: SHIPPED | OUTPATIENT
Start: 2023-01-01 | End: 2024-01-01

## 2023-01-01 RX ORDER — FLUTICASONE PROPIONATE 50 MCG
SPRAY, SUSPENSION (ML) NASAL
Qty: 16 G | Refills: 0 | Status: SHIPPED | OUTPATIENT
Start: 2023-01-01 | End: 2023-01-01

## 2023-01-01 RX ORDER — HYDROCODONE BITARTRATE AND ACETAMINOPHEN 7.5; 325 MG/1; MG/1
.5-1 TABLET ORAL EVERY 6 HOURS PRN
Qty: 60 TABLET | Refills: 0 | Status: SHIPPED | OUTPATIENT
Start: 2023-01-01 | End: 2023-01-01

## 2023-01-01 RX ORDER — PREGABALIN 75 MG/1
75 CAPSULE ORAL 2 TIMES DAILY
Qty: 60 CAPSULE | Refills: 1 | Status: SHIPPED | OUTPATIENT
Start: 2023-01-01 | End: 2024-01-01

## 2023-01-01 RX ORDER — FLUTICASONE PROPIONATE 50 MCG
1-2 SPRAY, SUSPENSION (ML) NASAL DAILY
Qty: 16 G | Refills: 0 | Status: SHIPPED | OUTPATIENT
Start: 2023-01-01 | End: 2024-01-01

## 2023-01-01 RX ORDER — EPINEPHRINE 1 MG/ML
0.3 INJECTION, SOLUTION INTRAMUSCULAR; SUBCUTANEOUS EVERY 5 MIN PRN
Status: CANCELLED | OUTPATIENT
Start: 2023-01-01

## 2023-01-01 RX ORDER — CEVIMELINE HYDROCHLORIDE 30 MG/1
30 CAPSULE ORAL 3 TIMES DAILY
Qty: 270 CAPSULE | Refills: 2 | Status: SHIPPED | OUTPATIENT
Start: 2023-01-01

## 2023-01-01 RX ORDER — HYDROXYCHLOROQUINE SULFATE 200 MG/1
TABLET, FILM COATED ORAL
Qty: 45 TABLET | Refills: 0 | Status: SHIPPED | OUTPATIENT
Start: 2023-01-01 | End: 2023-01-01

## 2023-01-01 RX ORDER — ZOLEDRONIC ACID 5 MG/100ML
5 INJECTION, SOLUTION INTRAVENOUS ONCE
Status: CANCELLED
Start: 2023-01-01

## 2023-01-01 RX ORDER — EPINEPHRINE 0.3 MG/.3ML
0.3 INJECTION SUBCUTANEOUS PRN
Qty: 2 EACH | Refills: 0 | Status: SHIPPED | OUTPATIENT
Start: 2023-01-01

## 2023-01-01 RX ORDER — FENTANYL 12.5 UG/1
1 PATCH TRANSDERMAL
Qty: 10 PATCH | Refills: 0 | Status: SHIPPED | OUTPATIENT
Start: 2023-01-01 | End: 2023-01-01

## 2023-01-01 RX ORDER — HYDROXYCHLOROQUINE SULFATE 200 MG/1
TABLET, FILM COATED ORAL
Qty: 135 TABLET | Refills: 0 | Status: SHIPPED | OUTPATIENT
Start: 2023-01-01 | End: 2023-01-01

## 2023-01-01 RX ORDER — HYDROCODONE BITARTRATE AND ACETAMINOPHEN 7.5; 325 MG/1; MG/1
.5-1 TABLET ORAL EVERY 6 HOURS PRN
Qty: 55 TABLET | Refills: 0 | Status: SHIPPED | OUTPATIENT
Start: 2023-01-01 | End: 2024-01-01

## 2023-01-01 RX ORDER — CLONAZEPAM 0.5 MG/1
TABLET ORAL
Qty: 100 TABLET | Refills: 1 | Status: SHIPPED | OUTPATIENT
Start: 2023-01-01 | End: 2024-01-01

## 2023-01-01 RX ORDER — PIMECROLIMUS 10 MG/G
CREAM TOPICAL 2 TIMES DAILY
Qty: 60 G | Refills: 5 | Status: SHIPPED | OUTPATIENT
Start: 2023-01-01 | End: 2024-01-01

## 2023-01-01 RX ORDER — EPINEPHRINE 1 MG/ML
0.3 INJECTION, SOLUTION, CONCENTRATE INTRAVENOUS EVERY 5 MIN PRN
Status: CANCELLED | OUTPATIENT
Start: 2023-01-01

## 2023-01-01 RX ORDER — HYDROXYCHLOROQUINE SULFATE 200 MG/1
200 TABLET, FILM COATED ORAL DAILY
Status: CANCELLED | OUTPATIENT
Start: 2023-01-01

## 2023-01-01 RX ORDER — HYDROCODONE BITARTRATE AND ACETAMINOPHEN 7.5; 325 MG/1; MG/1
.5-1 TABLET ORAL EVERY 6 HOURS PRN
Qty: 75 TABLET | Refills: 0 | Status: SHIPPED | OUTPATIENT
Start: 2023-01-01 | End: 2023-01-01

## 2023-01-01 RX ORDER — QUINIDINE GLUCONATE 324 MG
TABLET, EXTENDED RELEASE ORAL
COMMUNITY

## 2023-01-01 RX ORDER — HYDROCODONE BITARTRATE AND ACETAMINOPHEN 7.5; 325 MG/1; MG/1
.5-1 TABLET ORAL EVERY 6 HOURS PRN
Qty: 65 TABLET | Refills: 0 | Status: SHIPPED | OUTPATIENT
Start: 2023-01-01 | End: 2023-01-01

## 2023-01-01 RX ORDER — ESCITALOPRAM OXALATE 20 MG/1
TABLET ORAL
Qty: 135 TABLET | Refills: 0 | Status: SHIPPED | OUTPATIENT
Start: 2023-01-01 | End: 2023-01-01

## 2023-01-01 RX ORDER — PREGABALIN 25 MG/1
CAPSULE ORAL
Qty: 60 CAPSULE | Refills: 0 | Status: SHIPPED | OUTPATIENT
Start: 2023-01-01 | End: 2023-01-01 | Stop reason: DRUGHIGH

## 2023-01-01 RX ORDER — TRIAMCINOLONE ACETONIDE 1 MG/G
CREAM TOPICAL 2 TIMES DAILY
Qty: 45 G | Refills: 0 | Status: SHIPPED | OUTPATIENT
Start: 2023-01-01 | End: 2024-01-01

## 2023-01-01 RX ORDER — PROMETHAZINE HYDROCHLORIDE 25 MG/1
25 TABLET ORAL EVERY 6 HOURS PRN
Qty: 15 TABLET | Refills: 0 | Status: SHIPPED | OUTPATIENT
Start: 2023-01-01

## 2023-01-01 RX ORDER — CEFDINIR 300 MG/1
300 CAPSULE ORAL 2 TIMES DAILY
Qty: 20 CAPSULE | Refills: 0 | Status: SHIPPED | OUTPATIENT
Start: 2023-01-01 | End: 2023-01-01

## 2023-01-01 RX ORDER — METHYLPREDNISOLONE SODIUM SUCCINATE 125 MG/2ML
125 INJECTION, POWDER, LYOPHILIZED, FOR SOLUTION INTRAMUSCULAR; INTRAVENOUS
Status: CANCELLED
Start: 2023-01-01

## 2023-01-01 RX ORDER — ESCITALOPRAM OXALATE 20 MG/1
TABLET ORAL
Qty: 135 TABLET | Refills: 3 | Status: SHIPPED | OUTPATIENT
Start: 2023-01-01

## 2023-01-01 RX ORDER — PROMETHAZINE HYDROCHLORIDE 25 MG/1
25 TABLET ORAL EVERY 6 HOURS PRN
Qty: 30 TABLET | Refills: 6 | Status: SHIPPED | OUTPATIENT
Start: 2023-01-01

## 2023-01-01 RX ORDER — FLUTICASONE PROPIONATE 110 UG/1
1 AEROSOL, METERED RESPIRATORY (INHALATION) 2 TIMES DAILY
Qty: 36 G | Refills: 1 | OUTPATIENT
Start: 2023-01-01

## 2023-01-01 RX ORDER — FLUVASTATIN 20 MG/1
20 CAPSULE ORAL AT BEDTIME
Qty: 90 CAPSULE | Refills: 2 | Status: SHIPPED | OUTPATIENT
Start: 2023-01-01

## 2023-01-01 RX ORDER — PREGABALIN 100 MG/1
100 CAPSULE ORAL 2 TIMES DAILY
Qty: 60 CAPSULE | Refills: 1 | Status: SHIPPED | OUTPATIENT
Start: 2023-01-01 | End: 2023-01-01

## 2023-01-01 RX ORDER — HEPARIN SODIUM (PORCINE) LOCK FLUSH IV SOLN 100 UNIT/ML 100 UNIT/ML
5 SOLUTION INTRAVENOUS
Status: CANCELLED | OUTPATIENT
Start: 2023-01-01

## 2023-01-01 RX ORDER — PREGABALIN 100 MG/1
100 CAPSULE ORAL 2 TIMES DAILY
Qty: 60 CAPSULE | Refills: 1 | Status: SHIPPED | OUTPATIENT
Start: 2023-01-01 | End: 2024-01-01

## 2023-01-01 RX ORDER — FENTANYL 12.5 UG/1
1 PATCH TRANSDERMAL
Qty: 10 PATCH | Refills: 0 | Status: SHIPPED | OUTPATIENT
Start: 2023-01-01 | End: 2024-01-01

## 2023-01-01 RX ORDER — ALBUTEROL SULFATE 90 UG/1
1-2 AEROSOL, METERED RESPIRATORY (INHALATION) EVERY 4 HOURS PRN
Qty: 18 G | Refills: 5 | Status: SHIPPED | OUTPATIENT
Start: 2023-01-01 | End: 2024-01-01

## 2023-01-01 RX ORDER — PREGABALIN 50 MG/1
50 CAPSULE ORAL 2 TIMES DAILY
Qty: 60 CAPSULE | Refills: 1 | Status: SHIPPED | OUTPATIENT
Start: 2023-01-01 | End: 2024-01-01

## 2023-01-01 RX ORDER — CALCIUM CARBONATE 500 MG/1
1 TABLET, CHEWABLE ORAL 2 TIMES DAILY
COMMUNITY

## 2023-01-01 RX ORDER — HYDROXYCHLOROQUINE SULFATE 200 MG/1
TABLET, FILM COATED ORAL
Qty: 45 TABLET | Refills: 0 | OUTPATIENT
Start: 2023-01-01

## 2023-01-01 RX ORDER — GLIMEPIRIDE 2 MG/1
2 TABLET ORAL
Qty: 90 TABLET | Refills: 3 | Status: SHIPPED | OUTPATIENT
Start: 2023-01-01

## 2023-01-01 RX ADMIN — Medication 250 ML: at 12:08

## 2023-01-01 RX ADMIN — ZOLEDRONIC ACID 5 MG: 0.05 INJECTION, SOLUTION INTRAVENOUS at 12:08

## 2023-01-01 ASSESSMENT — ANXIETY QUESTIONNAIRES
IF YOU CHECKED OFF ANY PROBLEMS ON THIS QUESTIONNAIRE, HOW DIFFICULT HAVE THESE PROBLEMS MADE IT FOR YOU TO DO YOUR WORK, TAKE CARE OF THINGS AT HOME, OR GET ALONG WITH OTHER PEOPLE: SOMEWHAT DIFFICULT
7. FEELING AFRAID AS IF SOMETHING AWFUL MIGHT HAPPEN: NOT AT ALL
GAD7 TOTAL SCORE: 4
GAD7 TOTAL SCORE: 4
3. WORRYING TOO MUCH ABOUT DIFFERENT THINGS: NOT AT ALL
4. TROUBLE RELAXING: NOT AT ALL
5. BEING SO RESTLESS THAT IT IS HARD TO SIT STILL: NOT AT ALL
GAD7 TOTAL SCORE: 3
6. BECOMING EASILY ANNOYED OR IRRITABLE: SEVERAL DAYS
6. BECOMING EASILY ANNOYED OR IRRITABLE: NOT AT ALL
1. FEELING NERVOUS, ANXIOUS, OR ON EDGE: NOT AT ALL
2. NOT BEING ABLE TO STOP OR CONTROL WORRYING: NOT AT ALL
6. BECOMING EASILY ANNOYED OR IRRITABLE: SEVERAL DAYS
4. TROUBLE RELAXING: SEVERAL DAYS
7. FEELING AFRAID AS IF SOMETHING AWFUL MIGHT HAPPEN: NOT AT ALL
2. NOT BEING ABLE TO STOP OR CONTROL WORRYING: SEVERAL DAYS
GAD7 TOTAL SCORE: 2
2. NOT BEING ABLE TO STOP OR CONTROL WORRYING: NOT AT ALL
7. FEELING AFRAID AS IF SOMETHING AWFUL MIGHT HAPPEN: NOT AT ALL
1. FEELING NERVOUS, ANXIOUS, OR ON EDGE: MORE THAN HALF THE DAYS
3. WORRYING TOO MUCH ABOUT DIFFERENT THINGS: NOT AT ALL
3. WORRYING TOO MUCH ABOUT DIFFERENT THINGS: SEVERAL DAYS
IF YOU CHECKED OFF ANY PROBLEMS ON THIS QUESTIONNAIRE, HOW DIFFICULT HAVE THESE PROBLEMS MADE IT FOR YOU TO DO YOUR WORK, TAKE CARE OF THINGS AT HOME, OR GET ALONG WITH OTHER PEOPLE: NOT DIFFICULT AT ALL
GAD7 TOTAL SCORE: 2
1. FEELING NERVOUS, ANXIOUS, OR ON EDGE: SEVERAL DAYS
5. BEING SO RESTLESS THAT IT IS HARD TO SIT STILL: NOT AT ALL
5. BEING SO RESTLESS THAT IT IS HARD TO SIT STILL: NOT AT ALL
GAD7 TOTAL SCORE: 3
4. TROUBLE RELAXING: SEVERAL DAYS

## 2023-01-01 ASSESSMENT — PAIN SCALES - GENERAL
PAINLEVEL: MODERATE PAIN (5)
PAINLEVEL: MILD PAIN (3)
PAINLEVEL: MODERATE PAIN (4)
PAINLEVEL: NO PAIN (0)
PAINLEVEL: SEVERE PAIN (6)
PAINLEVEL: MODERATE PAIN (5)
PAINLEVEL: MODERATE PAIN (4)
PAINLEVEL: MODERATE PAIN (4)
PAINLEVEL: MILD PAIN (3)

## 2023-01-01 ASSESSMENT — ASTHMA QUESTIONNAIRES: ACT_TOTALSCORE: 12

## 2023-01-01 ASSESSMENT — PATIENT HEALTH QUESTIONNAIRE - PHQ9
SUM OF ALL RESPONSES TO PHQ QUESTIONS 1-9: 2
SUM OF ALL RESPONSES TO PHQ QUESTIONS 1-9: 2
10. IF YOU CHECKED OFF ANY PROBLEMS, HOW DIFFICULT HAVE THESE PROBLEMS MADE IT FOR YOU TO DO YOUR WORK, TAKE CARE OF THINGS AT HOME, OR GET ALONG WITH OTHER PEOPLE: NOT DIFFICULT AT ALL

## 2023-01-01 ASSESSMENT — ENCOUNTER SYMPTOMS: NERVOUS/ANXIOUS: 1

## 2023-01-02 NOTE — TELEPHONE ENCOUNTER
Central Prior Authorization Team   Phone: [phone]    PA Initiation    Medication: Hydroxychloroquine 200 mg-PA NOT NEEDED  Insurance Company: [employer] - Phone [phone] Fax [phone]  Pharmacy Filling the Rx: [facility] [address] [facility] [address] [address]  Filling Pharmacy Phone: [phone]  Filling Pharmacy Fax:    Start Date: 1/2/2023    Started PA on CMM and a response of Drug is covered by current benefit plan. No further PA activity needed.  Per patient's plan as long as they have been on the medication in the past 180 days a PA is not required.

## 2023-01-05 LAB — BACTERIA SPEC CULT: NO GROWTH

## 2023-01-10 ENCOUNTER — VIRTUAL VISIT (OUTPATIENT)
Dept: PALLIATIVE MEDICINE | Facility: CLINIC | Age: 63
End: 2023-01-10
Payer: COMMERCIAL

## 2023-01-10 DIAGNOSIS — T40.2X5A THERAPEUTIC OPIOID INDUCED CONSTIPATION: ICD-10-CM

## 2023-01-10 DIAGNOSIS — M06.09 RHEUMATOID ARTHRITIS OF MULTIPLE SITES WITHOUT RHEUMATOID FACTOR (H): Primary | ICD-10-CM

## 2023-01-10 DIAGNOSIS — K59.03 THERAPEUTIC OPIOID INDUCED CONSTIPATION: ICD-10-CM

## 2023-01-10 DIAGNOSIS — F11.20 CONTINUOUS OPIOID DEPENDENCE (H): ICD-10-CM

## 2023-01-10 DIAGNOSIS — G89.4 CHRONIC PAIN SYNDROME: ICD-10-CM

## 2023-01-10 PROCEDURE — 99215 OFFICE O/P EST HI 40 MIN: CPT | Mod: 95

## 2023-01-10 RX ORDER — SENNA AND DOCUSATE SODIUM 50; 8.6 MG/1; MG/1
1 TABLET, FILM COATED ORAL 2 TIMES DAILY
Qty: 60 TABLET | Refills: 1 | Status: SHIPPED | OUTPATIENT
Start: 2023-01-10 | End: 2023-02-22

## 2023-01-10 RX ORDER — ACETAMINOPHEN 500 MG
TABLET ORAL
Qty: 180 TABLET | Refills: 1 | Status: SHIPPED | OUTPATIENT
Start: 2023-01-10

## 2023-01-10 RX ORDER — HYDROCODONE BITARTRATE AND ACETAMINOPHEN 7.5; 325 MG/1; MG/1
.5-1 TABLET ORAL EVERY 6 HOURS PRN
Qty: 45 TABLET | Refills: 0 | Status: SHIPPED | OUTPATIENT
Start: 2023-01-10 | End: 2023-02-22

## 2023-01-10 RX ORDER — FENTANYL 12.5 UG/1
1 PATCH TRANSDERMAL
Qty: 10 PATCH | Refills: 0 | Status: SHIPPED | OUTPATIENT
Start: 2023-01-10 | End: 2023-02-22

## 2023-01-10 ASSESSMENT — ANXIETY QUESTIONNAIRES
IF YOU CHECKED OFF ANY PROBLEMS ON THIS QUESTIONNAIRE, HOW DIFFICULT HAVE THESE PROBLEMS MADE IT FOR YOU TO DO YOUR WORK, TAKE CARE OF THINGS AT HOME, OR GET ALONG WITH OTHER PEOPLE: SOMEWHAT DIFFICULT
GAD7 TOTAL SCORE: 4
6. BECOMING EASILY ANNOYED OR IRRITABLE: NOT AT ALL
7. FEELING AFRAID AS IF SOMETHING AWFUL MIGHT HAPPEN: NOT AT ALL
8. IF YOU CHECKED OFF ANY PROBLEMS, HOW DIFFICULT HAVE THESE MADE IT FOR YOU TO DO YOUR WORK, TAKE CARE OF THINGS AT HOME, OR GET ALONG WITH OTHER PEOPLE?: SOMEWHAT DIFFICULT
2. NOT BEING ABLE TO STOP OR CONTROL WORRYING: SEVERAL DAYS
7. FEELING AFRAID AS IF SOMETHING AWFUL MIGHT HAPPEN: NOT AT ALL
GAD7 TOTAL SCORE: 4
5. BEING SO RESTLESS THAT IT IS HARD TO SIT STILL: NOT AT ALL
1. FEELING NERVOUS, ANXIOUS, OR ON EDGE: SEVERAL DAYS
3. WORRYING TOO MUCH ABOUT DIFFERENT THINGS: SEVERAL DAYS
4. TROUBLE RELAXING: SEVERAL DAYS

## 2023-01-10 ASSESSMENT — PAIN SCALES - GENERAL: PAINLEVEL: MILD PAIN (3)

## 2023-01-10 ASSESSMENT — PAIN SCALES - PAIN ENJOYMENT GENERAL ACTIVITY SCALE (PEG)
INTERFERED_GENERAL_ACTIVITY: 5
INTERFERED_ENJOYMENT_LIFE: 5
AVG_PAIN_PASTWEEK: 5
PEG_TOTALSCORE: 5
PEG_TOTALSCORE: 5
AVG_PAIN_PASTWEEK: 5
INTERFERED_ENJOYMENT_LIFE: 5
INTERFERED_GENERAL_ACTIVITY: 5

## 2023-01-10 NOTE — PATIENT INSTRUCTIONS
1.  Pain Physical Therapy:  Continue home exercise program as tolerated, walking and yoga               2.  Pain Psychologist to address relaxation, behavioral change, coping style, and other factors important to improvement.  NO - she is coping well with chronic pain at this time. May consider in the future if needed.               3.  Diagnostic Studies:  None               4.  Medication Management:   Take tylenol 500-1000 mg twice daily. May take additional 500-1000 mg daily as needed. Discussed daily max dosage, encouraged her to keep below 3000 mg from all sources (Norco included); however, she may take up to 4000 mg total occasionally on days with increased pain. Last hepatic panel within last 12 months was WNL.   Start Senna S twice daily for constipation. Use miralax 1-2 times daily as needed.   Continue Norco and fentanyl as prescribed.  January refills sent to pharmacy. Pending outcome from adding in scheduled Tylenol, we may consider TEMPORARY increase in hydrocodone until she can resume Orencia injections for RA.   Keep naloxone on hand at home in the event of accidental overdose.    Continue medical cannabis.   We will consider buprenorphine in the future. We discussed this again at last visit, though I will need to collaborate with my colleague on transitioning from fentanyl patch to buprenorphine. I have sent a message to my colleague today.               5.  Urine toxicology screen - completed on 8/11/22.               6.  Opioid agreement - completed on 8/11/22.    7. Procedures - We have been deferring repeat lumbar facet joint injections due to ongoing fungal infection, though this has since resolved. We will hold off on ordering repeat procedure until after surgery. She will follow up with me towards the end of February, at which time we can discuss/order injection.               8.  Follow up with ANIBAL Frank CNP in 6 weeks, video visit is okay for next appointment if  preferred    ----------------------------------------------------------------  [facility] Number:  [phone]   Call with any questions about your care and for scheduling assistance.   Calls are returned Monday through Friday between 8 AM and 4:30 PM. We usually get back to you within 2 business days depending on the issue/request.    If we are prescribing your medications:  For opioid medication refills, call the clinic or send a Fliptu message 7 days in advance.  Please include:  Name of requested medication  Name of the pharmacy.  For non-opioid medications, call your pharmacy directly to request a refill. Please allow 3-4 days to be processed.   Per [place] State Law:  All controlled substance prescriptions must be filled within 30 days of being written.    For those controlled substances allowing refills, pickup must occur within 30 days of last fill.      We believe regular attendance is key to your success in our program!    Any time you are unable to keep your appointment we ask that you call us at least 24 hours in advance to cancel.This will allow us to offer the appointment time to another patient.   Multiple missed appointments may lead to dismissal from the clinic.

## 2023-01-10 NOTE — Clinical Note
This patient is on 12 mcg fentanyl patch and PRN Norco but diminishing efficacy over time. What are your thoughts on transitioning to buprenorphine? And how might I do that with the fentanyl patch on board?   Thanks, Ronda

## 2023-01-10 NOTE — PROGRESS NOTES
Video-Visit Details    Type of service:  Video Visit   Video Start Time: 8:39 AM  Video End Time:9:01 AM    Originating Location (pt. Location): Home    Distant Location (provider location):  On-site  Platform used for Video Visit: [facility]     Date of visit: 1/10/2023      Assessment:     Betsey Vanessa is a 62 year old female with a past medical history significant for RA, fibromyalgia, polyarticular arthritis, HTN, Meniere's disease, depression, migraine who presents with complaints of widespread pain.     1. Chronic pain syndrome - Onset of back pain occurred many years ago, and she has pain in multiple joints. Etiology of pain is associated with multiple factors, including but not limited to, lumbar spondylosis, lumbar facet arthropathy, rheumatoid arthritis, and myofascial component.    Visit Diagnoses:  1. Rheumatoid arthritis of multiple sites without rheumatoid factor (H)    2. Chronic pain syndrome    3. Continuous opioid dependence (H)    4. Therapeutic opioid induced constipation        Plan:  Diagnosis reviewed, treatment option addressed, and risk/benifits discussed.  Self-care instructions given.  I am recommending a multidisciplinary treatment plan to help this patient better manage their pain.      1.  Pain Physical Therapy:  Continue home exercise program as tolerated, walking and yoga               2.  Pain Psychologist to address relaxation, behavioral change, coping style, and other factors important to improvement.  NO - she is coping well with chronic pain at this time. May consider in the future if needed.               3.  Diagnostic Studies:  None               4.  Medication Management:     Take tylenol 500-1000 mg twice daily. May take additional 500-1000 mg daily as needed. Discussed daily max dosage, encouraged her to keep below 3000 mg from all sources (Norco included); however, she may take up to 4000 mg total occasionally on days with increased  pain. Last hepatic panel within last 12 months was WNL.     Start Senna S twice daily for constipation. Use miralax 1-2 times daily as needed.     Continue Norco and fentanyl as prescribed.  January refills sent to pharmacy. Pending outcome from adding in scheduled Tylenol, we may consider TEMPORARY increase in hydrocodone until she can resume Orencia injections for RA.     Keep naloxone on hand at home in the event of accidental overdose.      Continue medical cannabis.     We will consider buprenorphine in the future. We discussed this again at last visit, though I will need to collaborate with my colleague on transitioning from fentanyl patch to buprenorphine. I have sent a message to my colleague today.               5.  Urine toxicology screen - completed on 8/11/22.               6.  Opioid agreement - completed on 8/11/22.    7. Procedures - We have been deferring repeat lumbar facet joint injections due to ongoing fungal infection, though this has since resolved. We will hold off on ordering repeat procedure until after surgery. She will follow up with me towards the end of February, at which time we can discuss/order injection.               8.  Follow up with ANIBAL Frank CNP in 6 weeks, video visit is okay for next appointment if preferred    Review of Electronic Chart: Today I have also reviewed available medical information in the patient's medical record at [facility] ([facility]) and Care Everywhere (if available), including relevant provider notes, laboratory work, and imaging.     Ronda Vasquez DNP, ANIBAL, AGNP-C  [facility] Pain Management     -------------------------------------------------    Subjective:    Chief complaint:   Chief Complaint   Patient presents with     Pain       Interval history:  Betsey Vanessa is a 62 year old female last seen on 10/11/22.  They are a patient of mine seen in follow up.     Recommendations/plan at the last visit included:  1.  Pain Physical Therapy:  " Continue home exercise program as tolerated, walking and yoga               2.  Pain Psychologist to address relaxation, behavioral change, coping style, and other factors important to improvement.  NO - she is coping well with chronic pain at this time. May consider in the future if needed.               3.  Diagnostic Studies:  None               4.  Medication Management:   1. Continue Norco and fentanyl as prescribed.  October refills sent to pharmacy.   2. Keep naloxone on hand at home in the event of accidental overdose.    3. Continue medical cannabis.   4. We will consider buprenorphine in the future. We discussed this again today, though I will need to collaborate with my colleague on transitioning from fentanyl patch to buprenorphine. I advised Betsey that we will revisit this at her next visit and decide from there if we think it is a reasonable option for her to trial.               5.  Urine toxicology screen - completed on 8/11/22.               6.  Opioid agreement - completed on 8/11/22.               8.  Follow up with ANIBAL Frank CNP in 8 weeks, video visit is okay for next appointment if preferred     Since her last visit, Betsey Vanessa reports:  -Her pain is about the same as last visit.   -Pain is widespread in joints, they feel warm and swollen.   -She reports her fungal infection in her ear has resolved.    -Per chart review, last fungal swab on file from 12/8/22 negative.  -She is still off Orencia right now.  -She cannot restart this med for 3-6 months after surgery.   -Rheumatology suggested she try adding Tylenol.   -She is open to trial of this scheduled throughout the day.   -She is struggling with constipation.   -She is taking miralax at home PRN, has not been as helpful.   -She is interested in other medications to help with OIC.     Interval history from last visit on 10/11/22:  -Pain has increased since last visit, \"pain has been nasty\" lately.   -She is still dealing with " fungal infection, has follow up 10/27/22. Does not feel like infection is getting better and has   -She is still struggling with dizziness and nausea r/t fungal infection in ear.   -She has missed Orencia injection that she is using to tx RA due to fungal infection.   -She is interested in repeat lumbar facet injections with steroid, will need to check with ENT first.   -Medical cannabis:                          1. Charleen SL spray (1:1 CBD and THC) - use 1-2 sprays up to 5 times daily PRN; uses this on her bad days, but often takes at bedtime, takes for short acting                           2. Charleen oil tincture for long acting       Pain Information:   Pain rating: averages 5/10 on a 0-10 scale.      Current Pain Treatments:    Current pain medications:   -Voltaren gel 1% PRN (somewhat helpful)  -Medical cannabis PRN (helpful)  -Celebrex 100mg BID (helpful)  -Fentanyl 12mcg/hr transdermal patch Q 72 hours (helpful)  -Norco 7.5/325mg take 0.5-1 tab Q 6 hours PRN pain (helpful, has been using this more)  -plaquenil 200mg every day and 400mg every other day  -hydroxyzine 25mg TID PRN severe itching (helpful, but doesn't need to use often)     Current MME: 40 (daily average with fentanyl patch and 1.5 tabs Norco)     Review of [place] Prescription Monitoring Program (): No concern for abuse or misuse of controlled medications based on this report. Reviewed on 1/10/23 - appears appropriate.      Annual Controlled Substance Agreement/UDS due date: Completed on 8/11/22     Past pain treatments:  Injections:   -10/12/2020 bilateral L4-5, L5-S1 facet joint injections with Dr. Bailee Houser  (quite helpful)  -5/12/2021 bilateral L4-5, L5-S1 facet joint injections with Dr. Bailee Houser  (quite helpful)  -7/5/22 bilateral L4-5, L5-S1 facet joint injections with Dr. Lion       Medications:  Current Outpatient Medications   Medication Sig Dispense Refill     acetaminophen (TYLENOL) 500 MG tablet Take 500-1000 mg  BID, may take additional 500-1000 mg dose daily as needed, max 4000 mg from all sources. 180 tablet 1     alcohol swab prep pads Use to swab area of injection/rosie as directed 100 each 3     blood glucose (NO BRAND SPECIFIED) test strip Use to test blood sugar 2 times daily or as directed. To accompany: Blood Glucose Monitor Brands: per insurance. 100 strip 6     blood glucose calibration (NO BRAND SPECIFIED) solution Use to calibrate blood glucose monitor as needed as directed. To accompany: Blood Glucose Monitor Brands: per insurance. 1 Bottle 3     celecoxib (CELEBREX) 100 MG capsule Take 1 capsule (100 mg) by mouth 2 times daily 180 capsule 2     cetirizine (ZYRTEC) 10 MG tablet Take 1 tablet (10 mg) by mouth daily 90 tablet 3     cevimeline (EVOXAC) 30 MG capsule Take 1 capsule (30 mg) by mouth 3 times daily 270 capsule 2     clonazePAM (KLONOPIN) 0.5 MG tablet TAKE 1 TABLET BY MOUTH TWICE DAILY AS NEEDED FOR ANXIETY OK TO TAKE 1 TO 2 AT NIGHT AS NEEDED FOR SLEEP. SHOULD LAST ABOUT 90 DAYS 100 tablet 1     clotrimazole (LOTRIMIN) 1 % external solution Instill 5 drops to the right ear 5 minutes for 21 days. 10 mL 1     COMPOUNDED NON-CONTROLLED SUBSTANCE (CMPD RX) - PHARMACY TO MIX COMPOUNDED MEDICATION Amphotericin 5mg/ml- 5 drops in the right ear twice daily x 30 days 15 mL 1     diclofenac (VOLTAREN) 1 % topical gel Place 2-4 g onto the skin 4 times daily . Max of 8g per dose. No more than 32g/day 100 g 3     EPINEPHrine (ANY BX GENERIC EQUIV) 0.3 MG/0.3ML injection 2-pack Inject 0.3 mLs (0.3 mg) into the muscle as needed for anaphylaxis 2 each 0     EQ ALLERGY RELIEF, CETIRIZINE, 10 MG tablet Take 1 tablet by mouth once daily 90 tablet 1     EQ STOOL SOFTENER 100 MG capsule Take 1 capsule by mouth twice daily 180 capsule 0     escitalopram (LEXAPRO) 20 MG tablet Take 1 tablet (20 mg) by mouth daily 90 tablet 3     fentaNYL (DURAGESIC) 12 mcg/hr 72 hr patch Place 1 patch onto the skin every 72 hours remove  old patch. Fill 1/26/23, start 1/28/23 to last 30 days 10 patch 0     fluorometholone (FML LIQUIFILM) 0.1 % ophthalmic suspension INSTILL 1 DROP INTO EACH EYE IN THE MORNING       fluticasone (FLONASE) 50 MCG/ACT nasal spray Spray 1-2 sprays into both nostrils daily 16 g 11     fluvastatin (LESCOL) 20 MG capsule Take 1 capsule (20 mg) by mouth At Bedtime 90 capsule 3     glimepiride (AMARYL) 2 MG tablet Take 1 tablet (2 mg) by mouth every morning (before breakfast) 90 tablet 1     glucose blood VI test strips strip daily       HYDROcodone-acetaminophen (NORCO) 7.5-325 MG per tablet Take 0.5-1 tablets by mouth every 6 hours as needed for severe pain (7-10) Can take 1 tab per day and on 15 days of the month can take 2 tabs.  Fill 1/26/23. Start 1/29/23 to last 30 days 45 tablet 0     hydroxychloroquine (PLAQUENIL) 200 MG tablet Hydroxychloroquine 200mg daily; and an additional 200mg every other day. Yearly eye exam including 10-2 VF and SD- tablet 2     hydrOXYzine (ATARAX) 25 MG tablet Take 1 tablet (25 mg) by mouth 3 times daily as needed for itching 40 tablet 0     lifitegrast (XIIDRA) 5 % opthalmic solution Place 1 drop into both eyes 2 times daily 30 each 6     magnesium 250 MG tablet Take 1 tablet by mouth daily       medical cannabis (Patient's own supply) Take 1 Dose by mouth See Admin Instructions (The purpose of this order is to document that the patient reports taking medical cannabis.  This is not a prescription, and is not used to certify that the patient has a qualifying medical condition.)       metFORMIN (GLUCOPHAGE) 500 MG tablet Take 2 tablets (1,000 mg) by mouth 2 times daily (with meals) 360 tablet 1     metroNIDAZOLE (METROGEL) 0.75 % external gel Apply topically 2 times daily Apply to face daily 45 g 5     naloxone (NARCAN) 4 MG/0.1ML nasal spray Spray 1 spray (4 mg) into one nostril alternating nostrils as needed for opioid reversal every 2-3 minutes until assistance arrives 0.2 mL 0      nystatin (MYCOSTATIN) 955793 UNIT/GM external powder Apply topically 2 times daily as needed 2 x daily PRN for rash 60 g 4     omeprazole (PRILOSEC) 20 MG DR capsule Take 1 capsule (20 mg) by mouth 2 times daily 180 capsule 3     pimecrolimus (ELIDEL) 1 % external cream Apply topically 2 times daily - use on back of neck and hands 60 g 5     potassium chloride ER (KLOR-CON M) 10 MEQ CR tablet Take 1 tablet (10 mEq) by mouth 2 times daily 180 tablet 3     PROAIR  (90 Base) MCG/ACT inhaler Inhale 1-2 puffs into the lungs every 4 hours as needed for shortness of breath / dyspnea or wheezing 18 g 5     promethazine (PHENERGAN) 25 MG tablet Take 1 tablet (25 mg) by mouth every 6 hours as needed for nausea or other (Meniere's) For nusea 90 tablet 0     psyllium (METAMUCIL) 58.6 % POWD Take by mouth daily PRN       SENNA-docusate sodium (SENNA S) 8.6-50 MG tablet Take 1 tablet by mouth 2 times daily 60 tablet 1     thin (NO BRAND SPECIFIED) lancets Use to test blood sugar 2 times daily or as directed. To accompany: Blood Glucose Monitor Brands: per insurance. 1 each 3     triamterene-HCTZ (DYAZIDE) 37.5-25 MG capsule Take 1 capsule by mouth daily 90 capsule 3       Medical History: any changes in medical history since they were last seen? No      Objective:    Physical Exam:  not currently breastfeeding.  GENERAL: Healthy, alert and no distress  EYES: Eyes grossly normal to inspection.  No discharge or erythema, or obvious scleral/conjunctival abnormalities.  RESP: No audible wheeze, cough, or visible cyanosis.  No visible retractions or increased work of breathing.    SKIN: Visible skin clear. No significant rash, abnormal pigmentation or lesions.  NEURO: Cranial nerves grossly intact.  Mentation and speech appropriate for age.  PSYCH: Mentation appears normal, affect normal/bright, judgement and insight intact, normal speech and appearance well-groomed.    Imaging/Labs:  Last fungal swab on file from 12/8/22  negative.    BILLING TIME DOCUMENTATION:   The total TIME spent on this patient on the date of the encounter/appointment was 40 minutes.      TOTAL TIME includes:   Time spent preparing to see the patient (reviewing records and tests)   Time spent face to face (or over the phone) with the patient   Time spent ordering tests, medications, procedures and referrals   Time spent Referring and communicating with other healthcare professionals   Time spent documenting clinical information in Epic

## 2023-01-10 NOTE — PROGRESS NOTES
Betsey is a 62 year old who is being evaluated via a billable video visit.      How would you like to obtain your AVS? MyChart  If the video visit is dropped, the invitation should be resent by: Text to cell phone: [phone]  Will anyone else be joining your video visit? No   Is Pt currently in [place]? Yes    NOTE:  If Pt is not in [place], Appointment needs to be canceled and rescheduled.

## 2023-01-13 NOTE — TELEPHONE ENCOUNTER
Device selection for 2/10/23 surgery with Dr. Stallings:    Right  [facility]  [place] 5 SP in [facility]

## 2023-01-24 ENCOUNTER — OFFICE VISIT (OUTPATIENT)
Dept: FAMILY MEDICINE | Facility: CLINIC | Age: 63
End: 2023-01-24
Payer: COMMERCIAL

## 2023-01-24 VITALS
OXYGEN SATURATION: 96 % | SYSTOLIC BLOOD PRESSURE: 116 MMHG | WEIGHT: 167.2 LBS | TEMPERATURE: 98.6 F | RESPIRATION RATE: 20 BRPM | HEIGHT: 63 IN | HEART RATE: 84 BPM | DIASTOLIC BLOOD PRESSURE: 48 MMHG | BODY MASS INDEX: 29.62 KG/M2

## 2023-01-24 DIAGNOSIS — Z23 HIGH PRIORITY FOR 2019-NCOV VACCINE: ICD-10-CM

## 2023-01-24 DIAGNOSIS — Z12.11 COLON CANCER SCREENING: ICD-10-CM

## 2023-01-24 DIAGNOSIS — Z00.00 ENCOUNTER FOR MEDICARE ANNUAL WELLNESS EXAM: Primary | ICD-10-CM

## 2023-01-24 DIAGNOSIS — I10 HYPERTENSION GOAL BP (BLOOD PRESSURE) < 140/90: ICD-10-CM

## 2023-01-24 DIAGNOSIS — Z83.49 FAMILY HISTORY OF THYROID DISEASE: ICD-10-CM

## 2023-01-24 DIAGNOSIS — F11.20 CONTINUOUS OPIOID DEPENDENCE (H): ICD-10-CM

## 2023-01-24 DIAGNOSIS — K21.9 GASTROESOPHAGEAL REFLUX DISEASE, UNSPECIFIED WHETHER ESOPHAGITIS PRESENT: ICD-10-CM

## 2023-01-24 DIAGNOSIS — F32.0 MILD MAJOR DEPRESSION (H): ICD-10-CM

## 2023-01-24 DIAGNOSIS — E11.65 TYPE 2 DIABETES MELLITUS WITH HYPERGLYCEMIA, WITHOUT LONG-TERM CURRENT USE OF INSULIN (H): ICD-10-CM

## 2023-01-24 DIAGNOSIS — F41.1 GAD (GENERALIZED ANXIETY DISORDER): ICD-10-CM

## 2023-01-24 LAB
ANION GAP SERPL CALCULATED.3IONS-SCNC: 9 MMOL/L (ref 3–14)
BUN SERPL-MCNC: 17 MG/DL (ref 7–30)
CALCIUM SERPL-MCNC: 9.6 MG/DL (ref 8.5–10.1)
CHLORIDE BLD-SCNC: 103 MMOL/L (ref 94–109)
CO2 SERPL-SCNC: 26 MMOL/L (ref 20–32)
CREAT SERPL-MCNC: 0.81 MG/DL (ref 0.52–1.04)
CREAT UR-MCNC: 153 MG/DL
GFR SERPL CREATININE-BSD FRML MDRD: 83 ML/MIN/1.73M2
GLUCOSE BLD-MCNC: 146 MG/DL (ref 70–99)
HBA1C MFR BLD: 6.6 % (ref 0–5.6)
MICROALBUMIN UR-MCNC: 12 MG/L
MICROALBUMIN/CREAT UR: 7.84 MG/G CR (ref 0–25)
POTASSIUM BLD-SCNC: 4.2 MMOL/L (ref 3.4–5.3)
SODIUM SERPL-SCNC: 138 MMOL/L (ref 133–144)
TSH SERPL DL<=0.005 MIU/L-ACNC: 3.15 MU/L (ref 0.4–4)

## 2023-01-24 PROCEDURE — 36415 COLL VENOUS BLD VENIPUNCTURE: CPT | Performed by: PHYSICIAN ASSISTANT

## 2023-01-24 PROCEDURE — 99207 PR FOOT EXAM NO CHARGE: CPT | Performed by: PHYSICIAN ASSISTANT

## 2023-01-24 PROCEDURE — G0438 PPPS, INITIAL VISIT: HCPCS | Performed by: PHYSICIAN ASSISTANT

## 2023-01-24 PROCEDURE — 99214 OFFICE O/P EST MOD 30 MIN: CPT | Mod: 25 | Performed by: PHYSICIAN ASSISTANT

## 2023-01-24 PROCEDURE — 83036 HEMOGLOBIN GLYCOSYLATED A1C: CPT | Performed by: PHYSICIAN ASSISTANT

## 2023-01-24 PROCEDURE — 80048 BASIC METABOLIC PNL TOTAL CA: CPT | Performed by: PHYSICIAN ASSISTANT

## 2023-01-24 PROCEDURE — 91312 COVID-19 VACCINE BIVALENT BOOSTER 12+ (PFIZER): CPT | Performed by: PHYSICIAN ASSISTANT

## 2023-01-24 PROCEDURE — 82043 UR ALBUMIN QUANTITATIVE: CPT | Performed by: PHYSICIAN ASSISTANT

## 2023-01-24 PROCEDURE — 0124A COVID-19 VACCINE BIVALENT BOOSTER 12+ (PFIZER): CPT | Performed by: PHYSICIAN ASSISTANT

## 2023-01-24 PROCEDURE — 84443 ASSAY THYROID STIM HORMONE: CPT | Performed by: PHYSICIAN ASSISTANT

## 2023-01-24 PROCEDURE — 82570 ASSAY OF URINE CREATININE: CPT | Performed by: PHYSICIAN ASSISTANT

## 2023-01-24 RX ORDER — TRIAMTERENE AND HYDROCHLOROTHIAZIDE 37.5; 25 MG/1; MG/1
1 CAPSULE ORAL DAILY
Qty: 90 CAPSULE | Refills: 3 | Status: SHIPPED | OUTPATIENT
Start: 2023-01-24 | End: 2024-01-01

## 2023-01-24 RX ORDER — DOCUSATE SODIUM 100 MG/1
100 CAPSULE, LIQUID FILLED ORAL 2 TIMES DAILY
Qty: 180 CAPSULE | Refills: 3 | Status: SHIPPED | OUTPATIENT
Start: 2023-01-24

## 2023-01-24 RX ORDER — ESCITALOPRAM OXALATE 20 MG/1
30 TABLET ORAL EVERY EVENING
Qty: 135 TABLET | Refills: 0 | Status: SHIPPED | OUTPATIENT
Start: 2023-01-24 | End: 2023-01-01

## 2023-01-24 RX ORDER — LANCETS
EACH MISCELLANEOUS
Qty: 200 EACH | Refills: 11 | Status: SHIPPED | OUTPATIENT
Start: 2023-01-24

## 2023-01-24 RX ORDER — GLIMEPIRIDE 2 MG/1
2 TABLET ORAL
Qty: 90 TABLET | Refills: 1 | Status: SHIPPED | OUTPATIENT
Start: 2023-01-24 | End: 2023-01-01

## 2023-01-24 ASSESSMENT — ENCOUNTER SYMPTOMS
SHORTNESS OF BREATH: 0
HEMATOCHEZIA: 0
CHILLS: 0
ARTHRALGIAS: 1
DYSURIA: 0
FREQUENCY: 0
NERVOUS/ANXIOUS: 1
FEVER: 0
SORE THROAT: 0
COUGH: 0
DIZZINESS: 1
PARESTHESIAS: 0
HEADACHES: 0
MYALGIAS: 1
EYE PAIN: 1
JOINT SWELLING: 1
CONSTIPATION: 1
HEMATURIA: 0
BREAST MASS: 0
PALPITATIONS: 0
DIARRHEA: 0
NAUSEA: 1
WEAKNESS: 0
HEARTBURN: 1

## 2023-01-24 ASSESSMENT — PATIENT HEALTH QUESTIONNAIRE - PHQ9
SUM OF ALL RESPONSES TO PHQ QUESTIONS 1-9: 9
SUM OF ALL RESPONSES TO PHQ QUESTIONS 1-9: 9
10. IF YOU CHECKED OFF ANY PROBLEMS, HOW DIFFICULT HAVE THESE PROBLEMS MADE IT FOR YOU TO DO YOUR WORK, TAKE CARE OF THINGS AT HOME, OR GET ALONG WITH OTHER PEOPLE: VERY DIFFICULT

## 2023-01-24 ASSESSMENT — ACTIVITIES OF DAILY LIVING (ADL)
CURRENT_FUNCTION: SHOPPING REQUIRES ASSISTANCE
CURRENT_FUNCTION: TRANSPORTATION REQUIRES ASSISTANCE

## 2023-01-24 ASSESSMENT — PAIN SCALES - GENERAL: PAINLEVEL: MODERATE PAIN (5)

## 2023-01-24 NOTE — PROGRESS NOTES
Patient reported tingling in her mouth and a change in taste 2-3 minutes after receiving the Pfizer bivalent booster. Katerina Judd PA-C informed. Patient was assessed by provider and advised to wait in room for 15 minutes for monitoring.    Silvia Tidwell CMA on 1/24/2023 at 7:59 AM

## 2023-01-24 NOTE — PATIENT INSTRUCTIONS
Schedule:  -- A mammogram as soon as you're able to  -- A diabetic eye exam in March  -- Colonoscopy in November      Preventive Health Recommendations  Female Ages 50 - 64    Yearly exam: See your health care provider every year in order to  Review health changes.   Discuss preventive care.    Review your medicines if your doctor has prescribed any.    Get a Pap test every three years (unless you have an abnormal result and your provider advises testing more often).  If you get Pap tests with HPV test, you only need to test every 5 years, unless you have an abnormal result.   You do not need a Pap test if your uterus was removed (hysterectomy) and you have not had cancer.  You should be tested each year for STDs (sexually transmitted diseases) if you're at risk.   Have a mammogram every 1 to 2 years.  Have a colonoscopy at age 50, or have a yearly FIT test (stool test). These exams screen for colon cancer.    Have a cholesterol test every 5 years, or more often if advised.  Have a diabetes test (fasting glucose) every three years. If you are at risk for diabetes, you should have this test more often.   If you are at risk for osteoporosis (brittle bone disease), think about having a bone density scan (DEXA).    Shots: Get a flu shot each year. Get a tetanus shot every 10 years.    Nutrition:   Eat at least 5 servings of fruits and vegetables each day.  Eat whole-grain bread, whole-wheat pasta and brown rice instead of white grains and rice.  Get adequate Calcium and Vitamin D.     Lifestyle  Exercise at least 150 minutes a week (30 minutes a day, 5 days a week). This will help you control your weight and prevent disease.  Limit alcohol to one drink per day.  No smoking.   Wear sunscreen to prevent skin cancer.   See your dentist every six months for an exam and cleaning.  See your eye doctor every 1 to 2 years.

## 2023-01-31 ENCOUNTER — TELEPHONE (OUTPATIENT)
Dept: PALLIATIVE MEDICINE | Facility: CLINIC | Age: 63
End: 2023-01-31
Payer: COMMERCIAL

## 2023-01-31 NOTE — TELEPHONE ENCOUNTER
Health Call Center    Phone Message    May a detailed message be left on voicemail: yes     Reason for Call: Medication Refill Request      Has the patient contacted the pharmacy for the refill? Yes     Name of medication being requested:     HYDROcodone-acetaminophen (NORCO) 7.5-325 MG per tablet    fentaNYL (DURAGESIC) 12 mcg/hr 72 hr patch    Provider who prescribed the medication: Ronda Vasquez    Pharmacy: [facility]    Date medication is needed: ASAP

## 2023-01-31 NOTE — TELEPHONE ENCOUNTER
These were already processed and sent to pharmacy at her 1/10/23 visit.  _________________________________    Mychart sent to pt:  From: Sanjuanita Redmond RN      Created: 1/31/2023 2:50 PM      Sam Leggett,  When you saw Ronda Vasquez, VIDHI, APRN, AGNP-C on 1/10/23 she had already sent in this months refills for you so they are already at your pharmacy.     Sanjuanita Larios, RN-BSN  [facility]

## 2023-02-01 ENCOUNTER — OFFICE VISIT (OUTPATIENT)
Dept: FAMILY MEDICINE | Facility: CLINIC | Age: 63
End: 2023-02-01
Payer: COMMERCIAL

## 2023-02-01 VITALS
OXYGEN SATURATION: 97 % | WEIGHT: 167 LBS | HEART RATE: 75 BPM | SYSTOLIC BLOOD PRESSURE: 138 MMHG | BODY MASS INDEX: 29.59 KG/M2 | HEIGHT: 63 IN | RESPIRATION RATE: 20 BRPM | DIASTOLIC BLOOD PRESSURE: 50 MMHG | TEMPERATURE: 97.1 F

## 2023-02-01 DIAGNOSIS — E11.65 TYPE 2 DIABETES MELLITUS WITH HYPERGLYCEMIA, WITHOUT LONG-TERM CURRENT USE OF INSULIN (H): ICD-10-CM

## 2023-02-01 DIAGNOSIS — F13.20 BENZODIAZEPINE DEPENDENCE, EPISODIC (H): ICD-10-CM

## 2023-02-01 DIAGNOSIS — H90.71 MIXED CONDUCTIVE AND SENSORINEURAL HEARING LOSS OF RIGHT EAR, UNSPECIFIED HEARING STATUS ON CONTRALATERAL SIDE: ICD-10-CM

## 2023-02-01 DIAGNOSIS — F11.20 CONTINUOUS OPIOID DEPENDENCE (H): ICD-10-CM

## 2023-02-01 DIAGNOSIS — I10 HYPERTENSION GOAL BP (BLOOD PRESSURE) < 140/90: ICD-10-CM

## 2023-02-01 DIAGNOSIS — M06.09 RHEUMATOID ARTHRITIS OF MULTIPLE SITES WITH NEGATIVE RHEUMATOID FACTOR (H): ICD-10-CM

## 2023-02-01 DIAGNOSIS — D50.9 IRON DEFICIENCY ANEMIA, UNSPECIFIED IRON DEFICIENCY ANEMIA TYPE: ICD-10-CM

## 2023-02-01 DIAGNOSIS — Z01.818 PRE-OP EXAM: Primary | ICD-10-CM

## 2023-02-01 PROBLEM — I35.0 NONRHEUMATIC AORTIC VALVE STENOSIS: Status: ACTIVE | Noted: 2023-02-01

## 2023-02-01 LAB
BASOPHILS # BLD AUTO: 0 10E3/UL (ref 0–0.2)
BASOPHILS NFR BLD AUTO: 0 %
EOSINOPHIL # BLD AUTO: 0.6 10E3/UL (ref 0–0.7)
EOSINOPHIL NFR BLD AUTO: 9 %
ERYTHROCYTE [DISTWIDTH] IN BLOOD BY AUTOMATED COUNT: 14.7 % (ref 10–15)
FERRITIN SERPL-MCNC: 41 NG/ML (ref 8–252)
HCT VFR BLD AUTO: 36 % (ref 35–47)
HGB BLD-MCNC: 11 G/DL (ref 11.7–15.7)
LYMPHOCYTES # BLD AUTO: 1.6 10E3/UL (ref 0.8–5.3)
LYMPHOCYTES NFR BLD AUTO: 26 %
MCH RBC QN AUTO: 26.4 PG (ref 26.5–33)
MCHC RBC AUTO-ENTMCNC: 30.6 G/DL (ref 31.5–36.5)
MCV RBC AUTO: 86 FL (ref 78–100)
MONOCYTES # BLD AUTO: 0.4 10E3/UL (ref 0–1.3)
MONOCYTES NFR BLD AUTO: 6 %
NEUTROPHILS # BLD AUTO: 3.7 10E3/UL (ref 1.6–8.3)
NEUTROPHILS NFR BLD AUTO: 58 %
PLATELET # BLD AUTO: 181 10E3/UL (ref 150–450)
RBC # BLD AUTO: 4.17 10E6/UL (ref 3.8–5.2)
WBC # BLD AUTO: 6.3 10E3/UL (ref 4–11)

## 2023-02-01 PROCEDURE — 99214 OFFICE O/P EST MOD 30 MIN: CPT | Performed by: PHYSICIAN ASSISTANT

## 2023-02-01 PROCEDURE — 36415 COLL VENOUS BLD VENIPUNCTURE: CPT | Performed by: PHYSICIAN ASSISTANT

## 2023-02-01 PROCEDURE — 82728 ASSAY OF FERRITIN: CPT | Performed by: PHYSICIAN ASSISTANT

## 2023-02-01 PROCEDURE — 85025 COMPLETE CBC W/AUTO DIFF WBC: CPT | Performed by: PHYSICIAN ASSISTANT

## 2023-02-01 RX ORDER — CHOLECALCIFEROL (VITAMIN D3) 50 MCG
2 TABLET ORAL
COMMUNITY
Start: 2022-11-10

## 2023-02-01 RX ORDER — CLONAZEPAM 0.5 MG/1
TABLET ORAL
Qty: 100 TABLET | Refills: 1 | Status: SHIPPED | OUTPATIENT
Start: 2023-02-01 | End: 2023-01-01

## 2023-02-01 ASSESSMENT — ASTHMA QUESTIONNAIRES
ACT_TOTALSCORE: 21
QUESTION_5 LAST FOUR WEEKS HOW WOULD YOU RATE YOUR ASTHMA CONTROL: WELL CONTROLLED
QUESTION_1 LAST FOUR WEEKS HOW MUCH OF THE TIME DID YOUR ASTHMA KEEP YOU FROM GETTING AS MUCH DONE AT WORK, SCHOOL OR AT HOME: A LITTLE OF THE TIME
QUESTION_3 LAST FOUR WEEKS HOW OFTEN DID YOUR ASTHMA SYMPTOMS (WHEEZING, COUGHING, SHORTNESS OF BREATH, CHEST TIGHTNESS OR PAIN) WAKE YOU UP AT NIGHT OR EARLIER THAN USUAL IN THE MORNING: NOT AT ALL
ACT_TOTALSCORE: 21
QUESTION_2 LAST FOUR WEEKS HOW OFTEN HAVE YOU HAD SHORTNESS OF BREATH: ONCE OR TWICE A WEEK
QUESTION_4 LAST FOUR WEEKS HOW OFTEN HAVE YOU USED YOUR RESCUE INHALER OR NEBULIZER MEDICATION (SUCH AS ALBUTEROL): ONCE A WEEK OR LESS

## 2023-02-01 ASSESSMENT — PATIENT HEALTH QUESTIONNAIRE - PHQ9
10. IF YOU CHECKED OFF ANY PROBLEMS, HOW DIFFICULT HAVE THESE PROBLEMS MADE IT FOR YOU TO DO YOUR WORK, TAKE CARE OF THINGS AT HOME, OR GET ALONG WITH OTHER PEOPLE: SOMEWHAT DIFFICULT
SUM OF ALL RESPONSES TO PHQ QUESTIONS 1-9: 9
SUM OF ALL RESPONSES TO PHQ QUESTIONS 1-9: 9

## 2023-02-01 ASSESSMENT — PAIN SCALES - GENERAL: PAINLEVEL: MODERATE PAIN (4)

## 2023-02-01 NOTE — PROGRESS NOTES
[facility] [place]  [address]  [place] [address]  Phone: [phone]  Primary Provider: Katerina Judd  Pre-op Performing Provider: KATERINA JUDD      PREOPERATIVE EVALUATION:  Today's date: 2/1/2023    Betsey Vanessa is a 62 year old female who presents for a preoperative evaluation.    Surgical Information:  Surgery/Procedure: INSERTION, BONE ANCHORED HEARING AID WITH THE OTICON DEVICE, right  Surgery Location: [facility] [place] [facility]  Surgeon: Laura Stallings MD  Surgery Date: 2/10/2023  Time of Surgery: TBD  Where patient plans to recover: At home with family  Fax number for surgical facility: Note does not need to be faxed, will be available electronically in Epic.    Type of Anesthesia Anticipated: General    Assessment & Plan     The proposed surgical procedure is considered INTERMEDIATE risk.      ICD-10-CM    1. Pre-op exam  Z01.818       2. Mixed conductive and sensorineural hearing loss of right ear, unspecified hearing status on contralateral side  H90.71       3. Rheumatoid arthritis of multiple sites with negative rheumatoid factor (H)  M06.09       4. Type 2 diabetes mellitus with hyperglycemia, without long-term current use of insulin (H)  E11.65       5. Hypertension goal BP (blood pressure) < 140/90  I10       6. Continuous opioid dependence (H)  F11.20       7. Benzodiazepine dependence, episodic (H)  F13.20 clonazePAM (KLONOPIN) 0.5 MG tablet      8. Iron deficiency anemia, unspecified iron deficiency anemia type  D50.9 CBC with Platelets & Differential     Ferritin           Recent labs show good diabetes control. Blood pressure at goal.   Follows pain management, chronic opioid and benzo use.   CBC and ferritin stable.      Risks and Recommendations:  The patient has the following additional risks and recommendations for perioperative complications:   - No identified additional risk factors other than previously  addressed    Medication Instructions:   - metformin: HOLD day of surgery.   - sulfonylurea (e.g. glyburide, glipizide): HOLD day of surgery      RECOMMENDATION:  APPROVAL GIVEN to proceed with proposed procedure, without further diagnostic evaluation.    Ordering of each unique test  Prescription drug management        Subjective     HPI related to upcoming procedure: Hearing aid replacement      Preop Questions 2/1/2023   1. Have you ever had a heart attack or stroke? No   2. Have you ever had surgery on your heart or blood vessels, such as a stent placement, a coronary artery bypass, or surgery on an artery in your head, neck, heart, or legs? No   3. Do you have chest pain with activity? No   4. Do you have a history of  heart failure? No   5. Do you currently have a cold, bronchitis or symptoms of other infection? No   6. Do you have a cough, shortness of breath, or wheezing? No   7. Do you or anyone in your family have previous history of blood clots? No   8. Do you or does anyone in your family have a serious bleeding problem such as prolonged bleeding following surgeries or cuts? No   9. Have you ever had problems with anemia or been told to take iron pills? YES - Currently mildly anemic   10. Have you had any abnormal blood loss such as black, tarry or bloody stools, or abnormal vaginal bleeding? No   11. Have you ever had a blood transfusion? YES - high school   11a. Have you ever had a transfusion reaction? No   12. Are you willing to have a blood transfusion if it is medically needed before, during, or after your surgery? Yes   13. Have you or any of your relatives ever had problems with anesthesia? YES - personal history of nausea/vomiting    14. Do you have sleep apnea, excessive snoring or daytime drowsiness? No   15. Do you have any artifical heart valves or other implanted medical devices like a pacemaker, defibrillator, or continuous glucose monitor? No   16. Do you have artificial joints? No   17.  Are you allergic to latex? No   18. Is there any chance that you may be pregnant? No     Health Care Directive:  Patient does not have a Health Care Directive or Living Will: Has paperwork at home    Preoperative Review of :   reviewed - controlled substances reflected in medication list.      Status of Chronic Conditions:  See problem list for active medical problems.  Problems all longstanding and stable, except as noted/documented.  See ROS for pertinent symptoms related to these conditions.      Review of Systems  Constitutional, neuro, ENT, endocrine, pulmonary, cardiac, gastrointestinal, genitourinary, musculoskeletal, integument and psychiatric systems are negative, except as otherwise noted.    Patient Active Problem List    Diagnosis Date Noted     Family history of thyroid disease 01/24/2023     Priority: Medium     BONNIE (generalized anxiety disorder) 01/24/2023     Priority: Medium     Long-term current use of bisphosphonate 12/05/2022     Priority: Medium     Otorrhea, right 10/31/2022     Priority: Medium     Added automatically from request for surgery [id number]       Gastroesophageal reflux disease, esophagitis presence not specified 08/14/2020     Priority: Medium     IMO Regulatory Load OCT 2020       Fungal skin infection 08/14/2020     Priority: Medium     Anaphylaxis, sequela 08/14/2020     Priority: Medium     Hyperlipidemia, unspecified hyperlipidemia type 08/14/2020     Priority: Medium     Benzodiazepine dependence, episodic (H) 02/19/2020     Priority: Medium     Continuous opioid dependence (H) 02/19/2020     Priority: Medium     Osteopenia of multiple sites 02/04/2020     Priority: Medium     History of bisphosphonate therapy 02/04/2020     Priority: Medium     Post-menopausal 02/04/2020     Priority: Medium     Type 2 diabetes mellitus with hyperglycemia, without long-term current use of insulin (H) 01/12/2020     Priority: Medium     Hypertension goal BP (blood pressure) < 140/90  01/12/2020     Priority: Medium     Meniere's disease, unspecified laterality 01/12/2020     Priority: Medium     Rheumatoid arthritis, involving unspecified site, unspecified rheumatoid factor presence 01/12/2020     Priority: Medium     IMO Regulatory Load OCT 2020       Valvular heart disease 01/12/2020     Priority: Medium     Mild major depression (H) 01/12/2020     Priority: Medium     Mixed conductive and sensorineural hearing loss of right ear 03/05/2015     Priority: Medium     Migraine with vertigo 03/05/2015     Priority: Medium     Tympanic membrane perforation 02/12/2015     Priority: Medium     Myofascial pain 06/18/2014     Priority: Medium     Mitral stenosis 04/14/2014     Priority: Medium     Fibromyalgia 10/28/2011     Priority: Medium     Polyarticular arthritis 10/28/2011     Priority: Medium     Encounter for long-term use of opiate analgesic 10/28/2011     Priority: Medium     Chronic pain 10/28/2011     Priority: Medium      Past Medical History:   Diagnosis Date     Anemia     r/t menses     Anxiety      Arthritis      Constipation      Depression      Depressive disorder      Diabetes (H)      Diarrhea      Double vision      Headache(784.0)      Hearing loss      Heart disease      Heart murmur      Heartburn      History of blood transfusion      Hypertension      Indigestion      Nasal congestion      Night sweats      Numbness      Problems related to lack of adequate sleep      Rash      Sneezing      Tinnitus      Uncomplicated asthma      Visual loss      Weakness      Weight gain     because of Prednisone     Past Surgical History:   Procedure Laterality Date     ------------OTHER-------------      D&C     ABDOMEN SURGERY       CARDIAC SURGERY       ENT SURGERY       GYN SURGERY       HYSTERECTOMY       INNER EAR SURGERY       RELEASE CARPAL TUNNEL BILATERAL Bilateral 2008     Current Outpatient Medications   Medication Sig Dispense Refill     acetaminophen (TYLENOL) 500 MG tablet  Take 500-1000 mg BID, may take additional 500-1000 mg dose daily as needed, max 4000 mg from all sources. 180 tablet 1     alcohol swab prep pads Use to swab area of injection/rosie as directed 100 each 3     blood glucose (NO BRAND SPECIFIED) test strip Use to test blood sugar 2 times daily or as directed. To accompany: Blood Glucose Monitor Brands: per insurance. 200 strip 11     blood glucose calibration (NO BRAND SPECIFIED) solution Use to calibrate blood glucose monitor as needed as directed. To accompany: Blood Glucose Monitor Brands: per insurance. 1 Bottle 3     celecoxib (CELEBREX) 100 MG capsule Take 1 capsule (100 mg) by mouth 2 times daily 180 capsule 2     cetirizine (ZYRTEC) 10 MG tablet Take 1 tablet (10 mg) by mouth daily 90 tablet 3     cevimeline (EVOXAC) 30 MG capsule Take 1 capsule (30 mg) by mouth 3 times daily 270 capsule 2     clonazePAM (KLONOPIN) 0.5 MG tablet TAKE 1 TABLET BY MOUTH TWICE DAILY AS NEEDED FOR ANXIETY OK TO TAKE 1 TO 2 AT NIGHT AS NEEDED FOR SLEEP. SHOULD LAST ABOUT 90 DAYS 100 tablet 1     diclofenac (VOLTAREN) 1 % topical gel Place 2-4 g onto the skin 4 times daily . Max of 8g per dose. No more than 32g/day 100 g 3     docusate sodium (EQ STOOL SOFTENER) 100 MG capsule Take 1 capsule (100 mg) by mouth 2 times daily 180 capsule 3     EPINEPHrine (ANY BX GENERIC EQUIV) 0.3 MG/0.3ML injection 2-pack Inject 0.3 mLs (0.3 mg) into the muscle as needed for anaphylaxis 2 each 0     escitalopram (LEXAPRO) 20 MG tablet Take 1.5 tablets (30 mg) by mouth every evening - dose increase 1/24/23 135 tablet 0     fentaNYL (DURAGESIC) 12 mcg/hr 72 hr patch Place 1 patch onto the skin every 72 hours remove old patch. Fill 1/26/23, start 1/28/23 to last 30 days 10 patch 0     fluorometholone (FML LIQUIFILM) 0.1 % ophthalmic suspension INSTILL 1 DROP INTO EACH EYE IN THE MORNING       fluticasone (FLONASE) 50 MCG/ACT nasal spray Spray 1-2 sprays into both nostrils daily 16 g 11     fluvastatin  (LESCOL) 20 MG capsule Take 1 capsule (20 mg) by mouth At Bedtime 90 capsule 3     glimepiride (AMARYL) 2 MG tablet Take 1 tablet (2 mg) by mouth every morning (before breakfast) 90 tablet 1     glucose blood VI test strips strip daily       HYDROcodone-acetaminophen (NORCO) 7.5-325 MG per tablet Take 0.5-1 tablets by mouth every 6 hours as needed for severe pain (7-10) Can take 1 tab per day and on 15 days of the month can take 2 tabs.  Fill 1/26/23. Start 1/29/23 to last 30 days 45 tablet 0     hydroxychloroquine (PLAQUENIL) 200 MG tablet Hydroxychloroquine 200mg daily; and an additional 200mg every other day. Yearly eye exam including 10-2 VF and SD- tablet 2     hydrOXYzine (ATARAX) 25 MG tablet Take 1 tablet (25 mg) by mouth 3 times daily as needed for itching 40 tablet 0     lifitegrast (XIIDRA) 5 % opthalmic solution Place 1 drop into both eyes 2 times daily 30 each 6     magnesium 250 MG tablet Take 1 tablet by mouth daily       medical cannabis (Patient's own supply) Take 1 Dose by mouth See Admin Instructions (The purpose of this order is to document that the patient reports taking medical cannabis.  This is not a prescription, and is not used to certify that the patient has a qualifying medical condition.)       metFORMIN (GLUCOPHAGE) 500 MG tablet Take 2 tablets (1,000 mg) by mouth 2 times daily (with meals) 360 tablet 1     metroNIDAZOLE (METROGEL) 0.75 % external gel Apply topically 2 times daily Apply to face daily 45 g 5     naloxone (NARCAN) 4 MG/0.1ML nasal spray Spray 1 spray (4 mg) into one nostril alternating nostrils as needed for opioid reversal every 2-3 minutes until assistance arrives 0.2 mL 0     nystatin (MYCOSTATIN) 243061 UNIT/GM external powder Apply topically 2 times daily as needed 2 x daily PRN for rash 60 g 4     omeprazole (PRILOSEC) 20 MG DR capsule Take 1 capsule (20 mg) by mouth 2 times daily 180 capsule 3     pimecrolimus (ELIDEL) 1 % external cream Apply topically 2  times daily - use on back of neck and hands 60 g 5     potassium chloride ER (KLOR-CON M) 10 MEQ CR tablet Take 1 tablet (10 mEq) by mouth 2 times daily (Patient taking differently: Take 10 mEq by mouth daily) 180 tablet 3     PROAIR  (90 Base) MCG/ACT inhaler Inhale 1-2 puffs into the lungs every 4 hours as needed for shortness of breath / dyspnea or wheezing 18 g 5     promethazine (PHENERGAN) 25 MG tablet Take 1 tablet (25 mg) by mouth every 6 hours as needed for nausea or other (Meniere's) For nusea 90 tablet 0     psyllium (METAMUCIL) 58.6 % POWD Take by mouth daily PRN       SENNA-docusate sodium (SENNA S) 8.6-50 MG tablet Take 1 tablet by mouth 2 times daily 60 tablet 1     thin (NO BRAND SPECIFIED) lancets Use to test blood sugar 2 times daily or as directed. To accompany: Blood Glucose Monitor Brands: per insurance. 200 each 11     triamterene-HCTZ (DYAZIDE) 37.5-25 MG capsule Take 1 capsule by mouth daily 90 capsule 3     clotrimazole (LOTRIMIN) 1 % external solution Instill 5 drops to the right ear 5 minutes for 21 days. (Patient not taking: Reported on 1/24/2023) 10 mL 1     COMPOUNDED NON-CONTROLLED SUBSTANCE (CMPD RX) - PHARMACY TO MIX COMPOUNDED MEDICATION Amphotericin 5mg/ml- 5 drops in the right ear twice daily x 30 days (Patient not taking: Reported on 1/24/2023) 15 mL 1     EQ ALLERGY RELIEF, CETIRIZINE, 10 MG tablet Take 1 tablet by mouth once daily (Patient not taking: Reported on 1/24/2023) 90 tablet 1       Allergies   Allergen Reactions     Duloxetine Other (See Comments)     Topiramate Other (See Comments)     Other reaction(s): Confusion     Amitriptyline Embonate [Amitriptyline]      Hyper activity     Azithromycin Hives     Cymbalta      Hyper activity     Gabapentin Hives     Hmg-Coa-R Inhibitors Other (See Comments)     Liver function studies abnormal on Lipitor / Crestor     Macrobid [Nitrofurantoin] Hives     Paxil [Paroxetine] Other (See Comments)     Hyper behavior      "Penicillins Hives     Tetracycline Hives        Social History     Tobacco Use     Smoking status: Former     Types: Cigarettes     Start date: 1978     Quit date: 1998     Years since quittin.0     Smokeless tobacco: Never     Tobacco comments:     1 pack a day    Substance Use Topics     Alcohol use: Yes     Comment: very rare     Family History   Problem Relation Age of Onset     C.A.D. Mother      Alzheimer Disease Mother      Cardiovascular Mother      Eye Disorder Mother      Thyroid Disease Mother      Osteoporosis Mother      Hypertension Other         grandmother     Cerebrovascular Disease Father      Alcohol/Drug Father      Depression Father      Obesity Father         aunt     Asthma Father      Prostate Cancer Father      Anxiety Disorder Father      Alcohol/Drug Brother      Cancer Brother      Alcohol/Drug Sister         aunt     Cancer Sister      Allergies Other         All family members     Arthritis Other         aunt     Obesity Sister      Thyroid Disease Sister      Asthma Sister         daughter     History   Drug Use     Types: Marijuana         Objective     /50 (BP Location: Right arm, Patient Position: Sitting, Cuff Size: Adult Regular)   Pulse 75   Temp 97.1  F (36.2  C) (Tympanic)   Resp 20   Ht 1.59 m (5' 2.6\")   Wt 75.8 kg (167 lb)   LMP  (LMP Unknown)   SpO2 97%   BMI 29.96 kg/m      Physical Exam    GENERAL APPEARANCE: healthy, alert and no distress     EYES: EOMI, PERRL     HENT: ear canals and left TM normal, nose and mouth without ulcers or lesions and right TM perforation, chronic     NECK: no adenopathy, no asymmetry, masses, or scars and thyroid normal to palpation     RESP: lungs clear to auscultation - no rales, rhonchi or wheezes     CV: regular rates and rhythm, no irregular beats and grade 4/6 systolic murmur heard best over the right sternal border     ABDOMEN:  soft, nontender, no HSM or masses and bowel sounds normal     MS: extremities " normal- no gross deformities noted, no evidence of inflammation in joints, FROM in all extremities.     SKIN: no suspicious lesions or rashes     NEURO: Normal strength and tone, sensory exam grossly normal, mentation intact and speech normal     PSYCH: mentation appears normal. and affect normal/bright     LYMPHATICS: No cervical adenopathy    Recent Labs   Lab Test 01/24/23  0821 08/19/22  0755 07/18/22  1411 03/30/22  1428 02/18/22  0704 02/18/22  0657   HGB  --   --  11.0* 11.5*  --   --    PLT  --   --  174 176  --   --      --   --   --   --  137   POTASSIUM 4.2  --   --   --   --  3.8   CR 0.81  --  0.89 0.88  --  0.87   A1C 6.6* 6.4*  --   --    < >  --     < > = values in this interval not displayed.        Diagnostics:  Recent Results (from the past 720 hour(s))   TSH with free T4 reflex    Collection Time: 01/24/23  8:21 AM   Result Value Ref Range    TSH 3.15 0.40 - 4.00 mU/L   Albumin Random Urine Quantitative with Creat Ratio    Collection Time: 01/24/23  8:21 AM   Result Value Ref Range    Creatinine Urine mg/dL 153 mg/dL    Albumin Urine mg/L 12 mg/L    Albumin Urine mg/g Cr 7.84 0.00 - 25.00 mg/g Cr   BASIC METABOLIC PANEL    Collection Time: 01/24/23  8:21 AM   Result Value Ref Range    Sodium 138 133 - 144 mmol/L    Potassium 4.2 3.4 - 5.3 mmol/L    Chloride 103 94 - 109 mmol/L    Carbon Dioxide (CO2) 26 20 - 32 mmol/L    Anion Gap 9 3 - 14 mmol/L    Urea Nitrogen 17 7 - 30 mg/dL    Creatinine 0.81 0.52 - 1.04 mg/dL    Calcium 9.6 8.5 - 10.1 mg/dL    Glucose 146 (H) 70 - 99 mg/dL    GFR Estimate 83 >60 mL/min/1.73m2   HEMOGLOBIN A1C    Collection Time: 01/24/23  8:21 AM   Result Value Ref Range    Hemoglobin A1C 6.6 (H) 0.0 - 5.6 %   Ferritin    Collection Time: 02/01/23  7:38 AM   Result Value Ref Range    Ferritin 41 8 - 252 ng/mL   CBC with platelets and differential    Collection Time: 02/01/23  7:38 AM   Result Value Ref Range    WBC Count 6.3 4.0 - 11.0 10e3/uL    RBC Count 4.17 3.80 -  5.20 10e6/uL    Hemoglobin 11.0 (L) 11.7 - 15.7 g/dL    Hematocrit 36.0 35.0 - 47.0 %    MCV 86 78 - 100 fL    MCH 26.4 (L) 26.5 - 33.0 pg    MCHC 30.6 (L) 31.5 - 36.5 g/dL    RDW 14.7 10.0 - 15.0 %    Platelet Count 181 150 - 450 10e3/uL    % Neutrophils 58 %    % Lymphocytes 26 %    % Monocytes 6 %    % Eosinophils 9 %    % Basophils 0 %    Absolute Neutrophils 3.7 1.6 - 8.3 10e3/uL    Absolute Lymphocytes 1.6 0.8 - 5.3 10e3/uL    Absolute Monocytes 0.4 0.0 - 1.3 10e3/uL    Absolute Eosinophils 0.6 0.0 - 0.7 10e3/uL    Absolute Basophils 0.0 0.0 - 0.2 10e3/uL      No EKG required, no history of coronary heart disease, significant arrhythmia, peripheral arterial disease or other structural heart disease.     Echo done on 4/21/22, stable    Revised Cardiac Risk Index (RCRI):  The patient has the following serious cardiovascular risks for perioperative complications:   - No serious cardiac risks = 0 points     RCRI Interpretation: 0 points: Class I (very low risk - 0.4% complication rate)       Signed Electronically by: Katerina Judd PA-C  Copy of this evaluation report is provided to requesting physician.

## 2023-02-01 NOTE — PATIENT INSTRUCTIONS
Skip your morning Glimepiride and Metformin the day of surgery. You can take the evening dose of Metformin that day.  If it's a very early morning surgery take your medications when you get home.

## 2023-02-07 ENCOUNTER — TELEPHONE (OUTPATIENT)
Dept: OTOLARYNGOLOGY | Facility: CLINIC | Age: 63
End: 2023-02-07
Payer: COMMERCIAL

## 2023-02-09 ENCOUNTER — ANESTHESIA EVENT (OUTPATIENT)
Dept: SURGERY | Facility: AMBULATORY SURGERY CENTER | Age: 63
End: 2023-02-09
Payer: COMMERCIAL

## 2023-02-10 ENCOUNTER — HOSPITAL ENCOUNTER (OUTPATIENT)
Facility: AMBULATORY SURGERY CENTER | Age: 63
Discharge: HOME | End: 2023-02-10
Attending: OTOLARYNGOLOGY | Admitting: OTOLARYNGOLOGY
Payer: COMMERCIAL

## 2023-02-10 ENCOUNTER — ANESTHESIA (OUTPATIENT)
Dept: SURGERY | Facility: AMBULATORY SURGERY CENTER | Age: 63
End: 2023-02-10
Payer: COMMERCIAL

## 2023-02-10 VITALS
TEMPERATURE: 97.7 F | OXYGEN SATURATION: 95 % | RESPIRATION RATE: 16 BRPM | DIASTOLIC BLOOD PRESSURE: 53 MMHG | SYSTOLIC BLOOD PRESSURE: 114 MMHG | HEART RATE: 76 BPM

## 2023-02-10 DIAGNOSIS — H90.A31 MIXED CONDUCTIVE AND SENSORINEURAL HEARING LOSS OF RIGHT EAR WITH RESTRICTED HEARING OF LEFT EAR: ICD-10-CM

## 2023-02-10 DIAGNOSIS — H92.11 OTORRHEA, RIGHT: ICD-10-CM

## 2023-02-10 DIAGNOSIS — H72.91 PERFORATION OF RIGHT TYMPANIC MEMBRANE: ICD-10-CM

## 2023-02-10 PROCEDURE — 69717 RPLCMT OI IMPLT SKL PRQ ESP: CPT | Mod: RT | Performed by: OTOLARYNGOLOGY

## 2023-02-10 PROCEDURE — 69717 RPLCMT OI IMPLT SKL PRQ ESP: CPT | Mod: RT

## 2023-02-10 RX ORDER — OXYCODONE HYDROCHLORIDE 5 MG/1
5 TABLET ORAL EVERY 4 HOURS PRN
Status: DISCONTINUED | OUTPATIENT
Start: 2023-02-10 | End: 2023-02-10 | Stop reason: HOSPADM

## 2023-02-10 RX ORDER — HYDROCODONE BITARTRATE AND ACETAMINOPHEN 5; 325 MG/1; MG/1
1 TABLET ORAL
Status: DISCONTINUED | OUTPATIENT
Start: 2023-02-10 | End: 2023-02-11 | Stop reason: HOSPADM

## 2023-02-10 RX ORDER — LIDOCAINE 40 MG/G
CREAM TOPICAL
Status: DISCONTINUED | OUTPATIENT
Start: 2023-02-10 | End: 2023-02-10 | Stop reason: HOSPADM

## 2023-02-10 RX ORDER — LIDOCAINE HYDROCHLORIDE AND EPINEPHRINE 10; 10 MG/ML; UG/ML
INJECTION, SOLUTION INFILTRATION; PERINEURAL PRN
Status: DISCONTINUED | OUTPATIENT
Start: 2023-02-10 | End: 2023-02-10 | Stop reason: HOSPADM

## 2023-02-10 RX ORDER — DEXAMETHASONE SODIUM PHOSPHATE 10 MG/ML
10 INJECTION, SOLUTION INTRAMUSCULAR; INTRAVENOUS ONCE
Status: DISCONTINUED | OUTPATIENT
Start: 2023-02-10 | End: 2023-02-10 | Stop reason: HOSPADM

## 2023-02-10 RX ORDER — FENTANYL CITRATE 50 UG/ML
25 INJECTION, SOLUTION INTRAMUSCULAR; INTRAVENOUS EVERY 5 MIN PRN
Status: DISCONTINUED | OUTPATIENT
Start: 2023-02-10 | End: 2023-02-10 | Stop reason: HOSPADM

## 2023-02-10 RX ORDER — ONDANSETRON 4 MG/1
4 TABLET, ORALLY DISINTEGRATING ORAL EVERY 30 MIN PRN
Status: DISCONTINUED | OUTPATIENT
Start: 2023-02-10 | End: 2023-02-10 | Stop reason: HOSPADM

## 2023-02-10 RX ORDER — LABETALOL HYDROCHLORIDE 5 MG/ML
10 INJECTION, SOLUTION INTRAVENOUS
Status: DISCONTINUED | OUTPATIENT
Start: 2023-02-10 | End: 2023-02-10 | Stop reason: HOSPADM

## 2023-02-10 RX ORDER — OXYCODONE HYDROCHLORIDE 5 MG/1
5 TABLET ORAL EVERY 4 HOURS PRN
Status: DISCONTINUED | OUTPATIENT
Start: 2023-02-10 | End: 2023-02-11 | Stop reason: HOSPADM

## 2023-02-10 RX ORDER — CLINDAMYCIN PHOSPHATE 900 MG/50ML
900 INJECTION, SOLUTION INTRAVENOUS
Status: DISCONTINUED | OUTPATIENT
Start: 2023-02-10 | End: 2023-02-10 | Stop reason: HOSPADM

## 2023-02-10 RX ORDER — CLINDAMYCIN PHOSPHATE 900 MG/50ML
900 INJECTION, SOLUTION INTRAVENOUS SEE ADMIN INSTRUCTIONS
Status: DISCONTINUED | OUTPATIENT
Start: 2023-02-10 | End: 2023-02-10 | Stop reason: HOSPADM

## 2023-02-10 RX ORDER — SODIUM CHLORIDE, SODIUM LACTATE, POTASSIUM CHLORIDE, CALCIUM CHLORIDE 600; 310; 30; 20 MG/100ML; MG/100ML; MG/100ML; MG/100ML
INJECTION, SOLUTION INTRAVENOUS CONTINUOUS
Status: DISCONTINUED | OUTPATIENT
Start: 2023-02-10 | End: 2023-02-10 | Stop reason: HOSPADM

## 2023-02-10 RX ORDER — ONDANSETRON 2 MG/ML
4 INJECTION INTRAMUSCULAR; INTRAVENOUS EVERY 30 MIN PRN
Status: DISCONTINUED | OUTPATIENT
Start: 2023-02-10 | End: 2023-02-10 | Stop reason: HOSPADM

## 2023-02-10 RX ORDER — ACETAMINOPHEN 325 MG/1
650 TABLET ORAL
Status: DISCONTINUED | OUTPATIENT
Start: 2023-02-10 | End: 2023-02-11 | Stop reason: HOSPADM

## 2023-02-10 RX ORDER — HYDROMORPHONE HYDROCHLORIDE 1 MG/ML
0.2 INJECTION, SOLUTION INTRAMUSCULAR; INTRAVENOUS; SUBCUTANEOUS EVERY 5 MIN PRN
Status: DISCONTINUED | OUTPATIENT
Start: 2023-02-10 | End: 2023-02-10 | Stop reason: HOSPADM

## 2023-02-10 RX ORDER — FENTANYL CITRATE 50 UG/ML
50 INJECTION, SOLUTION INTRAMUSCULAR; INTRAVENOUS EVERY 5 MIN PRN
Status: DISCONTINUED | OUTPATIENT
Start: 2023-02-10 | End: 2023-02-10 | Stop reason: HOSPADM

## 2023-02-10 RX ORDER — ACETAMINOPHEN 325 MG/1
975 TABLET ORAL ONCE
Status: COMPLETED | OUTPATIENT
Start: 2023-02-10 | End: 2023-02-10

## 2023-02-10 RX ORDER — ACETAMINOPHEN 325 MG/1
975 TABLET ORAL ONCE
Status: DISCONTINUED | OUTPATIENT
Start: 2023-02-10 | End: 2023-02-10 | Stop reason: HOSPADM

## 2023-02-10 RX ORDER — HYDROMORPHONE HYDROCHLORIDE 1 MG/ML
0.4 INJECTION, SOLUTION INTRAMUSCULAR; INTRAVENOUS; SUBCUTANEOUS EVERY 5 MIN PRN
Status: DISCONTINUED | OUTPATIENT
Start: 2023-02-10 | End: 2023-02-10 | Stop reason: HOSPADM

## 2023-02-10 RX ADMIN — SODIUM CHLORIDE, SODIUM LACTATE, POTASSIUM CHLORIDE, CALCIUM CHLORIDE: 600; 310; 30; 20 INJECTION, SOLUTION INTRAVENOUS at 10:09

## 2023-02-10 RX ADMIN — ACETAMINOPHEN 975 MG: 325 TABLET ORAL at 10:09

## 2023-02-10 NOTE — DISCHARGE INSTRUCTIONS
1.  No showering or getting the incision until seen in clinic.  Clinic will remove the dressing next week.    2.  Take pain medication, Tylenol or ibuprofen as needed.    3.  No strenuous activity for one week.    4.  Follow up with Dr. Stallings as scheduled.    5.  Please call the ENT clinic if the incision drains, becomes more painful over time, becomes redder over time and the redness spreads or for fever > 101.5.  If it is the evening or weekend, please call [phone] and ask for the ENT resident on call.     [facility]  Home Care Following Your Procedure  Call a doctor if you have signs of infection (fever, growing tenderness at the surgery site, a large amount of drainage or bleeding, severe pain, foul-smelling drainage, redness, swelling).         Tylenol/Acetaminophen Consumption  To help encourage the safe use of acetaminophen, the makers of TYLENOL  have lowered the maximum daily dose for single-ingredient Extra Strength TYLENOL  (acetaminophen) products sold in the [place] from 8 pills per day (4,000 mg) to 6 pills per day (3,000 mg). The dosing interval has also changed from 2 pills every 4-6 hours to 2 pills every 6 hours.  If you feel your pain relief is insufficient, you may take Tylenol/Acetaminophen in addition to your narcotic pain medication.   Be careful not to exceed 3,000 mg of Tylenol/Acetaminophen in a 24 hour period from all sources.  If you are taking extra strength Tylenol/acetaminophen (500 mg), the maximum dose is 6 tablets in 24 hours.  If you are taking regular strength acetaminophen (325 mg), the maximum dose is 9 tablets in 24 hours.    Your doctor is:  Dr. Laura Stallings, ENT Otolaryngology: [phone]                  Or dial [phone] and ask for the resident on call for:  ENT Otolaryngology  For emergency care, call the:  [place]:  [phone] (TTY for hearing impaired: [phone])

## 2023-02-10 NOTE — BRIEF OP NOTE
[facility] [place]    Brief Operative Note    Pre-operative diagnosis: Mixed conductive and sensorineural hearing loss of right ear with restricted hearing of left ear [H90.A31]  Perforation of right tympanic membrane [H72.91]  Otorrhea, right [H92.11]  Post-operative diagnosis Same as pre-operative diagnosis    Procedure: Procedure(s):  INSERTION, BONE ANCHORED HEARING AID WITH THE OTICON DEVICE  Surgeon: Surgeon(s) and Role:     * Laura Stallings MD - Primary     * Gaby Mittal MD - Resident - Assisting  Anesthesia: Local   Estimated Blood Loss: Minimal    Drains: None  Specimens: * No specimens in log *  Findings:   None.  Complications: None.  Implants:   Implant Name Type Inv. Item Serial No.  Lot No. LRB No. Used Action   Ponto BHX implant 4mm with abutment, 6mm     [id number] Right 1 Implanted         Gaby [place] MD PGY4  ENT Resident

## 2023-02-14 NOTE — OP NOTE
Date of service:  2/10/23    Preoperative diagnosis: Right mixed hearing loss    Postoperative diagnosis: Right mixed hearing loss    Procedure:  Right osseointegrated implant with the Oticon device and removal of previously placed osseointegrated implant abutment    Surgeon:  Laura Stallings MD    Fellow:  Nohelia Booker MD    Resident:  Gaby Mittal MD    Anesthesia:  Local    EBL:  5cc    Specimens:  None    Complications:  None    Findings:  None    Indications:  Betsey Vanessa has a history of right mixed hearing loss. She had a right osseointegrated implant with sound processor placed in the past with a Cochlear Super Power device but experienced significant difficulties with device placement and feedback. We discussed removal of the prior abutment and placement of a new osseointegrated implant with sound processor in a high position. The risks and benefits were discussed with the patient and informed consent was obtained.    Procedure:  The patient was taken to the operating room and placed supine on the operating room table.  Time Out was then performed with confirmation of the patient, site and procedure.  The area behind the ear had been marked and the skin thickness measured as 4.5mm. The hair was shaved over the area. 1% lidocaine with 1:100,000 epinephrine was injected into the area. The area was then prepped and draped in standard surgical fashion. A 4mm punch was used to remove the skin.  A linear incision was created 1cm above and below the punch.  A cruciate incision was made in the periosteum over the area of the implant and elevated off the skull.  The 3/4mm guide drill was used to drill a 3 mm hole.  No dura was encountered.  The guard was removed and the hole was deepened to 4 mm.  The 4 mm countersink was drilled.  The 4 mm implant with 6 mm abutment was placed with good contact with the bone.  It was solid to shake test.  The skin was then closed using nylon suture.  The previously placed abutment  was removed leaving the implant in position. Bacitracin was placed into the skin opening. Allevyn was placed over the new abutment and a healing cap placed to hold the Allevyn in position. The patient tolerated the procedure well and there were no complications.    Laura PHILIP MD, was present during the entire procedure.

## 2023-02-16 ENCOUNTER — OFFICE VISIT (OUTPATIENT)
Dept: AUDIOLOGY | Facility: CLINIC | Age: 63
End: 2023-02-16
Payer: COMMERCIAL

## 2023-02-16 ENCOUNTER — ALLIED HEALTH/NURSE VISIT (OUTPATIENT)
Dept: OTOLARYNGOLOGY | Facility: CLINIC | Age: 63
End: 2023-02-16
Payer: COMMERCIAL

## 2023-02-16 DIAGNOSIS — Z96.21 STATUS POST PLACEMENT OF BONE ANCHORED HEARING AID (BAHA): Primary | ICD-10-CM

## 2023-02-16 DIAGNOSIS — H90.A31 MIXED CONDUCTIVE AND SENSORINEURAL HEARING LOSS OF RIGHT EAR WITH RESTRICTED HEARING OF LEFT EAR: Primary | ICD-10-CM

## 2023-02-16 PROCEDURE — 99207 PR NO CHARGE NURSE ONLY: CPT

## 2023-02-16 PROCEDURE — 99207 PR ASSESSMENT FOR HEARING AID: CPT | Performed by: AUDIOLOGIST

## 2023-02-16 NOTE — PROGRESS NOTES
AUDIOLOGY REPORT    SUBJECTIVE: Betsey Vanessa is a 62 year old female who was seen in the Audiology Clinic at the [facility] [place] for a fitting of a right Oticon Medical [place] 5 SP on softband until her abutment osseointegrates and she is medically cleared to wear the device on the abutment in about 3 months. 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 The patient was given medical clearance to pursue amplification by  Laura Stallings MD..  She was implanted with a right [employer]chlear [employer] BAHA in [place], [place] but has had extensive issues regarding feedback of the device. She was never able to wear her previous device easily and so had surgery on 2/10/2023 to remove the Cochlear [employer] abutment and have a new Oticon Medical Abutment place.  4th year doctoral extern, Briana Núñez, was present and observed today's appointment.    OBJECTIVE:  The patient reports that she is currently experiencing some soreness and swelling at the abutment sit and surrounding area. She is currently using an old hearing aid that she found and has found that she is able to easily communicate with the old hearing aid.  While looking to fit the softband, based on the location of the abutment and patient's reported soreness area, there was not a way to position the softband where it would not touch the abutment or cause more pain at the moment. It was reviewed that she would not be able to wear it like a traditional headband way at the moment but it could be worn around the forehead. She expressed understanding and after discussing options, decided to wait a few weeks until after the swelling goes down to try again with the softband.   Her device was not fit today and she expressed that she will continue wearing the old hearing aid until the next appointment.  She is scheduled with Dr. Stallings on 3/2/2023 and  will be seen again after that appointment to try and fit the softband.    ASSESSMENT: Patient was seen but her device on softband could not be fit today.    PLAN:Betsey will return for follow-up in about 2 weeks to try again to be fit with the softband and new device, sooner if concerns arise. Today's appointment is a no charge visit as no billable service was provided. Please call this clinic with questions regarding today s appointment.    Alee Lutz  Audiologist  [place] License  #[id number]

## 2023-02-16 NOTE — NURSING NOTE
Patient presents for a nurse visit today for suture and pink puff removal.    Procedure date: 2/10/23  Procedure: AMINAHA  Surgeon(s): Dr. Stallings    Patient reports tenderness at site.     Incision appears clean, dry, and intact with no significant redness, swelling, or drainage. Edges are well-approximated. Cap was taken off, pink puff and sutures were removed without difficulty and patient tolerated well.        Provided education on incision care, pain management, and activity restrictions. Patient verbalized understanding of all instructions, and was encouraged to call with any further questions or concerns. Provided writer's direct contact information.    Follow up plan: Return for scheduled appointment on 3/2 with Dr. Stallings.

## 2023-02-22 ENCOUNTER — VIRTUAL VISIT (OUTPATIENT)
Dept: PALLIATIVE MEDICINE | Facility: CLINIC | Age: 63
End: 2023-02-22
Payer: COMMERCIAL

## 2023-02-22 DIAGNOSIS — K59.03 THERAPEUTIC OPIOID INDUCED CONSTIPATION: ICD-10-CM

## 2023-02-22 DIAGNOSIS — M06.09 RHEUMATOID ARTHRITIS OF MULTIPLE SITES WITHOUT RHEUMATOID FACTOR (H): ICD-10-CM

## 2023-02-22 DIAGNOSIS — F11.20 CONTINUOUS OPIOID DEPENDENCE (H): ICD-10-CM

## 2023-02-22 DIAGNOSIS — G89.4 CHRONIC PAIN SYNDROME: ICD-10-CM

## 2023-02-22 DIAGNOSIS — H90.A31 MIXED CONDUCTIVE AND SENSORINEURAL HEARING LOSS OF RIGHT EAR WITH RESTRICTED HEARING OF LEFT EAR: Primary | ICD-10-CM

## 2023-02-22 DIAGNOSIS — T40.2X5A THERAPEUTIC OPIOID INDUCED CONSTIPATION: ICD-10-CM

## 2023-02-22 PROCEDURE — 99215 OFFICE O/P EST HI 40 MIN: CPT | Mod: 24

## 2023-02-22 RX ORDER — HYDROCODONE BITARTRATE AND ACETAMINOPHEN 7.5; 325 MG/1; MG/1
.5-1 TABLET ORAL EVERY 6 HOURS PRN
Qty: 75 TABLET | Refills: 0 | Status: SHIPPED | OUTPATIENT
Start: 2023-02-22 | End: 2023-01-01

## 2023-02-22 RX ORDER — FENTANYL 12.5 UG/1
1 PATCH TRANSDERMAL
Qty: 10 PATCH | Refills: 0 | Status: SHIPPED | OUTPATIENT
Start: 2023-02-22 | End: 2023-01-01

## 2023-02-22 RX ORDER — SENNA AND DOCUSATE SODIUM 50; 8.6 MG/1; MG/1
1 TABLET, FILM COATED ORAL 2 TIMES DAILY
Qty: 60 TABLET | Refills: 1 | Status: SHIPPED | OUTPATIENT
Start: 2023-02-22

## 2023-02-22 ASSESSMENT — PAIN SCALES - GENERAL: PAINLEVEL: MODERATE PAIN (5)

## 2023-02-22 NOTE — PATIENT INSTRUCTIONS
1.  Pain Physical Therapy:  Continue home exercise program as tolerated, walking and yoga               2.  Pain Psychologist to address relaxation, behavioral change, coping style, and other factors important to improvement.  NO - she is coping well with chronic pain at this time. May consider in the future if needed.               3.  Diagnostic Studies:  None               4.  Medication Management:   Change tylenol 500-1000 mg to twice daily as needed. She is not certain of benefit, recommend transitioning from scheduled to PRN and monitor for Discussed daily max dosage, encouraged her to keep below 3000 mg from all sources (Norco included).  Last hepatic panel within last 12 months was WNL.   Continue Senna S twice daily for constipation. Use miralax 1-2 times daily as needed. Constipation has improved, achieving daily BM.   Continue fentanyl as prescribed.  TEMPORARY increase in hydrocodone to 2-3 tabs per day, #75 tabs to last 30 days until she can resume Orencia injections for RA. refills sent to pharmacy.  Keep naloxone on hand at home in the event of accidental overdose.    Continue medical cannabis.   We will consider buprenorphine in the future. We discussed this again at last visit, and I reached out to Dr. Mendoza in between visits for recommendations on transition from fentanyl patch  - You need to discontinue the patch and replace it with oral opioid.  For a 12mcg patch I would give an extra 30mg of norco a day to use PRN to help with withdrawal.  After one week of this discontinue ALL norco for 12 hours and start her on suboxone 2mg TID.                5.  Urine toxicology screen - completed on 8/11/22.               6.  Opioid agreement - completed on 8/11/22.               7. Procedures - We have been deferring repeat lumbar facet joint injections due to ongoing fungal infection, though this has since resolved. We will hold off on ordering repeat procedure until after surgery. She had surgery on  2/10/23, will revisit injections at April follow up.               8.  Follow up with ANIBAL Frank CNP in 8-10 weeks for in person visit.     ----------------------------------------------------------------  Clinic Number:  [phone]   Call with any questions about your care and for scheduling assistance.   Calls are returned Monday through Friday between 8 AM and 4:30 PM. We usually get back to you within 2 business days depending on the issue/request.    If we are prescribing your medications:  For opioid medication refills, call the clinic or send a [facility] message 7 days in advance.  Please include:  Name of requested medication  Name of the pharmacy.  For non-opioid medications, call your pharmacy directly to request a refill. Please allow 3-4 days to be processed.   Per [place] State Law:  All controlled substance prescriptions must be filled within 30 days of being written.    For those controlled substances allowing refills, pickup must occur within 30 days of last fill.      We believe regular attendance is key to your success in our program!    Any time you are unable to keep your appointment we ask that you call us at least 24 hours in advance to cancel.This will allow us to offer the appointment time to another patient.   Multiple missed appointments may lead to dismissal from the clinic.

## 2023-02-22 NOTE — PROGRESS NOTES
Is Pt currently in [place]? Yes    NOTE:  If Pt is not in [place], Appointment needs to be canceled and rescheduled.    Betsey is a 62 year old who is being evaluated via a billable video visit.      How would you like to obtain your AVS? MyChart  If the video visit is dropped, the invitation should be resent by: Text to cell phone: [phone]  Will anyone else be joining your video visit? No      Geena Farmer CMA ([facility])    Video-Visit Details    Type of service:  Video Visit     Originating Location (pt. Location): Home    Distant Location (provider location):  On-site  Platform used for Video Visit: [facility]     Date of visit: 2/22/2023      Assessment:   Betsey Vanessa is a 62 year old female with a past medical history significant for RA, fibromyalgia, polyarticular arthritis, HTN, Meniere's disease, depression, migraine who presents with complaints of widespread pain.      1. Chronic pain syndrome - Onset of back pain occurred many years ago, and she has pain in multiple joints. Etiology of pain is associated with multiple factors, including but not limited to, lumbar spondylosis, lumbar facet arthropathy, rheumatoid arthritis, and myofascial component.    Visit Diagnoses:  1. Rheumatoid arthritis of multiple sites without rheumatoid factor (H)    2. Chronic pain syndrome    3. Continuous opioid dependence (H)    4. Therapeutic opioid induced constipation        Plan:  Diagnosis reviewed, treatment option addressed, and risk/benifits discussed.  Self-care instructions given.  I am recommending a multidisciplinary treatment plan to help this patient better manage their pain.      1.  Pain Physical Therapy:  Continue home exercise program as tolerated, walking and yoga               2.  Pain Psychologist to address relaxation, behavioral change, coping style, and other factors important to improvement.  NO - she is coping well with chronic pain at this time. May consider in the  future if needed.               3.  Diagnostic Studies:  None               4.  Medication Management:   - Change tylenol 500-1000 mg to twice daily as needed. She is not certain of benefit, recommend transitioning from scheduled to PRN and monitor for Discussed daily max dosage, encouraged her to keep below 3000 mg from all sources (Norco included).  Last hepatic panel within last 12 months was WNL.   - Continue Senna S twice daily for constipation. Use miralax 1-2 times daily as needed. Constipation has improved, achieving daily BM.     Continue fentanyl as prescribed.  TEMPORARY increase in hydrocodone to 2-3 tabs per day, #75 tabs to last 30 days until she can resume Orencia injections for RA or she decides to transition to buprenorphine. Refills sent to pharmacy.    Keep naloxone on hand at home in the event of accidental overdose.      Continue medical cannabis.     We will consider buprenorphine in the future. We discussed this again at last visit, and I reached out to Dr. Mendoza in between visits for recommendations on transition from fentanyl patch  - You need to discontinue the patch and replace it with oral opioid.  For a 12mcg patch I would give an extra 30mg of norco a day to use PRN to help with withdrawal.  After one week of this discontinue ALL norco for 12 hours and start her on suboxone 2mg TID.              5.  Urine toxicology screen - completed on 8/11/22.               6.  Opioid agreement - completed on 8/11/22.               7. Procedures - We have been deferring repeat lumbar facet joint injections due to ongoing fungal infection, though this has since resolved. We will hold off on ordering repeat procedure until after surgery. She had surgery on 2/10/23, will revisit injections at April follow up.               8.  Follow up with ANIBAL Frank CNP in 8-10 weeks for in person visit.     Review of Electronic Chart: Today I have also reviewed available medical information in the patient's  medical record at [facility] and Care Everywhere (if available), including relevant provider notes, laboratory work, and imaging.     Ronda Vasquez, DNP, APRN, AGNP-C  [facility]     -------------------------------------------------    Subjective:    Chief complaint:   Chief Complaint   Patient presents with     Pain       Interval history:  Betsey Vanessa is a 62 year old female last seen on 1/10/23.  They are a patient of mine seen in follow up.     Recommendations/plan at the last visit included:  1.  Pain Physical Therapy:  Continue home exercise program as tolerated, walking and yoga               2.  Pain Psychologist to address relaxation, behavioral change, coping style, and other factors important to improvement.  NO - she is coping well with chronic pain at this time. May consider in the future if needed.               3.  Diagnostic Studies:  None               4.  Medication Management:   - Take tylenol 500-1000 mg twice daily. May take additional 500-1000 mg daily as needed. Discussed daily max dosage, encouraged her to keep below 3000 mg from all sources (Norco included); however, she may take up to 4000 mg total occasionally on days with increased pain. Last hepatic panel within last 12 months was WNL.   - Start Senna S twice daily for constipation. Use miralax 1-2 times daily as needed.     Continue Norco and fentanyl as prescribed.  January refills sent to pharmacy. Pending outcome from adding in scheduled Tylenol, we may consider TEMPORARY increase in hydrocodone until she can resume Orencia injections for RA.     Keep naloxone on hand at home in the event of accidental overdose.      Continue medical cannabis.     We will consider buprenorphine in the future. We discussed this again at last visit, though I will need to collaborate with my colleague on transitioning from fentanyl patch to buprenorphine. I have sent a message to my colleague today.               5.  " Urine toxicology screen - completed on 8/11/22.               6.  Opioid agreement - completed on 8/11/22.               7. Procedures - We have been deferring repeat lumbar facet joint injections due to ongoing fungal infection, though this has since resolved. We will hold off on ordering repeat procedure until after surgery. She will follow up with me towards the end of February, at which time we can discuss/order injection.               8.  Follow up with ANIBAL Frank CNP in 6 weeks, video visit is okay for next appointment if preferred    Since her last visit, Betsey Vanessa reports:  -Her pain is worse than it was since last visit.   -She added in scheduled tylenol that has not been very helpful.  -She continues to have significant pain.   -She takes Norco 7.5 mg, 1/2-1 tab q6h PRN, #45 tabs per month.   -She is asking if she can take     Interval history from last visit on 1/10/23:  -Her pain is about the same as last visit.   -Pain is widespread in joints, they feel warm and swollen.   -She reports her fungal infection in her ear has resolved.    -Per chart review, last fungal swab on file from 12/8/22 negative.  -She is still off Orencia right now.  -She cannot restart this med for 3-6 months after surgery.   -Rheumatology suggested she try adding Tylenol.   -She is open to trial of this scheduled throughout the day.   -She is struggling with constipation.   -She is taking miralax at home PRN, has not been as helpful.   -She is interested in other medications to help with OIC.      Interval history from last visit on 10/11/22:  -Pain has increased since last visit, \"pain has been nasty\" lately.   -She is still dealing with fungal infection, has follow up 10/27/22. Does not feel like infection is getting better and has   -She is still struggling with dizziness and nausea r/t fungal infection in ear.   -She has missed Orencia injection that she is using to tx RA due to fungal infection.   -She is " interested in repeat lumbar facet injections with steroid, will need to check with ENT first.   -Medical cannabis:                          1. Charleen SL spray (1:1 CBD and THC) - use 1-2 sprays up to 5 times daily PRN; uses this on her bad days, but often takes at bedtime, takes for short acting                           2. Charleen oil tincture for long acting     Pain Information:   Pain rating: averages 5/10 on a 0-10 scale.      Current Pain Treatments:    Current pain medications:   -Voltaren gel 1% PRN (somewhat helpful)  -Medical cannabis PRN (helpful)  -Celebrex 100mg BID (helpful)  -Fentanyl 12mcg/hr transdermal patch Q 72 hours (helpful)  -Norco 7.5/325mg take 0.5-1 tab Q 6 hours PRN pain (helpful, has been using this more)  -plaquenil 200mg every day and 400mg every other day  -hydroxyzine 25mg TID PRN severe itching (helpful, but doesn't need to use often)     Current MME: 40 (daily average with fentanyl patch and 1.5 tabs Norco)     Review of [place] Prescription Monitoring Program (): No concern for abuse or misuse of controlled medications based on this report. Reviewed  - appears appropriate.      Annual Controlled Substance Agreement/UDS due date: Completed on 8/11/22     Past pain treatments:  Injections:   -10/12/2020 bilateral L4-5, L5-S1 facet joint injections with Dr. Bailee Houser  (quite helpful)  -5/12/2021 bilateral L4-5, L5-S1 facet joint injections with Dr. Bailee Houser  (quite helpful)  -7/5/22 bilateral L4-5, L5-S1 facet joint injections with Dr. Lion       Medications:  Current Outpatient Medications   Medication Sig Dispense Refill     acetaminophen (TYLENOL) 500 MG tablet Take 500-1000 mg BID, may take additional 500-1000 mg dose daily as needed, max 4000 mg from all sources. 180 tablet 1     alcohol swab prep pads Use to swab area of injection/rosie as directed 100 each 3     blood glucose (NO BRAND SPECIFIED) test strip Use to test blood sugar 2 times daily or as directed.  To accompany: Blood Glucose Monitor Brands: per insurance. 200 strip 11     blood glucose calibration (NO BRAND SPECIFIED) solution Use to calibrate blood glucose monitor as needed as directed. To accompany: Blood Glucose Monitor Brands: per insurance. 1 Bottle 3     celecoxib (CELEBREX) 100 MG capsule Take 1 capsule (100 mg) by mouth 2 times daily 180 capsule 2     cetirizine (ZYRTEC) 10 MG tablet Take 1 tablet (10 mg) by mouth daily 90 tablet 3     cevimeline (EVOXAC) 30 MG capsule Take 1 capsule (30 mg) by mouth 3 times daily 270 capsule 2     clonazePAM (KLONOPIN) 0.5 MG tablet TAKE 1 TABLET BY MOUTH TWICE DAILY AS NEEDED FOR ANXIETY OK TO TAKE 1 TO 2 AT NIGHT AS NEEDED FOR SLEEP. SHOULD LAST ABOUT 90 DAYS 100 tablet 1     diclofenac (VOLTAREN) 1 % topical gel Place 2-4 g onto the skin 4 times daily . Max of 8g per dose. No more than 32g/day 100 g 3     docusate sodium (EQ STOOL SOFTENER) 100 MG capsule Take 1 capsule (100 mg) by mouth 2 times daily 180 capsule 3     EPINEPHrine (ANY BX GENERIC EQUIV) 0.3 MG/0.3ML injection 2-pack Inject 0.3 mLs (0.3 mg) into the muscle as needed for anaphylaxis 2 each 0     escitalopram (LEXAPRO) 20 MG tablet Take 1.5 tablets (30 mg) by mouth every evening - dose increase 1/24/23 135 tablet 0     fentaNYL (DURAGESIC) 12 mcg/hr 72 hr patch Place 1 patch onto the skin every 72 hours remove old patch. Fill 2/25/23, start 2/27/23 to last 30 days 10 patch 0     fluorometholone (FML LIQUIFILM) 0.1 % ophthalmic suspension INSTILL 1 DROP INTO EACH EYE IN THE MORNING       fluticasone (FLONASE) 50 MCG/ACT nasal spray Spray 1-2 sprays into both nostrils daily 16 g 11     fluvastatin (LESCOL) 20 MG capsule Take 1 capsule (20 mg) by mouth At Bedtime 90 capsule 3     glimepiride (AMARYL) 2 MG tablet Take 1 tablet (2 mg) by mouth every morning (before breakfast) 90 tablet 1     glucose blood VI test strips strip daily       HYDROcodone-acetaminophen (NORCO) 7.5-325 MG per tablet Take 0.5-1  tablets by mouth every 6 hours as needed for severe pain (7-10) Max 3 tabs/day, #75 tablets to last 30 days. 75 tablet 0     hydroxychloroquine (PLAQUENIL) 200 MG tablet Hydroxychloroquine 200mg daily; and an additional 200mg every other day. Yearly eye exam including 10-2 VF and SD- tablet 2     hydrOXYzine (ATARAX) 25 MG tablet Take 1 tablet (25 mg) by mouth 3 times daily as needed for itching 40 tablet 0     lifitegrast (XIIDRA) 5 % opthalmic solution Place 1 drop into both eyes 2 times daily 30 each 6     magnesium 250 MG tablet Take 1 tablet by mouth daily       medical cannabis (Patient's own supply) Take 1 Dose by mouth See Admin Instructions (The purpose of this order is to document that the patient reports taking medical cannabis.  This is not a prescription, and is not used to certify that the patient has a qualifying medical condition.)       metFORMIN (GLUCOPHAGE) 500 MG tablet Take 2 tablets (1,000 mg) by mouth 2 times daily (with meals) 360 tablet 1     metroNIDAZOLE (METROGEL) 0.75 % external gel Apply topically 2 times daily Apply to face daily 45 g 5     naloxone (NARCAN) 4 MG/0.1ML nasal spray Spray 1 spray (4 mg) into one nostril alternating nostrils as needed for opioid reversal every 2-3 minutes until assistance arrives 0.2 mL 0     nystatin (MYCOSTATIN) 719611 UNIT/GM external powder Apply topically 2 times daily as needed 2 x daily PRN for rash 60 g 4     omeprazole (PRILOSEC) 20 MG DR capsule Take 1 capsule (20 mg) by mouth 2 times daily 180 capsule 3     pimecrolimus (ELIDEL) 1 % external cream Apply topically 2 times daily - use on back of neck and hands 60 g 5     potassium chloride ER (KLOR-CON M) 10 MEQ CR tablet Take 1 tablet (10 mEq) by mouth 2 times daily (Patient taking differently: Take 10 mEq by mouth daily) 180 tablet 3     PROAIR  (90 Base) MCG/ACT inhaler Inhale 1-2 puffs into the lungs every 4 hours as needed for shortness of breath / dyspnea or wheezing 18 g 5      promethazine (PHENERGAN) 25 MG tablet Take 1 tablet (25 mg) by mouth every 6 hours as needed for nausea or other (Meniere's) For nusea 90 tablet 0     psyllium (METAMUCIL) 58.6 % POWD Take by mouth daily PRN       SENNA-docusate sodium (SENNA S) 8.6-50 MG tablet Take 1 tablet by mouth 2 times daily 60 tablet 1     thin (NO BRAND SPECIFIED) lancets Use to test blood sugar 2 times daily or as directed. To accompany: Blood Glucose Monitor Brands: per insurance. 200 each 11     triamterene-HCTZ (DYAZIDE) 37.5-25 MG capsule Take 1 capsule by mouth daily 90 capsule 3     VITAMIN D3 50 MCG (2000 UT) tablet Take 2 tablets by mouth daily at 2 pm         Medical History: any changes in medical history since they were last seen? Yes - ENT surgery (ear) 2/10/23.       Objective:    Physical Exam:  not currently breastfeeding.  GENERAL: Healthy, alert and no distress  EYES: Eyes grossly normal to inspection.  No discharge or erythema, or obvious scleral/conjunctival abnormalities.  RESP: No audible wheeze, cough, or visible cyanosis.  No visible retractions or increased work of breathing.    SKIN: Visible skin clear. No significant rash, abnormal pigmentation or lesions.  NEURO: Cranial nerves grossly intact.  Mentation and speech appropriate for age.  PSYCH: Mentation appears normal, affect normal/bright, judgement and insight intact, normal speech and appearance well-groomed.      Imaging:  None     BILLING TIME DOCUMENTATION:   The total TIME spent on this patient on the date of the encounter/appointment was 40 minutes.      TOTAL TIME includes:   Time spent preparing to see the patient (reviewing records and tests)   Time spent face to face (or over the phone) with the patient   Time spent ordering tests, medications, procedures and referrals   Time spent Referring and communicating with other healthcare professionals   Time spent documenting clinical information in Epic

## 2023-02-25 DIAGNOSIS — E11.65 TYPE 2 DIABETES MELLITUS WITH HYPERGLYCEMIA, WITHOUT LONG-TERM CURRENT USE OF INSULIN (H): ICD-10-CM

## 2023-02-27 ENCOUNTER — TELEPHONE (OUTPATIENT)
Dept: PALLIATIVE MEDICINE | Facility: CLINIC | Age: 63
End: 2023-02-27
Payer: COMMERCIAL

## 2023-02-27 NOTE — TELEPHONE ENCOUNTER
Prior Authorization Specialty Medication Request    Medication/Dose: fentaNYL (DURAGESIC) 12 mcg/hr 72 hr patch  ICD code (if different than what is on RX):    Rheumatoid arthritis of multiple sites without rheumatoid factor (H) [M06.09]       Chronic pain syndrome [G89.4]           Previously Tried and Failed:      Important Lab Values:   Rationale:     Coverage information:     Subscriber: [id number] KIERRA NUÑEZ     Rel to sub: 01 - Self     Member ID: [id number]     Payor: [address] [place] [address] Ph: [phone]     Benefit plan: [facility] DUAL Ph: [phone]     Group number: [id number]     Member effective dates: from 01/01/22          Pharmacy Information (if different than what is on RX)  Name:  [facility] [address] - [address]  Phone:  [phone]      Urszula Palacios MA  [facility] [place]

## 2023-02-28 ENCOUNTER — VIRTUAL VISIT (OUTPATIENT)
Dept: FAMILY MEDICINE | Facility: CLINIC | Age: 63
End: 2023-02-28
Payer: COMMERCIAL

## 2023-02-28 DIAGNOSIS — F41.1 GAD (GENERALIZED ANXIETY DISORDER): ICD-10-CM

## 2023-02-28 DIAGNOSIS — F32.0 MILD MAJOR DEPRESSION (H): Primary | ICD-10-CM

## 2023-02-28 PROCEDURE — 99213 OFFICE O/P EST LOW 20 MIN: CPT | Mod: VID | Performed by: PHYSICIAN ASSISTANT

## 2023-02-28 PROCEDURE — 96127 BRIEF EMOTIONAL/BEHAV ASSMT: CPT | Performed by: PHYSICIAN ASSISTANT

## 2023-02-28 ASSESSMENT — ANXIETY QUESTIONNAIRES
GAD7 TOTAL SCORE: 5
3. WORRYING TOO MUCH ABOUT DIFFERENT THINGS: SEVERAL DAYS
IF YOU CHECKED OFF ANY PROBLEMS ON THIS QUESTIONNAIRE, HOW DIFFICULT HAVE THESE PROBLEMS MADE IT FOR YOU TO DO YOUR WORK, TAKE CARE OF THINGS AT HOME, OR GET ALONG WITH OTHER PEOPLE: SOMEWHAT DIFFICULT
7. FEELING AFRAID AS IF SOMETHING AWFUL MIGHT HAPPEN: SEVERAL DAYS
GAD7 TOTAL SCORE: 5
1. FEELING NERVOUS, ANXIOUS, OR ON EDGE: SEVERAL DAYS
6. BECOMING EASILY ANNOYED OR IRRITABLE: NOT AT ALL
2. NOT BEING ABLE TO STOP OR CONTROL WORRYING: SEVERAL DAYS
5. BEING SO RESTLESS THAT IT IS HARD TO SIT STILL: NOT AT ALL

## 2023-02-28 ASSESSMENT — PATIENT HEALTH QUESTIONNAIRE - PHQ9
5. POOR APPETITE OR OVEREATING: SEVERAL DAYS
SUM OF ALL RESPONSES TO PHQ QUESTIONS 1-9: 1

## 2023-02-28 NOTE — PROGRESS NOTES
"Betsey is a 62 year old who is being evaluated via a billable video visit.      How would you like to obtain your AVS? MyChart  If the video visit is dropped, the invitation should be resent by: Text to cell phone: [phone]  Will anyone else be joining your video visit? No          Assessment & Plan       ICD-10-CM    1. Mild major depression (H)  F32.0       2. BONNIE (generalized anxiety disorder)  F41.1           1,2) PHQ is improved and back in normal range. Will continue Lexapro 30mg, no changes today. Follow up in 5 months.     Prescription drug management         BMI:   Estimated body mass index is 29.96 kg/m  as calculated from the following:    Height as of 23: 1.59 m (5' 2.6\").    Weight as of 23: 75.8 kg (167 lb).       Return in about 5 months (around 2023) for DM, dep/anx follow up, fasting labs, with Katerina, in person.    Katerina Judd PA-C  [facility] [place] Leggett is a 62 year old, presenting for the following health issues:  Recheck Medication      HPI     Depression and Anxiety Follow-Up    How are you doing with your depression since your last visit? Improved     How are you doing with your anxiety since your last visit?  Improved     Are you having other symptoms that might be associated with depression or anxiety? No    Have you had a significant life event? OTHER: had ear surgery this month     Do you have any concerns with your use of alcohol or other drugs? No    Social History     Tobacco Use     Smoking status: Former     Types: Cigarettes     Start date: 1978     Quit date: 1998     Years since quittin.1     Smokeless tobacco: Never     Tobacco comments:     1 pack a day    Vaping Use     Vaping Use: Never used   Substance Use Topics     Alcohol use: Yes     Comment: very rare     Drug use: Yes     Types: Marijuana     PHQ 2023   PHQ-9 Total Score 9 9 1   Q9: Thoughts of better off dead/self-harm past 2 " weeks Not at all Not at all Not at all     BONNIE-7 SCORE 8/19/2022 1/10/2023 2/28/2023   Total Score 3 (minimal anxiety) 4 (minimal anxiety) -   Total Score 3 4 5     Suicide Assessment Five-step Evaluation and Treatment (SAFE-T)      How many servings of fruits and vegetables do you eat daily?  4 or more    On average, how many sweetened beverages do you drink each day (Examples: soda, juice, sweet tea, etc.  Do NOT count diet or artificially sweetened beverages)?   0    How many days per week do you exercise enough to make your heart beat faster? 3 or less    How many minutes a day do you exercise enough to make your heart beat faster? 9 or less    How many days per week do you miss taking your medication? 0      Review of Systems   Constitutional, and psych systems are negative, except as otherwise noted.      Objective           Vitals:  No vitals were obtained today due to virtual visit.    Physical Exam   GENERAL: Healthy, alert and no distress  EYES: Eyes grossly normal to inspection.  No discharge or erythema, or obvious scleral/conjunctival abnormalities.  RESP: No audible wheeze, cough, or visible cyanosis.  No visible retractions or increased work of breathing.    SKIN: Visible skin clear. No significant rash, abnormal pigmentation or lesions.  NEURO: Cranial nerves grossly intact.  Mentation and speech appropriate for age.  PSYCH: Mentation appears normal, affect normal/bright, judgement and insight intact, normal speech and appearance well-groomed.          Video-Visit Details    Type of service:  Video Visit     Joined the call at 2/28/2023, 12:47:27 pm.  Left the call at 2/28/2023, 12:54:24 pm.  You were on the call for 6 minutes 57 seconds     Originating Location (pt. Location): Home    Distant Location (provider location):  On-site  Platform used for Video Visit: [employer]

## 2023-03-01 NOTE — TELEPHONE ENCOUNTER
PA Initiation    Medication: fentaNYL (DURAGESIC) 12 mcg/hr 72 hr patch - Initiated  Insurance Company: [employer] - Phone [phone] Fax [phone]  Pharmacy Filling the Rx: [facility] [address] [facility] [place] [place] [facility] [address]  Filling Pharmacy Phone: [phone]  Filling Pharmacy Fax: [phone]  Start Date: 3/1/2023

## 2023-03-01 NOTE — PROGRESS NOTES
Otolaryngology Clinic      Name: Betsey Vanessa  MRN: [id number]  Age: 62 year old  : 2023      Chief Complaint:   Follow up    History of Present Illness:   Betsey Vanessa is a 62 year old female with a history of right BAHA and bilateral Meniere's disease s/p left labyrinthectomy who presents for follow up s/p right BAHA removal and re-insertion 2/10/23. At our last visit on 22 prior to surgery she had cultures collected from her right ear which showed no growth. She had a nurse visit follow-up after surgery and she was healing well following suture removal.     Physical examination:  Female in no acute distress.  Alert and answering questions appropriately.  HB 1/6 bilaterally. Right BAHA site showed the scalp to be erythematous with some pulling away of the scalp away from the abutment inferiorly, the area was cultured. The abutment feels solid to shake test.     Assessment and Plan:  Betsey Vanessa is a 62 year old female who presents for follow up s/p right BAHA removal and re-insertion 2/10/23. There may be an infection of the site and we will start her on Bactroban and will call with the culture results. We'll see her back in a month.      Scribe Disclosure:  I, Dalila Powell, am serving as a scribe to document services personally performed by Laura Stallings MD at this visit, based upon the provider's statements to me. All documentation has been reviewed by the aforementioned provider prior to being entered into the official medical record.    The documentation recorded by the scribe accurately reflects the services I personally performed and the decisions made by me.

## 2023-03-02 ENCOUNTER — OFFICE VISIT (OUTPATIENT)
Dept: OTOLARYNGOLOGY | Facility: CLINIC | Age: 63
End: 2023-03-02
Payer: COMMERCIAL

## 2023-03-02 VITALS
WEIGHT: 167 LBS | OXYGEN SATURATION: 100 % | HEART RATE: 87 BPM | SYSTOLIC BLOOD PRESSURE: 135 MMHG | HEIGHT: 63 IN | DIASTOLIC BLOOD PRESSURE: 59 MMHG | BODY MASS INDEX: 29.59 KG/M2 | TEMPERATURE: 96.9 F

## 2023-03-02 DIAGNOSIS — T85.79XA INFECTION OF BAHA BONE IMPLANT (H): Primary | ICD-10-CM

## 2023-03-02 DIAGNOSIS — R11.0 NAUSEA: ICD-10-CM

## 2023-03-02 PROCEDURE — 87102 FUNGUS ISOLATION CULTURE: CPT | Mod: 90 | Performed by: PATHOLOGY

## 2023-03-02 PROCEDURE — 99000 SPECIMEN HANDLING OFFICE-LAB: CPT | Performed by: PATHOLOGY

## 2023-03-02 PROCEDURE — 87077 CULTURE AEROBIC IDENTIFY: CPT | Mod: 90 | Performed by: PATHOLOGY

## 2023-03-02 PROCEDURE — 87070 CULTURE OTHR SPECIMN AEROBIC: CPT | Mod: 90 | Performed by: PATHOLOGY

## 2023-03-02 PROCEDURE — 99024 POSTOP FOLLOW-UP VISIT: CPT | Performed by: OTOLARYNGOLOGY

## 2023-03-02 RX ORDER — PROMETHAZINE HYDROCHLORIDE 25 MG/1
25 TABLET ORAL EVERY 6 HOURS PRN
Qty: 15 TABLET | Refills: 0 | Status: SHIPPED | OUTPATIENT
Start: 2023-03-02 | End: 2023-01-01

## 2023-03-02 RX ORDER — MUPIROCIN 20 MG/G
OINTMENT TOPICAL
Qty: 1 G | Refills: 0 | Status: SHIPPED | OUTPATIENT
Start: 2023-03-02 | End: 2023-03-12

## 2023-03-02 RX ORDER — PROMETHAZINE HYDROCHLORIDE 25 MG/1
25 SUPPOSITORY RECTAL EVERY 6 HOURS PRN
Qty: 7 SUPPOSITORY | Refills: 0 | Status: SHIPPED | OUTPATIENT
Start: 2023-03-02

## 2023-03-02 RX ORDER — SULFAMETHOXAZOLE/TRIMETHOPRIM 800-160 MG
1 TABLET ORAL 2 TIMES DAILY
Qty: 60 TABLET | Refills: 0 | Status: SHIPPED | OUTPATIENT
Start: 2023-03-02 | End: 2023-03-05

## 2023-03-02 RX ORDER — POTASSIUM CHLORIDE 750 MG/1
TABLET, EXTENDED RELEASE ORAL
COMMUNITY
Start: 2023-02-09 | End: 2023-01-01

## 2023-03-02 ASSESSMENT — PAIN SCALES - GENERAL: PAINLEVEL: SEVERE PAIN (7)

## 2023-03-02 NOTE — LETTER
3/2/2023      RE: Betsey Vanessa  [address]  [place] [address]         Otolaryngology Clinic      Name: Betsey Vanessa  MRN: [id number]  Age: 62 year old  : 2021      Chief Complaint:   Follow up    History of Present Illness:   Betsey Vanessa is a 62 year old female with a history of right BAHA and bilateral Meniere's disease s/p left labyrinthectomy who presents for follow up s/p right BAHA removal and re-insertion 2/10/23. At our last visit on 22 prior to surgery she had cultures collected from her right ear which showed no growth. She had a nurse visit follow-up after surgery and she was healing well following suture removal.     Physical examination:  Female in no acute distress.  Alert and answering questions appropriately.  HB 1/6 bilaterally. Right BAHA site showed the scalp to be erythematous with some pulling away of the scalp away from the abutment inferiorly, the area was cultured. The abutment feels solid to shake test.     Assessment and Plan:  Betsey Vanessa is a 62 year old female who presents for follow up s/p right BAHA removal and re-insertion 2/10/23. There may be an infection of the site and we will start her on Bactroban and will call with the culture results. We'll see her back in a month.      Scribe Disclosure:  I, Dalila Powell, am serving as a scribe to document services personally performed by Laura Stallings MD at this visit, based upon the provider's statements to me. All documentation has been reviewed by the aforementioned provider prior to being entered into the official medical record.    The documentation recorded by the scribe accurately reflects the services I personally performed and the decisions made by me.      Laura Stallings MD

## 2023-03-02 NOTE — Clinical Note
3/2/2023       RE: Betsey Vanessa  [address]  [facility] [address]     Dear Colleague,    Thank you for referring your patient, Betsey Vanessa, to the [facility] EAR NOSE AND THROAT CLINIC [place] at [facility]. Please see a copy of my visit note below.      Otolaryngology Clinic      Name: Betsey Vanessa  MRN: [id number]  Age: 62 year old  : 2021      Chief Complaint:   Follow up    History of Present Illness:   Betsey Vanessa is a 62 year old female with a history of right BAHA and right Meniere's disease s/p left labyrinthectomy who presents for follow up s/p right BAHA removal and re-insertion 2/10/23. At our last visit on 22 prior to surgery she had cultures collected from her right ear which showed no growth. She had a nurse visit follow-up after surgery and she was healing well following suture removal.     Physical Exam:   LMP  (LMP Unknown)      Physical examination:  Female in no acute distress.  Alert and answering questions appropriately.  HB 1/6 bilaterally.  {RIGHT, LEFT-INITIAL CAP:5607} ears examined under the microscope.     Assessment and Plan:  Betsey Vanessa is a 62 year old female who presents for follow up s/p right BAHA removal and re-insertion 2/10/23      Follow-up: No follow-ups on file.       Scribe Disclosure:  Dalila PHILIP, am serving as a scribe to document services personally performed by Laura Stallings MD at this visit, based upon the provider's statements to me. All documentation has been reviewed by the aforementioned provider prior to being entered into the official medical record. ***    Scribe Preparation Attestation:  Dalila PHILIP, zain scribe, prepared the chart for today's encounter. ***        Again, thank you for allowing me to participate in the care of your patient.      Sincerely,    Laura Stallings MD

## 2023-03-02 NOTE — PATIENT INSTRUCTIONS
You were seen in the ENT Clinic today by Dr. Stallings. If you have any questions or concerns after your appointment, please contact us (see below)      2.   Please return to clinic in one month.  3.   We will plan to call with the results of the culture.        How to Contact Us:  Send a Nagi message to your provider. Our team will respond to you via Nagi. Occasionally, we will need to call you to get further information.  For urgent matters (Monday-Friday), call the ENT Clinic: [phone] and speak with a call center team member - they will route your call appropriately.   If you'd like to speak directly with a nurse, please find our contact information below. We do our best to check voicemail frequently throughout the day, and will work to call you back within 1-2 days. For urgent matters, please use the general clinic phone numbers listed above.      Ofe BORJAS RN  ENT RN Care Coordinator  Direct: [phone]    Ni PORRAS LPN  Direct: [phone]

## 2023-03-04 LAB — BACTERIA SPEC CULT: ABNORMAL

## 2023-03-05 RX ORDER — CIPROFLOXACIN 500 MG/1
500 TABLET, FILM COATED ORAL 2 TIMES DAILY
Qty: 14 TABLET | Refills: 0 | Status: SHIPPED | OUTPATIENT
Start: 2023-03-05 | End: 2023-03-12

## 2023-03-13 DIAGNOSIS — J30.89 PERENNIAL ALLERGIC RHINITIS: ICD-10-CM

## 2023-03-13 RX ORDER — FLUTICASONE PROPIONATE 50 MCG
SPRAY, SUSPENSION (ML) NASAL
Qty: 16 G | Refills: 0 | Status: SHIPPED | OUTPATIENT
Start: 2023-03-13 | End: 2023-01-01

## 2023-03-30 NOTE — TELEPHONE ENCOUNTER
[facility] Call Center    Phone Message    May a detailed message be left on voicemail: yes     Reason for Call: Medication Refill Request    Has the patient contacted the pharmacy for the refill? Yes   Name of medication being requested: HYDROcodone-acetaminophen (NORCO) 7.5-325 MG per tablet  fentaNYL (DURAGESIC) 12 mcg/hr 72 hr patch  Provider who prescribed the medication: Ronda Vasquez  Pharmacy: [facility] [address] [place] [address] [id number]  Date medication is needed: 4/3/23        Action Taken: Message routed to:  Other: [facility]    Travel Screening: Not Applicable

## 2023-03-30 NOTE — TELEPHONE ENCOUNTER
Received call from patient requesting refill(s) of  HYDROcodone-acetaminophen (NORCO) 7.5-325 MG per tablet  and  fentaNYL (DURAGESIC) 12 mcg/hr 72 hr patch    Last dispensed from pharmacy on 02/22/23-[place]  03/01/23-Fentanyl    Patient's last office/virtual visit by prescribing provider on 02/22/23  Next office/virtual appointment scheduled for 04/13/23    Last urine drug screen date 08/11/22  Current opioid agreement on file (completed within the last year) Yes Date of opioid agreement: 08/11/22    E-pres[facility] pharmacy[facility] [address] [facility] [place] [facility] [address] [address]    Will route to nursing [place] for review and preparation of prescription(s).

## 2023-03-31 NOTE — TELEPHONE ENCOUNTER
Medication refill information reviewed.      last due:    Fentanyl: Fill 2/25/23, start 2/27/23 to last 30 days  Picked up on 3/1/23  Norco: processed on 2/22/23.    Per Ronda Vasquez, VIDHI, APRN, AGNP-C in 2/22 visit: Continue fentanyl as prescribed.  TEMPORARY increase in hydrocodone to 2-3 tabs per day, #75 tabs to last 30 days until she can resume Orencia injections for RA or she decides to transition to buprenorphine. Refills sent to pharmacy.    Due date:    Fentanyl: 3/31/23  Norco: anytime      Prescriptions prepped for review.     Sanjuanita RN-BSN  [facility]

## 2023-03-31 NOTE — TELEPHONE ENCOUNTER
Chart reviewed - request appears appropriate. Refilled.     Ronda Vasquez DNP, APRN, AGNP-C  [facility]

## 2023-04-04 NOTE — PROGRESS NOTES
"  Otolaryngology Clinic      Name: Betsey Vanessa  MRN: [id number]  Age: 62 year old  : 2021      Chief Complaint:   Follow up    History of Present Illness:   Betsey Vanessa is a 62 year old female with a history of right BAHA and bilateral Meniere's disease s/p left labyrinthectomy who presents for follow up s/p right BAHA removal and re-insertion 2/10/23. At our last visit on 3/2/2023, there was a possible infection of the surgical site, so I obtained culture and started her on Bactroban. After the visit, she was started on Ciprofloxacin oral as she grew turicella otidis.    Today, she reports that she has been using Bactroban ointment at BAHA abutment site and has avoided brushing in that area. It is less sore than it was previously.    Physical Exam:   /60 (BP Location: Left arm, Patient Position: Sitting, Cuff Size: Adult Regular)   Pulse 88   Temp 98.6  F (37  C)   Ht 1.588 m (5' 2.52\")   Wt 75.8 kg (167 lb)   LMP  (LMP Unknown)   SpO2 100%   BMI 30.04 kg/m       Female in no acute distress.  Alert and answering questions appropriately.  HB 1/6 bilaterally. Scalp is healthy around abutment. Much less erythema. Slight tenderness. No more granulation tissue. Skin is tight against abutment. Abutment is solid to shake test.    Cultures:  Fungal or yeast 3/2/23: No growth  Aerobic Bacterial 3/2/23: 2+ Turicella otitidis     Assessment and Plan:  Betsey Vanessa is a 62 year old female with a history of right BAHA and bilateral Meniere's disease s/p left labyrinthectomy who presents for follow up s/p right BAHA removal and re-insertion 2/10/23. The abutment site is improved and the infection is resolved. She can start brushing once daily. We will defer ear exam until she stops wearing her hearing aid as she always develops moisture and often an ear infection with right hearing aid wearing. She is scheduled for processor fitting in May. We will coordinate her follow-up with that " appointment.      Scribe Disclosure:  I, John Goodwin, am serving as a scribe to document services personally performed by Laura Stallings MD at this visit, based upon the provider's statements to me. All documentation has been reviewed by the aforementioned provider prior to being entered into the official medical record.    The documentation recorded by the scribe accurately reflects the services I personally performed and the decisions made by me.

## 2023-04-06 NOTE — PROGRESS NOTES
Spoke briefly with patient and her daughter in the lobby. Reviewed that with the location of the patient's new BAHA abutment, there is no easy place for her to wear the softband where it would not risk hitting her new abutment that is still in the healing phase and was recently treated for an infection. The softband could be worn around the top of the head as previously the softband would slip even if positioned in front of the ear. They expressed understanding and will defer being fit until the device can be fit on the abutment which is scheduled for May.    Alee Lutz  Audiologist  [place] License  #[id number]

## 2023-04-06 NOTE — LETTER
"2023       RE: Betsey Vanessa  [address]  [facility] [address]     Dear Colleague,    Thank you for referring your patient, Betsey Vanessa, to the [facility] EAR NOSE AND THROAT CLINIC [place] at [facility]. Please see a copy of my visit note below.      Otolaryngology Clinic      Name: Betsey Vanessa  MRN: [id number]  Age: 62 year old  : 2021      Chief Complaint:   Follow up    History of Present Illness:   Betsey Vanessa is a 62 year old female with a history of right BAHA and bilateral Meniere's disease s/p left labyrinthectomy who presents for follow up s/p right BAHA removal and re-insertion 2/10/23. At our last visit on 3/2/2023, there was a possible infection of the surgical site, so I obtained culture and started her on Bactroban. After the visit, she was started on Ciprofloxacin oral as she grew turicella otidis.    Today, she reports that she has been using Bactroban ointment at BAHA abutment site and has avoided brushing in that area. It is less sore than it was previously.    Physical Exam:   /60 (BP Location: Left arm, Patient Position: Sitting, Cuff Size: Adult Regular)   Pulse 88   Temp 98.6  F (37  C)   Ht 1.588 m (5' 2.52\")   Wt 75.8 kg (167 lb)   LMP  (LMP Unknown)   SpO2 100%   BMI 30.04 kg/m       Female in no acute distress.  Alert and answering questions appropriately.  HB 1/6 bilaterally. Scalp is healthy around abutment. Much less erythema. Slight tenderness. No more granulation tissue. Skin is tight against abutment. Abutment is solid to shake test.    Cultures:  Fungal or yeast 3/2/23: No growth  Aerobic Bacterial 3/2/23: 2+ Turicella otitidis     Assessment and Plan:  Betsey Vanessa is a 62 year old female with a history of right BAHA and bilateral Meniere's disease s/p left labyrinthectomy who presents for follow up s/p right BAHA removal and re-insertion 2/10/23. The abutment site is improved and " the infection is resolved. She can start brushing once daily. We will defer ear exam until she stops wearing her hearing aid as she always develops moisture and often an ear infection with right hearing aid wearing. She is scheduled for processor fitting in May. We will coordinate her follow-up with that appointment.      Scribe Disclosure:  I, John Goodwin, am serving as a scribe to document services personally performed by Laura Stallings MD at this visit, based upon the provider's statements to me. All documentation has been reviewed by the aforementioned provider prior to being entered into the official medical record.    The documentation recorded by the scribe accurately reflects the services I personally performed and the decisions made by me.      Again, thank you for allowing me to participate in the care of your patient.      Sincerely,    Laura Stallings MD

## 2023-04-06 NOTE — PATIENT INSTRUCTIONS
You were seen in the ENT Clinic today by Dr. Stallings.  If you have any questions or concerns after your appointment, please call   - Option 1: ENT Clinic: [phone]   - Option 2: Ni (Dr. Stallings's Nurse): [phone]                   Ofe (Dr. Stallings's Nurse): [phone]      2.   Plan to return to clinic on May 12th with Dr. Stallings before audiogram.        How to Contact Us:  Send a Feidee message to your provider. Our team will respond to you via Feidee. Occasionally, we will need to call you to get further information.  For urgent matters (Monday-Friday), call the ENT Clinic: [phone] and speak with a call center team member - they will route your call appropriately.   If you'd like to speak directly with a nurse, please find our contact information below. We do our best to check voicemail frequently throughout the day, and will work to call you back within 1-2 days. For urgent matters, please use the general clinic phone numbers listed above.

## 2023-04-13 NOTE — PATIENT INSTRUCTIONS
1.  Pain Physical Therapy:  Continue home exercise program as tolerated, walking and yoga               2.  Pain Psychologist to address relaxation, behavioral change, coping style, and other factors important to improvement.  NO - she is coping well with chronic pain at this time. May consider in the future if needed.               3.  Diagnostic Studies:  None               4.  Medication Management:   Change tylenol 500-1000 mg to twice daily as needed. She is not certain of benefit, recommend transitioning from scheduled to PRN and monitor for Discussed daily max dosage, encouraged her to keep below 3000 mg from all sources (Norco included).  Last hepatic panel within last 12 months was WNL.   Continue Senna S twice daily for constipation. Use miralax 1-2 times daily as needed. Constipation has improved, achieving daily BM.   Continue fentanyl as prescribed.  TEMPORARY increase in hydrocodone to 2-3 tabs per day, #75 tabs to last 30 days until she can resume Orencia injections for RA or she decides to transition to buprenorphine. Refills sent to pharmacy.  Keep naloxone on hand at home in the event of accidental overdose.    Continue medical cannabis. She will let me know if renewal needed in between visits.   We will consider buprenorphine in the future. We discussed this again at last visit, and I reached out to Dr. Mendoza in between visits for recommendations on transition from fentanyl patch  - You need to discontinue the patch and replace it with oral opioid.  For a 12mcg patch I would give an extra 30mg of norco a day to use PRN to help with withdrawal.  After one week of this discontinue ALL norco for 12 hours and start her on suboxone 2mg TID.              5.  Urine toxicology screen - completed on 8/11/22.               6.  Opioid agreement - completed on 8/11/22.               7. Procedures - We have been deferring repeat lumbar facet joint injections due to ongoing fungal infection, though this has  since resolved. We will hold off on ordering repeat procedure until after surgery. She had surgery on 2/10/23, will revisit injections at April follow up.               8.  Follow up with ANIBAL Frank CNP in 8 weeks, video visit is okay.     ----------------------------------------------------------------  Clinic Number:  [phone]   Call with any questions about your care and for scheduling assistance.   Calls are returned Monday through Friday between 8 AM and 4:30 PM. We usually get back to you within 2 business days depending on the issue/request.    If we are prescribing your medications:  For opioid medication refills, call the clinic or send a ContraFect message 7 days in advance.  Please include:  Name of requested medication  Name of the pharmacy.  For non-opioid medications, call your pharmacy directly to request a refill. Please allow 3-4 days to be processed.   Per [place] State Law:  All controlled substance prescriptions must be filled within 30 days of being written.    For those controlled substances allowing refills, pickup must occur within 30 days of last fill.      We believe regular attendance is key to your success in our program!    Any time you are unable to keep your appointment we ask that you call us at least 24 hours in advance to cancel.This will allow us to offer the appointment time to another patient.   Multiple missed appointments may lead to dismissal from the clinic.

## 2023-04-13 NOTE — PROGRESS NOTES
[facility] Pain Management     Date of visit: 4/13/2023      Assessment:   Betsey Vanessa is a 62 year old female with a past medical history significant for RA, fibromyalgia, polyarticular arthritis, HTN, Meniere's disease, depression, migraine who presents with complaints of widespread pain.      1. Chronic pain syndrome - Onset of back pain occurred many years ago, and she has pain in multiple joints. Etiology of pain is associated with multiple factors, including but not limited to, lumbar spondylosis, lumbar facet arthropathy, rheumatoid arthritis, and myofascial component.    Visit Diagnoses:  1. Rheumatoid arthritis of multiple sites without rheumatoid factor (H)    2. Multiple joint pain    3. Chronic pain syndrome    4. Myofascial pain    5. Therapeutic opioid induced constipation        Plan:  Diagnosis reviewed, treatment option addressed, and risk/benifits discussed.  Self-care instructions given.  I am recommending a multidisciplinary treatment plan to help this patient better manage their pain.      1.  Pain Physical Therapy:  Continue home exercise program as tolerated, walking and yoga               2.  Pain Psychologist to address relaxation, behavioral change, coping style, and other factors important to improvement.  NO - she is coping well with chronic pain at this time. May consider in the future if needed.               3.  Diagnostic Studies:  None               4.  Medication Management:   - Change tylenol 500-1000 mg to twice daily as needed. She is not certain of benefit, recommend transitioning from scheduled to PRN and monitor for Discussed daily max dosage, encouraged her to keep below 3000 mg from all sources (Norco included).  Last hepatic panel within last 12 months was WNL.   - Continue Senna S twice daily for constipation. Use miralax 1-2 times daily as needed. Constipation has improved, achieving daily BM.     Continue fentanyl as prescribed.  TEMPORARY increase in  hydrocodone to 2-3 tabs per day, #75 tabs to last 30 days until she can resume Orencia injections for RA or she decides to transition to buprenorphine. Refills sent to pharmacy.    Keep naloxone on hand at home in the event of accidental overdose.      Continue medical cannabis. She will let me know if renewal needed in between visits.     We will consider buprenorphine in the future. We discussed this again at last visit, and I reached out to Dr. Mendoza in between visits for recommendations on transition from fentanyl patch  - You need to discontinue the patch and replace it with oral opioid.  For a 12mcg patch I would give an extra 30mg of norco a day to use PRN to help with withdrawal.  After one week of this discontinue ALL norco for 12 hours and start her on suboxone 2mg TID.              5.  Urine toxicology screen - completed on 8/11/22.               6.  Opioid agreement - completed on 8/11/22.               7. Procedures - We have been deferring repeat lumbar facet joint injections due to ongoing fungal infection, though this has since resolved. We will hold off on ordering repeat procedure until after surgery. She had surgery on 2/10/23, will revisit injections at April follow up.               8.  Follow up with ANIBAL Frank CNP in 8 weeks, video visit is okay.     Review of Electronic Chart: Today I have also reviewed available medical information in the patient's medical record at [facility] ([facility]) and Care Everywhere (if available), including relevant provider notes, laboratory work, and imaging.     Ronda Vasquez DNP, APRN, AGNP-C  [facility] Pain Management     -------------------------------------------------    Subjective:    Chief complaint:   Chief Complaint   Patient presents with     Pain     Pain Management       Interval history:  Betsey Vanessa is a 62 year old female last seen on 2/22/23.  They are a patient of mine seen in follow up.     Recommendations/plan at the last  visit included:  1.  Pain Physical Therapy:  Continue home exercise program as tolerated, walking and yoga               2.  Pain Psychologist to address relaxation, behavioral change, coping style, and other factors important to improvement.  NO - she is coping well with chronic pain at this time. May consider in the future if needed.               3.  Diagnostic Studies:  None               4.  Medication Management:   - Change tylenol 500-1000 mg to twice daily as needed. She is not certain of benefit, recommend transitioning from scheduled to PRN and monitor for Discussed daily max dosage, encouraged her to keep below 3000 mg from all sources (Norco included).  Last hepatic panel within last 12 months was WNL.   - Continue Senna S twice daily for constipation. Use miralax 1-2 times daily as needed. Constipation has improved, achieving daily BM.     Continue fentanyl as prescribed.  TEMPORARY increase in hydrocodone to 2-3 tabs per day, #75 tabs to last 30 days until she can resume Orencia injections for RA or she decides to transition to buprenorphine. Refills sent to pharmacy.    Keep naloxone on hand at home in the event of accidental overdose.      Continue medical cannabis.     We will consider buprenorphine in the future. We discussed this again at last visit, and I reached out to Dr. Mendoza in between visits for recommendations on transition from fentanyl patch  - You need to discontinue the patch and replace it with oral opioid.  For a 12mcg patch I would give an extra 30mg of norco a day to use PRN to help with withdrawal.  After one week of this discontinue ALL norco for 12 hours and start her on suboxone 2mg TID.              5.  Urine toxicology screen - completed on 8/11/22.               6.  Opioid agreement - completed on 8/11/22.               7. Procedures - We have been deferring repeat lumbar facet joint injections due to ongoing fungal infection, though this has since resolved. We will hold  "off on ordering repeat procedure until after surgery. She had surgery on 2/10/23, will revisit injections at April follow up.               8.  Follow up with ANIBAL Frank CNP in 8-10 weeks for in person visit.     Since her last visit, Betsey Vanessa reports:  -her pain is about the same as it was at last visit.   -Her ear infection has resolved but she will not be resuming the Orencia injections until sometime around June.  -She has follow up with rheum next and will have better idea about resuming RA tx.   -She is looking forward to follow up.   -Norco was increased at last visit to target pain until she resumes RA tx.   -She has appreciated the additional 1 tab per day.   -Denies side effects from medications.     Pain Information:   Pain rating: averages 6/10 on a 0-10 scale.      Interval history from last visit on 2/22/23:  -Her pain is worse than it was since last visit.   -She added in scheduled tylenol that has not been very helpful.  -She continues to have significant pain.   -She takes Norco 7.5 mg, 1/2-1 tab q6h PRN, #45 tabs per month.   -She is asking if she can take additional tylenol.      Interval history from last visit on 1/10/23:  -Her pain is about the same as last visit.   -Pain is widespread in joints, they feel warm and swollen.   -She reports her fungal infection in her ear has resolved.    -Per chart review, last fungal swab on file from 12/8/22 negative.  -She is still off Orencia right now.  -She cannot restart this med for 3-6 months after surgery.   -Rheumatology suggested she try adding Tylenol.   -She is open to trial of this scheduled throughout the day.   -She is struggling with constipation.   -She is taking miralax at home PRN, has not been as helpful.   -She is interested in other medications to help with OIC.      Interval history from last visit on 10/11/22:  -Pain has increased since last visit, \"pain has been nasty\" lately.   -She is still dealing with fungal infection, " has follow up 10/27/22. Does not feel like infection is getting better and has   -She is still struggling with dizziness and nausea r/t fungal infection in ear.   -She has missed Orencia injection that she is using to tx RA due to fungal infection.   -She is interested in repeat lumbar facet injections with steroid, will need to check with ENT first.   -Medical cannabis:                          1. Charleen SL spray (1:1 CBD and THC) - use 1-2 sprays up to 5 times daily PRN; uses this on her bad days, but often takes at bedtime, takes for short acting                           2. Charleen oil tincture for long acting       Current Pain Treatments:    -Voltaren gel 1% PRN (somewhat helpful)  -Medical cannabis PRN (helpful)  -Celebrex 100mg BID (helpful)  -Fentanyl 12mcg/hr transdermal patch Q 72 hours (helpful)  -Norco 7.5/325mg take 0.5-1 tab Q 6 hours PRN pain (helpful, has been using this more)  -plaquenil 200mg every day and 400mg every other day  -hydroxyzine 25mg TID PRN severe itching (helpful, but doesn't need to use often)     Current MME: 40 (daily average with fentanyl patch and 1.5 tabs Norco)     Review of [place] Prescription Monitoring Program (): No concern for abuse or misuse of controlled medications based on this report. Reviewed  - appears appropriate.      Annual Controlled Substance Agreement/UDS due date: Completed on 8/11/22     Past pain treatments:  Injections:   -10/12/2020 bilateral L4-5, L5-S1 facet joint injections with Dr. Bailee Houser  (quite helpful)  -5/12/2021 bilateral L4-5, L5-S1 facet joint injections with Dr. Bailee Houser  (quite helpful)  -7/5/22 bilateral L4-5, L5-S1 facet joint injections with Dr. Lion       Medications:  Current Outpatient Medications   Medication Sig Dispense Refill     alcohol swab prep pads Use to swab area of injection/rosie as directed 100 each 3     blood glucose (NO BRAND SPECIFIED) test strip Use to test blood sugar 2 times daily or as  directed. To accompany: Blood Glucose Monitor Brands: per insurance. 200 strip 11     blood glucose calibration (NO BRAND SPECIFIED) solution Use to calibrate blood glucose monitor as needed as directed. To accompany: Blood Glucose Monitor Brands: per insurance. 1 Bottle 3     celecoxib (CELEBREX) 100 MG capsule Take 1 capsule (100 mg) by mouth 2 times daily 180 capsule 2     cetirizine (ZYRTEC) 10 MG tablet Take 1 tablet (10 mg) by mouth daily 90 tablet 3     cevimeline (EVOXAC) 30 MG capsule Take 1 capsule (30 mg) by mouth 3 times daily 270 capsule 2     clonazePAM (KLONOPIN) 0.5 MG tablet TAKE 1 TABLET BY MOUTH TWICE DAILY AS NEEDED FOR ANXIETY OK TO TAKE 1 TO 2 AT NIGHT AS NEEDED FOR SLEEP. SHOULD LAST ABOUT 90 DAYS 100 tablet 1     diclofenac (VOLTAREN) 1 % topical gel Place 2-4 g onto the skin 4 times daily . Max of 8g per dose. No more than 32g/day 100 g 3     docusate sodium (EQ STOOL SOFTENER) 100 MG capsule Take 1 capsule (100 mg) by mouth 2 times daily 180 capsule 3     EPINEPHrine (ANY BX GENERIC EQUIV) 0.3 MG/0.3ML injection 2-pack Inject 0.3 mLs (0.3 mg) into the muscle as needed for anaphylaxis 2 each 0     escitalopram (LEXAPRO) 20 MG tablet Take 1.5 tablets (30 mg) by mouth every evening - dose increase 1/24/23 135 tablet 0     fentaNYL (DURAGESIC) 12 mcg/hr 72 hr patch Place 1 patch onto the skin every 72 hours remove old patch. Fill 4/28/23, start 4/30/23 to last 30 days 10 patch 0     fluorometholone (FML LIQUIFILM) 0.1 % ophthalmic suspension INSTILL 1 DROP INTO EACH EYE IN THE MORNING       fluticasone (FLONASE) 50 MCG/ACT nasal spray USE 1 TO 2 SPRAY(S) IN EACH NOSTRIL ONCE DAILY 16 g 0     fluvastatin (LESCOL) 20 MG capsule Take 1 capsule (20 mg) by mouth At Bedtime 90 capsule 3     glimepiride (AMARYL) 2 MG tablet Take 1 tablet (2 mg) by mouth every morning (before breakfast) 90 tablet 1     glucose blood VI test strips strip daily       HYDROcodone-acetaminophen (NORCO) 7.5-325 MG per  tablet Take 0.5-1 tablets by mouth every 6 hours as needed for severe pain Max 3 tabs/day, #75 tablets to last 30 days. Fill 4/28/23, start 4/30/23 75 tablet 0     hydroxychloroquine (PLAQUENIL) 200 MG tablet Hydroxychloroquine 200mg daily; and an additional 200mg every other day. Yearly eye exam including 10-2 VF and SD- tablet 2     hydrOXYzine (ATARAX) 25 MG tablet Take 1 tablet (25 mg) by mouth 3 times daily as needed for itching 40 tablet 0     lifitegrast (XIIDRA) 5 % opthalmic solution Place 1 drop into both eyes 2 times daily 30 each 6     magnesium 250 MG tablet Take 1 tablet by mouth daily       medical cannabis (Patient's own supply) Take 1 Dose by mouth See Admin Instructions (The purpose of this order is to document that the patient reports taking medical cannabis.  This is not a prescription, and is not used to certify that the patient has a qualifying medical condition.)       metFORMIN (GLUCOPHAGE) 500 MG tablet TAKE 2 TABLETS BY MOUTH TWICE DAILY WITH MEALS 360 tablet 0     metroNIDAZOLE (METROGEL) 0.75 % external gel Apply topically 2 times daily Apply to face daily 45 g 5     naloxone (NARCAN) 4 MG/0.1ML nasal spray Spray 1 spray (4 mg) into one nostril alternating nostrils as needed for opioid reversal every 2-3 minutes until assistance arrives 0.2 mL 0     nystatin (MYCOSTATIN) 948578 UNIT/GM external powder Apply topically 2 times daily as needed 2 x daily PRN for rash 60 g 4     omeprazole (PRILOSEC) 20 MG DR capsule Take 1 capsule (20 mg) by mouth 2 times daily 180 capsule 3     pimecrolimus (ELIDEL) 1 % external cream Apply topically 2 times daily - use on back of neck and hands 60 g 5     potassium chloride ER (K-TAB/KLOR-CON) 10 MEQ CR tablet TAKE 1 BY MOUTH TWICE DAILY       potassium chloride ER (KLOR-CON M) 10 MEQ CR tablet Take 1 tablet (10 mEq) by mouth 2 times daily (Patient taking differently: Take 10 mEq by mouth daily) 180 tablet 3     PROAIR  (90 Base) MCG/ACT  inhaler Inhale 1-2 puffs into the lungs every 4 hours as needed for shortness of breath / dyspnea or wheezing 18 g 5     promethazine (PHENERGAN) 25 MG suppository Place 1 suppository (25 mg) rectally every 6 hours as needed for nausea 7 suppository 0     promethazine (PHENERGAN) 25 MG tablet Take 1 tablet (25 mg) by mouth every 6 hours as needed for nausea 15 tablet 0     promethazine (PHENERGAN) 25 MG tablet Take 1 tablet (25 mg) by mouth every 6 hours as needed for nausea or other (Meniere's) For nusea 90 tablet 0     psyllium (METAMUCIL) 58.6 % POWD Take by mouth daily PRN       SENNA-docusate sodium (SENNA S) 8.6-50 MG tablet Take 1 tablet by mouth 2 times daily 60 tablet 1     thin (NO BRAND SPECIFIED) lancets Use to test blood sugar 2 times daily or as directed. To accompany: Blood Glucose Monitor Brands: per insurance. 200 each 11     triamterene-HCTZ (DYAZIDE) 37.5-25 MG capsule Take 1 capsule by mouth daily 90 capsule 3     VITAMIN D3 50 MCG (2000 UT) tablet Take 2 tablets by mouth daily at 2 pm       acetaminophen (TYLENOL) 500 MG tablet Take 500-1000 mg BID, may take additional 500-1000 mg dose daily as needed, max 4000 mg from all sources. (Patient not taking: Reported on 2/28/2023) 180 tablet 1       Medical History: any changes in medical history since they were last seen? No      Objective:    Physical Exam:  Blood pressure 131/67, pulse 89, SpO2 97 %, not currently breastfeeding.  Constitutional: Well developed, well nourished, appears stated age.  Gait: ambulates with cane.   HEENT: Head atraumatic, normocephalic. Eyes without conjunctival injection or jaundice. Oropharynx clear. Neck supple. No obvious neck masses.  Skin: No rash, lesions, or petechiae of exposed skin.   Psychiatric/mental status: Alert, without lethargy or stupor. Speech fluent. Appropriate affect. Mood normal. Able to follow commands without difficulty.     Diagnostic Tests/Imaging/Labs:  None     BILLING TIME DOCUMENTATION:    The total TIME spent on this patient on the date of the encounter/appointment was 20 minutes.      TOTAL TIME includes:   Time spent preparing to see the patient (reviewing records and tests)   Time spent face to face (or over the phone) with the patient   Time spent ordering tests, medications, procedures and referrals   Time spent Referring and communicating with other healthcare professionals   Time spent documenting clinical information in Epic

## 2023-04-13 NOTE — PROGRESS NOTES
Patient presents to the clinic today for a follow up with ANIBAL Frank CNP            8/11/2022     2:55 PM 8/19/2022     7:13 AM 4/13/2023     3:19 PM   PEG Score   PEG Total Score 5 5 6       UDS/CSA    Medications    QUESTIONS:    Kathy SHARIF [facility] [place] [facility]

## 2023-05-11 NOTE — LETTER
5/11/2023      RE: Betsey Vanessa  [address]  [place] [address]       Betsey Vanessa is seen for a right BAHA and ear check. She was placed on Bactroban and ciprofloxacin in March for what appeared to be a skin infection around the BAHA abutment although culture grew only turicella. The area was better last month. She reports that she has been brushing it daily and there is no pain or crusting or drainage around the site. She continues to wear the right hearing aid although does not report any pain in the right ear or drainage.    Physical examination:  female in no acute distress.  Alert and answering questions appropriately.  HB 1/6 bilaterally.  Right ear examined under the microscope. The ear canal is dry and healthy, TM with a stable perforation and only a small amount of mucosalization on the TM, middle ear healthy, the mucosalized area was cultured. The BAHA abutment is solid and the skin healthy around it with no crusting.  Please note the microscope was needed to examine the right ear due to her history of recurrent infections and mucosalization and is not related to her recent surgery.    Assessment and plan:  Surprisingly healthy right ear despite having worn a hearing aid in it for the last 3 months. She and her daughter are pleased and we will call with the culture results. She is cleared for processor loading today and we'll see her back in 3 months.        Laura Stallings MD

## 2023-05-11 NOTE — PATIENT INSTRUCTIONS
1. You were seen in the ENT Clinic today by Dr. Stallings.  If you have any questions or concerns after your appointment, please call   - Option 1: ENT Clinic: [phone]   - Option 2: Ni (Dr. Stallings's Nurse): [phone]          Ofe (Dr. Stallings's Nurse): [phone]     2.   Plan to return to clinic in 6 months    3. Right ear culture- will call with results    How to Contact Us:  Send a InStaff message to your provider. Our team will respond to you via InStaff. Occasionally, we will need to call you to get further information.  For urgent matters (Monday-Friday), call the ENT Clinic: [phone] and speak with a call center team member - they will route your call appropriately.   If you'd like to speak directly with a nurse, please find our contact information below. We do our best to check voicemail frequently throughout the day, and will work to call you back within 1-2 days. For urgent matters, please use the general clinic phone numbers listed above.        Ni GILES LPN  [facility] - Otolaryngology

## 2023-05-11 NOTE — NURSING NOTE
"Chief Complaint   Patient presents with     RECHECK     Follow up     Blood pressure 113/62, pulse 98, temperature 97.5  F (36.4  C), height 1.575 m (5' 2\"), weight 75.3 kg (166 lb), SpO2 95 %, not currently breastfeeding.    Leonides Cook LPN    "

## 2023-05-11 NOTE — PROGRESS NOTES
AUDIOLOGY REPORT    SUBJECTIVE: Betsey Vanessa is a 63 year old female who was seen in the Audiology Clinic at the [facility] [place] [facility] for a fitting of a right [employer] Ponto 5SP on an abutment. Previous results have revealed a complete loss of hearing in left ear (profound sensorineural hearing loss) and a mixed right hearing loss.   She was implanted with a right [employer] BAHA in [place], [place] but has had extensive issues regarding feedback of the device. She was never able to wear her previous device easily and so had surgery on 2/10/2023 to remove the [employer]chle[employer] abutment and have a new Oticon Medical Abutment place.  Another audiologist, Tamanna Bland, was present and observed today's appointment.    OBJECTIVE: A shake test was performed and the abutment was found to be secure.  The abutment site looked healthy with no drainage, redness, or swelling noted.   In-Situ bone conduction was performed and the device was programmed to those settings.  Betsey was oriented to proper hearing aid use, care, cleaning (no water, dry brush), batteries (size: BATTERY SIZE: 675, insertion/removal, toxicity, low-battery signal), aid insertion/removal, user booklet, warranty information, storage cases, and other hearing aid details. The patient confirmed understanding of hearing device use and care, and showed proper insertion of hearing aid and batteries while in the office today.Betsey reported good volume and sound quality today.   Hearing aids were programmed as follows:  Program 1:Universal  Toggle is active for volume and this was reviewed with  The patient.    EAR(S) FIT: Right  HEARING AID MODEL NAME: O[employer] Medical Ponto 5SP  HEARING AID STYLE: BAHA  SERIAL NUMBERS: Right: [id number]   The hearing device was paired with her Podotree and the Android phone micho[place]ano. A brief review was performed and she she expressed understanding.    ASSESSMENT: A right bone anchored hearing aid  was fit today. Verification measures were performed. Betsey signed the Hearing Aid Purchase Agreement and was given a copy, as well as details on her hearing aids.    PLAN:Betsey will return for follow-up in 2-3 weeks for a hearing aid review appointment. Please call this clinic with questions regarding today s appointment.    Alee Lutz  Audiologist  [place] License  #[id number]

## 2023-05-14 NOTE — PROGRESS NOTES
Betsey Vanessa is seen for a right BAHA and ear check. She was placed on Bactroban and ciprofloxacin in March for what appeared to be a skin infection around the BAHA abutment although culture grew only turicella. The area was better last month. She reports that she has been brushing it daily and there is no pain or crusting or drainage around the site. She continues to wear the right hearing aid although does not report any pain in the right ear or drainage.    Physical examination:  female in no acute distress.  Alert and answering questions appropriately.  HB 1/6 bilaterally.  Right ear examined under the microscope. The ear canal is dry and healthy, TM with a stable perforation and only a small amount of mucosalization on the TM, middle ear healthy, the mucosalized area was cultured. The BAHA abutment is solid and the skin healthy around it with no crusting.  Please note the microscope was needed to examine the right ear due to her history of recurrent infections and mucosalization and is not related to her recent surgery.    Assessment and plan:  Surprisingly healthy right ear despite having worn a hearing aid in it for the last 3 months. She and her daughter are pleased and we will call with the culture results. She is cleared for processor loading today and we'll see her back in 3 months.

## 2023-05-18 NOTE — PATIENT INSTRUCTIONS
Please call to schedule the bone density scan (DEXA)-[phone]    Please call to schedule the reclast infusion    RHEUMATOLOGY    [address]  [place] [address]    Phone number: [phone]  Fax number: [phone]      Thank you for choosing [facility]!    LEIDY Carr RN 5/18/2023 3:20 PM

## 2023-05-18 NOTE — PROGRESS NOTES
"  Rheumatology Clinic Visit      Betsey Vanessa MRN# [id number]   YOB: 1960 Age: 63 year old      Date of visit: 5/18/23   PCP: Katerina Judd     Chief Complaint   Patient presents with:  Rheumatoid arthritis, involving unspecified site, unspecifi    Assessment and Plan     1.  Rheumatoid arthritis: Reportedly diagnosed \"many years ago\", when she first tablet care with me in 2020.  Previously treated by Dr. Stacy at [facility] in [place], [place]. Previously on methotrexate + rituximab, Enbrel + leflunomide; had elevated liver enzymes preventing oral DMARDs; then with Actemra 8 mg/kg IV monthly that was effective but was found to be associated with thrombocytopenia and the patient reported not much benefit from medication; Orencia were IV was effective but then held because of a fungal infection of the ear and RA continues to be well controlled.   ALEGRIA hx prevents several oral DMARDs and LFT elevations with MTX in the past.  Using Celebrex 100 mg twice daily.  Hydroxychloroquine requires an eye exam and this is overdue.  No synovitis on exam today.  RA is controlled.  Exam is consistent with fibromyalgia.  Chronic illness, stable.      - Continue hydroxychloroquine 200 mg daily with an additional 200 mg every other day, if eye exam is okay.  Refill by MD only.  Eye exam overdue  - Continue Celebrex 100 mg BID; did not tolerate dose reduction previously  - Ophthalmology referral for hydroxychloroquine toxicity monitoring given previously and she says that this will be completed.  This is required for future refills of hydroxychloroquine.  - Labs today: CBC, Creatinine, Hepatic Panel, ESR, CRP     2. Sjogren's Syndrome: reportedly had punctal plugs in the past and reportedly ducts are now cauterized.  Doing okay with artificial tears, Xiidra, and Evoxac.   Encouraged frequent sips of water, visits with a dentist at least every 6months, avoidance of sugary foods/drinks.  She is also " going to see ophthalmology soon.  Chronic illness, stable.     - Continue xiidra  - Continue cevimeline 30mg TID    3.  Fibromyalgia: Managed at the pain clinic.  Exam today is consistent with fibromyalgia.  Rheumatoid arthritis is controlled.  She will continue following with her pain management specialist for fibromyalgia management.    4. Osteopenia: based on 7/2019 DEXA report that she brought with her; FRAX score shows a 22% risk of major osteoporotic fracture in the next 10 years and a 1.9% risk for osteoporotic hip fracture in the next 10 years.  Based on FRAX tx is indicated.  She already received one dose of boniva in Sept 2019; and failed alendronate due to GI upset.  Reclast received 2/13/2020, 3/16/2021, 3/30/22; plan to receive once more in 2023 before changing to prolia or taking a drug holiday.   Need updated DEXA.   Chronic illness, stable.    - Continue calcium 1000mg daily  - Continue vitamin D 4000 IU daily  - Continue Reclast yearly, next due on or shortly after 3/30/2023; advised that she call to schedule so that I do not get scammed  - DEXA ordered previously and advised again today that she call to schedule    5.  Vaccinations: Vaccinations reviewed with Ms. Vanessa.  Risks and benefits of vaccinations were discussed.    - Influenza: up to date  - [id number]: up to date  - [id number]: up to date  - Shingrix: 1 dose received she had a reaction so she does not want a second dose.  - COVID-19: Advised updating    Total minutes spent in evaluation with patient, documentation, , and review of pertinent studies and chart notes: 20      Ms. Vanessa verbalized agreement with and understanding of the rational for the diagnosis and treatment plan.  All questions were answered to best of my ability and the patient's satisfaction. Ms. Vanessa was advised to contact the clinic with any questions that may arise after the clinic visit.      Thank you for involving me in the care of the  patient    Return to clinic: 3-4 months    HPI   Betsey Vanessa is a 63 year old female with a past medical history significant for hypertension, depression, type 2 diabetes, Ménière's disease, and rheumatoid arthritis who is seen in consultation for follow-up of RA.     12/5/2022: Has not taken Orencia since July 2022 because of a fungal ear infection that needs to be cleared before she has ear surgery.  Feels like her joints have been somewhat stable since stopping Orencia so she questions if it needs to be restarted.  She would like to see how she does for a longer duration without Orencia.  She reports that she does have occasional pain in her back, knees, feet, ankles, and hands but this pain may occur at anytime of day, is not affected by activity, and morning stiffness is less than 20 minutes.  Xiidra and cevimeline are effective.  Hydroxychloroquine is well-tolerated and seems to be controlling arthritis well per patient.  Dry eyes and dry mouth are controlled at this time.      Today, 5/18/2023: Diffuse body pain.  No joint swelling.  Joints without increased warmth or overlying erythema.  Following with the pain clinic for fibromyalgia management.  Fungal infection of the ear has resolved, per patient.  Continues to use Xiidra and cevimeline for dry eye and dry mouth and states that these are effective.  States that she is overdue for the hydroxychloroquine toxicity monitoring eye exam.    Tobacco: Quit smoking in 1998  EtOH: None  Drugs: None    ROS   12 point review of system was completed and negative except as noted in the HPI     Active Problem List     Patient Active Problem List   Diagnosis     Type 2 diabetes mellitus with hyperglycemia, without long-term current use of insulin (H)     Hypertension goal BP (blood pressure) < 140/90     Meniere's disease, unspecified laterality     Rheumatoid arthritis, involving unspecified site, unspecified rheumatoid factor presence     Valvular heart disease      Fibromyalgia     Mild major depression (H)     Osteopenia of multiple sites     History of bisphosphonate therapy     Post-menopausal     Benzodiazepine dependence, episodic (H)     Continuous opioid dependence (H)     Mitral stenosis     Mixed conductive and sensorineural hearing loss of right ear     Polyarticular arthritis     Encounter for long-term use of opiate analgesic     Chronic pain     Tympanic membrane perforation     Myofascial pain     Migraine with vertigo     Gastroesophageal reflux disease, esophagitis presence not specified     Fungal skin infection     Anaphylaxis, sequela     Hyperlipidemia, unspecified hyperlipidemia type     Otorrhea, right     Long-term current use of bisphosphonate     Family history of thyroid disease     BONNIE (generalized anxiety disorder)     Nonrheumatic aortic valve stenosis     Past Medical History     Past Medical History:   Diagnosis Date     Anemia     r/t menses     Anxiety      Arthritis      Constipation      Depression      Depressive disorder      Diabetes (H)      Diarrhea      Double vision      Headache(784.0)      Hearing loss      Heart disease      Heart murmur      Heartburn      History of blood transfusion      Hypertension      Indigestion      Nasal congestion      Night sweats      Numbness      Problems related to lack of adequate sleep      Rash      Sneezing      Tinnitus      Uncomplicated asthma      Visual loss      Weakness      Weight gain     because of Prednisone     Past Surgical History     Past Surgical History:   Procedure Laterality Date     ------------OTHER-------------      D&C     ABDOMEN SURGERY       CARDIAC SURGERY       ENT SURGERY       GYN SURGERY       HYSTERECTOMY       IMPLANT BAHA Right 2/10/2023    Procedure: INSERTION, BONE ANCHORED HEARING AID WITH THE OTICON DEVICE;  Surgeon: Laura Stallings MD;  Location: [facility] OR     INNER EAR SURGERY       RELEASE CARPAL TUNNEL BILATERAL Bilateral 2008     Allergy     Allergies    Allergen Reactions     Duloxetine Other (See Comments)     Topiramate Other (See Comments)     Other reaction(s): Confusion     Amitriptyline Embonate [Amitriptyline]      Hyper activity     Azithromycin Hives     Duloxetine Hcl      Hyper activity     Gabapentin Hives     Macrobid [Nitrofurantoin] Hives     Paxil [Paroxetine] Other (See Comments)     Hyper behavior     Penicillins Hives     Statins Other (See Comments)     Liver function studies abnormal on Lipitor / Crestor     Tetracycline Hives     Current Medication List     Current Outpatient Medications   Medication Sig     alcohol swab prep pads Use to swab area of injection/rosie as directed     blood glucose (NO BRAND SPECIFIED) test strip Use to test blood sugar 2 times daily or as directed. To accompany: Blood Glucose Monitor Brands: per insurance.     blood glucose calibration (NO BRAND SPECIFIED) solution Use to calibrate blood glucose monitor as needed as directed. To accompany: Blood Glucose Monitor Brands: per insurance.     celecoxib (CELEBREX) 100 MG capsule Take 1 capsule (100 mg) by mouth 2 times daily     cetirizine (ZYRTEC) 10 MG tablet Take 1 tablet (10 mg) by mouth daily     cevimeline (EVOXAC) 30 MG capsule Take 1 capsule (30 mg) by mouth 3 times daily     clonazePAM (KLONOPIN) 0.5 MG tablet TAKE 1 TABLET BY MOUTH TWICE DAILY AS NEEDED FOR ANXIETY OK TO TAKE 1 TO 2 AT NIGHT AS NEEDED FOR SLEEP. SHOULD LAST ABOUT 90 DAYS     diclofenac (VOLTAREN) 1 % topical gel Place 2-4 g onto the skin 4 times daily . Max of 8g per dose. No more than 32g/day     docusate sodium (EQ STOOL SOFTENER) 100 MG capsule Take 1 capsule (100 mg) by mouth 2 times daily     EPINEPHrine (ANY BX GENERIC EQUIV) 0.3 MG/0.3ML injection 2-pack Inject 0.3 mLs (0.3 mg) into the muscle as needed for anaphylaxis     escitalopram (LEXAPRO) 20 MG tablet Take 1.5 tablets (30 mg) by mouth every evening - dose increase 1/24/23     fentaNYL (DURAGESIC) 12 mcg/hr 72 hr patch Place 1  patch onto the skin every 72 hours remove old patch. Fill 4/28/23, start 4/30/23 to last 30 days     Ferrous Gluconate 240 (27 Fe) MG TABS      fluorometholone (FML LIQUIFILM) 0.1 % ophthalmic suspension INSTILL 1 DROP INTO EACH EYE IN THE MORNING     fluticasone (FLONASE) 50 MCG/ACT nasal spray USE 1 TO 2 SPRAY(S) IN EACH NOSTRIL ONCE DAILY     fluvastatin (LESCOL) 20 MG capsule Take 1 capsule (20 mg) by mouth At Bedtime     glimepiride (AMARYL) 2 MG tablet Take 1 tablet (2 mg) by mouth every morning (before breakfast)     glucose blood VI test strips strip daily     HYDROcodone-acetaminophen (NORCO) 7.5-325 MG per tablet Take 0.5-1 tablets by mouth every 6 hours as needed for severe pain Max 3 tabs/day, #75 tablets to last 30 days. Fill 4/28/23, start 4/30/23     hydroxychloroquine (PLAQUENIL) 200 MG tablet Hydroxychloroquine 200mg daily; and an additional 200mg every other day. Yearly eye exam including 10-2 VF and SD-OCT     hydrOXYzine (ATARAX) 25 MG tablet Take 1 tablet (25 mg) by mouth 3 times daily as needed for itching     lifitegrast (XIIDRA) 5 % opthalmic solution Place 1 drop into both eyes 2 times daily     magnesium 250 MG tablet Take 1 tablet by mouth daily     medical cannabis (Patient's own supply) Take 1 Dose by mouth See Admin Instructions (The purpose of this order is to document that the patient reports taking medical cannabis.  This is not a prescription, and is not used to certify that the patient has a qualifying medical condition.)     metFORMIN (GLUCOPHAGE) 500 MG tablet TAKE 2 TABLETS BY MOUTH TWICE DAILY WITH MEALS     metroNIDAZOLE (METROGEL) 0.75 % external gel Apply topically 2 times daily Apply to face daily     naloxone (NARCAN) 4 MG/0.1ML nasal spray Spray 1 spray (4 mg) into one nostril alternating nostrils as needed for opioid reversal every 2-3 minutes until assistance arrives     nystatin (MYCOSTATIN) 298986 UNIT/GM external powder Apply topically 2 times daily as needed 2 x  daily PRN for rash     omeprazole (PRILOSEC) 20 MG DR capsule Take 1 capsule (20 mg) by mouth 2 times daily     pimecrolimus (ELIDEL) 1 % external cream Apply topically 2 times daily - use on back of neck and hands     potassium chloride ER (KLOR-CON M) 10 MEQ CR tablet Take 1 tablet (10 mEq) by mouth 2 times daily (Patient taking differently: Take 10 mEq by mouth daily)     PROAIR  (90 Base) MCG/ACT inhaler Inhale 1-2 puffs into the lungs every 4 hours as needed for shortness of breath / dyspnea or wheezing     promethazine (PHENERGAN) 25 MG suppository Place 1 suppository (25 mg) rectally every 6 hours as needed for nausea     promethazine (PHENERGAN) 25 MG tablet Take 1 tablet (25 mg) by mouth every 6 hours as needed for nausea     promethazine (PHENERGAN) 25 MG tablet Take 1 tablet (25 mg) by mouth every 6 hours as needed for nausea or other (Meniere's) For nusea     psyllium (METAMUCIL) 58.6 % POWD Take by mouth daily PRN     SENNA-docusate sodium (SENNA S) 8.6-50 MG tablet Take 1 tablet by mouth 2 times daily     thin (NO BRAND SPECIFIED) lancets Use to test blood sugar 2 times daily or as directed. To accompany: Blood Glucose Monitor Brands: per insurance.     triamterene-HCTZ (DYAZIDE) 37.5-25 MG capsule Take 1 capsule by mouth daily     VITAMIN D3 50 MCG (2000 UT) tablet Take 2 tablets by mouth daily at 2 pm     acetaminophen (TYLENOL) 500 MG tablet Take 500-1000 mg BID, may take additional 500-1000 mg dose daily as needed, max 4000 mg from all sources. (Patient not taking: Reported on 5/18/2023)     No current facility-administered medications for this visit.         Social History   See HPI    Family History     Family History   Problem Relation Age of Onset     C.A.D. Mother      Alzheimer Disease Mother      Cardiovascular Mother      Eye Disorder Mother      Thyroid Disease Mother      Osteoporosis Mother      Hypertension Other         grandmother     Cerebrovascular Disease Father       "Alcohol/Drug Father      Depression Father      Obesity Father         aunt     Asthma Father      Prostate Cancer Father      Anxiety Disorder Father      Alcohol/Drug Brother      Cancer Brother      Alcohol/Drug Sister         aunt     Cancer Sister      Allergies Other         All family members     Arthritis Other         aunt     Obesity Sister      Thyroid Disease Sister      Asthma Sister         daughter       Physical Exam     Temp Readings from Last 3 Encounters:   05/11/23 97.5  F (36.4  C)   04/06/23 98.6  F (37  C)   03/02/23 96.9  F (36.1  C)     BP Readings from Last 5 Encounters:   05/18/23 (!) 145/69   05/11/23 113/62   04/13/23 131/67   04/06/23 130/60   03/02/23 135/59     Pulse Readings from Last 1 Encounters:   05/18/23 106     Resp Readings from Last 1 Encounters:   02/10/23 16     Estimated body mass index is 30.4 kg/m  as calculated from the following:    Height as of 5/11/23: 1.575 m (5' 2\").    Weight as of this encounter: 75.4 kg (166 lb 3.2 oz).      GEN: NAD.   HEENT:  Anicteric, noninjected sclera. No obvious external lesions of the ear and nose. Hearing intact.  CV: S1, S2. RRR. No m/r/g  PULM: No increased work of breathing. CTA bilaterally   MSK: MCPs, PIPs, DIPs, wrists, elbows, shoulders, knees, ankles, and MTPs are diffusely tender to palpation but without swelling, increased warmth, or overlying erythema.  Hips tender to palpation.  Tender to palpation in the bilateral forearms, upper arms, thighs, and lower legs.  All fibromyalgia tender points positive.    SKIN: No rash or jaundice seen  PSYCH: Alert. Appropriate.         Labs / Imaging (select studies)     RF/CCP  Recent Labs   Lab Test 01/14/20  1221   CCPIGG 1   RHF 20*     CBC  Recent Labs   Lab Test 02/01/23  0738 07/18/22  1411 03/30/22  1428 09/17/21  0945 05/21/21  0954 01/19/21  1130 10/28/20  1537   WBC 6.3 7.9 6.7   < > 6.6 6.5 7.1   RBC 4.17 4.22 4.37   < > 4.41 4.46 4.56   HGB 11.0* 11.0* 11.5*   < > 11.6* 11.8 " 12.3   HCT 36.0 34.5* 34.9*   < > 35.7 36.6 37.5   MCV 86 82 80   < > 81 82 82   RDW 14.7 15.3* 14.0   < > 15.3* 14.4 13.7    174 176   < > 160 159 162   MCH 26.4* 26.1* 26.3*   < > 26.3* 26.5 27.0   MCHC 30.6* 31.9 33.0   < > 32.5 32.2 32.8   NEUTROPHIL 58 66 63   < > 53.1 65.0 66.1   LYMPH 26 23 28   < > 36.2 25.3 25.8   MONOCYTE 6 6 5   < > 7.4 5.1 5.1   EOSINOPHIL 9 4 4   < > 3.0 4.0 2.5   BASOPHIL 0 1 0   < > 0.3 0.3 0.1   ANEU  --   --   --   --  3.5 4.2 4.7   ALYM  --   --   --   --  2.4 1.6 1.8   MOISE  --   --   --   --  0.5 0.3 0.4   AEOS  --   --   --   --  0.2 0.3 0.2   ABAS  --   --   --   --  0.0 0.0 0.0   ANEUTAUTO 3.7 5.2 4.2   < >  --   --   --    ALYMPAUTO 1.6 1.8 1.9   < >  --   --   --    AMONOAUTO 0.4 0.5 0.4   < >  --   --   --    AEOSAUTO 0.6 0.3 0.2   < >  --   --   --    ABSBASO 0.0 0.0 0.0   < >  --   --   --     < > = values in this interval not displayed.     CMP  Recent Labs   Lab Test 01/24/23  0821 07/18/22  1411 03/30/22  1428 02/18/22  0657 01/14/22  0718 09/17/21  0945 05/21/21  0954 03/16/21  1130 01/19/21  1130 01/08/21  1535     --   --  137  --   --   --   --   --  135   POTASSIUM 4.2  --   --  3.8  --   --   --   --   --  4.2   CHLORIDE 103  --   --  102  --   --   --   --   --  99   CO2 26  --   --  27  --   --   --   --   --  32   ANIONGAP 9  --   --  8  --   --   --   --   --  4   *  --   --  201*  --   --   --   --   --  155*   BUN 17  --   --  19  --   --   --   --   --  16   CR 0.81 0.89 0.88 0.87 0.93   < > 0.92 0.83 0.88 0.80   GFRESTIMATED 83 74 75 76 70   < > 68 78 72 81   GFRESTBLACK  --   --   --   --   --   --  79 >90 84 >90   MIRNA 9.6  --  9.4 9.1 8.9   < >  --  10.2* 9.2 9.7   BILITOTAL  --  0.3 0.4  --  0.3   < > 0.3  --  0.4 0.5   ALBUMIN  --  3.8 3.9  --  3.9   < > 4.0  --  3.9 4.1   PROTTOTAL  --  7.3 7.4  --  7.5   < > 7.7  --  7.7 8.2   ALKPHOS  --  87 86  --  97   < > 83  --  117 100   AST  --  17 26  --  18   < > 20  --  23 23   ALT  --   26 44  --  32   < > 35  --  36 45    < > = values in this interval not displayed.     Calcium/VitaminD  Recent Labs   Lab Test 01/24/23  0821 07/18/22  1411 03/30/22  1428 02/18/22  0657 01/14/22  0718 01/14/22  0718 05/21/21  0954   MIRNA 9.6  --  9.4 9.1   < > 8.9  --    VITDT  --  39  --   --   --  34 39    < > = values in this interval not displayed.     ESR/CRP  Recent Labs   Lab Test 07/18/22  1411 03/30/22  1428 01/14/22  0718   SED 22 20 19   CRP 5.6 <2.9 5.1     Hepatitis B  Recent Labs   Lab Test 01/14/20  1221   HBCAB Nonreactive   HEPBANG Nonreactive     Hepatitis C  Recent Labs   Lab Test 01/14/20  1221   HCVAB Nonreactive     Lyme ab screening  Recent Labs   Lab Test 01/14/20  1221   LYMEGM 0.05     Lyme confirmation testing by Western Blot  No results for input(s): LYWG, LYWM in the last [id number] hours.  Tuberculosis Screening  Recent Labs   Lab Test 09/17/21  0945 01/14/20  1221   TBRES Negative Negative     HIV Screening  Recent Labs   Lab Test 08/14/20  0736   HIAGAB Nonreactive     [facility]   7/18/2013 CCP negative  8/5/2010 CCP negative  3/27/2013 hepatitis B surface antigen negative and hepatitis B core antibody negative  9/2/2011 hepatitis B surface antigen negative and hepatitis B core antibody negative  3/27/2013 hepatitis C antibody negative    Immunization History     Immunization History   Administered Date(s) Administered     COVID-19 Bivalent 12+ (Pfizer) 01/24/2023     COVID-19 MONOVALENT 12+ (Pfizer) 03/19/2021, 04/09/2021, 10/12/2021     COVID-19 Monovalent 12+ (Pfizer 2022) 08/19/2022     FLU 6-35 months 10/22/2013     Influenza Vaccine 50-64 or 18-64 w/egg allergy (Flublok) 10/12/2021     Influenza Vaccine >6 months (Alfuria,Fluzone) 10/21/2015, 10/05/2016, 10/11/2018, 09/25/2019, 09/24/2020     Influenza Vaccine, 6+MO IM (QUADRIVALENT W/PRESERVATIVES) 09/18/2014, 10/03/2017     Pneumo Conj 13-V (2010&after) 10/11/2018     Pneumococcal 23 valent 12/09/2020      TD,PF 7+ (Tenivac) 04/20/1997, 04/17/2019     TDAP Vaccine (Adacel) 01/15/2009     Zoster recombinant adjuvanted (SHINGRIX) 10/11/2018          Chart documentation done in part with Dragon Voice recognition Software. Although reviewed after completion, some word and grammatical error may remain.    Terrence Carrillo MD

## 2023-05-23 NOTE — TELEPHONE ENCOUNTER
[facility] Call Center    Phone Message    May a detailed message be left on voicemail: yes     Reason for Call: Order(s): Other:   Reason for requested: DEXA Scan; Pt states a PA needs to be completed so she can get the DEXA scan done as well.    Please contact Pt back to let her know once PA is completed so she can schedule her appt.    Provider name: Dr. Carrillo      Action Taken: Other:  Adult Rheumatology

## 2023-05-25 NOTE — TELEPHONE ENCOUNTER
Called pt and let her know that [facility] does our own prior auths. RN extended the order as it was exp so pt could schedule.    LEIDY Carr RN 5/25/2023 12:08 PM

## 2023-07-05 NOTE — PROGRESS NOTES
[facility] Pain Management     Date of visit: 7/5/2023      Assessment:   Betsey Vanessa is a 63 year old female with a past medical history significant for RA, fibromyalgia, polyarticular arthritis, HTN, Meniere's disease, depression, migraine who presents with complaints of widespread pain.      1. Chronic pain syndrome - Onset of back pain occurred many years ago, and she has pain in multiple joints. Etiology of pain is associated with multiple factors, including but not limited to, lumbar spondylosis, lumbar facet arthropathy, rheumatoid arthritis, and myofascial component.      Visit Diagnoses:  1. Fibromyalgia    2. Rheumatoid arthritis of multiple sites without rheumatoid factor (H)    3. Chronic pain syndrome    4. Multiple joint pain    5. Myofascial pain        Plan:  Diagnosis reviewed, treatment option addressed, and risk/benifits discussed.  Self-care instructions given.  I am recommending a multidisciplinary treatment plan to help this patient better manage their pain.      1.  Pain Physical Therapy:  Continue home exercise program as tolerated, walking and yoga. May consider pain PT referral in future for fibromyalgia targeted therapy, pending outcome from other therapies (Lyrica, possibly massage or acupuncture). She also expressed interest in re-engaging with massage therapy, notes she appreciated significant benefit in the past when living in [place], went to massage therapy school, as this was affordable option for her. Advised to check with insurance regarding coverage of massage therapy and potential covered providers, though I was transparent that most insurances do not cover massage therapy at this point. Also discussed acupuncture, which is covered by many insurances, and will consider referral at follow up, pending outcome from Lyrica and possibly massage therapy.               2.  Pain Psychologist to address relaxation, behavioral change, coping style, and other factors important  to improvement.  NO - she is coping well with chronic pain at this time. May consider in the future if needed.               3.  Diagnostic Studies:  None               4.  Medication Management:   - Start Lyrica 25 mg at bedtime and titrate to goal dose 50 mg twice daily, per instructions on prescription bottle. Advised to monitor for improvement in baseline pain level and intensity of fluctuations. Will consider dosage increase at follow up, pending outcome from initial dosing.   - Monitor for sedation - may cause dizziness or drowsiness. Be careful driving/moving around until you know effects of medication. Avoid concurrent alcohol use.   - Change tylenol 500-1000 mg to twice daily as needed. She is not certain of benefit, recommend transitioning from scheduled to PRN and monitor for Discussed daily max dosage, encouraged her to keep below 3000 mg from all sources (Norco included).  Last hepatic panel within last 12 months was WNL.   - Continue Senna S twice daily for constipation. Use miralax 1-2 times daily as needed. Constipation has improved, achieving daily BM.     Continue fentanyl as prescribed.  TEMPORARY increase in hydrocodone to 2-3 tabs per day, #75 tabs to last 30 days until she can resume Orencia injections for RA or she decides to transition to buprenorphine. Refills sent to pharmacy. Will plan to continue at current dose for now and will re-evaluate pain levels after starting Lyrica. Recommend we consider tapering back down on opioids in the next couple of follow ups.     Keep naloxone on hand at home in the event of accidental overdose.      Continue medical cannabis. She will let me know if renewal needed in between visits.     We will consider buprenorphine in the future. We discussed this again at last visit, and I reached out to Dr. Mendoza in between visits for recommendations on transition from fentanyl patch  - You need to discontinue the patch and replace it with oral opioid.  For a [facility]  patch I would give an extra 30mg of norco a day to use PRN to help with withdrawal.  After one week of this discontinue ALL norco for 12 hours and start her on suboxone 2mg TID.              5.  Urine toxicology screen - completed on 8/11/22.               6.  Opioid agreement - completed on 8/11/22.               7. Procedures - We have been deferring repeat lumbar facet joint injections due to ongoing fungal infection, though this has since resolved. She is hesitant to repeat due to discomfort of procedure. However, she does appreciate significant benefit from injections. We decided to hold off for now, will monitor for benefit from Lyrica and revisit at follow up in around 6 weeks.               8.  Follow up with ANIBAL Frank CNP in 6 weeks      Review of Electronic Chart: Today I have also reviewed available medical information in the patient's medical record at [facility] ([facility]) and Care Everywhere (if available), including relevant provider notes, laboratory work, and imaging.     Ronda Vasquez DNP, ANIBAL, AGNP-C  [facility] Pain Management     -------------------------------------------------    Subjective:    Chief complaint:   Chief Complaint   Patient presents with     Pain       Interval history:  Betsey Vanessa is a 63 year old female last seen on 4/13/23.  They are a patient of mine seen in follow up.     Recommendations/plan at the last visit included:  1.  Pain Physical Therapy:  Continue home exercise program as tolerated, walking and yoga               2.  Pain Psychologist to address relaxation, behavioral change, coping style, and other factors important to improvement.  NO - she is coping well with chronic pain at this time. May consider in the future if needed.               3.  Diagnostic Studies:  None               4.  Medication Management:   - Change tylenol 500-1000 mg to twice daily as needed. She is not certain of benefit, recommend transitioning from scheduled to PRN  and monitor for Discussed daily max dosage, encouraged her to keep below 3000 mg from all sources (Norco included).  Last hepatic panel within last 12 months was WNL.   - Continue Senna S twice daily for constipation. Use miralax 1-2 times daily as needed. Constipation has improved, achieving daily BM.     Continue fentanyl as prescribed.  TEMPORARY increase in hydrocodone to 2-3 tabs per day, #75 tabs to last 30 days until she can resume Orencia injections for RA or she decides to transition to buprenorphine. Refills sent to pharmacy.    Keep naloxone on hand at home in the event of accidental overdose.      Continue medical cannabis. She will let me know if renewal needed in between visits.     We will consider buprenorphine in the future. We discussed this again at last visit, and I reached out to Dr. Mendoza in between visits for recommendations on transition from fentanyl patch  - You need to discontinue the patch and replace it with oral opioid.  For a 12mcg patch I would give an extra 30mg of norco a day to use PRN to help with withdrawal.  After one week of this discontinue ALL norco for 12 hours and start her on suboxone 2mg TID.              5.  Urine toxicology screen - completed on 8/11/22.               6.  Opioid agreement - completed on 8/11/22.               7. Procedures - We have been deferring repeat lumbar facet joint injections due to ongoing fungal infection, though this has since resolved. We will hold off on ordering repeat procedure until after surgery. She had surgery on 2/10/23, will revisit injections at April follow up.               8.  Follow up with ANIBAL Frank CNP in 8 weeks, video visit is okay.       Since her last visit, Betsey Vanessa reports:  -her pain is worse than it was at last visit.   -She reports pain is widespread, sometimes very sharp and impacts sleep, she just seems to hurt all the time.   -She notes that today pain is not as bad.   -She is having trouble with  sleep due to pain.   -Dr. Carrillo's LOV from 5/18/23 suggests that current symptoms are most consistent with fibromyalgia, as RA is fairly well controlled.   -She notes issues with intermittent constipation and loose stools, has had these issues ongoing for an extended period of time but she notes symptoms have worsened more recently.   -She notes she also recently started taking iron supplement, which is likely contributing to GI symptoms to an extent.   -She also has hx of psoriasis that has been flared up, also notes allergies have been worse this season.   -She has tried duloxetine in the past, had significant side effects, sounds like manic symptoms, now listed as allergy.   -She has tried low dose Lyrica in the past, stopped due to side effects but she notes this could have been due to concurrent use of clonazepam.   -She is open to trial again and will space out from benzo doses.   -She takes clonazepam PRN for vertigo and anxiety.   -She is overwhelmed with symptoms right now, trying to think about prior pain flares and what she did for it.   -She tried massage therapy in the past, states she went to [employer] [facility] school, states this was helpful and she felt it was well worth it.   -She knows insurance does not usually cover this tx, but she is open to explore coverage with insurance.   -She is open to acupuncture referral, pending what she finds out about massage therapy.   -She is thinking about starting chair yoga.   -She continues fentanyl 12 mcg/hr patch and norco PRN.   -She appreciates benefit from these meds, but more recently she started having increased pain and did ask about adjustments to fentanyl today, which I advised against.   -She is open to trial other non-opioid medications to better target fibromyalgia related pain.   -Her brother is sick, has multiple myeloma.   -She states he has been sick for a while and now they are getting some answers.       Pain Information:   Pain  "rating: averages 5/10 on a 0-10 scale.      Interval history from last visit on 4/13/23:  -her pain is about the same as it was at last visit.   -Her ear infection has resolved but she will not be resuming the Orencia injections until sometime around June.  -She has follow up with rheum next and will have better idea about resuming RA tx.   -She is looking forward to follow up.   -Norco was increased at last visit to target pain until she resumes RA tx.   -She has appreciated the additional 1 tab per day.   -Denies side effects from medications.   Pain Information:              Pain rating: averages 6/10 on a 0-10 scale.        Interval history from last visit on 2/22/23:  -Her pain is worse than it was since last visit.   -She added in scheduled tylenol that has not been very helpful.  -She continues to have significant pain.   -She takes Norco 7.5 mg, 1/2-1 tab q6h PRN, #45 tabs per month.   -She is asking if she can take additional tylenol.      Interval history from last visit on 1/10/23:  -Her pain is about the same as last visit.   -Pain is widespread in joints, they feel warm and swollen.   -She reports her fungal infection in her ear has resolved.    -Per chart review, last fungal swab on file from 12/8/22 negative.  -She is still off Orencia right now.  -She cannot restart this med for 3-6 months after surgery.   -Rheumatology suggested she try adding Tylenol.   -She is open to trial of this scheduled throughout the day.   -She is struggling with constipation.   -She is taking miralax at home PRN, has not been as helpful.   -She is interested in other medications to help with OIC.      Interval history from last visit on 10/11/22:  -Pain has increased since last visit, \"pain has been nasty\" lately.   -She is still dealing with fungal infection, has follow up 10/27/22. Does not feel like infection is getting better and has   -She is still struggling with dizziness and nausea r/t fungal infection in ear. "   -She has missed Orencia injection that she is using to tx RA due to fungal infection.   -She is interested in repeat lumbar facet injections with steroid, will need to check with ENT first.   -Medical cannabis:                          1. Charleen SL spray (1:1 CBD and THC) - use 1-2 sprays up to 5 times daily PRN; uses this on her bad days, but often takes at bedtime, takes for short acting                           2. Charleen oil tincture for long acting         Current Pain Treatments:    -Voltaren gel 1% PRN (somewhat helpful)  -Medical cannabis PRN (helpful)  -Celebrex 100mg BID (helpful)  -Fentanyl 12mcg/hr transdermal patch Q 72 hours (helpful)  -Norco 7.5/325mg take 0.5-1 tab Q 6 hours PRN pain (helpful, has been using this more)  -plaquenil 200mg every day and 400mg every other day  -hydroxyzine 25mg TID PRN severe itching (helpful, but doesn't need to use often)     Current MME: 40 (daily average with fentanyl patch and 1.5 tabs Norco)     Review of [place] Prescription Monitoring Program (): No concern for abuse or misuse of controlled medications based on this report. Reviewed  - appears appropriate. Fentanyl and hydrocodone last filled 6/2/23.      Annual Controlled Substance Agreement/UDS due date: Completed on 8/11/22     Past pain treatments:  Injections:   -10/12/2020 bilateral L4-5, L5-S1 facet joint injections with Dr. Bailee Houser  (quite helpful)  -5/12/2021 bilateral L4-5, L5-S1 facet joint injections with Dr. Bailee Houser  (quite helpful)  -7/5/22 bilateral L4-5, L5-S1 facet joint injections with Dr. Lion         Medications:  Current Outpatient Medications   Medication Sig Dispense Refill     alcohol swab prep pads Use to swab area of injection/rosie as directed 100 each 3     blood glucose (NO BRAND SPECIFIED) test strip Use to test blood sugar 2 times daily or as directed. To accompany: Blood Glucose Monitor Brands: per insurance. 200 strip 11     blood glucose calibration (NO  BRAND SPECIFIED) solution Use to calibrate blood glucose monitor as needed as directed. To accompany: Blood Glucose Monitor Brands: per insurance. 1 Bottle 3     celecoxib (CELEBREX) 100 MG capsule Take 1 capsule (100 mg) by mouth 2 times daily 180 capsule 2     cetirizine (ZYRTEC) 10 MG tablet Take 1 tablet (10 mg) by mouth daily 90 tablet 3     cevimeline (EVOXAC) 30 MG capsule Take 1 capsule (30 mg) by mouth 3 times daily 270 capsule 2     clonazePAM (KLONOPIN) 0.5 MG tablet TAKE 1 TABLET BY MOUTH TWICE DAILY AS NEEDED FOR ANXIETY OK TO TAKE 1 TO 2 AT NIGHT AS NEEDED FOR SLEEP. SHOULD LAST ABOUT 90 DAYS 100 tablet 1     diclofenac (VOLTAREN) 1 % topical gel Place 2-4 g onto the skin 4 times daily . Max of 8g per dose. No more than 32g/day 100 g 3     docusate sodium (EQ STOOL SOFTENER) 100 MG capsule Take 1 capsule (100 mg) by mouth 2 times daily 180 capsule 3     EPINEPHrine (ANY BX GENERIC EQUIV) 0.3 MG/0.3ML injection 2-pack Inject 0.3 mLs (0.3 mg) into the muscle as needed for anaphylaxis 2 each 0     escitalopram (LEXAPRO) 20 MG tablet TAKE 1 & 1/2 (ONE & ONE-HALF) TABLETS BY MOUTH ONCE DAILY IN THE EVENING 135 tablet 0     fentaNYL (DURAGESIC) 12 mcg/hr 72 hr patch Place 1 patch onto the skin every 72 hours remove old patch. Fill 7/27/23, start 7/29/23 to last 30 days 10 patch 0     Ferrous Gluconate 240 (27 Fe) MG TABS        fluorometholone (FML LIQUIFILM) 0.1 % ophthalmic suspension INSTILL 1 DROP INTO EACH EYE IN THE MORNING       fluticasone (FLONASE) 50 MCG/ACT nasal spray USE 1 TO 2 SPRAY(S) IN EACH NOSTRIL ONCE DAILY 16 g 0     fluvastatin (LESCOL) 20 MG capsule Take 1 capsule (20 mg) by mouth At Bedtime 90 capsule 3     glimepiride (AMARYL) 2 MG tablet Take 1 tablet (2 mg) by mouth every morning (before breakfast) 90 tablet 1     glucose blood VI test strips strip daily       HYDROcodone-acetaminophen (NORCO) 7.5-325 MG per tablet Take 0.5-1 tablets by mouth every 6 hours as needed for severe pain  Max 3 tabs/day, #75 tablets to last 30 days. Fill 7/27/23, start 7/29/23 75 tablet 0     hydroxychloroquine (PLAQUENIL) 200 MG tablet Hydroxychloroquine 200mg daily; and an additional 200mg every other day. Yearly eye exam including 10-2 VF and SD- tablet 2     hydrOXYzine (ATARAX) 25 MG tablet Take 1 tablet (25 mg) by mouth 3 times daily as needed for itching 40 tablet 0     lifitegrast (XIIDRA) 5 % opthalmic solution Place 1 drop into both eyes 2 times daily 30 each 6     magnesium 250 MG tablet Take 1 tablet by mouth daily       medical cannabis (Patient's own supply) Take 1 Dose by mouth See Admin Instructions (The purpose of this order is to document that the patient reports taking medical cannabis.  This is not a prescription, and is not used to certify that the patient has a qualifying medical condition.)       metFORMIN (GLUCOPHAGE) 500 MG tablet TAKE 2 TABLETS BY MOUTH TWICE DAILY WITH MEALS 360 tablet 0     metroNIDAZOLE (METROGEL) 0.75 % external gel Apply topically 2 times daily Apply to face daily 45 g 5     naloxone (NARCAN) 4 MG/0.1ML nasal spray Spray 1 spray (4 mg) into one nostril alternating nostrils as needed for opioid reversal every 2-3 minutes until assistance arrives 0.2 mL 0     nystatin (MYCOSTATIN) 984409 UNIT/GM external powder Apply topically 2 times daily as needed 2 x daily PRN for rash 60 g 4     omeprazole (PRILOSEC) 20 MG DR capsule Take 1 capsule (20 mg) by mouth 2 times daily 180 capsule 3     pimecrolimus (ELIDEL) 1 % external cream Apply topically 2 times daily - use on back of neck and hands 60 g 5     potassium chloride ER (KLOR-CON M) 10 MEQ CR tablet Take 1 tablet (10 mEq) by mouth 2 times daily (Patient taking differently: Take 10 mEq by mouth daily) 180 tablet 3     pregabalin (LYRICA) 25 MG capsule Start Lyrica 25 mg at bedtime x 1 week, then increase to 25 mg BID x 1 week, then increase to 25 mg in morning and 50 mg at bedtime x 1 week, then increase to 50 mg BID.  60 capsule 0     pregabalin (LYRICA) 50 MG capsule Take 1 capsule (50 mg) by mouth 2 times daily 60 capsule 1     PROAIR  (90 Base) MCG/ACT inhaler Inhale 1-2 puffs into the lungs every 4 hours as needed for shortness of breath / dyspnea or wheezing 18 g 5     promethazine (PHENERGAN) 25 MG suppository Place 1 suppository (25 mg) rectally every 6 hours as needed for nausea 7 suppository 0     promethazine (PHENERGAN) 25 MG tablet Take 1 tablet (25 mg) by mouth every 6 hours as needed for nausea 15 tablet 0     promethazine (PHENERGAN) 25 MG tablet Take 1 tablet (25 mg) by mouth every 6 hours as needed for nausea or other (Meniere's) For nusea 90 tablet 0     psyllium (METAMUCIL) 58.6 % POWD Take by mouth daily PRN       SENNA-docusate sodium (SENNA S) 8.6-50 MG tablet Take 1 tablet by mouth 2 times daily 60 tablet 1     thin (NO BRAND SPECIFIED) lancets Use to test blood sugar 2 times daily or as directed. To accompany: Blood Glucose Monitor Brands: per insurance. 200 each 11     triamterene-HCTZ (DYAZIDE) 37.5-25 MG capsule Take 1 capsule by mouth daily 90 capsule 3     VITAMIN D3 50 MCG (2000 UT) tablet Take 2 tablets by mouth daily at 2 pm       acetaminophen (TYLENOL) 500 MG tablet Take 500-1000 mg BID, may take additional 500-1000 mg dose daily as needed, max 4000 mg from all sources. (Patient not taking: Reported on 5/18/2023) 180 tablet 1       Medical History: any changes in medical history since they were last seen? No      Objective:    Physical Exam:  Blood pressure 132/72, pulse 89, not currently breastfeeding.  Constitutional: Well developed, well nourished, appears stated age.  Gait: ambulates with cane  HEENT: Head atraumatic, normocephalic. Eyes without conjunctival injection or jaundice. Oropharynx clear. Neck supple. No obvious neck masses.  Skin: No rash, lesions, or petechiae of exposed skin.   Psychiatric/mental status: Alert, without lethargy or stupor. Speech fluent. Appropriate affect.  Mood normal. Able to follow commands without difficulty.     Diagnostic Tests/Imaging/Labs:  None      BILLING TIME DOCUMENTATION:   The total TIME spent on this patient on the date of the encounter/appointment was 45 minutes.      TOTAL TIME includes:   Time spent preparing to see the patient (reviewing records and tests)   Time spent face to face (or over the phone) with the patient   Time spent ordering tests, medications, procedures and referrals   Time spent Referring and communicating with other healthcare professionals   Time spent documenting clinical information in Epic

## 2023-07-05 NOTE — PATIENT INSTRUCTIONS
1.  Pain Physical Therapy:  Continue home exercise program as tolerated, walking and yoga. May consider pain PT referral in future for fibromyalgia targeted therapy, pending outcome from other therapies (Lyrica, possibly massage or acupuncture). She also expressed interest in re-engaging with massage therapy, notes she appreciated significant benefit in the past when living in [place], went to massage therapy school, as this was affordable option for her. Advised to check with insurance regarding coverage of massage therapy and potential covered providers, though I was transparent that most insurances do not cover massage therapy at this point. Also discussed acupuncture, which is covered by many insurances, and will consider referral at follow up, pending outcome from Lyrica and possibly massage therapy.               2.  Pain Psychologist to address relaxation, behavioral change, coping style, and other factors important to improvement.  NO - she is coping well with chronic pain at this time. May consider in the future if needed.               3.  Diagnostic Studies:  None               4.  Medication Management:   Start Lyrica 25 mg at bedtime and titrate to goal dose 50 mg twice daily, per instructions on prescription bottle. Advised to monitor for improvement in baseline pain level and intensity of fluctuations. Will consider dosage increase at follow up, pending outcome from initial dosing.   Monitor for sedation - may cause dizziness or drowsiness. Be careful driving/moving around until you know effects of medication. Avoid concurrent alcohol use.   Change tylenol 500-1000 mg to twice daily as needed. She is not certain of benefit, recommend transitioning from scheduled to PRN and monitor for Discussed daily max dosage, encouraged her to keep below 3000 mg from all sources (Norco included).  Last hepatic panel within last 12 months was WNL.   Continue Senna S twice daily for constipation. Use miralax 1-2  times daily as needed. Constipation has improved, achieving daily BM.   Continue fentanyl as prescribed.  TEMPORARY increase in hydrocodone to 2-3 tabs per day, #75 tabs to last 30 days until she can resume Orencia injections for RA or she decides to transition to buprenorphine. Refills sent to pharmacy. Will plan to continue at current dose for now and will re-evaluate pain levels after starting Lyrica. Recommend we consider tapering back down on opioids in the next couple of follow ups.   Keep naloxone on hand at home in the event of accidental overdose.    Continue medical cannabis. She will let me know if renewal needed in between visits.   We will consider buprenorphine in the future. We discussed this again at last visit, and I reached out to Dr. Mendoza in between visits for recommendations on transition from fentanyl patch  - You need to discontinue the patch and replace it with oral opioid.  For a 12mcg patch I would give an extra 30mg of norco a day to use PRN to help with withdrawal.  After one week of this discontinue ALL norco for 12 hours and start her on suboxone 2mg TID.              5.  Urine toxicology screen - completed on 8/11/22.               6.  Opioid agreement - completed on 8/11/22.               7. Procedures - We have been deferring repeat lumbar facet joint injections due to ongoing fungal infection, though this has since resolved. She is hesitant to repeat due to discomfort of procedure. However, she does appreciate significant benefit from injections. We decided to hold off for now, will monitor for benefit from Lyrica and revisit at follow up in around 6 weeks.               8.  Follow up with ANIBAL Frank CNP in 6 weeks    ----------------------------------------------------------------  Clinic Number:  [phone]   Call with any questions about your care and for scheduling assistance.   Calls are returned Monday through Friday between 8 AM and 4:30 PM. We usually get back to  you within 2 business days depending on the issue/request.    If we are prescribing your medications:  For opioid medication refills, call the clinic or send a [employer] message 7 days in advance.  Please include:  Name of requested medication  Name of the pharmacy.  For non-opioid medications, call your pharmacy directly to request a refill. Please allow 3-4 days to be processed.   Per [place] State Law:  All controlled substance prescriptions must be filled within 30 days of being written.    For those controlled substances allowing refills, pickup must occur within 30 days of last fill.      We believe regular attendance is key to your success in our program!    Any time you are unable to keep your appointment we ask that you call us at least 24 hours in advance to cancel.This will allow us to offer the appointment time to another patient.   Multiple missed appointments may lead to dismissal from the clinic.

## 2023-07-07 NOTE — TELEPHONE ENCOUNTER
Patient was referred for an MTM appointment by their insurance plan.  Calling patient and left a voicemail to see if they had time to review their medications today, or if we could schedule an appointment.  Appointment would be a general review of their medications to make sure they are working well, don't have any serious interactions/aren't causing side effects, and if there are ways to simplify them, make them easier to take, or more affordable.  Patient can call [phone] to schedule an appointment with me, or another MTM pharmacist.    Marielos Reyes, Pharm.D.  Medication Therapy Management Pharmacist  [facility]

## 2023-07-18 NOTE — PROGRESS NOTES
Infusion Nursing Note:  Betsey Vanessa presents today for Reclast.    Patient seen by provider today: No   present during visit today: Not Applicable.    Note: Patient expressed no new medical concerns. Patient verbalized that she is taking calcium and vitamin d. Patient encouraged to increase water intake over the next 24-48 hours. Patient verbalized understanding.      Intravenous Access:  Peripheral IV placed.    Treatment Conditions:  Lab Results   Component Value Date     01/24/2023    POTASSIUM 4.2 01/24/2023    MAG 1.7 02/07/2020    CR 0.88 07/18/2023    MIRNA 9.8 07/18/2023    BILITOTAL 0.3 05/18/2023    ALBUMIN 4.6 05/18/2023    ALT 29 05/18/2023    AST 30 05/18/2023     Results reviewed, labs MET treatment parameters, ok to proceed with treatment.      Post Infusion Assessment:  Patient tolerated infusion without incident.  Blood return noted pre and post infusion.  Site patent and intact, free from redness, edema or discomfort.  No evidence of extravasations.  Access discontinued per protocol.       Discharge Plan:   AVS to patient via MYCHART.  Patient will follow up with provider. Per her knowledge she is now done with Reclast.  Patient discharged in stable condition accompanied by: self.  Departure Mode: Ambulatory.      Leticia Laguna RN

## 2023-07-23 NOTE — TELEPHONE ENCOUNTER
fv Patient calling for appt only, no triage needed. Patient is new to  and new to [place].  Ashleigh George RN [facility]    
gradual onset

## 2023-07-27 NOTE — PROGRESS NOTES
"  Assessment & Plan       ICD-10-CM    1. Type 2 diabetes mellitus with hyperglycemia, without long-term current use of insulin (H)  E11.65 HEMOGLOBIN A1C     Lipid panel reflex to direct LDL Non-fasting      2. Mild persistent asthma without complication  J45.30 fluticasone (FLOVENT HFA) 110 MCG/ACT inhaler     PROAIR  (90 Base) MCG/ACT inhaler      3. Benzodiazepine dependence, episodic (H)  F13.20 clonazePAM (KLONOPIN) 0.5 MG tablet      4. Flexural eczema  L20.82 pimecrolimus (ELIDEL) 1 % external cream     triamcinolone (KENALOG) 0.1 % external cream      5. Anaphylaxis, sequela  T78.2XXS EPINEPHrine (ANY BX GENERIC EQUIV) 0.3 MG/0.3ML injection 2-pack          1) Diabetes under good control with an A1c of 6.4%. No med changes today    2) Will start an ICS, Flovent BID. Repeat ACT in 1 month    3-5) Meds renewed, no changes      Ordering of each unique test  Prescription drug management         BMI:   Estimated body mass index is 30 kg/m  as calculated from the following:    Height as of this encounter: 1.575 m (5' 2\").    Weight as of this encounter: 74.4 kg (164 lb).     Return in about 6 months (around 1/28/2024) for your annual physical, a med check, fasting labs, with Katerina, in person.     Katerina Judd PA-C  [facility] KIMBERLY Leggett is a 63 year old, presenting for the following health issues:  Diabetes, Depression, and Anxiety        7/28/2023     7:42 AM   Additional Questions   Roomed by Opal KENNEDY   Accompanied by self         7/28/2023     7:42 AM   Patient Reported Additional Medications   Patient reports taking the following new medications Lyrica       History of Present Illness       Mental Health Follow-up:  Patient presents to follow-up on Depression & Anxiety.Patient's depression since last visit has been:  Good  The patient is not having other symptoms associated with depression.  Patient's anxiety since last visit has been:  Good  The patient is " not having other symptoms associated with anxiety.  Any significant life events: other  Patient is feeling anxious or having panic attacks.  Patient has no concerns about alcohol or drug use.        Diabetes Follow-up    How often are you checking your blood sugar? One time daily  What time of day are you checking your blood sugars (select all that apply)?  Before and after meals  Have you had any blood sugars above 200?  No  Have you had any blood sugars below 70?  Yes usually late afternoon  What symptoms do you notice when your blood sugar is low?  Gets sweaty  What concerns do you have today about your diabetes? None and Low blood sugar   Do you have any of these symptoms? (Select all that apply)  No numbness or tingling in feet.  No redness, sores or blisters on feet.  No complaints of excessive thirst.  No reports of blurry vision.  No significant changes to weight.  Have you had a diabetic eye exam in the last 12 months? No    Scheduled for 8/16, but will need to reschedule      BP Readings from Last 2 Encounters:   07/28/23 132/52   07/18/23 (!) 150/89     Hemoglobin A1C (%)   Date Value   07/28/2023 6.4 (H)   01/24/2023 6.6 (H)   06/09/2021 7.1 (H)   03/09/2021 8.0 (H)     LDL Cholesterol Calculated (mg/dL)   Date Value   08/19/2022 127 (H)   02/18/2022 106 (H)   06/09/2021 130 (H)   12/09/2020 129 (H)         How many servings of fruits and vegetables do you eat daily?  4 or more  On average, how many sweetened beverages do you drink each day (Examples: soda, juice, sweet tea, etc.  Do NOT count diet or artificially sweetened beverages)?   0  How many days per week do you exercise enough to make your heart beat faster? 7  How many minutes a day do you exercise enough to make your heart beat faster? 30 - 60  How many days per week do you miss taking your medication? 0-1  What makes it hard for you to take your medications?   Won't take a dose if she's vomiting        Review of Systems  "  Psychiatric/Behavioral:  The patient is nervous/anxious.       Constitutional, pulmonary, endocrine and psych systems are negative, except as otherwise noted.      Objective    /52 (BP Location: Right arm, Patient Position: Chair, Cuff Size: Adult Regular)   Pulse 89   Temp 98.6  F (37  C) (Oral)   Ht 1.575 m (5' 2\")   Wt 74.4 kg (164 lb)   LMP  (LMP Unknown)   SpO2 94%   BMI 30.00 kg/m    Body mass index is 30 kg/m .  Physical Exam   GENERAL: healthy, alert and no distress  RESP: lungs clear to auscultation - no rales, rhonchi or wheezes  CV: regular rates and rhythm and grade 4/6 systolic murmur heard best over the right sternal border  MS: no gross musculoskeletal defects noted, no edema  SKIN: no suspicious lesions or rashes  NEURO: Normal strength and tone, mentation intact and speech normal  PSYCH: mentation appears normal, affect normal/bright    Results for orders placed or performed in visit on 07/28/23 (from the past 24 hour(s))   HEMOGLOBIN A1C   Result Value Ref Range    Hemoglobin A1C 6.4 (H) 0.0 - 5.6 %                   "

## 2023-07-31 NOTE — TELEPHONE ENCOUNTER
Prior Authorization Retail Medication Request    Medication/Dose: fluticasone (FLOVENT HFA) 110 MCG/ACT inhaler  ICD code (if different than what is on RX):  J45.30  Previously Tried and Failed:  na  Rationale:  na    Insurance Name:  [facility]  Insurance ID:  [id number]      Pharmacy Information (if different than what is on RX)  Name:  [facility]  Phone:  [phone]

## 2023-08-02 NOTE — TELEPHONE ENCOUNTER
Prior Authorization Approval    Authorization Effective Date: 7/3/2023  Authorization Expiration Date: 8/1/2024  Medication: fluticasone (FLOVENT HFA) 110 MCG/ACT inhaler  Approved Dose/Quantity:    Reference #:     Insurance Company: [employer] - Phone [phone] Fax [phone]  Expected CoPay:       CoPay Card Available:      Foundation Assistance Needed:    Which Pharmacy is filling the prescription (Not needed for infusion/clinic administered): [facility] [address] [facility] [address] [address]  Pharmacy Notified: Yes  Patient Notified: Yes  **Instructed pharmacy to notify patient when script is ready to /ship.**

## 2023-08-02 NOTE — TELEPHONE ENCOUNTER
Central Prior Authorization Team   Phone: [phone]    PA Initiation    Medication: fluticasone (FLOVENT HFA) 110 MCG/ACT inhaler  Insurance Company: [employer] - Phone [phone] Fax [phone]  Pharmacy Filling the Rx: [facility] [address] [place], [place] - [address] [address]  Filling Pharmacy Phone: [phone]  Filling Pharmacy Fax:    Start Date: 8/2/2023

## 2023-08-16 NOTE — PROGRESS NOTES
Betsey is a 63 year old who is being evaluated via a billable video visit.      How would you like to obtain your AVS? MyChart  If the video visit is dropped, the invitation should be resent by: Text to cell phone: [phone]  Will anyone else be joining your video visit? No  Is Pt currently in [place]? Yes    NOTE:  If Pt is not in [place], Appointment needs to be canceled and rescheduled.

## 2023-08-16 NOTE — PROGRESS NOTES
Video-Visit Details    Type of service:  Video Visit     Originating Location (pt. Location): Home    Distant Location (provider location):  On-site  Platform used for Video Visit: [facility]     Date of visit: 8/16/2023      Assessment:   Betsey Vanessa is a 63 year old female with a past medical history significant for RA, fibromyalgia, polyarticular arthritis, HTN, Meniere's disease, depression, migraine who presents with complaints of widespread pain.      1. Chronic pain syndrome - Onset of back pain occurred many years ago, and she has pain in multiple joints. Etiology of pain is associated with multiple factors, including but not limited to, lumbar spondylosis, lumbar facet arthropathy, rheumatoid arthritis, and myofascial component.    Visit Diagnoses:  1. Multiple joint pain    2. Chronic pain syndrome    3. Rheumatoid arthritis of multiple sites without rheumatoid factor (H)    4. Encounter for therapeutic drug monitoring    5. Controlled substance agreement signed    6. Fibromyalgia    7. Myofascial pain    8. Lumbar facet joint pain    9. Continuous opioid dependence (H)    10. Therapeutic opioid induced constipation        Plan:  Diagnosis reviewed, treatment option addressed, and risk/benifits discussed.  Self-care instructions given.  I am recommending a multidisciplinary treatment plan to help this patient better manage their pain.      1.  Pain Physical Therapy:  Continue home exercise program as tolerated, walking and yoga. May consider pain PT referral in future for fibromyalgia targeted therapy, pending outcome from other therapies (Lyrica, possibly massage or acupuncture). She also expressed interest in re-engaging with massage therapy, notes she appreciated significant benefit in the past when living in [place], went to massage therapy school, as this was affordable option for her. Advised to check with insurance regarding coverage of massage therapy and  potential covered providers, though I was transparent that most insurances do not cover massage therapy at this point. Also discussed acupuncture, which is covered by many insurances, and will consider referral at follow up, pending outcome from Lyrica and possibly massage therapy.               2.  Pain Psychologist to address relaxation, behavioral change, coping style, and other factors important to improvement.  NO - she is coping well with chronic pain at this time. May consider in the future if needed.               3.  Diagnostic Studies:  None               4.  Medication Management:   Increase Lyrica to 75 mg in morning and 100 mg at bedtime x 1 week, then increase to 100 mg twice daily. Advised to monitor for improvement in baseline pain level and intensity of fluctuations. Will consider dosage increase at follow up, pending outcome from initial dosing.   Monitor for sedation - may cause dizziness or drowsiness. Be careful driving/moving around until you know effects of medication. Avoid concurrent alcohol use.   Change tylenol 500-1000 mg to twice daily as needed. She is not certain of benefit, recommend transitioning from scheduled to PRN and monitor for Discussed daily max dosage, encouraged her to keep below 3000 mg from all sources (Norco included).  Last hepatic panel within last 12 months was WNL.   Continue Senna twice daily for constipation. Use miralax 1-2 times daily as needed. Constipation has improved, achieving daily BM.   Continue fentanyl as prescribed.  Decrease hydrocodone to 2-3 tabs per day, #65 tabs to last 30 days. Advised that taper goal will be #45 tabs, which was her prior dosage, as I had agreed to temporary increase until Orencia injections resumed. Of note, rheumatology thinks fibromyalgia is most significant contributing factor to current pain symptoms, RA is stable. Of note, she reports improvement in baseline pain since starting Lyrica, which we will increase today.   Keep  naloxone on hand at home in the event of accidental overdose. New prescription sent into pharmacy today.   Continue medical cannabis. She will let me know if renewal needed in between visits.   We will consider buprenorphine in the future. We discussed this again at last visit, and I reached out to Dr. Mendoza in between visits for recommendations on transition from fentanyl patch  - You need to discontinue the patch and replace it with oral opioid.  For a 12mcg patch I would give an extra 30mg of norco a day to use PRN to help with withdrawal.  After one week of this discontinue ALL norco for 12 hours and start her on suboxone 2mg TID.              5.  Urine toxicology screen - completed on 8/11/22. Repeat UDS ordered today. Advised to complete within 1 week. She will be at [facility] for eye appointment on 8/21/23, advised it is okay to complete then and messaged scheduling team to help with making lab appointment               6.  Opioid agreement - completed on 8/11/22. Requested nursing reach out to facilitate renewal.               7. Procedures - We have been deferring repeat lumbar facet joint injections due to ongoing fungal infection, though this has since resolved. She is hesitant to repeat due to discomfort of procedure. However, she does appreciate significant benefit from injections.               8.  Follow up with ANIBAL Frank CNP in 2-3 months.       Review of Electronic Chart: Today I have also reviewed available medical information in the patient's medical record at [facility] ([facility]) and Care Everywhere (if available), including relevant provider notes, laboratory work, and imaging.     Ronda Vasquez DNP, APRN, AGNP-C  [facility] Pain Management     -------------------------------------------------    Subjective:    Chief complaint:   Chief Complaint   Patient presents with    Pain       Interval history:  Betsey Vanessa is a 63 year old female last seen on 7/5/23.  They are a  patient of mine seen in follow up.     Recommendations/plan at the last visit included:  1.  Pain Physical Therapy:  Continue home exercise program as tolerated, walking and yoga. May consider pain PT referral in future for fibromyalgia targeted therapy, pending outcome from other therapies (Lyrica, possibly massage or acupuncture). She also expressed interest in re-engaging with massage therapy, notes she appreciated significant benefit in the past when living in [place], went to massage therapy school, as this was affordable option for her. Advised to check with insurance regarding coverage of massage therapy and potential covered providers, though I was transparent that most insurances do not cover massage therapy at this point. Also discussed acupuncture, which is covered by many insurances, and will consider referral at follow up, pending outcome from Lyrica and possibly massage therapy.               2.  Pain Psychologist to address relaxation, behavioral change, coping style, and other factors important to improvement.  NO - she is coping well with chronic pain at this time. May consider in the future if needed.               3.  Diagnostic Studies:  None               4.  Medication Management:   Start Lyrica 25 mg at bedtime and titrate to goal dose 50 mg twice daily, per instructions on prescription bottle. Advised to monitor for improvement in baseline pain level and intensity of fluctuations. Will consider dosage increase at follow up, pending outcome from initial dosing.   Monitor for sedation - may cause dizziness or drowsiness. Be careful driving/moving around until you know effects of medication. Avoid concurrent alcohol use.   Change tylenol 500-1000 mg to twice daily as needed. She is not certain of benefit, recommend transitioning from scheduled to PRN and monitor for Discussed daily max dosage, encouraged her to keep below 3000 mg from all sources (Norco included).  Last hepatic panel within  last 12 months was WNL.   Continue Senna S twice daily for constipation. Use miralax 1-2 times daily as needed. Constipation has improved, achieving daily BM.   Continue fentanyl as prescribed.  TEMPORARY increase in hydrocodone to 2-3 tabs per day, #75 tabs to last 30 days until she can resume Orencia injections for RA or she decides to transition to buprenorphine. Refills sent to pharmacy. Will plan to continue at current dose for now and will re-evaluate pain levels after starting Lyrica. Recommend we consider tapering back down on opioids in the next couple of follow ups.   Keep naloxone on hand at home in the event of accidental overdose.    Continue medical cannabis. She will let me know if renewal needed in between visits.   We will consider buprenorphine in the future. We discussed this again at last visit, and I reached out to Dr. Mendoza in between visits for recommendations on transition from fentanyl patch  - You need to discontinue the patch and replace it with oral opioid.  For a 12mcg patch I would give an extra 30mg of norco a day to use PRN to help with withdrawal.  After one week of this discontinue ALL norco for 12 hours and start her on suboxone 2mg TID.              5.  Urine toxicology screen - completed on 8/11/22.               6.  Opioid agreement - completed on 8/11/22.               7. Procedures - We have been deferring repeat lumbar facet joint injections due to ongoing fungal infection, though this has since resolved. She is hesitant to repeat due to discomfort of procedure. However, she does appreciate significant benefit from injections. We decided to hold off for now, will monitor for benefit from Lyrica and revisit at follow up in around 6 weeks.               8.  Follow up with ANIBAL Frank CNP in 6 weeks    Since her last visit, Betsey Vanessa reports:  -her pain is than it was at last visit.   -She was started on Lyrica at last visit, states she just started it on Monday.    -She reports some concerns for sleep disturbance since starting Lyrica, but she notes that there is a lot of stuff going on in her personal life right now that is contributing.   -Her current dose of Lyrica 50 mg BID.   -No major side effects outside of concern for sleep.   -She is open to further dosage increase.   -Hydrocodone had been increased a few months ago temporarily.   -She takes 1 tab in morning and 1 tab in evening, sometimes additional tab PRN.   -She is open to reduce hydrocodone by 10 tabs this next months.   -She reports she has not used medical cannabis for a while due to side effects and exacerbates vertigo/dizziness, only uses occasionally on days with severe pain.       Pain Information:   Pain rating: averages 3/10 on a 0-10 scale.      Interval history from last visit on 7/5/23:  -her pain is worse than it was at last visit.   -She reports pain is widespread, sometimes very sharp and impacts sleep, she just seems to hurt all the time.   -She notes that today pain is not as bad.   -She is having trouble with sleep due to pain.   -Dr. Carrillo's LOV from 5/18/23 suggests that current symptoms are most consistent with fibromyalgia, as RA is fairly well controlled.   -She notes issues with intermittent constipation and loose stools, has had these issues ongoing for an extended period of time but she notes symptoms have worsened more recently.   -She notes she also recently started taking iron supplement, which is likely contributing to GI symptoms to an extent.   -She also has hx of psoriasis that has been flared up, also notes allergies have been worse this season.   -She has tried duloxetine in the past, had significant side effects, sounds like manic symptoms, now listed as allergy.   -She has tried low dose Lyrica in the past, stopped due to side effects but she notes this could have been due to concurrent use of clonazepam.   -She is open to trial again and will space out from benzo doses.    -She takes clonazepam PRN for vertigo and anxiety.   -She is overwhelmed with symptoms right now, trying to think about prior pain flares and what she did for it.   -She tried massage therapy in the past, states she went to [employer] [facility] school, states this was helpful and she felt it was well worth it.   -She knows insurance does not usually cover this tx, but she is open to explore coverage with insurance.   -She is open to acupuncture referral, pending what she finds out about massage therapy.   -She is thinking about starting chair yoga.   -She continues fentanyl 12 mcg/hr patch and norco PRN.   -She appreciates benefit from these meds, but more recently she started having increased pain and did ask about adjustments to fentanyl today, which I advised against.   -She is open to trial other non-opioid medications to better target fibromyalgia related pain.   -Her brother is sick, has multiple myeloma.   -She states he has been sick for a while and now they are getting some answers.   Pain Information:              Pain rating: averages 5/10 on a 0-10 scale.        Interval history from last visit on 4/13/23:  -her pain is about the same as it was at last visit.   -Her ear infection has resolved but she will not be resuming the Orencia injections until sometime around June.  -She has follow up with rheum next and will have better idea about resuming RA tx.   -She is looking forward to follow up.   -Norco was increased at last visit to target pain until she resumes RA tx.   -She has appreciated the additional 1 tab per day.   -Denies side effects from medications.   Pain Information:              Pain rating: averages 6/10 on a 0-10 scale.        Interval history from last visit on 2/22/23:  -Her pain is worse than it was since last visit.   -She added in scheduled tylenol that has not been very helpful.  -She continues to have significant pain.   -She takes Norco 7.5 mg, 1/2-1 tab q6h PRN, #45 tabs  "per month.   -She is asking if she can take additional tylenol.      Interval history from last visit on 1/10/23:  -Her pain is about the same as last visit.   -Pain is widespread in joints, they feel warm and swollen.   -She reports her fungal infection in her ear has resolved.    -Per chart review, last fungal swab on file from 12/8/22 negative.  -She is still off Orencia right now.  -She cannot restart this med for 3-6 months after surgery.   -Rheumatology suggested she try adding Tylenol.   -She is open to trial of this scheduled throughout the day.   -She is struggling with constipation.   -She is taking miralax at home PRN, has not been as helpful.   -She is interested in other medications to help with OIC.      Interval history from last visit on 10/11/22:  -Pain has increased since last visit, \"pain has been nasty\" lately.   -She is still dealing with fungal infection, has follow up 10/27/22. Does not feel like infection is getting better and has   -She is still struggling with dizziness and nausea r/t fungal infection in ear.   -She has missed Orencia injection that she is using to tx RA due to fungal infection.   -She is interested in repeat lumbar facet injections with steroid, will need to check with ENT first.   -Medical cannabis:                          1. Charleen SL spray (1:1 CBD and THC) - use 1-2 sprays up to 5 times daily PRN; uses this on her bad days, but often takes at bedtime, takes for short acting                           2. Charleen oil tincture for long acting            Current Pain Treatments:     -Voltaren gel 1% PRN (somewhat helpful)  -Medical cannabis PRN (helpful)  -Celebrex 100mg BID (helpful)  -Fentanyl 12mcg/hr transdermal patch Q 72 hours (helpful)  -Norco 7.5/325mg take 0.5-1 tab Q 6 hours PRN pain (helpful, has been using this more)  -plaquenil 200mg every day and 400mg every other day  -hydroxyzine 25mg TID PRN severe itching (helpful, but doesn't need to use often)   "   Current MME: 40 (daily average with fentanyl patch and 1.5 tabs Norco)     Review of [place] Prescription Monitoring Program (): No concern for abuse or misuse of controlled medications based on this report. Reviewed  - appears appropriate.      Annual Controlled Substance Agreement/UDS due date: Completed on 8/11/22     Past pain treatments:  Injections:   -10/12/2020 bilateral L4-5, L5-S1 facet joint injections with Dr. Bailee Houser  (quite helpful)  -5/12/2021 bilateral L4-5, L5-S1 facet joint injections with Dr. Bailee Houser  (quite helpful)  -7/5/22 bilateral L4-5, L5-S1 facet joint injections with Dr. Lion           Medications:  Current Outpatient Medications   Medication Sig Dispense Refill    acetaminophen (TYLENOL) 500 MG tablet Take 500-1000 mg BID, may take additional 500-1000 mg dose daily as needed, max 4000 mg from all sources. 180 tablet 1    alcohol swab prep pads Use to swab area of injection/rosie as directed 100 each 3    blood glucose (NO BRAND SPECIFIED) test strip Use to test blood sugar 2 times daily or as directed. To accompany: Blood Glucose Monitor Brands: per insurance. 200 strip 11    blood glucose calibration (NO BRAND SPECIFIED) solution Use to calibrate blood glucose monitor as needed as directed. To accompany: Blood Glucose Monitor Brands: per insurance. 1 Bottle 3    celecoxib (CELEBREX) 100 MG capsule Take 1 capsule (100 mg) by mouth 2 times daily 180 capsule 2    cetirizine (ZYRTEC) 10 MG tablet Take 1 tablet (10 mg) by mouth daily 90 tablet 3    cevimeline (EVOXAC) 30 MG capsule Take 1 capsule (30 mg) by mouth 3 times daily 270 capsule 2    clonazePAM (KLONOPIN) 0.5 MG tablet TAKE 1 TABLET BY MOUTH TWICE DAILY AS NEEDED FOR ANXIETY OK TO TAKE 1 TO 2 AT NIGHT AS NEEDED FOR SLEEP. SHOULD LAST ABOUT 90 DAYS 100 tablet 1    diclofenac (VOLTAREN) 1 % topical gel Place 2-4 g onto the skin 4 times daily . Max of 8g per dose. No more than 32g/day 100 g 3    docusate sodium  (EQ STOOL SOFTENER) 100 MG capsule Take 1 capsule (100 mg) by mouth 2 times daily 180 capsule 3    EPINEPHrine (ANY BX GENERIC EQUIV) 0.3 MG/0.3ML injection 2-pack Inject 0.3 mLs (0.3 mg) into the muscle as needed for anaphylaxis 2 each 0    escitalopram (LEXAPRO) 20 MG tablet TAKE 1 & 1/2 (ONE & ONE-HALF) TABLETS BY MOUTH ONCE DAILY IN THE EVENING 135 tablet 0    fentaNYL (DURAGESIC) 12 mcg/hr 72 hr patch Place 1 patch onto the skin every 72 hours remove old patch. Fill 8/26/23, start 8/28/23 to last 30 days 10 patch 0    Ferrous Gluconate 240 (27 Fe) MG TABS       fluorometholone (FML LIQUIFILM) 0.1 % ophthalmic suspension INSTILL 1 DROP INTO EACH EYE IN THE MORNING      fluticasone (FLONASE) 50 MCG/ACT nasal spray USE 1 TO 2 SPRAY(S) IN EACH NOSTRIL ONCE DAILY 16 g 0    fluticasone (FLOVENT HFA) 110 MCG/ACT inhaler Inhale 1 puff into the lungs 2 times daily 36 g 1    fluvastatin (LESCOL) 20 MG capsule Take 1 capsule (20 mg) by mouth At Bedtime 90 capsule 3    glimepiride (AMARYL) 2 MG tablet Take 1 tablet (2 mg) by mouth every morning (before breakfast) 90 tablet 1    glucose blood VI test strips strip daily      HYDROcodone-acetaminophen (NORCO) 7.5-325 MG per tablet Take 0.5-1 tablets by mouth every 6 hours as needed for severe pain Max 3 tabs/day, #65 tablets to last 30 days. Fill 8/26/23, start 8/28/23 65 tablet 0    hydroxychloroquine (PLAQUENIL) 200 MG tablet Hydroxychloroquine 200mg daily; and an additional 200mg every other day. Yearly eye exam including 10-2 VF and SD- tablet 2    hydrOXYzine (ATARAX) 25 MG tablet Take 1 tablet (25 mg) by mouth 3 times daily as needed for itching 40 tablet 0    lifitegrast (XIIDRA) 5 % opthalmic solution Place 1 drop into both eyes 2 times daily 30 each 6    magnesium 250 MG tablet Take 1 tablet by mouth daily      medical cannabis (Patient's own supply) Take 1 Dose by mouth See Admin Instructions (The purpose of this order is to document that the patient reports  taking medical cannabis.  This is not a prescription, and is not used to certify that the patient has a qualifying medical condition.)      metFORMIN (GLUCOPHAGE) 500 MG tablet TAKE 2 TABLETS BY MOUTH TWICE DAILY WITH MEALS 360 tablet 0    metroNIDAZOLE (METROGEL) 0.75 % external gel Apply topically 2 times daily Apply to face daily 45 g 5    naloxone (NARCAN) 4 MG/0.1ML nasal spray Spray 1 spray (4 mg) into one nostril alternating nostrils as needed for opioid reversal every 2-3 minutes until assistance arrives 0.2 mL 0    nystatin (MYCOSTATIN) 326465 UNIT/GM external powder Apply topically 2 times daily as needed 2 x daily PRN for rash 60 g 4    omeprazole (PRILOSEC) 20 MG DR capsule Take 1 capsule (20 mg) by mouth 2 times daily 180 capsule 3    pimecrolimus (ELIDEL) 1 % external cream Apply topically 2 times daily - use on back of neck and hands 60 g 5    potassium chloride ER (KLOR-CON M) 10 MEQ CR tablet Take 1 tablet (10 mEq) by mouth 2 times daily (Patient taking differently: Take 10 mEq by mouth daily) 180 tablet 3    pregabalin (LYRICA) 25 MG capsule Start Lyrica 25 mg at bedtime x 1 week, then increase to 25 mg BID x 1 week, then increase to 25 mg in morning and 50 mg at bedtime x 1 week, then increase to 50 mg BID. 60 capsule 0    pregabalin (LYRICA) 50 MG capsule Take 1 capsule (50 mg) by mouth 2 times daily 60 capsule 1    pregabalin (LYRICA) 75 MG capsule Take 1 capsule (75 mg) by mouth 2 times daily 60 capsule 1    PROAIR  (90 Base) MCG/ACT inhaler Inhale 1-2 puffs into the lungs every 4 hours as needed for shortness of breath or wheezing 18 g 5    promethazine (PHENERGAN) 25 MG suppository Place 1 suppository (25 mg) rectally every 6 hours as needed for nausea 7 suppository 0    promethazine (PHENERGAN) 25 MG tablet Take 1 tablet (25 mg) by mouth every 6 hours as needed for nausea 15 tablet 0    promethazine (PHENERGAN) 25 MG tablet Take 1 tablet (25 mg) by mouth every 6 hours as needed for  nausea or other (Meniere's) For nusea 90 tablet 0    psyllium (METAMUCIL) 58.6 % POWD Take by mouth daily PRN      SENNA-docusate sodium (SENNA S) 8.6-50 MG tablet Take 1 tablet by mouth 2 times daily 60 tablet 1    thin (NO BRAND SPECIFIED) lancets Use to test blood sugar 2 times daily or as directed. To accompany: Blood Glucose Monitor Brands: per insurance. 200 each 11    triamcinolone (KENALOG) 0.1 % external cream Apply topically 2 times daily x5-10 days for flares 45 g 0    triamterene-HCTZ (DYAZIDE) 37.5-25 MG capsule Take 1 capsule by mouth daily 90 capsule 3    VITAMIN D3 50 MCG (2000 UT) tablet Take 2 tablets by mouth daily at 2 pm         Medical History: any changes in medical history since they were last seen? No      Objective:    Physical Exam:  not currently breastfeeding.  GENERAL: Healthy, alert and no distress  EYES: Eyes grossly normal to inspection.  No discharge or erythema, or obvious scleral/conjunctival abnormalities.  RESP: No audible wheeze, cough, or visible cyanosis.  No visible retractions or increased work of breathing.    SKIN: Visible skin clear. No significant rash, abnormal pigmentation or lesions.  NEURO: Cranial nerves grossly intact.  Mentation and speech appropriate for age.  PSYCH: Mentation appears normal, affect normal/bright, judgement and insight intact, normal speech and appearance well-groomed.      Diagnostic Tests/Imaging/Labs:  None     BILLING TIME DOCUMENTATION:   The total TIME spent on this patient on the date of the encounter/appointment was 40 minutes.      TOTAL TIME includes:   Time spent preparing to see the patient (reviewing records and tests)   Time spent face to face (or over the phone) with the patient   Time spent ordering tests, medications, procedures and referrals   Time spent Referring and communicating with other healthcare professionals   Time spent documenting clinical information in Epic       '

## 2023-08-16 NOTE — Clinical Note
Please help with CSA renewal. She is going to do UDS 8/21 when she is here for another appt. If you want her to stop up and do CSA in person, I am sure she will be agreeable, just give her a call.   Thanks, A

## 2023-08-16 NOTE — PATIENT INSTRUCTIONS
1.  Pain Physical Therapy:  Continue home exercise program as tolerated, walking and yoga. May consider pain PT referral in future for fibromyalgia targeted therapy, pending outcome from other therapies (Lyrica, possibly massage or acupuncture). She also expressed interest in re-engaging with massage therapy, notes she appreciated significant benefit in the past when living in [place], went to massage therapy school, as this was affordable option for her. Advised to check with insurance regarding coverage of massage therapy and potential covered providers, though I was transparent that most insurances do not cover massage therapy at this point. Also discussed acupuncture, which is covered by many insurances, and will consider referral at follow up, pending outcome from Lyrica and possibly massage therapy.               2.  Pain Psychologist to address relaxation, behavioral change, coping style, and other factors important to improvement.  NO - she is coping well with chronic pain at this time. May consider in the future if needed.               3.  Diagnostic Studies:  None               4.  Medication Management:   Increase Lyrica to 75 mg in morning and 100 mg at bedtime x 1 week, then increase to 100 mg twice daily. Advised to monitor for improvement in baseline pain level and intensity of fluctuations. Will consider dosage increase at follow up, pending outcome from initial dosing.   Monitor for sedation - may cause dizziness or drowsiness. Be careful driving/moving around until you know effects of medication. Avoid concurrent alcohol use.   Change tylenol 500-1000 mg to twice daily as needed. She is not certain of benefit, recommend transitioning from scheduled to PRN and monitor for Discussed daily max dosage, encouraged her to keep below 3000 mg from all sources (Norco included).  Last hepatic panel within last 12 months was WNL.   Continue Senna twice daily for constipation. Use miralax 1-2 times daily  as needed. Constipation has improved, achieving daily BM.   Continue fentanyl as prescribed.  Decrease hydrocodone to 2-3 tabs per day, #65 tabs to last 30 days. Advised that taper goal will be #45 tabs, which was her prior dosage, as I had agreed to temporary increase until Orencia injections resumed. Of note, rheumatology thinks fibromyalgia is most significant contributing factor to current pain symptoms, RA is stable. Of note, she reports improvement in baseline pain since starting Lyrica, which we will increase today.   Keep naloxone on hand at home in the event of accidental overdose. New prescription sent into pharmacy today.   Continue medical cannabis. She will let me know if renewal needed in between visits.   We will consider buprenorphine in the future. We discussed this again at last visit, and I reached out to Dr. Mendoza in between visits for recommendations on transition from fentanyl patch  - You need to discontinue the patch and replace it with oral opioid.  For a 12mcg patch I would give an extra 30mg of norco a day to use PRN to help with withdrawal.  After one week of this discontinue ALL norco for 12 hours and start her on suboxone 2mg TID.              5.  Urine toxicology screen - completed on 8/11/22. Repeat UDS ordered today. Advised to complete within 1 week. She will be at [facility] for eye appointment on 8/21/23, advised it is okay to complete then and messaged scheduling team to help with making lab appointment               6.  Opioid agreement - completed on 8/11/22. Requested nursing reach out to facilitate renewal.               7. Procedures - We have been deferring repeat lumbar facet joint injections due to ongoing fungal infection, though this has since resolved. She is hesitant to repeat due to discomfort of procedure. However, she does appreciate significant benefit from injections.               8.  Follow up with ANIBAL Frank CNP in 2-3 months.      ----------------------------------------------------------------  [facility] Number:  [phone]   Call with any questions about your care and for scheduling assistance.   Calls are returned Monday through Friday between 8 AM and 4:30 PM. We usually get back to you within 2 business days depending on the issue/request.    If we are prescribing your medications:  For opioid medication refills, call the clinic or send a [employer] message 7 days in advance.  Please include:  Name of requested medication  Name of the pharmacy.  For non-opioid medications, call your pharmacy directly to request a refill. Please allow 3-4 days to be processed.   Per [place] State Law:  All controlled substance prescriptions must be filled within 30 days of being written.    For those controlled substances allowing refills, pickup must occur within 30 days of last fill.      We believe regular attendance is key to your success in our program!    Any time you are unable to keep your appointment we ask that you call us at least 24 hours in advance to cancel.This will allow us to offer the appointment time to another patient.   Multiple missed appointments may lead to dismissal from the clinic.

## 2023-08-17 NOTE — TELEPHONE ENCOUNTER
Information noted.     Ronda Vasquez, VIDHI, APRN, AGNP-C  [facility]

## 2023-08-17 NOTE — TELEPHONE ENCOUNTER
Patient scheduled for 8/21 lab.     Patient notified to come to pain clinic following lab and request to see nurse to complete CSA.     Dee Armenta RN  [facility]  [phone]

## 2023-08-17 NOTE — TELEPHONE ENCOUNTER
[facility] Call Center    Phone Message    May a detailed message be left on voicemail: yes     Reason for Call: Other: Patient is calling to set up her UDS for Monday. Please call back when available.      Action Taken: Other: Pain    Travel Screening: Not Applicable

## 2023-08-17 NOTE — TELEPHONE ENCOUNTER
Duplicate encounter.     Dee Armenta, YAHAIRA  [facility] [place] [facility] [place]  [phone]

## 2023-08-21 PROBLEM — Z79.899 CONTROLLED SUBSTANCE AGREEMENT SIGNED: Status: ACTIVE | Noted: 2023-01-01

## 2023-08-21 NOTE — TELEPHONE ENCOUNTER
Patient requesting medical cannabis re-certification:     [email or web address]   medical cannabis number is [id number]   [phone] pain level is a 5     Routing to provider for review.     Dee Armenta RN  [facility]  [phone]

## 2023-08-21 NOTE — LETTER
Opioid / Opioid Plus Controlled Substance Agreement    This is an agreement between you and your provider about the safe and appropriate use of controlled substance/opioids prescribed by your care team. Controlled substances are medicines that can cause physical and mental dependence (abuse).    There are strict laws about having and using these medicines. We here at [facility] are committing to working with you in your efforts to get better. To support you in this work, we ll help you schedule regular office appointments for medicine refills. If we must cancel or change your appointment for any reason, we ll make sure you have enough medicine to last until your next appointment.     As a Provider, I will:  Listen carefully to your concerns and treat you with respect.   Recommend a treatment plan that I believe is in your best interest. This plan may involve therapies other than opioid pain medication.   Talk with you often about the possible benefits, and the risk of harm of any medicine that we prescribe for you.   Provide a plan on how to taper (discontinue or go off) using this medicine if the decision is made to stop its use.    As a Patient, I understand that opioid(s):   Are a controlled substance prescribed by my care team to help me function or work and manage my condition(s).   Are strong medicines and can cause serious side effects such as:  Drowsiness, which can seriously affect my driving ability  A lower breathing rate, enough to cause death  Harm to my thinking ability   Depression   Abuse of and addiction to this medicine  Need to be taken exactly as prescribed. Combining opioids with certain medicines or chemicals (such as illegal drugs, sedatives, sleeping pills, and benzodiazepines) can be dangerous or even fatal. If I stop opioids suddenly, I may have severe withdrawal symptoms.  Do not work for all types of pain nor for all patients. If they re not helpful, I may be asked to stop  them.      The risks, benefits and side effects of these medicine(s) were explained to me. I agree that:  I will take part in other treatments as advised by my care team. This may be psychiatry or counseling, physical therapy, behavioral therapy, group treatment or a referral to a specialist.     I will keep all my appointments. I understand that this is part of the monitoring of opioids. My care team may require an office visit for EVERY opioid/controlled substance refill. If I miss appointments or don t follow instructions, my care team may stop my medicine.    I will take my medicines as prescribed. I will not change the dose or schedule unless my care team tells me to. There will be no refills if I run out early.     I may be asked to come to the clinic and complete a urine drug test or complete a pill count at any time. If I don t give a urine sample or participate in a pill count, the care team may stop my medicine.    I will only receive prescriptions from this clinic for chronic pain. If I am treated by another provider for acute pain issues, I will tell them that I am taking opioid pain medication for chronic pain and that I have a treatment agreement with this provider. I will inform my [facility] care team within one business day if I am given a prescription for any pain medication by another healthcare provider. My [facility] care team can contact other providers and pharmacists about my use of any medicines.    It is up to me to make sure that I don t run out of my medicines on weekends or holidays. If my care team is willing to refill my opioid prescription without a visit, I must request refills only during office hours. Refills may take up to 3 business days to process. I will use one pharmacy to fill all my opioid and other controlled substance prescriptions. I will notify the clinic about any changes to my insurance or medication availability.    I am responsible for my prescriptions.  If the medicine/prescription is lost, stolen or destroyed, it will not be replaced. I also agree not to share controlled substance medicines with anyone.    I am aware I should not use any illegal or recreational drugs. I agree not to drink alcohol unless my care team says I can.       If I enroll in the [place] Medical Cannabis program, I will tell my care team prior to my next refill.     I will tell my care team right away if I become pregnant, have a new medical problem treated outside of my regular clinic, or have a change in my medications.    I understand that this medicine can affect my thinking, judgment and reaction time. Alcohol and drugs affect the brain and body, which can affect the safety of my driving. Being under the influence of alcohol or drugs can affect my decision-making, behaviors, personal safety, and the safety of others. Driving while impaired (DWI) can occur if a person is driving, operating, or in physical control of a car, motorcycle, boat, snowmobile, ATV, motorbike, off-road vehicle, or any other motor vehicle ([place] Statute 169A.20). I understand the risk if I choose to drive or operate any vehicle or machinery.    I understand that if I do not follow any of the conditions above, my prescriptions or treatment may be stopped or changed.          Opioids  What You Need to Know    What are opioids?   Opioids are pain medicines that must be prescribed by a doctor. They are also known as narcotics.     Examples are:   morphine (MS Contin, Janessa)  oxycodone (Oxycontin)  oxycodone and acetaminophen (Percocet)  hydrocodone and acetaminophen (Vicodin, Norco)   fentanyl patch (Duragesic)   hydromorphone (Dilaudid)   methadone  codeine (Tylenol #3)     What do opioids do well?   Opioids are best for severe short-term pain such as after a surgery or injury. They may work well for cancer pain. They may help some people with long-lasting (chronic) pain.     What do opioids NOT do well?   Opioids  never get rid of pain entirely, and they don t work well for most patients with chronic pain. Opioids don t reduce swelling, one of the causes of pain.                                    Other ways to manage chronic pain and improve function include:     Treat the health problem that may be causing pain  Anti-inflammation medicines, which reduce swelling and tenderness, such as ibuprofen (Advil, Motrin) or naproxen (Aleve)  Acetaminophen (Tylenol)  Antidepressants and anti-seizure medicines, especially for nerve pain  Topical treatments such as patches or creams  Injections or nerve blocks  Chiropractic or osteopathic treatment  Acupuncture, massage, deep breathing, meditation, visual imagery, aromatherapy  Use heat or ice at the pain site  Physical therapy   Exercise  Stop smoking  Take part in therapy       Risks and side effects     Talk to your doctor before you start or decide to keep taking opioids. Possible side effects include:    Lowering your breathing rate enough to cause death  Overdose, including death, especially if taking higher than prescribed doses  Worse depression symptoms; less pleasure in things you usually enjoy  Feeling tired or sluggish  Slower thoughts or cloudy thinking  Being more sensitive to pain over time; pain is harder to control  Trouble sleeping or restless sleep  Changes in hormone levels (for example, less testosterone)  Changes in sex drive or ability to have sex  Constipation  Unsafe driving  Itching and sweating  Dizziness  Nausea, throwing up and dry mouth    What else should I know about opioids?    Opioids may lead to dependence, tolerance, or addiction.    Dependence means that if you stop or reduce the medicine too quickly, you will have withdrawal symptoms. These include loose poop (diarrhea), jitters, flu-like symptoms, nervousness and tremors. Dependence is not the same as addiction.                     Tolerance means needing higher doses over time to get the same  effect. This may increase the chance of serious side effects.    Addiction is when people improperly use a substance that harms their body, their mind or their relations with others. Use of opiates can cause a relapse of addiction if you have a history of drug or alcohol abuse.    People who have used opioids for a long time may have a lower quality of life, worse depression, higher levels of pain and more visits to doctors.    You can overdose on opioids. Take these steps to lower your risk of overdose:    Recognize the signs:  Signs of overdose include decrease or loss of consciousness (blackout), slowed breathing, trouble waking up and blue lips. If someone is worried about overdose, they should call 911.    Talk to your doctor about Narcan (naloxone).   If you are at risk for overdose, you may be given a prescription for Narcan. This medicine very quickly reverses the effects of opioids.   If you overdose, a friend or family member can give you Narcan while waiting for the ambulance. They need to know the signs of overdose and how to give Narcan.     Don't use alcohol or street drugs.   Taking them with opioids can cause death.    Do not take any of these medicines unless your doctor says it s OK. Taking these with opioids can cause death:  Benzodiazepines, such as lorazepam (Ativan), alprazolam (Xanax) or diazepam (Valium)  Muscle relaxers, such as cyclobenzaprine (Flexeril)  Sleeping pills like zolpidem (Ambien)   Other opioids      How to keep you and other people safe while taking opioids:    Never share your opioids with others.  Opioid medicines are regulated by the Drug Enforcement Agency ([place]). Selling or sharing medications is a criminal act.    2. Be sure to store opioids in a secure place, locked up if possible. Young children can easily swallow them and overdose.    3. When you are traveling with your medicines, keep them in the original bottles. If you use a pill box, be sure you also carry a copy  of your medicine list from your clinic or pharmacy.    4. Safe disposal of opioids    Most pharmacies have places to get rid of medicine, called disposal kiosks. Medicine disposal options are also available in every [facility]. Search your county and  medication disposal  to find more options. You can find more details at:  https://www.pca.[facility].[place]./living-green/managing-unwanted-medications     I agree that my provider, clinic care team, and pharmacy may work with any city, state or federal law enforcement agency that investigates the misuse, sale, or other diversion of my controlled medicine. I will allow my provider to discuss my care with, or share a copy of, this agreement with any other treating provider, pharmacy or emergency room where I receive care.    I have read this agreement and have asked questions about anything I did not understand.    _______________________________________________________  Patient Signature - Betsey Vanessa _____________________                   Date     _______________________________________________________  Provider Signature - [facility] PAIN NURSE   _____________________                   Date     _______________________________________________________  Witness Signature (required if provider not present while patient signing)   _____________________                   Date

## 2023-08-21 NOTE — PROGRESS NOTES
Reviewed Controlled Substance Agreement with patient and all questions were answered.    Copy of Agreement sent with patient.    Controlled Substance Agreement put in bin to be scanned into patient's chart.      Dee Armenta RN  [facility]  [phone]

## 2023-09-12 NOTE — TELEPHONE ENCOUNTER
[facility] Call Center    Phone Message    May a detailed message be left on voicemail: yes     Reason for Call: Other: Betsey calling to schedule EMG. Please review order and call her back to schedule.      Action Taken: Message routed to:  [facility] talib neuro     Travel Screening: Not Applicable                                                                         250

## 2023-09-16 PROBLEM — Z92.29 HISTORY OF BISPHOSPHONATE THERAPY: Status: RESOLVED | Noted: 2020-02-04 | Resolved: 2023-01-01

## 2023-09-16 PROBLEM — Z78.0 POST-MENOPAUSAL: Status: RESOLVED | Noted: 2020-02-04 | Resolved: 2023-01-01

## 2023-09-21 NOTE — PROGRESS NOTES
"  Rheumatology Clinic Visit      Betsey Vanessa MRN# [id number]   YOB: 1960 Age: 63 year old      Date of visit: 9/21/23   PCP: Katerina Judd     Chief Complaint   Patient presents with:  RECHECK: RA    Assessment and Plan     1.  Rheumatoid arthritis: Reportedly diagnosed \"many years ago\", when she first tablet care with me in 2020.  Previously treated by Dr. Stacy at [facility] in [place], [place]. Previously on methotrexate + rituximab, Enbrel + leflunomide; had elevated liver enzymes preventing oral DMARDs; then with Actemra 8 mg/kg IV monthly that was effective but was found to be associated with thrombocytopenia and the patient reported not much benefit from medication; Orencia were IV was effective but then held because of a fungal infection of the ear and RA continues to be well controlled. ALEGRIA hx prevents several oral DMARDs and LFT elevations with MTX in the past. Using Celebrex 100 mg twice daily.  Hydroxychloroquine requires an eye exam and this is overdue; 30-day supply provided today and the handout for hydroxychloroquine toxicity monitoring that she can give to her ophthalmologist was given today with the fax number to this clinic highlighted.  No synovitis on exam today.  RA is controlled.  Exam is consistent with fibromyalgia.  Chronic illness, stable.      - Continue hydroxychloroquine 200 mg daily with an additional 200 mg every other day, if eye exam is okay.  Refill by MD only.  Eye exam overdue  - Continue Celebrex 100 mg BID; did not tolerate dose reduction previously  - Ophthalmology referral for hydroxychloroquine toxicity monitoring given previously and she says that this will be completed.  This is required for future refills of hydroxychloroquine.  - Labs today: CBC, Creatinine, Hepatic Panel, ESR, CRP     2. Sjogren's Syndrome: reportedly had punctal plugs in the past and reportedly ducts are now cauterized.  Doing okay with artificial tears, Xiidra, " and Evoxac.   Encouraged frequent sips of water, visits with a dentist at least every 6months, avoidance of sugary foods/drinks.    Chronic illness, stable.     - Continue xiidra  - Continue cevimeline 30mg TID    3.  Fibromyalgia: Managed at the pain clinic.  Exam today is consistent with fibromyalgia.  Rheumatoid arthritis is controlled.  She will continue following with her pain management specialist for fibromyalgia management.    4. Osteopenia: based on 7/2019 DEXA report that she brought with her; FRAX score shows a 22% risk of major osteoporotic fracture in the next 10 years and a 1.9% risk for osteoporotic hip fracture in the next 10 years.  Based on FRAX tx is indicated.  She already received one dose of boniva in Sept 2019; and failed alendronate due to GI upset.  Reclast received 2/13/2020, 3/16/2021, 3/30/22, 7/18/2023.   9/12/2023 DEXA showed osteopenia and considering duration of bisphosphonate use we will give a drug holiday at this time.  Recheck DEXA on or after 9/12/2025 at which point Prolia or bisphosphonate could be considered if needed.  Chronic illness  - Continue calcium 1000mg daily  - Continue vitamin D 4000 IU daily  - Discontinue Reclast     5.  Vaccinations: Vaccinations reviewed with Ms. Vanessa.  Risks and benefits of vaccinations were discussed.    - Influenza: Advise yearly vaccination  - [id number]: up to date  - [id number]: up to date  - Shingrix: 1 dose received she had a reaction so she does not want a second dose.  - COVID-19: Advised keeping up-to-date    Total minutes spent in evaluation with patient, documentation, , and review of pertinent studies and chart notes: 20      Ms. Vanessa verbalized agreement with and understanding of the rational for the diagnosis and treatment plan.  All questions were answered to best of my ability and the patient's satisfaction. Ms. Vanessa was advised to contact the clinic with any questions that may arise after the clinic visit.       Thank you for involving me in the care of the patient    Return to clinic: 3-4 months    HPI   Betsey Vanessa is a 63 year old female with a past medical history significant for hypertension, depression, type 2 diabetes, Ménière's disease, and rheumatoid arthritis who is seen in consultation for follow-up of RA.     12/5/2022: Has not taken Orencia since July 2022 because of a fungal ear infection that needs to be cleared before she has ear surgery.  Feels like her joints have been somewhat stable since stopping Orencia so she questions if it needs to be restarted.  She would like to see how she does for a longer duration without Orencia.  She reports that she does have occasional pain in her back, knees, feet, ankles, and hands but this pain may occur at anytime of day, is not affected by activity, and morning stiffness is less than 20 minutes.  Xiidra and cevimeline are effective.  Hydroxychloroquine is well-tolerated and seems to be controlling arthritis well per patient.  Dry eyes and dry mouth are controlled at this time.      5/18/2023: Diffuse body pain.  No joint swelling.  Joints without increased warmth or overlying erythema.  Following with the pain clinic for fibromyalgia management.  Fungal infection of the ear has resolved, per patient.  Continues to use Xiidra and cevimeline for dry eye and dry mouth and states that these are effective.  States that she is overdue for the hydroxychloroquine toxicity monitoring eye exam.    Today, 9/21/2023: Diffuse body pain.  Joints without swelling, increased warmth, or overlying erythema.  Following in the pain clinic for fibromyalgia management.  Still with hearing issues and planning to follow-up with ENT.  Xiidra and cevimeline are effective for dry eye and dry mouth.  Her symptoms in the morning when she wakes up; not using humidifier at night.    Tobacco: Quit smoking in 1998  EtOH: None  Drugs: None    ROS   12 point review of system was completed and  negative except as noted in the HPI     Active Problem List     Patient Active Problem List   Diagnosis    Type 2 diabetes mellitus with hyperglycemia, without long-term current use of insulin (H)    Hypertension goal BP (blood pressure) < 140/90    Meniere's disease, unspecified laterality    Rheumatoid arthritis, involving unspecified site, unspecified rheumatoid factor presence    Valvular heart disease    Fibromyalgia    Mild major depression (H)    Osteopenia of multiple sites    Benzodiazepine dependence, episodic (H)    Continuous opioid dependence (H)    Mitral stenosis    Mixed conductive and sensorineural hearing loss of right ear    Polyarticular arthritis    Chronic pain    Tympanic membrane perforation    Myofascial pain    Migraine with vertigo    Gastroesophageal reflux disease, esophagitis presence not specified    Fungal skin infection    Anaphylaxis, sequela    Hyperlipidemia, unspecified hyperlipidemia type    Otorrhea, right    Long-term current use of bisphosphonate    Family history of thyroid disease    BONNIE (generalized anxiety disorder)    Nonrheumatic aortic valve stenosis    Controlled substance agreement signed     Past Medical History     Past Medical History:   Diagnosis Date    Anemia     r/t menses    Anxiety     Arthritis     Constipation     Depression     Depressive disorder     Diabetes (H)     Diarrhea     Double vision     Headache(784.0)     Hearing loss     Heart disease     Heart murmur     Heartburn     History of blood transfusion     Hypertension     Indigestion     Nasal congestion     Night sweats     Numbness     Problems related to lack of adequate sleep     Rash     Sneezing     Tinnitus     Uncomplicated asthma     Visual loss     Weakness     Weight gain     because of Prednisone     Past Surgical History     Past Surgical History:   Procedure Laterality Date    ------------OTHER-------------      D&C    ABDOMEN SURGERY      CARDIAC SURGERY      ENT SURGERY      GYN  SURGERY      HYSTERECTOMY TOTAL ABDOMINAL      HYSTERECTOMY, PAP NO LONGER INDICATED      IMPLANT BAHA Right 02/10/2023    Procedure: INSERTION, BONE ANCHORED HEARING AID WITH THE OTICON DEVICE;  Surgeon: Laura Stallings MD;  Location: [facility] OR    INNER EAR SURGERY      RELEASE CARPAL TUNNEL BILATERAL Bilateral 2008     Allergy     Allergies   Allergen Reactions    Duloxetine Other (See Comments)    Topiramate Other (See Comments)     Other reaction(s): Confusion    Amitriptyline Embonate [Amitriptyline]      Hyper activity    Azithromycin Hives    Duloxetine Hcl      Hyper activity    Gabapentin Hives    Macrobid [Nitrofurantoin] Hives    Paxil [Paroxetine] Other (See Comments)     Hyper behavior    Penicillins Hives    Statins Other (See Comments)     Liver function studies abnormal on Lipitor / Crestor    Tetracycline Hives     Current Medication List     Current Outpatient Medications   Medication Sig    acetaminophen (TYLENOL) 500 MG tablet Take 500-1000 mg BID, may take additional 500-1000 mg dose daily as needed, max 4000 mg from all sources.    alcohol swab prep pads Use to swab area of injection/rosie as directed    blood glucose (NO BRAND SPECIFIED) test strip Use to test blood sugar 2 times daily or as directed. To accompany: Blood Glucose Monitor Brands: per insurance.    blood glucose calibration (NO BRAND SPECIFIED) solution Use to calibrate blood glucose monitor as needed as directed. To accompany: Blood Glucose Monitor Brands: per insurance.    celecoxib (CELEBREX) 100 MG capsule Take 1 capsule (100 mg) by mouth 2 times daily    cetirizine (EQ ALLERGY RELIEF, CETIRIZINE,) 10 MG tablet Take 1 tablet by mouth once daily    cevimeline (EVOXAC) 30 MG capsule Take 1 capsule (30 mg) by mouth 3 times daily    clonazePAM (KLONOPIN) 0.5 MG tablet TAKE 1 TABLET BY MOUTH TWICE DAILY AS NEEDED FOR ANXIETY OK TO TAKE 1 TO 2 AT NIGHT AS NEEDED FOR SLEEP. SHOULD LAST ABOUT 90 DAYS    diclofenac (VOLTAREN) 1 % topical  gel Place 2-4 g onto the skin 4 times daily . Max of 8g per dose. No more than 32g/day    docusate sodium (EQ STOOL SOFTENER) 100 MG capsule Take 1 capsule (100 mg) by mouth 2 times daily    EPINEPHrine (ANY BX GENERIC EQUIV) 0.3 MG/0.3ML injection 2-pack Inject 0.3 mLs (0.3 mg) into the muscle as needed for anaphylaxis    escitalopram (LEXAPRO) 20 MG tablet TAKE 1 & 1/2 (ONE & ONE-HALF) TABLETS BY MOUTH ONCE DAILY IN THE EVENING    fentaNYL (DURAGESIC) 12 mcg/hr 72 hr patch Place 1 patch onto the skin every 72 hours remove old patch. Fill 8/26/23, start 8/28/23 to last 30 days    Ferrous Gluconate 240 (27 Fe) MG TABS     fluorometholone (FML LIQUIFILM) 0.1 % ophthalmic suspension INSTILL 1 DROP INTO EACH EYE IN THE MORNING    fluticasone (FLONASE) 50 MCG/ACT nasal spray Spray 1-2 sprays into both nostrils daily    fluticasone (FLOVENT HFA) 110 MCG/ACT inhaler Inhale 1 puff into the lungs 2 times daily    fluvastatin (LESCOL) 20 MG capsule Take 1 capsule (20 mg) by mouth At Bedtime    glimepiride (AMARYL) 2 MG tablet TAKE 1 TABLET BY MOUTH IN THE MORNING BEFORE BREAKFAST    glucose blood VI test strips strip daily    HYDROcodone-acetaminophen (NORCO) 7.5-325 MG per tablet Take 0.5-1 tablets by mouth every 6 hours as needed for severe pain Max 3 tabs/day, #65 tablets to last 30 days. Fill 8/26/23, start 8/28/23    hydroxychloroquine (PLAQUENIL) 200 MG tablet Hydroxychloroquine 200mg daily; and an additional 200mg every other day. Yearly eye exam including 10-2 VF and SD-OCT    hydrOXYzine (ATARAX) 25 MG tablet Take 1 tablet (25 mg) by mouth 3 times daily as needed for itching    lifitegrast (XIIDRA) 5 % opthalmic solution Place 1 drop into both eyes 2 times daily    magnesium 250 MG tablet Take 1 tablet by mouth daily    medical cannabis (Patient's own supply) Take 1 each (1 Dose) by mouth See Admin Instructions (The purpose of this order is to document that the patient reports taking medical cannabis.  This is not a  prescription, and is not used to certify that the patient has a qualifying medical condition.)    metFORMIN (GLUCOPHAGE) 500 MG tablet Take 2 tablets (1,000 mg) by mouth 2 times daily (with meals)    metroNIDAZOLE (METROGEL) 0.75 % external gel Apply topically 2 times daily Apply to face daily    naloxone (NARCAN) 4 MG/0.1ML nasal spray Spray 1 spray (4 mg) into one nostril alternating nostrils as needed for opioid reversal every 2-3 minutes until assistance arrives    nystatin (MYCOSTATIN) 254513 UNIT/GM external powder Apply topically 2 times daily as needed 2 x daily PRN for rash    omeprazole (PRILOSEC) 20 MG DR capsule Take 1 capsule (20 mg) by mouth 2 times daily    pimecrolimus (ELIDEL) 1 % external cream Apply topically 2 times daily - use on back of neck and hands    potassium chloride ER (KLOR-CON M) 10 MEQ CR tablet Take 1 tablet (10 mEq) by mouth 2 times daily (Patient taking differently: Take 10 mEq by mouth daily)    pregabalin (LYRICA) 25 MG capsule Start Lyrica 25 mg at bedtime x 1 week, then increase to 25 mg BID x 1 week, then increase to 25 mg in morning and 50 mg at bedtime x 1 week, then increase to 50 mg BID.    pregabalin (LYRICA) 50 MG capsule Take 1 capsule (50 mg) by mouth 2 times daily    pregabalin (LYRICA) 75 MG capsule Take 1 capsule (75 mg) by mouth 2 times daily    PROAIR  (90 Base) MCG/ACT inhaler Inhale 1-2 puffs into the lungs every 4 hours as needed for shortness of breath or wheezing    promethazine (PHENERGAN) 25 MG suppository Place 1 suppository (25 mg) rectally every 6 hours as needed for nausea    promethazine (PHENERGAN) 25 MG tablet Take 1 tablet (25 mg) by mouth every 6 hours as needed for nausea    promethazine (PHENERGAN) 25 MG tablet Take 1 tablet (25 mg) by mouth every 6 hours as needed for nausea or other (Meniere's) For nusea    psyllium (METAMUCIL) 58.6 % POWD Take by mouth daily PRN    SENNA-docusate sodium (SENNA S) 8.6-50 MG tablet Take 1 tablet by mouth  "2 times daily    thin (NO BRAND SPECIFIED) lancets Use to test blood sugar 2 times daily or as directed. To accompany: Blood Glucose Monitor Brands: per insurance.    triamcinolone (KENALOG) 0.1 % external cream Apply topically 2 times daily x5-10 days for flares    triamterene-HCTZ (DYAZIDE) 37.5-25 MG capsule Take 1 capsule by mouth daily    VITAMIN D3 50 MCG (2000 UT) tablet Take 2 tablets by mouth daily at 2 pm     No current facility-administered medications for this visit.     Social History   See HPI    Family History     Family History   Problem Relation Age of Onset    C.A.D. Mother     Alzheimer Disease Mother     Cardiovascular Mother     Eye Disorder Mother     Thyroid Disease Mother     Osteoporosis Mother     Hypertension Other         grandmother    Cerebrovascular Disease Father     Alcohol/Drug Father     Depression Father     Obesity Father         aunt    Asthma Father     Prostate Cancer Father     Anxiety Disorder Father     Alcohol/Drug Brother     Cancer Brother     Alcohol/Drug Sister         aunt    Cancer Sister     Allergies Other         All family members    Arthritis Other         aunt    Obesity Sister     Thyroid Disease Sister     Asthma Sister         daughter       Physical Exam     Temp Readings from Last 3 Encounters:   07/28/23 98.6  F (37  C) (Oral)   07/18/23 97.9  F (36.6  C)   05/11/23 97.5  F (36.4  C)     BP Readings from Last 5 Encounters:   09/21/23 117/58   07/28/23 132/52   07/18/23 (!) 150/89   07/05/23 132/72   05/18/23 (!) 145/69     Pulse Readings from Last 1 Encounters:   09/21/23 87     Resp Readings from Last 1 Encounters:   09/21/23 16     Estimated body mass index is 30.73 kg/m  as calculated from the following:    Height as of this encounter: 1.575 m (5' 2\").    Weight as of this encounter: 76.2 kg (168 lb).    GEN: NAD.   HEENT:  Anicteric, noninjected sclera. No obvious external lesions of the ear and nose. Hearing intact.  CV: S1, S2. RRR. No m/r/g  PULM: " No increased work of breathing. CTA bilaterally   MSK: MCPs, PIPs, DIPs, wrists, elbows, shoulders, knees, ankles, and MTPs are diffusely tender to palpation but without swelling, increased warmth, or overlying erythema.  Hips tender to palpation.  Tender to palpation in the bilateral forearms, upper arms, thighs, and lower legs.  All fibromyalgia tender points positive.    SKIN: No rash or jaundice seen  PSYCH: Alert. Appropriate.     Labs / Imaging (select studies)   RF/CCP  Recent Labs   Lab Test 01/14/20  1221   CCPIGG 1   RHF 20*     CBC  Recent Labs   Lab Test 05/18/23  1537 02/01/23  0738 07/18/22  1411 09/17/21  0945 05/21/21  0954 01/19/21  1130 10/28/20  1537   WBC 6.7 6.3 7.9   < > 6.6 6.5 7.1   RBC 4.32 4.17 4.22   < > 4.41 4.46 4.56   HGB 11.4* 11.0* 11.0*   < > 11.6* 11.8 12.3   HCT 34.9* 36.0 34.5*   < > 35.7 36.6 37.5   MCV 81 86 82   < > 81 82 82   RDW 15.5* 14.7 15.3*   < > 15.3* 14.4 13.7    181 174   < > 160 159 162   MCH 26.4* 26.4* 26.1*   < > 26.3* 26.5 27.0   MCHC 32.7 30.6* 31.9   < > 32.5 32.2 32.8   NEUTROPHIL 66 58 66   < > 53.1 65.0 66.1   LYMPH 20 26 23   < > 36.2 25.3 25.8   MONOCYTE 6 6 6   < > 7.4 5.1 5.1   EOSINOPHIL 8 9 4   < > 3.0 4.0 2.5   BASOPHIL 0 0 1   < > 0.3 0.3 0.1   ANEU  --   --   --   --  3.5 4.2 4.7   ALYM  --   --   --   --  2.4 1.6 1.8   MOISE  --   --   --   --  0.5 0.3 0.4   AEOS  --   --   --   --  0.2 0.3 0.2   ABAS  --   --   --   --  0.0 0.0 0.0   ANEUTAUTO 4.5 3.7 5.2   < >  --   --   --    ALYMPAUTO 1.3 1.6 1.8   < >  --   --   --    AMONOAUTO 0.4 0.4 0.5   < >  --   --   --    AEOSAUTO 0.5 0.6 0.3   < >  --   --   --    ABSBASO 0.0 0.0 0.0   < >  --   --   --     < > = values in this interval not displayed.     CMP  Recent Labs   Lab Test 07/18/23  1056 05/18/23  1537 01/24/23  0821 07/18/22  1411 03/30/22  1428 02/18/22  0657 09/17/21  0945 05/21/21  0954 03/16/21  1130 01/19/21  1130 01/08/21  1535   NA  --   --  138  --   --  137  --   --   --   --   135   POTASSIUM  --   --  4.2  --   --  3.8  --   --   --   --  4.2   CHLORIDE  --   --  103  --   --  102  --   --   --   --  99   CO2  --   --  26  --   --  27  --   --   --   --  32   ANIONGAP  --   --  9  --   --  8  --   --   --   --  4   GLC  --   --  146*  --   --  201*  --   --   --   --  155*   BUN  --   --  17  --   --  19  --   --   --   --  16   CR 0.88 0.88 0.81 0.89 0.88 0.87   < > 0.92 0.83 0.88 0.80   GFRESTIMATED 74 74 83 74 75 76   < > 68 78 72 81   GFRESTBLACK  --   --   --   --   --   --   --  79 >90 84 >90   MIRNA 9.8  --  9.6  --  9.4 9.1   < >  --  10.2* 9.2 9.7   BILITOTAL  --  0.3  --  0.3 0.4  --    < > 0.3  --  0.4 0.5   ALBUMIN  --  4.6  --  3.8 3.9  --    < > 4.0  --  3.9 4.1   PROTTOTAL  --  7.5  --  7.3 7.4  --    < > 7.7  --  7.7 8.2   ALKPHOS  --  72  --  87 86  --    < > 83  --  117 100   AST  --  30  --  17 26  --    < > 20  --  23 23   ALT  --  29  --  26 44  --    < > 35  --  36 45    < > = values in this interval not displayed.     Calcium/VitaminD  Recent Labs   Lab Test 07/18/23  1056 01/24/23  0821 07/18/22  1411 03/30/22  1428 02/18/22  0657 01/14/22  0718 05/21/21  0954   MIRNA 9.8 9.6  --  9.4   < > 8.9  --    VITDT  --   --  39  --   --  34 39    < > = values in this interval not displayed.     ESR/CRP  Recent Labs   Lab Test 05/18/23  1537 07/18/22  1411 03/30/22  1428 01/14/22  0718   SED 19 22 20 19   CRP  --  5.6 <2.9 5.1   CRPI 4.81  --   --   --      Hepatitis B  Recent Labs   Lab Test 01/14/20  1221   HBCAB Nonreactive   HEPBANG Nonreactive     Hepatitis C  Recent Labs   Lab Test 01/14/20  1221   HCVAB Nonreactive     Lyme ab screening  Recent Labs   Lab Test 01/14/20  1221   LYMEGM 0.05     Tuberculosis Screening  Recent Labs   Lab Test 09/17/21  0945 01/14/20  1221   TBRES Negative Negative     HIV Screening  Recent Labs   Lab Test 08/14/20  0736   HIAGAB Nonreactive     [facility]where [facility]   7/18/2013 CCP negative  8/5/2010 CCP negative  3/27/2013  hepatitis B surface antigen negative and hepatitis B core antibody negative  9/2/2011 hepatitis B surface antigen negative and hepatitis B core antibody negative  3/27/2013 hepatitis C antibody negative    Immunization History     Immunization History   Administered Date(s) Administered    COVID-19 Bivalent 12+ (Pfizer) 01/24/2023    COVID-19 MONOVALENT 12+ (Pfizer) 03/19/2021, 04/09/2021, 10/12/2021    COVID-19 Monovalent 12+ (Pfizer 2022) 08/19/2022    FLU 6-35 months 10/22/2013    Influenza Vaccine 18-64 (Flublok) 10/12/2021    Influenza Vaccine >6 months (Alfuria,Fluzone) 10/21/2015, 10/05/2016, 10/11/2018, 09/25/2019, 09/24/2020    Influenza Vaccine, 6+MO IM (QUADRIVALENT W/PRESERVATIVES) 09/18/2014, 10/03/2017    Pneumo Conj 13-V (2010&after) 10/11/2018    Pneumococcal 23 valent 12/09/2020    TD,PF 7+ (Tenivac) 04/20/1997, 04/17/2019    TDAP Vaccine (Adacel) 01/15/2009    Zoster recombinant adjuvanted (SHINGRIX) 10/11/2018          Chart documentation done in part with Dragon Voice recognition Software. Although reviewed after completion, some word and grammatical error may remain.    Terrence Carrillo MD

## 2023-09-21 NOTE — PATIENT INSTRUCTIONS
Please complete the hydroxychloroquine toxicity monitoring eye exam, that includes 10-2 VF and SD-OCT. Please send the results to Dr. Terrence Carrillo at fax #[phone]

## 2023-09-21 NOTE — NURSING NOTE
RAPID3 (0-30) Cumulative Score  12.8          RAPID3 Weighted Score (divide #4 by 3 and that is the weighted score)  4.2

## 2023-09-25 NOTE — TELEPHONE ENCOUNTER
Requested Prescriptions   Pending Prescriptions Disp Refills    cevimeline (EVOXAC) 30 MG capsule 270 capsule 2     Sig: Take 1 capsule (30 mg) by mouth 3 times daily       There is no refill protocol information for this order

## 2023-09-25 NOTE — TELEPHONE ENCOUNTER
Requested Prescriptions   Pending Prescriptions Disp Refills    cevimeline (EVOXAC) 30 MG capsule 270 capsule 2     Sig: Take 1 capsule (30 mg) by mouth 3 times daily       There is no refill protocol information for this order        Routing refill request to provider for review/approval because:  Drug not on the [facility] refill protocol   LOV 9/21/23  Next 1/8/24  Labs 9/21/23    Joyce JUAREZ, Specialty RN 9/25/2023 2:27 PM

## 2023-09-29 NOTE — TELEPHONE ENCOUNTER
Received refill request for:    fentaNYL (DURAGESIC) 12 mcg/hr 72 hr patch   HYDROcodone-acetaminophen (NORCO) 7.5-325 MG per tablet      Last dispensed from pharmacy on 8/31/2023.    Patient's last office/virtual visit by prescribing provider on 8/16/2023.  Next office/virtual appointment scheduled for 10/18/2023.    Last urine drug screen date 8/21/2023.    Current opioid agreement on file? Yes Date of opioid agreement: 8/21/2023.    E-prescribe to:  [facility] [address] [facility], [place] - [address] [address]    Will route to nursing [place]uel[place] for review and preparation of prescription(s).

## 2023-09-29 NOTE — PATIENT INSTRUCTIONS
Omnicef twice daily for 10 days  Flonase (fluticasone) 2 sprays in each nostril daily until symptoms resolve, then continue 1 spray in each nostril for at least 5 more days.  Take Tylenol or an NSAID such as ibuprofen or naproxen as needed for pain.  May use netti pot with bottled or distilled water and saline packets to flush sinuses.  Afrin (oxymetazoline) nasal spray twice daily for 3 days. Stop after 3 days.  Mucinex (guiafenesin) thins mucus and may help it to loosen more quickly  Saline drops or nasal sprays may loosen mucus.  Sit in the bathroom with the door closed and hot shower running to loosen mucus.    Contact primary care clinic if you do not have any relief from your symptoms after 10 days.  Present to emergency room for significantly increasing pain, persistent high fever >102F, swelling/redness around your eyes, changes in your vision or ability to move your eyes, altered mental status or a severe headache.

## 2023-09-29 NOTE — TELEPHONE ENCOUNTER
Chart reviewed - Request appears appropriate. Refilled for 30 day supply.     Ronda Vasquez DNP, APRN, AGNP-C  [facility]

## 2023-09-29 NOTE — PROGRESS NOTES
Assessment & Plan     Acute sinusitis with symptoms > 10 days  - cefdinir (OMNICEF) 300 MG capsule; Take 1 capsule (300 mg) by mouth 2 times daily for 10 days    Acute pharyngitis, unspecified etiology  - Streptococcus A Rapid Screen w/Reflex to PCR - Clinic Collect  - Group A Streptococcus PCR Throat Swab    We discussed that symptoms are worsening and tomorrow is day 10, will start cefdinir twice daily for 10 days.  Strep negative.  Symptomatic measures including fluids, rest, Tylenol, ibuprofen.    Return in about 1 week (around 10/6/2023) for visit with primary care provider if not improving.     [facility]    Subjective   Betsey Vanessa is a 63 year old who presents for the following health issues     Patient presents with:  Urgent Care  URI: Per patient symptoms started ten days ago, sinus pressure, sore throat, cough at first productive but now dry, chest congestion , headaches, and ear pain. Patient has taken benadryl and tylenol for symptoms exposed to strep by grandson who tested positive on Tuesday.     HPI    Betsey presents clinic today for evaluation of facial pain and pressure, headache, nasal congestion, cough, sore throat, intermittent shortness of breath for 8 or 9 days.  Symptoms seem to be changing over time.  She initially had a sore throat and seem to improve and then worsened again.  Her symptoms seem to be in her chest but now have moved to settle in her head.  She has some postnasal drainage and is clearing her throat often. Exposure to grandson with strep.    Review of Systems   ROS negative except as stated above.      Objective    BP (!) 145/65   Pulse 88   Temp 99  F (37.2  C) (Tympanic)   Resp 20   Wt 75.8 kg (167 lb)   LMP  (LMP Unknown)   SpO2 95%   BMI 30.54 kg/m    Physical Exam   GENERAL: healthy, alert and no distress  EYES: Eyes grossly normal to inspection, PERRL and conjunctivae and sclerae normal  HENT: ear canals normal,  right tympanic membrane with chronic perforation, left tympanic membrane with minimally distorted light reflex, no erythema or effusion noted.  Nose and mouth without ulcers or lesions  NECK: no adenopathy, no asymmetry, masses, or scars and thyroid normal to palpation  RESP: lungs clear to auscultation - no rales, rhonchi or wheezes  CV: regular rate and rhythm, normal S1 S2, no S3 or S4, no murmur, click or rub, no peripheral edema and peripheral pulses strong    Results for orders placed or performed in visit on 09/29/23   Streptococcus A Rapid Screen w/Reflex to PCR - Clinic Collect     Status: Normal    Specimen: Throat; Swab   Result Value Ref Range    Group A Strep antigen Negative Negative

## 2023-09-29 NOTE — TELEPHONE ENCOUNTER
Medication refill information reviewed.     Fentanyl and norco last due:  Fill 8/26/23, start 8/28/23 to last 30 day  on 8/31/23  Due date:  9/30/23      Prescriptions prepped for review.     Sanjuanita RN-BSN  [facility]

## 2023-09-29 NOTE — TELEPHONE ENCOUNTER
[facility] Call Center    Phone Message    May a detailed message be left on voicemail: yes     Reason for Call: Medication Refill Request    Has the patient contacted the pharmacy for the refill? Yes   Name of medication being requested: fentaNYL (DURAGESIC) 12 mcg/hr 72 hr patch   HYDROcodone-acetaminophen (NORCO) 7.5-325 MG per tablet   Provider who prescribed the medication:   Ronda Vasquez APRN [facility]       Pharmacy: [address] [address] [address]   Date medication is needed: 10/2/2023        Action Taken: Message routed to:  Other: [facility]    Travel Screening: Not Applicable

## 2023-10-18 NOTE — NURSING NOTE
7/5/2023     4:09 PM 8/16/2023     1:51 PM 10/18/2023     3:05 PM   PEG Score   PEG Total Score 7 6.67 7

## 2023-10-18 NOTE — PROGRESS NOTES
[facility] Pain Management     Date of visit: 10/18/2023      Assessment:   Betsey Vanessa is a 63 year old female with a past medical history significant for RA, fibromyalgia, polyarticular arthritis, HTN, Meniere's disease, depression, migraine who presents with complaints of widespread pain.      1. Chronic pain syndrome - Onset of back pain occurred many years ago, and she has pain in multiple joints. Etiology of pain is associated with multiple factors, including but not limited to, lumbar spondylosis, lumbar facet arthropathy, rheumatoid arthritis, and myofascial component.    Assigned to [place] nursing team.     Visit Diagnoses:  1. Lumbar facet joint pain    2. Chronic pain syndrome    3. Multiple joint pain    4. Rheumatoid arthritis of multiple sites without rheumatoid factor (H)    5. Fibromyalgia    6. Bilateral hand pain        Plan:  Diagnosis reviewed, treatment option addressed, and risk/benifits discussed.  Self-care instructions given.  I am recommending a multidisciplinary treatment plan to help this patient better manage their pain.      1.  Pain Physical Therapy:  Continue home exercise program as tolerated, walking and yoga. May consider pain PT referral in future for fibromyalgia targeted therapy, pending outcome from other therapies (Lyrica, possibly massage or acupuncture). 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 Advised to check with insurance regarding coverage of massage therapy and potential covered providers, though I was transparent that most insurances do not cover massage therapy at this point. Also discussed acupuncture, which is covered by many insurances, and will consider referral at follow up, pending outcome from Lyrica and possibly massage therapy.               2.  Pain Psychologist to address  relaxation, behavioral change, coping style, and other factors important to improvement.  NO - she is coping well with chronic pain at this time. May consider in the future if needed.               3.  Diagnostic Studies:  None               4.  Medication Management:   Increase Lyrica - 75 mg in morning and 100 mg at bedtime x 1 week, then increase to 100 mg twice daily. Advised to monitor for improvement in baseline pain level and intensity of fluctuations.  This dosage increase was recommended at last visit, but she has continued 75 mg twice daily.  Updated prescription for 100 mg capsules sent in to pharmacy today.  Monitor for sedation - may cause dizziness or drowsiness. Be careful driving/moving around until you know effects of medication. Avoid concurrent alcohol use.   Change tylenol 500-1000 mg to twice daily as needed. She is not certain of benefit, recommend transitioning from scheduled to PRN and monitor for Discussed daily max dosage, encouraged her to keep below 3000 mg from all sources (Norco included).  Last hepatic panel within last 12 months was WNL.   Continue Senna twice daily for constipation. Use miralax 1-2 times daily as needed. Constipation has improved, achieving daily BM.   Continue fentanyl 12 mcg/hr patch.  Continue hydrocodone to 2-3 tabs per day, #60 tabs to last 30 days.  She has been working on tapering down on use.  Advised that taper goal will be #45 tabs, which was her prior dosage, as I had agreed to temporary increase until Orencia injections resumed. Of note, rheumatology thinks fibromyalgia is most significant contributing factor to current pain symptoms, RA is stable.  Updated prescription sent to pharmacy today.  Keep naloxone on hand at home in the event of accidental overdose. New prescription sent into pharmacy today.   Continue medical cannabis. She will let me know if renewal needed in between visits.   We will consider buprenorphine in the future. We discussed this  again at last visit, and I reached out to Dr. Mendoza in between visits for recommendations on transition from fentanyl patch  - You need to discontinue the patch and replace it with oral opioid.  For a 12mcg patch I would give an extra 30mg of norco a day to use PRN to help with withdrawal.  After one week of this discontinue ALL norco for 12 hours and start her on suboxone 2mg TID.              5.  Urine toxicology screen - completed on 8/21/23.               6.  Opioid agreement - completed on 8/21/23.               7. Procedures -she had bilateral L4-5 and L5-S1 facet joint injections in the past that were of substantial benefit.  She reports 98% improvement in symptoms that lasts several months, last injections in July 2022.  We discussed the possibility that insurance will no longer cover facet joint injections, and, if denied by insurance, we will plan for lumbar medial branch blocks with plan to proceed to radiofrequency ablation alternatively.  She was provided with information on MBB and RFA today.  Advised I will have nursing reach out to update her, if procedure planned we will need to change.              8.  Follow up with ANIBAL Frank CNP in 2-3 months.     Review of Electronic Chart: Today I have also reviewed available medical information in the patient's medical record at [facility] ([facility]) and Care Everywhere (if available), including relevant provider notes, laboratory work, and imaging.     Ronda Vasquez DNP, APRN, AGNP-C  [facility] Pain Management     -------------------------------------------------    Subjective:    Chief complaint:   Chief Complaint   Patient presents with    Pain       Interval history:  Betsey Vanessa is a 63 year old female last seen on 8/16/23.  They are a patient of mine seen in follow up.     Recommendations/plan at the last visit included:  1.  Pain Physical Therapy:  Continue home exercise program as tolerated, walking and yoga. May consider pain PT  referral in future for fibromyalgia targeted therapy, pending outcome from other therapies (Lyrica, possibly massage or acupuncture). She also expressed interest in re-engaging with massage therapy, notes she appreciated significant benefit in the past when living in [place], went to massage therapy school, as this was affordable option for her. Advised to check with insurance regarding coverage of massage therapy and potential covered providers, though I was transparent that most insurances do not cover massage therapy at this point. Also discussed acupuncture, which is covered by many insurances, and will consider referral at follow up, pending outcome from Lyrica and possibly massage therapy.               2.  Pain Psychologist to address relaxation, behavioral change, coping style, and other factors important to improvement.  NO - she is coping well with chronic pain at this time. May consider in the future if needed.               3.  Diagnostic Studies:  None               4.  Medication Management:   Increase Lyrica to 75 mg in morning and 100 mg at bedtime x 1 week, then increase to 100 mg twice daily. Advised to monitor for improvement in baseline pain level and intensity of fluctuations. Will consider dosage increase at follow up, pending outcome from initial dosing.   Monitor for sedation - may cause dizziness or drowsiness. Be careful driving/moving around until you know effects of medication. Avoid concurrent alcohol use.   Change tylenol 500-1000 mg to twice daily as needed. She is not certain of benefit, recommend transitioning from scheduled to PRN and monitor for Discussed daily max dosage, encouraged her to keep below 3000 mg from all sources (Norco included).  Last hepatic panel within last 12 months was WNL.   Continue Senna twice daily for constipation. Use miralax 1-2 times daily as needed. Constipation has improved, achieving daily BM.   Continue fentanyl as prescribed.  Decrease  hydrocodone to 2-3 tabs per day, #65 tabs to last 30 days. Advised that taper goal will be #45 tabs, which was her prior dosage, as I had agreed to temporary increase until Orencia injections resumed. Of note, rheumatology thinks fibromyalgia is most significant contributing factor to current pain symptoms, RA is stable. Of note, she reports improvement in baseline pain since starting Lyrica, which we will increase today.   Keep naloxone on hand at home in the event of accidental overdose. New prescription sent into pharmacy today.   Continue medical cannabis. She will let me know if renewal needed in between visits.   We will consider buprenorphine in the future. We discussed this again at last visit, and I reached out to Dr. Mendoza in between visits for recommendations on transition from fentanyl patch  - You need to discontinue the patch and replace it with oral opioid.  For a 12mcg patch I would give an extra 30mg of norco a day to use PRN to help with withdrawal.  After one week of this discontinue ALL norco for 12 hours and start her on suboxone 2mg TID.              5.  Urine toxicology screen - completed on 8/11/22. Repeat UDS ordered today. Advised to complete within 1 week. She will [facility] appointment on 8/21/23, advised it is okay to complete then and messaged scheduling team to help with making lab appointment               6.  Opioid agreement - completed on 8/11/22. Requested nursing reach out to facilitate renewal.               7. Procedures - We have been deferring repeat lumbar facet joint injections due to ongoing fungal infection, though this has since resolved. She is hesitant to repeat due to discomfort of procedure. However, she does appreciate significant benefit from injections.               8.  Follow up with ANIBAL Frank CNP in 2-3 months.     Since her last visit, Betsey Vanessa reports:  -Her pain is better in some ways, notes improvements in sleep.   -She  continues to have good days and bad days, notes last weekend was rough, also dealing with Meniere's flare.   -Recommended Lyrica increase at last visit to 100 mg BID, but she did not increase, current dose 75 mg BID.   -She does get some sedation with Lyrica.   -She thinks this is helping.   -Overall, she thinks pain is stable.   -She continues fentanyl 12 mcg/hr patch.   -She continues hydrocodone, takes 1-2 tabs per day.   -No major side effects from meds.   -She uses medical cannabis occasionally for severe pain.   -She is having increased low back pain, she is interested in repeating facet joint injections.   -She generally appreciates 98% pain benefit that lasts for several months.         Pain Information:   Pain rating: averages 7/10 on a 0-10 scale.      Interval history from last visit on 8/16/23:  -her pain is than it was at last visit.   -She was started on Lyrica at last visit, states she just started it on Monday.   -She reports some concerns for sleep disturbance since starting Lyrica, but she notes that there is a lot of stuff going on in her personal life right now that is contributing.   -Her current dose of Lyrica 50 mg BID.   -No major side effects outside of concern for sleep.   -She is open to further dosage increase.   -Hydrocodone had been increased a few months ago temporarily.   -She takes 1 tab in morning and 1 tab in evening, sometimes additional tab PRN.   -She is open to reduce hydrocodone by 10 tabs this next months.   -She reports she has not used medical cannabis for a while due to side effects and exacerbates vertigo/dizziness, only uses occasionally on days with severe pain.   Pain Information:              Pain rating: averages 3/10 on a 0-10 scale.        Interval history from last visit on 7/5/23:  -her pain is worse than it was at last visit.   -She reports pain is widespread, sometimes very sharp and impacts sleep, she just seems to hurt all the time.   -She notes that today  pain is not as bad.   -She is having trouble with sleep due to pain.   -Dr. Carrillo's LOV from 5/18/23 suggests that current symptoms are most consistent with fibromyalgia, as RA is fairly well controlled.   -She notes issues with intermittent constipation and loose stools, has had these issues ongoing for an extended period of time but she notes symptoms have worsened more recently.   -She notes she also recently started taking iron supplement, which is likely contributing to GI symptoms to an extent.   -She also has hx of psoriasis that has been flared up, also notes allergies have been worse this season.   -She has tried duloxetine in the past, had significant side effects, sounds like manic symptoms, now listed as allergy.   -She has tried low dose Lyrica in the past, stopped due to side effects but she notes this could have been due to concurrent use of clonazepam.   -She is open to trial again and will space out from benzo doses.   -She takes clonazepam PRN for vertigo and anxiety.   -She is overwhelmed with symptoms right now, trying to think about prior pain flares and what she did for it.   -She tried massage therapy in the past, states she went to [employer] [facility] school, states this was helpful and she felt it was well worth it.   -She knows insurance does not usually cover this tx, but she is open to explore coverage with insurance.   -She is open to acupuncture referral, pending what she finds out about massage therapy.   -She is thinking about starting chair yoga.   -She continues fentanyl 12 mcg/hr patch and norco PRN.   -She appreciates benefit from these meds, but more recently she started having increased pain and did ask about adjustments to fentanyl today, which I advised against.   -She is open to trial other non-opioid medications to better target fibromyalgia related pain.   -Her brother is sick, has multiple myeloma.   -She states he has been sick for a while and now they are  "getting some answers.   Pain Information:              Pain rating: averages 5/10 on a 0-10 scale.        Interval history from last visit on 4/13/23:  -her pain is about the same as it was at last visit.   -Her ear infection has resolved but she will not be resuming the Orencia injections until sometime around June.  -She has follow up with rheum next and will have better idea about resuming RA tx.   -She is looking forward to follow up.   -Norco was increased at last visit to target pain until she resumes RA tx.   -She has appreciated the additional 1 tab per day.   -Denies side effects from medications.   Pain Information:              Pain rating: averages 6/10 on a 0-10 scale.        Interval history from last visit on 2/22/23:  -Her pain is worse than it was since last visit.   -She added in scheduled tylenol that has not been very helpful.  -She continues to have significant pain.   -She takes Norco 7.5 mg, 1/2-1 tab q6h PRN, #45 tabs per month.   -She is asking if she can take additional tylenol.      Interval history from last visit on 1/10/23:  -Her pain is about the same as last visit.   -Pain is widespread in joints, they feel warm and swollen.   -She reports her fungal infection in her ear has resolved.    -Per chart review, last fungal swab on file from 12/8/22 negative.  -She is still off Orencia right now.  -She cannot restart this med for 3-6 months after surgery.   -Rheumatology suggested she try adding Tylenol.   -She is open to trial of this scheduled throughout the day.   -She is struggling with constipation.   -She is taking miralax at home PRN, has not been as helpful.   -She is interested in other medications to help with OIC.      Interval history from last visit on 10/11/22:  -Pain has increased since last visit, \"pain has been nasty\" lately.   -She is still dealing with fungal infection, has follow up 10/27/22. Does not feel like infection is getting better and has   -She is still " struggling with dizziness and nausea r/t fungal infection in ear.   -She has missed Orencia injection that she is using to tx RA due to fungal infection.   -She is interested in repeat lumbar facet injections with steroid, will need to check with ENT first.   -Medical cannabis:                          1. Charleen SL spray (1:1 CBD and THC) - use 1-2 sprays up to 5 times daily PRN; uses this on her bad days, but often takes at bedtime, takes for short acting                           2. Charleen oil tincture for long acting            Current Pain Treatments:     -Voltaren gel 1% PRN (somewhat helpful)  -Medical cannabis PRN (helpful)  -Celebrex 100mg BID (helpful)  -Fentanyl 12mcg/hr transdermal patch Q 72 hours (helpful)  -Norco 7.5/325mg take 0.5-1 tab Q 6 hours PRN pain (helpful, has been using this more)  -plaquenil 200mg every day and 400mg every other day  -hydroxyzine 25mg TID PRN severe itching (helpful, but doesn't need to use often)     Current MME: 40 (daily average with fentanyl patch and 1.5 tabs Norco)     Review of [place] Prescription Monitoring Program (): No concern for abuse or misuse of controlled medications based on this report. Reviewed  - appears appropriate.      Annual Controlled Substance Agreement/UDS due date: Completed on 8/11/22     Past pain treatments:  Injections:   -10/12/2020 bilateral L4-5, L5-S1 facet joint injections with Dr. Bailee Houser  (quite helpful)  -5/12/2021 bilateral L4-5, L5-S1 facet joint injections with Dr. Bailee Houser  (quite helpful)  -7/5/22 bilateral L4-5, L5-S1 facet joint injections with Dr. Lion       Medications:  Current Outpatient Medications   Medication Sig Dispense Refill    calcium carbonate (TUMS) 500 MG chewable tablet Take 1 chew tab by mouth 2 times daily      celecoxib (CELEBREX) 100 MG capsule Take 1 capsule (100 mg) by mouth 2 times daily 180 capsule 2    cetirizine (EQ ALLERGY RELIEF, CETIRIZINE,) 10 MG tablet Take 1 tablet by mouth  once daily 90 tablet 3    cevimeline (EVOXAC) 30 MG capsule Take 1 capsule (30 mg) by mouth 3 times daily 270 capsule 2    clonazePAM (KLONOPIN) 0.5 MG tablet TAKE 1 TABLET BY MOUTH TWICE DAILY AS NEEDED FOR ANXIETY OK TO TAKE 1 TO 2 AT NIGHT AS NEEDED FOR SLEEP. SHOULD LAST ABOUT 90 DAYS 100 tablet 1    diclofenac (VOLTAREN) 1 % topical gel Apply 4 g topically 4 times daily 350 g 1    docusate sodium (EQ STOOL SOFTENER) 100 MG capsule Take 1 capsule (100 mg) by mouth 2 times daily 180 capsule 3    EPINEPHrine (ANY BX GENERIC EQUIV) 0.3 MG/0.3ML injection 2-pack Inject 0.3 mLs (0.3 mg) into the muscle as needed for anaphylaxis 2 each 0    escitalopram (LEXAPRO) 20 MG tablet TAKE 1 & 1/2 (ONE & ONE-HALF) TABLETS BY MOUTH ONCE DAILY IN THE EVENING 135 tablet 0    fentaNYL (DURAGESIC) 12 mcg/hr 72 hr patch Place 1 patch onto the skin every 72 hours remove old patch. Fill 10/27/23, start 10/29/23 to last 30 days 10 patch 0    Ferrous Gluconate 240 (27 Fe) MG TABS       fluorometholone (FML LIQUIFILM) 0.1 % ophthalmic suspension INSTILL 1 DROP INTO EACH EYE IN THE MORNING      fluticasone (FLOVENT HFA) 110 MCG/ACT inhaler Inhale 1 puff into the lungs 2 times daily 36 g 1    fluvastatin (LESCOL) 20 MG capsule Take 1 capsule (20 mg) by mouth At Bedtime 90 capsule 3    glimepiride (AMARYL) 2 MG tablet TAKE 1 TABLET BY MOUTH IN THE MORNING BEFORE BREAKFAST 90 tablet 3    HYDROcodone-acetaminophen (NORCO) 7.5-325 MG per tablet Take 0.5-1 tablets by mouth every 6 hours as needed for severe pain Max 3 tabs/day, #60 tablets to last 30 days. Fill 10/27/23, start 10/29/23 60 tablet 0    hydroxychloroquine (PLAQUENIL) 200 MG tablet Hydroxychloroquine 200mg daily; and an additional 200mg every other day. Yearly eye exam including 10-2 VF and SD-OCT 45 tablet 0    lifitegrast (XIIDRA) 5 % opthalmic solution Place 1 drop into both eyes 2 times daily 30 each 6    medical cannabis (Patient's own supply) Take 1 each (1 Dose) by mouth See  Admin Instructions (The purpose of this order is to document that the patient reports taking medical cannabis.  This is not a prescription, and is not used to certify that the patient has a qualifying medical condition.) 0 Information only     metFORMIN (GLUCOPHAGE) 500 MG tablet Take 2 tablets (1,000 mg) by mouth 2 times daily (with meals) 360 tablet 0    metroNIDAZOLE (METROGEL) 0.75 % external gel Apply topically 2 times daily Apply to face daily 45 g 5    naloxone (NARCAN) 4 MG/0.1ML nasal spray Spray 1 spray (4 mg) into one nostril alternating nostrils as needed for opioid reversal every 2-3 minutes until assistance arrives 0.2 mL 0    nystatin (MYCOSTATIN) 860806 UNIT/GM external powder Apply topically 2 times daily as needed 2 x daily PRN for rash 60 g 4    omeprazole (PRILOSEC) 20 MG DR capsule Take 1 capsule (20 mg) by mouth 2 times daily 180 capsule 3    pimecrolimus (ELIDEL) 1 % external cream Apply topically 2 times daily - use on back of neck and hands 60 g 5    potassium chloride ER (KLOR-CON M) 10 MEQ CR tablet Take 1 tablet (10 mEq) by mouth 2 times daily (Patient taking differently: Take 10 mEq by mouth daily) 180 tablet 3    pregabalin (LYRICA) 100 MG capsule Take 1 capsule (100 mg) by mouth 2 times daily 60 capsule 1    pregabalin (LYRICA) 50 MG capsule Take 1 capsule (50 mg) by mouth 2 times daily 60 capsule 1    pregabalin (LYRICA) 75 MG capsule Take 1 capsule (75 mg) by mouth 2 times daily 60 capsule 1    PROAIR  (90 Base) MCG/ACT inhaler Inhale 1-2 puffs into the lungs every 4 hours as needed for shortness of breath or wheezing 18 g 5    promethazine (PHENERGAN) 25 MG suppository Place 1 suppository (25 mg) rectally every 6 hours as needed for nausea 7 suppository 0    promethazine (PHENERGAN) 25 MG tablet Take 1 tablet (25 mg) by mouth every 6 hours as needed for nausea 15 tablet 0    promethazine (PHENERGAN) 25 MG tablet Take 1 tablet (25 mg) by mouth every 6 hours as needed for  nausea or other (Meniere's) For nusea 90 tablet 0    psyllium (METAMUCIL) 58.6 % POWD Take by mouth daily PRN      SENNA-docusate sodium (SENNA S) 8.6-50 MG tablet Take 1 tablet by mouth 2 times daily 60 tablet 1    triamcinolone (KENALOG) 0.1 % external cream Apply topically 2 times daily x5-10 days for flares 45 g 0    triamterene-HCTZ (DYAZIDE) 37.5-25 MG capsule Take 1 capsule by mouth daily 90 capsule 3    VITAMIN D3 50 MCG (2000 UT) tablet Take 2 tablets by mouth daily at 2 pm      acetaminophen (TYLENOL) 500 MG tablet Take 500-1000 mg BID, may take additional 500-1000 mg dose daily as needed, max 4000 mg from all sources. (Patient not taking: Reported on 10/18/2023) 180 tablet 1    alcohol swab prep pads Use to swab area of injection/rosie as directed (Patient not taking: Reported on 10/18/2023) 100 each 3    blood glucose (NO BRAND SPECIFIED) test strip Use to test blood sugar 2 times daily or as directed. To accompany: Blood Glucose Monitor Brands: per insurance. (Patient not taking: Reported on 10/18/2023) 200 strip 11    blood glucose calibration (NO BRAND SPECIFIED) solution Use to calibrate blood glucose monitor as needed as directed. To accompany: Blood Glucose Monitor Brands: per insurance. (Patient not taking: Reported on 10/18/2023) 1 Bottle 3    fluticasone (FLONASE) 50 MCG/ACT nasal spray Spray 1-2 sprays into both nostrils daily (Patient not taking: Reported on 10/18/2023) 16 g 0    glucose blood VI test strips strip daily (Patient not taking: Reported on 10/18/2023)      hydrOXYzine (ATARAX) 25 MG tablet Take 1 tablet (25 mg) by mouth 3 times daily as needed for itching (Patient not taking: Reported on 10/18/2023) 40 tablet 0    magnesium 250 MG tablet Take 1 tablet by mouth daily (Patient not taking: Reported on 10/18/2023)      thin (NO BRAND SPECIFIED) lancets Use to test blood sugar 2 times daily or as directed. To accompany: Blood Glucose Monitor Brands: per insurance. (Patient not taking:  Reported on 10/18/2023) 200 each 11       Medical History: any changes in medical history since they were last seen? No      Objective:    Physical Exam:  Blood pressure 134/74, pulse 79, not currently breastfeeding.  Constitutional: Well developed, well nourished, appears stated age.  Gait: Grossly intact   HEENT: Head atraumatic, normocephalic. Eyes without conjunctival injection or jaundice. Oropharynx clear. Neck supple. No obvious neck masses.  Skin: No rash, lesions, or petechiae of exposed skin.   Psychiatric/mental status: Alert, without lethargy or stupor. Speech fluent. Appropriate affect. Mood normal. Able to follow commands without difficulty.     Diagnostic Tests/Imaging/Labs:  Creatinine 9/29/23 - GFR 69    BILLING TIME DOCUMENTATION:   The total TIME spent on this patient on the date of the encounter/appointment was 41 minutes.      TOTAL TIME includes:   Time spent preparing to see the patient (reviewing records and tests)   Time spent face to face (or over the phone) with the patient   Time spent ordering tests, medications, procedures and referrals   Time spent Referring and communicating with other healthcare professionals   Time spent documenting clinical information in Epic

## 2023-10-18 NOTE — PROGRESS NOTES
10/18/23 1505   PEG: A Thee-Item Scale Assessing Pain Intensity and Interference        0 = No pain / No interference    10 = Pain as bad as you can imagine / Completely interferes   What number best describes your pain on average in the past week? 7   What number best describes how, during the past week, pain has interfered with your enjoyment of life? 7   What number best describes how, during the past week, pain has interfered with your general activity? 7   PEG Total Score 7

## 2023-10-18 NOTE — PATIENT INSTRUCTIONS
1.  Pain Physical Therapy:  Continue home exercise program as tolerated, walking and yoga. May consider pain PT referral in future for fibromyalgia targeted therapy, pending outcome from other therapies (Lyrica, possibly massage or acupuncture). She also expressed interest in re-engaging with massage therapy, notes she appreciated significant benefit in the past when living in [place], went to massage therapy school, as this was affordable option for her. Advised to check with insurance regarding coverage of massage therapy and potential covered providers, though I was transparent that most insurances do not cover massage therapy at this point. Also discussed acupuncture, which is covered by many insurances, and will consider referral at follow up, pending outcome from Lyrica and possibly massage therapy.               2.  Pain Psychologist to address relaxation, behavioral change, coping style, and other factors important to improvement.  NO - she is coping well with chronic pain at this time. May consider in the future if needed.               3.  Diagnostic Studies:  None               4.  Medication Management:   Increase Lyrica - 75 mg in morning and 100 mg at bedtime x 1 week, then increase to 100 mg twice daily. Advised to monitor for improvement in baseline pain level and intensity of fluctuations.  This dosage increase was recommended at last visit, but she has continued 75 mg twice daily.  Updated prescription for 100 mg capsules sent in to pharmacy today.  Monitor for sedation - may cause dizziness or drowsiness. Be careful driving/moving around until you know effects of medication. Avoid concurrent alcohol use.   Change tylenol 500-1000 mg to twice daily as needed. She is not certain of benefit, recommend transitioning from scheduled to PRN and monitor for Discussed daily max dosage, encouraged her to keep below 3000 mg from all sources (Norco included).  Last hepatic panel within last 12 months was  WNL.   Continue Senna twice daily for constipation. Use miralax 1-2 times daily as needed. Constipation has improved, achieving daily BM.   Continue fentanyl 12 mcg/hr patch.  Continue hydrocodone to 2-3 tabs per day, #60 tabs to last 30 days.  She has been working on tapering down on use.  Advised that taper goal will be #45 tabs, which was her prior dosage, as I had agreed to temporary increase until Orencia injections resumed. Of note, rheumatology thinks fibromyalgia is most significant contributing factor to current pain symptoms, RA is stable.  Updated prescription sent to pharmacy today.  Keep naloxone on hand at home in the event of accidental overdose. New prescription sent into pharmacy today.   Continue medical cannabis. She will let me know if renewal needed in between visits.   We will consider buprenorphine in the future. We discussed this again at last visit, and I reached out to Dr. Mendoza in between visits for recommendations on transition from fentanyl patch  - You need to discontinue the patch and replace it with oral opioid.  For a 12mcg patch I would give an extra 30mg of norco a day to use PRN to help with withdrawal.  After one week of this discontinue ALL norco for 12 hours and start her on suboxone 2mg TID.              5.  Urine toxicology screen - completed on 8/21/23.               6.  Opioid agreement - completed on 8/21/23.               7. Procedures -she had bilateral L4-5 and L5-S1 facet joint injections in the past that were of substantial benefit.  She reports 98% improvement in symptoms that lasts several months, last injections in July 2022.  We discussed the possibility that insurance will no longer cover facet joint injections, and, if denied by insurance, we will plan for lumbar medial branch blocks with plan to proceed to radiofrequency ablation alternatively.  She was provided with information on MBB and RFA today.  Advised I will have nursing reach out to update her, if  procedure planned we will need to change.              8.  Follow up with ANIBAL Frank CNP in 2-3 months.     ----------------------------------------------------------------  Clinic Number:  [phone]   Call with any questions about your care and for scheduling assistance.   Calls are returned Monday through Friday between 8 AM and 4:30 PM. We usually get back to you within 2 business days depending on the issue/request.    If we are prescribing your medications:  For opioid medication refills, call the clinic or send a [employer] message 7 days in advance.  Please include:  Name of requested medication  Name of the pharmacy.  For non-opioid medications, call your pharmacy directly to request a refill. Please allow 3-4 days to be processed.   Per [place] State Law:  All controlled substance prescriptions must be filled within 30 days of being written.    For those controlled substances allowing refills, pickup must occur within 30 days of last fill.      We believe regular attendance is key to your success in our program!    Any time you are unable to keep your appointment we ask that you call us at least 24 hours in advance to cancel.This will allow us to offer the appointment time to another patient.   Multiple missed appointments may lead to dismissal from the clinic.

## 2023-10-20 NOTE — TELEPHONE ENCOUNTER
Medication:   Hydroxychloroquine 200mg  Last written on:   09/21/2023  Quantity:   45    Refills:   0    Last office visit:   09/21/2023  Next office visit:   01/18/2023  Last labs:   09/21/2023  Eye exam:

## 2023-10-20 NOTE — TELEPHONE ENCOUNTER
RN: Please check with Betsey Vanessa regarding the hydroxychloroquine toxicity monitoring eye exam that is required to safely continue hydroxychloroquine.  If this eye exam has been performed then we need record of it.  It has not been performed, and needs to be performed before hydroxychloroquine can be refilled.    Terrence Carrillo MD  10/20/2023

## 2023-10-27 NOTE — TELEPHONE ENCOUNTER
Called [facility] and request eye records to be faxed over. FV was completed.    LEIDY Carr RN 10/27/2023 2:52 PM

## 2023-10-31 NOTE — TELEPHONE ENCOUNTER
Eye exam given to Dr. Carrillo.  Madeline Cheung, [facility] Rheumatology  10/31/2023

## 2023-11-01 NOTE — TELEPHONE ENCOUNTER
Pt is calling to follow up on the refill request; please contact Pt back to let her know if there is an issue sending in refills.

## 2023-11-02 NOTE — TELEPHONE ENCOUNTER
RN: Please notify patient that hydroxychloroquine has been refilled.    The records of the visual fields and OCT are not able to be interpreted by myself; plus the quality of the fax isn't sufficient for anyone to interpret the results.   Interpretation of the eye exam (VF and OCT) should be performed by the ophthalmologist.  Need documentation of hydroxychloroquine toxicity monitoring results.     Terrence Carrillo MD  11/2/2023

## 2023-11-06 NOTE — TELEPHONE ENCOUNTER
Prescription sent on 11/2/2023 to [facility] in [place].  /Madeline Cheung CMA Rheumatology  11/6/2023

## 2023-11-15 NOTE — PATIENT INSTRUCTIONS
1. You were seen in the ENT Clinic today by Dr. Stallings.  If you have any questions or concerns after your appointment, please call   - Option 1: ENT Clinic: [phone]   - Option 2: Ni (Dr. Stallings's Nurse): [phone]          Ofe (Dr. Stallings's Nurse): [phone]     2.   Plan to return to clinic in 6 months (wait til audiology appt tomorrow to schedule)    How to Contact Us:  Send a [employer]19 message to your provider. Our team will respond to you via cube19. Occasionally, we will need to call you to get further information.  For urgent matters (Monday-Friday), call the ENT Clinic: [phone] and speak with a call center team member - they will route your call appropriately.   If you'd like to speak directly with a nurse, please find our contact information below. We do our best to check voicemail frequently throughout the day, and will work to call you back within 1-2 days. For urgent matters, please use the general clinic phone numbers listed above.        Ni GILES LPN  [facility] - Otolaryngology

## 2023-11-15 NOTE — NURSING NOTE
"Chief Complaint   Patient presents with    RECHECK     Follow up      Blood pressure 136/72, pulse 86, temperature 97.4  F (36.3  C), height 1.575 m (5' 2\"), weight 76.2 kg (168 lb), SpO2 97%, not currently breastfeeding.  Leonides Cook LPN    "

## 2023-11-15 NOTE — LETTER
11/15/2023      RE: Betsey Vanessa  [address]  [place] [address]       Betsey Vanessa is a 63 year old female who is here for a right BAHA and ear check. She was last seen 5/11/2023 with healthy right ear despite wearing hearing aid for the past 3 months. No new complaints, pt is cleaning her abutment daily.    She continues to have episodes of vertigo which can last hours at a time. She also has chronic imbalance and uses a 4 footed cane.     Physical Examination: Female in no acute distress.  Alert and answering questions appropriately.  HB 1/6 bilaterally. Both ears examined under the microscope.   The right ear canal is dry and healthy, TM with a stable perforation and only a small amount of mucosalization on the TM, middle ear healthy. The BAHA abutment is solid and the skin healthy around it with no crusting.   Left ear canal clean, TM intact, middle ear well aerated.    Assessment and Plan: Betsey Vanessa is a 63 year old female who is here for a right BAHA and ear check. On exam ear looks great. She may sometimes get moisture on the right due to the perforation but there is no evidence of infection or even inflammation today. Ear looks better without a hearing aid.     We had another discussion regarding her vertigo and imbalance. She has had a left labyrinthectomy by another MD in the past. VNG showed a small amount of residual hearing with ice caloric on the left. She also had central findings. She underwent gentamicin instillation through a PE tube. This did not change her symptoms. She has complained of intermittent vertigo and chronic imbalance the entire time I have been seeing her and nothing has changed her symptoms. She does think that vestibular therapy may have helped some. She still does some of her exercises and also does yoga and walks her dog daily. Due to her right ear being her only hear ear, we cannot do anything ablative to the right. She says she understands this but would like a  refill on her phenergan for nausea. I strongly recommended that she not use clonazepam on a regular basis, particularly for vertigo, as it is not changing her ear function but is acting centrally and is addictive. She has a prescription for anxiety but reports she uses it infrequently.    She has an Audiology appointment tomorrow. She will follow up in 6 months with me.    Please note that 20 minutes of this 22 minute visit were spent in consultation regarding her vertigo and imbalance.    Scribe Disclosure:   I, KEVEN DONOVAN, am serving as a scribe; to document services personally performed by Laura Stallings MD -based on data collection and the provider's statements to me.     Provider Disclosure:  I agree with above History, Review of Systems, Physical exam and Plan.  I have reviewed the content of the documentation and have edited it as needed. I have personally performed the services documented here and the documentation accurately represents those services and the decisions I have made.          Laura Stallings MD

## 2023-11-15 NOTE — PROGRESS NOTES
Betsey Vanessa is a 63 year old female who is here for a right BAHA and ear check. She was last seen 5/11/2023 with healthy right ear despite wearing hearing aid for the past 3 months. No new complaints, pt is cleaning her abutment daily.    She continues to have episodes of vertigo which can last hours at a time. She also has chronic imbalance and uses a 4 footed cane.     Physical Examination: Female in no acute distress.  Alert and answering questions appropriately.  HB 1/6 bilaterally. Both ears examined under the microscope.   The right ear canal is dry and healthy, TM with a stable perforation and only a small amount of mucosalization on the TM, middle ear healthy. The BAHA abutment is solid and the skin healthy around it with no crusting.   Left ear canal clean, TM intact, middle ear well aerated.    Assessment and Plan: Betsey Vanessa is a 63 year old female who is here for a right BAHA and ear check. On exam ear looks great. She may sometimes get moisture on the right due to the perforation but there is no evidence of infection or even inflammation today. Ear looks better without a hearing aid.     We had another discussion regarding her vertigo and imbalance. She has had a left labyrinthectomy by another MD in the past. VNG showed a small amount of residual hearing with ice caloric on the left. She also had central findings. She underwent gentamicin instillation through a PE tube. This did not change her symptoms. She has complained of intermittent vertigo and chronic imbalance the entire time I have been seeing her and nothing has changed her symptoms. She does think that vestibular therapy may have helped some. She still does some of her exercises and also does yoga and walks her dog daily. Due to her right ear being her only hear ear, we cannot do anything ablative to the right. She says she understands this but would like a refill on her phenergan for nausea. I strongly recommended that she not use  clonazepam on a regular basis, particularly for vertigo, as it is not changing her ear function but is acting centrally and is addictive. She has a prescription for anxiety but reports she uses it infrequently.    She has an Audiology appointment tomorrow. She will follow up in 6 months with me.    Please note that 20 minutes of this 22 minute visit were spent in consultation regarding her vertigo and imbalance.    Scribe Disclosure:   I, KEVEN DONOVAN, am serving as a scribe; to document services personally performed by Laura Stallings MD -based on data collection and the provider's statements to me.     Provider Disclosure:  I agree with above History, Review of Systems, Physical exam and Plan.  I have reviewed the content of the documentation and have edited it as needed. I have personally performed the services documented here and the documentation accurately represents those services and the decisions I have made.

## 2023-11-16 NOTE — PROGRESS NOTES
AUDIOLOGY REPORT    BACKGROUND INFORMATION: Betsey Vanessa was seen in the Audiology Clinic at [facility] on 11/16/2023 for follow-up. Previous results have revealed a complete loss of hearing in left ear (profound sensorineural hearing loss) and a mixed right hearing loss.     She was implanted with a right [employer] BAHA in [place], [place] but has had extensive issues regarding feedback of the device. She was never able to wear her previous device easily and so had surgery on 2/10/2023 to remove the [employer] abutment and have a new [employer] Abutment place.She was fit with an [employer] Ponto 5 SP device on 5/11/2023. The patient reports that things are overall going very well.  She feels that she hears well with the device and is happy with the new placement of the abutment.    TEST RESULTS AND PROCEDURES: The abutment site was inspected, no drainage, redness or swelling was noted.   She did not want any programming changes made to the device as she feels that she has good volume with it. She did not want any additional programs added. She feels comfortable using her accessories.     4 packs (24 cells) of size 675 batteries were dispensed and will be billed to the patient's insurance.    SUMMARY AND RECOMMENDATIONS: A first follow-up for a new BAHA devices was performed today and the patient reports that she is very happy with the new device.  It is recommended that she return every 1-2 years for programming/monitor hearing, sooner if concerns arise. Call this clinic with questions regarding today s visit.      Alee Lutz  Audiologist  [place] License  #[id number]

## 2023-11-29 NOTE — TELEPHONE ENCOUNTER
[facility] Call Center    Phone Message    May a detailed message be left on voicemail: yes     Reason for Call: Medication Refill Request    Has the patient contacted the pharmacy for the refill? Yes   Name of medication being requested: fentaNYL (DURAGESIC) 12 mcg/hr 72 hr patch and  HYDROcodone-acetaminophen (NORCO) 7.5-325 MG per tablet   Provider who prescribed the medication: Ronda Vasquez  Pharmacy:   [facility] [address] [place] [id number] [address]     Date medication is needed: ASAP   Pt reminded to call 5-7 business days ahead  Action Taken: Message routed to:  Other: [place] [facility]    Travel Screening: Not Applicable

## 2023-11-29 NOTE — TELEPHONE ENCOUNTER
Received call from patient requesting refill(s) of fentaNYL (DURAGESIC) 12 mcg/hr 72 hr patch   and  HYDROcodone-acetaminophen (NORCO) 7.5-325 MG per tablet      Last dispensed from pharmacy on 11/02/23 for both    Patient's last office/virtual visit by prescribing provider on 10/18/23  Next office/virtual appointment scheduled for 12/19/23    Last urine drug screen date 08/21/23  Current opioid agreement on file (completed within the last year) Yes Date of opioid agreement: 08/21/23    E-prescribe to pharmacy-[facility] [address] [facility] [address] [address]     Will route to nursing [place] for review and preparation of prescription(s).

## 2023-11-30 NOTE — TELEPHONE ENCOUNTER
Medication refill information reviewed.     Both meds last due:  Fill 10/27/23, start 10/29/23 to last 30 days both picked up on 11/2/23  Due date:  12/2/23      Prescriptions prepped for review.     Sanjuanita RN-BSN  [facility]

## 2023-11-30 NOTE — TELEPHONE ENCOUNTER
Chart reviewed - Request appears appropriate. Refilled for 30 day supply.     Ronda Vasquez DNP, APRN, AGNP-C  [facility]

## 2023-12-16 NOTE — PROGRESS NOTES
Video-Visit Details    Type of service:  Video Visit     Originating Location (pt. Location): Home    Distant Location (provider location):  On-site  Platform used for Video Visit: [facility]     Date of visit: 12/19/2023      Assessment:   Betsey Vanessa is a 63 year old female with a past medical history significant for RA, fibromyalgia, polyarticular arthritis, HTN, Meniere's disease, depression, migraine who presents with complaints of widespread pain.      1. Chronic pain syndrome - Onset of back pain occurred many years ago, and she has pain in multiple joints. Etiology of pain is associated with multiple factors, including but not limited to, lumbar spondylosis, lumbar facet arthropathy, rheumatoid arthritis, and myofascial component.     Assigned to [facility] nursing team.     Visit Diagnoses:  1. Chronic pain syndrome    2. Multiple joint pain    3. Rheumatoid arthritis of multiple sites without rheumatoid factor (H)    4. Lumbar facet joint pain    5. Fibromyalgia    6. Bilateral hand pain        Plan:  Diagnosis reviewed, treatment option addressed, and risk/benifits discussed.  Self-care instructions given.  I am recommending a multidisciplinary treatment plan to help this patient better manage their pain.      1.  Pain Physical Therapy:  Continue home exercise program as tolerated, walking and yoga. May consider pain PT referral in future for fibromyalgia targeted therapy.              2.  Pain Psychologist to address relaxation, behavioral change, coping style, and other factors important to improvement.  NO - she is coping well with chronic pain at this time. May consider in the future if needed.               3.  Diagnostic Studies:  None               4.  Medication Management:   Lyrica - Continue 100 mg twice daily. She reports significant pain benefit, concerns for daytime fatigue/drowsiness present. Discussed that she is on multiple medications with CNS depression  effects, advised to avoid taking Lyrica along with any other agents with sedation effects (hydrocodone, promethazine, clonazepam). She will monitor/track how she is taking medications and we will discuss at next visit and may consider further dosage increase of Lyrica.   Change tylenol 500-1000 mg to twice daily as needed. She is not certain of benefit, recommend transitioning from scheduled to PRN and monitor for Discussed daily max dosage, encouraged her to keep below 3000 mg from all sources (Norco included).  Last hepatic panel within last 12 months was WNL.   Continue Senna twice daily for constipation. Use miralax 1-2 times daily as needed. Constipation has improved, achieving daily BM.   Continue fentanyl 12 mcg/hr patch.  Continue hydrocodone to 2-3 tabs per day, #55 tabs to last 30 days.  Do not combine with clonazepam. She has been working on tapering down on use.  Advised that taper goal will be #45 tabs per month, which was her prior dosage, as I had agreed to temporary increase until Orencia injections resumed. Of note, rheumatology thinks fibromyalgia is most significant contributing factor to current pain symptoms, RA is stable.  Updated prescription sent to pharmacy today.  Keep naloxone on hand at home in the event of accidental overdose. New prescription sent into pharmacy today.   Continue medical cannabis. She will let me know if renewal needed in between visits.   We will consider buprenorphine in the future. We discussed this again at prior visit, and I reached out to Dr. Mendoza for recommendations on transition from fentanyl patch  - You need to discontinue the patch and replace it with oral opioid.  For a 12mcg patch I would give an extra 30mg of norco a day to use PRN to help with withdrawal.  After one week of this discontinue ALL norco for 12 hours and start her on suboxone 2mg TID.              5.  Urine toxicology screen - completed on 8/21/23.               6.  Opioid agreement -  completed on 8/21/23.               7. Procedures -she had bilateral L4-5 and L5-S1 facet joint injections in the past that were of substantial benefit.  She reports 98% improvement in symptoms that lasts several months, last injections in July 2022.  At prior visit, we discussed the possibility that insurance will no longer cover facet joint injections, and, if denied by insurance, we will plan for lumbar medial branch blocks with plan to proceed to radiofrequency ablation alternatively.  She was provided with information on MBB and RFA at prior visit.  Advised I will have nursing reach out to update her, if procedure planned we will need to change. Of note, she did not schedule repeat injections in between visits, notes improvement in low back pain with Lyrica dosage increase. Will hold off on procedures for now, consider in future for increased low back pain.               8.  Follow up with ANIBAL Frank CNP in 2-3 months.       Review of Electronic Chart: Today I have also reviewed available medical information in the patient's medical record at [facility] ([facility]) and Care Everywhere (if available), including relevant provider notes, laboratory work, and imaging.     Ronda Vasquez, VIDHI, APRN, AGNP-C  [facility] Pain Management     -------------------------------------------------    Subjective:    Chief complaint:   Chief Complaint   Patient presents with    Pain       Interval history:  Betsey Vanessa is a 63 year old female last seen on 10/18/23.  They are a patient of mine seen in follow up.     Recommendations/plan at the last visit included:  1.  Pain Physical Therapy:  Continue home exercise program as tolerated, walking and yoga. May consider pain PT referral in future for fibromyalgia targeted therapy, pending outcome from other therapies (Lyrica, possibly massage or acupuncture). 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 Advised to check with insurance regarding coverage of massage therapy and potential covered providers, though I was transparent that most insurances do not cover massage therapy at this point. Also discussed acupuncture, which is covered by many insurances, and will consider referral at follow up, pending outcome from Lyrica and possibly massage therapy.               2.  Pain Psychologist to address relaxation, behavioral change, coping style, and other factors important to improvement.  NO - she is coping well with chronic pain at this time. May consider in the future if needed.               3.  Diagnostic Studies:  None               4.  Medication Management:   Increase Lyrica - 75 mg in morning and 100 mg at bedtime x 1 week, then increase to 100 mg twice daily. Advised to monitor for improvement in baseline pain level and intensity of fluctuations.  This dosage increase was recommended at last visit, but she has continued 75 mg twice daily.  Updated prescription for 100 mg capsules sent in to pharmacy today.  Monitor for sedation - may cause dizziness or drowsiness. Be careful driving/moving around until you know effects of medication. Avoid concurrent alcohol use.   Change tylenol 500-1000 mg to twice daily as needed. She is not certain of benefit, recommend transitioning from scheduled to PRN and monitor for Discussed daily max dosage, encouraged her to keep below 3000 mg from all sources (Norco included).  Last hepatic panel within last 12 months was WNL.   Continue Senna twice daily for constipation. Use miralax 1-2 times daily as needed. Constipation has improved, achieving daily BM.   Continue fentanyl 12 mcg/hr patch.  Continue hydrocodone to 2-3 tabs per day, #60 tabs to last 30 days.  She has been working on tapering down on use.  Advised that taper goal will be #45 tabs, which was her prior  dosage, as I had agreed to temporary increase until Orencia injections resumed. Of note, rheumatology thinks fibromyalgia is most significant contributing factor to current pain symptoms, RA is stable.  Updated prescription sent to pharmacy today.  Keep naloxone on hand at home in the event of accidental overdose. New prescription sent into pharmacy today.   Continue medical cannabis. She will let me know if renewal needed in between visits.   We will consider buprenorphine in the future. We discussed this again at last visit, and I reached out to Dr. Mendoza in between visits for recommendations on transition from fentanyl patch  - You need to discontinue the patch and replace it with oral opioid.  For a 12mcg patch I would give an extra 30mg of norco a day to use PRN to help with withdrawal.  After one week of this discontinue ALL norco for 12 hours and start her on suboxone 2mg TID.              5.  Urine toxicology screen - completed on 8/21/23.               6.  Opioid agreement - completed on 8/21/23.               7. Procedures -she had bilateral L4-5 and L5-S1 facet joint injections in the past that were of substantial benefit.  She reports 98% improvement in symptoms that lasts several months, last injections in July 2022.  We discussed the possibility that insurance will no longer cover facet joint injections, and, if denied by insurance, we will plan for lumbar medial branch blocks with plan to proceed to radiofrequency ablation alternatively.  She was provided with information on MBB and RFA today.  Advised I will have nursing reach out to update her, if procedure planned we will need to change.              8.  Follow up with ANIBAL Frank CNP in 2-3 months.     Since her last visit, Betsey Vanessa reports:  -She reports pain improved since last visit.   -Lyrica was increased from 75 mg BID to 100 mg BID. She states she was surprised by benefit.   -She is having more fatigue/sedation during daytime.  She reports other medications that can cause CNS depression.   -She reports bedtime Lyrica dose is okay, but after she increased morning dose she has noticed more prominent fatigue.   -She continues to have good days and bad days, but overall she reports pain is better with Lyrica.   -She reports promethazine has been part of Meniere's tx plan for 10+ years. She notes significant drying effect from medication. She also takes clonazepam as needed. She has daytime drowsiness as well, though not sure if this has changed much with Lyrica increase. She is open to try spacing out medications with sedation effects and see if this helps.   -She reports avoiding combining medications that cause CNS depression, though she does have multiple agents on board, including Lyrica, opioids, benzo, promethazine.  -She denies other side effects, no constipation.     Pain Information:   Pain rating: averages 7/10 on a 0-10 scale.      Interval history from last visit on 10/18/23:  -Her pain is better in some ways, notes improvements in sleep.   -She continues to have good days and bad days, notes last weekend was rough, also dealing with Meniere's flare.   -Recommended Lyrica increase at last visit to 100 mg BID, but she did not increase, current dose 75 mg BID.   -She does get some sedation with Lyrica.   -She thinks this is helping.   -Overall, she thinks pain is stable.   -She continues fentanyl 12 mcg/hr patch.   -She continues hydrocodone, takes 1-2 tabs per day.   -No major side effects from meds.   -She uses medical cannabis occasionally for severe pain.   -She is having increased low back pain, she is interested in repeating facet joint injections.   -She generally appreciates 98% pain benefit that lasts for several months.   Pain Information:              Pain rating: averages 7/10 on a 0-10 scale.        Interval history from last visit on 8/16/23:  -her pain is than it was at last visit.   -She was started on Lyrica at last  visit, states she just started it on Monday.   -She reports some concerns for sleep disturbance since starting Lyrica, but she notes that there is a lot of stuff going on in her personal life right now that is contributing.   -Her current dose of Lyrica 50 mg BID.   -No major side effects outside of concern for sleep.   -She is open to further dosage increase.   -Hydrocodone had been increased a few months ago temporarily.   -She takes 1 tab in morning and 1 tab in evening, sometimes additional tab PRN.   -She is open to reduce hydrocodone by 10 tabs this next months.   -She reports she has not used medical cannabis for a while due to side effects and exacerbates vertigo/dizziness, only uses occasionally on days with severe pain.   Pain Information:              Pain rating: averages 3/10 on a 0-10 scale.        Interval history from last visit on 7/5/23:  -her pain is worse than it was at last visit.   -She reports pain is widespread, sometimes very sharp and impacts sleep, she just seems to hurt all the time.   -She notes that today pain is not as bad.   -She is having trouble with sleep due to pain.   -Dr. Carrillo's LOV from 5/18/23 suggests that current symptoms are most consistent with fibromyalgia, as RA is fairly well controlled.   -She notes issues with intermittent constipation and loose stools, has had these issues ongoing for an extended period of time but she notes symptoms have worsened more recently.   -She notes she also recently started taking iron supplement, which is likely contributing to GI symptoms to an extent.   -She also has hx of psoriasis that has been flared up, also notes allergies have been worse this season.   -She has tried duloxetine in the past, had significant side effects, sounds like manic symptoms, now listed as allergy.   -She has tried low dose Lyrica in the past, stopped due to side effects but she notes this could have been due to concurrent use of clonazepam.   -She is open  to trial again and will space out from benzo doses.   -She takes clonazepam PRN for vertigo and anxiety.   -She is overwhelmed with symptoms right now, trying to think about prior pain flares and what she did for it.   -She tried massage therapy in the past, states she went to [employer] [facility] school, states this was helpful and she felt it was well worth it.   -She knows insurance does not usually cover this tx, but she is open to explore coverage with insurance.   -She is open to acupuncture referral, pending what she finds out about massage therapy.   -She is thinking about starting chair yoga.   -She continues fentanyl 12 mcg/hr patch and norco PRN.   -She appreciates benefit from these meds, but more recently she started having increased pain and did ask about adjustments to fentanyl today, which I advised against.   -She is open to trial other non-opioid medications to better target fibromyalgia related pain.   -Her brother is sick, has multiple myeloma.   -She states he has been sick for a while and now they are getting some answers.   Pain Information:              Pain rating: averages 5/10 on a 0-10 scale.        Interval history from last visit on 4/13/23:  -her pain is about the same as it was at last visit.   -Her ear infection has resolved but she will not be resuming the Orencia injections until sometime around June.  -She has follow up with rheum next and will have better idea about resuming RA tx.   -She is looking forward to follow up.   -Norco was increased at last visit to target pain until she resumes RA tx.   -She has appreciated the additional 1 tab per day.   -Denies side effects from medications.   Pain Information:              Pain rating: averages 6/10 on a 0-10 scale.        Interval history from last visit on 2/22/23:  -Her pain is worse than it was since last visit.   -She added in scheduled tylenol that has not been very helpful.  -She continues to have significant pain.  "  -She takes Norco 7.5 mg, 1/2-1 tab q6h PRN, #45 tabs per month.   -She is asking if she can take additional tylenol.      Interval history from last visit on 1/10/23:  -Her pain is about the same as last visit.   -Pain is widespread in joints, they feel warm and swollen.   -She reports her fungal infection in her ear has resolved.    -Per chart review, last fungal swab on file from 12/8/22 negative.  -She is still off Orencia right now.  -She cannot restart this med for 3-6 months after surgery.   -Rheumatology suggested she try adding Tylenol.   -She is open to trial of this scheduled throughout the day.   -She is struggling with constipation.   -She is taking miralax at home PRN, has not been as helpful.   -She is interested in other medications to help with OIC.      Interval history from last visit on 10/11/22:  -Pain has increased since last visit, \"pain has been nasty\" lately.   -She is still dealing with fungal infection, has follow up 10/27/22. Does not feel like infection is getting better and has   -She is still struggling with dizziness and nausea r/t fungal infection in ear.   -She has missed Orencia injection that she is using to tx RA due to fungal infection.   -She is interested in repeat lumbar facet injections with steroid, will need to check with ENT first.   -Medical cannabis:                          1. Charleen SL spray (1:1 CBD and THC) - use 1-2 sprays up to 5 times daily PRN; uses this on her bad days, but often takes at bedtime, takes for short acting                           2. Charleen oil tincture for long acting            Current Pain Treatments:     -Voltaren gel 1% PRN (somewhat helpful)  -Medical cannabis PRN (helpful)  -Celebrex 100mg BID (helpful)  -Fentanyl 12mcg/hr transdermal patch Q 72 hours (helpful)  -Norco 7.5/325mg take 0.5-1 tab Q 6 hours PRN pain (helpful, has been using this more)  -plaquenil 200mg every day and 400mg every other day  -hydroxyzine 25mg TID PRN severe " itching (helpful, but doesn't need to use often)     Current MME: 40 (daily average with fentanyl patch and 1.5 tabs Norco)     Review of [place] Prescription Monitoring Program (): No concern for abuse or misuse of controlled medications based on this report. Reviewed  - appears appropriate.      Annual Controlled Substance Agreement/UDS due date: Completed on 10/18/23.     Past pain treatments:  Injections:   -10/12/2020 bilateral L4-5, L5-S1 facet joint injections with Dr. Bailee Houser  (quite helpful)  -5/12/2021 bilateral L4-5, L5-S1 facet joint injections with Dr. Bailee Houser  (quite helpful)  -7/5/22 bilateral L4-5, L5-S1 facet joint injections with Dr. Lion     Medications:  Current Outpatient Medications   Medication Sig Dispense Refill    acetaminophen (TYLENOL) 500 MG tablet Take 500-1000 mg BID, may take additional 500-1000 mg dose daily as needed, max 4000 mg from all sources. 180 tablet 1    alcohol swab prep pads Use to swab area of injection/rosie as directed 100 each 3    blood glucose (NO BRAND SPECIFIED) test strip Use to test blood sugar 2 times daily or as directed. To accompany: Blood Glucose Monitor Brands: per insurance. 200 strip 11    blood glucose calibration (NO BRAND SPECIFIED) solution Use to calibrate blood glucose monitor as needed as directed. To accompany: Blood Glucose Monitor Brands: per insurance. 1 Bottle 3    calcium carbonate (TUMS) 500 MG chewable tablet Take 1 chew tab by mouth 2 times daily      celecoxib (CELEBREX) 100 MG capsule Take 1 capsule (100 mg) by mouth 2 times daily 180 capsule 2    cetirizine (EQ ALLERGY RELIEF, CETIRIZINE,) 10 MG tablet Take 1 tablet by mouth once daily 90 tablet 3    cevimeline (EVOXAC) 30 MG capsule Take 1 capsule (30 mg) by mouth 3 times daily 270 capsule 2    clonazePAM (KLONOPIN) 0.5 MG tablet TAKE 1 TABLET BY MOUTH TWICE DAILY AS NEEDED FOR ANXIETY OK TO TAKE 1 TO 2 AT NIGHT AS NEEDED FOR SLEEP. SHOULD LAST ABOUT 90 DAYS 100  tablet 1    diclofenac (VOLTAREN) 1 % topical gel Apply 4 g topically 4 times daily 350 g 1    docusate sodium (EQ STOOL SOFTENER) 100 MG capsule Take 1 capsule (100 mg) by mouth 2 times daily 180 capsule 3    EPINEPHrine (ANY BX GENERIC EQUIV) 0.3 MG/0.3ML injection 2-pack Inject 0.3 mLs (0.3 mg) into the muscle as needed for anaphylaxis 2 each 0    escitalopram (LEXAPRO) 20 MG tablet TAKE 1 & 1/2 (ONE & ONE-HALF) TABLETS BY MOUTH ONCE DAILY IN THE EVENING 135 tablet 3    fentaNYL (DURAGESIC) 12 mcg/hr 72 hr patch Place 1 patch onto the skin every 72 hours remove old patch. Fill 12/29/23, start 1/1/24 #10 patches to last 30 days 10 patch 0    Ferrous Gluconate 240 (27 Fe) MG TABS       fluorometholone (FML LIQUIFILM) 0.1 % ophthalmic suspension INSTILL 1 DROP INTO EACH EYE IN THE MORNING      fluticasone (FLONASE) 50 MCG/ACT nasal spray Spray 1-2 sprays into both nostrils daily 16 g 0    fluticasone (FLOVENT HFA) 110 MCG/ACT inhaler Inhale 1 puff into the lungs 2 times daily 36 g 1    fluvastatin (LESCOL) 20 MG capsule Take 1 capsule by mouth at bedtime 90 capsule 2    glimepiride (AMARYL) 2 MG tablet TAKE 1 TABLET BY MOUTH IN THE MORNING BEFORE BREAKFAST 90 tablet 3    glucose blood VI test strips strip daily      HYDROcodone-acetaminophen (NORCO) 7.5-325 MG per tablet Take 0.5-1 tablets by mouth every 6 hours as needed for severe pain Max 3 tabs/day, #55 tablets to last 30 days. Fill 12/29/23, start 1/1/24 55 tablet 0    hydroxychloroquine (PLAQUENIL) 200 MG tablet Hydroxychloroquine 200mg daily; and an additional 200mg every other day. Yearly eye exam including 10-2 VF and SD- tablet 3    lifitegrast (XIIDRA) 5 % opthalmic solution Place 1 drop into both eyes 2 times daily 30 each 6    magnesium 250 MG tablet Take 1 tablet by mouth daily      medical cannabis (Patient's own supply) Take 1 each (1 Dose) by mouth See Admin Instructions (The purpose of this order is to document that the patient reports  taking medical cannabis.  This is not a prescription, and is not used to certify that the patient has a qualifying medical condition.) 0 Information only     metFORMIN (GLUCOPHAGE) 500 MG tablet Take 2 tablets (1,000 mg) by mouth 2 times daily (with meals) 360 tablet 0    metroNIDAZOLE (METROGEL) 0.75 % external gel Apply topically 2 times daily Apply to face daily 45 g 5    naloxone (NARCAN) 4 MG/0.1ML nasal spray Spray 1 spray (4 mg) into one nostril alternating nostrils as needed for opioid reversal every 2-3 minutes until assistance arrives 0.2 mL 0    nystatin (MYCOSTATIN) 467562 UNIT/GM external powder Apply topically 2 times daily as needed 2 x daily PRN for rash 60 g 4    omeprazole (PRILOSEC) 20 MG DR capsule Take 1 capsule (20 mg) by mouth 2 times daily 180 capsule 3    pimecrolimus (ELIDEL) 1 % external cream Apply topically 2 times daily - use on back of neck and hands 60 g 5    potassium chloride ER (KLOR-CON M) 10 MEQ CR tablet Take 1 tablet (10 mEq) by mouth 2 times daily (Patient taking differently: Take 10 mEq by mouth daily) 180 tablet 3    pregabalin (LYRICA) 100 MG capsule Take 1 capsule (100 mg) by mouth 2 times daily 60 capsule 1    pregabalin (LYRICA) 50 MG capsule Take 1 capsule (50 mg) by mouth 2 times daily 60 capsule 1    pregabalin (LYRICA) 75 MG capsule Take 1 capsule (75 mg) by mouth 2 times daily (Patient taking differently: Take 100 mg by mouth 2 times daily) 60 capsule 1    PROAIR  (90 Base) MCG/ACT inhaler Inhale 1-2 puffs into the lungs every 4 hours as needed for shortness of breath or wheezing 18 g 5    promethazine (PHENERGAN) 25 MG tablet Take 1 tablet (25 mg) by mouth every 6 hours as needed for nausea or other (Meniere's) For nusea 30 tablet 6    psyllium (METAMUCIL) 58.6 % POWD Take by mouth daily PRN      SENNA-docusate sodium (SENNA S) 8.6-50 MG tablet Take 1 tablet by mouth 2 times daily 60 tablet 1    thin (NO BRAND SPECIFIED) lancets Use to test blood sugar 2  times daily or as directed. To accompany: Blood Glucose Monitor Brands: per insurance. 200 each 11    triamcinolone (KENALOG) 0.1 % external cream Apply topically 2 times daily x5-10 days for flares 45 g 0    triamterene-HCTZ (DYAZIDE) 37.5-25 MG capsule Take 1 capsule by mouth daily 90 capsule 3    VITAMIN D3 50 MCG (2000 UT) tablet Take 2 tablets by mouth daily at 2 pm      hydrOXYzine (ATARAX) 25 MG tablet Take 1 tablet (25 mg) by mouth 3 times daily as needed for itching (Patient not taking: Reported on 12/19/2023) 40 tablet 0    promethazine (PHENERGAN) 25 MG suppository Place 1 suppository (25 mg) rectally every 6 hours as needed for nausea (Patient not taking: Reported on 12/19/2023) 7 suppository 0    promethazine (PHENERGAN) 25 MG tablet Take 1 tablet (25 mg) by mouth every 6 hours as needed for nausea (Patient not taking: Reported on 12/19/2023) 15 tablet 0       Medical History: any changes in medical history since they were last seen? No      Objective:    Physical Exam:  not currently breastfeeding.  GENERAL: Healthy, alert and no distress  EYES: Eyes grossly normal to inspection.  No discharge or erythema, or obvious scleral/conjunctival abnormalities.  RESP: No audible wheeze, cough, or visible cyanosis.  No visible retractions or increased work of breathing.    SKIN: Visible skin clear. No significant rash, abnormal pigmentation or lesions.  NEURO: Cranial nerves grossly intact.  Mentation and speech appropriate for age.  PSYCH: Mentation appears normal, affect normal/bright, judgement and insight intact, normal speech and appearance well-groomed.    Diagnostic Tests/Imaging/Labs:  Creatinine 9/21/23 - GFR 69    BILLING TIME DOCUMENTATION:   The total TIME spent on this patient on the date of the encounter/appointment was 21 minutes.      TOTAL TIME includes:   Time spent preparing to see the patient (reviewing records and tests)   Time spent face to face (or over the phone) with the patient   Time  spent ordering tests, medications, procedures and referrals   Time spent Referring and communicating with other healthcare professionals   Time spent documenting clinical information in Epic

## 2023-12-19 NOTE — PROGRESS NOTES
Betsey is a 63 year old who is being evaluated via a billable video visit.      How would you like to obtain your AVS? MyChart  If the video visit is dropped, the invitation should be resent by: Text to cell phone: [phone]  Will anyone else be joining your video visit? No  Is Pt currently in [place]? Yes    NOTE:  If Pt is not in [place], Appointment needs to be canceled and rescheduled.

## 2023-12-19 NOTE — PATIENT INSTRUCTIONS
1.  Pain Physical Therapy:  Continue home exercise program as tolerated, walking and yoga. May consider pain PT referral in future for fibromyalgia targeted therapy.              2.  Pain Psychologist to address relaxation, behavioral change, coping style, and other factors important to improvement.  NO - she is coping well with chronic pain at this time. May consider in the future if needed.               3.  Diagnostic Studies:  None               4.  Medication Management:   Lyrica - Continue 100 mg twice daily. She reports significant pain benefit, concerns for daytime fatigue/drowsiness present. Discussed that she is on multiple medications with CNS depression effects, advised to avoid taking Lyrica along with any other agents with sedation effects (hydrocodone, promethazine, clonazepam). She will monitor/track how she is taking medications and we will discuss at next visit and may consider further dosage increase of Lyrica.   Change tylenol 500-1000 mg to twice daily as needed. She is not certain of benefit, recommend transitioning from scheduled to PRN and monitor for Discussed daily max dosage, encouraged her to keep below 3000 mg from all sources (Norco included).  Last hepatic panel within last 12 months was WNL.   Continue Senna twice daily for constipation. Use miralax 1-2 times daily as needed. Constipation has improved, achieving daily BM.   Continue fentanyl 12 mcg/hr patch.  Continue hydrocodone to 2-3 tabs per day, #55 tabs to last 30 days.  Do not combine with clonazepam. She has been working on tapering down on use.  Advised that taper goal will be #45 tabs per month, which was her prior dosage, as I had agreed to temporary increase until Orencia injections resumed. Of note, rheumatology thinks fibromyalgia is most significant contributing factor to current pain symptoms, RA is stable.  Updated prescription sent to pharmacy today.  Keep naloxone on hand at home in the event of accidental overdose.  New prescription sent into pharmacy today.   Continue medical cannabis. She will let me know if renewal needed in between visits.   We will consider buprenorphine in the future. We discussed this again at prior visit, and I reached out to Dr. Mendoza for recommendations on transition from fentanyl patch  - You need to discontinue the patch and replace it with oral opioid.  For a 12mcg patch I would give an extra 30mg of norco a day to use PRN to help with withdrawal.  After one week of this discontinue ALL norco for 12 hours and start her on suboxone 2mg TID.              5.  Urine toxicology screen - completed on 8/21/23.               6.  Opioid agreement - completed on 8/21/23.               7. Procedures -she had bilateral L4-5 and L5-S1 facet joint injections in the past that were of substantial benefit.  She reports 98% improvement in symptoms that lasts several months, last injections in July 2022.  At prior visit, we discussed the possibility that insurance will no longer cover facet joint injections, and, if denied by insurance, we will plan for lumbar medial branch blocks with plan to proceed to radiofrequency ablation alternatively.  She was provided with information on MBB and RFA at prior visit.  Advised I will have nursing reach out to update her, if procedure planned we will need to change. Of note, she did not schedule repeat injections in between visits, notes improvement in low back pain with Lyrica dosage increase. Will hold off on procedures for now, consider in future for increased low back pain.               8.  Follow up with ANIBAL Frank CNP in 2-3 months.     ----------------------------------------------------------------  Clinic Number:  [phone]   Call with any questions about your care and for scheduling assistance.   Calls are returned Monday through Friday between 8 AM and 4:30 PM. We usually get back to you within 2 business days depending on the issue/request.    If we are  prescribing your medications:  For opioid medication refills, call the clinic or send a [employer] message 7 days in advance.  Please include:  Name of requested medication  Name of the pharmacy.  For non-opioid medications, call your pharmacy directly to request a refill. Please allow 3-4 days to be processed.   Per [place] State Law:  All controlled substance prescriptions must be filled within 30 days of being written.    For those controlled substances allowing refills, pickup must occur within 30 days of last fill.      We believe regular attendance is key to your success in our program!    Any time you are unable to keep your appointment we ask that you call us at least 24 hours in advance to cancel.This will allow us to offer the appointment time to another patient.   Multiple missed appointments may lead to dismissal from the clinic.

## 2024-01-01 ENCOUNTER — OFFICE VISIT (OUTPATIENT)
Dept: AUDIOLOGY | Facility: CLINIC | Age: 64
End: 2024-01-01
Payer: COMMERCIAL

## 2024-01-01 ENCOUNTER — TELEPHONE (OUTPATIENT)
Dept: RHEUMATOLOGY | Facility: CLINIC | Age: 64
End: 2024-01-01
Payer: COMMERCIAL

## 2024-01-01 ENCOUNTER — TELEPHONE (OUTPATIENT)
Dept: GASTROENTEROLOGY | Facility: CLINIC | Age: 64
End: 2024-01-01
Payer: COMMERCIAL

## 2024-01-01 ENCOUNTER — OFFICE VISIT (OUTPATIENT)
Dept: FAMILY MEDICINE | Facility: CLINIC | Age: 64
End: 2024-01-01
Payer: COMMERCIAL

## 2024-01-01 ENCOUNTER — MYC REFILL (OUTPATIENT)
Dept: FAMILY MEDICINE | Facility: CLINIC | Age: 64
End: 2024-01-01

## 2024-01-01 ENCOUNTER — OFFICE VISIT (OUTPATIENT)
Dept: RHEUMATOLOGY | Facility: CLINIC | Age: 64
End: 2024-01-01
Payer: COMMERCIAL

## 2024-01-01 ENCOUNTER — TELEPHONE (OUTPATIENT)
Dept: PHARMACY | Facility: OTHER | Age: 64
End: 2024-01-01
Payer: COMMERCIAL

## 2024-01-01 ENCOUNTER — VIRTUAL VISIT (OUTPATIENT)
Dept: PALLIATIVE MEDICINE | Facility: CLINIC | Age: 64
End: 2024-01-01
Payer: COMMERCIAL

## 2024-01-01 VITALS
BODY MASS INDEX: 30.29 KG/M2 | SYSTOLIC BLOOD PRESSURE: 138 MMHG | DIASTOLIC BLOOD PRESSURE: 68 MMHG | WEIGHT: 165.6 LBS | HEART RATE: 87 BPM | OXYGEN SATURATION: 96 %

## 2024-01-01 VITALS
HEIGHT: 62 IN | SYSTOLIC BLOOD PRESSURE: 120 MMHG | WEIGHT: 165.4 LBS | BODY MASS INDEX: 30.44 KG/M2 | HEART RATE: 82 BPM | TEMPERATURE: 97.5 F | OXYGEN SATURATION: 97 % | RESPIRATION RATE: 16 BRPM | DIASTOLIC BLOOD PRESSURE: 58 MMHG

## 2024-01-01 DIAGNOSIS — K21.9 GASTROESOPHAGEAL REFLUX DISEASE, UNSPECIFIED WHETHER ESOPHAGITIS PRESENT: ICD-10-CM

## 2024-01-01 DIAGNOSIS — E11.65 TYPE 2 DIABETES MELLITUS WITH HYPERGLYCEMIA, WITHOUT LONG-TERM CURRENT USE OF INSULIN (H): ICD-10-CM

## 2024-01-01 DIAGNOSIS — E78.5 HYPERLIPIDEMIA, UNSPECIFIED HYPERLIPIDEMIA TYPE: ICD-10-CM

## 2024-01-01 DIAGNOSIS — G89.4 CHRONIC PAIN SYNDROME: ICD-10-CM

## 2024-01-01 DIAGNOSIS — M25.50 MULTIPLE JOINT PAIN: ICD-10-CM

## 2024-01-01 DIAGNOSIS — J45.30 MILD PERSISTENT ASTHMA WITHOUT COMPLICATION: ICD-10-CM

## 2024-01-01 DIAGNOSIS — H90.A31 MIXED CONDUCTIVE AND SENSORINEURAL HEARING LOSS OF RIGHT EAR WITH RESTRICTED HEARING OF LEFT EAR: Primary | ICD-10-CM

## 2024-01-01 DIAGNOSIS — Z79.1 LONG TERM (CURRENT) USE OF NON-STEROIDAL ANTI-INFLAMMATORIES (NSAID): ICD-10-CM

## 2024-01-01 DIAGNOSIS — F11.90 CHRONIC, CONTINUOUS USE OF OPIOIDS: ICD-10-CM

## 2024-01-01 DIAGNOSIS — E87.6 HYPOKALEMIA: ICD-10-CM

## 2024-01-01 DIAGNOSIS — I10 HYPERTENSION GOAL BP (BLOOD PRESSURE) < 140/90: ICD-10-CM

## 2024-01-01 DIAGNOSIS — M79.642 BILATERAL HAND PAIN: ICD-10-CM

## 2024-01-01 DIAGNOSIS — M79.641 BILATERAL HAND PAIN: ICD-10-CM

## 2024-01-01 DIAGNOSIS — M85.89 OSTEOPENIA OF MULTIPLE SITES: ICD-10-CM

## 2024-01-01 DIAGNOSIS — M06.09 RHEUMATOID ARTHRITIS OF MULTIPLE SITES WITHOUT RHEUMATOID FACTOR (H): ICD-10-CM

## 2024-01-01 DIAGNOSIS — L65.9 HAIR LOSS: ICD-10-CM

## 2024-01-01 DIAGNOSIS — M79.7 FIBROMYALGIA: ICD-10-CM

## 2024-01-01 DIAGNOSIS — M54.59 LUMBAR FACET JOINT PAIN: ICD-10-CM

## 2024-01-01 DIAGNOSIS — Z00.00 ENCOUNTER FOR MEDICARE ANNUAL WELLNESS EXAM: Primary | ICD-10-CM

## 2024-01-01 DIAGNOSIS — I05.0 MITRAL VALVE STENOSIS, UNSPECIFIED ETIOLOGY: ICD-10-CM

## 2024-01-01 DIAGNOSIS — F13.20 BENZODIAZEPINE DEPENDENCE, EPISODIC (H): ICD-10-CM

## 2024-01-01 DIAGNOSIS — M25.50 MULTIPLE JOINT PAIN: Primary | ICD-10-CM

## 2024-01-01 DIAGNOSIS — L20.82 FLEXURAL ECZEMA: ICD-10-CM

## 2024-01-01 DIAGNOSIS — Z12.11 SCREEN FOR COLON CANCER: ICD-10-CM

## 2024-01-01 DIAGNOSIS — Z29.11 NEED FOR VACCINATION AGAINST RESPIRATORY SYNCYTIAL VIRUS: ICD-10-CM

## 2024-01-01 DIAGNOSIS — J30.89 PERENNIAL ALLERGIC RHINITIS: ICD-10-CM

## 2024-01-01 DIAGNOSIS — M06.09 RHEUMATOID ARTHRITIS OF MULTIPLE SITES WITH NEGATIVE RHEUMATOID FACTOR (H): Primary | ICD-10-CM

## 2024-01-01 DIAGNOSIS — I35.0 NONRHEUMATIC AORTIC VALVE STENOSIS: ICD-10-CM

## 2024-01-01 DIAGNOSIS — H90.A22 SENSORINEURAL HEARING LOSS (SNHL) OF LEFT EAR WITH RESTRICTED HEARING OF RIGHT EAR: ICD-10-CM

## 2024-01-01 DIAGNOSIS — M35.01 SJOGREN'S SYNDROME WITH KERATOCONJUNCTIVITIS SICCA (H): ICD-10-CM

## 2024-01-01 LAB
ALBUMIN SERPL BCG-MCNC: 4.3 G/DL (ref 3.5–5.2)
ALP SERPL-CCNC: 93 U/L (ref 40–150)
ALT SERPL W P-5'-P-CCNC: 26 U/L (ref 0–50)
ANION GAP SERPL CALCULATED.3IONS-SCNC: 12 MMOL/L (ref 7–15)
AST SERPL W P-5'-P-CCNC: 26 U/L (ref 0–45)
BASOPHILS # BLD AUTO: 0 10E3/UL (ref 0–0.2)
BASOPHILS NFR BLD AUTO: 0 %
BILIRUB DIRECT SERPL-MCNC: <0.2 MG/DL (ref 0–0.3)
BILIRUB SERPL-MCNC: 0.3 MG/DL
BUN SERPL-MCNC: 16.9 MG/DL (ref 8–23)
CALCIUM SERPL-MCNC: 9.7 MG/DL (ref 8.8–10.2)
CALCIUM SERPL-MCNC: 9.7 MG/DL (ref 8.8–10.2)
CHLORIDE SERPL-SCNC: 101 MMOL/L (ref 98–107)
CHOLEST SERPL-MCNC: 206 MG/DL
CREAT SERPL-MCNC: 0.93 MG/DL (ref 0.51–0.95)
CREAT SERPL-MCNC: 0.93 MG/DL (ref 0.51–0.95)
CREAT UR-MCNC: 176 MG/DL
CRP SERPL-MCNC: 6.41 MG/L
DEPRECATED HCO3 PLAS-SCNC: 26 MMOL/L (ref 22–29)
EGFRCR SERPLBLD CKD-EPI 2021: 70 ML/MIN/1.73M2
EGFRCR SERPLBLD CKD-EPI 2021: 70 ML/MIN/1.73M2
EOSINOPHIL # BLD AUTO: 0.3 10E3/UL (ref 0–0.7)
EOSINOPHIL NFR BLD AUTO: 4 %
ERYTHROCYTE [DISTWIDTH] IN BLOOD BY AUTOMATED COUNT: 14.8 % (ref 10–15)
ERYTHROCYTE [SEDIMENTATION RATE] IN BLOOD BY WESTERGREN METHOD: 27 MM/HR (ref 0–30)
FASTING STATUS PATIENT QL REPORTED: YES
GLUCOSE SERPL-MCNC: 105 MG/DL (ref 70–99)
HBA1C MFR BLD: 6.6 % (ref 0–5.6)
HCT VFR BLD AUTO: 35.5 % (ref 35–47)
HDLC SERPL-MCNC: 42 MG/DL
HGB BLD-MCNC: 11.3 G/DL (ref 11.7–15.7)
IMM GRANULOCYTES # BLD: 0 10E3/UL
IMM GRANULOCYTES NFR BLD: 1 %
LDLC SERPL CALC-MCNC: 139 MG/DL
LYMPHOCYTES # BLD AUTO: 1.5 10E3/UL (ref 0.8–5.3)
LYMPHOCYTES NFR BLD AUTO: 20 %
MCH RBC QN AUTO: 25.1 PG (ref 26.5–33)
MCHC RBC AUTO-ENTMCNC: 31.8 G/DL (ref 31.5–36.5)
MCV RBC AUTO: 79 FL (ref 78–100)
MICROALBUMIN UR-MCNC: <12 MG/L
MICROALBUMIN/CREAT UR: NORMAL MG/G{CREAT}
MONOCYTES # BLD AUTO: 0.4 10E3/UL (ref 0–1.3)
MONOCYTES NFR BLD AUTO: 6 %
NEUTROPHILS # BLD AUTO: 5.3 10E3/UL (ref 1.6–8.3)
NEUTROPHILS NFR BLD AUTO: 70 %
NONHDLC SERPL-MCNC: 164 MG/DL
PLATELET # BLD AUTO: 192 10E3/UL (ref 150–450)
POTASSIUM SERPL-SCNC: 4.2 MMOL/L (ref 3.4–5.3)
PROT SERPL-MCNC: 7.2 G/DL (ref 6.4–8.3)
RBC # BLD AUTO: 4.5 10E6/UL (ref 3.8–5.2)
SODIUM SERPL-SCNC: 139 MMOL/L (ref 135–145)
TRIGL SERPL-MCNC: 126 MG/DL
VIT D+METAB SERPL-MCNC: 58 NG/ML (ref 20–50)
WBC # BLD AUTO: 7.5 10E3/UL (ref 4–11)

## 2024-01-01 PROCEDURE — 82306 VITAMIN D 25 HYDROXY: CPT | Performed by: INTERNAL MEDICINE

## 2024-01-01 PROCEDURE — 85652 RBC SED RATE AUTOMATED: CPT | Performed by: INTERNAL MEDICINE

## 2024-01-01 PROCEDURE — 99214 OFFICE O/P EST MOD 30 MIN: CPT | Mod: 95

## 2024-01-01 PROCEDURE — 82310 ASSAY OF CALCIUM: CPT | Performed by: INTERNAL MEDICINE

## 2024-01-01 PROCEDURE — 99214 OFFICE O/P EST MOD 30 MIN: CPT | Performed by: INTERNAL MEDICINE

## 2024-01-01 PROCEDURE — 85025 COMPLETE CBC W/AUTO DIFF WBC: CPT | Performed by: INTERNAL MEDICINE

## 2024-01-01 PROCEDURE — 36415 COLL VENOUS BLD VENIPUNCTURE: CPT | Performed by: INTERNAL MEDICINE

## 2024-01-01 PROCEDURE — 80061 LIPID PANEL: CPT | Performed by: PHYSICIAN ASSISTANT

## 2024-01-01 PROCEDURE — 80076 HEPATIC FUNCTION PANEL: CPT | Performed by: INTERNAL MEDICINE

## 2024-01-01 PROCEDURE — 82565 ASSAY OF CREATININE: CPT | Performed by: INTERNAL MEDICINE

## 2024-01-01 PROCEDURE — 86140 C-REACTIVE PROTEIN: CPT | Performed by: INTERNAL MEDICINE

## 2024-01-01 PROCEDURE — 83036 HEMOGLOBIN GLYCOSYLATED A1C: CPT | Performed by: PHYSICIAN ASSISTANT

## 2024-01-01 PROCEDURE — 80048 BASIC METABOLIC PNL TOTAL CA: CPT | Performed by: PHYSICIAN ASSISTANT

## 2024-01-01 PROCEDURE — 82043 UR ALBUMIN QUANTITATIVE: CPT | Performed by: PHYSICIAN ASSISTANT

## 2024-01-01 PROCEDURE — 82570 ASSAY OF URINE CREATININE: CPT | Performed by: PHYSICIAN ASSISTANT

## 2024-01-01 PROCEDURE — 99214 OFFICE O/P EST MOD 30 MIN: CPT | Mod: 25 | Performed by: PHYSICIAN ASSISTANT

## 2024-01-01 PROCEDURE — G0439 PPPS, SUBSEQ VISIT: HCPCS | Performed by: PHYSICIAN ASSISTANT

## 2024-01-01 PROCEDURE — 36415 COLL VENOUS BLD VENIPUNCTURE: CPT | Performed by: PHYSICIAN ASSISTANT

## 2024-01-01 PROCEDURE — 92592 PR HEARING AID CHECK, MONAURAL: CPT | Performed by: AUDIOLOGIST

## 2024-01-01 PROCEDURE — 99207 PR FOOT EXAM NO CHARGE: CPT | Performed by: PHYSICIAN ASSISTANT

## 2024-01-01 RX ORDER — POTASSIUM CHLORIDE 750 MG/1
10 TABLET, EXTENDED RELEASE ORAL 2 TIMES DAILY
Qty: 180 TABLET | Refills: 3 | Status: SHIPPED | OUTPATIENT
Start: 2024-01-01

## 2024-01-01 RX ORDER — TRIAMTERENE AND HYDROCHLOROTHIAZIDE 37.5; 25 MG/1; MG/1
1 CAPSULE ORAL DAILY
Qty: 90 CAPSULE | Refills: 3 | Status: SHIPPED | OUTPATIENT
Start: 2024-01-01

## 2024-01-01 RX ORDER — TRIAMCINOLONE ACETONIDE 1 MG/G
CREAM TOPICAL 2 TIMES DAILY
Qty: 45 G | Refills: 0 | Status: SHIPPED | OUTPATIENT
Start: 2024-01-01

## 2024-01-01 RX ORDER — FLUTICASONE PROPIONATE 50 MCG
1-2 SPRAY, SUSPENSION (ML) NASAL DAILY
Qty: 48 G | Refills: 3 | Status: SHIPPED | OUTPATIENT
Start: 2024-01-01

## 2024-01-01 RX ORDER — FENTANYL 12.5 UG/1
1 PATCH TRANSDERMAL
Qty: 10 PATCH | Refills: 0 | Status: SHIPPED | OUTPATIENT
Start: 2024-01-01

## 2024-01-01 RX ORDER — FENTANYL 12.5 UG/1
1 PATCH TRANSDERMAL
Qty: 10 PATCH | Refills: 0 | Status: SHIPPED | OUTPATIENT
Start: 2024-01-01 | End: 2024-01-01

## 2024-01-01 RX ORDER — FLUTICASONE PROPIONATE 110 UG/1
1 AEROSOL, METERED RESPIRATORY (INHALATION) 2 TIMES DAILY
Qty: 36 G | Refills: 3 | Status: SHIPPED | OUTPATIENT
Start: 2024-01-01

## 2024-01-01 RX ORDER — CLONAZEPAM 0.5 MG/1
TABLET ORAL
Qty: 100 TABLET | Refills: 1 | Status: SHIPPED | OUTPATIENT
Start: 2024-01-01

## 2024-01-01 RX ORDER — RESPIRATORY SYNCYTIAL VIRUS VACCINE 120MCG/0.5
0.5 KIT INTRAMUSCULAR ONCE
Qty: 1 EACH | Refills: 0 | Status: SHIPPED | OUTPATIENT
Start: 2024-01-01 | End: 2024-01-01

## 2024-01-01 RX ORDER — HYDROCODONE BITARTRATE AND ACETAMINOPHEN 7.5; 325 MG/1; MG/1
.5-1 TABLET ORAL EVERY 6 HOURS PRN
Qty: 65 TABLET | Refills: 0 | Status: SHIPPED | OUTPATIENT
Start: 2024-01-01

## 2024-01-01 RX ORDER — PIMECROLIMUS 10 MG/G
CREAM TOPICAL 2 TIMES DAILY
Qty: 60 G | Refills: 5 | Status: SHIPPED | OUTPATIENT
Start: 2024-01-01

## 2024-01-01 RX ORDER — PREGABALIN 100 MG/1
100 CAPSULE ORAL 2 TIMES DAILY
Qty: 60 CAPSULE | Refills: 1 | Status: SHIPPED | OUTPATIENT
Start: 2024-01-01

## 2024-01-01 RX ORDER — POTASSIUM CHLORIDE 750 MG/1
10 TABLET, EXTENDED RELEASE ORAL DAILY
Qty: 90 TABLET | Refills: 1 | Status: SHIPPED | OUTPATIENT
Start: 2024-01-01

## 2024-01-01 RX ORDER — HYDROCODONE BITARTRATE AND ACETAMINOPHEN 7.5; 325 MG/1; MG/1
.5-1 TABLET ORAL EVERY 6 HOURS PRN
Qty: 55 TABLET | Refills: 0 | Status: SHIPPED | OUTPATIENT
Start: 2024-01-01 | End: 2024-01-01

## 2024-01-01 RX ORDER — ALBUTEROL SULFATE 90 UG/1
1-2 AEROSOL, METERED RESPIRATORY (INHALATION) EVERY 4 HOURS PRN
Qty: 18 G | Refills: 5 | Status: SHIPPED | OUTPATIENT
Start: 2024-01-01

## 2024-01-01 SDOH — HEALTH STABILITY: PHYSICAL HEALTH: ON AVERAGE, HOW MANY DAYS PER WEEK DO YOU ENGAGE IN MODERATE TO STRENUOUS EXERCISE (LIKE A BRISK WALK)?: 5 DAYS

## 2024-01-01 ASSESSMENT — ANXIETY QUESTIONNAIRES
5. BEING SO RESTLESS THAT IT IS HARD TO SIT STILL: NOT AT ALL
1. FEELING NERVOUS, ANXIOUS, OR ON EDGE: SEVERAL DAYS
GAD7 TOTAL SCORE: 1
IF YOU CHECKED OFF ANY PROBLEMS ON THIS QUESTIONNAIRE, HOW DIFFICULT HAVE THESE PROBLEMS MADE IT FOR YOU TO DO YOUR WORK, TAKE CARE OF THINGS AT HOME, OR GET ALONG WITH OTHER PEOPLE: NOT DIFFICULT AT ALL
6. BECOMING EASILY ANNOYED OR IRRITABLE: NOT AT ALL
GAD7 TOTAL SCORE: 1
3. WORRYING TOO MUCH ABOUT DIFFERENT THINGS: NOT AT ALL
8. IF YOU CHECKED OFF ANY PROBLEMS, HOW DIFFICULT HAVE THESE MADE IT FOR YOU TO DO YOUR WORK, TAKE CARE OF THINGS AT HOME, OR GET ALONG WITH OTHER PEOPLE?: NOT DIFFICULT AT ALL
7. FEELING AFRAID AS IF SOMETHING AWFUL MIGHT HAPPEN: NOT AT ALL
7. FEELING AFRAID AS IF SOMETHING AWFUL MIGHT HAPPEN: NOT AT ALL
2. NOT BEING ABLE TO STOP OR CONTROL WORRYING: NOT AT ALL
4. TROUBLE RELAXING: NOT AT ALL
GAD7 TOTAL SCORE: 1

## 2024-01-01 ASSESSMENT — SOCIAL DETERMINANTS OF HEALTH (SDOH): HOW OFTEN DO YOU GET TOGETHER WITH FRIENDS OR RELATIVES?: PATIENT DECLINED

## 2024-01-01 ASSESSMENT — PATIENT HEALTH QUESTIONNAIRE - PHQ9
SUM OF ALL RESPONSES TO PHQ QUESTIONS 1-9: 1
SUM OF ALL RESPONSES TO PHQ QUESTIONS 1-9: 1
10. IF YOU CHECKED OFF ANY PROBLEMS, HOW DIFFICULT HAVE THESE PROBLEMS MADE IT FOR YOU TO DO YOUR WORK, TAKE CARE OF THINGS AT HOME, OR GET ALONG WITH OTHER PEOPLE: NOT DIFFICULT AT ALL

## 2024-01-01 ASSESSMENT — PAIN SCALES - GENERAL
PAINLEVEL: SEVERE PAIN (6)
PAINLEVEL: MODERATE PAIN (4)

## 2024-01-18 NOTE — PATIENT INSTRUCTIONS
RHEUMATOLOGY    [facility] [place] [facility]  [address]  [place] [address]    Phone number: [phone]  Fax number: [phone]    If you need a medication refill, please contact us as you may need lab work and/or a follow up visit prior to your refill.      Thank you for choosing [facility] [place]!    LEIDY Carr RN 1/18/2024 2:12 PM

## 2024-01-18 NOTE — PROGRESS NOTES
"  Rheumatology Clinic Visit      Betsey Vanessa MRN# [id number]   YOB: 1960 Age: 63 year old      Date of visit: 1/18/24   PCP: Katerina Judd     Chief Complaint   Patient presents with:  RA    Assessment and Plan     1.  Rheumatoid arthritis: Reportedly diagnosed \"many years ago\", when she first tablet care with me in 2020.  Previously treated by Dr. Stacy at [facility] in [place], [place]. Previously on methotrexate + rituximab, Enbrel + leflunomide; had elevated liver enzymes preventing oral DMARDs; then with Actemra 8 mg/kg IV monthly that was effective but was found to be associated with thrombocytopenia and the patient reported not much benefit from medication; Orencia were IV was effective but then held because of a fungal infection of the ear and RA continues to be well controlled. ALEGRIA hx prevents several oral DMARDs and LFT elevations with MTX in the past. Using Celebrex 100 mg twice daily.  Hydroxychloroquine requires an eye exam and this is overdue; 30-day supply provided today and the handout for hydroxychloroquine toxicity monitoring that she can give to her ophthalmologist was given today with the fax number to this clinic highlighted.  No synovitis on exam today.  RA is controlled.  Exam is consistent with fibromyalgia.  Chronic illness, stable.      - Continue hydroxychloroquine 200 mg daily with an additional 200 mg every other day (normal eye exam with Dr. Mara Oconnell on 9/25/2023)  - Continue Celebrex 100 mg BID; did not tolerate dose reduction previously  - Labs today: CBC, Creatinine, Hepatic Panel, ESR, CRP     2. Sjogren's Syndrome: reportedly had punctal plugs in the past and reportedly ducts are now cauterized.  Doing okay with artificial tears, Xiidra, and Evoxac.   Encouraged frequent sips of water, visits with a dentist at least every 6months, avoidance of sugary foods/drinks.    Chronic illness, stable.     - Continue xiidra  - Continue cevimeline " 30mg TID    3.  Fibromyalgia: Managed at the pain clinic.  Exam today is consistent with fibromyalgia.  Rheumatoid arthritis is controlled.  She will continue following with her pain management specialist for fibromyalgia management.    4. Osteopenia: based on 7/2019 DEXA report that she brought with her; FRAX score shows a 22% risk of major osteoporotic fracture in the next 10 years and a 1.9% risk for osteoporotic hip fracture in the next 10 years.  Based on FRAX tx is indicated.  She already received one dose of boniva in Sept 2019; and failed alendronate due to GI upset.  Reclast received 2/13/2020, 3/16/2021, 3/30/22, 7/18/2023.   9/12/2023 DEXA showed osteopenia and considering duration of bisphosphonate use we will give a drug holiday at this time.  Recheck DEXA on or after 9/12/2025 at which point Prolia or bisphosphonate could be considered if needed.  Chronic illness  - Continue calcium 1000mg daily  - Continue vitamin D 4000 IU daily  - Labs today: Ca, Vitamin D    5.  Vaccinations: Vaccinations reviewed with Ms. Vanessa.  Risks and benefits of vaccinations were discussed.    - Influenza: Advise yearly vaccination  - [id number]: up to date  - [id number]: up to date  - Shingrix: 1 dose received she had a reaction so she does not want a second dose.  - COVID-19: Advised keeping up-to-date    Total minutes spent in evaluation with patient, documentation, , and review of pertinent studies and chart notes: 22      Ms. Vanessa verbalized agreement with and understanding of the rational for the diagnosis and treatment plan.  All questions were answered to best of my ability and the patient's satisfaction. Ms. Vanessa was advised to contact the clinic with any questions that may arise after the clinic visit.      Thank you for involving me in the care of the patient    Return to clinic: 3-4 months    HPI   Betsey Vanessa is a 63 year old female with a past medical history significant for hypertension,  depression, type 2 diabetes, Ménière's disease, and rheumatoid arthritis who is seen in consultation for follow-up of RA.     12/5/2022: Has not taken Orencia since July 2022 because of a fungal ear infection that needs to be cleared before she has ear surgery.  Feels like her joints have been somewhat stable since stopping Orencia so she questions if it needs to be restarted.  She would like to see how she does for a longer duration without Orencia.  She reports that she does have occasional pain in her back, knees, feet, ankles, and hands but this pain may occur at anytime of day, is not affected by activity, and morning stiffness is less than 20 minutes.  Xiidra and cevimeline are effective.  Hydroxychloroquine is well-tolerated and seems to be controlling arthritis well per patient.  Dry eyes and dry mouth are controlled at this time.      5/18/2023: Diffuse body pain.  No joint swelling.  Joints without increased warmth or overlying erythema.  Following with the pain clinic for fibromyalgia management.  Fungal infection of the ear has resolved, per patient.  Continues to use Xiidra and cevimeline for dry eye and dry mouth and states that these are effective.  States that she is overdue for the hydroxychloroquine toxicity monitoring eye exam.    9/21/2023: Diffuse body pain.  Joints without swelling, increased warmth, or overlying erythema.  Following in the pain clinic for fibromyalgia management.  Still with hearing issues and planning to follow-up with ENT.  Xiidra and cevimeline are effective for dry eye and dry mouth.  Her symptoms in the morning when she wakes up; not using humidifier at night.    Today, 1/18/2024: Diffuse body pain similar to previous.  No joint swelling.  Morning stiffness for approximately 1 hour.  Following the pain clinic for fibromyalgia management.  Lyrica has been possibly helpful for fibromyalgia but is causing worsening dryness of her eyes and mouth.  Continues to use Xiidra,  cevimeline, preservative-free artificial tears, and frequent sips of water.    Tobacco: Quit smoking in 1998  EtOH: None  Drugs: None    ROS   12 point review of system was completed and negative except as noted in the HPI     Active Problem List     Patient Active Problem List   Diagnosis    Type 2 diabetes mellitus with hyperglycemia, without long-term current use of insulin (H)    Hypertension goal BP (blood pressure) < 140/90    Meniere's disease, unspecified laterality    Rheumatoid arthritis, involving unspecified site, unspecified rheumatoid factor presence    Valvular heart disease    Fibromyalgia    Mild major depression (H24)    Osteopenia of multiple sites    Benzodiazepine dependence, episodic (H)    Continuous opioid dependence (H)    Mitral stenosis    Mixed conductive and sensorineural hearing loss of right ear    Polyarticular arthritis    Chronic pain    Tympanic membrane perforation    Myofascial pain    Migraine with vertigo    Gastroesophageal reflux disease, esophagitis presence not specified    Fungal skin infection    Anaphylaxis, sequela    Hyperlipidemia, unspecified hyperlipidemia type    Otorrhea, right    Long-term current use of bisphosphonate    Family history of thyroid disease    BONNIE (generalized anxiety disorder)    Nonrheumatic aortic valve stenosis    Controlled substance agreement signed     Past Medical History     Past Medical History:   Diagnosis Date    Anemia     r/t menses    Anxiety     Arthritis     Constipation     Depression     Depressive disorder     Diabetes (H)     Diarrhea     Double vision     Headache(784.0)     Hearing loss     Heart disease     Heart murmur     Heartburn     History of blood transfusion     Hypertension     Indigestion     Nasal congestion     Night sweats     Numbness     Problems related to lack of adequate sleep     Rash     Sneezing     Tinnitus     Uncomplicated asthma     Visual loss     Weakness     Weight gain     because of Prednisone      Past Surgical History     Past Surgical History:   Procedure Laterality Date    ------------OTHER-------------      D&C    ABDOMEN SURGERY      CARDIAC SURGERY      ENT SURGERY      GYN SURGERY      HYSTERECTOMY TOTAL ABDOMINAL      HYSTERECTOMY, PAP NO LONGER INDICATED      IMPLANT BAHA Right 02/10/2023    Procedure: INSERTION, BONE ANCHORED HEARING AID WITH THE OTICON DEVICE;  Surgeon: Laura Stallings MD;  Location: [facility] OR    INNER EAR SURGERY      RELEASE CARPAL TUNNEL BILATERAL Bilateral 2008     Allergy     Allergies   Allergen Reactions    Duloxetine Other (See Comments)    Topiramate Other (See Comments)     Other reaction(s): Confusion    Amitriptyline Embonate [Amitriptyline]      Hyper activity    Azithromycin Hives    Duloxetine Hcl      Hyper activity    Gabapentin Hives    Macrobid [Nitrofurantoin] Hives    Paxil [Paroxetine] Other (See Comments)     Hyper behavior    Penicillins Hives    Statins Other (See Comments)     Liver function studies abnormal on Lipitor / Crestor    Tetracycline Hives     Current Medication List     Current Outpatient Medications   Medication Sig    acetaminophen (TYLENOL) 500 MG tablet Take 500-1000 mg BID, may take additional 500-1000 mg dose daily as needed, max 4000 mg from all sources.    alcohol swab prep pads Use to swab area of injection/rosie as directed    blood glucose (NO BRAND SPECIFIED) test strip Use to test blood sugar 2 times daily or as directed. To accompany: Blood Glucose Monitor Brands: per insurance.    blood glucose calibration (NO BRAND SPECIFIED) solution Use to calibrate blood glucose monitor as needed as directed. To accompany: Blood Glucose Monitor Brands: per insurance.    calcium carbonate (TUMS) 500 MG chewable tablet Take 1 chew tab by mouth 2 times daily    celecoxib (CELEBREX) 100 MG capsule Take 1 capsule (100 mg) by mouth 2 times daily    cetirizine (EQ ALLERGY RELIEF, CETIRIZINE,) 10 MG tablet Take 1 tablet by mouth once daily     cevimeline (EVOXAC) 30 MG capsule Take 1 capsule (30 mg) by mouth 3 times daily    clonazePAM (KLONOPIN) 0.5 MG tablet TAKE 1 TABLET BY MOUTH TWICE DAILY AS NEEDED FOR ANXIETY OK TO TAKE 1 TO 2 AT NIGHT AS NEEDED FOR SLEEP. SHOULD LAST ABOUT 90 DAYS    diclofenac (VOLTAREN) 1 % topical gel Apply 4 g topically 4 times daily    docusate sodium (EQ STOOL SOFTENER) 100 MG capsule Take 1 capsule (100 mg) by mouth 2 times daily    EPINEPHrine (ANY BX GENERIC EQUIV) 0.3 MG/0.3ML injection 2-pack Inject 0.3 mLs (0.3 mg) into the muscle as needed for anaphylaxis    escitalopram (LEXAPRO) 20 MG tablet TAKE 1 & 1/2 (ONE & ONE-HALF) TABLETS BY MOUTH ONCE DAILY IN THE EVENING    fentaNYL (DURAGESIC) 12 mcg/hr 72 hr patch Place 1 patch onto the skin every 72 hours remove old patch. Fill 12/29/23, start 1/1/24 #10 patches to last 30 days    Ferrous Gluconate 240 (27 Fe) MG TABS     fluorometholone (FML LIQUIFILM) 0.1 % ophthalmic suspension INSTILL 1 DROP INTO EACH EYE IN THE MORNING    fluticasone (FLONASE) 50 MCG/ACT nasal spray Spray 1-2 sprays into both nostrils daily    fluticasone (FLOVENT HFA) 110 MCG/ACT inhaler Inhale 1 puff into the lungs 2 times daily    fluvastatin (LESCOL) 20 MG capsule Take 1 capsule by mouth at bedtime    glimepiride (AMARYL) 2 MG tablet TAKE 1 TABLET BY MOUTH IN THE MORNING BEFORE BREAKFAST    glucose blood VI test strips strip daily    HYDROcodone-acetaminophen (NORCO) 7.5-325 MG per tablet Take 0.5-1 tablets by mouth every 6 hours as needed for severe pain Max 3 tabs/day, #55 tablets to last 30 days. Fill 12/29/23, start 1/1/24    hydroxychloroquine (PLAQUENIL) 200 MG tablet Hydroxychloroquine 200mg daily; and an additional 200mg every other day. Yearly eye exam including 10-2 VF and SD-OCT    hydrOXYzine (ATARAX) 25 MG tablet Take 1 tablet (25 mg) by mouth 3 times daily as needed for itching    lifitegrast (XIIDRA) 5 % opthalmic solution Place 1 drop into both eyes 2 times daily    magnesium 250  MG tablet Take 1 tablet by mouth daily    medical cannabis (Patient's own supply) Take 1 each (1 Dose) by mouth See Admin Instructions (The purpose of this order is to document that the patient reports taking medical cannabis.  This is not a prescription, and is not used to certify that the patient has a qualifying medical condition.)    metFORMIN (GLUCOPHAGE) 500 MG tablet Take 2 tablets (1,000 mg) by mouth 2 times daily (with meals)    metroNIDAZOLE (METROGEL) 0.75 % external gel Apply topically 2 times daily Apply to face daily    naloxone (NARCAN) 4 MG/0.1ML nasal spray Spray 1 spray (4 mg) into one nostril alternating nostrils as needed for opioid reversal every 2-3 minutes until assistance arrives    nystatin (MYCOSTATIN) 421673 UNIT/GM external powder Apply topically 2 times daily as needed 2 x daily PRN for rash    omeprazole (PRILOSEC) 20 MG DR capsule Take 1 capsule (20 mg) by mouth 2 times daily    pimecrolimus (ELIDEL) 1 % external cream Apply topically 2 times daily - use on back of neck and hands    potassium chloride ER (KLOR-CON M) 10 MEQ CR tablet Take 1 tablet (10 mEq) by mouth 2 times daily (Patient taking differently: Take 10 mEq by mouth daily)    pregabalin (LYRICA) 100 MG capsule Take 1 capsule (100 mg) by mouth 2 times daily    pregabalin (LYRICA) 50 MG capsule Take 1 capsule (50 mg) by mouth 2 times daily    pregabalin (LYRICA) 75 MG capsule Take 1 capsule (75 mg) by mouth 2 times daily (Patient taking differently: Take 100 mg by mouth 2 times daily)    PROAIR  (90 Base) MCG/ACT inhaler Inhale 1-2 puffs into the lungs every 4 hours as needed for shortness of breath or wheezing    promethazine (PHENERGAN) 25 MG suppository Place 1 suppository (25 mg) rectally every 6 hours as needed for nausea    promethazine (PHENERGAN) 25 MG tablet Take 1 tablet (25 mg) by mouth every 6 hours as needed for nausea or other (Meniere's) For nusea    promethazine (PHENERGAN) 25 MG tablet Take 1 tablet  "(25 mg) by mouth every 6 hours as needed for nausea    psyllium (METAMUCIL) 58.6 % POWD Take by mouth daily PRN    SENNA-docusate sodium (SENNA S) 8.6-50 MG tablet Take 1 tablet by mouth 2 times daily    thin (NO BRAND SPECIFIED) lancets Use to test blood sugar 2 times daily or as directed. To accompany: Blood Glucose Monitor Brands: per insurance.    triamcinolone (KENALOG) 0.1 % external cream Apply topically 2 times daily x5-10 days for flares    triamterene-HCTZ (DYAZIDE) 37.5-25 MG capsule Take 1 capsule by mouth daily    VITAMIN D3 50 MCG (2000 UT) tablet Take 2 tablets by mouth daily at 2 pm     No current facility-administered medications for this visit.     Social History   See HPI    Family History     Family History   Problem Relation Age of Onset    C.A.D. Mother     Alzheimer Disease Mother     Cardiovascular Mother     Eye Disorder Mother     Thyroid Disease Mother     Osteoporosis Mother     Hypertension Other         grandmother    Cerebrovascular Disease Father     Alcohol/Drug Father     Depression Father     Obesity Father         aunt    Asthma Father     Prostate Cancer Father     Anxiety Disorder Father     Alcohol/Drug Brother     Cancer Brother     Alcohol/Drug Sister         aunt    Cancer Sister     Allergies Other         All family members    Arthritis Other         aunt    Obesity Sister     Thyroid Disease Sister     Asthma Sister         daughter       Physical Exam     Temp Readings from Last 3 Encounters:   11/15/23 97.4  F (36.3  C)   09/29/23 99  F (37.2  C) (Tympanic)   07/28/23 98.6  F (37  C) (Oral)     BP Readings from Last 5 Encounters:   01/18/24 138/68   11/15/23 136/72   10/18/23 134/74   09/29/23 (!) 145/65   09/21/23 117/58     Pulse Readings from Last 1 Encounters:   01/18/24 87     Resp Readings from Last 1 Encounters:   09/29/23 20     Estimated body mass index is 30.29 kg/m  as calculated from the following:    Height as of 11/15/23: 1.575 m (5' 2\").    Weight as of " this encounter: 75.1 kg (165 lb 9.6 oz).    GEN: NAD.   HEENT:  Anicteric, noninjected sclera. No obvious external lesions of the ear and nose. Hearing intact.  CV: S1, S2. RRR. No m/r/g  PULM: No increased work of breathing. CTA bilaterally   MSK: MCPs, PIPs, DIPs, wrists, elbows, shoulders, knees, ankles, and MTPs are diffusely tender to palpation but without swelling, increased warmth, or overlying erythema.  Hips tender to palpation.  Tender to palpation in the bilateral forearms, upper arms, thighs, and lower legs.  Fibromyalgia tender points positive.    SKIN: No rash or jaundice seen  PSYCH: Alert. Appropriate.     Labs / Imaging (select studies)     RF/CCP  Recent Labs   Lab Test 01/14/20  1221   CCPIGG 1   RHF 20*     CBC  Recent Labs   Lab Test 09/21/23  1523 05/18/23  1537 02/01/23  0738 09/17/21  0945 05/21/21  0954 01/19/21  1130 10/28/20  1537   WBC 6.4 6.7 6.3   < > 6.6 6.5 7.1   RBC 4.28 4.32 4.17   < > 4.41 4.46 4.56   HGB 11.0* 11.4* 11.0*   < > 11.6* 11.8 12.3   HCT 33.8* 34.9* 36.0   < > 35.7 36.6 37.5   MCV 79 81 86   < > 81 82 82   RDW 14.4 15.5* 14.7   < > 15.3* 14.4 13.7    158 181   < > 160 159 162   MCH 25.7* 26.4* 26.4*   < > 26.3* 26.5 27.0   MCHC 32.5 32.7 30.6*   < > 32.5 32.2 32.8   NEUTROPHIL 63 66 58   < > 53.1 65.0 66.1   LYMPH 26 20 26   < > 36.2 25.3 25.8   MONOCYTE 6 6 6   < > 7.4 5.1 5.1   EOSINOPHIL 4 8 9   < > 3.0 4.0 2.5   BASOPHIL 0 0 0   < > 0.3 0.3 0.1   ANEU  --   --   --   --  3.5 4.2 4.7   ALYM  --   --   --   --  2.4 1.6 1.8   MOISE  --   --   --   --  0.5 0.3 0.4   AEOS  --   --   --   --  0.2 0.3 0.2   ABAS  --   --   --   --  0.0 0.0 0.0   ANEUTAUTO 4.1 4.5 3.7   < >  --   --   --    ALYMPAUTO 1.7 1.3 1.6   < >  --   --   --    AMONOAUTO 0.4 0.4 0.4   < >  --   --   --    AEOSAUTO 0.3 0.5 0.6   < >  --   --   --    ABSBASO 0.0 0.0 0.0   < >  --   --   --     < > = values in this interval not displayed.     [facility]  Recent Labs   Lab Test 09/21/23  1333  07/18/23  1056 05/18/23  1537 01/24/23  0821 07/18/22  1411 03/30/22  1428 02/18/22  0657 09/17/21  0945 05/21/21  0954 03/16/21  1130 01/19/21  1130 01/08/21  1535   NA  --   --   --  138  --   --  137  --   --   --   --  135   POTASSIUM  --   --   --  4.2  --   --  3.8  --   --   --   --  4.2   CHLORIDE  --   --   --  103  --   --  102  --   --   --   --  99   CO2  --   --   --  26  --   --  27  --   --   --   --  32   ANIONGAP  --   --   --  9  --   --  8  --   --   --   --  4   GLC  --   --   --  146*  --   --  201*  --   --   --   --  155*   BUN  --   --   --  17  --   --  19  --   --   --   --  16   CR 0.94 0.88 0.88 0.81 0.89 0.88 0.87   < > 0.92 0.83 0.88 0.80   GFRESTIMATED 69 74 74 83 74 75 76   < > 68 78 72 81   GFRESTBLACK  --   --   --   --   --   --   --   --  79 >90 84 >90   MIRNA  --  9.8  --  9.6  --  9.4 9.1   < >  --  10.2* 9.2 9.7   BILITOTAL 0.4  --  0.3  --  0.3 0.4  --    < > 0.3  --  0.4 0.5   ALBUMIN 4.5  --  4.6  --  3.8 3.9  --    < > 4.0  --  3.9 4.1   PROTTOTAL 7.4  --  7.5  --  7.3 7.4  --    < > 7.7  --  7.7 8.2   ALKPHOS 61  --  72  --  87 86  --    < > 83  --  117 100   AST 26  --  30  --  17 26  --    < > 20  --  23 23   ALT 19  --  29  --  26 44  --    < > 35  --  36 45    < > = values in this interval not displayed.     Calcium/VitaminD  Recent Labs   Lab Test 07/18/23  1056 01/24/23  0821 07/18/22  1411 03/30/22  1428 02/18/22  0657 01/14/22  0718 05/21/21  0954   MIRNA 9.8 9.6  --  9.4   < > 8.9  --    VITDT  --   --  39  --   --  34 39    < > = values in this interval not displayed.     ESR/CRP  Recent Labs   Lab Test 09/21/23  1523 05/18/23  1537 07/18/22  1411 03/30/22  1428 01/14/22  0718   SED 17 19 22 20 19   CRP  --   --  5.6 <2.9 5.1   CRPI 9.01* 4.81  --   --   --      Hepatitis B  Recent Labs   Lab Test 01/14/20  1221   HBCAB Nonreactive   HEPBANG Nonreactive     Hepatitis C  Recent Labs   Lab Test 01/14/20  1221   HCVAB Nonreactive     Lyme ab screening  Recent Labs   Lab  Test 01/14/20  1221   LYMEGM 0.05     Tuberculosis Screening  Recent Labs   Lab Test 09/17/21  0945 01/14/20  1221   TBRES Negative Negative     HIV Screening  Recent Labs   Lab Test 08/14/20  0736   HIAGAB Nonreactive       [facility]   7/18/2013 CCP negative  8/5/2010 CCP negative  3/27/2013 hepatitis B surface antigen negative and hepatitis B core antibody negative  9/2/2011 hepatitis B surface antigen negative and hepatitis B core antibody negative  3/27/2013 hepatitis C antibody negative    Immunization History     Immunization History   Administered Date(s) Administered    COVID-19 Bivalent 12+ (Pfizer) 01/24/2023    COVID-19 MONOVALENT 12+ (Pfizer) 03/19/2021, 04/09/2021, 10/12/2021    COVID-19 Monovalent 12+ (Pfizer 2022) 08/19/2022    Influenza Vaccine 18-64 (Flublok) 10/12/2021    Influenza Vaccine >6 months,quad, PF 10/21/2015, 10/05/2016, 10/11/2018, 09/25/2019, 09/24/2020    Influenza Vaccine, 6+MO IM (QUADRIVALENT W/PRESERVATIVES) 09/18/2014, 10/03/2017    Influenza, seasonal, injectable, PF 10/22/2013    Pneumo Conj 13-V (2010&after) 10/11/2018    Pneumococcal 23 valent 12/09/2020    TD,PF 7+ (Tenivac) 04/20/1997, 04/17/2019    TDAP Vaccine (Adacel) 01/15/2009    Zoster recombinant adjuvanted (SHINGRIX) 10/11/2018          Chart documentation done in part with Dragon Voice recognition Software. Although reviewed after completion, some word and grammatical error may remain.    Terrence Carrillo MD

## 2024-01-28 NOTE — LETTER
February 1, 2024      Betsey Vanessa  [address]  [place] [address]        Dear ,    We are writing to inform you of your test results. We have been unsuccessful in reaching you by phone.    The vitamin D level is just above the normal range; please reduce vitamin D from 4000 IU daily to 2000 IU daily.     Sincerely,  Terrence Carrillo MD

## 2024-01-29 NOTE — TELEPHONE ENCOUNTER
[facility] Call Center    Phone Message    May a detailed message be left on voicemail: yes     Reason for Call: Medication Refill Request    Has the patient contacted the pharmacy for the refill? Yes   Name of medication being requested: HYDROcodone-acetaminophen (NORCO) 7.5-325 MG per tablet  and fentaNYL (DURAGESIC) 12 mcg/hr 72 hr patch   Provider who prescribed the medication: Ronda Vasquez  Pharmacy:   [facility] [address] [place] [address] [id number]     Date medication is needed: ASAP  Pt reminded to call 5-7 business days ahead    Action Taken: Message routed to:  Other: Ernesto Pain    Travel Screening: Not Applicable

## 2024-01-29 NOTE — TELEPHONE ENCOUNTER
Called patient and there was no answer. Left message for patient to call back to discuss.     Migdalia GOMEZ RN, [facility] 01/29/24 10:27 AM

## 2024-01-29 NOTE — TELEPHONE ENCOUNTER
"RN: Betsey Vanessa did not read the result note sent by [email or web address] message based on notification from Epic stating that it was not read.  Therefore, please call Betsey to provide the information in the message.     \"      Betsey,     The vitamin D level is just above the normal range; please reduce vitamin D from 4000 IU daily to 2000 IU daily.     Sincerely,  Terrence Carrillo MD    \"    "

## 2024-01-30 NOTE — PROGRESS NOTES
AUDIOLOGY REPORT    SUBJECTIVE: Betsey Vanessa is a 63 year old female who was seen in the Audiology Clinic at [facility] on 1/30/2024 for an osseointegrated hearing implant check. She was accompanied by her daughter. Previous results have revealed severe likely mixed hearing loss for the right ear and a complete hearing loss for the left ear. The patient was implanted with a right [employer] osseointegrated hearing implant in [place], [place] but had extensive issues regarding feedback of the device. She was never able to wear her previous device easily and so had surgery on 2/10/2023 to remove the [employer] abutment and have a new [employer] abutment placed. She was fit with an MICMALIto 5 SP sound processor on 5/11/2023. Betsey reports she would like the volume increased.      OBJECTIVE: Overall gain was increased by 4 clicks. Feedback test was re-run. Betsey reported she felt like she was hearing her daughter better.     ASSESSMENT: An osseointegrated hearing implant check was completed today. Changes made as outlined above.    PLAN: Betsey will return for follow-up as needed. Please call this clinic with any questions regarding today s appointment.      Robert Heller, [facility]  Licensed Audiologist  [place] #[id number]

## 2024-01-30 NOTE — TELEPHONE ENCOUNTER
Medication refill information reviewed.     Due date for HYDROcodone-acetaminophen (NORCO) 7.5-325 MG per tablet    Dispensed: Dec. 29, 2023      fentaNYL (DURAGESIC) 12 mcg/hr 72 hr patch  is 1/31/2024 for both     Prescriptions prepped for review.     Will route to provider.       Dee Armenta RN  [facility]  [phone]

## 2024-01-30 NOTE — TELEPHONE ENCOUNTER
Received call from patient requesting refill(s) of HYDROcodone-acetaminophen (NORCO) 7.5-325 MG per tablet    Dispensed: Dec. 29, 2023     fentaNYL (DURAGESIC) 12 mcg/hr 72 hr patch   Dispensed: Dec. 29, 2023     Patient's last office/virtual visit by prescribing provider on: Oct. 18, 2023     Next office/virtual appointment scheduled for: Feb. 20, 2024     UDT: Aug. 21, 2023   CSA: Aug. 24, 2023    E-prescribe to:  [facility] [address], [place] - [address],    Will route to nursing [place] for review and preparation of prescription(s).     Loraine Monsivais, Clinic Facilitator  [facility] [place]

## 2024-01-30 NOTE — TELEPHONE ENCOUNTER
[place] Prescription Monitoring Program database was checked and prescription was e-prescribed to their preferred pharmacy.    I am covering for this provider while they are out of the office. Refills and or orders placed constitute a continuation of this providers medical decision making.     Nancy Calle, NP     Signed Prescriptions:                        Disp   Refills    fentaNYL (DURAGESIC) 12 mcg/hr 72 hr patch 10 pat*0        Sig: Place 1 patch onto the skin every 72 hours remove old           patch. Fill 1/30/24, start 1/31/24 #10 patches to           last 30 days  Authorizing Provider: NANCY CALLE    HYDROcodone-acetaminophen (NORCO) 7.5-325 *55 tab*0        Sig: Take 0.5-1 tablets by mouth every 6 hours as needed           for severe pain Max 3 tabs/day, #55 tablets to           last 30 days. Fill 1/30/2024, start 1/31/24  Authorizing Provider: NANCY CALLE

## 2024-02-01 NOTE — TELEPHONE ENCOUNTER
FYI - Status Update    Who is Calling: Betsey Vanessa    Update: Patient would like to update Terrence Carrillo that she receive the message about vitamin d and has cut down to 2000. She saw the notes on MyChart but had trouble replying back.     Does caller want a call/response back: No

## 2024-02-01 NOTE — TELEPHONE ENCOUNTER
Called pt and left vm to call back to discuss labs    LEIDY Carr RN 2/1/2024 9:44 AM     Consent (Near Eyelid Margin)/Introductory Paragraph: The rationale for Mohs was explained to the patient and consent was obtained. The risks, benefits and alternatives to therapy were discussed in detail. Specifically, the risks of ectropion or eyelid deformity, infection, scarring, bleeding, prolonged wound healing, incomplete removal, allergy to anesthesia, nerve injury and recurrence were addressed. Prior to the procedure, the treatment site was clearly identified and confirmed by the patient. All components of Universal Protocol/PAUSE Rule completed.

## 2024-02-05 NOTE — PROGRESS NOTES
Preventive Care Visit  [facility] KIMBERLY Judd PA-C, Family Medicine  Feb 5, 2024    Assessment & Plan       ICD-10-CM    1. Encounter for Medicare annual wellness exam  Z00.00       2. Need for vaccination against respiratory syncytial virus  Z29.11 respiratory syncytial virus vaccine, bivalent (ABRYSVO) injection      3. Screen for colon cancer  Z12.11 Colonoscopy Screening  Referral      4. Type 2 diabetes mellitus with hyperglycemia, without long-term current use of insulin (H)  E11.65 Albumin Random Urine Quantitative with Creat Ratio     HEMOGLOBIN A1C     FOOT EXAM     metFORMIN (GLUCOPHAGE) 500 MG tablet     Albumin Random Urine Quantitative with Creat Ratio     HEMOGLOBIN A1C      5. Hypertension goal BP (blood pressure) < 140/90  I10 BASIC METABOLIC PANEL     triamterene-HCTZ (DYAZIDE) 37.5-25 MG capsule     BASIC METABOLIC PANEL      6. Hyperlipidemia, unspecified hyperlipidemia type  E78.5 Lipid panel reflex to direct LDL Fasting     Lipid panel reflex to direct LDL Fasting      7. Benzodiazepine dependence, episodic (H)  F13.20 clonazePAM (KLONOPIN) 0.5 MG tablet      8. Mild persistent asthma without complication  J45.30 fluticasone (FLOVENT HFA) 110 MCG/ACT inhaler     PROAIR  (90 Base) MCG/ACT inhaler      9. Flexural eczema  L20.82 pimecrolimus (ELIDEL) 1 % external cream     triamcinolone (KENALOG) 0.1 % external cream      10. Gastroesophageal reflux disease, unspecified whether esophagitis present  K21.9 omeprazole (PRILOSEC) 20 MG DR capsule      11. Nonrheumatic aortic valve stenosis  I35.0 Echocardiogram Complete      12. Mitral valve stenosis, unspecified etiology  I05.0 Echocardiogram Complete          1-3) Screenings reviewed    4-6) Routine labs in process. Blood pressure at goal. Meds renewed, no changes. If 1c <7.3% will follow up in 6 months.     7-10) Meds renewed, no changes. ACT up to date and at goal as of 9/14/23.    11,12) Due for  "repeat echo and follow up with cardiology      Ordering of each unique test  Prescription drug management    BMI  Estimated body mass index is 30.25 kg/m  as calculated from the following:    Height as of this encounter: 1.575 m (5' 2\").    Weight as of this encounter: 75 kg (165 lb 6.4 oz).     Counseling  Appropriate preventive services were discussed with this patient, including applicable screening as appropriate for fall prevention, nutrition, physical activity, Tobacco-use cessation, weight loss and cognition.  Checklist reviewing preventive services available has been given to the patient.  Reviewed patient's diet, addressing concerns and/or questions.   The patient was instructed to see the dentist every 6 months.   She is at risk for psychosocial distress and has been provided with information to reduce risk.   Discussed possible causes of fatigue. Addressed any concerns about safety while driving.  The patient was provided with written information regarding signs of hearing loss.   Information on urinary incontinence and treatment options given to patient.   I have reviewed Opioid Use Disorder and Substance Use Disorder risk factors and made any needed referrals.     Return in about 6 months (around 8/5/2024) for diabetes follow up, a repeat A1c.        Jose Leggett is a 63 year old, presenting for the following:  Physical        2/5/2024     7:27 AM   Additional Questions   Roomed by EDGAR Brody         2/5/2024     7:27 AM   Patient Reported Additional Medications   Patient reports taking the following new medications None per patient         Health Care Directive  Patient does not have a Health Care Directive or Living Will: Discussed advance care planning with patient; information given to patient to review.    History of Present Illness       Diabetes:   She presents for follow up of diabetes.  She is checking home blood glucose a few times a month.   She checks blood glucose before and after " meals.  Blood glucose is never over 200 and sometimes under 70. She is aware of hypoglycemia symptoms including shakiness, dizziness, weakness and confusion.    She has no concerns regarding her diabetes at this time.  She is having weight gain.            She eats 4 or more servings of fruits and vegetables daily.She consumes 2 sweetened beverage(s) daily.She exercises with enough effort to increase her heart rate 30 to 60 minutes per day.  She exercises with enough effort to increase her heart rate 5 days per week. She is missing 2 dose(s) of medications per week.  She is not taking prescribed medications regularly due to other.        2/5/2024   General Health   How would you rate your overall physical health? (!) FAIR   Feel stress (tense, anxious, or unable to sleep) Only a little   (!) STRESS CONCERN      2/5/2024   Nutrition   Diet: Low salt    Diabetic         2/5/2024   Exercise   Days per week of moderate/strenous exercise 5 days         2/5/2024   Social Factors   Frequency of gathering with friends or relatives Patient declined   Worry food won't last until get money to buy more No   Food not last or not have enough money for food? No   Do you have housing?  Yes   Are you worried about losing your housing? No   Lack of transportation? Yes   Unable to get utilities (heat,electricity)? No    (!) TRANSPORTATION CONCERN PRESENT      2/5/2024   Fall Risk   Fallen 2 or more times in the past year? Yes   Trouble with walking or balance? Yes   Gait Speed Test (Document in seconds) 10.78   Gait Speed Test Interpretation Greater than 5.01 seconds - ABNORMAL      Falls a few times per week  Her Menerire's has been back this last month, often the trigger for losing her balance.  Does have a walker at home  Follows audiology   Has Alex hearing           2/5/2024   Activities of Daily Living- Home Safety   Needs help with the following daily activites None of the above   Safety concerns in the home None of the above          2024   Dental   Dentist two times every year? (!) NO - dentures          2024   Hearing Screening   Hearing concerns? (!) I NEED TO ASK PEOPLE TO SPEAK UP OR REPEAT THEMSELVES.    (!) IT'S HARD TO FOLLOW A CONVERSATION IN A NOISY RESTAURANT OR CROWDED ROOM.    (!) TROUBLE UNDESTANDING A SPEAKER IN A PUBLIC MEETING OR [employer] SERVICE.    (!) TROUBLE UNDERSTANDING SOFT OR WHISPERED SPEECH.         2024   Driving Risk Screening   Patient/family members have concerns about driving (!) YES - doesn't drive due to [place]          2024   General Alertness/Fatigue Screening   Have you been more tired than usual lately? (!) YES         2024   Urinary Incontinence Screening   Bothered by leaking urine in past 6 months Yes          No data to display                Today's PHQ-9 Score:       2024     7:23 AM   PHQ-9 SCORE   PHQ-9 Total Score MyChart 1 (Minimal depression)   PHQ-9 Total Score 1         2024   Substance Use   Alcohol more than 3/day or more than 7/wk No   Do you have a current opioid prescription? (!) YES   How severe/bad is pain from 1 to 10? 4/10   Do you use any other substances recreationally? No       Social History     Tobacco Use    Smoking status: Former     Types: Cigarettes     Start date: 1978     Quit date: 1998     Years since quittin.0    Smokeless tobacco: Never    Tobacco comments:     1 pack a day    Vaping Use    Vaping Use: Never used   Substance Use Topics    Alcohol use: Yes     Comment: very rare    Drug use: Yes     Types: Marijuana           2023   LAST FHS-7 RESULTS   1st degree relative breast or ovarian cancer Yes   Any relative bilateral breast cancer Yes   Any male have breast cancer No   Any woman have breast and ovarian cancer No   Any woman with breast cancer before 50yrs No   2 or more relatives with breast and/or ovarian cancer Yes   2 or more relatives with breast and/or bowel cancer Yes        Annual screen by mutual  decision with patient      History of abnormal Pap smear: Status post benign hysterectomy. Health Maintenance and Surgical History updated.       The 10-year ASCVD risk score (Dylon DK, et al., 2019) is: 7.6%    Values used to calculate the score:      Age: 63 years      Sex: Female      Is Non- : No      Diabetic: Yes      Tobacco smoker: No      Systolic Blood Pressure: 120 mmHg      Is BP treated: No      HDL Cholesterol: 40 mg/dL      Total Cholesterol: 206 mg/dL          Reviewed and updated as needed this visit by Provider                    Past Medical History:   Diagnosis Date    Anemia     r/t menses    Anxiety     Arthritis     Constipation     Depression     Depressive disorder     Diabetes (H)     Diarrhea     Double vision     Headache(784.0)     Hearing loss     Heart disease     Heart murmur     Heartburn     History of blood transfusion     Hypertension     Indigestion     Nasal congestion     Night sweats     Numbness     Problems related to lack of adequate sleep     Rash     Sneezing     Tinnitus     Uncomplicated asthma     Visual loss     Weakness     Weight gain     because of Prednisone     Current providers sharing in care for this patient include:  Patient Care Team:  Katerina Judd PA-C as PCP - General (Family Medicine)  Noah Martins Tina C, MD as Assigned Surgical Provider  Laura Stallings MD as MD (Otolaryngology)  Katerina Judd PA-C as Assigned PCP  Terrence Carrillo MD as Assigned Rheumatology Provider  Zia Chew MD as MD (Neurology)  DEBRA To MD as MD (Cardiovascular Disease)  Aaron Cassidy MD as MD (Neurology)  Ronda Vasquez APRN CNP as Nurse Practitioner (Pain Medicine)  Ronda Vasquez APRN CNP as Assigned Pain Medication Provider  No Ref-Primary, Physician    The following health maintenance items are reviewed in Epic and correct as of today:  Health Maintenance   Topic Date Due    RSV VACCINE  "(Pregnancy & 60+) (1 - 1-dose 60+ series) Never done    ASTHMA ACTION PLAN  02/18/2023    COLORECTAL CANCER SCREENING  11/14/2023    BMP  01/24/2024    MICROALBUMIN  01/24/2024    DIABETIC FOOT EXAM  01/24/2024    A1C  01/28/2024    MEDICARE ANNUAL WELLNESS VISIT  01/24/2024    ASTHMA CONTROL TEST  02/29/2024    LIPID  07/28/2024    ANNUAL REVIEW OF HM ORDERS  07/28/2024    URINE DRUG SCREEN  08/21/2024    CONTROLLED SUBSTANCE AGREEMENT FOR CHRONIC PAIN MANAGEMENT  08/21/2024    EYE EXAM  11/08/2024    BONNIE ASSESSMENT  02/05/2025    PHQ-9  02/05/2025    MAMMO SCREENING  09/12/2025    Pneumococcal Vaccine: Pediatrics (0 to 5 Years) and At-Risk Patients (6 to 64 Years) (3 of 3 - PPSV23 or PCV20) 05/11/2027    ADVANCE CARE PLANNING  01/24/2028    DTAP/TDAP/TD IMMUNIZATION (3 - Td or Tdap) 04/17/2029    HEPATITIS C SCREENING  Completed    HIV SCREENING  Completed    DEPRESSION ACTION PLAN  Completed    INFLUENZA VACCINE  Completed    COVID-19 Vaccine  Completed    IPV IMMUNIZATION  Aged Out    HPV IMMUNIZATION  Aged Out    MENINGITIS IMMUNIZATION  Aged Out    RSV MONOCLONAL ANTIBODY  Aged Out    PAP  Discontinued    ZOSTER IMMUNIZATION  Discontinued    HEPATITIS A IMMUNIZATION  Discontinued     Review of Systems    Review of Systems  Constitutional, neuro, ENT, endocrine, pulmonary, cardiac, gastrointestinal, genitourinary, musculoskeletal, integument and psychiatric systems are negative, except as otherwise noted.     Objective    Exam  /58   Pulse 82   Temp 97.5  F (36.4  C) (Temporal)   Resp 16   Ht 1.575 m (5' 2\")   Wt 75 kg (165 lb 6.4 oz)   LMP  (LMP Unknown)   SpO2 97%   BMI 30.25 kg/m     Estimated body mass index is 30.25 kg/m  as calculated from the following:    Height as of this encounter: 1.575 m (5' 2\").    Weight as of this encounter: 75 kg (165 lb 6.4 oz).    Physical Exam  GENERAL: alert and no distress  EYES: Eyes grossly normal to inspection  HENT: ear canals and TM's normal, nose and " mouth without ulcers or lesions  NECK: no adenopathy, no asymmetry, masses, or scars  RESP: lungs clear to auscultation - no rales, rhonchi or wheezes  CV: regular rates and rhythm, normal S1 S2, no S3 or S4, and no murmur, click or rub  ABDOMEN: soft, nontender, no hepatosplenomegaly, no masses and bowel sounds normal  MS: no gross musculoskeletal defects noted, no edema  SKIN: no suspicious lesions or rashes  NEURO: Normal strength and tone, mentation intact and speech normal  PSYCH: mentation appears normal, affect normal/bright  Diabetic foot exam: normal DP and PT pulses, no trophic changes or ulcerative lesions, normal sensory exam, and normal monofilament exam        2/5/2024   Mini Cog   Clock Draw Score 2 Normal   3 Item Recall 2 objects recalled   Mini Cog Total Score 4            Signed Electronically by: Katerina Judd PA-C

## 2024-02-09 NOTE — TELEPHONE ENCOUNTER
"Endoscopy Scheduling Screen    Have you had a positive Covid test in the last 14 days?  No    Are you active on MyChart?   Yes    What insurance is in the chart?  Other:  [facility]    Ordering/Referring Provider: APARNA SHAH    (If ordering provider performs procedure, schedule with ordering provider unless otherwise instructed. )    BMI: Estimated body mass index is 30.25 kg/m  as calculated from the following:    Height as of 2/5/24: 1.575 m (5' 2\").    Weight as of 2/5/24: 75 kg (165 lb 6.4 oz).     Sedation Ordered  moderate sedation.   If patient BMI > 50 do not schedule in [facility].    If patient BMI > 45 do not schedule at [facility].    Are you taking methadone or Suboxone?  No    Have you had difficulties, pain, or discomfort during past endoscopy procedures?  No    Are you taking any prescription medications for pain 3 or more times per week?   YES, Colon/Combined: RN review needed to determine MAC and PREP.  (RN Review required.)     Do you have a history of malignant hyperthermia or adverse reaction to anesthesia?  Yes (Continue with scheduling. Nurse will notify anesthesia at scheduled location for eval.)    (Females) Are you currently pregnant?   No     Have you been diagnosed or told you have pulmonary hypertension?   No    Do you have an LVAD?  No    Have you been told you have moderate to severe sleep apnea?  No    Have you been told you have COPD, asthma, or any other lung disease?  Yes     What breathing problems do you have?  Asthma     Do you use home oxygen?  No    Have your breathing problems required an ED visit or hospitalization in the last year?  No    Do you have any heart conditions?  Yes heart disease    In the past year, have you had any hospitalizations for heart related issues including cardiomyopathy, heart attack, or stent placement?  No    Do you have any implantable devices in your body (pacemaker, ICD)?  Other BAJA  implant    Do you take nitroglycerine?  No    Have you ever had an " "organ transplant?   No    Have you ever had or are you awaiting a heart or lung transplant?   No    Have you had a stroke or transient ischemic attack (TIA aka \"mini stroke\" in the last 6 months?   No    Have you been diagnosed with or been told you have cirrhosis of the liver?   No    Are you currently on dialysis?   No    Do you need assistance transferring?   Yes (Hospital Only)    BMI: Estimated body mass index is 30.25 kg/m  as calculated from the following:    Height as of 2/5/24: 1.575 m (5' 2\").    Weight as of 2/5/24: 75 kg (165 lb 6.4 oz).     Is patients BMI > 40 and scheduling location [facility]?  No    Do you take an injectable medication for weight loss or diabetes (excluding insulin)?  No    Do you take the medication Naltrexone?  No    Do you take blood thinners?  No       Prep   Are you currently on dialysis or do you have chronic kidney disease?  No    Do you have a diagnosis of diabetes?  Yes (Golytely Prep)    Do you have a diagnosis of cystic fibrosis (CF)?  No    On a regular basis do you go 3 -5 days between bowel movements?  No    BMI > 40?  No    Preferred Pharmacy:    [facility] [address] [place] [address] [facility] [address] [facility]  [address] [facility] [address]  Phone: [phone] Fax: [phone]      Final Scheduling Details   Colonoscopy prep sent?  Standard MiraLAX    Procedure scheduled  Colonoscopy    Surgeon:  kervin     Date of procedure:  7/25    Pre-OP / PAC:   No - Not required for this site.    Location  [facility] - Per order.    Sedation   MAC/Deep Sedation - Per RN assessment.      Patient Reminders:   You will receive a call from a Nurse to review instructions and health history.  This assessment must be completed prior to your procedure.  Failure to complete the Nurse assessment may result in the procedure being cancelled.      On the day of your procedure, please designate an adult(s) who can drive you home stay with you for the next 24 hours. The medicines used in the exam will make " you sleepy. You will not be able to drive.      You cannot take public transportation, ride share services, or non-medical taxi service without a responsible caregiver.  Medical transport services are allowed with the requirement that a responsible caregiver will receive you at your destination.  We require that drivers and caregivers are confirmed prior to your procedure.

## 2024-02-09 NOTE — TELEPHONE ENCOUNTER
Pre Assessment RN Review    Focused Assessments    Pain Medication Review    Per scheduling questionnaire, patient reports taking prescription pain medication three or more times per week.    Per chart review, patient does have an active prescription for an opioid medication. Prescribed Medication(s): fentanyl and norco      Scheduling Status & Recommendations    Sedation: MAC/Deep Sedation - Per RN assessment.    Sandee Isaac RN  Endoscopy Procedure Pre Assessment RN  [phone] option 4

## 2024-02-13 NOTE — CONFIDENTIAL NOTE
MTM Recruitment: [facility] insurance     Referral outreach attempt #1 on February 13, 2024      Outcome: visit scheduled    Noah Paez, Pharm. D., BCACP  Medication Therapy Management Pharmacist

## 2024-02-20 NOTE — PROGRESS NOTES
Patient presents to the clinic today for a follow up with ANIBAL Frank CNP  regarding Pain Management.     Betsey is a 63 year old who is being evaluated via a billable video visit.      How would you like to obtain your AVS? MyChart  If the video visit is dropped, the invitation should be resent by: Text to cell phone: [phone]  Will anyone else be joining your video visit? No        Video-Visit Details    Type of service:  Video Visit     Platform used for Video Visit: OwnerListens- Send message to cell phone.          Is Pt currently in [place]? Yes    NOTE:  If Pt is not in [place], Appointment needs to be canceled and rescheduled             8/16/2023     1:51 PM 10/18/2023     3:05 PM 2/20/2024    11:01 AM   PEG Score   PEG Total Score 6.67 7 6          Urszula Palacios MA  [facility]

## 2024-02-20 NOTE — PROGRESS NOTES
Video-Visit Details    Type of service:  Video Visit     Originating Location (pt. Location): Home    Distant Location (provider location):  On-site  Platform used for Video Visit: [facility]     Date of visit: 2/20/2024      Assessment:   Betsey Vanessa is a 63 year old female with a past medical history significant for RA, fibromyalgia, polyarticular arthritis, HTN, Meniere's disease, depression, migraine who presents with complaints of widespread pain.      1. Chronic pain syndrome - Onset of back pain occurred many years ago, and she has pain in multiple joints. Etiology of pain is associated with multiple factors, including but not limited to, lumbar spondylosis, lumbar facet arthropathy, rheumatoid arthritis, and myofascial component.     Assigned to [facility] nursing team.     Visit Diagnoses:  1. Multiple joint pain    2. Chronic pain syndrome    3. Rheumatoid arthritis of multiple sites without rheumatoid factor (H)    4. Chronic, continuous use of opioids    5. Lumbar facet joint pain    6. Fibromyalgia    7. Bilateral hand pain        Plan:  Diagnosis reviewed, treatment option addressed, and risk/benifits discussed.  Self-care instructions given.  I am recommending a multidisciplinary treatment plan to help this patient better manage their pain.      1.  Pain Physical Therapy:  Continue home exercise program as tolerated, walking and yoga. May consider pain PT referral in future for fibromyalgia targeted therapy.              2.  Pain Psychologist to address relaxation, behavioral change, coping style, and other factors important to improvement.  NO - she is coping well with chronic pain at this time. May consider in the future if needed.               3.  Diagnostic Studies:  None               4.  Medication Management:   Lyrica - Current dose 100 mg twice daily. Given increased baseline pain since last visit, recommend increasing dosage to 125 mg twice daily. She has 25 mg  capsules leftover from prior prescription. Advised to increase by 25 mg at bedtime x 1 weeks (100 mg in AM and 125 mg in PM), then increase to 125 mg twice daily. Advised to send update via [facility] in between visits if interested in further dosage increase to 150 mg twice daily, also will let me know when she needs refills.   Change tylenol 500-1000 mg to twice daily as needed. She is not certain of benefit, recommend transitioning from scheduled to PRN and monitor for Discussed daily max dosage, encouraged her to keep below 3000 mg from all sources (Norco included).  Last hepatic panel within last 12 months was WNL.   Continue Senna twice daily for constipation. Use miralax 1-2 times daily as needed. Constipation has improved, achieving daily BM.   Continue fentanyl 12 mcg/hr patch.  Continue hydrocodone to 2-3 tabs per day, #65 tabs to last 30 days. Do not combine with clonazepam. She reports increased pain over past 2 months that is impacting daily function and sleep. Advised I am agreeable to increase by #10 tabs/month for now, will re-evaluate at follow up and consider tapering back down. Overall goal to reduce usage, ideally #45 tabs/month for long term plan. Prescriptions sent into pharmacy today.   Keep naloxone on hand at home in the event of accidental overdose. New prescription sent into pharmacy today.   Continue medical cannabis. She will let me know if renewal needed in between visits.   We will consider buprenorphine in the future. We discussed this again at prior visit, and I reached out to Dr. Mendoza for recommendations on transition from fentanyl patch  - You need to discontinue the patch and replace it with oral opioid.  For a 12mcg patch I would give an extra 30mg of norco a day to use PRN to help with withdrawal.  After one week of this discontinue ALL norco for 12 hours and start her on suboxone 2mg TID.              5.  Urine toxicology screen - completed on 8/21/23.               6.  Opioid  agreement - completed on 8/21/23.               7. Procedures -she had bilateral L4-5 and L5-S1 facet joint injections in the past that were of substantial benefit.  She reports 98% improvement in symptoms that lasts several months, last injections in July 2022.  At prior visit, we discussed the possibility that insurance will no longer cover facet joint injections, and, if denied by insurance, we will plan for lumbar medial branch blocks with plan to proceed to radiofrequency ablation alternatively.  She was provided with information on MBB and RFA at prior visit.  Advised I will have nursing reach out to update her, if procedure planned we will need to change. Of note, she did not schedule repeat injections in between visits, notes improvement in low back pain with Lyrica dosage increase. Will hold off on procedures for now, consider in future for increased low back pain.               8.  Follow up with ANIBAL Frank CNP in 8 weeks for in person visit       Review of Electronic Chart: Today I have also reviewed available medical information in the patient's medical record at [facility] and Care Everywhere (if available), including relevant provider notes, laboratory work, and imaging.     Ronda Vasquez, VIDHI, APRN, AGNP-C  [facility]     -------------------------------------------------    Subjective:    Chief complaint:   Chief Complaint   Patient presents with    Pain       Interval history:  Betsey Vanessa is a 63 year old female last seen on 12/19/23.  They are a patient of mine seen in follow up.     Recommendations/plan at the last visit included:  1.  Pain Physical Therapy:  Continue home exercise program as tolerated, walking and yoga. May consider pain PT referral in future for fibromyalgia targeted therapy.              2.  Pain Psychologist to address relaxation, behavioral change, coping style, and other factors important to improvement.  NO - she is coping well  with chronic pain at this time. May consider in the future if needed.               3.  Diagnostic Studies:  None               4.  Medication Management:   Lyrica - Continue 100 mg twice daily. She reports significant pain benefit, concerns for daytime fatigue/drowsiness present. Discussed that she is on multiple medications with CNS depression effects, advised to avoid taking Lyrica along with any other agents with sedation effects (hydrocodone, promethazine, clonazepam). She will monitor/track how she is taking medications and we will discuss at next visit and may consider further dosage increase of Lyrica.   Change tylenol 500-1000 mg to twice daily as needed. She is not certain of benefit, recommend transitioning from scheduled to PRN and monitor for Discussed daily max dosage, encouraged her to keep below 3000 mg from all sources (Norco included).  Last hepatic panel within last 12 months was WNL.   Continue Senna twice daily for constipation. Use miralax 1-2 times daily as needed. Constipation has improved, achieving daily BM.   Continue fentanyl 12 mcg/hr patch.  Continue hydrocodone to 2-3 tabs per day, #55 tabs to last 30 days.  Do not combine with clonazepam. She has been working on tapering down on use.  Advised that taper goal will be #45 tabs per month, which was her prior dosage, as I had agreed to temporary increase until Orencia injections resumed. Of note, rheumatology thinks fibromyalgia is most significant contributing factor to current pain symptoms, RA is stable.  Updated prescription sent to pharmacy today.  Keep naloxone on hand at home in the event of accidental overdose. New prescription sent into pharmacy today.   Continue medical cannabis. She will let me know if renewal needed in between visits.   We will consider buprenorphine in the future. We discussed this again at prior visit, and I reached out to Dr. Mendoza for recommendations on transition from fentanyl patch  - You need to  discontinue the patch and replace it with oral opioid.  For a 12mcg patch I would give an extra 30mg of norco a day to use PRN to help with withdrawal.  After one week of this discontinue ALL norco for 12 hours and start her on suboxone 2mg TID.              5.  Urine toxicology screen - completed on 8/21/23.               6.  Opioid agreement - completed on 8/21/23.               7. Procedures -she had bilateral L4-5 and L5-S1 facet joint injections in the past that were of substantial benefit.  She reports 98% improvement in symptoms that lasts several months, last injections in July 2022.  At prior visit, we discussed the possibility that insurance will no longer cover facet joint injections, and, if denied by insurance, we will plan for lumbar medial branch blocks with plan to proceed to radiofrequency ablation alternatively.  She was provided with information on MBB and RFA at prior visit.  Advised I will have nursing reach out to update her, if procedure planned we will need to change. Of note, she did not schedule repeat injections in between visits, notes improvement in low back pain with Lyrica dosage increase. Will hold off on procedures for now, consider in future for increased low back pain.               8.  Follow up with ANIBAL Frank CNP in 2-3 months.     Since her last visit, Betsey Vanessa reports:  -Overall pain has worsened over since last visit.   -She has been sick lately with cold, Meniere's symptoms have flared over past 2 months. She thinks weather may play a role in Meniere's symptom   -She notes new onset tremors since last visit, this is new for her. She does not notice difference with pain relative to tremors, just overall pain has worsened and not entirely sure what is contributing.   -She reports her sister is having similar issues with tremors who is around 6 years older than her. She notes they have similar medical conditions/conerns.   -She recently to [place] with her family,  they had a fun trip.   -Current Lyrica dose is 100 mg BID, takes doses 12 hours apart. She notes dry mouth symptoms and knows this medication contributes along with her other medications. She is open to dosage increase 125 mg, has leftover 25 mg caps at home.   -She has been taking clonazepam separate from other medications, does not drowsiness with this med.   -She uses promethazine as needed for vomiting, uses PRN.     Pain Information:   Pain rating: averages 6/10 on a 0-10 scale.      Interval history from last visit on 12/19/23:  -She reports pain improved since last visit.   -Lyrica was increased from 75 mg BID to 100 mg BID. She states she was surprised by benefit.   -She is having more fatigue/sedation during daytime. She reports other medications that can cause CNS depression.   -She reports bedtime Lyrica dose is okay, but after she increased morning dose she has noticed more prominent fatigue.   -She continues to have good days and bad days, but overall she reports pain is better with Lyrica.   -She reports promethazine has been part of Meniere's tx plan for 10+ years. She notes significant drying effect from medication. She also takes clonazepam as needed. She has daytime drowsiness as well, though not sure if this has changed much with Lyrica increase. She is open to try spacing out medications with sedation effects and see if this helps.   -She reports avoiding combining medications that cause CNS depression, though she does have multiple agents on board, including Lyrica, opioids, benzo, promethazine.  -She denies other side effects, no constipation.   Pain Information:              Pain rating: averages 7/10 on a 0-10 scale.        Interval history from last visit on 10/18/23:  -Her pain is better in some ways, notes improvements in sleep.   -She continues to have good days and bad days, notes last weekend was rough, also dealing with Meniere's flare.   -Recommended Lyrica increase at last visit to  100 mg BID, but she did not increase, current dose 75 mg BID.   -She does get some sedation with Lyrica.   -She thinks this is helping.   -Overall, she thinks pain is stable.   -She continues fentanyl 12 mcg/hr patch.   -She continues hydrocodone, takes 1-2 tabs per day.   -No major side effects from meds.   -She uses medical cannabis occasionally for severe pain.   -She is having increased low back pain, she is interested in repeating facet joint injections.   -She generally appreciates 98% pain benefit that lasts for several months.   Pain Information:              Pain rating: averages 7/10 on a 0-10 scale.        Interval history from last visit on 8/16/23:  -her pain is than it was at last visit.   -She was started on Lyrica at last visit, states she just started it on Monday.   -She reports some concerns for sleep disturbance since starting Lyrica, but she notes that there is a lot of stuff going on in her personal life right now that is contributing.   -Her current dose of Lyrica 50 mg BID.   -No major side effects outside of concern for sleep.   -She is open to further dosage increase.   -Hydrocodone had been increased a few months ago temporarily.   -She takes 1 tab in morning and 1 tab in evening, sometimes additional tab PRN.   -She is open to reduce hydrocodone by 10 tabs this next months.   -She reports she has not used medical cannabis for a while due to side effects and exacerbates vertigo/dizziness, only uses occasionally on days with severe pain.   Pain Information:              Pain rating: averages 3/10 on a 0-10 scale.        Interval history from last visit on 7/5/23:  -her pain is worse than it was at last visit.   -She reports pain is widespread, sometimes very sharp and impacts sleep, she just seems to hurt all the time.   -She notes that today pain is not as bad.   -She is having trouble with sleep due to pain.   -Dr. Carrillo's LOV from 5/18/23 suggests that current symptoms are most  consistent with fibromyalgia, as RA is fairly well controlled.   -She notes issues with intermittent constipation and loose stools, has had these issues ongoing for an extended period of time but she notes symptoms have worsened more recently.   -She notes she also recently started taking iron supplement, which is likely contributing to GI symptoms to an extent.   -She also has hx of psoriasis that has been flared up, also notes allergies have been worse this season.   -She has tried duloxetine in the past, had significant side effects, sounds like manic symptoms, now listed as allergy.   -She has tried low dose Lyrica in the past, stopped due to side effects but she notes this could have been due to concurrent use of clonazepam.   -She is open to trial again and will space out from benzo doses.   -She takes clonazepam PRN for vertigo and anxiety.   -She is overwhelmed with symptoms right now, trying to think about prior pain flares and what she did for it.   -She tried massage therapy in the past, states she went to [employer], states this was helpful and she felt it was well worth it.   -She knows insurance does not usually cover this tx, but she is open to explore coverage with insurance.   -She is open to acupuncture referral, pending what she finds out about massage therapy.   -She is thinking about starting chair yoga.   -She continues fentanyl 12 mcg/hr patch and norco PRN.   -She appreciates benefit from these meds, but more recently she started having increased pain and did ask about adjustments to fentanyl today, which I advised against.   -She is open to trial other non-opioid medications to better target fibromyalgia related pain.   -Her brother is sick, has multiple myeloma.   -She states he has been sick for a while and now they are getting some answers.   Pain Information:              Pain rating: averages 5/10 on a 0-10 scale.        Interval history from last visit on  "4/13/23:  -her pain is about the same as it was at last visit.   -Her ear infection has resolved but she will not be resuming the Orencia injections until sometime around June.  -She has follow up with rheum next and will have better idea about resuming RA tx.   -She is looking forward to follow up.   -Norco was increased at last visit to target pain until she resumes RA tx.   -She has appreciated the additional 1 tab per day.   -Denies side effects from medications.   Pain Information:              Pain rating: averages 6/10 on a 0-10 scale.        Interval history from last visit on 2/22/23:  -Her pain is worse than it was since last visit.   -She added in scheduled tylenol that has not been very helpful.  -She continues to have significant pain.   -She takes Norco 7.5 mg, 1/2-1 tab q6h PRN, #45 tabs per month.   -She is asking if she can take additional tylenol.      Interval history from last visit on 1/10/23:  -Her pain is about the same as last visit.   -Pain is widespread in joints, they feel warm and swollen.   -She reports her fungal infection in her ear has resolved.    -Per chart review, last fungal swab on file from 12/8/22 negative.  -She is still off Orencia right now.  -She cannot restart this med for 3-6 months after surgery.   -Rheumatology suggested she try adding Tylenol.   -She is open to trial of this scheduled throughout the day.   -She is struggling with constipation.   -She is taking miralax at home PRN, has not been as helpful.   -She is interested in other medications to help with OIC.      Interval history from last visit on 10/11/22:  -Pain has increased since last visit, \"pain has been nasty\" lately.   -She is still dealing with fungal infection, has follow up 10/27/22. Does not feel like infection is getting better and has   -She is still struggling with dizziness and nausea r/t fungal infection in ear.   -She has missed Orencia injection that she is using to tx RA due to fungal " infection.   -She is interested in repeat lumbar facet injections with steroid, will need to check with ENT first.   -Medical cannabis:                          1. Charleen SL spray (1:1 CBD and THC) - use 1-2 sprays up to 5 times daily PRN; uses this on her bad days, but often takes at bedtime, takes for short acting                           2. Charleen oil tincture for long acting            Current Pain Treatments:     -Voltaren gel 1% PRN (somewhat helpful)  -Medical cannabis PRN (helpful)  -Celebrex 100mg BID (helpful)  -Fentanyl 12mcg/hr transdermal patch Q 72 hours (helpful)  -Norco 7.5/325mg take 0.5-1 tab Q 6 hours PRN #65 tabs to last 30 days   -plaquenil 200mg every day and 400mg every other day  -hydroxyzine 25mg TID PRN severe itching (helpful, but doesn't need to use often)     Current MME: 45-52.5      Review of [place] Prescription Monitoring Program (): No concern for abuse or misuse of controlled medications based on this report. Reviewed  - appears appropriate.      Annual Controlled Substance Agreement/UDS due date: Completed on 10/18/23.     Past pain treatments:  Injections:   -10/12/2020 bilateral L4-5, L5-S1 facet joint injections with Dr. Bailee Houser  (quite helpful)  -5/12/2021 bilateral L4-5, L5-S1 facet joint injections with Dr. Bailee Houser  (quite helpful)  -7/5/22 bilateral L4-5, L5-S1 facet joint injections with Dr. Lion       Medications:  Current Outpatient Medications   Medication Sig Dispense Refill    acetaminophen (TYLENOL) 500 MG tablet Take 500-1000 mg BID, may take additional 500-1000 mg dose daily as needed, max 4000 mg from all sources. 180 tablet 1    alcohol swab prep pads Use to swab area of injection/rosie as directed 100 each 3    blood glucose (NO BRAND SPECIFIED) test strip Use to test blood sugar 2 times daily or as directed. To accompany: Blood Glucose Monitor Brands: per insurance. 200 strip 11    blood glucose calibration (NO BRAND SPECIFIED) solution  Use to calibrate blood glucose monitor as needed as directed. To accompany: Blood Glucose Monitor Brands: per insurance. 1 Bottle 3    calcium carbonate (TUMS) 500 MG chewable tablet Take 1 chew tab by mouth 2 times daily      celecoxib (CELEBREX) 100 MG capsule Take 1 capsule (100 mg) by mouth 2 times daily 180 capsule 2    cetirizine (EQ ALLERGY RELIEF, CETIRIZINE,) 10 MG tablet Take 1 tablet by mouth once daily 90 tablet 3    cevimeline (EVOXAC) 30 MG capsule Take 1 capsule (30 mg) by mouth 3 times daily 270 capsule 2    clonazePAM (KLONOPIN) 0.5 MG tablet TAKE 1 TABLET BY MOUTH TWICE DAILY AS NEEDED FOR ANXIETY OK TO TAKE 1 TO 2 AT NIGHT AS NEEDED FOR SLEEP. SHOULD LAST ABOUT 90 DAYS 100 tablet 1    diclofenac (VOLTAREN) 1 % topical gel Apply 4 g topically 4 times daily 350 g 1    docusate sodium (EQ STOOL SOFTENER) 100 MG capsule Take 1 capsule (100 mg) by mouth 2 times daily 180 capsule 3    EPINEPHrine (ANY BX GENERIC EQUIV) 0.3 MG/0.3ML injection 2-pack Inject 0.3 mLs (0.3 mg) into the muscle as needed for anaphylaxis 2 each 0    escitalopram (LEXAPRO) 20 MG tablet TAKE 1 & 1/2 (ONE & ONE-HALF) TABLETS BY MOUTH ONCE DAILY IN THE EVENING 135 tablet 3    fentaNYL (DURAGESIC) 12 mcg/hr 72 hr patch Place 1 patch onto the skin every 72 hours remove old patch. Fill 2/27/24, start 3/1/24 #10 patches to last 30 days 10 patch 0    Ferrous Gluconate 240 (27 Fe) MG TABS       fluorometholone (FML LIQUIFILM) 0.1 % ophthalmic suspension INSTILL 1 DROP INTO EACH EYE IN THE MORNING      fluticasone (FLOVENT HFA) 110 MCG/ACT inhaler Inhale 1 puff into the lungs 2 times daily 36 g 3    fluvastatin (LESCOL) 20 MG capsule Take 1 capsule by mouth at bedtime 90 capsule 2    glimepiride (AMARYL) 2 MG tablet TAKE 1 TABLET BY MOUTH IN THE MORNING BEFORE BREAKFAST 90 tablet 3    glucose blood VI test strips strip daily      HYDROcodone-acetaminophen (NORCO) 7.5-325 MG per tablet Take 0.5-1 tablets by mouth every 6 hours as needed  for severe pain Max 3 tabs/day, #65 tablets to last 30 days. May fill/start 2/27/24 65 tablet 0    hydroxychloroquine (PLAQUENIL) 200 MG tablet Hydroxychloroquine 200mg daily; and an additional 200mg every other day. Yearly eye exam including 10-2 VF and SD- tablet 3    lifitegrast (XIIDRA) 5 % opthalmic solution Place 1 drop into both eyes 2 times daily 30 each 6    magnesium 250 MG tablet Take 1 tablet by mouth daily      medical cannabis (Patient's own supply) Take 1 each (1 Dose) by mouth See Admin Instructions (The purpose of this order is to document that the patient reports taking medical cannabis.  This is not a prescription, and is not used to certify that the patient has a qualifying medical condition.) 0 Information only     metFORMIN (GLUCOPHAGE) 500 MG tablet Take 2 tablets (1,000 mg) by mouth 2 times daily (with meals) 360 tablet 0    metroNIDAZOLE (METROGEL) 0.75 % external gel Apply topically 2 times daily Apply to face daily 45 g 5    naloxone (NARCAN) 4 MG/0.1ML nasal spray Spray 1 spray (4 mg) into one nostril alternating nostrils as needed for opioid reversal every 2-3 minutes until assistance arrives 0.2 mL 0    nystatin (MYCOSTATIN) 484351 UNIT/GM external powder Apply topically 2 times daily as needed 2 x daily PRN for rash 60 g 4    omeprazole (PRILOSEC) 20 MG DR capsule Take 1 capsule (20 mg) by mouth 2 times daily 180 capsule 3    pimecrolimus (ELIDEL) 1 % external cream Apply topically 2 times daily - use on back of neck and hands 60 g 5    pregabalin (LYRICA) 100 MG capsule Take 1 capsule (100 mg) by mouth 2 times daily 60 capsule 1    PROAIR  (90 Base) MCG/ACT inhaler Inhale 1-2 puffs into the lungs every 4 hours as needed for shortness of breath or wheezing 18 g 5    psyllium (METAMUCIL) 58.6 % POWD Take by mouth daily PRN      SENNA-docusate sodium (SENNA S) 8.6-50 MG tablet Take 1 tablet by mouth 2 times daily 60 tablet 1    thin (NO BRAND SPECIFIED) lancets Use to test  blood sugar 2 times daily or as directed. To accompany: Blood Glucose Monitor Brands: per insurance. 200 each 11    triamcinolone (KENALOG) 0.1 % external cream Apply topically 2 times daily x5-10 days for flares 45 g 0    triamterene-HCTZ (DYAZIDE) 37.5-25 MG capsule Take 1 capsule by mouth daily 90 capsule 3    VITAMIN D3 50 MCG (2000 UT) tablet Take 2 tablets by mouth daily at 2 pm      fluticasone (FLONASE) 50 MCG/ACT nasal spray Spray 1-2 sprays into both nostrils daily 48 g 3    hydrOXYzine (ATARAX) 25 MG tablet Take 1 tablet (25 mg) by mouth 3 times daily as needed for itching (Patient not taking: Reported on 2/20/2024) 40 tablet 0    potassium chloride alesia ER (KLOR-CON M10) 10 MEQ CR tablet Take 1 tablet (10 mEq) by mouth 2 times daily 180 tablet 3    potassium chloride ER (K-TAB/KLOR-CON) 10 MEQ CR tablet Take 1 tablet (10 mEq) by mouth daily 90 tablet 1    promethazine (PHENERGAN) 25 MG suppository Place 1 suppository (25 mg) rectally every 6 hours as needed for nausea (Patient not taking: Reported on 2/20/2024) 7 suppository 0    promethazine (PHENERGAN) 25 MG tablet Take 1 tablet (25 mg) by mouth every 6 hours as needed for nausea or other (Meniere's) For nusea (Patient not taking: Reported on 2/20/2024) 30 tablet 6    promethazine (PHENERGAN) 25 MG tablet Take 1 tablet (25 mg) by mouth every 6 hours as needed for nausea (Patient not taking: Reported on 2/20/2024) 15 tablet 0       Medical History: any changes in medical history since they were last seen? No      Objective:    Physical Exam:  not currently breastfeeding.  GENERAL: alert and no distress  EYES: Eyes grossly normal to inspection.  No discharge or erythema, or obvious scleral/conjunctival abnormalities.  RESP: No audible wheeze, cough, or visible cyanosis.    SKIN: Visible skin clear. No significant rash, abnormal pigmentation or lesions.  NEURO: Cranial nerves grossly intact.  Mentation and speech appropriate for age.  PSYCH: Appropriate  affect, tone, and pace of words      Diagnostic Tests/Imaging/Labs:  Hepatic panel 1/18/24 - WNL  Creatinine 1/18/24  - GFR 70     BILLING TIME DOCUMENTATION:   The total TIME spent on this patient on the date of the encounter/appointment was 26 minutes.      TOTAL TIME includes:   Time spent preparing to see the patient (reviewing records and tests)   Time spent face to face (or over the phone) with the patient   Time spent ordering tests, medications, procedures and referrals   Time spent Referring and communicating with other healthcare professionals   Time spent documenting clinical information in Epic

## 2024-02-22 NOTE — PATIENT INSTRUCTIONS
1.  Pain Physical Therapy:  Continue home exercise program as tolerated, walking and yoga. May consider pain PT referral in future for fibromyalgia targeted therapy.              2.  Pain Psychologist to address relaxation, behavioral change, coping style, and other factors important to improvement.  NO - she is coping well with chronic pain at this time. May consider in the future if needed.               3.  Diagnostic Studies:  None               4.  Medication Management:   Lyrica - Current dose 100 mg twice daily. Given increased baseline pain since last visit, recommend increasing dosage to 125 mg twice daily. She has 25 mg capsules leftover from prior prescription. Advised to increase by 25 mg at bedtime x 1 weeks (100 mg in AM and 125 mg in PM), then increase to 125 mg twice daily. Advised to send update via [employer] in between visits if interested in further dosage increase to 150 mg twice daily, also will let me know when she needs refills.   Change tylenol 500-1000 mg to twice daily as needed. She is not certain of benefit, recommend transitioning from scheduled to PRN and monitor for Discussed daily max dosage, encouraged her to keep below 3000 mg from all sources (Norco included).  Last hepatic panel within last 12 months was WNL.   Continue Senna twice daily for constipation. Use miralax 1-2 times daily as needed. Constipation has improved, achieving daily BM.   Continue fentanyl 12 mcg/hr patch.  Continue hydrocodone to 2-3 tabs per day, #65 tabs to last 30 days. Do not combine with clonazepam. She reports increased pain over past 2 months that is impacting daily function and sleep. Advised I am agreeable to increase by #10 tabs/month for now, will re-evaluate at follow up and consider tapering back down. Overall goal to reduce usage, ideally #45 tabs/month for long term plan. Prescriptions sent into pharmacy today.   Keep naloxone on hand at home in the event of accidental overdose. New prescription  sent into pharmacy today.   Continue medical cannabis. She will let me know if renewal needed in between visits.   We will consider buprenorphine in the future. We discussed this again at prior visit, and I reached out to Dr. Mendoza for recommendations on transition from fentanyl patch  - You need to discontinue the patch and replace it with oral opioid.  For a 12mcg patch I would give an extra 30mg of norco a day to use PRN to help with withdrawal.  After one week of this discontinue ALL norco for 12 hours and start her on suboxone 2mg TID.              5.  Urine toxicology screen - completed on 8/21/23.               6.  Opioid agreement - completed on 8/21/23.               7. Procedures -she had bilateral L4-5 and L5-S1 facet joint injections in the past that were of substantial benefit.  She reports 98% improvement in symptoms that lasts several months, last injections in July 2022.  At prior visit, we discussed the possibility that insurance will no longer cover facet joint injections, and, if denied by insurance, we will plan for lumbar medial branch blocks with plan to proceed to radiofrequency ablation alternatively.  She was provided with information on MBB and RFA at prior visit.  Advised I will have nursing reach out to update her, if procedure planned we will need to change. Of note, she did not schedule repeat injections in between visits, notes improvement in low back pain with Lyrica dosage increase. Will hold off on procedures for now, consider in future for increased low back pain.               8.  Follow up with ANIBAL Frank CNP in 8 weeks for in person visit

## 2024-03-21 ENCOUNTER — TELEPHONE (OUTPATIENT)
Dept: FAMILY MEDICINE | Facility: CLINIC | Age: 64
End: 2024-03-21
Payer: COMMERCIAL

## 2024-03-21 NOTE — TELEPHONE ENCOUNTER
[place] cause of death worksheet to be completed. Patient passed away on 3/20/24. Form placed in Katerina's in box.
